# Patient Record
Sex: MALE | Race: WHITE | NOT HISPANIC OR LATINO | Employment: FULL TIME | URBAN - METROPOLITAN AREA
[De-identification: names, ages, dates, MRNs, and addresses within clinical notes are randomized per-mention and may not be internally consistent; named-entity substitution may affect disease eponyms.]

---

## 2017-06-30 ENCOUNTER — OFFICE VISIT (OUTPATIENT)
Dept: FAMILY MEDICINE CLINIC | Facility: CLINIC | Age: 57
End: 2017-06-30

## 2017-06-30 VITALS
SYSTOLIC BLOOD PRESSURE: 142 MMHG | DIASTOLIC BLOOD PRESSURE: 80 MMHG | TEMPERATURE: 98.3 F | HEIGHT: 68 IN | RESPIRATION RATE: 16 BRPM | BODY MASS INDEX: 20.52 KG/M2 | HEART RATE: 88 BPM | WEIGHT: 135.4 LBS

## 2017-06-30 DIAGNOSIS — Z76.89 ENCOUNTER TO ESTABLISH CARE WITH NEW DOCTOR: ICD-10-CM

## 2017-06-30 DIAGNOSIS — R03.0 ELEVATED BLOOD PRESSURE (NOT HYPERTENSION): ICD-10-CM

## 2017-06-30 DIAGNOSIS — K21.00 GERD WITH ESOPHAGITIS: Primary | ICD-10-CM

## 2017-06-30 DIAGNOSIS — Z23 NEED FOR STREPTOCOCCUS PNEUMONIAE VACCINATION: ICD-10-CM

## 2017-06-30 DIAGNOSIS — Z72.0 TOBACCO ABUSE: ICD-10-CM

## 2017-06-30 DIAGNOSIS — Z12.5 SCREENING PSA (PROSTATE SPECIFIC ANTIGEN): ICD-10-CM

## 2017-06-30 DIAGNOSIS — Z23 NEED FOR TDAP VACCINATION: ICD-10-CM

## 2017-06-30 DIAGNOSIS — Z72.0 TOBACCO USE: ICD-10-CM

## 2017-06-30 DIAGNOSIS — Z11.59 NEED FOR HEPATITIS C SCREENING TEST: ICD-10-CM

## 2017-06-30 DIAGNOSIS — Z13.220 SCREENING FOR HYPERLIPIDEMIA: ICD-10-CM

## 2017-06-30 PROCEDURE — 90472 IMMUNIZATION ADMIN EACH ADD: CPT | Performed by: FAMILY MEDICINE

## 2017-06-30 PROCEDURE — 90715 TDAP VACCINE 7 YRS/> IM: CPT | Performed by: FAMILY MEDICINE

## 2017-06-30 PROCEDURE — 99406 BEHAV CHNG SMOKING 3-10 MIN: CPT | Performed by: FAMILY MEDICINE

## 2017-06-30 PROCEDURE — 90471 IMMUNIZATION ADMIN: CPT | Performed by: FAMILY MEDICINE

## 2017-06-30 PROCEDURE — 99203 OFFICE O/P NEW LOW 30 MIN: CPT | Performed by: FAMILY MEDICINE

## 2017-06-30 PROCEDURE — 90670 PCV13 VACCINE IM: CPT | Performed by: FAMILY MEDICINE

## 2017-06-30 RX ORDER — OMEPRAZOLE 40 MG/1
40 CAPSULE, DELAYED RELEASE ORAL DAILY
COMMUNITY
End: 2018-11-29 | Stop reason: SDUPTHER

## 2017-06-30 NOTE — PROGRESS NOTES
Subjective   Roger Bhat is a 57 y.o. male.     History of Present Illness   Here to establish care  No previous PCP   Was seen at  last year for GI bleed, GERD and ulcers. He has had repeat scopes since then, with biopsies and normal results.   He only takes Omeprazole for treatment.     Recently treated for ring worm from [facility]. Took oral medication. Condition is improving.   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     Tobacco abuse: currently smoking 1.5 ppd. States that he previously quit smoking with [employer], but resumed smoking in Dec 2016.     The following portions of the patient's history were reviewed and updated as appropriate: allergies, current medications, past family history, past medical history, past social history, past surgical history and problem list.    Review of Systems   Constitutional: Positive for unexpected weight change (weight loss). Negative for chills, fatigue and fever.   HENT: Negative for congestion, ear pain and hearing loss.    Eyes: Negative for pain and visual disturbance.   Respiratory: Negative for cough, chest tightness and shortness of breath.    Cardiovascular: Negative for chest pain, palpitations and leg swelling.   Gastrointestinal: Negative for abdominal pain, blood in stool, constipation, diarrhea and vomiting.   Endocrine: Negative for cold intolerance, heat intolerance, polydipsia, polyphagia and polyuria.   Genitourinary: Negative for difficulty urinating, dysuria and frequency.   Musculoskeletal: Negative for arthralgias, back pain and gait problem.   Skin: Negative for color change, rash and wound.   Allergic/Immunologic: Negative for environmental allergies, food allergies and immunocompromised state.   Neurological: Negative for dizziness, weakness, numbness and headaches.   Hematological: Negative for adenopathy. Does not bruise/bleed easily.   Psychiatric/Behavioral: Negative for agitation, confusion and sleep disturbance.       Objective    Physical Exam   Constitutional: He is oriented to person, place, and time. He appears well-developed and well-nourished.   HENT:   Head: Normocephalic and atraumatic.   Right Ear: Hearing, tympanic membrane, external ear and ear canal normal.   Left Ear: Hearing, tympanic membrane, external ear and ear canal normal.   Nose: Nose normal.   Mouth/Throat: Uvula is midline, oropharynx is clear and moist and mucous membranes are normal.   Eyes: Conjunctivae and EOM are normal. Pupils are equal, round, and reactive to light.   Neck: Normal range of motion. Neck supple. No tracheal deviation present. No thyromegaly present.   Cardiovascular: Normal rate, regular rhythm and normal heart sounds.    No murmur heard.  Pulmonary/Chest: Effort normal. No respiratory distress. He has decreased breath sounds in the right upper field and the left upper field. He has no wheezes.   Abdominal: Soft. Bowel sounds are normal. He exhibits no distension. There is no tenderness.   Musculoskeletal: He exhibits no edema or deformity.   Lymphadenopathy:     He has no cervical adenopathy.   Neurological: He is alert and oriented to person, place, and time. No cranial nerve deficit.   Skin: Skin is warm and dry. No rash noted.   Psychiatric: He has a normal mood and affect. His behavior is normal. Judgment and thought content normal.   Nursing note and vitals reviewed.      Assessment/Plan   Roger was seen today for establish care.    Diagnoses and all orders for this visit:    GERD with esophagitis  -     Comprehensive Metabolic Panel  -     CBC & Differential    Elevated blood pressure (not hypertension)    Tobacco abuse  -     XR Chest PA & Lateral    Encounter to establish care with new doctor  -     Comprehensive Metabolic Panel  -     CBC & Differential    Screening for hyperlipidemia  -     Lipid Panel  -     Comprehensive Metabolic Panel  -     CBC & Differential    Need for hepatitis C screening test  -     Hepatitis C  Antibody    Need for Streptococcus pneumoniae vaccination  -     pneumococcal conj. 13-valent (PREVNAR-13) vaccine 0.5 mL; Inject 0.5 mL into the shoulder, thigh, or buttocks 1 (One) Time.    Need for Tdap vaccination  -     Tdap Vaccine Greater Than or Equal To 6yo IM    Screening PSA (prostate specific antigen)  -     PSA    Tobacco use    Monitor blood pressure, return if >140/90.   Labs ordered  He will notify me when he wants to complete screening colonoscopy, did recommend that he do this soon.  I advised the patient of the risks in continuing to use tobacco. I also discussed how to quit smoking and the patient has expressed the willingness to quit.    During this visit, I spent 3-10 mintues counseling the patient regarding smoking cessation.   Vaccinations updated  Screening CXR ordered  Avoid caffeine, alcohol, tobacco, and spicy/greasy foods.  Sleep with the head of the bed slightly elevated to decrease reflux symptoms at night.  Avoid eating 3-4 hours prior to bedtime.

## 2017-07-04 DIAGNOSIS — R71.8 ELEVATED MCV: ICD-10-CM

## 2017-07-04 DIAGNOSIS — R73.9 HYPERGLYCEMIA: Primary | ICD-10-CM

## 2017-07-04 LAB
ALBUMIN SERPL-MCNC: 4.2 G/DL (ref 3.2–4.8)
ALBUMIN/GLOB SERPL: 1.7 G/DL (ref 1.5–2.5)
ALP SERPL-CCNC: 83 U/L (ref 25–100)
ALT SERPL-CCNC: 22 U/L (ref 7–40)
AST SERPL-CCNC: 31 U/L (ref 0–33)
BASOPHILS # BLD AUTO: 0.04 10*3/MM3 (ref 0–0.2)
BASOPHILS NFR BLD AUTO: 0.5 % (ref 0–1)
BILIRUB SERPL-MCNC: 0.4 MG/DL (ref 0.3–1.2)
BUN SERPL-MCNC: 5 MG/DL (ref 9–23)
BUN/CREAT SERPL: 7.1 (ref 7–25)
CALCIUM SERPL-MCNC: 9.6 MG/DL (ref 8.7–10.4)
CHLORIDE SERPL-SCNC: 95 MMOL/L (ref 99–109)
CHOLEST SERPL-MCNC: 170 MG/DL (ref 0–200)
CO2 SERPL-SCNC: 27 MMOL/L (ref 20–31)
CREAT SERPL-MCNC: 0.7 MG/DL (ref 0.6–1.3)
EOSINOPHIL # BLD AUTO: 0.05 10*3/MM3 (ref 0–0.3)
EOSINOPHIL NFR BLD AUTO: 0.6 % (ref 0–3)
ERYTHROCYTE [DISTWIDTH] IN BLOOD BY AUTOMATED COUNT: 14.4 % (ref 11.3–14.5)
GLOBULIN SER CALC-MCNC: 2.5 GM/DL
GLUCOSE SERPL-MCNC: 104 MG/DL (ref 70–100)
HCT VFR BLD AUTO: 42.4 % (ref 38.9–50.9)
HCV AB S/CO SERPL IA: <0.1 S/CO RATIO (ref 0–0.9)
HDLC SERPL-MCNC: 70 MG/DL (ref 40–60)
HGB BLD-MCNC: 14.7 G/DL (ref 13.1–17.5)
IMM GRANULOCYTES # BLD: 0.02 10*3/MM3 (ref 0–0.03)
IMM GRANULOCYTES NFR BLD: 0.2 % (ref 0–0.6)
LDLC SERPL CALC-MCNC: 80 MG/DL (ref 0–100)
LYMPHOCYTES # BLD AUTO: 1.4 10*3/MM3 (ref 0.6–4.8)
LYMPHOCYTES NFR BLD AUTO: 16.1 % (ref 24–44)
MCH RBC QN AUTO: 35.8 PG (ref 27–31)
MCHC RBC AUTO-ENTMCNC: 34.7 G/DL (ref 32–36)
MCV RBC AUTO: 103.2 FL (ref 80–99)
MONOCYTES # BLD AUTO: 0.68 10*3/MM3 (ref 0–1)
MONOCYTES NFR BLD AUTO: 7.8 % (ref 0–12)
NEUTROPHILS # BLD AUTO: 6.48 10*3/MM3 (ref 1.5–8.3)
NEUTROPHILS NFR BLD AUTO: 74.8 % (ref 41–71)
PLATELET # BLD AUTO: 253 10*3/MM3 (ref 150–450)
POTASSIUM SERPL-SCNC: 4.8 MMOL/L (ref 3.5–5.5)
PROT SERPL-MCNC: 6.7 G/DL (ref 5.7–8.2)
PSA SERPL-MCNC: 0.73 NG/ML (ref 0–4)
RBC # BLD AUTO: 4.11 10*6/MM3 (ref 4.2–5.76)
SODIUM SERPL-SCNC: 130 MMOL/L (ref 132–146)
TRIGL SERPL-MCNC: 102 MG/DL (ref 0–150)
VLDLC SERPL CALC-MCNC: 20.4 MG/DL
WBC # BLD AUTO: 8.67 10*3/MM3 (ref 3.5–10.8)

## 2017-09-09 ENCOUNTER — RESULTS ENCOUNTER (OUTPATIENT)
Dept: FAMILY MEDICINE CLINIC | Facility: CLINIC | Age: 57
End: 2017-09-09

## 2017-09-09 DIAGNOSIS — R73.9 HYPERGLYCEMIA: ICD-10-CM

## 2017-09-09 DIAGNOSIS — R71.8 ELEVATED MCV: ICD-10-CM

## 2017-09-20 ENCOUNTER — OFFICE VISIT (OUTPATIENT)
Dept: FAMILY MEDICINE CLINIC | Facility: CLINIC | Age: 57
End: 2017-09-20

## 2017-09-20 ENCOUNTER — HOSPITAL ENCOUNTER (OUTPATIENT)
Dept: GENERAL RADIOLOGY | Facility: HOSPITAL | Age: 57
Discharge: HOME | End: 2017-09-20
Attending: FAMILY MEDICINE | Admitting: FAMILY MEDICINE

## 2017-09-20 VITALS
WEIGHT: 139.8 LBS | HEIGHT: 68 IN | SYSTOLIC BLOOD PRESSURE: 130 MMHG | DIASTOLIC BLOOD PRESSURE: 80 MMHG | HEART RATE: 88 BPM | RESPIRATION RATE: 20 BRPM | TEMPERATURE: 97.9 F | BODY MASS INDEX: 21.19 KG/M2

## 2017-09-20 DIAGNOSIS — S99.911A RIGHT ANKLE INJURY, INITIAL ENCOUNTER: Primary | ICD-10-CM

## 2017-09-20 DIAGNOSIS — Z72.0 TOBACCO ABUSE: ICD-10-CM

## 2017-09-20 PROCEDURE — 71020 HC CHEST PA AND LATERAL: CPT

## 2017-09-20 PROCEDURE — 99213 OFFICE O/P EST LOW 20 MIN: CPT | Performed by: FAMILY MEDICINE

## 2017-09-20 NOTE — PATIENT INSTRUCTIONS
"Go to the nearest ER or return to clinic if symptoms worsen, fever/chill develop      Generic Ankle Exercises  EXERCISES  RANGE OF MOTION (ROM) AND STRETCHING EXERCISES  These exercises may help you when beginning to rehabilitate your injury. Your symptoms may resolve with or without further involvement from your physician, physical therapist or . While completing these exercises, remember:   · Restoring tissue flexibility helps normal motion to return to the joints. This allows healthier, less painful movement and activity.  · An effective stretch should be held for at least 30 seconds.  · A stretch should never be painful. You should only feel a gentle lengthening or release in the stretched tissue.  RANGE OF MOTION - Dorsi/Plantar Flexion  · While sitting with your right / left knee straight, draw the top of your foot upwards by flexing your ankle. Then reverse the motion, pointing your toes downward.  · Hold each position for __________ seconds.  · After completing your first set of exercises, repeat this exercise with your knee bent.  Repeat __________ times. Complete this exercise __________ times per day.   RANGE OF MOTION - Ankle Alphabet  · Imagine your right / left big toe is a pen.  · Keeping your hip and knee still, write out the entire alphabet with your \"pen.\" Make the letters as large as you can without increasing any discomfort.  Repeat __________ times. Complete this exercise __________ times per day.   RANGE OF MOTION - Ankle Dorsiflexion, Active Assisted   · Remove shoes and sit on a chair that is preferably not on a carpeted surface.  · Place right / left foot under knee. Extend your opposite leg for support.  · Keeping your heel down, slide your right / left foot back toward the chair until you feel a stretch at your ankle or calf. If you do not feel a stretch, slide your bottom forward to the edge of the chair while still keeping your heel down.  · Hold this stretch for " __________ seconds.  Repeat __________ times. Complete this stretch __________ times per day.   STRENGTHENING EXERCISES   These exercises may help you when beginning to rehabilitate your injury. They may resolve your symptoms with or without further involvement from your physician, physical therapist or . While completing these exercises, remember:   · Muscles can gain both the endurance and the strength needed for everyday activities through controlled exercises.  · Complete these exercises as instructed by your physician, physical therapist or . Progress the resistance and repetitions only as guided.  · You may experience muscle soreness or fatigue, but the pain or discomfort you are trying to eliminate should never worsen during these exercises. If this pain does worsen, stop and make certain you are following the directions exactly. If the pain is still present after adjustments, discontinue the exercise until you can discuss the trouble with your clinician.  STRENGTH - Dorsiflexors  · Secure a rubber exercise band/tubing to a fixed object (table, pole) and loop the other end around your right / left foot.  · Sit on the floor facing the fixed object. The band/tubing should be slightly tense when your foot is relaxed.  · Slowly draw your foot back toward you using your ankle and toes.  · Hold this position for __________ seconds. Slowly release the tension in the band and return your foot to the starting position.  Repeat __________ times. Complete this exercise __________ times per day.   STRENGTH - Plantar-flexors  · Sit with your right / left leg extended. Holding onto both ends of a rubber exercise band/tubing, loop it around the ball of your foot. Keep a slight tension in the band.  · Slowly push your toes away from you, pointing them downward.  · Hold this position for __________ seconds. Return slowly, controlling the tension in the band/tubing.  Repeat __________ times.  Complete this exercise __________ times per day.   STRENGTH - Ankle Eversion  · Secure one end of a rubber exercise band/tubing to a fixed object (table, pole). Loop the other end around your foot just before your toes.  · Place your fists between your knees. This will focus your strengthening at your ankle.  · Drawing the band/tubing across your opposite foot, slowly, pull your little toe out and up. Make sure the band/tubing is positioned to resist the entire motion.  · Hold this position for __________ seconds.  · Have your muscles resist the band/tubing as it slowly pulls your foot back to the starting position.  Repeat __________ times. Complete this exercise __________ times per day.   STRENGTH - Ankle Inversion  · Secure one end of a rubber exercise band/tubing to a fixed object (table, pole). Loop the other end around your foot just before your toes.  · Place your fists between your knees. This will focus your strengthening at your ankle.  · Slowly, pull your big toe up and in, making sure the band/tubing is positioned to resist the entire motion.  · Hold this position for __________ seconds.  · Have your muscles resist the band/tubing as it slowly pulls your foot back to the starting position.  Repeat __________ times. Complete this exercises __________ times per day.   STRENGTH - Towel Curls  · Sit in a chair positioned on a non-carpeted surface.  · Place your foot on a towel, keeping your heel on the floor.  · Pull the towel toward your heel by only curling your toes. Keep your heel on the floor.  If instructed by your physician, physical therapist or , add weight to the end of the towel.  Repeat __________ times. Complete this exercise __________ times per day.  STRENGTH - Plantar-flexors, Standing  · Stand with your feet shoulder width apart. Steady yourself with a wall or table using as little support as needed.  · Keeping your weight evenly spread over the width of your feet, rise up  on your toes.*  · Hold this position for __________ seconds.  Repeat __________ times. Complete this exercise __________ times per day.   *If this is too easy, shift your weight toward your right / left leg until you feel challenged. Ultimately, you may be asked to do this exercise with your right / left foot only.  BALANCE - Tandem Walking  · Place your uninjured foot on a line 2-4 inches wide and at least 10 feet long.  · Keeping your balance without using anything for extra support, place your right / left heel directly in front of your other foot.  · Slowly raise your back foot up, lifting from the heel to the toes, and place it directly in front of the right / left foot.  · Continue to walk along the line slowly. Walk for ____________________ feet.  Repeat ____________________ times. Complete ____________________ times per day.     This information is not intended to replace advice given to you by your health care provider. Make sure you discuss any questions you have with your health care provider.     Document Released: 11/01/2006 Document Revised: 01/08/2016 Document Reviewed: 09/04/2016  [employer] Interactive Patient Education ©2017 [employer]

## 2017-09-20 NOTE — PROGRESS NOTES
Subjective   Roger Bhat is a 57 y.o. male.     History of Present Illness   He was kicked by a horse 1.5 weeks ago on his right ankle. He has been able to bear weight and walk without difficulty, treating with Aleve. Tolerating pain well, but edema is still present on the lateral malleolus. ROM is intact.  The first 2 days, he elevated the RLE and kept ice on the joint.     He continues to smoke tobacco. Order for chest xray was placed on last visit, however he didn't complete. Requesting to complete today.     The following portions of the patient's history were reviewed and updated as appropriate: allergies, current medications, past family history, past medical history, past social history, past surgical history and problem list.    Review of Systems   Constitutional: Negative.    Respiratory: Negative for cough, chest tightness and shortness of breath.    Cardiovascular: Negative.    Musculoskeletal: Positive for arthralgias and joint swelling. Negative for gait problem and myalgias.   Neurological: Negative.    Hematological: Negative.        Objective   Physical Exam   Constitutional: He is oriented to person, place, and time. He appears well-developed and well-nourished.   HENT:   Head: Normocephalic and atraumatic.   Nose: Nose normal.   Eyes: Conjunctivae are normal.   Cardiovascular: Normal rate, regular rhythm and normal heart sounds.    Pulmonary/Chest: Effort normal and breath sounds normal.   Musculoskeletal:        Right ankle: He exhibits swelling (lateral malleolus). He exhibits normal range of motion, no ecchymosis and no deformity. Tenderness. Lateral malleolus tenderness found.   Neurological: He is alert and oriented to person, place, and time. No cranial nerve deficit.   Skin: Skin is warm and dry.   Psychiatric: He has a normal mood and affect. His behavior is normal.   Nursing note and vitals reviewed.      Assessment/Plan   Roger was seen today for ankle pain.    Diagnoses and all orders  for this visit:    Right ankle injury, initial encounter  -     XR Ankle 3+ View Right    Tobacco abuse  -     XR Chest PA & Lateral      Evaluate ankle injury with xray. Advised him to continue aleve prn for pain relief, elevate the affected joint, compress with ace bandage, and apply ice as tolerated.   Encouraged him to stop tobacco abuse

## 2018-11-29 ENCOUNTER — OFFICE VISIT (OUTPATIENT)
Dept: FAMILY MEDICINE CLINIC | Facility: CLINIC | Age: 58
End: 2018-11-29

## 2018-11-29 ENCOUNTER — LAB (OUTPATIENT)
Dept: LAB | Facility: HOSPITAL | Age: 58
End: 2018-11-29

## 2018-11-29 VITALS
TEMPERATURE: 98 F | RESPIRATION RATE: 16 BRPM | HEART RATE: 80 BPM | SYSTOLIC BLOOD PRESSURE: 130 MMHG | HEIGHT: 69 IN | DIASTOLIC BLOOD PRESSURE: 80 MMHG | WEIGHT: 137.5 LBS | BODY MASS INDEX: 20.37 KG/M2

## 2018-11-29 DIAGNOSIS — Z13.220 SCREENING, LIPID: ICD-10-CM

## 2018-11-29 DIAGNOSIS — Z12.5 SCREENING FOR PROSTATE CANCER: ICD-10-CM

## 2018-11-29 DIAGNOSIS — L03.113 CELLULITIS OF RIGHT HAND: ICD-10-CM

## 2018-11-29 DIAGNOSIS — K21.9 GASTROESOPHAGEAL REFLUX DISEASE WITHOUT ESOPHAGITIS: ICD-10-CM

## 2018-11-29 DIAGNOSIS — L03.113 CELLULITIS OF RIGHT HAND: Primary | ICD-10-CM

## 2018-11-29 LAB
ALBUMIN SERPL-MCNC: 4.54 G/DL (ref 3.2–4.8)
ALBUMIN/GLOB SERPL: 2 G/DL (ref 1.5–2.5)
ALP SERPL-CCNC: 91 U/L (ref 25–100)
ALT SERPL W P-5'-P-CCNC: 22 U/L (ref 7–40)
ANION GAP SERPL CALCULATED.3IONS-SCNC: 5 MMOL/L (ref 3–11)
ARTICHOKE IGE QN: 107 MG/DL (ref 0–130)
AST SERPL-CCNC: 28 U/L (ref 0–33)
BASOPHILS # BLD AUTO: 0.07 10*3/MM3 (ref 0–0.2)
BASOPHILS NFR BLD AUTO: 1 % (ref 0–1)
BILIRUB SERPL-MCNC: 0.4 MG/DL (ref 0.3–1.2)
BUN BLD-MCNC: 7 MG/DL (ref 9–23)
BUN/CREAT SERPL: 10.1 (ref 7–25)
CALCIUM SPEC-SCNC: 9.6 MG/DL (ref 8.7–10.4)
CHLORIDE SERPL-SCNC: 101 MMOL/L (ref 99–109)
CHOLEST SERPL-MCNC: 182 MG/DL (ref 0–200)
CO2 SERPL-SCNC: 29 MMOL/L (ref 20–31)
CREAT BLD-MCNC: 0.69 MG/DL (ref 0.6–1.3)
DEPRECATED RDW RBC AUTO: 55 FL (ref 37–54)
EOSINOPHIL # BLD AUTO: 0.14 10*3/MM3 (ref 0–0.3)
EOSINOPHIL NFR BLD AUTO: 2.1 % (ref 0–3)
ERYTHROCYTE [DISTWIDTH] IN BLOOD BY AUTOMATED COUNT: 14.3 % (ref 11.3–14.5)
GFR SERPL CREATININE-BSD FRML MDRD: 119 ML/MIN/1.73
GLOBULIN UR ELPH-MCNC: 2.3 GM/DL
GLUCOSE BLD-MCNC: 93 MG/DL (ref 70–100)
HCT VFR BLD AUTO: 47.7 % (ref 38.9–50.9)
HDLC SERPL-MCNC: 66 MG/DL (ref 40–60)
HGB BLD-MCNC: 16.4 G/DL (ref 13.1–17.5)
IMM GRANULOCYTES # BLD: 0.01 10*3/MM3 (ref 0–0.03)
IMM GRANULOCYTES NFR BLD: 0.1 % (ref 0–0.6)
LYMPHOCYTES # BLD AUTO: 1.57 10*3/MM3 (ref 0.6–4.8)
LYMPHOCYTES NFR BLD AUTO: 23.4 % (ref 24–44)
MCH RBC QN AUTO: 36.2 PG (ref 27–31)
MCHC RBC AUTO-ENTMCNC: 34.4 G/DL (ref 32–36)
MCV RBC AUTO: 105.3 FL (ref 80–99)
MONOCYTES # BLD AUTO: 0.61 10*3/MM3 (ref 0–1)
MONOCYTES NFR BLD AUTO: 9.1 % (ref 0–12)
NEUTROPHILS # BLD AUTO: 4.31 10*3/MM3 (ref 1.5–8.3)
NEUTROPHILS NFR BLD AUTO: 64.4 % (ref 41–71)
PLATELET # BLD AUTO: 336 10*3/MM3 (ref 150–450)
PMV BLD AUTO: 10.1 FL (ref 6–12)
POTASSIUM BLD-SCNC: 5 MMOL/L (ref 3.5–5.5)
PROT SERPL-MCNC: 6.8 G/DL (ref 5.7–8.2)
PSA SERPL-MCNC: 0.78 NG/ML (ref 0–4)
RBC # BLD AUTO: 4.53 10*6/MM3 (ref 4.2–5.76)
SODIUM BLD-SCNC: 135 MMOL/L (ref 132–146)
TRIGL SERPL-MCNC: 118 MG/DL (ref 0–150)
WBC NRBC COR # BLD: 6.7 10*3/MM3 (ref 3.5–10.8)

## 2018-11-29 PROCEDURE — 80053 COMPREHEN METABOLIC PANEL: CPT

## 2018-11-29 PROCEDURE — G0103 PSA SCREENING: HCPCS

## 2018-11-29 PROCEDURE — 99214 OFFICE O/P EST MOD 30 MIN: CPT | Performed by: NURSE PRACTITIONER

## 2018-11-29 PROCEDURE — 85025 COMPLETE CBC W/AUTO DIFF WBC: CPT

## 2018-11-29 PROCEDURE — 80061 LIPID PANEL: CPT

## 2018-11-29 RX ORDER — OMEPRAZOLE 40 MG/1
40 CAPSULE, DELAYED RELEASE ORAL DAILY
Qty: 90 CAPSULE | Refills: 1 | Status: SHIPPED | OUTPATIENT
Start: 2018-11-29 | End: 2020-01-31 | Stop reason: SDUPTHER

## 2018-11-29 RX ORDER — CEPHALEXIN 500 MG/1
CAPSULE ORAL
COMMUNITY
Start: 2018-11-28 | End: 2020-01-31

## 2018-11-29 NOTE — PROGRESS NOTES
Subjective   Roger Bhat is a 58 y.o. male.     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 started on Keflex by [facility] is keeping skin clean and dry and monitoring for worsening pain or swelling or redness  Is supposed to follow up with [facility] tomorrow for recheck, up to date on Tetanus shot    GERD   Needs refill on Omeprazole   No belching or burping or abdominal pains    Would like to have labs he is off work today and is fasting  He usually only see PCP once a year    The following portions of the patient's history were reviewed and updated as appropriate: allergies, current medications, past family history, past medical history, past social history, past surgical history and problem list.    Review of Systems   Constitutional: Negative.  Negative for activity change, chills and fever.   HENT: Negative.    Respiratory: Positive for cough. Negative for chest tightness, shortness of breath and wheezing.    Cardiovascular: Negative.    Gastrointestinal: Negative.  Positive for GERD.   Musculoskeletal: Positive for arthralgias.   Skin: Positive for color change.   Neurological: Negative.  Negative for weakness and numbness.   Hematological: Negative.    Psychiatric/Behavioral: Negative for sleep disturbance.       Objective   Physical Exam   Constitutional: He is oriented to person, place, and time. He appears well-developed and well-nourished. No distress.   HENT:   Head: Normocephalic.   Right Ear: External ear normal.   Left Ear: External ear normal.   Nose: Nose normal.   Neck: Neck supple. No JVD present. No thyromegaly present.   Cardiovascular: Normal rate, regular rhythm, normal heart sounds and intact distal pulses. Exam reveals no gallop and no friction rub.   No murmur heard.  Pulmonary/Chest: Effort normal and breath sounds normal. No stridor. No respiratory distress. He has no wheezes. He has no rales.   Musculoskeletal: Normal range of  motion. He exhibits edema (right hand swelling and tenderness) and tenderness.   Neurological: He is alert and oriented to person, place, and time. He has normal reflexes.   Skin: Skin is warm and dry. Capillary refill takes less than 2 seconds. There is erythema.   Psychiatric: He has a normal mood and affect. His behavior is normal. Judgment and thought content normal.   Nursing note and vitals reviewed.        Assessment/Plan   Roger was seen today for right hand edema & pain, order for any necessary labs and heartburn.    Diagnoses and all orders for this visit:    Cellulitis of right hand  -     CBC & Differential; Future  -     Comprehensive Metabolic Panel; Future    Screening, lipid  -     Lipid Panel; Future    Screening for prostate cancer  -     PSA SCREENING; Future    Gastroesophageal reflux disease without esophagitis    Other orders  -     omeprazole (priLOSEC) 40 MG capsule; Take 1 capsule by mouth Daily.      Will refill Omeprazole  Will check labs, [facility]  Will notify pt of results  Keep follow up regarding cellulitis and continue Keflex twice day until complete, if worsening pain, redness or swelling pt advised to follow up

## 2018-11-29 NOTE — PATIENT INSTRUCTIONS
Cellulitis, Adult  Cellulitis is a skin infection. The infected area is usually red and tender. This condition occurs most often in the arms and lower legs. The infection can travel to the muscles, blood, and underlying tissue and become serious. It is very important to get treated for this condition.  What are the causes?  Cellulitis is caused by bacteria. The bacteria enter through a break in the skin, such as a cut, burn, insect bite, open sore, or crack.  What increases the risk?  This condition is more likely to occur in people who:  · Have a weak defense system (immune system).  · Have open wounds on the skin such as cuts, burns, bites, and scrapes. Bacteria can enter the body through these open wounds.  · Are older.  · Have diabetes.  · Have a type of long-lasting (chronic) liver disease (cirrhosis) or kidney disease.  · Use IV drugs.    What are the signs or symptoms?  Symptoms of this condition include:  · Redness, streaking, or spotting on the skin.  · Swollen area of the skin.  · Tenderness or pain when an area of the skin is touched.  · Warm skin.  · Fever.  · Chills.  · Blisters.    How is this diagnosed?  This condition is diagnosed based on a medical history and physical exam. You may also have tests, including:  · Blood tests.  · Lab tests.  · Imaging tests.    How is this treated?  Treatment for this condition may include:  · Medicines, such as antibiotic medicines or antihistamines.  · Supportive care, such as rest and application of cold or warm cloths (cold or warm compresses) to the skin.  · Hospital care, if the condition is severe.    The infection usually gets better within 1-2 days of treatment.  Follow these instructions at home:  · Take over-the-counter and prescription medicines only as told by your health care provider.  · If you were prescribed an antibiotic medicine, take it as told by your health care provider. Do not stop taking the antibiotic even if you start to feel  better.  · Drink enough fluid to keep your urine clear or pale yellow.  · Do not touch or rub the infected area.  · Raise (elevate) the infected area above the level of your heart while you are sitting or lying down.  · Apply warm or cold compresses to the affected area as told by your health care provider.  · Keep all follow-up visits as told by your health care provider. This is important. These visits let your health care provider make sure a more serious infection is not developing.  Contact a health care provider if:  · You have a fever.  · Your symptoms do not improve within 1-2 days of starting treatment.  · Your bone or joint underneath the infected area becomes painful after the skin has healed.  · Your infection returns in the same area or another area.  · You notice a swollen bump in the infected area.  · You develop new symptoms.  · You have a general ill feeling (malaise) with muscle aches and pains.  Get help right away if:  · Your symptoms get worse.  · You feel very sleepy.  · You develop vomiting or diarrhea that persists.  · You notice red streaks coming from the infected area.  · Your red area gets larger or turns dark in color.  This information is not intended to replace advice given to you by your health care provider. Make sure you discuss any questions you have with your health care provider.  Document Released: 09/27/2006 Document Revised: 04/27/2017 Document Reviewed: 10/26/2016  [employer] Interactive Patient Education © 2018 [employer]    Food Choices for Gastroesophageal Reflux Disease, Adult  When you have gastroesophageal reflux disease (GERD), the foods you eat and your eating habits are very important. Choosing the right foods can help ease the discomfort of GERD. Consider working with a diet and nutrition specialist (dietitian) to help you make healthy food choices.  What general guidelines should I follow?  Eating plan  · Choose healthy foods low in fat, such as fruits,  vegetables, whole grains, low-fat dairy products, and lean meat, fish, and poultry.  · Eat frequent, small meals instead of three large meals each day. Eat your meals slowly, in a relaxed setting. Avoid bending over or lying down until 2-3 hours after eating.  · Limit high-fat foods such as fatty meats or fried foods.  · Limit your intake of oils, butter, and shortening to less than 8 teaspoons each day.  · Avoid the following:  ? Foods that cause symptoms. These may be different for different people. Keep a food diary to keep track of foods that cause symptoms.  ? Alcohol.  ? Drinking large amounts of liquid with meals.  ? Eating meals during the 2-3 hours before bed.  · Cook foods using methods other than frying. This may include baking, grilling, or broiling.  Lifestyle    · Maintain a healthy weight. Ask your health care provider what weight is healthy for you. If you need to lose weight, work with your health care provider to do so safely.  · Exercise for at least 30 minutes on 5 or more days each week, or as told by your health care provider.  · Avoid wearing clothes that fit tightly around your waist and chest.  · Do not use any products that contain nicotine or tobacco, such as cigarettes and e-cigarettes. If you need help quitting, ask your health care provider.  · Sleep with the head of your bed raised. Use a wedge under the mattress or blocks under the bed frame to raise the head of the bed.  What foods are not recommended?  The items listed may not be a complete list. Talk with your dietitian about what dietary choices are best for you.  Grains  Pastries or quick breads with added fat. French toast.  Vegetables  Deep fried vegetables. French fries. Any vegetables prepared with added fat. Any vegetables that cause symptoms. For some people this may include tomatoes and tomato products, chili peppers, onions and garlic, and horseradish.  Fruits  Any fruits prepared with added fat. Any fruits that cause  symptoms. For some people this may include citrus fruits, such as oranges, grapefruit, pineapple, and alex.  Meats and other protein foods  High-fat meats, such as fatty beef or pork, hot dogs, ribs, ham, sausage, salami and durán. Fried meat or protein, including fried fish and fried chicken. Nuts and nut butters.  Dairy  Whole milk and chocolate milk. Sour cream. Cream. Ice cream. Cream cheese. Milk shakes.  Beverages  Coffee and tea, with or without caffeine. Carbonated beverages. Sodas. Energy drinks. Fruit juice made with acidic fruits (such as orange or grapefruit). Tomato juice. Alcoholic drinks.  Fats and oils  Butter. Margarine. Shortening. Ghee.  Sweets and desserts  Chocolate and cocoa. Donuts.  Seasoning and other foods  Pepper. Peppermint and spearmint. Any condiments, herbs, or seasonings that cause symptoms. For some people, this may include rosales, hot sauce, or vinegar-based salad dressings.  Summary  · When you have gastroesophageal reflux disease (GERD), food and lifestyle choices are very important to help ease the discomfort of GERD.  · Eat frequent, small meals instead of three large meals each day. Eat your meals slowly, in a relaxed setting. Avoid bending over or lying down until 2-3 hours after eating.  · Limit high-fat foods such as fatty meat or fried foods.  This information is not intended to replace advice given to you by your health care provider. Make sure you discuss any questions you have with your health care provider.  Document Released: 12/18/2006 Document Revised: 12/19/2017 Document Reviewed: 12/19/2017  [employer] Interactive Patient Education © 2018 [employer]    Preventive Care 40-64 Years, Male  Preventive care refers to lifestyle choices and visits with your health care provider that can promote health and wellness.  What does preventive care include?  · A yearly physical exam. This is also called an annual well check.  · Dental exams once or twice a year.  · Routine eye  exams. Ask your health care provider how often you should have your eyes checked.  · Personal lifestyle choices, including:  ? Daily care of your teeth and gums.  ? Regular physical activity.  ? Eating a healthy diet.  ? Avoiding tobacco and drug use.  ? Limiting alcohol use.  ? Practicing safe sex.  ? Taking low-dose aspirin every day starting at age 50.  What happens during an annual well check?  The services and screenings done by your health care provider during your annual well check will depend on your age, overall health, lifestyle risk factors, and family history of disease.  Counseling  Your health care provider may ask you questions about your:  · Alcohol use.  · Tobacco use.  · Drug use.  · Emotional well-being.  · Home and relationship well-being.  · Sexual activity.  · Eating habits.  · Work and work environment.    Screening  You may have the following tests or measurements:  · Height, weight, and BMI.  · Blood pressure.  · Lipid and cholesterol levels. These may be checked every 5 years, or more frequently if you are over 50 years old.  · Skin check.  · Lung cancer screening. You may have this screening every year starting at age 55 if you have a 30-pack-year history of smoking and currently smoke or have quit within the past 15 years.  · Fecal occult blood test (FOBT) of the stool. You may have this test every year starting at age 50.  · Flexible sigmoidoscopy or colonoscopy. You may have a sigmoidoscopy every 5 years or a colonoscopy every 10 years starting at age 50.  · Prostate cancer screening. Recommendations will vary depending on your family history and other risks.  · Hepatitis C blood test.  · Hepatitis B blood test.  · Sexually transmitted disease (STD) testing.  · Diabetes screening. This is done by checking your blood sugar (glucose) after you have not eaten for a while (fasting). You may have this done every 1-3 years.    Discuss your test results, treatment options, and if necessary,  the need for more tests with your health care provider.  Vaccines  Your health care provider may recommend certain vaccines, such as:  · Influenza vaccine. This is recommended every year.  · Tetanus, diphtheria, and acellular pertussis (Tdap, Td) vaccine. You may need a Td booster every 10 years.  · Varicella vaccine. You may need this if you have not been vaccinated.  · Zoster vaccine. You may need this after age 60.  · Measles, mumps, and rubella (MMR) vaccine. You may need at least one dose of MMR if you were born in 1957 or later. You may also need a second dose.  · Pneumococcal 13-valent conjugate (PCV13) vaccine. You may need this if you have certain conditions and have not been vaccinated.  · Pneumococcal polysaccharide (PPSV23) vaccine. You may need one or two doses if you smoke cigarettes or if you have certain conditions.  · Meningococcal vaccine. You may need this if you have certain conditions.  · Hepatitis A vaccine. You may need this if you have certain conditions or if you travel or work in places where you may be exposed to hepatitis A.  · Hepatitis B vaccine. You may need this if you have certain conditions or if you travel or work in places where you may be exposed to hepatitis B.  · Haemophilus influenzae type b (Hib) vaccine. You may need this if you have certain risk factors.    Talk to your health care provider about which screenings and vaccines you need and how often you need them.  This information is not intended to replace advice given to you by your health care provider. Make sure you discuss any questions you have with your health care provider.  Document Released: 01/13/2017 Document Revised: 09/06/2017 Document Reviewed: 10/18/2016  [employer] Interactive Patient Education © 2018 [employer]  Prostate-Specific Antigen Test  Why am I having this test?  The prostate-specific antigen (PSA) test is performed to determine how much PSA you have in your blood. PSA is a type of protein that is  normally present in the prostate gland. Certain conditions can cause PSA blood levels to increase, such as:  · Infection in the prostate (prostatitis).  · Enlargement of the prostate (hypertrophy).  · Prostate cancer.    Because PSA levels increase greatly from prostate cancer, this test can be used to confirm a diagnosis of prostate cancer. It may also be used to monitor treatment for prostate cancer and to watch for a return of prostate cancer after treatment has finished.  This test has a very high false-positive rate. Therefore, routine PSA screening for all men is no longer recommended. A false-positive result is incorrect because it indicates a condition or finding is present when it is not.  What kind of sample is taken?  A blood sample is required for this test. It is usually collected by inserting a needle into a vein or by sticking a finger with a small needle.  How do I prepare for this test?  There is no preparation required for this test. However, there are factors that can affect the results of a PSA test. To get the most accurate results:  · Avoid having a rectal exam within several hours before having your blood drawn for this test.  · Avoid having any procedures performed on the prostate gland within 6 weeks of having this test.  · Avoid ejaculating within 24 hours of having this test.  · Tell your health care provider if you had a recent urinary tract infection (UTI).  · Tell your health care provider if you are taking medicines to assist with hair growth, such as finasteride.  · Tell your health care provider if you have been exposed to a medicine called diethylstilbestrol.    Let your health care provider know if any of these factors apply to you. You may be asked to reschedule the test.  What are the reference ranges?  Reference ranges are established after testing a large group of people. Reference ranges may vary among different people, labs, and hospitals. It is your responsibility to obtain  your test results. Ask the lab or department performing the test when and how you will get your results.  · Low: 0-2.5 ng/mL.  · Slightly to moderately elevated: 2.6-10.0 ng/mL.  · Moderately elevated: 10.0-19.9 ng/mL.  · Significantly elevated: 20 ng/mL or greater.    What do the results mean?  PSA test results greater than 4 ng/mL are found in the majority of men with prostate cancer. If your test result is above this level, this can indicate an increased risk for prostate cancer. Increased PSA levels can also indicate other health conditions.  Talk with your health care provider to discuss your results, treatment options, and if necessary, the need for more tests. Talk with your health care provider if you have any questions about your results.  Talk with your health care provider to discuss your results, treatment options, and if necessary, the need for more tests. Talk with your health care provider if you have any questions about your results.  This information is not intended to replace advice given to you by your health care provider. Make sure you discuss any questions you have with your health care provider.  Document Released: 01/20/2006 Document Revised: 08/23/2017 Document Reviewed: 05/13/2015  [employer] Interactive Patient Education © 2018 [employer]

## 2020-01-31 ENCOUNTER — OFFICE VISIT (OUTPATIENT)
Dept: FAMILY MEDICINE CLINIC | Facility: CLINIC | Age: 60
End: 2020-01-31

## 2020-01-31 VITALS
RESPIRATION RATE: 20 BRPM | HEIGHT: 69 IN | SYSTOLIC BLOOD PRESSURE: 138 MMHG | BODY MASS INDEX: 20.26 KG/M2 | OXYGEN SATURATION: 98 % | WEIGHT: 136.8 LBS | DIASTOLIC BLOOD PRESSURE: 76 MMHG | HEART RATE: 73 BPM | TEMPERATURE: 97.5 F

## 2020-01-31 DIAGNOSIS — Z00.00 ANNUAL PHYSICAL EXAM: ICD-10-CM

## 2020-01-31 DIAGNOSIS — Z12.11 SCREENING FOR COLON CANCER: ICD-10-CM

## 2020-01-31 DIAGNOSIS — Z23 NEED FOR SHINGLES VACCINE: ICD-10-CM

## 2020-01-31 DIAGNOSIS — F17.200 CURRENT SMOKER: Primary | ICD-10-CM

## 2020-01-31 DIAGNOSIS — Z12.5 PROSTATE CANCER SCREENING: ICD-10-CM

## 2020-01-31 DIAGNOSIS — E78.89 ELEVATED HDL: ICD-10-CM

## 2020-01-31 DIAGNOSIS — R06.02 SHORTNESS OF BREATH: ICD-10-CM

## 2020-01-31 DIAGNOSIS — K21.9 GASTROESOPHAGEAL REFLUX DISEASE WITHOUT ESOPHAGITIS: ICD-10-CM

## 2020-01-31 DIAGNOSIS — Z23 NEED FOR 23-POLYVALENT PNEUMOCOCCAL POLYSACCHARIDE VACCINE: ICD-10-CM

## 2020-01-31 PROCEDURE — 99396 PREV VISIT EST AGE 40-64: CPT | Performed by: FAMILY MEDICINE

## 2020-01-31 RX ORDER — OMEPRAZOLE 40 MG/1
40 CAPSULE, DELAYED RELEASE ORAL DAILY
Qty: 90 CAPSULE | Refills: 3 | Status: SHIPPED | OUTPATIENT
Start: 2020-01-31 | End: 2021-08-23 | Stop reason: SDUPTHER

## 2020-01-31 RX ORDER — ALBUTEROL SULFATE 90 UG/1
2 AEROSOL, METERED RESPIRATORY (INHALATION) EVERY 4 HOURS PRN
Qty: 8 G | Refills: 5 | Status: SHIPPED | OUTPATIENT
Start: 2020-01-31 | End: 2021-08-12 | Stop reason: SDUPTHER

## 2020-01-31 RX ORDER — NICOTINE 21 MG/24HR
1 PATCH, TRANSDERMAL 24 HOURS TRANSDERMAL EVERY 24 HOURS
Qty: 28 EACH | Refills: 1 | Status: SHIPPED | OUTPATIENT
Start: 2020-01-31 | End: 2021-08-12

## 2020-02-03 PROCEDURE — 90471 IMMUNIZATION ADMIN: CPT | Performed by: FAMILY MEDICINE

## 2020-02-03 PROCEDURE — 90732 PPSV23 VACC 2 YRS+ SUBQ/IM: CPT | Performed by: FAMILY MEDICINE

## 2020-02-05 ENCOUNTER — HOSPITAL ENCOUNTER (OUTPATIENT)
Dept: GENERAL RADIOLOGY | Facility: HOSPITAL | Age: 60
Discharge: HOME | End: 2020-02-05
Admitting: FAMILY MEDICINE

## 2020-02-05 ENCOUNTER — RESULTS ENCOUNTER (OUTPATIENT)
Dept: FAMILY MEDICINE CLINIC | Facility: CLINIC | Age: 60
End: 2020-02-05

## 2020-02-05 DIAGNOSIS — Z12.5 PROSTATE CANCER SCREENING: ICD-10-CM

## 2020-02-05 DIAGNOSIS — K21.9 GASTROESOPHAGEAL REFLUX DISEASE WITHOUT ESOPHAGITIS: ICD-10-CM

## 2020-02-05 DIAGNOSIS — F17.200 CURRENT SMOKER: ICD-10-CM

## 2020-02-05 DIAGNOSIS — E78.89 ELEVATED HDL: ICD-10-CM

## 2020-02-05 PROCEDURE — 71046 X-RAY EXAM CHEST 2 VIEWS: CPT

## 2020-02-06 DIAGNOSIS — E87.5 HYPERKALEMIA: Primary | ICD-10-CM

## 2020-02-06 DIAGNOSIS — R71.8 ELEVATED MCV: ICD-10-CM

## 2020-02-06 LAB
ALBUMIN SERPL-MCNC: 4.1 G/DL (ref 3.5–5.2)
ALBUMIN/GLOB SERPL: 1.6 G/DL
ALP SERPL-CCNC: 80 U/L (ref 39–117)
ALT SERPL-CCNC: 19 U/L (ref 1–41)
AST SERPL-CCNC: 25 U/L (ref 1–40)
BASOPHILS # BLD AUTO: 0.07 10*3/MM3 (ref 0–0.2)
BASOPHILS NFR BLD AUTO: 1.1 % (ref 0–1.5)
BILIRUB SERPL-MCNC: 0.3 MG/DL (ref 0.2–1.2)
BUN SERPL-MCNC: 6 MG/DL (ref 6–20)
BUN/CREAT SERPL: 8.2 (ref 7–25)
CALCIUM SERPL-MCNC: 9.3 MG/DL (ref 8.6–10.5)
CHLORIDE SERPL-SCNC: 96 MMOL/L (ref 98–107)
CHOLEST SERPL-MCNC: 166 MG/DL (ref 0–200)
CO2 SERPL-SCNC: 29.4 MMOL/L (ref 22–29)
CREAT SERPL-MCNC: 0.73 MG/DL (ref 0.76–1.27)
EOSINOPHIL # BLD AUTO: 0.15 10*3/MM3 (ref 0–0.4)
EOSINOPHIL NFR BLD AUTO: 2.4 % (ref 0.3–6.2)
ERYTHROCYTE [DISTWIDTH] IN BLOOD BY AUTOMATED COUNT: 13.2 % (ref 12.3–15.4)
GLOBULIN SER CALC-MCNC: 2.5 GM/DL
GLUCOSE SERPL-MCNC: 127 MG/DL (ref 65–99)
HCT VFR BLD AUTO: 43.7 % (ref 37.5–51)
HDLC SERPL-MCNC: 67 MG/DL (ref 40–60)
HGB BLD-MCNC: 15.1 G/DL (ref 13–17.7)
IMM GRANULOCYTES # BLD AUTO: 0.04 10*3/MM3 (ref 0–0.05)
IMM GRANULOCYTES NFR BLD AUTO: 0.6 % (ref 0–0.5)
LDLC SERPL CALC-MCNC: 73 MG/DL (ref 0–100)
LYMPHOCYTES # BLD AUTO: 1.57 10*3/MM3 (ref 0.7–3.1)
LYMPHOCYTES NFR BLD AUTO: 25 % (ref 19.6–45.3)
Lab: NORMAL
Lab: NORMAL
MCH RBC QN AUTO: 35.9 PG (ref 26.6–33)
MCHC RBC AUTO-ENTMCNC: 34.6 G/DL (ref 31.5–35.7)
MCV RBC AUTO: 103.8 FL (ref 79–97)
MONOCYTES # BLD AUTO: 0.59 10*3/MM3 (ref 0.1–0.9)
MONOCYTES NFR BLD AUTO: 9.4 % (ref 5–12)
NEUTROPHILS # BLD AUTO: 3.85 10*3/MM3 (ref 1.7–7)
NEUTROPHILS NFR BLD AUTO: 61.5 % (ref 42.7–76)
NRBC BLD AUTO-RTO: 0 /100 WBC (ref 0–0.2)
PLATELET # BLD AUTO: 365 10*3/MM3 (ref 140–450)
POTASSIUM SERPL-SCNC: 5.6 MMOL/L (ref 3.5–5.2)
PROT SERPL-MCNC: 6.6 G/DL (ref 6–8.5)
PSA SERPL-MCNC: 1.15 NG/ML (ref 0–4)
RBC # BLD AUTO: 4.21 10*6/MM3 (ref 4.14–5.8)
SODIUM SERPL-SCNC: 135 MMOL/L (ref 136–145)
TRIGL SERPL-MCNC: 132 MG/DL (ref 0–150)
VLDLC SERPL CALC-MCNC: 26.4 MG/DL
WBC # BLD AUTO: 6.27 10*3/MM3 (ref 3.4–10.8)

## 2020-02-07 LAB
HBA1C MFR BLD: 5.8 % (ref 4.8–5.6)
WRITTEN AUTHORIZATION: NORMAL

## 2020-02-11 ENCOUNTER — RESULTS ENCOUNTER (OUTPATIENT)
Dept: FAMILY MEDICINE CLINIC | Facility: CLINIC | Age: 60
End: 2020-02-11

## 2020-02-11 DIAGNOSIS — R71.8 ELEVATED MCV: ICD-10-CM

## 2020-02-11 DIAGNOSIS — E87.5 HYPERKALEMIA: ICD-10-CM

## 2020-02-15 DIAGNOSIS — E87.1 HYPONATREMIA: ICD-10-CM

## 2020-02-15 DIAGNOSIS — E87.5 HYPERKALEMIA: Primary | ICD-10-CM

## 2020-02-15 LAB
BUN SERPL-MCNC: 7 MG/DL (ref 6–20)
BUN/CREAT SERPL: 9.9 (ref 7–25)
CALCIUM SERPL-MCNC: 9.4 MG/DL (ref 8.6–10.5)
CHLORIDE SERPL-SCNC: 94 MMOL/L (ref 98–107)
CO2 SERPL-SCNC: 27.1 MMOL/L (ref 22–29)
CREAT SERPL-MCNC: 0.71 MG/DL (ref 0.76–1.27)
FOLATE SERPL-MCNC: 10.3 NG/ML (ref 4.78–24.2)
GLUCOSE SERPL-MCNC: 121 MG/DL (ref 65–99)
POTASSIUM SERPL-SCNC: 5.7 MMOL/L (ref 3.5–5.2)
SODIUM SERPL-SCNC: 133 MMOL/L (ref 136–145)
VIT B12 SERPL-MCNC: 692 PG/ML (ref 211–946)

## 2020-02-19 DIAGNOSIS — Z12.11 SCREENING FOR COLON CANCER: Primary | ICD-10-CM

## 2020-02-20 ENCOUNTER — RESULTS ENCOUNTER (OUTPATIENT)
Dept: FAMILY MEDICINE CLINIC | Facility: CLINIC | Age: 60
End: 2020-02-20

## 2020-02-20 DIAGNOSIS — E87.5 HYPERKALEMIA: ICD-10-CM

## 2020-02-20 DIAGNOSIS — E87.1 HYPONATREMIA: ICD-10-CM

## 2020-02-26 ENCOUNTER — OUTSIDE FACILITY SERVICE (OUTPATIENT)
Dept: GASTROENTEROLOGY | Facility: CLINIC | Age: 60
End: 2020-02-26

## 2020-02-26 ENCOUNTER — LAB REQUISITION (OUTPATIENT)
Dept: LAB | Facility: HOSPITAL | Age: 60
End: 2020-02-26

## 2020-02-26 DIAGNOSIS — Z12.11 ENCOUNTER FOR SCREENING FOR MALIGNANT NEOPLASM OF COLON: ICD-10-CM

## 2020-02-26 PROCEDURE — 88305 TISSUE EXAM BY PATHOLOGIST: CPT | Performed by: INTERNAL MEDICINE

## 2020-02-26 PROCEDURE — 45385 COLONOSCOPY W/LESION REMOVAL: CPT | Performed by: INTERNAL MEDICINE

## 2020-02-26 PROCEDURE — 45381 COLONOSCOPY SUBMUCOUS NJX: CPT | Performed by: INTERNAL MEDICINE

## 2020-02-27 LAB
CYTO UR: NORMAL
LAB AP CASE REPORT: NORMAL
LAB AP CLINICAL INFORMATION: NORMAL
PATH REPORT.FINAL DX SPEC: NORMAL
PATH REPORT.GROSS SPEC: NORMAL

## 2020-02-28 LAB
ACTH PLAS-MCNC: 13.8 PG/ML (ref 7.2–63.3)
ALDOST SERPL-MCNC: 1.2 NG/DL (ref 0–30)
BUN SERPL-MCNC: 6 MG/DL (ref 6–20)
BUN/CREAT SERPL: 11.3 (ref 7–25)
CALCIUM SERPL-MCNC: 9.2 MG/DL (ref 8.6–10.5)
CHLORIDE SERPL-SCNC: 90 MMOL/L (ref 98–107)
CO2 SERPL-SCNC: 27.5 MMOL/L (ref 22–29)
CORTIS SERPL-MCNC: 7.1 UG/DL
CREAT SERPL-MCNC: 0.53 MG/DL (ref 0.76–1.27)
GLUCOSE SERPL-MCNC: 70 MG/DL (ref 65–99)
POTASSIUM SERPL-SCNC: 4.9 MMOL/L (ref 3.5–5.2)
SODIUM SERPL-SCNC: 129 MMOL/L (ref 136–145)

## 2021-07-19 ENCOUNTER — OFFICE VISIT (OUTPATIENT)
Dept: FAMILY MEDICINE CLINIC | Facility: CLINIC | Age: 61
End: 2021-07-19

## 2021-07-19 VITALS
OXYGEN SATURATION: 99 % | HEART RATE: 74 BPM | SYSTOLIC BLOOD PRESSURE: 152 MMHG | HEIGHT: 69 IN | BODY MASS INDEX: 19.99 KG/M2 | RESPIRATION RATE: 20 BRPM | WEIGHT: 135 LBS | DIASTOLIC BLOOD PRESSURE: 90 MMHG | TEMPERATURE: 98 F

## 2021-07-19 DIAGNOSIS — S69.91XD INJURY OF RIGHT HAND, SUBSEQUENT ENCOUNTER: Primary | ICD-10-CM

## 2021-07-19 DIAGNOSIS — R60.0 HAND EDEMA: ICD-10-CM

## 2021-07-19 DIAGNOSIS — R23.3 EASY BRUISABILITY: ICD-10-CM

## 2021-07-19 DIAGNOSIS — R13.10 DYSPHAGIA, UNSPECIFIED TYPE: ICD-10-CM

## 2021-07-19 PROCEDURE — 99214 OFFICE O/P EST MOD 30 MIN: CPT | Performed by: FAMILY MEDICINE

## 2021-07-19 RX ORDER — METHYLPREDNISOLONE 4 MG/1
TABLET ORAL
Qty: 21 EACH | Refills: 0 | Status: SHIPPED | OUTPATIENT
Start: 2021-07-19 | End: 2021-08-12

## 2021-07-19 NOTE — PROGRESS NOTES
"Chief Complaint  [facility] ER f/u-R hand injury/swollen (horse knocked him down 6/1, no breaks per pt on xray )    Subjective     {Problem List  Visit Diagnosis   Encounters  Notes  Medications  Labs  Result Review Imaging  Media :23}     Roger Bhat presents to [facility]  History of Present Illness  He suffered an injury to his right hand on 6/1/2021 in a horse knocked him down and he landed on the dorsal surface of his right hand.  He was initially evaluated at [facility], x-ray completed and no fracture or dislocation seen.  He was advised to follow-up with myself within the week, which he did not.  He returned to [facility] ER on 7/13/2021 for another recheck on right hand due to pain and edema still being present.  X-ray was repeated and again no fracture or dislocation seen.  Per x-ray read \"persistent, but improved dorsal hand soft tissue swelling\".    {Common H&P Review Areas:15715}    Objective   Vital Signs:   /90   Pulse 74   Temp 98 °F (36.7 °C)   Resp 20   Ht 175.3 cm (69\")   Wt 61.2 kg (135 lb)   SpO2 99%   BMI 19.94 kg/m²     Physical Exam   Result Review :{Labs  Result Review  Imaging  Med Tab  Media :23}   {The following data was reviewed by (Optional):92215}  {Ambulatory Labs (Optional):70873}  {Data reviewed (Optional):88121:::1}          Assessment and Plan {CC Problem List  Visit Diagnosis  ROS  Review (Popup)  Health Maintenance  Quality  BestPractice  Medications  SmartSets  SnapShot Encounters  Media :23}   There are no diagnoses linked to this encounter.  {Time Spent (Optional):30805}  Follow Up {Instructions Charge Capture  Follow-up Communications :23}  No follow-ups on file.  Patient was given instructions and counseling regarding his condition or for health maintenance advice. Please see specific information pulled into the AVS if appropriate.   "

## 2021-07-19 NOTE — PROGRESS NOTES
"Chief Complaint  [facility] f/u-R hand injury/swollen (horse knocked him down 6/1, no breaks per pt on xray )    Subjective          Roger Bhat presents to [facility]  History of Present Illness  He suffered an injury to his right hand on 6/1/2021 in a horse knocked him down and he landed on the dorsal surface of his right hand.  He was initially evaluated at [facility], x-ray completed and no fracture or dislocation seen.  He was advised to follow-up with myself within the week, which he did not.  He returned to [facility] ER on 7/13/2021 for another recheck on right hand due to pain and edema still being present.  X-ray was repeated and again no fracture or dislocation seen.  Per x-ray read \"persistent, but improved dorsal hand soft tissue swelling\".  Edema remains of dorsal right hand, improving gradually with time and compression. He is also taking tylenol to treat.   ROM of right hand and fingers are intact  He is able to still use his right hand, but painful at times.   There is no skin color change or wounds present.     He recently went to [place] ER due to choking.  They considered sending him to [facility] for treatment, but ended up responding to treatment provided in ER.  He reports that he is unable to eat certain foods, including steak as it easily chokes him.  He does not experience dysphagia with liquids.    His significant other is also concerned because he bruises easily and if he nicks himself, bleeds for an extended period of time.  He denies taking aspirin and blood thinners.    The following portions of the patient's history were reviewed and updated as appropriate: allergies, current medications, past family history, past medical history, past social history, past surgical history and problem list.    Objective   Vital Signs:   /90   Pulse 74   Temp 98 °F (36.7 °C)   Resp 20   Ht 175.3 cm (69\")   Wt 61.2 " kg (135 lb)   SpO2 99%   BMI 19.94 kg/m²     Physical Exam  Vitals and nursing note reviewed.   Constitutional:       Appearance: Normal appearance. He is well-developed.   HENT:      Head: Normocephalic and atraumatic.      Nose: Nose normal.   Eyes:      Conjunctiva/sclera: Conjunctivae normal.   Cardiovascular:      Rate and Rhythm: Normal rate and regular rhythm.      Heart sounds: Normal heart sounds.   Pulmonary:      Effort: Pulmonary effort is normal.      Breath sounds: Normal breath sounds. No wheezing.   Musculoskeletal:         General: No deformity.      Right hand: Swelling present. No deformity or bony tenderness. Normal range of motion.      Comments: No erythema or wound of right hand/wrist   Skin:     General: Skin is warm and dry.   Neurological:      General: No focal deficit present.      Mental Status: He is alert and oriented to person, place, and time.   Psychiatric:         Behavior: Behavior normal.        Result Review :                 Assessment and Plan    Diagnoses and all orders for this visit:    1. Injury of right hand, subsequent encounter (Primary)    2. Hand edema  -     Ambulatory Referral to Hand Surgery  -     methylPREDNISolone (MEDROL) 4 MG dose pack; Take as directed on package instructions.  Dispense: 21 each; Refill: 0    3. Dysphagia, unspecified type  -     Ambulatory Referral to Gastroenterology    4. Easy bruisability  -     CBC & Differential  -     Comprehensive Metabolic Panel  -     Protime-INR    Will refer to hand surgeon for persistent symptoms in right hand after injury.  2 x-rays have been completed and no fracture seen on either.  Recommended to start cold compresses, elevate affected extremity to improve edema.  Steroid taper provided.  Labs completed today to evaluate easy bruisability.  Refer to GI for further evaluation of dysphagia.      Follow Up   Return if symptoms worsen or fail to improve.  Patient was given instructions and counseling regarding  his condition or for health maintenance advice. Please see specific information pulled into the AVS if appropriate.

## 2021-07-19 NOTE — PATIENT INSTRUCTIONS
Go to the nearest ER or return to clinic if symptoms worsen, fever/chill develop    Dysphagia    Dysphagia is trouble swallowing. This condition occurs when solids and liquids stick in a person's throat on the way down to the stomach, or when food takes longer to get to the stomach than usual.  You may have problems swallowing food, liquids, or both. You may also have pain while trying to swallow. It may take you more time and effort to swallow something.  What are the causes?  This condition may be caused by:  · Muscle problems. They may make it difficult for you to move food and liquids through the esophagus, which is the tube that connects your mouth to your stomach.  · Blockages. You may have ulcers, scar tissue, or inflammation that blocks the normal passage of food and liquids. Causes of these problems include:  ? Acid reflux from your stomach into your esophagus (gastroesophageal reflux).  ? Infections.  ? Radiation treatment for cancer.  ? Medicines taken without enough fluids to wash them down into your stomach.  · Stroke. This can affect the nerves and make it difficult to swallow.  · Nerve problems. These prevent signals from being sent to the muscles of your esophagus to squeeze (contract) and move what you swallow down to your stomach.  · Globus pharyngeus. This is a common problem that involves a feeling like something is stuck in your throat or a sense of trouble with swallowing, even though nothing is wrong with the swallowing passages.  · Certain conditions, such as cerebral palsy or Parkinson's disease.  What are the signs or symptoms?  Common symptoms of this condition include:  · A feeling that solids or liquids are stuck in your throat on the way down to the stomach.  · Pain while swallowing.  · Coughing or gagging while trying to swallow.  Other symptoms include:  · Food moving back from your stomach to your mouth (regurgitation).  · Noises coming from your throat.  · Chest discomfort with  swallowing.  · A feeling of fullness when swallowing.  · Drooling, especially when the throat is blocked.  · Heartburn.  How is this diagnosed?  This condition may be diagnosed by:  · Barium X-ray. In this test, you will swallow a white liquid that sticks to the inside of your esophagus. X-ray images are then taken.  · Endoscopy. In this test, a flexible telescope is inserted down your throat to look at your esophagus and your stomach.  · CT scans and an MRI.  How is this treated?  Treatment for dysphagia depends on the cause of this condition, such as:  · If the dysphagia is caused by acid reflux or infection, medicines may be used. They may include antibiotics and heartburn medicines.  · If the dysphagia is caused by problems with the muscles, swallowing therapy may be used to help you strengthen your swallowing muscles. You may have to do specific exercises to strengthen the muscles or stretch them.  · If the dysphagia is caused by a blockage or mass, procedures to remove the blockage may be done. You may need surgery and a feeding tube.  You may need to make diet changes. Ask your health care provider for specific instructions.  Follow these instructions at home:  Medicines  · Take over-the-counter and prescription medicines only as told by your health care provider.  · If you were prescribed an antibiotic medicine, take it as told by your health care provider. Do not stop taking the antibiotic even if you start to feel better.  Eating and drinking    · Follow any diet changes as told by your health care provider.  · Work with a diet and nutrition specialist (dietitian) to create an eating plan that will help you get the nutrients you need in order to stay healthy.  · Eat soft foods that are easier to swallow.  · Cut your food into small pieces and eat slowly. Take small bites.  · Eat and drink only when you are sitting upright.  · Do not drink alcohol or caffeine. If you need help quitting, ask your health care  provider.  General instructions  · Check your weight every day to make sure you are not losing weight.  · Do not use any products that contain nicotine or tobacco, such as cigarettes, e-cigarettes, and chewing tobacco. If you need help quitting, ask your health care provider.  · Keep all follow-up visits as told by your health care provider. This is important.  Contact a health care provider if you:  · Lose weight because you cannot swallow.  · Cough when you drink liquids.  · Cough up partially digested food.  Get help right away if you:  · Cannot swallow your saliva.  · Have shortness of breath, a fever, or both.  · Have a hoarse voice and also have trouble swallowing.  Summary  · Dysphagia is trouble swallowing. This condition occurs when solids and liquids stick in a person's throat on the way down to the stomach. You may cough or gag while trying to swallow.  · Dysphagia has many possible causes.  · Treatment for dysphagia depends on the cause of the condition.  · Keep all follow-up visits as told by your health care provider. This is important.  This information is not intended to replace advice given to you by your health care provider. Make sure you discuss any questions you have with your health care provider.  Document Revised: 05/13/2020 Document Reviewed: 05/13/2020  [employer] Patient Education © 2021 [employer]

## 2021-07-20 LAB
ALBUMIN SERPL-MCNC: 4.5 G/DL (ref 3.8–4.9)
ALBUMIN/GLOB SERPL: 2 {RATIO} (ref 1.2–2.2)
ALP SERPL-CCNC: 79 IU/L (ref 48–121)
ALT SERPL-CCNC: 15 IU/L (ref 0–44)
AST SERPL-CCNC: 23 IU/L (ref 0–40)
BASOPHILS # BLD AUTO: 0.1 X10E3/UL (ref 0–0.2)
BASOPHILS NFR BLD AUTO: 1 %
BILIRUB SERPL-MCNC: 0.4 MG/DL (ref 0–1.2)
BUN SERPL-MCNC: 6 MG/DL (ref 6–24)
BUN/CREAT SERPL: 11 (ref 9–20)
CALCIUM SERPL-MCNC: 9.3 MG/DL (ref 8.7–10.2)
CHLORIDE SERPL-SCNC: 92 MMOL/L (ref 96–106)
CO2 SERPL-SCNC: 25 MMOL/L (ref 20–29)
CREAT SERPL-MCNC: 0.54 MG/DL (ref 0.76–1.27)
EOSINOPHIL # BLD AUTO: 0.1 X10E3/UL (ref 0–0.4)
EOSINOPHIL NFR BLD AUTO: 1 %
ERYTHROCYTE [DISTWIDTH] IN BLOOD BY AUTOMATED COUNT: 13.8 % (ref 11.6–15.4)
GLOBULIN SER CALC-MCNC: 2.2 G/DL (ref 1.5–4.5)
GLUCOSE SERPL-MCNC: 79 MG/DL (ref 65–99)
HCT VFR BLD AUTO: 42.5 % (ref 37.5–51)
HGB BLD-MCNC: 14.9 G/DL (ref 13–17.7)
IMM GRANULOCYTES # BLD AUTO: 0 X10E3/UL (ref 0–0.1)
IMM GRANULOCYTES NFR BLD AUTO: 0 %
INR PPP: 1 (ref 0.9–1.2)
LYMPHOCYTES # BLD AUTO: 1.7 X10E3/UL (ref 0.7–3.1)
LYMPHOCYTES NFR BLD AUTO: 19 %
MCH RBC QN AUTO: 35.9 PG (ref 26.6–33)
MCHC RBC AUTO-ENTMCNC: 35.1 G/DL (ref 31.5–35.7)
MCV RBC AUTO: 102 FL (ref 79–97)
MONOCYTES # BLD AUTO: 0.9 X10E3/UL (ref 0.1–0.9)
MONOCYTES NFR BLD AUTO: 9 %
NEUTROPHILS # BLD AUTO: 6.4 X10E3/UL (ref 1.4–7)
NEUTROPHILS NFR BLD AUTO: 70 %
PLATELET # BLD AUTO: 290 X10E3/UL (ref 150–450)
POTASSIUM SERPL-SCNC: 4.7 MMOL/L (ref 3.5–5.2)
PROT SERPL-MCNC: 6.7 G/DL (ref 6–8.5)
PROTHROMBIN TIME: 10.3 SEC (ref 9.1–12)
RBC # BLD AUTO: 4.15 X10E6/UL (ref 4.14–5.8)
SODIUM SERPL-SCNC: 130 MMOL/L (ref 134–144)
WBC # BLD AUTO: 9.2 X10E3/UL (ref 3.4–10.8)

## 2021-08-12 ENCOUNTER — OFFICE VISIT (OUTPATIENT)
Dept: FAMILY MEDICINE CLINIC | Facility: CLINIC | Age: 61
End: 2021-08-12

## 2021-08-12 VITALS
WEIGHT: 132 LBS | HEART RATE: 88 BPM | TEMPERATURE: 98.7 F | DIASTOLIC BLOOD PRESSURE: 68 MMHG | RESPIRATION RATE: 22 BRPM | BODY MASS INDEX: 19.55 KG/M2 | OXYGEN SATURATION: 98 % | SYSTOLIC BLOOD PRESSURE: 122 MMHG | HEIGHT: 69 IN

## 2021-08-12 DIAGNOSIS — R73.03 PREDIABETES: ICD-10-CM

## 2021-08-12 DIAGNOSIS — Z00.00 ANNUAL PHYSICAL EXAM: ICD-10-CM

## 2021-08-12 DIAGNOSIS — F17.200 CURRENT SMOKER: ICD-10-CM

## 2021-08-12 DIAGNOSIS — Z12.5 PROSTATE CANCER SCREENING: ICD-10-CM

## 2021-08-12 DIAGNOSIS — R05.3 PERSISTENT COUGH FOR 3 WEEKS OR LONGER: Primary | ICD-10-CM

## 2021-08-12 DIAGNOSIS — Z13.220 SCREENING FOR HYPERLIPIDEMIA: ICD-10-CM

## 2021-08-12 DIAGNOSIS — N52.9 ERECTILE DYSFUNCTION, UNSPECIFIED ERECTILE DYSFUNCTION TYPE: ICD-10-CM

## 2021-08-12 PROCEDURE — 99396 PREV VISIT EST AGE 40-64: CPT | Performed by: FAMILY MEDICINE

## 2021-08-12 RX ORDER — SILDENAFIL 100 MG/1
50-100 TABLET, FILM COATED ORAL DAILY PRN
Qty: 20 TABLET | Refills: 2 | Status: SHIPPED | OUTPATIENT
Start: 2021-08-12 | End: 2021-11-09

## 2021-08-12 RX ORDER — ALBUTEROL SULFATE 90 UG/1
2 AEROSOL, METERED RESPIRATORY (INHALATION) EVERY 4 HOURS PRN
Qty: 8 G | Refills: 5 | Status: SHIPPED | OUTPATIENT
Start: 2021-08-12 | End: 2022-11-28 | Stop reason: SDUPTHER

## 2021-08-12 NOTE — PROGRESS NOTES
"Chief Complaint  Annual Exam (pt is not fasting ), R hand still swollen (He was referred to hand specialist July 2021), and Dry cough all the time    Subjective          Roger Bhat presents to [facility]  History of Present Illness  Here for annual exam  Vision exam is overdue   Dental exam is overdue  Denies urinary frequency, dribbling, weak stream. +nocturia     Right hand is still swollen after injury in   ROM intact, but he is unable to lift heavy items with right hand  Referred to hand specialist, but states that he was never made aware of appointment.     Patient complains of erectile dysfunction. Onset of dysfunction was several months ago and was gradual in onset.  Patient states the nature of difficulty is maintaining erection.  Partial erections occur with intercourse.  Previous treatment of ED includes none.    He has a persistent dry cough.  Feels like something is in his throat.  He does take omeprazole for treatment of GERD, acid reflux symptoms are controlled with this.  He is scheduled to follow-up with gastroenterology in the near future.  He does smoke tobacco, 1-1/2 to 2 packs/day.  He has smoked for over 30 years.    The following portions of the patient's history were reviewed and updated as appropriate: allergies, current medications, past family history, past medical history, past social history, past surgical history and problem list.    Objective   Vital Signs:   /68   Pulse 88   Temp 98.7 °F (37.1 °C)   Resp 22   Ht 175.3 cm (69\")   Wt 59.9 kg (132 lb)   SpO2 98%   BMI 19.49 kg/m²     Physical Exam  Vitals and nursing note reviewed.   Constitutional:       Appearance: Normal appearance. He is well-developed.   HENT:      Head: Normocephalic and atraumatic.      Right Ear: External ear normal.      Left Ear: External ear normal.      Nose: Nose normal.   Eyes:      Conjunctiva/sclera: Conjunctivae normal.   Cardiovascular:      Rate and " Rhythm: Normal rate and regular rhythm.      Heart sounds: Normal heart sounds. No murmur heard.     Pulmonary:      Effort: Pulmonary effort is normal. No respiratory distress.      Breath sounds: Normal breath sounds. No wheezing or rhonchi.   Musculoskeletal:         General: No deformity.      Right hand: Swelling present.      Cervical back: Normal range of motion and neck supple.   Skin:     General: Skin is warm and dry.   Neurological:      General: No focal deficit present.      Mental Status: He is alert and oriented to person, place, and time.   Psychiatric:         Mood and Affect: Mood normal.         Behavior: Behavior normal.        Result Review :                 Assessment and Plan    Diagnoses and all orders for this visit:    1. Persistent cough for 3 weeks or longer (Primary)  -     CBC & Differential  -     Comprehensive Metabolic Panel  -     TSH Rfx On Abnormal To Free T4  -     CT Chest Without Contrast    2. Current smoker  -     Lipid Panel With / Chol / HDL Ratio  -     TSH Rfx On Abnormal To Free T4  -     albuterol sulfate HFA (Ventolin HFA) 108 (90 Base) MCG/ACT inhaler; Inhale 2 puffs Every 4 (Four) Hours As Needed for Wheezing or Shortness of Air.  Dispense: 8 g; Refill: 5  -     CT Chest Without Contrast    3. Erectile dysfunction, unspecified erectile dysfunction type  -     sildenafil (VIAGRA) 100 MG tablet; Take 0.5-1 tablets by mouth Daily As Needed for Erectile Dysfunction.  Dispense: 20 tablet; Refill: 2    4. Annual physical exam  -     CBC & Differential  -     Comprehensive Metabolic Panel  -     Lipid Panel With / Chol / HDL Ratio  -     TSH Rfx On Abnormal To Free T4    5. Prediabetes  -     Hemoglobin A1c    6. Prostate cancer screening  -     PSA Screen    7. Screening for hyperlipidemia  -     Lipid Panel With / Chol / HDL Ratio  -     TSH Rfx On Abnormal To Free T4    Health advice: healthy food choices with fresh fruits and vegetables, maintain sleep pattern at least  8 hours, avoid texting and distracted driving practices; wear safety belt, engage in regular exercise, maintain healthy weight, use safe sex practices, avoid alcohol and illicit drugs. Maintain immunizations that are up to date. Maintain health maintenance:  PSA, etc.  Follow up with PCP if struggling with depression or anxiety. Keep regular dental and eye exams. Brush and floss teeth daily.   F/U annually and prn.         Follow Up   Return in about 1 year (around 8/12/2022) for Annual.  Patient was given instructions and counseling regarding his condition or for health maintenance advice. Please see specific information pulled into the AVS if appropriate.

## 2021-08-17 ENCOUNTER — LAB (OUTPATIENT)
Dept: FAMILY MEDICINE CLINIC | Facility: CLINIC | Age: 61
End: 2021-08-17

## 2021-08-18 LAB
ALBUMIN SERPL-MCNC: 4.3 G/DL (ref 3.5–5.2)
ALBUMIN/GLOB SERPL: 1.7 G/DL
ALP SERPL-CCNC: 80 U/L (ref 39–117)
ALT SERPL-CCNC: 19 U/L (ref 1–41)
AST SERPL-CCNC: 29 U/L (ref 1–40)
BASOPHILS # BLD AUTO: 0.07 10*3/MM3 (ref 0–0.2)
BASOPHILS NFR BLD AUTO: 0.8 % (ref 0–1.5)
BILIRUB SERPL-MCNC: 0.5 MG/DL (ref 0–1.2)
BUN SERPL-MCNC: 7 MG/DL (ref 6–20)
BUN/CREAT SERPL: 10 (ref 7–25)
CALCIUM SERPL-MCNC: 9.6 MG/DL (ref 8.6–10.5)
CHLORIDE SERPL-SCNC: 91 MMOL/L (ref 98–107)
CHOLEST SERPL-MCNC: 167 MG/DL (ref 0–200)
CHOLEST/HDLC SERPL: 2.57 {RATIO}
CO2 SERPL-SCNC: 27.4 MMOL/L (ref 22–29)
CREAT SERPL-MCNC: 0.7 MG/DL (ref 0.76–1.27)
EOSINOPHIL # BLD AUTO: 0.09 10*3/MM3 (ref 0–0.4)
EOSINOPHIL NFR BLD AUTO: 1 % (ref 0.3–6.2)
ERYTHROCYTE [DISTWIDTH] IN BLOOD BY AUTOMATED COUNT: 13.8 % (ref 12.3–15.4)
GLOBULIN SER CALC-MCNC: 2.6 GM/DL
GLUCOSE SERPL-MCNC: 87 MG/DL (ref 65–99)
HBA1C MFR BLD: 5.2 % (ref 4.8–5.6)
HCT VFR BLD AUTO: 43 % (ref 37.5–51)
HDLC SERPL-MCNC: 65 MG/DL (ref 40–60)
HGB BLD-MCNC: 15.3 G/DL (ref 13–17.7)
IMM GRANULOCYTES # BLD AUTO: 0.02 10*3/MM3 (ref 0–0.05)
IMM GRANULOCYTES NFR BLD AUTO: 0.2 % (ref 0–0.5)
LDLC SERPL CALC-MCNC: 86 MG/DL (ref 0–100)
LYMPHOCYTES # BLD AUTO: 1.8 10*3/MM3 (ref 0.7–3.1)
LYMPHOCYTES NFR BLD AUTO: 20.2 % (ref 19.6–45.3)
MCH RBC QN AUTO: 36.8 PG (ref 26.6–33)
MCHC RBC AUTO-ENTMCNC: 35.6 G/DL (ref 31.5–35.7)
MCV RBC AUTO: 103.4 FL (ref 79–97)
MONOCYTES # BLD AUTO: 0.88 10*3/MM3 (ref 0.1–0.9)
MONOCYTES NFR BLD AUTO: 9.9 % (ref 5–12)
NEUTROPHILS # BLD AUTO: 6.06 10*3/MM3 (ref 1.7–7)
NEUTROPHILS NFR BLD AUTO: 67.9 % (ref 42.7–76)
NRBC BLD AUTO-RTO: 0 /100 WBC (ref 0–0.2)
PLATELET # BLD AUTO: 299 10*3/MM3 (ref 140–450)
POTASSIUM SERPL-SCNC: 5.2 MMOL/L (ref 3.5–5.2)
PROT SERPL-MCNC: 6.9 G/DL (ref 6–8.5)
PSA SERPL-MCNC: 0.75 NG/ML (ref 0–4)
RBC # BLD AUTO: 4.16 10*6/MM3 (ref 4.14–5.8)
SODIUM SERPL-SCNC: 130 MMOL/L (ref 136–145)
TRIGL SERPL-MCNC: 85 MG/DL (ref 0–150)
TSH SERPL DL<=0.005 MIU/L-ACNC: 0.93 UIU/ML (ref 0.27–4.2)
VLDLC SERPL CALC-MCNC: 16 MG/DL (ref 5–40)
WBC # BLD AUTO: 8.92 10*3/MM3 (ref 3.4–10.8)

## 2021-08-23 ENCOUNTER — TELEPHONE (OUTPATIENT)
Dept: FAMILY MEDICINE CLINIC | Facility: CLINIC | Age: 61
End: 2021-08-23

## 2021-08-23 DIAGNOSIS — K21.9 GASTROESOPHAGEAL REFLUX DISEASE WITHOUT ESOPHAGITIS: ICD-10-CM

## 2021-08-23 RX ORDER — OMEPRAZOLE 40 MG/1
40 CAPSULE, DELAYED RELEASE ORAL DAILY
Qty: 90 CAPSULE | Refills: 3 | Status: SHIPPED | OUTPATIENT
Start: 2021-08-23 | End: 2022-06-07 | Stop reason: SDUPTHER

## 2021-08-23 NOTE — TELEPHONE ENCOUNTER
----- Message from Richa Mary DO sent at 8/22/2021  6:26 PM EDT -----  Screening PSA is normal. Cholesterol panel looks great. Normal thyroid function, HgA1c.  Sodium is low. Alcohol consumption and how much? Excessive water intake?   Size of RBC are large, can we add vitamin b12 and folate?

## 2021-08-23 NOTE — TELEPHONE ENCOUNTER
Called and spoke with patient, reviewed results with him. He does consume alcohol stating that he drinks 3-4 beers daily. He doesn't drink water but does drink 2-3 Gatorades daily.     Added on labs as requested.

## 2021-08-24 ENCOUNTER — OFFICE VISIT (OUTPATIENT)
Dept: GASTROENTEROLOGY | Facility: CLINIC | Age: 61
End: 2021-08-24

## 2021-08-24 VITALS
TEMPERATURE: 97.8 F | HEIGHT: 69 IN | DIASTOLIC BLOOD PRESSURE: 72 MMHG | BODY MASS INDEX: 19.61 KG/M2 | HEART RATE: 82 BPM | SYSTOLIC BLOOD PRESSURE: 142 MMHG | OXYGEN SATURATION: 99 % | WEIGHT: 132.4 LBS

## 2021-08-24 DIAGNOSIS — D12.6 ADENOMATOUS POLYP OF COLON, UNSPECIFIED PART OF COLON: ICD-10-CM

## 2021-08-24 DIAGNOSIS — K21.00 GASTROESOPHAGEAL REFLUX DISEASE WITH ESOPHAGITIS WITHOUT HEMORRHAGE: ICD-10-CM

## 2021-08-24 DIAGNOSIS — K22.2 ESOPHAGEAL STENOSIS: Primary | ICD-10-CM

## 2021-08-24 DIAGNOSIS — R13.19 ESOPHAGEAL DYSPHAGIA: ICD-10-CM

## 2021-08-24 LAB
FOLATE SERPL-MCNC: 8.76 NG/ML (ref 4.78–24.2)
Lab: NORMAL
VIT B12 SERPL-MCNC: 554 PG/ML (ref 211–946)
WRITTEN AUTHORIZATION: NORMAL

## 2021-08-24 PROCEDURE — 99214 OFFICE O/P EST MOD 30 MIN: CPT | Performed by: NURSE PRACTITIONER

## 2021-08-24 NOTE — PROGRESS NOTES
New Patient Consultation     Patient Name: Roger Bhat  : 1960   MRN: [id number]     Chief Complaint:    Chief Complaint   Patient presents with   • Difficulty Swallowing       History of Present Illness: Roger Bhat is a 61 y.o. male who is here today for a Gastroenterology Consultation for dysphagia.  Roger is here today with dysphagia.  Dysphagia occurs only with solids.  Has had food bolus impaction twice in the past- one time requiring an EGD with extraction.  This was last performed in 2020 by Dr. Aidan Cook.  A repeat EGD was recommended in 3 weeks with dilation however he was lost to follow-up.  Roger believes he has been dilated twice in the past.  He currently takes omeprazole once daily and no longer has GERD symptoms.  He also reports about 3 to 4 years ago he had an upper GI bleed.  During that time he underwent a surgical procedure and was evaluated inpatient for several days.  He is unsure exactly what procedure he had.  There is no unintentional weight loss or change in appetite.  His bowel habits are normal.  He denies any family history of colon cancer.    His colonoscopy was last performed in 2020.  Findings were significant for multiple tubular adenomas and a repeat colonoscopy was recommended in 3 to 4 months.  Roger does smoke about 2 packs/day.  He does drink a few beers each night.  There is no history of liver disease..    Subjective      Review of Systems:   Review of Systems   Constitutional: Negative for activity change, appetite change, chills, diaphoresis, fever, unexpected weight gain and unexpected weight loss.   HENT: Positive for trouble swallowing.    Respiratory: Positive for cough. Negative for shortness of breath.    Gastrointestinal: Negative for abdominal distention, abdominal pain, anal bleeding, blood in stool, constipation, diarrhea, nausea, rectal pain, vomiting, GERD and indigestion.   Musculoskeletal: Positive for arthralgias.        Past Medical History:   Past Medical History:   Diagnosis Date   • GERD (gastroesophageal reflux disease)        Past Surgical History:   Past Surgical History:   Procedure Laterality Date   • ENDOSCOPY  05/2016    pt say's, he was placed under general anesthesia for this       Family History:   Family History   Problem Relation Age of Onset   • No Known Problems Mother    • No Known Problems Father    • Colon cancer Neg Hx    • Colon polyps Neg Hx    • Esophageal cancer Neg Hx        Social History:   Social History     Socioeconomic History   • Marital status:      Spouse name: Not on file   • Number of children: Not on file   • Years of education: Not on file   • Highest education level: Not on file   Tobacco Use   • Smoking status: Current Every Day Smoker     Packs/day: 2.00     Years: 40.00     Pack years: 80.00     Types: Cigarettes   • Smokeless tobacco: Never Used   Vaping Use   • Vaping Use: Never used   Substance and Sexual Activity   • Alcohol use: Yes     Alcohol/week: 2.0 standard drinks     Types: 2 Cans of beer per week   • Drug use: Never   • Sexual activity: Defer       Alcohol/Tobacco History:   Social History     Substance and Sexual Activity   Alcohol Use Yes   • Alcohol/week: 2.0 standard drinks   • Types: 2 Cans of beer per week     Social History     Tobacco Use   Smoking Status Current Every Day Smoker   • Packs/day: 2.00   • Years: 40.00   • Pack years: 80.00   • Types: Cigarettes   Smokeless Tobacco Never Used       Medications:     Current Outpatient Medications:   •  albuterol sulfate HFA (Ventolin HFA) 108 (90 Base) MCG/ACT inhaler, Inhale 2 puffs Every 4 (Four) Hours As Needed for Wheezing or Shortness of Air., Disp: 8 g, Rfl: 5  •  omeprazole (priLOSEC) 40 MG capsule, Take 1 capsule by mouth Daily., Disp: 90 capsule, Rfl: 3  •  sildenafil (VIAGRA) 100 MG tablet, Take 0.5-1 tablets by mouth Daily As Needed for Erectile Dysfunction., Disp: 20 tablet, Rfl: 2    Allergies:  "  No Known Allergies    Objective     Physical Exam:  Vital Signs:   Vitals:    08/24/21 1432   BP: 142/72   BP Location: Left arm   Patient Position: Sitting   Cuff Size: Adult   Pulse: 82   Temp: 97.8 °F (36.6 °C)   TempSrc: Temporal   SpO2: 99%   Weight: 60.1 kg (132 lb 6.4 oz)   Height: 175.3 cm (69.02\")     Body mass index is 19.54 kg/m².     Physical Exam  Vitals and nursing note reviewed.   Constitutional:       General: He is not in acute distress.     Appearance: He is well-developed.   Eyes:      General: No scleral icterus.  Pulmonary:      Effort: Pulmonary effort is normal. No accessory muscle usage or respiratory distress.      Breath sounds: Rhonchi present. No decreased breath sounds or rales.   Abdominal:      General: There is no distension.      Palpations: Abdomen is soft. There is no hepatomegaly or splenomegaly.      Tenderness: There is no abdominal tenderness. There is no guarding.   Musculoskeletal:      Right lower leg: No edema.      Left lower leg: No edema.   Skin:     Coloration: Skin is not pale.      Findings: No erythema.      Comments: Rubén   Neurological:      Mental Status: He is alert and oriented to person, place, and time.   Psychiatric:         Speech: Speech normal.         Behavior: Behavior normal.         Thought Content: Thought content normal.         Judgment: Judgment normal.         Assessment / Plan      Assessment/Plan:   Diagnoses and all orders for this visit:    1. Esophageal stenosis (Primary)  -     Ambulatory referral for Screening EGD  Continue omeprazole 30 minutes before meal.  Counseled on tobacco cessation.  Will attempt to get records from  regarding previous GI surgery.  2. Gastroesophageal reflux disease with esophagitis without hemorrhage  -     Ambulatory referral for Screening EGD    3. Esophageal dysphagia  -     Ambulatory referral for Screening EGD    4. Adenomatous polyp of colon, unspecified part of colon  -     Ambulatory Referral For " Screening Colonoscopy           Follow Up:   Return in about 6 weeks (around 10/5/2021).    Plan of care reviewed with the patient at the conclusion of today's visit.  Education was provided regarding diagnosis, management, and any prescribed or recommended OTC medications.  Patient verbalized understanding of and agreement with management plan.         NESHA Cornelius  [facility] [place] [facility]

## 2021-08-27 ENCOUNTER — TELEPHONE (OUTPATIENT)
Dept: FAMILY MEDICINE CLINIC | Facility: CLINIC | Age: 61
End: 2021-08-27

## 2021-08-27 NOTE — TELEPHONE ENCOUNTER
OANH HAS CALLED REQUESTING IF SUPREP BILE PREP CAN BE CALLED INTO PHARMACY ASAP FOR PATIENT UPCOMING COLONOSCOPY.    PATIENT USES [email or web address] IN [address]    CALL BACK NUMBER [phone]

## 2021-08-27 NOTE — TELEPHONE ENCOUNTER
MISTAKE    CALLER GAVE INCORRECT INFORMATION AT FIRST FOR MESSAGE     CALLER NEEDS TO SPEAK TO SPECIALIST REGARDING REFERRAL FROM DR BENAVIDEZ

## 2021-08-30 ENCOUNTER — APPOINTMENT (OUTPATIENT)
Dept: CT IMAGING | Facility: HOSPITAL | Age: 61
End: 2021-08-30

## 2021-08-31 ENCOUNTER — HOSPITAL ENCOUNTER (OUTPATIENT)
Dept: CT IMAGING | Facility: HOSPITAL | Age: 61
Discharge: HOME | End: 2021-08-31
Admitting: FAMILY MEDICINE

## 2021-08-31 PROCEDURE — 71250 CT THORAX DX C-: CPT

## 2021-09-02 DIAGNOSIS — Z12.11 ENCOUNTER FOR SCREENING COLONOSCOPY: Primary | ICD-10-CM

## 2021-09-08 DIAGNOSIS — J43.9 PULMONARY EMPHYSEMA, UNSPECIFIED EMPHYSEMA TYPE (HCC): Primary | ICD-10-CM

## 2021-09-13 ENCOUNTER — APPOINTMENT (OUTPATIENT)
Dept: PREADMISSION TESTING | Facility: HOSPITAL | Age: 61
End: 2021-09-13

## 2021-09-13 PROCEDURE — U0004 COV-19 TEST NON-CDC HGH THRU: HCPCS

## 2021-09-13 PROCEDURE — C9803 HOPD COVID-19 SPEC COLLECT: HCPCS

## 2021-09-14 LAB — SARS-COV-2 RNA PNL SPEC NAA+PROBE: NOT DETECTED

## 2021-09-16 ENCOUNTER — OUTSIDE FACILITY SERVICE (OUTPATIENT)
Dept: GASTROENTEROLOGY | Facility: CLINIC | Age: 61
End: 2021-09-16

## 2021-09-16 PROCEDURE — 43202 ESOPHAGOSCOPY FLEX BIOPSY: CPT | Performed by: INTERNAL MEDICINE

## 2021-09-16 PROCEDURE — 88312 SPECIAL STAINS GROUP 1: CPT | Performed by: INTERNAL MEDICINE

## 2021-09-16 PROCEDURE — 45380 COLONOSCOPY AND BIOPSY: CPT | Performed by: INTERNAL MEDICINE

## 2021-09-16 PROCEDURE — 88305 TISSUE EXAM BY PATHOLOGIST: CPT | Performed by: INTERNAL MEDICINE

## 2021-09-16 PROCEDURE — 45381 COLONOSCOPY SUBMUCOUS NJX: CPT | Performed by: INTERNAL MEDICINE

## 2021-09-16 PROCEDURE — 45385 COLONOSCOPY W/LESION REMOVAL: CPT | Performed by: INTERNAL MEDICINE

## 2021-09-16 RX ORDER — TIOTROPIUM BROMIDE INHALATION SPRAY 3.12 UG/1
2 SPRAY, METERED RESPIRATORY (INHALATION)
Qty: 12 G | Refills: 3 | Status: SHIPPED | OUTPATIENT
Start: 2021-09-16 | End: 2022-10-15 | Stop reason: SDUPTHER

## 2021-09-17 ENCOUNTER — LAB REQUISITION (OUTPATIENT)
Dept: LAB | Facility: HOSPITAL | Age: 61
End: 2021-09-17

## 2021-09-17 DIAGNOSIS — R13.10 DYSPHAGIA, UNSPECIFIED: ICD-10-CM

## 2021-09-17 DIAGNOSIS — K63.89 OTHER SPECIFIED DISEASES OF INTESTINE: ICD-10-CM

## 2021-09-17 DIAGNOSIS — K21.9 GASTRO-ESOPHAGEAL REFLUX DISEASE WITHOUT ESOPHAGITIS: ICD-10-CM

## 2021-09-17 DIAGNOSIS — Z86.010 PERSONAL HISTORY OF COLONIC POLYPS: ICD-10-CM

## 2021-09-17 DIAGNOSIS — K22.2 ESOPHAGEAL OBSTRUCTION: ICD-10-CM

## 2021-09-17 DIAGNOSIS — K63.5 POLYP OF COLON: ICD-10-CM

## 2021-09-21 LAB
CYTO UR: NORMAL
LAB AP CASE REPORT: NORMAL
LAB AP CLINICAL INFORMATION: NORMAL
LAB AP DIAGNOSIS COMMENT: NORMAL
PATH REPORT.FINAL DX SPEC: NORMAL
PATH REPORT.GROSS SPEC: NORMAL

## 2021-09-22 ENCOUNTER — PREP FOR SURGERY (OUTPATIENT)
Dept: OTHER | Facility: HOSPITAL | Age: 61
End: 2021-09-22

## 2021-09-22 DIAGNOSIS — K21.9 GASTROESOPHAGEAL REFLUX DISEASE WITHOUT ESOPHAGITIS: Primary | ICD-10-CM

## 2021-10-14 ENCOUNTER — PRE-ADMISSION TESTING (OUTPATIENT)
Dept: PREADMISSION TESTING | Facility: HOSPITAL | Age: 61
End: 2021-10-14

## 2021-10-14 VITALS — BODY MASS INDEX: 19.99 KG/M2 | WEIGHT: 135 LBS | HEIGHT: 69 IN

## 2021-10-14 LAB
DEPRECATED RDW RBC AUTO: 49.5 FL (ref 37–54)
ERYTHROCYTE [DISTWIDTH] IN BLOOD BY AUTOMATED COUNT: 13.2 % (ref 12.3–15.4)
HCT VFR BLD AUTO: 41.6 % (ref 37.5–51)
HGB BLD-MCNC: 14.7 G/DL (ref 13–17.7)
MCH RBC QN AUTO: 35.8 PG (ref 26.6–33)
MCHC RBC AUTO-ENTMCNC: 35.3 G/DL (ref 31.5–35.7)
MCV RBC AUTO: 101.2 FL (ref 79–97)
PLATELET # BLD AUTO: 277 10*3/MM3 (ref 140–450)
PMV BLD AUTO: 9.4 FL (ref 6–12)
RBC # BLD AUTO: 4.11 10*6/MM3 (ref 4.14–5.8)
WBC # BLD AUTO: 9.38 10*3/MM3 (ref 3.4–10.8)

## 2021-10-14 PROCEDURE — 36415 COLL VENOUS BLD VENIPUNCTURE: CPT

## 2021-10-14 PROCEDURE — 85027 COMPLETE CBC AUTOMATED: CPT

## 2021-10-14 NOTE — DISCHARGE INSTRUCTIONS
The following information and instructions were given:    Do not eat, drink, smoke or chew gum after midnight the night before surgery. This includes no mints.  Take all routine, prescribed medications including heart and blood pressure medicines with a sip of water unless otherwise instructed by your physician.   Do NOT take diabetic medication unless instructed by your physician.    DO NOT shave for two days before your procedure.  Do not wear makeup.      DO NOT wear fingernail polish (gel/regular) and/or acrylic/artificial nails on the day of surgery.   If you had a recent manicure and would rather not remove polish or artificial nails, the minimum requirement is that the polish/artificial nails must be removed from the middle finger on each hand.      If you are having surgery/procedure on an upper extremity, fingernail polish/artificial fingernails must be removed for surgery.  NO EXCEPTIONS.      If you are having surgery/procedure on a lower extremity, toenail polish on both extremities must be removed for surgery.  NO EXCEPTIONS.    Remove all jewelry (advise to go to jeweler if unable to remove).  Jewelry, especially rings, can no longer be taped for surgery.    Leave anything you consider valuable at home.    Leave your suitcase in the car until after your surgery.    Bring the following with you the day of your procedure (when applicable):       -Picture ID and insurance cards       -Co-pay/deductible required by insurance       -Medications in the original bottles (not a list) including all over-the-counter medications if not brought to PAT       -Copy of advance directive, living will or power of  documents if not brought to PAT       -PAT Pass    Education booklet, brochure, handout or binder related to procedure given to patient.  Education booklet also includes general information related to their recovery that mentions signs and symptoms of infection and when to call the doctor.    Pain  Control After Surgery handout given to patient.    Respirex use (handout given to patient) and pneumonia prevention education provided.    Signs and Symptoms of infection discussed with patient in Pre Admission Testing.  Patient instructed to call their doctor if any of the following symptoms are noted during recovery:  Fever of 100.4 F or higher, incision that is warm or has increasing bleeding, redness or drainage.

## 2021-10-15 DIAGNOSIS — Z01.812 PRE-PROCEDURE LAB EXAM: Primary | ICD-10-CM

## 2021-10-17 ENCOUNTER — APPOINTMENT (OUTPATIENT)
Dept: PREADMISSION TESTING | Facility: HOSPITAL | Age: 61
End: 2021-10-17

## 2021-10-17 LAB — SARS-COV-2 RNA PNL SPEC NAA+PROBE: NOT DETECTED

## 2021-10-17 PROCEDURE — C9803 HOPD COVID-19 SPEC COLLECT: HCPCS

## 2021-10-17 PROCEDURE — U0004 COV-19 TEST NON-CDC HGH THRU: HCPCS

## 2021-10-19 ENCOUNTER — ANESTHESIA EVENT (OUTPATIENT)
Dept: GASTROENTEROLOGY | Facility: HOSPITAL | Age: 61
End: 2021-10-19

## 2021-10-19 ENCOUNTER — ANESTHESIA (OUTPATIENT)
Dept: GASTROENTEROLOGY | Facility: HOSPITAL | Age: 61
End: 2021-10-19

## 2021-10-19 ENCOUNTER — HOSPITAL ENCOUNTER (OUTPATIENT)
Facility: HOSPITAL | Age: 61
Setting detail: HOSPITAL OUTPATIENT SURGERY
Discharge: HOME | End: 2021-10-19
Attending: INTERNAL MEDICINE | Admitting: INTERNAL MEDICINE

## 2021-10-19 VITALS
HEART RATE: 56 BPM | RESPIRATION RATE: 18 BRPM | OXYGEN SATURATION: 98 % | SYSTOLIC BLOOD PRESSURE: 147 MMHG | DIASTOLIC BLOOD PRESSURE: 92 MMHG | TEMPERATURE: 97.7 F

## 2021-10-19 DIAGNOSIS — K21.9 GASTROESOPHAGEAL REFLUX DISEASE WITHOUT ESOPHAGITIS: ICD-10-CM

## 2021-10-19 PROCEDURE — 88305 TISSUE EXAM BY PATHOLOGIST: CPT | Performed by: INTERNAL MEDICINE

## 2021-10-19 PROCEDURE — S0260 H&P FOR SURGERY: HCPCS | Performed by: INTERNAL MEDICINE

## 2021-10-19 PROCEDURE — 43239 EGD BIOPSY SINGLE/MULTIPLE: CPT | Performed by: INTERNAL MEDICINE

## 2021-10-19 PROCEDURE — 43248 EGD GUIDE WIRE INSERTION: CPT | Performed by: INTERNAL MEDICINE

## 2021-10-19 PROCEDURE — 25010000002 PROPOFOL 10 MG/ML EMULSION: Performed by: NURSE ANESTHETIST, CERTIFIED REGISTERED

## 2021-10-19 PROCEDURE — C1769 GUIDE WIRE: HCPCS | Performed by: INTERNAL MEDICINE

## 2021-10-19 RX ORDER — FAMOTIDINE 10 MG/ML
20 INJECTION, SOLUTION INTRAVENOUS ONCE
Status: DISCONTINUED | OUTPATIENT
Start: 2021-10-19 | End: 2021-10-19

## 2021-10-19 RX ORDER — LIDOCAINE HYDROCHLORIDE 10 MG/ML
0.5 INJECTION, SOLUTION EPIDURAL; INFILTRATION; INTRACAUDAL; PERINEURAL ONCE AS NEEDED
Status: CANCELLED | OUTPATIENT
Start: 2021-10-19

## 2021-10-19 RX ORDER — SODIUM CHLORIDE, SODIUM LACTATE, POTASSIUM CHLORIDE, CALCIUM CHLORIDE 600; 310; 30; 20 MG/100ML; MG/100ML; MG/100ML; MG/100ML
9 INJECTION, SOLUTION INTRAVENOUS CONTINUOUS
Status: CANCELLED | OUTPATIENT
Start: 2021-10-19

## 2021-10-19 RX ORDER — ONDANSETRON 2 MG/ML
4 INJECTION INTRAMUSCULAR; INTRAVENOUS ONCE AS NEEDED
Status: DISCONTINUED | OUTPATIENT
Start: 2021-10-19 | End: 2021-10-19 | Stop reason: HOSPADM

## 2021-10-19 RX ORDER — LIDOCAINE HYDROCHLORIDE 10 MG/ML
INJECTION, SOLUTION EPIDURAL; INFILTRATION; INTRACAUDAL; PERINEURAL AS NEEDED
Status: DISCONTINUED | OUTPATIENT
Start: 2021-10-19 | End: 2021-10-19 | Stop reason: SURG

## 2021-10-19 RX ORDER — FAMOTIDINE 20 MG/1
20 TABLET, FILM COATED ORAL ONCE
Status: CANCELLED | OUTPATIENT
Start: 2021-10-19 | End: 2021-10-19

## 2021-10-19 RX ORDER — SODIUM CHLORIDE 9 MG/ML
9 INJECTION, SOLUTION INTRAVENOUS CONTINUOUS
Status: DISCONTINUED | OUTPATIENT
Start: 2021-10-19 | End: 2021-10-19 | Stop reason: HOSPADM

## 2021-10-19 RX ORDER — MIDAZOLAM HYDROCHLORIDE 1 MG/ML
1 INJECTION INTRAMUSCULAR; INTRAVENOUS
Status: DISCONTINUED | OUTPATIENT
Start: 2021-10-19 | End: 2021-10-19 | Stop reason: HOSPADM

## 2021-10-19 RX ORDER — PROPOFOL 10 MG/ML
VIAL (ML) INTRAVENOUS AS NEEDED
Status: DISCONTINUED | OUTPATIENT
Start: 2021-10-19 | End: 2021-10-19 | Stop reason: SURG

## 2021-10-19 RX ORDER — SODIUM CHLORIDE 0.9 % (FLUSH) 0.9 %
10 SYRINGE (ML) INJECTION AS NEEDED
Status: DISCONTINUED | OUTPATIENT
Start: 2021-10-19 | End: 2021-10-19 | Stop reason: HOSPADM

## 2021-10-19 RX ORDER — SODIUM CHLORIDE 0.9 % (FLUSH) 0.9 %
10 SYRINGE (ML) INJECTION EVERY 12 HOURS SCHEDULED
Status: CANCELLED | OUTPATIENT
Start: 2021-10-19

## 2021-10-19 RX ADMIN — SODIUM CHLORIDE, PRESERVATIVE FREE 10 ML: 5 INJECTION INTRAVENOUS at 10:43

## 2021-10-19 RX ADMIN — SODIUM CHLORIDE 9 ML/HR: 9 INJECTION, SOLUTION INTRAVENOUS at 10:43

## 2021-10-19 RX ADMIN — PROPOFOL 50 MG: 10 INJECTION, EMULSION INTRAVENOUS at 12:52

## 2021-10-19 RX ADMIN — PROPOFOL 100 MG: 10 INJECTION, EMULSION INTRAVENOUS at 12:41

## 2021-10-19 RX ADMIN — LIDOCAINE HYDROCHLORIDE 50 MG: 10 INJECTION, SOLUTION EPIDURAL; INFILTRATION; INTRACAUDAL; PERINEURAL at 12:41

## 2021-10-19 RX ADMIN — PROPOFOL 100 MG: 10 INJECTION, EMULSION INTRAVENOUS at 12:46

## 2021-10-19 NOTE — ANESTHESIA PREPROCEDURE EVALUATION
Anesthesia Evaluation     Patient summary reviewed and Nursing notes reviewed   NPO Solid Status: > 8 hours  NPO Liquid Status: > 8 hours           Airway   Mallampati: II  TM distance: >3 FB  Neck ROM: full  No difficulty expected  Dental    (+) partials        Pulmonary    (+) a smoker Current, COPD (MDI ) moderate,   (-) shortness of breath, recent URI, sleep apnea  Cardiovascular     (-) hypertension, past MI, dysrhythmias, angina      Neuro/Psych  (-) seizures, CVA  GI/Hepatic/Renal/Endo    (+)  GERD,    (-) no renal disease, diabetes, no thyroid disorder    Musculoskeletal     Abdominal    Substance History      OB/GYN          Other            Phys Exam Other: Upper plate               Anesthesia Plan    ASA 2     general and MAC   (PFL )  intravenous induction     Anesthetic plan, all risks, benefits, and alternatives have been provided, discussed and informed consent has been obtained with: patient.    Plan discussed with CRNA.

## 2021-10-19 NOTE — DISCHARGE INSTRUCTIONS
Monitored Anesthesia Care, Care After  This sheet gives you information about how to care for yourself after your procedure. Your health care provider may also give you more specific instructions. If you have problems or questions, contact your health care provider.  What can I expect after the procedure?  After the procedure, it is common to have:  · Tiredness.  · Forgetfulness about what happened after the procedure.  · Impaired judgment for important decisions.  · Nausea or vomiting.  · Some difficulty with balance.  Follow these instructions at home:  For the time period you were told by your health care provider:         · Rest as needed.  · Do not participate in activities where you could fall or become injured.  · Do not drive or use machinery.  · Do not drink alcohol.  · Do not take sleeping pills or medicines that cause drowsiness.  · Do not make important decisions or sign legal documents.  · Do not take care of children on your own.  Eating and drinking  · Follow the diet that is recommended by your health care provider.  · Drink enough fluid to keep your urine pale yellow.  · If you vomit:  ? Drink water, juice, or soup when you can drink without vomiting.  ? Make sure you have little or no nausea before eating solid foods.  General instructions  · Have a responsible adult stay with you for the time you are told. It is important to have someone help care for you until you are awake and alert.  · Take over-the-counter and prescription medicines only as told by your health care provider.  · If you have sleep apnea, surgery and certain medicines can increase your risk for breathing problems. Follow instructions from your health care provider about wearing your sleep device:  ? Anytime you are sleeping, including during daytime naps.  ? While taking prescription pain medicines, sleeping medicines, or medicines that make you drowsy.  · Avoid smoking.  · Keep all follow-up visits as told by your health care  "provider. This is important.  Contact a health care provider if:  · You keep feeling nauseous or you keep vomiting.  · You feel light-headed.  · You are still sleepy or having trouble with balance after 24 hours.  · You develop a rash.  · You have a fever.  · You have redness or swelling around the IV site.  Get help right away if:  · You have trouble breathing.  · You have new-onset confusion at home.  Summary  · For several hours after your procedure, you may feel tired. You may also be forgetful and have poor judgment.  · Have a responsible adult stay with you for the time you are told. It is important to have someone help care for you until you are awake and alert.  · Rest as told. Do not drive or operate machinery. Do not drink alcohol or take sleeping pills.  · Get help right away if you have trouble breathing, or if you suddenly become confused.  This information is not intended to replace advice given to you by your health care provider. Make sure you discuss any questions you have with your health care provider.  Document Revised: 04/23/2021 Document Reviewed: 11/19/2020  [employer] Patient Education © 2021 [employer].  [email or web address]">   Esophageal Dilatation  Esophageal dilatation, also called esophageal dilation, is a procedure to widen or open a blocked or narrowed part of the esophagus. The esophagus is the part of the body that moves food and liquid from the mouth to the stomach. You may need this procedure if:  · You have a buildup of scar tissue in your esophagus that makes it difficult, painful, or impossible to swallow. This can be caused by gastroesophageal reflux disease (GERD).  · You have cancer of the esophagus.  · There is a problem with how food moves through your esophagus.  In some cases, you may need this procedure repeated at a later time to dilate the esophagus gradually.  Tell a health care provider " about:  · Any allergies you have.  · All medicines you are taking, including vitamins, herbs, eye drops, creams, and over-the-counter medicines.  · Any problems you or family members have had with anesthetic medicines.  · Any blood disorders you have.  · Any surgeries you have had.  · Any medical conditions you have.  · Any antibiotic medicines you are required to take before dental procedures.  · Whether you are pregnant or may be pregnant.  What are the risks?  Generally, this is a safe procedure. However, problems may occur, including:  · Bleeding due to a tear in the lining of the esophagus.  · A hole, or perforation, in the esophagus.  What happens before the procedure?  · Ask your health care provider about:  ? Changing or stopping your regular medicines. This is especially important if you are taking diabetes medicines or blood thinners.  ? Taking medicines such as aspirin and ibuprofen. These medicines can thin your blood. Do not take these medicines unless your health care provider tells you to take them.  ? Taking over-the-counter medicines, vitamins, herbs, and supplements.  · Follow instructions from your health care provider about eating or drinking restrictions.  · Plan to have a responsible adult take you home from the hospital or clinic.  · Plan to have a responsible adult care for you for the time you are told after you leave the hospital or clinic. This is important.  What happens during the procedure?  · You may be given a medicine to help you relax (sedative).  · A numbing medicine may be sprayed into the back of your throat, or you may gargle the medicine.  · Your health care provider may perform the dilatation using various surgical instruments, such as:  ? Simple dilators. This instrument is carefully placed in the esophagus to stretch it.  ? Guided wire bougies. This involves using an endoscope to insert a wire into the esophagus. A dilator is passed over this wire to enlarge the esophagus.  Then the wire is removed.  ? Balloon dilators. An endoscope with a small balloon is inserted into the esophagus. The balloon is inflated to stretch the esophagus and open it up.  The procedure may vary among health care providers and hospitals.  What can I expect after the procedure?  · Your blood pressure, heart rate, breathing rate, and blood oxygen level will be monitored until you leave the hospital or clinic.  · Your throat may feel slightly sore and numb. This will get better over time.  · You will not be allowed to eat or drink until your throat is no longer numb.  · When you are able to drink, urinate, and sit on the edge of the bed without nausea or dizziness, you may be able to return home.  Follow these instructions at home:  · Take over-the-counter and prescription medicines only as told by your health care provider.  · If you were given a sedative during the procedure, it can affect you for several hours. Do not drive or operate machinery until your health care provider says that it is safe.  · Plan to have a responsible adult care for you for the time you are told. This is important.  · Follow instructions from your health care provider about any eating or drinking restrictions.  · Do not use any products that contain nicotine or tobacco, such as cigarettes, e-cigarettes, and chewing tobacco. If you need help quitting, ask your health care provider.  · Keep all follow-up visits. This is important.  Contact a health care provider if:  · You have a fever.  · You have pain that is not relieved by medicine.  Get help right away if:  · You have chest pain.  · You have trouble breathing.  · You have trouble swallowing.  · You vomit blood.  · You have black, tarry, or bloody stools.  These symptoms may represent a serious problem that is an emergency. Do not wait to see if the symptoms will go away. Get medical help right away. Call your local emergency services (911 in the [place] Do not drive yourself to the  hospital.  Summary  · Esophageal dilatation, also called esophageal dilation, is a procedure to widen or open a blocked or narrowed part of the esophagus.  · Plan to have a responsible adult take you home from the hospital or clinic.  · For this procedure, a numbing medicine may be sprayed into the back of your throat, or you may gargle the medicine.  · Do not drive or operate machinery until your health care provider says that it is safe.  This information is not intended to replace advice given to you by your health care provider. Make sure you discuss any questions you have with your health care provider.  Document Revised: 05/05/2021 Document Reviewed: 05/05/2021  [employer] Patient Education © 2021 [employer]  Upper Endoscopy, Adult, Care After  This sheet gives you information about how to care for yourself after your procedure. Your health care provider may also give you more specific instructions. If you have problems or questions, contact your health care provider.  What can I expect after the procedure?  After the procedure, it is common to have:  · A sore throat.  · Mild stomach pain or discomfort.  · Bloating.  · Nausea.  Follow these instructions at home:    · Follow instructions from your health care provider about what to eat or drink after your procedure.  · Return to your normal activities as told by your health care provider. Ask your health care provider what activities are safe for you.  · Take over-the-counter and prescription medicines only as told by your health care provider.  · If you were given a sedative during the procedure, it can affect you for several hours. Do not drive or operate machinery until your health care provider says that it is safe.  · Keep all follow-up visits as told by your health care provider. This is important.  Contact a health care provider if you have:  · A sore throat that lasts longer than one day.  · Trouble swallowing.  Get help right away if:  · You vomit  blood or your vomit looks like coffee grounds.  · You have:  ? A fever.  ? Bloody, black, or tarry stools.  ? A severe sore throat or you cannot swallow.  ? Difficulty breathing.  ? Severe pain in your chest or abdomen.  Summary  · After the procedure, it is common to have a sore throat, mild stomach discomfort, bloating, and nausea.  · If you were given a sedative during the procedure, it can affect you for several hours. Do not drive or operate machinery until your health care provider says that it is safe.  · Follow instructions from your health care provider about what to eat or drink after your procedure.  · Return to your normal activities as told by your health care provider.  This information is not intended to replace advice given to you by your health care provider. Make sure you discuss any questions you have with your health care provider.  Document Revised: 12/15/2020 Document Reviewed: 05/20/2019  [employer] Patient Education © 2021 [employer]

## 2021-10-19 NOTE — ANESTHESIA POSTPROCEDURE EVALUATION
Patient: Roger Bhat    Procedure Summary     Date: 10/19/21 Room / Location:  [facility]    Anesthesia Start: 1237 Anesthesia Stop:     Procedure: ESOPHAGOGASTRODUODENOSCOPY (N/A ) Diagnosis:       Gastroesophageal reflux disease without esophagitis      (Gastroesophageal reflux disease without esophagitis [K21.9])    Surgeons: Osmel Kessler MD Provider: Izaiah De La Cruz MD    Anesthesia Type: general, MAC ASA Status: 2          Anesthesia Type: general, MAC    Vitals  Vitals Value Taken Time   BP 89/54 10/19/21 1306   Temp 97.7 °F (36.5 °C) 10/19/21 1306   Pulse 72 10/19/21 1308   Resp 16 10/19/21 1306   SpO2 99 % 10/19/21 1308   Vitals shown include unvalidated device data.        Post Anesthesia Care and Evaluation    Patient location during evaluation: PACU  Patient participation: complete - patient participated  Level of consciousness: awake and responsive to verbal stimuli  Pain score: 2  Pain management: adequate  Airway patency: patent  Anesthetic complications: No anesthetic complications    Cardiovascular status: acceptable  Respiratory status: acceptable  Hydration status: acceptable    Comments: Pt awake and responsive. SV. VSS. Report to RN. Patient Vitals in the past 24 hrs:  10/19/21 1306, BP:(!) 89/54, Temp:97.7 °F (36.5 °C), Temp src:Temporal, Pulse:74, Resp:16, SpO2:99 %  10/19/21 1020, BP:147/88, Temp:97.5 °F (36.4 °C), Temp src:Tympanic, Pulse:57, Resp:20, SpO2:100 %  133/78. p 72. r 16. t 98.1

## 2021-10-19 NOTE — H&P
[facility]    Referring Provider: Osmel Kessler MD    Reason for Consultation: high grade esophageal proximal stricture    Chief complaint difficulty swallowing    History of present illness:  Roger Bhat is a 61 y.o. male who presents to inpatient endoscopy for egd. H/o high grade proximal esophageal stricture. Inability to pass diagnostic gastroscope beyond proximal esophagus. Also h/o sigmoid ulcerated masses biopsied to show benign inflammation. He is here for esophageal dilatation. Would like to repeat colonoscopy upon return from [place].      Allergies:  Patient has no known allergies.    Scheduled Meds:        Infusions:  sodium chloride, 9 mL/hr, Last Rate: 9 mL/hr (10/19/21 1043)        PRN Meds:  •  midazolam  •  sodium chloride    Home Meds:  Medications Prior to Admission   Medication Sig Dispense Refill Last Dose   • albuterol sulfate HFA (Ventolin HFA) 108 (90 Base) MCG/ACT inhaler Inhale 2 puffs Every 4 (Four) Hours As Needed for Wheezing or Shortness of Air. 8 g 5 10/19/2021 at Unknown time   • omeprazole (priLOSEC) 40 MG capsule Take 1 capsule by mouth Daily. 90 capsule 3 10/19/2021 at 0730   • tiotropium bromide monohydrate (Spiriva Respimat) 2.5 MCG/ACT aerosol solution inhaler Inhale 2 puffs Daily. 12 g 3 10/18/2021 at Unknown time   • sildenafil (VIAGRA) 100 MG tablet Take 0.5-1 tablets by mouth Daily As Needed for Erectile Dysfunction. 20 tablet 2 Unknown at Unknown time       ROS: Review of Systems  All other systems reviewed and are negative.    PAST MED HX: Pt  has a past medical history of Cough, GERD (gastroesophageal reflux disease), and Wears dentures.  PAST SURG HX: Pt  has a past surgical history that includes Esophagogastroduodenoscopy (05/2016) and Colonoscopy (2021).  FAM HX: family history includes No Known Problems in his father and mother.  SOC HX: Pt  reports that he has been smoking cigarettes. He has a 10.00 pack-year smoking history. He has never  used smokeless tobacco. He reports current alcohol use of about 14.0 standard drinks of alcohol per week. He reports that he does not use drugs.    /88 (BP Location: Left arm, Patient Position: Lying)   Pulse 57   Temp 97.5 °F (36.4 °C) (Tympanic)   Resp 20   SpO2 100%     Physical Exam  Wt Readings from Last 3 Encounters:   10/14/21 61.2 kg (135 lb)   08/24/21 60.1 kg (132 lb 6.4 oz)   08/12/21 59.9 kg (132 lb)   ,body mass index is unknown because there is no height or weight on file.    General Appearance:  Vitals as above. no acute distress  Older than stated age  Head/face:  Normocephalic, atraumatic  Eyes:   EOMI, no conjunctivitis or icterus   Nose/Sinuses:  Nares patent bilaterally without discharge or lesions  Mouth/Throat:  Normal oral movements without dyskinesia  Neck:  trachea is midline, no thyromegaly  Lungs:  Normal work of breathing effort, no overt rales  Heart:  Regular rate, no overt palpable thrill or grade VI M  Abdomen:  Nondistended, no guarding or rebound tenderness  Neurologic:  Alert; no focal deficits; age appropriate behavior and speech  Psychiatric: mood and affect are congruent  Vascular: extremities without edema  Skin: no rash or cyanosis.          Results Review:   I reviewed the patient's new clinical results.    Lab Results   Component Value Date    WBC 9.38 10/14/2021    HGB 14.7 10/14/2021    HCT 41.6 10/14/2021    .2 (H) 10/14/2021     10/14/2021       Lab Results   Component Value Date    GLUCOSE 93 11/29/2018    BUN 7 08/17/2021    CREATININE 0.70 (L) 08/17/2021    EGFRIFNONA 115 08/17/2021    EGFRIFAFRI 140 08/17/2021    BCR 10.0 08/17/2021    CO2 27.4 08/17/2021    CALCIUM 9.6 08/17/2021    PROTENTOTREF 6.9 08/17/2021    ALBUMIN 4.30 08/17/2021    LABIL2 1.7 08/17/2021    AST 29 08/17/2021    ALT 19 08/17/2021       ASSESSMENTS/PLANS  1.) High grade proximal esophageal stricture   to egd with xp scope, biopsy and dilatation with savary. He will need  repeat dilatations to reach goal diameter. Complicated by future vacation and difficulty work schedule.    2.) h/o tobacco dependence  3.) Sigmoid lesions, biopsied with benign inflammation. ? Ischemia   he has quit smoking. Will repeat colonoscopy to reassess lesions upon return from [place]    The risk of endoscopy was discussed including the risk of anesthesia, bowel perforation, bleeding, missed lesion, infection, and death should a severe complication occur.  The patient determined that the diagnostic benefit outweighed the risk.             I discussed the patients findings and my recommendations with the patient    Osmel Kessler MD  10/19/21  11:55 EDT

## 2021-10-27 ENCOUNTER — PREP FOR SURGERY (OUTPATIENT)
Dept: OTHER | Facility: HOSPITAL | Age: 61
End: 2021-10-27

## 2021-10-27 DIAGNOSIS — Z01.818 PRE-OP TESTING: ICD-10-CM

## 2021-10-27 DIAGNOSIS — K21.9 GASTROESOPHAGEAL REFLUX DISEASE WITHOUT ESOPHAGITIS: Primary | ICD-10-CM

## 2021-10-27 DIAGNOSIS — R13.10 DYSPHAGIA, UNSPECIFIED TYPE: ICD-10-CM

## 2021-11-09 ENCOUNTER — TELEPHONE (OUTPATIENT)
Dept: FAMILY MEDICINE CLINIC | Facility: CLINIC | Age: 61
End: 2021-11-09

## 2021-11-09 DIAGNOSIS — F17.200 CURRENT SMOKER: Primary | ICD-10-CM

## 2021-11-09 RX ORDER — NICOTINE 21 MG/24HR
1 PATCH, TRANSDERMAL 24 HOURS TRANSDERMAL EVERY 24 HOURS
Qty: 28 EACH | Refills: 0 | Status: SHIPPED | OUTPATIENT
Start: 2021-11-09 | End: 2021-11-09 | Stop reason: DRUGHIGH

## 2021-11-09 NOTE — TELEPHONE ENCOUNTER
Caller: Jeremiah Ambar    Relationship: Emergency Contact    Best call back number: [phone]    What medication are you requesting: NICOTINE PATCH    What are your current symptoms: HAVING HIS ESOPHAGUS STRETCHED, HE IS A HEAVY SMOKER AND DOES NEED HELP WITH NICOTINE WITHDRAWAL PRIOR TO PROCEDURE.    How long have you been experiencing symptoms: LONG TIME    Have you had these symptoms before:    [x] Yes  [] No    Have you been treated for these symptoms before:   [x] Yes  [] No    If a prescription is needed, what is your preferred [facility] and [facility] [address] [place] [address] [facility] [phone] [facility] [phone]      Additional notes: PLEASE LET ME KNOW IF THIS IS CALLED IN.

## 2021-11-11 ENCOUNTER — TELEPHONE (OUTPATIENT)
Dept: FAMILY MEDICINE CLINIC | Facility: CLINIC | Age: 61
End: 2021-11-11

## 2021-11-11 NOTE — TELEPHONE ENCOUNTER
Caller: Jeremiah Ambar    Relationship: Emergency Contact    Best call back number: [phone]    What medications are you currently taking:   Current Outpatient Medications on File Prior to Visit   Medication Sig Dispense Refill   • albuterol sulfate HFA (Ventolin HFA) 108 (90 Base) MCG/ACT inhaler Inhale 2 puffs Every 4 (Four) Hours As Needed for Wheezing or Shortness of Air. 8 g 5   • nicotine (Nicotine Step 3) 7 MG/24HR patch Place 1 patch on the skin as directed by provider Daily. 28 each 0   • omeprazole (priLOSEC) 40 MG capsule Take 1 capsule by mouth Daily. 90 capsule 3   • tiotropium bromide monohydrate (Spiriva Respimat) 2.5 MCG/ACT aerosol solution inhaler Inhale 2 puffs Daily. 12 g 3     No current facility-administered medications on file prior to visit.      Which medication are you concerned about: INHALERS     Who prescribed you this medication: PRAMOD     What are your concerns: PATIENT'S SPOUSE HAS QUESTIONS ABOUT THE PATIENT'S INHALERS.

## 2021-11-12 ENCOUNTER — PRE-ADMISSION TESTING (OUTPATIENT)
Dept: PREADMISSION TESTING | Facility: HOSPITAL | Age: 61
End: 2021-11-12

## 2021-11-12 VITALS — BODY MASS INDEX: 20.7 KG/M2 | HEIGHT: 69 IN | WEIGHT: 139.77 LBS

## 2021-11-12 LAB
DEPRECATED RDW RBC AUTO: 51.8 FL (ref 37–54)
ERYTHROCYTE [DISTWIDTH] IN BLOOD BY AUTOMATED COUNT: 13.4 % (ref 12.3–15.4)
HCT VFR BLD AUTO: 41.6 % (ref 37.5–51)
HGB BLD-MCNC: 14.7 G/DL (ref 13–17.7)
MCH RBC QN AUTO: 36.4 PG (ref 26.6–33)
MCHC RBC AUTO-ENTMCNC: 35.3 G/DL (ref 31.5–35.7)
MCV RBC AUTO: 103 FL (ref 79–97)
PLATELET # BLD AUTO: 282 10*3/MM3 (ref 140–450)
PMV BLD AUTO: 9.1 FL (ref 6–12)
QT INTERVAL: 374 MS
QTC INTERVAL: 423 MS
RBC # BLD AUTO: 4.04 10*6/MM3 (ref 4.14–5.8)
WBC # BLD AUTO: 8.85 10*3/MM3 (ref 3.4–10.8)

## 2021-11-12 PROCEDURE — 93005 ELECTROCARDIOGRAM TRACING: CPT

## 2021-11-12 PROCEDURE — 36415 COLL VENOUS BLD VENIPUNCTURE: CPT

## 2021-11-12 PROCEDURE — 85027 COMPLETE CBC AUTOMATED: CPT

## 2021-11-12 PROCEDURE — 93010 ELECTROCARDIOGRAM REPORT: CPT | Performed by: INTERNAL MEDICINE

## 2021-11-12 NOTE — TELEPHONE ENCOUNTER
Has The Growth Been Previously Biopsied?: has been previously biopsied tor was sent in 8/21 but never picked up. She wasn't sure he was to be on both. Informed yes

## 2021-11-14 ENCOUNTER — APPOINTMENT (OUTPATIENT)
Dept: PREADMISSION TESTING | Facility: HOSPITAL | Age: 61
End: 2021-11-14

## 2021-11-14 DIAGNOSIS — Z01.818 PRE-OP TESTING: ICD-10-CM

## 2021-11-14 LAB — SARS-COV-2 RNA PNL SPEC NAA+PROBE: NOT DETECTED

## 2021-11-14 PROCEDURE — U0004 COV-19 TEST NON-CDC HGH THRU: HCPCS

## 2021-11-14 PROCEDURE — C9803 HOPD COVID-19 SPEC COLLECT: HCPCS

## 2021-11-15 ENCOUNTER — ANESTHESIA EVENT (OUTPATIENT)
Dept: GASTROENTEROLOGY | Facility: HOSPITAL | Age: 61
End: 2021-11-15

## 2021-11-15 ENCOUNTER — TELEPHONE (OUTPATIENT)
Dept: GASTROENTEROLOGY | Facility: CLINIC | Age: 61
End: 2021-11-15

## 2021-11-15 NOTE — TELEPHONE ENCOUNTER
I SPOKE WITH OANH. GAVE HER EGD APPOINTMENT ARRIVAL TIME AT 10AM. ALSO DON'T PUT NICOTINE PATCH THE DAY OF PROCEDURE. MAY APPLY NEW PATCH AFTER THE PROCEDURE.

## 2021-11-16 ENCOUNTER — HOSPITAL ENCOUNTER (OUTPATIENT)
Facility: HOSPITAL | Age: 61
Setting detail: HOSPITAL OUTPATIENT SURGERY
Discharge: HOME | End: 2021-11-16
Attending: INTERNAL MEDICINE | Admitting: INTERNAL MEDICINE

## 2021-11-16 ENCOUNTER — ANESTHESIA (OUTPATIENT)
Dept: GASTROENTEROLOGY | Facility: HOSPITAL | Age: 61
End: 2021-11-16

## 2021-11-16 VITALS
SYSTOLIC BLOOD PRESSURE: 140 MMHG | HEART RATE: 67 BPM | OXYGEN SATURATION: 99 % | TEMPERATURE: 97.6 F | HEIGHT: 69 IN | BODY MASS INDEX: 20.7 KG/M2 | DIASTOLIC BLOOD PRESSURE: 86 MMHG | RESPIRATION RATE: 18 BRPM | WEIGHT: 139.77 LBS

## 2021-11-16 DIAGNOSIS — K21.9 GASTROESOPHAGEAL REFLUX DISEASE WITHOUT ESOPHAGITIS: ICD-10-CM

## 2021-11-16 DIAGNOSIS — R13.10 DYSPHAGIA, UNSPECIFIED TYPE: ICD-10-CM

## 2021-11-16 PROCEDURE — 25010000002 PROPOFOL 10 MG/ML EMULSION: Performed by: NURSE ANESTHETIST, CERTIFIED REGISTERED

## 2021-11-16 PROCEDURE — 88305 TISSUE EXAM BY PATHOLOGIST: CPT | Performed by: INTERNAL MEDICINE

## 2021-11-16 PROCEDURE — C1769 GUIDE WIRE: HCPCS | Performed by: INTERNAL MEDICINE

## 2021-11-16 PROCEDURE — 43248 EGD GUIDE WIRE INSERTION: CPT | Performed by: INTERNAL MEDICINE

## 2021-11-16 PROCEDURE — 43239 EGD BIOPSY SINGLE/MULTIPLE: CPT | Performed by: INTERNAL MEDICINE

## 2021-11-16 RX ORDER — SODIUM CHLORIDE, SODIUM LACTATE, POTASSIUM CHLORIDE, CALCIUM CHLORIDE 600; 310; 30; 20 MG/100ML; MG/100ML; MG/100ML; MG/100ML
9 INJECTION, SOLUTION INTRAVENOUS CONTINUOUS
Status: DISCONTINUED | OUTPATIENT
Start: 2021-11-16 | End: 2021-11-16 | Stop reason: HOSPADM

## 2021-11-16 RX ORDER — SODIUM CHLORIDE 0.9 % (FLUSH) 0.9 %
10 SYRINGE (ML) INJECTION AS NEEDED
Status: DISCONTINUED | OUTPATIENT
Start: 2021-11-16 | End: 2021-11-16 | Stop reason: HOSPADM

## 2021-11-16 RX ORDER — IPRATROPIUM BROMIDE AND ALBUTEROL SULFATE 2.5; .5 MG/3ML; MG/3ML
3 SOLUTION RESPIRATORY (INHALATION) ONCE AS NEEDED
Status: DISCONTINUED | OUTPATIENT
Start: 2021-11-16 | End: 2021-11-16 | Stop reason: HOSPADM

## 2021-11-16 RX ORDER — FAMOTIDINE 20 MG/1
20 TABLET, FILM COATED ORAL ONCE
Status: DISCONTINUED | OUTPATIENT
Start: 2021-11-16 | End: 2021-11-16 | Stop reason: HOSPADM

## 2021-11-16 RX ORDER — ONDANSETRON 2 MG/ML
4 INJECTION INTRAMUSCULAR; INTRAVENOUS ONCE AS NEEDED
Status: DISCONTINUED | OUTPATIENT
Start: 2021-11-16 | End: 2021-11-16 | Stop reason: HOSPADM

## 2021-11-16 RX ORDER — MIDAZOLAM HYDROCHLORIDE 1 MG/ML
1 INJECTION INTRAMUSCULAR; INTRAVENOUS
Status: DISCONTINUED | OUTPATIENT
Start: 2021-11-16 | End: 2021-11-16 | Stop reason: HOSPADM

## 2021-11-16 RX ORDER — LIDOCAINE HYDROCHLORIDE 10 MG/ML
INJECTION, SOLUTION EPIDURAL; INFILTRATION; INTRACAUDAL; PERINEURAL AS NEEDED
Status: DISCONTINUED | OUTPATIENT
Start: 2021-11-16 | End: 2021-11-17 | Stop reason: SURG

## 2021-11-16 RX ORDER — SODIUM CHLORIDE 0.9 % (FLUSH) 0.9 %
10 SYRINGE (ML) INJECTION EVERY 12 HOURS SCHEDULED
Status: DISCONTINUED | OUTPATIENT
Start: 2021-11-16 | End: 2021-11-16 | Stop reason: HOSPADM

## 2021-11-16 RX ORDER — PROPOFOL 10 MG/ML
VIAL (ML) INTRAVENOUS AS NEEDED
Status: DISCONTINUED | OUTPATIENT
Start: 2021-11-16 | End: 2021-11-17 | Stop reason: SURG

## 2021-11-16 RX ORDER — FAMOTIDINE 10 MG/ML
20 INJECTION, SOLUTION INTRAVENOUS ONCE
Status: COMPLETED | OUTPATIENT
Start: 2021-11-16 | End: 2021-11-16

## 2021-11-16 RX ORDER — LIDOCAINE HYDROCHLORIDE 10 MG/ML
0.5 INJECTION, SOLUTION EPIDURAL; INFILTRATION; INTRACAUDAL; PERINEURAL ONCE AS NEEDED
Status: DISCONTINUED | OUTPATIENT
Start: 2021-11-16 | End: 2021-11-16 | Stop reason: HOSPADM

## 2021-11-16 RX ADMIN — PROPOFOL 100 MG: 10 INJECTION, EMULSION INTRAVENOUS at 11:38

## 2021-11-16 RX ADMIN — PROPOFOL 20 MG: 10 INJECTION, EMULSION INTRAVENOUS at 11:42

## 2021-11-16 RX ADMIN — LIDOCAINE HYDROCHLORIDE 100 MG: 10 INJECTION, SOLUTION EPIDURAL; INFILTRATION; INTRACAUDAL; PERINEURAL at 11:38

## 2021-11-16 RX ADMIN — SODIUM CHLORIDE, PRESERVATIVE FREE 10 ML: 5 INJECTION INTRAVENOUS at 09:51

## 2021-11-16 RX ADMIN — FAMOTIDINE 20 MG: 10 INJECTION INTRAVENOUS at 09:50

## 2021-11-16 RX ADMIN — SODIUM CHLORIDE, POTASSIUM CHLORIDE, SODIUM LACTATE AND CALCIUM CHLORIDE 9 ML/HR: 600; 310; 30; 20 INJECTION, SOLUTION INTRAVENOUS at 09:50

## 2021-11-16 RX ADMIN — PROPOFOL 50 MG: 10 INJECTION, EMULSION INTRAVENOUS at 11:40

## 2021-11-17 NOTE — ANESTHESIA POSTPROCEDURE EVALUATION
Patient: Roger Bhat    Procedure Summary     Date: 11/16/21 Room / Location:  [facility]    Anesthesia Start: 1118 Anesthesia Stop:     Procedure: ESOPHAGOGASTRODUODENOSCOPY WITH DILATATION (N/A ) Diagnosis:       Gastroesophageal reflux disease without esophagitis      Dysphagia, unspecified type      (Gastroesophageal reflux disease without esophagitis [K21.9])      (Dysphagia, unspecified type [R13.10])    Surgeons: Osmel Kessler MD Provider: Keyur Gilbert MD    Anesthesia Type: general ASA Status: 3          Anesthesia Type: No value filed.    Vitals  Vitals Value Taken Time   /86 11/16/21 1215   Temp 97.6 °F (36.4 °C) 11/16/21 1152   Pulse 76 11/16/21 1218   Resp 18 11/16/21 1215   SpO2 98 % 11/16/21 1218   Vitals shown include unvalidated device data.        Post Anesthesia Care and Evaluation    Patient location during evaluation: PACU  Patient participation: complete - patient participated  Level of consciousness: awake and alert  Pain management: adequate  Airway patency: patent  Anesthetic complications: No anesthetic complications  PONV Status: none  Cardiovascular status: hemodynamically stable and acceptable  Respiratory status: nonlabored ventilation, acceptable and nasal cannula  Hydration status: acceptable

## 2021-11-17 NOTE — ANESTHESIA PREPROCEDURE EVALUATION
Anesthesia Evaluation     Patient summary reviewed and Nursing notes reviewed                Airway   Mallampati: II  Dental      Pulmonary    (+) COPD,   Cardiovascular - negative cardio ROS        Neuro/Psych- negative ROS  GI/Hepatic/Renal/Endo - negative ROS     Musculoskeletal (-) negative ROS    Abdominal    Substance History - negative use     OB/GYN negative ob/gyn ROS         Other                        Anesthesia Plan    ASA 3     general     intravenous induction     Anesthetic plan, all risks, benefits, and alternatives have been provided, discussed and informed consent has been obtained with: patient.

## 2021-12-01 ENCOUNTER — TELEPHONE (OUTPATIENT)
Dept: FAMILY MEDICINE CLINIC | Facility: CLINIC | Age: 61
End: 2021-12-01

## 2021-12-01 RX ORDER — NICOTINE 21 MG/24HR
1 PATCH, TRANSDERMAL 24 HOURS TRANSDERMAL EVERY 24 HOURS
Qty: 28 EACH | Refills: 1 | Status: SHIPPED | OUTPATIENT
Start: 2021-12-01 | End: 2022-09-14

## 2021-12-01 NOTE — TELEPHONE ENCOUNTER
Caller: Ambar Daniels    Relationship: Emergency Contact    Best call back number: [phone]    What medication are you requesting: NICOTINE PATCHES- RUGBY     Have you had these symptoms before:    [x] Yes  [] No    Have you been treated for these symptoms before:   [x] Yes  [] No    If a prescription is needed, what is your preferred pharmacy and phone number: [facility] [address] [place], [place] [address] [facility] [phone] [facility] [phone] FX     Additional notes: PATIENT WAS GIVEN 7 MG AND HE IS STRUGGLING.  PATIENT HAS A PRESCRIPTIN BUT IS GOING TO BE RUNNING OUT SOON

## 2022-06-07 DIAGNOSIS — K21.9 GASTROESOPHAGEAL REFLUX DISEASE WITHOUT ESOPHAGITIS: ICD-10-CM

## 2022-06-07 RX ORDER — OMEPRAZOLE 40 MG/1
40 CAPSULE, DELAYED RELEASE ORAL DAILY
Qty: 90 CAPSULE | Refills: 3 | Status: SHIPPED | OUTPATIENT
Start: 2022-06-07 | End: 2022-11-28 | Stop reason: SDUPTHER

## 2022-09-08 ENCOUNTER — TELEPHONE (OUTPATIENT)
Dept: FAMILY MEDICINE CLINIC | Facility: CLINIC | Age: 62
End: 2022-09-08

## 2022-09-08 DIAGNOSIS — S99.922D INJURY OF LEFT FOOT, SUBSEQUENT ENCOUNTER: ICD-10-CM

## 2022-09-08 DIAGNOSIS — M79.672 LEFT FOOT PAIN: Primary | ICD-10-CM

## 2022-09-08 NOTE — TELEPHONE ENCOUNTER
Please request records.     Which foot did he fracture? (need diagnosis to associate with referral)

## 2022-09-08 NOTE — TELEPHONE ENCOUNTER
Bottom of L ft is broken. Kennedi sent him home w/ Ibuprofen then called the next day and said it's broken. Currently he's in a boot. [facility] location which ever can get him in soon. He can barley walk.     Records requested.

## 2022-09-08 NOTE — TELEPHONE ENCOUNTER
Pt wife stated [place] and they wanted to know if you could place a referral to an ortho they already had xrays done at [facility] in [place], and they referred him to [facility] but they want to stay with [facility] if possible

## 2022-09-14 ENCOUNTER — OFFICE VISIT (OUTPATIENT)
Dept: ORTHOPEDIC SURGERY | Facility: CLINIC | Age: 62
End: 2022-09-14

## 2022-09-14 VITALS
SYSTOLIC BLOOD PRESSURE: 122 MMHG | HEIGHT: 69 IN | WEIGHT: 145 LBS | DIASTOLIC BLOOD PRESSURE: 81 MMHG | BODY MASS INDEX: 21.48 KG/M2

## 2022-09-14 DIAGNOSIS — S92.015A CLOSED NONDISPLACED FRACTURE OF BODY OF LEFT CALCANEUS, INITIAL ENCOUNTER: Primary | ICD-10-CM

## 2022-09-14 DIAGNOSIS — F17.200 SMOKING: ICD-10-CM

## 2022-09-14 DIAGNOSIS — I87.8 VENOUS STASIS: ICD-10-CM

## 2022-09-14 PROCEDURE — 99204 OFFICE O/P NEW MOD 45 MIN: CPT | Performed by: ORTHOPAEDIC SURGERY

## 2022-09-14 RX ORDER — IBUPROFEN 600 MG/1
TABLET ORAL
COMMUNITY
Start: 2022-09-08 | End: 2022-11-28

## 2022-09-14 NOTE — PROGRESS NOTES
NEW PATIENT    Patient: Roger Bhat  : 1960    Primary Care Provider: Richa Mary DO    Requesting Provider: As above    Pain and Initial Evaluation of the Left Ankle (Stepped into muddy hole 22)      History    Chief Complaint: Left heel fracture    History of Present Illness: This is a very pleasant 62-year-old gentleman here today with his wife.  On 2022 he was working on a horse [place] and jumped off a piece of equipment into a hole and sustained a left calcaneus fracture.  He was seen at [facility] in [place], I have the outside x-ray disc to review.  He has been using a walker and a short fracture boot, but has been putting weight on it.  He reports significant pain with weightbearing.  He has been using ibuprofen.  He reports the pain is 4-6 out of 10.  He is a heavy smoker, at least a pack a day.  I strongly recommended that he quit smoking.I strongly recommend quitting smoking.  I explained the risks of smoking, including hardening of the arteries, gangrene, amputation.  I explained nicotine poisons bone cells and increases the risk of nonunion of fractures and fusions.  I explained that studies show that smokers have FOUR times the risk of the general population when they have foot or ankle surgery.  (5min)        The patient does not have a personal or family history of DVT, PE, hypercoagulable state or risk factors for clotting.        Current Outpatient Medications on File Prior to Visit   Medication Sig Dispense Refill   • albuterol sulfate HFA (Ventolin HFA) 108 (90 Base) MCG/ACT inhaler Inhale 2 puffs Every 4 (Four) Hours As Needed for Wheezing or Shortness of Air. 8 g 5   • ibuprofen (ADVIL,MOTRIN) 600 MG tablet      • omeprazole (priLOSEC) 40 MG capsule Take 1 capsule by mouth Daily. 90 capsule 3   • tiotropium bromide monohydrate (Spiriva Respimat) 2.5 MCG/ACT aerosol solution inhaler Inhale 2 puffs Daily. 12 g 3     No current  facility-administered medications on file prior to visit.      No Known Allergies   Past Medical History:   Diagnosis Date   • COPD (chronic obstructive pulmonary disease) ([facility])    • Cough    • GERD (gastroesophageal reflux disease)    • Wears dentures     partial upper plate      Past Surgical History:   Procedure Laterality Date   • COLONOSCOPY  2021   • ENDOSCOPY  05/2016    pt dennise, he was placed under general anesthesia for this   • ENDOSCOPY N/A 10/19/2021    Procedure: ESOPHAGOGASTRODUODENOSCOPY;  Surgeon: Osmel Kessler MD;  Location:  [facility];  Service: Gastroenterology;  Laterality: N/A;  24 fr savory dilator used at 1251, 27 fr sdavory used at 1253, 33 Upper sorbian savoruy used at 1257, 42 fr savory used at 1259     • ENDOSCOPY N/A 11/16/2021    Procedure: ESOPHAGOGASTRODUODENOSCOPY WITH DILATATION;  Surgeon: Osmel Kessler MD;  Location:  [facility];  Service: Gastroenterology;  Laterality: N/A;  Dilation with 48fr savery     Family History   Problem Relation Age of Onset   • No Known Problems Mother    • No Known Problems Father    • Colon cancer Neg Hx    • Colon polyps Neg Hx    • Esophageal cancer Neg Hx       Social History     Socioeconomic History   • Marital status: Significant Other   Tobacco Use   • Smoking status: Current Every Day Smoker     Packs/day: 1.00     Years: 40.00     Pack years: 40.00     Types: Cigarettes   • Smokeless tobacco: Never Used   • Tobacco comment: max 2ppd  now 1/4 ppd    Vaping Use   • Vaping Use: Every day   • Substances: Nicotine   • Devices: Disposable   Substance and Sexual Activity   • Alcohol use: Yes     Alcohol/week: 14.0 standard drinks     Types: 14 Cans of beer per week   • Drug use: Never   • Sexual activity: Defer        Review of Systems   Constitutional: Negative.    HENT: Negative.    Eyes: Negative.    Respiratory: Negative.    Cardiovascular: Negative.    Gastrointestinal: Negative.    Endocrine: Negative.    Genitourinary:  "Negative.    Musculoskeletal: Positive for arthralgias.        Foot swelling     Skin: Negative.    Allergic/Immunologic: Negative.    Neurological: Negative.    Hematological: Negative.    Psychiatric/Behavioral: Negative.        The following portions of the patient's history were reviewed and updated as appropriate: allergies, current medications, past family history, past medical history, past social history, past surgical history and problem list.    Physical Exam:   /81   Ht 175.3 cm (69.02\")   Wt 65.8 kg (145 lb)   BMI 21.40 kg/m²   GENERAL: Body habitus: normal weight for height    Lower extremity edema: Right: 2+ pitting; Left: 2+ pitting    Varicose veins:  Right: moderate; Left: moderate    Gait: using walker     Mental Status:  awake and alert; oriented to person, place, and time    Voice:  clear  SKIN:  Lower extremity: venous stasis pigment    Hair Growth(lower extremity):  Right:diminished; Left:  diminished  NAILS: Toenails: thick  HEENT: Head: Normocephalic, atraumatic,  without obvious abnormality.  eye: normal external eye, no icterus  ears:normal external ears  PULM:  Repiratory effort normal  CV:  Dorsalis Pedis:  Right: 2+; Left:2+    Posterior Tibial: Right:2+; Left:2+    Capillary Refill:  Brisk  MSK:        Tibia:  ; Left:  non tender      Ankle:  ; Left:  non tender      Foot: ; Left:  Tender with swelling and ecchymosis surrounding the calcaneus, decreased inversion eversion, nontender in the midfoot and forefoot      NEURO:      [place] 5.07 monofilament test: normal    Lower extremity sensation: intact         Motor Function: All motors fire bilateral lower extremity         Medical Decision Making    Data Review:   ordered and reviewed x-rays today, reviewed radiology images and reviewed radiology results    Assessment and Plan/ Diagnosis/Treatment options:   1. Closed nondisplaced fracture of body of left calcaneus, initial encounter  X-rays today show that the injury " is probably more comminuted than was noticeable on the first set of x-rays.  We need to do a CT scan to evaluate the fracture.  That we will direct treatment.  At present I suspect we will be able to treat this nonoperatively.  However I explained to the patient and his wife that calcaneus fractures always result in some permanent stiffness and some arthritis in the joint.  He must be absolutely nonweightbearing.  We put him in a tall boot.  I encouraged him to get a knee scooter.  I will see him back following the CT scan  - XR Calcaneus 2+ View Left  - CT Lower Extremity Left Without Contrast; Future    2. Smoking  As above I strongly recommend he quit smoking    3. Venous stasis  Yet does have significant venous stasis, I explained that in relation to horses that have leg swelling.  He was able to understand this.  I explained edema and venous stasis to the patient, and the often hereditary nature of the problem, and the contribution of weight, trauma, age, etc. I explained how these factors cause edema (due to gravity, etc) I explained how the edema can lead to ulceration.  I explained how the edema can cause pain, stiffness, aching, cramping, etc. I explained that it is a very very common problem, so common that even [employer] markets compression stockings.  I explained that diuretics cannot correct the problem. I recommend wearing support stockings (compression stockings) daily, we discussed what type and where to find them                  Part of this encounter note is an electronic transcription/translation of spoken language to printed text. The electronic translation of spoken language may permit erroneous, or at times, nonsensical words or phrases to be inadvertently transcribed; Although I have reviewed the note for such errors, some may still exist.          Albania Boykin MD

## 2022-09-15 ENCOUNTER — TELEPHONE (OUTPATIENT)
Dept: ORTHOPEDIC SURGERY | Facility: CLINIC | Age: 62
End: 2022-09-15

## 2022-09-15 NOTE — TELEPHONE ENCOUNTER
Caller: OANH     Relationship: SO     Best call back number: [phone]     What is the best time to reach you: ASAP TODAY     Who are you requesting to speak with (clinical staff, provider,  specific staff member): CLINICAL     Do you know the name of the person who called: NO    What was the call regarding: UNSURE    Do you require a callback: YES , NO MESSAGE IN CHART WHERE PT WAS GIVEN A CALL . UNABLE TO WT

## 2022-09-16 NOTE — TELEPHONE ENCOUNTER
I called and spoke with Ambar and she called and scheduled his CT already.  She saw on mychart that they had tried to call.  Marilin

## 2022-09-19 ENCOUNTER — HOSPITAL ENCOUNTER (OUTPATIENT)
Dept: CT IMAGING | Facility: HOSPITAL | Age: 62
Discharge: HOME | End: 2022-09-19
Admitting: ORTHOPAEDIC SURGERY

## 2022-09-19 DIAGNOSIS — S92.015A CLOSED NONDISPLACED FRACTURE OF BODY OF LEFT CALCANEUS, INITIAL ENCOUNTER: ICD-10-CM

## 2022-09-19 PROCEDURE — 73700 CT LOWER EXTREMITY W/O DYE: CPT

## 2022-09-21 ENCOUNTER — OFFICE VISIT (OUTPATIENT)
Dept: ORTHOPEDIC SURGERY | Facility: CLINIC | Age: 62
End: 2022-09-21

## 2022-09-21 VITALS
HEIGHT: 69 IN | SYSTOLIC BLOOD PRESSURE: 132 MMHG | DIASTOLIC BLOOD PRESSURE: 80 MMHG | BODY MASS INDEX: 21.48 KG/M2 | WEIGHT: 145 LBS

## 2022-09-21 DIAGNOSIS — M25.572 LEFT ANKLE PAIN, UNSPECIFIED CHRONICITY: Primary | ICD-10-CM

## 2022-09-21 DIAGNOSIS — S92.015A CLOSED NONDISPLACED FRACTURE OF BODY OF LEFT CALCANEUS, INITIAL ENCOUNTER: ICD-10-CM

## 2022-09-21 DIAGNOSIS — I87.8 VENOUS STASIS: ICD-10-CM

## 2022-09-21 DIAGNOSIS — F17.200 SMOKING: ICD-10-CM

## 2022-09-21 PROCEDURE — 99213 OFFICE O/P EST LOW 20 MIN: CPT | Performed by: ORTHOPAEDIC SURGERY

## 2022-09-21 NOTE — PROGRESS NOTES
ESTABLISHED PATIENT    Patient: Roger Bhat  : 1960    Primary Care Provider: Richa Mary DO    Requesting Provider: As above    Follow-up (1 week CT f/u-- Closed nondisplaced fracture of body of left calcaneus)      History    Chief Complaint: Left calcaneus fracture    History of Present Illness: He returns for follow-up of the CT scan of the left calcaneus.  I reviewed the scan and the report, and discussed it with the radiologist.  It was done 2022.  The calcaneus fracture is extra-articular and nondisplaced, so we can treat it nonoperatively.  He does have some evidence of an old anterior process fracture with a little bit of arthritis in that area.  Interestingly on the anterior distal tibia there is essentially a hole in the cortex, with a track, that is well-corticated, that looks like an old penetrating injury.  It does not look like a tumor.  It does not look like a fracture.  He does not remember any specific injury, but there are 2 scars in that area and we got an x-ray today with a marker on the scars.    Current Outpatient Medications on File Prior to Visit   Medication Sig Dispense Refill   • albuterol sulfate HFA (Ventolin HFA) 108 (90 Base) MCG/ACT inhaler Inhale 2 puffs Every 4 (Four) Hours As Needed for Wheezing or Shortness of Air. 8 g 5   • ibuprofen (ADVIL,MOTRIN) 600 MG tablet      • omeprazole (priLOSEC) 40 MG capsule Take 1 capsule by mouth Daily. 90 capsule 3   • tiotropium bromide monohydrate (Spiriva Respimat) 2.5 MCG/ACT aerosol solution inhaler Inhale 2 puffs Daily. 12 g 3     No current facility-administered medications on file prior to visit.      No Known Allergies   Past Medical History:   Diagnosis Date   • COPD (chronic obstructive pulmonary disease) ([facility])    • Cough    • GERD (gastroesophageal reflux disease)    • Wears dentures     partial upper plate      Past Surgical History:   Procedure Laterality Date   • COLONOSCOPY     • ENDOSCOPY  2016     pt say's, he was placed under general anesthesia for this   • ENDOSCOPY N/A 10/19/2021    Procedure: ESOPHAGOGASTRODUODENOSCOPY;  Surgeon: Osmel Kessler MD;  Location:  [facility];  Service: Gastroenterology;  Laterality: N/A;  24 fr savory dilator used at 1251, 27 fr sdavory used at 1253, 33 Togolese savoruy used at 1257, 42 fr savory used at 1259     • ENDOSCOPY N/A 11/16/2021    Procedure: ESOPHAGOGASTRODUODENOSCOPY WITH DILATATION;  Surgeon: Osmel Kessler MD;  Location:  [facility];  Service: Gastroenterology;  Laterality: N/A;  Dilation with 48fr savery     Family History   Problem Relation Age of Onset   • No Known Problems Mother    • No Known Problems Father    • Colon cancer Neg Hx    • Colon polyps Neg Hx    • Esophageal cancer Neg Hx       Social History     Socioeconomic History   • Marital status: Significant Other   Tobacco Use   • Smoking status: Current Every Day Smoker     Packs/day: 1.00     Years: 40.00     Pack years: 40.00     Types: Cigarettes   • Smokeless tobacco: Never Used   • Tobacco comment: max 2ppd  now 1/4 ppd    Vaping Use   • Vaping Use: Every day   • Substances: Nicotine   • Devices: Disposable   Substance and Sexual Activity   • Alcohol use: Yes     Alcohol/week: 14.0 standard drinks     Types: 14 Cans of beer per week   • Drug use: Never   • Sexual activity: Defer        Review of Systems   Constitutional: Negative.    HENT: Negative.    Eyes: Negative.    Respiratory: Negative.    Cardiovascular: Negative.    Gastrointestinal: Negative.    Endocrine: Negative.    Genitourinary: Negative.    Musculoskeletal: Positive for arthralgias.   Skin: Negative.    Allergic/Immunologic: Negative.    Neurological: Negative.    Hematological: Negative.    Psychiatric/Behavioral: Negative.        The following portions of the patient's history were reviewed and updated as appropriate: allergies, current medications, past family history, past medical history, past social  "history, past surgical history and problem list.    Physical Exam:   /80   Ht 175.3 cm (69.02\")   Wt 65.8 kg (145 lb)   BMI 21.40 kg/m²   GENERAL: Gait: using crutches       MSK:  Less swelling in left foot and ankle,  over the calcaneus as expected, 2 old scars on the anterior distal tibia, I put markers on these    Medical Decision Making    Data Review:   ordered and reviewed x-rays today, reviewed radiology images and reviewed radiology results    Assessment/Plan/Diagnosis/Treatment Options:   1. Left ankle pain, unspecified chronicity  In looking at the ankle x-ray with a marker one of the scars on his leg does coincide with that divot in the tibia.  It must of been some type of old penetrating injury.  He does remember that he has had injuries from pitchfork's before.  It could have been that or a nail or fence wire etc.  He does not have the appearance at all of a tumor.  It is very well-corticated, and is clearly old.  I do not think we need to do any more work-up, I showed him the pictures and discussed it extensively.  - XR Ankle 3+ View Left    2. Closed nondisplaced fracture of body of left calcaneus, initial encounter  We can treat this nonoperatively, no weightbearing in the boot.  I will see him again in 6 to 8 weeks for nonweightbearing x-ray of the calcaneus    3. Venous stasis  He did get compression socks he should wear these daily    4. Smoking  Again I recommend he quit smoking              Part of this encounter note is an electronic transcription/translation of spoken language to printed text. The electronic translation of spoken language may permit erroneous, or at times, nonsensical words or phrases to be inadvertently transcribed; Although I have reviewed the note for such errors, some may still exist.              "

## 2022-10-13 DIAGNOSIS — J43.9 PULMONARY EMPHYSEMA, UNSPECIFIED EMPHYSEMA TYPE: ICD-10-CM

## 2022-10-13 RX ORDER — TIOTROPIUM BROMIDE INHALATION SPRAY 3.12 UG/1
2 SPRAY, METERED RESPIRATORY (INHALATION)
Qty: 12 G | Refills: 3 | OUTPATIENT
Start: 2022-10-13

## 2022-10-13 NOTE — TELEPHONE ENCOUNTER
Caller: JeremiahAmbar    Relationship: Emergency Contact    Best call back number: [phone] TO LEAVE DETAILED MESSAGE IF NO ANSWER 10/13/22 HUB      Requested Prescriptions:   Requested Prescriptions     Pending Prescriptions Disp Refills   • tiotropium bromide monohydrate (Spiriva Respimat) 2.5 MCG/ACT aerosol solution inhaler 12 g 3     Sig: Inhale 2 puffs Daily.        Pharmacy where request should be sent: [facility] [id number] [address] [phone] [facility] [phone]      Additional details provided by patient: PLEASE REFILL- OUT OF MEDICATION  Does the patient have less than a 3 day supply:  [x] Yes  [] No    Ronda Wilkerson Rep   10/13/22 10:14 EDT

## 2022-10-15 ENCOUNTER — NURSE TRIAGE (OUTPATIENT)
Dept: CALL CENTER | Facility: HOSPITAL | Age: 62
End: 2022-10-15

## 2022-10-15 DIAGNOSIS — J43.9 PULMONARY EMPHYSEMA, UNSPECIFIED EMPHYSEMA TYPE: ICD-10-CM

## 2022-10-15 RX ORDER — TIOTROPIUM BROMIDE INHALATION SPRAY 3.12 UG/1
2 SPRAY, METERED RESPIRATORY (INHALATION)
Qty: 12 G | Refills: 1 | Status: SHIPPED | OUTPATIENT
Start: 2022-10-15 | End: 2022-11-28 | Stop reason: SDUPTHER

## 2022-10-15 NOTE — TELEPHONE ENCOUNTER
Patient called while on call. Med not sent in yet. I sent in Spiriva to attached pharmacy. He has an appt in Nov    Castro Velasquez MD

## 2022-10-15 NOTE — TELEPHONE ENCOUNTER
"Request for spiriva refill on 10/13, still pending.  Contacted Dr. Velasquez provider on call for refill.  Dr. Velasquez approves and sent in, patient notified.    Reason for Disposition  • [1] Prescription refill request for ESSENTIAL medicine (i.e., likelihood of harm to patient if not taken) AND [2] triager unable to refill per department policy    Additional Information  • Negative: New-onset or worsening symptoms, see that guideline  (e.g., diarrhea, runny nose, sore throat)  • Negative: Medicine question not related to refill or renewal  • Negative: Caller requesting information unrelated to medicine  • Negative: [1] Prescription not at pharmacy AND [2] was prescribed by PCP recently (Exception: triager has access to EMR and prescription is recorded there. Go to Home Care and confirm for pharmacy.)  • Negative: [1] Pharmacy calling with prescription questions AND [2] triager unable to answer question  • Negative: Prescription request for new medicine (not a refill)  • Negative: Caller requesting a CONTROLLED substance prescription refill (e.g., narcotics, ADHD medicines)  • Negative: [1] Prescription refill request for NON-ESSENTIAL medicine (i.e., no harm to patient if med not taken) AND [2] triager unable to refill per department policy  • Negative: [1] Caller has NON-URGENT medicine question about med that PCP prescribed AND [2] triager unable to answer question  • Negative: [1] Prescription prescribed recently is not at pharmacy AND [2] triager has access to patient's EMR AND [3] prescription is recorded in the EMR  • Negative: [1] Caller requesting a prescription renewal (no refills left), no triage required, AND [2] triager able to renew prescription per department policy  • Negative: Patient has refills remaining on their prescription    Answer Assessment - Initial Assessment Questions  1. DRUG NAME: \"What medicine do you need to have refilled?\"      Spiriva  2. REFILLS REMAINING: \"How many refills are " "remaining?\" (Note: The label on the medicine or pill bottle will show how many refills are remaining. If there are no refills remaining, then a renewal may be needed.)      0  3. EXPIRATION DATE: \"What is the expiration date?\" (Note: The label states when the prescription will , and thus can no longer be refilled.)      na  4. PRESCRIBING HCP: \"Who prescribed it?\" Reason: If prescribed by specialist, call should be referred to that group.      Dr. Mary  5. SYMPTOMS: \"Do you have any symptoms?\"      yes  6. PREGNANCY: \"Is there any chance that you are pregnant?\" \"When was your last menstrual period?\"      na    Protocols used: MEDICATION REFILL AND RENEWAL CALL-ADULT-      "

## 2022-10-17 RX ORDER — TIOTROPIUM BROMIDE INHALATION SPRAY 3.12 UG/1
SPRAY, METERED RESPIRATORY (INHALATION)
Qty: 4 G | Refills: 0 | OUTPATIENT
Start: 2022-10-17

## 2022-11-02 ENCOUNTER — OFFICE VISIT (OUTPATIENT)
Dept: ORTHOPEDIC SURGERY | Facility: CLINIC | Age: 62
End: 2022-11-02

## 2022-11-02 VITALS
BODY MASS INDEX: 21.48 KG/M2 | WEIGHT: 145 LBS | DIASTOLIC BLOOD PRESSURE: 80 MMHG | HEIGHT: 69 IN | SYSTOLIC BLOOD PRESSURE: 124 MMHG

## 2022-11-02 DIAGNOSIS — Z09 FRACTURE FOLLOW-UP: Primary | ICD-10-CM

## 2022-11-02 PROCEDURE — 99213 OFFICE O/P EST LOW 20 MIN: CPT | Performed by: ORTHOPAEDIC SURGERY

## 2022-11-02 NOTE — PROGRESS NOTES
ESTABLISHED PATIENT    Patient: Roger Bhat  : 1960    Primary Care Provider: Richa Mary DO    Requesting Provider: As above    Follow-up (6 week f/u; Closed nondisplaced fracture of body of left calcaneus))      History    Chief Complaint: Follow-up left calcaneus fracture, 2022    History of Present Illness: He returns for follow-up of his extra-articular nondisplaced calcaneus fracture on the left.  He reports he has cut back significantly on smoking.  His pain is much less.  He has been putting some weight on the foot and advised him to stop doing that.    Current Outpatient Medications on File Prior to Visit   Medication Sig Dispense Refill   • albuterol sulfate HFA (Ventolin HFA) 108 (90 Base) MCG/ACT inhaler Inhale 2 puffs Every 4 (Four) Hours As Needed for Wheezing or Shortness of Air. 8 g 5   • ibuprofen (ADVIL,MOTRIN) 600 MG tablet      • omeprazole (priLOSEC) 40 MG capsule Take 1 capsule by mouth Daily. 90 capsule 3   • tiotropium bromide monohydrate (Spiriva Respimat) 2.5 MCG/ACT aerosol solution inhaler Inhale 2 puffs Daily. 12 g 1     No current facility-administered medications on file prior to visit.      No Known Allergies   Past Medical History:   Diagnosis Date   • COPD (chronic obstructive pulmonary disease) ([facility])    • Cough    • GERD (gastroesophageal reflux disease)    • Wears dentures     partial upper plate      Past Surgical History:   Procedure Laterality Date   • COLONOSCOPY     • ENDOSCOPY  2016    pt say's, he was placed under general anesthesia for this   • ENDOSCOPY N/A 10/19/2021    Procedure: ESOPHAGOGASTRODUODENOSCOPY;  Surgeon: Osmel Kessler MD;  [facility];  Service: Gastroenterology;  Laterality: N/A;  24 fr savory dilator used at 1251, 27 fr sdavory used at 1253, 33 french savoruy used at 1257, 42 fr savory used at 1259     • ENDOSCOPY N/A 2021    Procedure: ESOPHAGOGASTRODUODENOSCOPY WITH DILATATION;  Surgeon:  "Osmel Kessler MD;  Location: [facility] END[facility]COPY;  Service: Gastroenterology;  Laterality: N/A;  Dilation with 48fr savery     Family History   Problem Relation Age of Onset   • No Known Problems Mother    • No Known Problems Father    • Colon cancer Neg Hx    • Colon polyps Neg Hx    • Esophageal cancer Neg Hx       Social History     Socioeconomic History   • Marital status: Significant Other   Tobacco Use   • Smoking status: Every Day     Packs/day: 1.00     Years: 40.00     Pack years: 40.00     Types: Cigarettes   • Smokeless tobacco: Never   • Tobacco comments:     max 2ppd  now 1/4 ppd    Vaping Use   • Vaping Use: Every day   • Substances: Nicotine   • Devices: Disposable   Substance and Sexual Activity   • Alcohol use: Yes     Alcohol/week: 14.0 standard drinks     Types: 14 Cans of beer per week   • Drug use: Never   • Sexual activity: Defer        Review of Systems   Constitutional: Negative.    HENT: Negative.    Eyes: Negative.    Respiratory: Negative.    Cardiovascular: Negative.    Gastrointestinal: Negative.    Endocrine: Negative.    Genitourinary: Negative.    Musculoskeletal: Positive for arthralgias.   Skin: Negative.    Allergic/Immunologic: Negative.    Neurological: Negative.    Hematological: Negative.    Psychiatric/Behavioral: Negative.        The following portions of the patient's history were reviewed and updated as appropriate: allergies, current medications, past family history, past medical history, past social history, past surgical history and problem list.    Physical Exam:   /80   Ht 175.3 cm (69.02\")   Wt 65.8 kg (145 lb)   BMI 21.40 kg/m²   Much less swelling around the left hindfoot, mildly tender over the calcaneus    Medical Decision Making    Data Review:   ordered and reviewed x-rays today    Assessment/Plan/Diagnosis/Treatment Options:   1. Fracture follow-up  I strongly recommend he maintain nonweightbearing.  I explained that if he walks on this the " fracture can collapse.  I recommend he quit smoking altogether.  I will see him in 4 weeks for nonweightbearing x-ray of the calcaneus to possibly start physical therapy and weightbearing.  - XR Calcaneus 2+ View Left        Albania Boykin MD

## 2022-11-08 ENCOUNTER — TELEPHONE (OUTPATIENT)
Dept: ORTHOPEDIC SURGERY | Facility: CLINIC | Age: 62
End: 2022-11-08

## 2022-11-08 NOTE — TELEPHONE ENCOUNTER
Caller: LATOYA    Relationship: CAREGIVER    Best call back number: [phone]    What form or medical record are you requesting: WORK STATUS    Who is requesting this form or medical record from you: LUIS    How would you like to receive the form or medical records (pick-up, mail, fax): FAX  If fax, what is the fax number: [phone]: ATTN: MARIANA    Timeframe paperwork needed: ASAP

## 2022-11-28 ENCOUNTER — OFFICE VISIT (OUTPATIENT)
Dept: FAMILY MEDICINE CLINIC | Facility: CLINIC | Age: 62
End: 2022-11-28

## 2022-11-28 VITALS
DIASTOLIC BLOOD PRESSURE: 76 MMHG | BODY MASS INDEX: 22.22 KG/M2 | RESPIRATION RATE: 20 BRPM | HEIGHT: 69 IN | WEIGHT: 150 LBS | SYSTOLIC BLOOD PRESSURE: 122 MMHG | OXYGEN SATURATION: 99 % | TEMPERATURE: 97.1 F | HEART RATE: 80 BPM

## 2022-11-28 DIAGNOSIS — R53.83 OTHER FATIGUE: ICD-10-CM

## 2022-11-28 DIAGNOSIS — Z12.5 SCREENING FOR PROSTATE CANCER: ICD-10-CM

## 2022-11-28 DIAGNOSIS — N52.9 ERECTILE DYSFUNCTION, UNSPECIFIED ERECTILE DYSFUNCTION TYPE: ICD-10-CM

## 2022-11-28 DIAGNOSIS — J43.9 PULMONARY EMPHYSEMA, UNSPECIFIED EMPHYSEMA TYPE: ICD-10-CM

## 2022-11-28 DIAGNOSIS — F17.200 CURRENT SMOKER: ICD-10-CM

## 2022-11-28 DIAGNOSIS — Z00.00 ANNUAL PHYSICAL EXAM: Primary | ICD-10-CM

## 2022-11-28 DIAGNOSIS — Z23 NEED FOR COVID-19 VACCINE: ICD-10-CM

## 2022-11-28 DIAGNOSIS — D12.6 ADENOMATOUS POLYP OF COLON, UNSPECIFIED PART OF COLON: ICD-10-CM

## 2022-11-28 DIAGNOSIS — Z83.49 FAMILY HISTORY OF HEMOCHROMATOSIS: ICD-10-CM

## 2022-11-28 DIAGNOSIS — R73.03 PREDIABETES: ICD-10-CM

## 2022-11-28 DIAGNOSIS — K21.9 GASTROESOPHAGEAL REFLUX DISEASE WITHOUT ESOPHAGITIS: ICD-10-CM

## 2022-11-28 LAB
BILIRUB BLD-MCNC: NEGATIVE MG/DL
CLARITY, POC: CLEAR
COLOR UR: YELLOW
EXPIRATION DATE: NORMAL
GLUCOSE UR STRIP-MCNC: NEGATIVE MG/DL
KETONES UR QL: NEGATIVE
LEUKOCYTE EST, POC: NEGATIVE
Lab: NORMAL
NITRITE UR-MCNC: NEGATIVE MG/ML
PH UR: 6 [PH] (ref 5–8)
PROT UR STRIP-MCNC: NEGATIVE MG/DL
RBC # UR STRIP: NEGATIVE /UL
SP GR UR: 1.01 (ref 1–1.03)
UROBILINOGEN UR QL: NORMAL

## 2022-11-28 PROCEDURE — 81003 URINALYSIS AUTO W/O SCOPE: CPT | Performed by: FAMILY MEDICINE

## 2022-11-28 PROCEDURE — 99214 OFFICE O/P EST MOD 30 MIN: CPT | Performed by: FAMILY MEDICINE

## 2022-11-28 PROCEDURE — 0124A PR ADM SARSCOV2 30MCG/0.3ML BST: CPT | Performed by: FAMILY MEDICINE

## 2022-11-28 PROCEDURE — 99396 PREV VISIT EST AGE 40-64: CPT | Performed by: FAMILY MEDICINE

## 2022-11-28 PROCEDURE — 91312 COVID-19 (PFIZER) BIVALENT BOOSTER 12+YRS: CPT | Performed by: FAMILY MEDICINE

## 2022-11-28 RX ORDER — OMEPRAZOLE 40 MG/1
40 CAPSULE, DELAYED RELEASE ORAL DAILY
Qty: 90 CAPSULE | Refills: 3 | Status: SHIPPED | OUTPATIENT
Start: 2022-11-28

## 2022-11-28 RX ORDER — BUPROPION HYDROCHLORIDE 150 MG/1
TABLET, EXTENDED RELEASE ORAL
Qty: 183 TABLET | Refills: 0 | Status: SHIPPED | OUTPATIENT
Start: 2022-11-28 | End: 2023-01-16

## 2022-11-28 RX ORDER — BUDESONIDE AND FORMOTEROL FUMARATE DIHYDRATE 160; 4.5 UG/1; UG/1
2 AEROSOL RESPIRATORY (INHALATION)
Qty: 6 G | Refills: 12 | Status: SHIPPED | OUTPATIENT
Start: 2022-11-28

## 2022-11-28 RX ORDER — ALBUTEROL SULFATE 90 UG/1
2 AEROSOL, METERED RESPIRATORY (INHALATION) EVERY 4 HOURS PRN
Qty: 8 G | Refills: 11 | Status: SHIPPED | OUTPATIENT
Start: 2022-11-28

## 2022-11-28 RX ORDER — SILDENAFIL 100 MG/1
50-100 TABLET, FILM COATED ORAL DAILY PRN
Qty: 20 TABLET | Refills: 5 | Status: SHIPPED | OUTPATIENT
Start: 2022-11-28

## 2022-11-28 RX ORDER — TIOTROPIUM BROMIDE INHALATION SPRAY 3.12 UG/1
2 SPRAY, METERED RESPIRATORY (INHALATION)
Qty: 12 G | Refills: 3 | Status: SHIPPED | OUTPATIENT
Start: 2022-11-28

## 2022-11-28 NOTE — PROGRESS NOTES
The ABCs of the Annual Wellness Visit  Subsequent Medicare Wellness Visit    Chief Complaint   Patient presents with   • Medicare Wellness-subsequent      Subjective    History of Present Illness:  Roger Bhat is a 62 y.o. male who presents for a Subsequent Medicare Wellness Visit.    The following portions of the patient's history were reviewed and   updated as appropriate: allergies, current medications, past family history, past medical history, past social history, past surgical history and problem list.    Compared to one year ago, the patient feels his physical   health is the same.    Compared to one year ago, the patient feels his mental   health is the same.    Recent Hospitalizations:  He was not admitted to the hospital during the last year.       Current Medical Providers:  Patient Care Team:  Richa Mary DO as PCP - General (Family Medicine)  Cecelia Ace APRN as Nurse Practitioner (Nurse Practitioner)  Osmel Kessler MD as Consulting Physician (Gastroenterology)    Outpatient Medications Prior to Visit   Medication Sig Dispense Refill   • albuterol sulfate HFA (Ventolin HFA) 108 (90 Base) MCG/ACT inhaler Inhale 2 puffs Every 4 (Four) Hours As Needed for Wheezing or Shortness of Air. 8 g 5   • omeprazole (priLOSEC) 40 MG capsule Take 1 capsule by mouth Daily. 90 capsule 3   • tiotropium bromide monohydrate (Spiriva Respimat) 2.5 MCG/ACT aerosol solution inhaler Inhale 2 puffs Daily. 12 g 1   • ibuprofen (ADVIL,MOTRIN) 600 MG tablet        No facility-administered medications prior to visit.       No opioid medication identified on active medication list. I have reviewed chart for other potential  high risk medication/s and harmful drug interactions in the elderly.          Aspirin is not on active medication list.  Aspirin use is not indicated based on review of current medical condition/s. Risk of harm outweighs potential benefits.  .    Patient Active Problem List  "  Diagnosis   • Current smoker   • Cellulitis of right hand   • Gastroesophageal reflux disease without esophagitis   • Screening, lipid   • Dysphagia     Advance Care Planning  Advance Directive is not on file.  {ACP Discussion, Advance Directive not in EMR:53816}          Objective    Vitals:    11/28/22 1005   BP: 122/76   Pulse: 80   Resp: 20   Temp: 97.1 °F (36.2 °C)   SpO2: 99%   Weight: 68 kg (150 lb)  Comment: EST PER PT-WEARING A BOOT   Height: 175.3 cm (69.02\")   PainSc: 0-No pain     Estimated body mass index is 22.14 kg/m² as calculated from the following:    Height as of this encounter: 175.3 cm (69.02\").    Weight as of this encounter: 68 kg (150 lb).    BMI is within normal parameters. No other follow-up for BMI required.      Does the patient have evidence of cognitive impairment? No    Physical Exam            HEALTH RISK ASSESSMENT    Smoking Status:  Social History     Tobacco Use   Smoking Status Every Day   • Packs/day: 1.00   • Years: 40.00   • Pack years: 40.00   • Types: Cigarettes   Smokeless Tobacco Never   Tobacco Comments    max 2ppd  now 1/4 ppd      Alcohol Consumption:  Social History     Substance and Sexual Activity   Alcohol Use Yes   • Alcohol/week: 14.0 standard drinks   • Types: 14 Cans of beer per week     Fall Risk Screen:    JANETTE Fall Risk Assessment was completed, and patient is at HIGH risk for falls. Assessment completed on:11/28/2022    Depression Screening:  PHQ-2/PHQ-9 Depression Screening 11/28/2022   Retired Total Score -   Little Interest or Pleasure in Doing Things 0-->not at all   Feeling Down, Depressed or Hopeless 0-->not at all   PHQ-9: Brief Depression Severity Measure Score 0       Health Habits and Functional and Cognitive Screening:  Functional & Cognitive Status 11/28/2022   Do you have difficulty preparing food and eating? No   Do you have difficulty bathing yourself, getting dressed or grooming yourself? No   Do you have difficulty using the toilet? No "   Do you have difficulty moving around from place to place? No   Do you have trouble with steps or getting out of a bed or a chair? No   Current Diet Well Balanced Diet   Dental Exam Up to date   Eye Exam Not up to date   Exercise (times per week) 2 times per week   Current Exercises Include Walking   Do you need help using the phone?  No   Are you deaf or do you have serious difficulty hearing?  No   Do you need help with transportation? No   Do you need help shopping? No   Do you need help preparing meals?  No   Do you need help with housework?  No   Do you need help with laundry? No   Do you need help taking your medications? No   Do you need help managing money? No   Do you ever drive or ride in a car without wearing a seat belt? No   Have you felt unusual stress, anger or loneliness in the last month? No   Who do you live with? Other   If you need help, do you have trouble finding someone available to you? No   Have you been bothered in the last four weeks by sexual problems? No   Do you have difficulty concentrating, remembering or making decisions? No       Age-appropriate Screening Schedule:  Refer to the list below for future screening recommendations based on patient's age, sex and/or medical conditions. Orders for these recommended tests are listed in the plan section. The patient has been provided with a written plan.    Health Maintenance   Topic Date Due   • ZOSTER VACCINE (2 of 2) 04/03/2020   • INFLUENZA VACCINE  08/01/2022   • TDAP/TD VACCINES (2 - Td or Tdap) 06/30/2027              Assessment & Plan   CMS Preventative Services Quick Reference  Risk Factors Identified During Encounter  {Medicare Wellness Risk Factors:24108}  The above risks/problems have been discussed with the patient.  Follow up actions/plans if indicated are seen below in the Assessment/Plan Section.  Pertinent information has been shared with the patient in the After Visit Summary.    Diagnoses and all orders for this  visit:    1. Medicare annual wellness visit, subsequent (Primary)        Follow Up:   No follow-ups on file.     An After Visit Summary and PPPS were made available to the patient.    {Optional Chart Navigation Links Wrapup  Review (Popup)  Advance Care Planning  Labs  CC  Problem List  Visit Diagnosis  Medications  Result Review  Imaging  Health Maintenance  Quality  BestPractice  SmartSets  SnapShot  Encounters  Notes  Media  Procedures :23}      {Time Spent-Use for E/M Coding (Optional):56653}

## 2022-11-28 NOTE — PROGRESS NOTES
"Chief Complaint  Medicare Wellness-subsequent    Subjective        Roger Logan Bhat presents to [facility]  History of Present Illness  Dental exam is up to date, planning to have teeth extracted in December.  Vision exam is overdue, he will schedule  He is also planning to have hearing checked.     Experiencing increased fatigue.   Little routine exercise.     Colonoscopy: Sept 2021, was supposed to repeat in 3 months, but hasn't completed. Would like to schedule.     Has a cough, mainly in the morning when he wakes up.  Eases off during the day, but returns at night   Typically, nonproductive   Cough on going for over a year  CT chest screening completed August 2021, moderate to severe emphysematous changes. Continues to smoke about 0.5-1 ppd, would like to quit.   Unable to tolerate nicotine patches, irritates his skin. Would like to try medication to help quit.     His sister has hemochromatosis, he is requesting to have lab to evaluate     Objective   Vital Signs:  /76   Pulse 80   Temp 97.1 °F (36.2 °C)   Resp 20   Ht 175.3 cm (69.02\")   Wt 68 kg (150 lb) Comment: EST PER PT-WEARING A BOOT  SpO2 99%   BMI 22.14 kg/m²   Estimated body mass index is 22.14 kg/m² as calculated from the following:    Height as of this encounter: 175.3 cm (69.02\").    Weight as of this encounter: 68 kg (150 lb).    BMI is within normal parameters. No other follow-up for BMI required.      Physical Exam  Vitals reviewed.   Constitutional:       Appearance: He is well-developed.   HENT:      Head: Normocephalic and atraumatic.      Right Ear: Tympanic membrane, ear canal and external ear normal.      Left Ear: Tympanic membrane, ear canal and external ear normal.   Cardiovascular:      Rate and Rhythm: Normal rate and regular rhythm.      Heart sounds: Normal heart sounds. No murmur heard.  Pulmonary:      Effort: Pulmonary effort is normal.      Breath sounds: Decreased breath sounds " (diffuse) present.   Musculoskeletal:         General: No swelling.      Cervical back: Neck supple.      Comments: Left foot in boot due to injury    Neurological:      Mental Status: He is alert and oriented to person, place, and time.   Psychiatric:         Behavior: Behavior normal.        Result Review :                Assessment and Plan   Diagnoses and all orders for this visit:    1. Annual physical exam (Primary)  -     Hemochromatosis Mutation  -     CBC & Differential  -     Comprehensive Metabolic Panel  -     Hemoglobin A1c  -     Lipid Panel With / Chol / HDL Ratio  -     Iron  -     POCT urinalysis dipstick, automated  -     TSH  -     Vitamin D,25-Hydroxy  -     Vitamin B12    2. Current smoker  -     CT Chest Low Dose Wo; Future  -     Hemochromatosis Mutation  -     CBC & Differential  -     Comprehensive Metabolic Panel  -     Hemoglobin A1c  -     Lipid Panel With / Chol / HDL Ratio  -     Iron  -     albuterol sulfate HFA (Ventolin HFA) 108 (90 Base) MCG/ACT inhaler; Inhale 2 puffs Every 4 (Four) Hours As Needed for Wheezing or Shortness of Air.  Dispense: 8 g; Refill: 11  -     buPROPion SR (Wellbutrin SR) 150 MG 12 hr tablet; Take 1 tablet by mouth Daily for 3 days, THEN 1 tablet 2 (Two) Times a Day for 90 days.  Dispense: 183 tablet; Refill: 0  -     POCT urinalysis dipstick, automated  -     TSH  -     Vitamin D,25-Hydroxy  -     Vitamin B12    3. Prediabetes  -     CBC & Differential  -     Comprehensive Metabolic Panel  -     Hemoglobin A1c  -     POCT urinalysis dipstick, automated  -     TSH  -     Vitamin D,25-Hydroxy  -     Vitamin B12    4. Pulmonary emphysema, unspecified emphysema type (HCC)  -     tiotropium bromide monohydrate (Spiriva Respimat) 2.5 MCG/ACT aerosol solution inhaler; Inhale 2 puffs Daily.  Dispense: 12 g; Refill: 3  -     POCT urinalysis dipstick, automated  -     budesonide-formoterol (Symbicort) 160-4.5 MCG/ACT inhaler; Inhale 2 puffs 2 (Two) Times a Day.   Dispense: 6 g; Refill: 12  -     TSH  -     Vitamin D,25-Hydroxy  -     Vitamin B12    5. Gastroesophageal reflux disease without esophagitis  -     omeprazole (priLOSEC) 40 MG capsule; Take 1 capsule by mouth Daily.  Dispense: 90 capsule; Refill: 3  -     POCT urinalysis dipstick, automated  -     TSH  -     Vitamin D,25-Hydroxy  -     Vitamin B12    6. Other fatigue  -     TSH  -     Vitamin D,25-Hydroxy  -     Vitamin B12    7. Erectile dysfunction, unspecified erectile dysfunction type  -     sildenafil (Viagra) 100 MG tablet; Take 0.5-1 tablets by mouth Daily As Needed for Erectile Dysfunction.  Dispense: 20 tablet; Refill: 5  -     POCT urinalysis dipstick, automated  -     TSH  -     Vitamin D,25-Hydroxy  -     Vitamin B12    8. Adenomatous polyp of colon, unspecified part of colon  -     Ambulatory Referral For Screening Colonoscopy    9. Need for COVID-19 vaccine  -     COVID-19 Bivalent Booster (Pfizer) 12+yrs  -     Hemochromatosis Mutation  -     CBC & Differential  -     Comprehensive Metabolic Panel  -     Hemoglobin A1c  -     Lipid Panel With / Chol / HDL Ratio  -     Iron  -     POCT urinalysis dipstick, automated    10. Family history of hemochromatosis  -     Hemochromatosis Mutation  -     CBC & Differential  -     Comprehensive Metabolic Panel  -     Hemoglobin A1c  -     Lipid Panel With / Chol / HDL Ratio  -     Iron  -     POCT urinalysis dipstick, automated    11. Screening for prostate cancer  -     Hemochromatosis Mutation  -     CBC & Differential  -     Comprehensive Metabolic Panel  -     Hemoglobin A1c  -     Lipid Panel With / Chol / HDL Ratio  -     Iron  -     PSA Screen  -     POCT urinalysis dipstick, automated    12. BMI 22.0-22.9, adult  -     TSH  -     Vitamin D,25-Hydroxy  -     Vitamin B12      Add ICS to treatment to improve cough.  Consider pulmonology consult if symptoms persist.  Encourage tobacco cessation. Roger Bhat  reports that he has been smoking  cigarettes. He has a 40.00 pack-year smoking history. He has never used smokeless tobacco.. I have educated him on the risk of diseases from using tobacco products such as COPD.   I advised him to quit and he is willing to quit. We have discussed the following method/s for tobacco cessation:  Prescription Medicaiton.   I spent 5 minutes counseling the patient.    Health advice: healthy food choices with fresh fruits and vegetables, maintain sleep pattern at least 8 hours, avoid texting and distracted driving practices; wear safety belt, engage in regular exercise, maintain healthy weight, use safe sex practices, avoid alcohol and illicit drugs. Maintain immunizations that are up to date. Maintain health maintenance: PSA, etc.  Follow up with PCP if struggling with depression or anxiety. Keep regular dental and eye exams. Brush and floss teeth daily.   F/U annually and prn.                   Follow Up   No follow-ups on file.  Patient was given instructions and counseling regarding his condition or for health maintenance advice. Please see specific information pulled into the AVS if appropriate.

## 2022-11-30 ENCOUNTER — OFFICE VISIT (OUTPATIENT)
Dept: ORTHOPEDIC SURGERY | Facility: CLINIC | Age: 62
End: 2022-11-30

## 2022-11-30 VITALS — BODY MASS INDEX: 22.2 KG/M2 | WEIGHT: 149.91 LBS | HEIGHT: 69 IN

## 2022-11-30 DIAGNOSIS — Z09 FRACTURE FOLLOW-UP: Primary | ICD-10-CM

## 2022-11-30 PROCEDURE — 99213 OFFICE O/P EST LOW 20 MIN: CPT | Performed by: ORTHOPAEDIC SURGERY

## 2022-11-30 NOTE — PROGRESS NOTES
ESTABLISHED PATIENT    Patient: Roger Bhat  : 1960    Primary Care Provider: Richa Mary DO    Requesting Provider: As above    Follow-up (4 week follow up; Closed nondisplaced fracture of body of left calcaneus (DOI: 22))      History    Chief Complaint: Left calcaneus fracture    History of Present Illness: He returns for follow-up of his left calcaneus fracture.  He is now 3 months out, original injury 2022 his wife is with him.  He has been nonweightbearing in a boot.  He reports the pain is much less.  He would like to try to go back to work light duty.  He reports he has cut down on smoking to a pack a day from 2-1/2 packs a day.  I strongly recommend that he completely quit.    Current Outpatient Medications on File Prior to Visit   Medication Sig Dispense Refill   • albuterol sulfate HFA (Ventolin HFA) 108 (90 Base) MCG/ACT inhaler Inhale 2 puffs Every 4 (Four) Hours As Needed for Wheezing or Shortness of Air. 8 g 11   • budesonide-formoterol (Symbicort) 160-4.5 MCG/ACT inhaler Inhale 2 puffs 2 (Two) Times a Day. 6 g 12   • buPROPion SR (Wellbutrin SR) 150 MG 12 hr tablet Take 1 tablet by mouth Daily for 3 days, THEN 1 tablet 2 (Two) Times a Day for 90 days. 183 tablet 0   • omeprazole (priLOSEC) 40 MG capsule Take 1 capsule by mouth Daily. 90 capsule 3   • sildenafil (Viagra) 100 MG tablet Take 0.5-1 tablets by mouth Daily As Needed for Erectile Dysfunction. 20 tablet 5   • tiotropium bromide monohydrate (Spiriva Respimat) 2.5 MCG/ACT aerosol solution inhaler Inhale 2 puffs Daily. 12 g 3     No current facility-administered medications on file prior to visit.      No Known Allergies   Past Medical History:   Diagnosis Date   • COPD (chronic obstructive pulmonary disease) (HCC)    • Cough    • GERD (gastroesophageal reflux disease)    • Wears dentures     partial upper plate      Past Surgical History:   Procedure Laterality Date   • COLONOSCOPY     • ENDOSCOPY  2016     pt say's, he was placed under general anesthesia for this   • ENDOSCOPY N/A 10/19/2021    Procedure: ESOPHAGOGASTRODUODENOSCOPY;  Surgeon: Osmel Kessler MD;  Location:  [facility];  Service: Gastroenterology;  Laterality: N/A;  24 fr savory dilator used at 1251, 27 fr sdavory used at 1253, 33 Azerbaijani savoruy used at 1257, 42 fr savory used at 1259     • ENDOSCOPY N/A 11/16/2021    Procedure: ESOPHAGOGASTRODUODENOSCOPY WITH DILATATION;  Surgeon: Osmel Kessler MD;  Location:  [facility];  Service: Gastroenterology;  Laterality: N/A;  Dilation with 48fr savery     Family History   Problem Relation Age of Onset   • No Known Problems Mother    • No Known Problems Father    • Colon cancer Neg Hx    • Colon polyps Neg Hx    • Esophageal cancer Neg Hx       Social History     Socioeconomic History   • Marital status: Significant Other   Tobacco Use   • Smoking status: Every Day     Packs/day: 1.00     Years: 40.00     Pack years: 40.00     Types: Cigarettes   • Smokeless tobacco: Never   • Tobacco comments:     max 2ppd  now 1/4 ppd    Vaping Use   • Vaping Use: Never used   Substance and Sexual Activity   • Alcohol use: Yes     Alcohol/week: 14.0 standard drinks     Types: 14 Cans of beer per week   • Drug use: Never   • Sexual activity: Defer        Review of Systems   Constitutional: Negative.    HENT: Negative.    Eyes: Negative.    Respiratory: Negative.    Cardiovascular: Negative.    Gastrointestinal: Negative.    Endocrine: Negative.    Genitourinary: Negative.    Musculoskeletal: Positive for arthralgias.   Skin: Negative.    Allergic/Immunologic: Negative.    Neurological: Negative.    Hematological: Negative.    Psychiatric/Behavioral: Negative.        The following portions of the patient's history were reviewed and updated as appropriate: allergies, current medications, past family history, past medical history, past social history, past surgical history and problem list.    Physical  "Exam:   Ht 175.3 cm (69.02\")   Wt 68 kg (149 lb 14.6 oz)   BMI 22.13 kg/m²   Only mild thickening around the left calcaneus, nontender, ankle dorsiflexion 10 plantarflexion 30, few degrees inversion eversion  Medical Decision Making    Data Review:   ordered and reviewed x-rays today    Assessment/Plan/Diagnosis/Treatment Options:   1. Fracture follow-up  He is healing well.  He may now begin weightbearing in the boot and start PT.  He may slowly wean out of the boot.  I think it is okay to do light duty work.  I will see him in 8 to 10 weeks with an x-ray of the calcaneus weightbearing  - XR Calcaneus 2+ View Left  - Ambulatory Referral to Physical Therapy Evaluate and treat, Ortho        Albania Boykin MD                      "

## 2022-12-06 ENCOUNTER — TELEPHONE (OUTPATIENT)
Dept: ORTHOPEDIC SURGERY | Facility: CLINIC | Age: 62
End: 2022-12-06

## 2022-12-06 NOTE — TELEPHONE ENCOUNTER
Caller:MARIANA     Relationship:LUIS RAMEY    Best call back number:     What form or medical record are you requesting: MOST RECENT WORK STATUS    Who is requesting this form or medical record from you: LUIS BLAKE/BRANDEN    How would you like to receive the form or medical records (pick-up, mail, fax):FAX  If fax, what is the fax number: [phone]: ATTN: MARIANA- CLAIM# [id number]    Timeframe paperwork needed: ASAP

## 2022-12-07 LAB
25(OH)D3+25(OH)D2 SERPL-MCNC: 29.5 NG/ML (ref 30–100)
ALBUMIN SERPL-MCNC: 4.3 G/DL (ref 3.8–4.9)
ALBUMIN/GLOB SERPL: 1.6 {RATIO} (ref 1.2–2.2)
ALP SERPL-CCNC: 97 IU/L (ref 44–121)
ALT SERPL-CCNC: 20 IU/L (ref 0–44)
AST SERPL-CCNC: 22 IU/L (ref 0–40)
BASOPHILS # BLD AUTO: 0.1 X10E3/UL (ref 0–0.2)
BASOPHILS NFR BLD AUTO: 1 %
BILIRUB SERPL-MCNC: 0.4 MG/DL (ref 0–1.2)
BUN SERPL-MCNC: 6 MG/DL (ref 8–27)
BUN/CREAT SERPL: 8 (ref 10–24)
CALCIUM SERPL-MCNC: 9.3 MG/DL (ref 8.6–10.2)
CHLORIDE SERPL-SCNC: 94 MMOL/L (ref 96–106)
CHOLEST SERPL-MCNC: 182 MG/DL (ref 100–199)
CHOLEST/HDLC SERPL: 3.6 RATIO (ref 0–5)
CO2 SERPL-SCNC: 24 MMOL/L (ref 20–29)
CREAT SERPL-MCNC: 0.75 MG/DL (ref 0.76–1.27)
EGFRCR SERPLBLD CKD-EPI 2021: 103 ML/MIN/1.73
EOSINOPHIL # BLD AUTO: 0.1 X10E3/UL (ref 0–0.4)
EOSINOPHIL NFR BLD AUTO: 1 %
ERYTHROCYTE [DISTWIDTH] IN BLOOD BY AUTOMATED COUNT: 13.2 % (ref 11.6–15.4)
GLOBULIN SER CALC-MCNC: 2.7 G/DL (ref 1.5–4.5)
GLUCOSE SERPL-MCNC: 98 MG/DL (ref 70–99)
HBA1C MFR BLD: 5.3 % (ref 4.8–5.6)
HCT VFR BLD AUTO: 45.9 % (ref 37.5–51)
HDLC SERPL-MCNC: 50 MG/DL
HFE GENE MUT ANL BLD/T: NORMAL
HGB BLD-MCNC: 15.7 G/DL (ref 13–17.7)
IMM GRANULOCYTES # BLD AUTO: 0 X10E3/UL (ref 0–0.1)
IMM GRANULOCYTES NFR BLD AUTO: 0 %
IRON SERPL-MCNC: 139 UG/DL (ref 38–169)
LDLC SERPL CALC-MCNC: 109 MG/DL (ref 0–99)
LYMPHOCYTES # BLD AUTO: 1.7 X10E3/UL (ref 0.7–3.1)
LYMPHOCYTES NFR BLD AUTO: 21 %
MCH RBC QN AUTO: 34.7 PG (ref 26.6–33)
MCHC RBC AUTO-ENTMCNC: 34.2 G/DL (ref 31.5–35.7)
MCV RBC AUTO: 102 FL (ref 79–97)
MONOCYTES # BLD AUTO: 0.7 X10E3/UL (ref 0.1–0.9)
MONOCYTES NFR BLD AUTO: 8 %
NEUTROPHILS # BLD AUTO: 5.6 X10E3/UL (ref 1.4–7)
NEUTROPHILS NFR BLD AUTO: 69 %
PLATELET # BLD AUTO: 324 X10E3/UL (ref 150–450)
POTASSIUM SERPL-SCNC: 4.8 MMOL/L (ref 3.5–5.2)
PROT SERPL-MCNC: 7 G/DL (ref 6–8.5)
PSA SERPL-MCNC: 0.7 NG/ML (ref 0–4)
RBC # BLD AUTO: 4.52 X10E6/UL (ref 4.14–5.8)
SODIUM SERPL-SCNC: 132 MMOL/L (ref 134–144)
TRIGL SERPL-MCNC: 128 MG/DL (ref 0–149)
TSH SERPL DL<=0.005 MIU/L-ACNC: 1.28 UIU/ML (ref 0.45–4.5)
VIT B12 SERPL-MCNC: 684 PG/ML (ref 232–1245)
VLDLC SERPL CALC-MCNC: 23 MG/DL (ref 5–40)
WBC # BLD AUTO: 8.1 X10E3/UL (ref 3.4–10.8)

## 2022-12-08 DIAGNOSIS — Z83.49 FAMILY HISTORY OF HEMOCHROMATOSIS: Primary | ICD-10-CM

## 2022-12-09 RX ORDER — ROSUVASTATIN CALCIUM 10 MG/1
10 TABLET, COATED ORAL NIGHTLY
Qty: 90 TABLET | Refills: 3 | Status: SHIPPED | OUTPATIENT
Start: 2022-12-09

## 2022-12-14 ENCOUNTER — HOSPITAL ENCOUNTER (OUTPATIENT)
Dept: CT IMAGING | Facility: HOSPITAL | Age: 62
Discharge: HOME | End: 2022-12-14
Admitting: FAMILY MEDICINE

## 2022-12-14 DIAGNOSIS — F17.200 CURRENT SMOKER: ICD-10-CM

## 2022-12-14 PROCEDURE — 71271 CT THORAX LUNG CANCER SCR C-: CPT

## 2023-01-03 ENCOUNTER — TELEPHONE (OUTPATIENT)
Dept: FAMILY MEDICINE CLINIC | Facility: CLINIC | Age: 63
End: 2023-01-03
Payer: COMMERCIAL

## 2023-01-03 NOTE — TELEPHONE ENCOUNTER
Caller: Ambar Daniels    Relationship: Emergency Contact    Best call back number: [phone]    What is the best time to reach you: AS SOON AS POSSIBLE    Who are you requesting to speak with (clinical staff, provider,  specific staff member): CLINICAL    Do you know the name of the person who called: NA    What was the call regarding: MS. DANIELS STATED PATIENT IS HAVING ALL OF HIS TEETH REMOVED 1/4/23 @ 1 O'CLOCK, AND GETTING DENTURES PUT IN SAME DAY.    WOULD LIKE TO KNOW IF PATIENT SHOULD STOP USING THE   budesonide-formoterol (Symbicort) 160-4.5 MCG/ACT inhaler   FOR A PERIOD TIME DUE TO POSSIBLE THRUSH SIDE EFFECTS    Do you require a callback: YES

## 2023-01-03 NOTE — TELEPHONE ENCOUNTER
Caller: Ambar Daniels    Relationship: Emergency Contact    Best call back number: [phone]    What is the best time to reach you: ANYTIME     Who are you requesting to speak with (clinical staff, provider,  specific staff member): CLINICAL STAFF    Do you know the name of the person who called: AMBAR (ON  VERBAL)    What was the call regarding: AMBAR REPORTS THE PATIENT IS SCHEDULED FOR ORAL SURGERY TOMORROW, 01/04/2023, TO HAVE HIS REMAINING TEETH REMOVED AND WILL BE FITTED FOR DENTURES THE SAME DAY. AMBAR   WANTS TO KNOW IF THE PATIENT SHOULD TEMPORARILY STOP USING HIS  SYMBICORT INHALER FOR A FEW DAYS, BECAUSE THE PATIENT GENERALLY HAS TO RINCE HIS MOUTH AFTER USING THE MEDICATION.    Do you require a callback: YES, PLEASE CALL AND ADVISE

## 2023-01-04 NOTE — TELEPHONE ENCOUNTER
If he is unable to rinse his mouth after dental procedure, ok to hold Symbicort for a few days.

## 2023-01-13 DIAGNOSIS — F17.200 CURRENT SMOKER: ICD-10-CM

## 2023-01-13 RX ORDER — BUPROPION HYDROCHLORIDE 150 MG/1
TABLET, EXTENDED RELEASE ORAL
Qty: 183 TABLET | Refills: 0 | Status: CANCELLED | OUTPATIENT
Start: 2023-01-13 | End: 2023-04-16

## 2023-01-16 RX ORDER — BUPROPION HYDROCHLORIDE 150 MG/1
150 TABLET ORAL EVERY MORNING
Qty: 90 TABLET | Refills: 1 | Status: SHIPPED | OUTPATIENT
Start: 2023-01-16

## 2023-01-16 NOTE — TELEPHONE ENCOUNTER
Caller: Ambar Daniels    Relationship: Emergency Contact    Best call back number: [phone]     Requested Prescriptions:   Requested Prescriptions     Pending Prescriptions Disp Refills   • buPROPion SR (Wellbutrin SR) 150 MG 12 hr tablet 183 tablet 0     Sig: Take 1 tablet by mouth Daily for 3 days, THEN 1 tablet 2 (Two) Times a Day for 90 days.        Pharmacy where request should be sent: [facility] [address] [phone] [facility] [phone] [facility]     Additional details provided by patient: PATIENT WANT PATIENT WANTS TO TAKE ONE DAILY IN A TIMED RELEASE IF AVAILABLE, AND WOULD ALSO LIKE TO TAKE FOR FOR BEHAVORIAL HEALTH  REASON,  FOR HIS ANXIETY, IT HELPS HIM TO FOCUS AND NOT EDGY    Does the patient have less than a 3 day supply:  [] Yes  [x] No    Would you like a call back once the refill request has been completed: [x] Yes [] No    If the office needs to give you a call back, can they leave a voicemail: [x] Yes [] No    Ronda Chavarria Rep   01/13/23 12:42 EST     
Changed to once daily dose.   
Two to three times per week

## 2023-01-25 ENCOUNTER — OFFICE VISIT (OUTPATIENT)
Dept: ORTHOPEDIC SURGERY | Facility: CLINIC | Age: 63
End: 2023-01-25
Payer: OTHER MISCELLANEOUS

## 2023-01-25 VITALS
HEIGHT: 69 IN | WEIGHT: 149.91 LBS | DIASTOLIC BLOOD PRESSURE: 76 MMHG | SYSTOLIC BLOOD PRESSURE: 118 MMHG | BODY MASS INDEX: 22.2 KG/M2

## 2023-01-25 DIAGNOSIS — Z09 FRACTURE FOLLOW-UP: Primary | ICD-10-CM

## 2023-01-25 PROCEDURE — 99213 OFFICE O/P EST LOW 20 MIN: CPT | Performed by: ORTHOPAEDIC SURGERY

## 2023-01-25 RX ORDER — CHLORHEXIDINE GLUCONATE 0.12 MG/ML
RINSE ORAL
COMMUNITY
Start: 2023-01-14

## 2023-01-25 NOTE — PROGRESS NOTES
ESTABLISHED PATIENT    Patient: Roger Bhat  : 1960    Primary Care Provider: Richa Mary DO    Requesting Provider: As above    Follow-up (8 week f/u Closed nondisplaced fracture of body of left calcaneus DOI: 22)      History    Chief Complaint: Follow-up left calcaneus fracture    History of Present Illness: He returns for follow-up of his nondisplaced left calcaneus fracture.  He is feeling much better.  He is walking in the boot.  He has participated with physical therapy and has improved range of motion.    Current Outpatient Medications on File Prior to Visit   Medication Sig Dispense Refill   • albuterol sulfate HFA (Ventolin HFA) 108 (90 Base) MCG/ACT inhaler Inhale 2 puffs Every 4 (Four) Hours As Needed for Wheezing or Shortness of Air. 8 g 11   • budesonide-formoterol (Symbicort) 160-4.5 MCG/ACT inhaler Inhale 2 puffs 2 (Two) Times a Day. 6 g 12   • buPROPion XL (Wellbutrin XL) 150 MG 24 hr tablet Take 1 tablet by mouth Every Morning. 90 tablet 1   • chlorhexidine (PERIDEX) 0.12 % solution RINSE 1/2 OUNCE (15 ML) ORALLY FOR 30 SECONDS TWO TIMES A DAY (MORNING AND EVENING) AFTER BRUSHING (DO NOT SWALLOW)     • omeprazole (priLOSEC) 40 MG capsule Take 1 capsule by mouth Daily. 90 capsule 3   • rosuvastatin (Crestor) 10 MG tablet Take 1 tablet by mouth Every Night. 90 tablet 3   • sildenafil (Viagra) 100 MG tablet Take 0.5-1 tablets by mouth Daily As Needed for Erectile Dysfunction. 20 tablet 5   • tiotropium bromide monohydrate (Spiriva Respimat) 2.5 MCG/ACT aerosol solution inhaler Inhale 2 puffs Daily. 12 g 3     No current facility-administered medications on file prior to visit.      No Known Allergies   Past Medical History:   Diagnosis Date   • COPD (chronic obstructive pulmonary disease) (HCC)    • Cough    • GERD (gastroesophageal reflux disease)    • Wears dentures     partial upper plate      Past Surgical History:   Procedure Laterality Date   • COLONOSCOPY     •  ENDOSCOPY  05/2016    pt say's, he was placed under general anesthesia for this   • ENDOSCOPY N/A 10/19/2021    Procedure: ESOPHAGOGASTRODUODENOSCOPY;  Surgeon: Osmel Kessler MD;  Location:  [facility];  Service: Gastroenterology;  Laterality: N/A;  24 fr savory dilator used at 1251, 27 fr sdavory used at 1253, 33 German savoruy used at 1257, 42 fr savory used at 1259     • ENDOSCOPY N/A 11/16/2021    Procedure: ESOPHAGOGASTRODUODENOSCOPY WITH DILATATION;  Surgeon: Osmel Kessler MD;  Location:  [facility];  Service: Gastroenterology;  Laterality: N/A;  Dilation with 48fr savery     Family History   Problem Relation Age of Onset   • No Known Problems Mother    • No Known Problems Father    • Colon cancer Neg Hx    • Colon polyps Neg Hx    • Esophageal cancer Neg Hx       Social History     Socioeconomic History   • Marital status: Significant Other   Tobacco Use   • Smoking status: Every Day     Packs/day: 1.00     Years: 40.00     Pack years: 40.00     Types: Cigarettes   • Smokeless tobacco: Never   • Tobacco comments:     max 2ppd  now 1/4 ppd    Vaping Use   • Vaping Use: Never used   Substance and Sexual Activity   • Alcohol use: Yes     Alcohol/week: 14.0 standard drinks     Types: 14 Cans of beer per week   • Drug use: Never   • Sexual activity: Defer        Review of Systems   Constitutional: Negative.    HENT: Negative.    Eyes: Negative.    Respiratory: Negative.    Cardiovascular: Negative.    Gastrointestinal: Negative.    Endocrine: Negative.    Genitourinary: Negative.    Musculoskeletal: Positive for arthralgias.   Skin: Negative.    Allergic/Immunologic: Negative.    Neurological: Negative.    Hematological: Negative.    Psychiatric/Behavioral: Negative.        The following portions of the patient's history were reviewed and updated as appropriate: allergies, current medications, past family history, past medical history, past social history, past surgical history and problem  "list.    Physical Exam:   /76   Ht 175.3 cm (69.02\")   Wt 68 kg (149 lb 14.6 oz)   BMI 22.13 kg/m²   No swelling in the left hindfoot, no tenderness, ankle dorsiflexion 10 plantarflexion 30 about 5 degrees inversion eversion    Medical Decision Making    Data Review:   ordered and reviewed x-rays today    Assessment/Plan/Diagnosis/Treatment Options:   1. Fracture follow-up  He is doing well.  He has very reasonable range of motion for this injury.  He may wean out of the boot over the next 6 weeks.  We talked about how to do that.  He may slowly increase his activity.  I will see him again in 3 months for final x-ray  - XR Calcaneus 2+ View Left        Albania Boykin MD                      "

## 2023-02-22 DIAGNOSIS — Z12.11 SCREENING FOR COLORECTAL CANCER: Primary | ICD-10-CM

## 2023-02-22 DIAGNOSIS — Z12.12 SCREENING FOR COLORECTAL CANCER: Primary | ICD-10-CM

## 2023-03-27 ENCOUNTER — TELEPHONE (OUTPATIENT)
Dept: FAMILY MEDICINE CLINIC | Facility: CLINIC | Age: 63
End: 2023-03-27

## 2023-03-27 NOTE — TELEPHONE ENCOUNTER
Caller: Ambar Daniels    Relationship: Emergency Contact    Best call back number: [phone]    What orders are you requesting (i.e. lab or imaging): COLOGUARD REQUEST    In what timeframe would the patient need to come in: NA    Where will you receive your lab/imaging services: NA    Additional notes: STATED PATIENT WOULD LIKE TO KNOW IF HE CAN DO COLOGUARD.  STATED HE IS UNABLE TO KEEP COLONOSCOPY APPOINTMENT DUE TO WORK SCHEDULE

## 2023-03-27 NOTE — TELEPHONE ENCOUNTER
Spoke with JUSTO MARES he has not had a follow up colonoscopy. I spoke with Ambar he does not want to do one until around august due to work issues.

## 2023-04-26 ENCOUNTER — OFFICE VISIT (OUTPATIENT)
Dept: ORTHOPEDIC SURGERY | Facility: CLINIC | Age: 63
End: 2023-04-26
Payer: OTHER MISCELLANEOUS

## 2023-04-26 VITALS
BODY MASS INDEX: 21.12 KG/M2 | DIASTOLIC BLOOD PRESSURE: 84 MMHG | WEIGHT: 142.6 LBS | HEIGHT: 69 IN | SYSTOLIC BLOOD PRESSURE: 130 MMHG

## 2023-04-26 DIAGNOSIS — Z09 FRACTURE FOLLOW-UP: Primary | ICD-10-CM

## 2023-04-26 DIAGNOSIS — I87.8 VENOUS STASIS: ICD-10-CM

## 2023-04-26 RX ORDER — SODIUM, POTASSIUM,MAG SULFATES 17.5-3.13G
SOLUTION, RECONSTITUTED, ORAL ORAL
COMMUNITY
Start: 2023-02-22

## 2023-04-26 NOTE — PROGRESS NOTES
ESTABLISHED PATIENT    Patient: Roger Bhat  : 1960    Primary Care Provider: Richa Mary DO    Requesting Provider: As above    Follow-up (3 month follow up - Closed nondisplaced fracture of body of left calcaneus DOI: 22))      History    Chief Complaint: Follow-up left calcaneal fracture    History of Present Illness: He returns for follow-up of his left calcaneus fracture.  He also has underlying venous stasis.  He reports he is doing well.  No further pain.  He is very careful on uneven ground.  His wife asked me why he needs to wear compression socks and I went over this again with her.    Current Outpatient Medications on File Prior to Visit   Medication Sig Dispense Refill   • albuterol sulfate HFA (Ventolin HFA) 108 (90 Base) MCG/ACT inhaler Inhale 2 puffs Every 4 (Four) Hours As Needed for Wheezing or Shortness of Air. 8 g 11   • budesonide-formoterol (Symbicort) 160-4.5 MCG/ACT inhaler Inhale 2 puffs 2 (Two) Times a Day. 6 g 12   • buPROPion XL (Wellbutrin XL) 150 MG 24 hr tablet Take 1 tablet by mouth Every Morning. 90 tablet 1   • chlorhexidine (PERIDEX) 0.12 % solution RINSE 1/2 OUNCE (15 ML) ORALLY FOR 30 SECONDS TWO TIMES A DAY (MORNING AND EVENING) AFTER BRUSHING (DO NOT SWALLOW)     • omeprazole (priLOSEC) 40 MG capsule Take 1 capsule by mouth Daily. 90 capsule 3   • rosuvastatin (Crestor) 10 MG tablet Take 1 tablet by mouth Every Night. 90 tablet 3   • sildenafil (Viagra) 100 MG tablet Take 0.5-1 tablets by mouth Daily As Needed for Erectile Dysfunction. 20 tablet 5   • Sod Picosulfate-Mag Ox-Cit Acd 10-3.5-12 MG-GM -GM/160ML solution Take 160 mL by mouth Take As Directed for 2 doses. 320 mL 0   • sodium-potassium-magnesium sulfates (SUPREP) 17.5-3.13-1.6 GM/177ML solution oral solution DRINK AS DIRECTED PRIOR TO COLONOSCOPY     • tiotropium bromide monohydrate (Spiriva Respimat) 2.5 MCG/ACT aerosol solution inhaler Inhale 2 puffs Daily. 12 g 3     No current  facility-administered medications on file prior to visit.      No Known Allergies   Past Medical History:   Diagnosis Date   • COPD (chronic obstructive pulmonary disease)    • Cough    • Fracture, foot Sept 7 2022   • GERD (gastroesophageal reflux disease)    • Wears dentures     partial upper plate      Past Surgical History:   Procedure Laterality Date   • COLONOSCOPY  2021   • ENDOSCOPY  05/2016    pt dennise, he was placed under general anesthesia for this   • ENDOSCOPY N/A 10/19/2021    Procedure: ESOPHAGOGASTRODUODENOSCOPY;  Surgeon: Osmel Kessler MD;  Location:  [facility];  Service: Gastroenterology;  Laterality: N/A;  24 fr savory dilator used at 1251, 27 fr sdavory used at 1253, 33 Hungarian savoruy used at 1257, 42 fr savory used at 1259     • ENDOSCOPY N/A 11/16/2021    Procedure: ESOPHAGOGASTRODUODENOSCOPY WITH DILATATION;  Surgeon: Osmel Kessler MD;  Location:  [facility];  Service: Gastroenterology;  Laterality: N/A;  Dilation with 48fr savery   • HAND SURGERY  2022     Family History   Problem Relation Age of Onset   • No Known Problems Mother    • No Known Problems Father    • Colon cancer Neg Hx    • Colon polyps Neg Hx    • Esophageal cancer Neg Hx       Social History     Socioeconomic History   • Marital status: Significant Other   Tobacco Use   • Smoking status: Every Day     Packs/day: 1.00     Years: 15.00     Pack years: 15.00     Types: Cigarettes   • Smokeless tobacco: Never   • Tobacco comments:     max 2ppd  now 1/4 ppd    Vaping Use   • Vaping Use: Never used   Substance and Sexual Activity   • Alcohol use: Yes     Alcohol/week: 23.0 standard drinks     Types: 2 Glasses of wine, 21 Cans of beer per week   • Drug use: Never   • Sexual activity: Yes     Partners: Female     Birth control/protection: None        Review of Systems   Constitutional: Negative for activity change, appetite change, chills, diaphoresis, fatigue, fever and unexpected weight change.   HENT:  Negative for congestion, dental problem, drooling, ear discharge, ear pain, facial swelling, hearing loss, mouth sores, nosebleeds, postnasal drip, rhinorrhea, sinus pressure, sneezing, sore throat, tinnitus, trouble swallowing and voice change.    Eyes: Negative for photophobia, pain, discharge, redness, itching and visual disturbance.   Respiratory: Negative for apnea, cough, choking, chest tightness, shortness of breath, wheezing and stridor.    Cardiovascular: Negative for chest pain, palpitations and leg swelling.   Gastrointestinal: Negative for abdominal distention, abdominal pain, anal bleeding, blood in stool, constipation, diarrhea, nausea, rectal pain and vomiting.   Endocrine: Negative for cold intolerance, heat intolerance, polydipsia, polyphagia and polyuria.   Genitourinary: Negative for decreased urine volume, difficulty urinating, dysuria, enuresis, flank pain, frequency, genital sores, hematuria and urgency.   Musculoskeletal: Positive for arthralgias. Negative for back pain, gait problem, joint swelling, myalgias, neck pain and neck stiffness.   Skin: Negative for color change, pallor, rash and wound.   Allergic/Immunologic: Negative for environmental allergies, food allergies and immunocompromised state.   Neurological: Negative for dizziness, tremors, seizures, syncope, facial asymmetry, speech difficulty, weakness, light-headedness, numbness and headaches.   Hematological: Negative for adenopathy. Does not bruise/bleed easily.   Psychiatric/Behavioral: Negative for agitation, behavioral problems, confusion, decreased concentration, dysphoric mood, hallucinations, self-injury, sleep disturbance and suicidal ideas. The patient is not nervous/anxious and is not hyperactive.        The following portions of the patient's history were reviewed and updated as appropriate: allergies, current medications, past family history, past medical history, past social history, past surgical history and problem  "list.    Physical Exam:   /84   Ht 175.3 cm (69.02\")   Wt 64.7 kg (142 lb 9.6 oz)   BMI 21.05 kg/m²   No tenderness in the left ankle and hindfoot, he has 5 degrees inversion eversion which is very good considering the injury, dorsiflexion plantarflexion is symmetric with the right    Medical Decision Making    Data Review:   ordered and reviewed x-rays today    Assessment/Plan/Diagnosis/Treatment Options:   1. Fracture follow-up  The fracture is healed he has quite good range of motion for the injury.  I will be happy to see him anytime  - XR Calcaneus 2+ View Left    2. 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  The key to controlling swelling is to compress the tissue to help prevent        Albania Boykin MD                      "

## 2023-05-15 RX ORDER — BUPROPION HYDROCHLORIDE 150 MG/1
TABLET ORAL
Qty: 90 TABLET | Refills: 1 | Status: SHIPPED | OUTPATIENT
Start: 2023-05-15

## 2023-10-26 ENCOUNTER — OFFICE VISIT (OUTPATIENT)
Dept: FAMILY MEDICINE CLINIC | Facility: CLINIC | Age: 63
End: 2023-10-26
Payer: COMMERCIAL

## 2023-10-26 VITALS
HEIGHT: 69 IN | DIASTOLIC BLOOD PRESSURE: 74 MMHG | WEIGHT: 143.6 LBS | RESPIRATION RATE: 18 BRPM | SYSTOLIC BLOOD PRESSURE: 142 MMHG | HEART RATE: 76 BPM | TEMPERATURE: 97.7 F | OXYGEN SATURATION: 95 % | BODY MASS INDEX: 21.27 KG/M2

## 2023-10-26 DIAGNOSIS — K62.5 BRIGHT RED BLOOD PER RECTUM: Primary | ICD-10-CM

## 2023-10-26 DIAGNOSIS — F17.200 CURRENT SMOKER: ICD-10-CM

## 2023-10-26 DIAGNOSIS — J43.9 PULMONARY EMPHYSEMA, UNSPECIFIED EMPHYSEMA TYPE: ICD-10-CM

## 2023-10-26 DIAGNOSIS — Z86.010 HISTORY OF COLON POLYPS: ICD-10-CM

## 2023-10-26 DIAGNOSIS — Z12.11 SCREENING FOR MALIGNANT NEOPLASM OF COLON: ICD-10-CM

## 2023-10-26 RX ORDER — HYDROCORTISONE 25 MG/G
CREAM TOPICAL 2 TIMES DAILY
Qty: 28 G | Refills: 0 | Status: SHIPPED | OUTPATIENT
Start: 2023-10-26 | End: 2023-10-31

## 2023-10-26 RX ORDER — ALBUTEROL SULFATE 90 UG/1
2 AEROSOL, METERED RESPIRATORY (INHALATION) EVERY 4 HOURS PRN
Qty: 8 G | Refills: 11 | Status: SHIPPED | OUTPATIENT
Start: 2023-10-26

## 2023-10-26 RX ORDER — MAGNESIUM OXIDE 400 MG/1
400 TABLET ORAL DAILY
COMMUNITY

## 2023-10-26 RX ORDER — BUDESONIDE AND FORMOTEROL FUMARATE DIHYDRATE 160; 4.5 UG/1; UG/1
2 AEROSOL RESPIRATORY (INHALATION)
Qty: 6 G | Refills: 12 | Status: SHIPPED | OUTPATIENT
Start: 2023-10-26

## 2023-10-26 RX ORDER — TIOTROPIUM BROMIDE INHALATION SPRAY 3.12 UG/1
2 SPRAY, METERED RESPIRATORY (INHALATION)
Qty: 12 G | Refills: 3 | Status: SHIPPED | OUTPATIENT
Start: 2023-10-26

## 2023-10-26 NOTE — PROGRESS NOTES
"Subjective   Roger Logan Bhat is a 63 y.o. male.     Rectal Bleeding       Incident day before yesterday when wife noted her  stood up and and had a lot of bright red blood on back of pants that soaked through underwear, pants, as well as couch cushion. Pt had passed gas leading up to noticing the blood and there was a very small amount of stool in underwear  Had normal BM today   Denies rectal pain   No recent diarrheal illness  Has not felt a bulge  No recent change in diet  No abdominal pain or urinary symptoms   Does not sit on commode for prolonged period of time   Hx of colon polyps and inflammation on last colonoscopy in 2021. Was due to repeat in 6 months due to amount of inflammation/erythematous mucosa. Pt has not returned for repeat scope due to work demands  Multiple cancelled appointments with gastroenterology earlier this year. Pt states he will prioritize this and has the time in December to repeat scope   Needs refills on his inhalers     The following portions of the patient's history were reviewed and updated as appropriate: allergies, current medications, past family history, past medical history, past social history, past surgical history, and problem list.    Review of Systems   Gastrointestinal:  Positive for hematochezia.     As noted per HPI     Objective   Blood pressure 142/74, pulse 76, temperature 97.7 °F (36.5 °C), resp. rate 18, height 175.3 cm (69\"), weight 65.1 kg (143 lb 9.6 oz), SpO2 95%.     Physical Exam  Vitals and nursing note reviewed.   Constitutional:       Appearance: Normal appearance.   HENT:      Head: Normocephalic.      Right Ear: External ear normal.      Left Ear: External ear normal.   Cardiovascular:      Rate and Rhythm: Normal rate and regular rhythm.   Pulmonary:      Effort: Pulmonary effort is normal.      Breath sounds: Wheezing present.   Abdominal:      Tenderness: There is no abdominal tenderness. There is no guarding.   Skin:     General: Skin " is warm and dry.   Neurological:      Mental Status: He is alert and oriented to person, place, and time.   Psychiatric:         Mood and Affect: Mood normal.         Behavior: Behavior normal.         Assessment & Plan   Diagnoses and all orders for this visit:    1. Bright red blood per rectum (Primary)  -     Ambulatory Referral For Screening Colonoscopy  -     Ambulatory Referral to Gastroenterology  -     Hydrocortisone, Perianal, (ANUSOL-HC) 2.5 % rectal cream; Insert  into the rectum 2 (Two) Times a Day for 5 days.  Dispense: 28 g; Refill: 0    2. Current smoker  -     albuterol sulfate HFA (Ventolin HFA) 108 (90 Base) MCG/ACT inhaler; Inhale 2 puffs Every 4 (Four) Hours As Needed for Wheezing or Shortness of Air.  Dispense: 8 g; Refill: 11    3. Pulmonary emphysema, unspecified emphysema type  -     albuterol sulfate HFA (Ventolin HFA) 108 (90 Base) MCG/ACT inhaler; Inhale 2 puffs Every 4 (Four) Hours As Needed for Wheezing or Shortness of Air.  Dispense: 8 g; Refill: 11  -     tiotropium bromide monohydrate (Spiriva Respimat) 2.5 MCG/ACT aerosol solution inhaler; Inhale 2 puffs Daily.  Dispense: 12 g; Refill: 3  -     budesonide-formoterol (Symbicort) 160-4.5 MCG/ACT inhaler; Inhale 2 puffs 2 (Two) Times a Day.  Dispense: 6 g; Refill: 12    4. Screening for malignant neoplasm of colon  -     Ambulatory Referral For Screening Colonoscopy  -     Ambulatory Referral to Gastroenterology    5. History of colon polyps  -     Ambulatory Referral For Screening Colonoscopy  -     Ambulatory Referral to Gastroenterology    DDX includes hemorrhoid, bleeding polyp, fissure, rectal ulcer and colorectal cancer   Start metamucil fiber supplement and will do brief course of rectal steroid to help with inflammation   Stressed the importance of repeat colonoscopy and referral has been placed back to GI. Pt and wife voiced understanding   Refills on inhalers as noted.

## 2023-12-12 RX ORDER — ROSUVASTATIN CALCIUM 10 MG/1
10 TABLET, COATED ORAL
Qty: 30 TABLET | Refills: 1 | Status: SHIPPED | OUTPATIENT
Start: 2023-12-12

## 2023-12-15 ENCOUNTER — OFFICE VISIT (OUTPATIENT)
Dept: FAMILY MEDICINE CLINIC | Facility: CLINIC | Age: 63
End: 2023-12-15
Payer: COMMERCIAL

## 2023-12-15 VITALS
HEART RATE: 82 BPM | BODY MASS INDEX: 21.12 KG/M2 | OXYGEN SATURATION: 98 % | DIASTOLIC BLOOD PRESSURE: 80 MMHG | SYSTOLIC BLOOD PRESSURE: 130 MMHG | HEIGHT: 69 IN | TEMPERATURE: 97.3 F | WEIGHT: 142.6 LBS | RESPIRATION RATE: 18 BRPM

## 2023-12-15 DIAGNOSIS — F17.200 CURRENT SMOKER: ICD-10-CM

## 2023-12-15 DIAGNOSIS — Z00.00 ANNUAL PHYSICAL EXAM: Primary | ICD-10-CM

## 2023-12-15 DIAGNOSIS — J30.89 NON-SEASONAL ALLERGIC RHINITIS, UNSPECIFIED TRIGGER: ICD-10-CM

## 2023-12-15 DIAGNOSIS — Z12.5 PROSTATE CANCER SCREENING: ICD-10-CM

## 2023-12-15 DIAGNOSIS — N52.9 ERECTILE DYSFUNCTION, UNSPECIFIED ERECTILE DYSFUNCTION TYPE: ICD-10-CM

## 2023-12-15 DIAGNOSIS — J44.9 CHRONIC OBSTRUCTIVE PULMONARY DISEASE, UNSPECIFIED COPD TYPE: ICD-10-CM

## 2023-12-15 DIAGNOSIS — R53.83 OTHER FATIGUE: ICD-10-CM

## 2023-12-15 PROCEDURE — 99396 PREV VISIT EST AGE 40-64: CPT | Performed by: FAMILY MEDICINE

## 2023-12-15 RX ORDER — SILDENAFIL 100 MG/1
50-100 TABLET, FILM COATED ORAL DAILY PRN
Qty: 20 TABLET | Refills: 5 | Status: SHIPPED | OUTPATIENT
Start: 2023-12-15

## 2023-12-15 RX ORDER — SODIUM PICOSULFATE, MAGNESIUM OXIDE, AND ANHYDROUS CITRIC ACID 10; 3.5; 12 MG/160ML; G/160ML; G/160ML
350 LIQUID ORAL TAKE AS DIRECTED
Qty: 350 ML | Refills: 0 | Status: SHIPPED | OUTPATIENT
Start: 2023-12-15

## 2023-12-15 RX ORDER — BUDESONIDE, GLYCOPYRROLATE, AND FORMOTEROL FUMARATE 160; 9; 4.8 UG/1; UG/1; UG/1
2 AEROSOL, METERED RESPIRATORY (INHALATION) 2 TIMES DAILY
Qty: 10.7 G | Refills: 5 | Status: SHIPPED | OUTPATIENT
Start: 2023-12-15

## 2023-12-15 RX ORDER — LEVOCETIRIZINE DIHYDROCHLORIDE 5 MG/1
5 TABLET, FILM COATED ORAL EVERY EVENING
Qty: 90 TABLET | Refills: 1 | Status: SHIPPED | OUTPATIENT
Start: 2023-12-15

## 2023-12-15 NOTE — PROGRESS NOTES
Chief Complaint  Med Refill    Subjective          Roger Logan Bhat presents to [facility]  History of Present Illness  The patient is a 63-year-old male who presents for an annual wellness visit.     He is scheduled to see Dr. Kessler on 12/22/2023 for a colonoscopy. He had rectal bleeding 2 to 3 weeks ago, bright red blood. He denies any pain associated with the bleeding, but he had itching. His bowel movements and urination are normal. He has not had any other episodes of bleeding.     The adult female is concerned about the patient's fatigue. He sleeps from  evening time until he goes to work the next morning. He wakes up around 9:30 AM to eat. She knows he is tired from working, stating that he works hard. He sleeps well at night.     He takes omeprazole 40 mg, which controls GERD symptoms.     He has a cough in the morning. It takes him a good while before he stops coughing. He has 3 different inhalers. He normally rinses his mouth after using the inhalers. He wakes up a couple of times during the night coughing. He is on Symbicort and Spiriva, along with albuterol.    He has cut back on his smoking.  He needs a refill on Viagra.    He is up-to-date on his dental and eye exams.    The following portions of the patient's history were reviewed and updated as appropriate: allergies, current medications, past family history, past medical history, past social history, past surgical history, and problem list.    Objective      Physical Exam  Vitals reviewed.   HENT:      Right Ear: Tympanic membrane, ear canal and external ear normal.      Left Ear: Tympanic membrane, ear canal and external ear normal.   Cardiovascular:      Rate and Rhythm: Normal rate.      Heart sounds: Normal heart sounds.   Pulmonary:      Effort: Pulmonary effort is normal.      Breath sounds: Normal breath sounds.   Musculoskeletal:         General: No swelling.   Neurological:      Mental Status: He is  alert.   Psychiatric:         Mood and Affect: Mood normal.         Behavior: Behavior normal.        Result Review :                Assessment and Plan    Diagnoses and all orders for this visit:    1. Annual physical exam (Primary)  -     CBC & Differential  -     Vitamin D,25-Hydroxy  -     Vitamin B12  -     TSH+Free T4  -     Lipid Panel With / Chol / HDL Ratio  -     Comprehensive Metabolic Panel  -     Iron    2. Other fatigue  -     CBC & Differential  -     Vitamin D,25-Hydroxy  -     Vitamin B12  -     TSH+Free T4  -     Lipid Panel With / Chol / HDL Ratio  -     Comprehensive Metabolic Panel  -     Iron    3. Erectile dysfunction, unspecified erectile dysfunction type  -     CBC & Differential  -     Vitamin D,25-Hydroxy  -     Vitamin B12  -     TSH+Free T4  -     Lipid Panel With / Chol / HDL Ratio  -     Comprehensive Metabolic Panel  -     Iron  -     sildenafil (Viagra) 100 MG tablet; Take 0.5-1 tablets by mouth Daily As Needed for Erectile Dysfunction.  Dispense: 20 tablet; Refill: 5    4. Non-seasonal allergic rhinitis, unspecified trigger  -     CBC & Differential  -     Vitamin D,25-Hydroxy  -     Vitamin B12  -     TSH+Free T4  -     Lipid Panel With / Chol / HDL Ratio  -     Comprehensive Metabolic Panel  -     Iron  -     levocetirizine (XYZAL) 5 MG tablet; Take 1 tablet by mouth Every Evening.  Dispense: 90 tablet; Refill: 1    5. Current smoker  -     CT Chest Low Dose Wo; Future    6. Chronic obstructive pulmonary disease, unspecified COPD type  -     CBC & Differential  -     Vitamin D,25-Hydroxy  -     Vitamin B12  -     TSH+Free T4  -     Lipid Panel With / Chol / HDL Ratio  -     Comprehensive Metabolic Panel  -     Iron  -     Budeson-Glycopyrrol-Formoterol (Breztri Aerosphere) 160-9-4.8 MCG/ACT aerosol inhaler; Inhale 2 puffs 2 (Two) Times a Day.  Dispense: 10.7 g; Refill: 5    7. Prostate cancer screening  -     CBC & Differential  -     Vitamin D,25-Hydroxy  -     Vitamin B12  -      TSH+Free T4  -     Lipid Panel With / Chol / HDL Ratio  -     Comprehensive Metabolic Panel  -     Iron  -     PSA Screen      An order has been placed for fasting blood work.  His cough could be due to COPD. I will change his inhaler to Breztri. He will continue to use his albuterol inhaler. Screening CT scan of the chest.    Health advice: healthy food choices with fresh fruits and vegetables, maintain sleep pattern at least 8 hours, avoid texting and distracted driving practices; wear safety belt, engage in regular exercise, maintain healthy weight, use safe sex practices, avoid alcohol and illicit drugs. Maintain immunizations that are up to date. Maintain health maintenance: PSA, etc.  Follow up with PCP if struggling with depression or anxiety. Keep regular dental and eye exams. Brush and floss teeth daily.   F/U annually and prn.         Follow Up   Return in about 1 year (around 12/15/2024) for Annual.  Patient was given instructions and counseling regarding his condition or for health maintenance advice. Please see specific information pulled into the AVS if appropriate.    Transcribed from ambient dictation for Richa Mary DO by Black Bradley.  12/15/23   13:44 EST    Patient or patient representative verbalized consent to the visit recording.  I have personally performed the services described in this document as transcribed by the above individual, and it is both accurate and complete.

## 2023-12-19 ENCOUNTER — LAB (OUTPATIENT)
Dept: FAMILY MEDICINE CLINIC | Facility: CLINIC | Age: 63
End: 2023-12-19
Payer: COMMERCIAL

## 2023-12-20 LAB
25(OH)D3+25(OH)D2 SERPL-MCNC: 21.7 NG/ML (ref 30–100)
ALBUMIN SERPL-MCNC: 4.4 G/DL (ref 3.5–5.2)
ALBUMIN/GLOB SERPL: 1.8 G/DL
ALP SERPL-CCNC: 105 U/L (ref 39–117)
ALT SERPL-CCNC: 22 U/L (ref 1–41)
AST SERPL-CCNC: 23 U/L (ref 1–40)
BASOPHILS # BLD AUTO: 0.07 10*3/MM3 (ref 0–0.2)
BASOPHILS NFR BLD AUTO: 0.9 % (ref 0–1.5)
BILIRUB SERPL-MCNC: 0.5 MG/DL (ref 0–1.2)
BUN SERPL-MCNC: 6 MG/DL (ref 8–23)
BUN/CREAT SERPL: 6.5 (ref 7–25)
CALCIUM SERPL-MCNC: 9.5 MG/DL (ref 8.6–10.5)
CHLORIDE SERPL-SCNC: 94 MMOL/L (ref 98–107)
CHOLEST SERPL-MCNC: 168 MG/DL (ref 0–200)
CHOLEST/HDLC SERPL: 2.24 {RATIO}
CO2 SERPL-SCNC: 26.9 MMOL/L (ref 22–29)
CREAT SERPL-MCNC: 0.92 MG/DL (ref 0.76–1.27)
EGFRCR SERPLBLD CKD-EPI 2021: 94.6 ML/MIN/1.73
EOSINOPHIL # BLD AUTO: 0.15 10*3/MM3 (ref 0–0.4)
EOSINOPHIL NFR BLD AUTO: 1.9 % (ref 0.3–6.2)
ERYTHROCYTE [DISTWIDTH] IN BLOOD BY AUTOMATED COUNT: 13 % (ref 12.3–15.4)
GLOBULIN SER CALC-MCNC: 2.5 GM/DL
GLUCOSE SERPL-MCNC: 127 MG/DL (ref 65–99)
HCT VFR BLD AUTO: 43.6 % (ref 37.5–51)
HDLC SERPL-MCNC: 75 MG/DL (ref 40–60)
HGB BLD-MCNC: 14.9 G/DL (ref 13–17.7)
IMM GRANULOCYTES # BLD AUTO: 0.03 10*3/MM3 (ref 0–0.05)
IMM GRANULOCYTES NFR BLD AUTO: 0.4 % (ref 0–0.5)
IRON SERPL-MCNC: 162 MCG/DL (ref 59–158)
LDLC SERPL CALC-MCNC: 83 MG/DL (ref 0–100)
LYMPHOCYTES # BLD AUTO: 1.63 10*3/MM3 (ref 0.7–3.1)
LYMPHOCYTES NFR BLD AUTO: 20.3 % (ref 19.6–45.3)
MCH RBC QN AUTO: 33.5 PG (ref 26.6–33)
MCHC RBC AUTO-ENTMCNC: 34.2 G/DL (ref 31.5–35.7)
MCV RBC AUTO: 98 FL (ref 79–97)
MONOCYTES # BLD AUTO: 0.88 10*3/MM3 (ref 0.1–0.9)
MONOCYTES NFR BLD AUTO: 11 % (ref 5–12)
NEUTROPHILS # BLD AUTO: 5.25 10*3/MM3 (ref 1.7–7)
NEUTROPHILS NFR BLD AUTO: 65.5 % (ref 42.7–76)
NRBC BLD AUTO-RTO: 0 /100 WBC (ref 0–0.2)
PLATELET # BLD AUTO: 409 10*3/MM3 (ref 140–450)
POTASSIUM SERPL-SCNC: 5.2 MMOL/L (ref 3.5–5.2)
PROT SERPL-MCNC: 6.9 G/DL (ref 6–8.5)
PSA SERPL-MCNC: 0.86 NG/ML (ref 0–4)
RBC # BLD AUTO: 4.45 10*6/MM3 (ref 4.14–5.8)
SODIUM SERPL-SCNC: 132 MMOL/L (ref 136–145)
T4 FREE SERPL-MCNC: 1.27 NG/DL (ref 0.93–1.7)
TRIGL SERPL-MCNC: 48 MG/DL (ref 0–150)
TSH SERPL DL<=0.005 MIU/L-ACNC: 2.38 UIU/ML (ref 0.27–4.2)
VIT B12 SERPL-MCNC: 522 PG/ML (ref 211–946)
VLDLC SERPL CALC-MCNC: 10 MG/DL (ref 5–40)
WBC # BLD AUTO: 8.01 10*3/MM3 (ref 3.4–10.8)

## 2023-12-22 ENCOUNTER — OUTSIDE FACILITY SERVICE (OUTPATIENT)
Dept: GASTROENTEROLOGY | Facility: CLINIC | Age: 63
End: 2023-12-22
Payer: COMMERCIAL

## 2023-12-22 DIAGNOSIS — K63.89 COLONIC MASS: Primary | ICD-10-CM

## 2023-12-22 PROCEDURE — 88305 TISSUE EXAM BY PATHOLOGIST: CPT

## 2023-12-23 ENCOUNTER — LAB REQUISITION (OUTPATIENT)
Dept: LAB | Facility: HOSPITAL | Age: 63
End: 2023-12-23
Payer: COMMERCIAL

## 2023-12-23 DIAGNOSIS — Z86.010 PERSONAL HISTORY OF COLONIC POLYPS: ICD-10-CM

## 2023-12-23 DIAGNOSIS — K64.8 OTHER HEMORRHOIDS: ICD-10-CM

## 2023-12-23 DIAGNOSIS — Z12.11 ENCOUNTER FOR SCREENING FOR MALIGNANT NEOPLASM OF COLON: ICD-10-CM

## 2023-12-23 DIAGNOSIS — D12.4 BENIGN NEOPLASM OF DESCENDING COLON: ICD-10-CM

## 2023-12-23 DIAGNOSIS — K56.690 OTHER PARTIAL INTESTINAL OBSTRUCTION: ICD-10-CM

## 2023-12-23 DIAGNOSIS — D49.0 NEOPLASM OF UNSPECIFIED BEHAVIOR OF DIGESTIVE SYSTEM: ICD-10-CM

## 2023-12-23 DIAGNOSIS — D12.2 BENIGN NEOPLASM OF ASCENDING COLON: ICD-10-CM

## 2023-12-26 ENCOUNTER — PATIENT OUTREACH (OUTPATIENT)
Dept: ONCOLOGY | Facility: CLINIC | Age: 63
End: 2023-12-26
Payer: COMMERCIAL

## 2023-12-26 ENCOUNTER — TELEPHONE (OUTPATIENT)
Dept: FAMILY MEDICINE CLINIC | Facility: CLINIC | Age: 63
End: 2023-12-26
Payer: COMMERCIAL

## 2023-12-26 DIAGNOSIS — E87.1 HYPONATREMIA: ICD-10-CM

## 2023-12-26 DIAGNOSIS — E55.9 VITAMIN D DEFICIENCY: Primary | ICD-10-CM

## 2023-12-26 DIAGNOSIS — R73.9 HYPERGLYCEMIA: ICD-10-CM

## 2023-12-26 DIAGNOSIS — K63.89 MASS OF COLON: Primary | ICD-10-CM

## 2023-12-26 RX ORDER — ERGOCALCIFEROL 1.25 MG/1
50000 CAPSULE ORAL WEEKLY
Qty: 4 CAPSULE | Refills: 3 | Status: SHIPPED | OUTPATIENT
Start: 2023-12-26

## 2023-12-26 NOTE — TELEPHONE ENCOUNTER
Caller: Roger Bhat    Relationship: Self    Best call back number: [phone]     What is the best time to reach you: ANY    Who are you requesting to speak with (clinical staff, provider,  specific staff member): CLINICAL    Do you know the name of the person who called: SELF    What was the call regarding: PATIENT WANTS RESULTS OF COLONOSCOPY    Is it okay if the provider responds through MyChart: NO

## 2023-12-27 ENCOUNTER — HOSPITAL ENCOUNTER (OUTPATIENT)
Dept: CT IMAGING | Facility: HOSPITAL | Age: 63
Discharge: HOME | End: 2023-12-27
Admitting: INTERNAL MEDICINE
Payer: COMMERCIAL

## 2023-12-27 ENCOUNTER — PATIENT OUTREACH (OUTPATIENT)
Dept: ONCOLOGY | Facility: CLINIC | Age: 63
End: 2023-12-27
Payer: COMMERCIAL

## 2023-12-27 DIAGNOSIS — K63.89 COLONIC MASS: ICD-10-CM

## 2023-12-27 PROCEDURE — 74177 CT ABD & PELVIS W/CONTRAST: CPT

## 2023-12-27 PROCEDURE — 25510000001 IOPAMIDOL 61 % SOLUTION: Performed by: INTERNAL MEDICINE

## 2023-12-27 PROCEDURE — 71260 CT THORAX DX C+: CPT

## 2023-12-27 RX ADMIN — IOPAMIDOL 85 ML: 612 INJECTION, SOLUTION INTRAVENOUS at 14:35

## 2023-12-27 NOTE — TELEPHONE ENCOUNTER
Pt already knew his results and next steps, he was just checking to see if we had the biopsy report yet. I informed him that would have to come from Dr. Kessler's office. He voiced understanding.

## 2023-12-28 LAB — REF LAB TEST METHOD: NORMAL

## 2023-12-29 ENCOUNTER — TELEPHONE (OUTPATIENT)
Dept: FAMILY MEDICINE CLINIC | Facility: CLINIC | Age: 63
End: 2023-12-29
Payer: COMMERCIAL

## 2023-12-29 ENCOUNTER — PATIENT OUTREACH (OUTPATIENT)
Dept: ONCOLOGY | Facility: CLINIC | Age: 63
End: 2023-12-29
Payer: COMMERCIAL

## 2023-12-29 NOTE — TELEPHONE ENCOUNTER
Caller: Ambar Daniels    Relationship: Emergency Contact    Best call back number: [phone]     What was the call regarding: PATIENT NEEDS TO DISCUSS REFERRALS ABOUT CT'S AND WHAT INSURANCE WILL COVER. INSURANCE IS STATING THEY WILL NOT COVER CERTAIN PROCEDURES. PATIENT ALSO IS BEING REQUESTED TO HAVE BLOOD WORK DONE AT MULTIPLE PLACES.     Is it okay if the provider responds through MyChart: NO

## 2024-01-02 DIAGNOSIS — K21.9 GASTROESOPHAGEAL REFLUX DISEASE WITHOUT ESOPHAGITIS: ICD-10-CM

## 2024-01-02 NOTE — TELEPHONE ENCOUNTER
LVM FOR PATIENT FOR THEM TO CALL SCHEDULING TO GET INFORMATION ABOUT INSURANCE COVERING THE [phone]

## 2024-01-03 RX ORDER — OMEPRAZOLE 40 MG/1
40 CAPSULE, DELAYED RELEASE ORAL DAILY
Qty: 90 CAPSULE | Refills: 3 | Status: SHIPPED | OUTPATIENT
Start: 2024-01-03

## 2024-01-05 ENCOUNTER — TRANSCRIBE ORDERS (OUTPATIENT)
Dept: ADMINISTRATIVE | Facility: HOSPITAL | Age: 64
End: 2024-01-05
Payer: COMMERCIAL

## 2024-01-05 DIAGNOSIS — D49.0 NEOPLASM OF DIGESTIVE SYSTEM: Primary | ICD-10-CM

## 2024-01-08 ENCOUNTER — LAB (OUTPATIENT)
Dept: LAB | Facility: HOSPITAL | Age: 64
End: 2024-01-08
Payer: COMMERCIAL

## 2024-01-08 ENCOUNTER — CONSULT (OUTPATIENT)
Dept: ONCOLOGY | Facility: CLINIC | Age: 64
End: 2024-01-08
Payer: COMMERCIAL

## 2024-01-08 ENCOUNTER — PATIENT OUTREACH (OUTPATIENT)
Dept: ONCOLOGY | Facility: CLINIC | Age: 64
End: 2024-01-08
Payer: COMMERCIAL

## 2024-01-08 VITALS
SYSTOLIC BLOOD PRESSURE: 127 MMHG | OXYGEN SATURATION: 97 % | RESPIRATION RATE: 18 BRPM | HEART RATE: 90 BPM | BODY MASS INDEX: 20.73 KG/M2 | HEIGHT: 69 IN | WEIGHT: 140 LBS | TEMPERATURE: 97.5 F | DIASTOLIC BLOOD PRESSURE: 88 MMHG

## 2024-01-08 DIAGNOSIS — K63.89 COLONIC MASS: ICD-10-CM

## 2024-01-08 DIAGNOSIS — K82.8 GALLBLADDER MASS: ICD-10-CM

## 2024-01-08 DIAGNOSIS — E83.110 HEREDITARY HEMOCHROMATOSIS: Primary | ICD-10-CM

## 2024-01-08 DIAGNOSIS — E83.110 HEREDITARY HEMOCHROMATOSIS: ICD-10-CM

## 2024-01-08 LAB
ALBUMIN SERPL-MCNC: 4.4 G/DL (ref 3.5–5.2)
ALBUMIN/GLOB SERPL: 1.3 G/DL
ALP SERPL-CCNC: 87 U/L (ref 39–117)
ALT SERPL W P-5'-P-CCNC: 16 U/L (ref 1–41)
ANION GAP SERPL CALCULATED.3IONS-SCNC: 10 MMOL/L (ref 5–15)
AST SERPL-CCNC: 23 U/L (ref 1–40)
BASOPHILS # BLD AUTO: 0.05 10*3/MM3 (ref 0–0.2)
BASOPHILS NFR BLD AUTO: 0.7 % (ref 0–1.5)
BILIRUB SERPL-MCNC: 0.5 MG/DL (ref 0–1.2)
BUN SERPL-MCNC: 8 MG/DL (ref 8–23)
BUN/CREAT SERPL: 10.4 (ref 7–25)
CALCIUM SPEC-SCNC: 9.5 MG/DL (ref 8.6–10.5)
CANCER AG19-9 SERPL-ACNC: 18.5 U/ML
CEA SERPL-MCNC: 12.4 NG/ML
CHLORIDE SERPL-SCNC: 98 MMOL/L (ref 98–107)
CO2 SERPL-SCNC: 27 MMOL/L (ref 22–29)
CREAT SERPL-MCNC: 0.77 MG/DL (ref 0.76–1.27)
DEPRECATED RDW RBC AUTO: 53.6 FL (ref 37–54)
EGFRCR SERPLBLD CKD-EPI 2021: 101.9 ML/MIN/1.73
EOSINOPHIL # BLD AUTO: 0.17 10*3/MM3 (ref 0–0.4)
EOSINOPHIL NFR BLD AUTO: 2.5 % (ref 0.3–6.2)
ERYTHROCYTE [DISTWIDTH] IN BLOOD BY AUTOMATED COUNT: 14.2 % (ref 12.3–15.4)
FERRITIN SERPL-MCNC: 1141 NG/ML (ref 30–400)
GLOBULIN UR ELPH-MCNC: 3.3 GM/DL
GLUCOSE SERPL-MCNC: 100 MG/DL (ref 65–99)
HCT VFR BLD AUTO: 43.6 % (ref 37.5–51)
HGB BLD-MCNC: 15 G/DL (ref 13–17.7)
IMM GRANULOCYTES # BLD AUTO: 0.01 10*3/MM3 (ref 0–0.05)
IMM GRANULOCYTES NFR BLD AUTO: 0.1 % (ref 0–0.5)
IRON 24H UR-MRATE: 215 MCG/DL (ref 59–158)
IRON SATN MFR SERPL: 79 % (ref 20–50)
LYMPHOCYTES # BLD AUTO: 1.59 10*3/MM3 (ref 0.7–3.1)
LYMPHOCYTES NFR BLD AUTO: 23 % (ref 19.6–45.3)
MCH RBC QN AUTO: 34.9 PG (ref 26.6–33)
MCHC RBC AUTO-ENTMCNC: 34.4 G/DL (ref 31.5–35.7)
MCV RBC AUTO: 101.4 FL (ref 79–97)
MONOCYTES # BLD AUTO: 0.71 10*3/MM3 (ref 0.1–0.9)
MONOCYTES NFR BLD AUTO: 10.3 % (ref 5–12)
NEUTROPHILS NFR BLD AUTO: 4.39 10*3/MM3 (ref 1.7–7)
NEUTROPHILS NFR BLD AUTO: 63.4 % (ref 42.7–76)
PLATELET # BLD AUTO: 327 10*3/MM3 (ref 140–450)
PMV BLD AUTO: 8.6 FL (ref 6–12)
POTASSIUM SERPL-SCNC: 5.2 MMOL/L (ref 3.5–5.2)
PROT SERPL-MCNC: 7.7 G/DL (ref 6–8.5)
RBC # BLD AUTO: 4.3 10*6/MM3 (ref 4.14–5.8)
SODIUM SERPL-SCNC: 135 MMOL/L (ref 136–145)
TIBC SERPL-MCNC: 273 MCG/DL (ref 298–536)
TRANSFERRIN SERPL-MCNC: 183 MG/DL (ref 200–360)
WBC NRBC COR # BLD AUTO: 6.92 10*3/MM3 (ref 3.4–10.8)

## 2024-01-08 PROCEDURE — 36415 COLL VENOUS BLD VENIPUNCTURE: CPT

## 2024-01-08 PROCEDURE — 84466 ASSAY OF TRANSFERRIN: CPT

## 2024-01-08 PROCEDURE — 82728 ASSAY OF FERRITIN: CPT

## 2024-01-08 PROCEDURE — 80053 COMPREHEN METABOLIC PANEL: CPT

## 2024-01-08 PROCEDURE — 86301 IMMUNOASSAY TUMOR CA 19-9: CPT

## 2024-01-08 PROCEDURE — 82378 CARCINOEMBRYONIC ANTIGEN: CPT

## 2024-01-08 PROCEDURE — 83540 ASSAY OF IRON: CPT

## 2024-01-08 PROCEDURE — 85025 COMPLETE CBC W/AUTO DIFF WBC: CPT

## 2024-01-08 PROCEDURE — 99204 OFFICE O/P NEW MOD 45 MIN: CPT | Performed by: INTERNAL MEDICINE

## 2024-01-08 NOTE — PROGRESS NOTES
New Patient Office Visit      Date: 2024     Patient Name: Roger Bhat  MRN: [id number]  : 1960  Referring Physician: Bassam Kessler    Chief Complaint: Establish care for colon and gallbladder mass as well as hereditary hemochromatosis    History of Present Illness: Roger Bhat is a pleasant 63 y.o. male past medical history of hereditary hemochromatosis, anxiety, GERD, hyperlipidemia, COPD who presents today for evaluation of: Gallbladder mass and hemochromatosis. The patient is accompanied by their friend who contributes to the history of their care.  He initially presented in 2023 with bright red blood per rectum.  Underwent a colonoscopy Dr. Kessler which revealed a large sigmoid mass.  Biopsies were taken however they were not positive for malignancy.  CT C/A/P notable for lymphadenopathy near the colon as well as a possible gallbladder mass.  Nonspecific pulmonary nodules noted as well.  He feels well today.  Denies any abdominal pain or discomfort.  Denies any further rectal bleeding or discomfort.  Of note, he was diagnosed with hemochromatosis in 2022 with a homozygous C282Y mutation.  States that he has not been told about the diagnosis nor has he ever had therapeutic phlebotomy previously    Oncology History:    Oncology/Hematology History    No history exists.       Subjective      Review of Systems:     Constitutional: Negative for fevers, chills, or weight loss  Eyes: Negative for blurred vision or discharge         Ear/Nose/Throat: Negative for difficulty swallowing, sore throat, LAD                                                       Respiratory: Negative for cough, SOA, wheezing                                                                                        Cardiovascular: Negative for chest pain or palpitations                                                                  Gastrointestinal: Negative for nausea, vomiting or diarrhea                                                                      Genitourinary: Negative for dysuria or hematuria                                                                                           Musculoskeletal: Negative for any joint pains or muscle aches                                                                        Neurologic: Negative for any weakness, headaches, dizziness                                                                         Hematologic: Negative for any easy bleeding or bruising                                                                                   Psychiatric: Negative for anxiety or depression                             Past Medical History:   Past Medical History:   Diagnosis Date    Clotting disorder     COPD (chronic obstructive pulmonary disease)     Cough     Fracture, foot Sept 7 2022    GERD (gastroesophageal reflux disease)     Wears dentures     partial upper plate        Past Surgical History:   Past Surgical History:   Procedure Laterality Date    COLONOSCOPY  2021    ENDOSCOPY  05/2016    pt dennise, he was placed under general anesthesia for this    ENDOSCOPY N/A 10/19/2021    Procedure: ESOPHAGOGASTRODUODENOSCOPY;  Surgeon: Osmel Kessler MD;  Location:  [facility];  Service: Gastroenterology;  Laterality: N/A;  24 fr savory dilator used at 1251, 27 fr sdavory used at 1253, 33 English savoruy used at 1257, 42 fr savory used at 1259      ENDOSCOPY N/A 11/16/2021    Procedure: ESOPHAGOGASTRODUODENOSCOPY WITH DILATATION;  Surgeon: Osmel Kessler MD;  Location:  [facility];  Service: Gastroenterology;  Laterality: N/A;  Dilation with 48fr savery    HAND SURGERY  2022       Family History:   Family History   Problem Relation Age of Onset    No Known Problems Mother     No Known Problems Father     Colon cancer Neg Hx     Colon polyps Neg Hx     Esophageal cancer Neg Hx        Social History:   Social History     Socioeconomic History     Marital status: Significant Other   Tobacco Use    Smoking status: Every Day     Packs/day: 2.00     Years: 15.00     Additional pack years: 0.00     Total pack years: 30.00     Types: Cigarettes    Smokeless tobacco: Never    Tobacco comments:     max 2ppd  now 1/4 ppd    Vaping Use    Vaping Use: Never used   Substance and Sexual Activity    Alcohol use: Yes     Alcohol/week: 23.0 standard drinks of alcohol     Types: 2 Glasses of wine, 21 Cans of beer per week    Drug use: Never    Sexual activity: Yes     Partners: Female     Birth control/protection: None       Medications:     Current Outpatient Medications:     albuterol sulfate HFA (Ventolin HFA) 108 (90 Base) MCG/ACT inhaler, Inhale 2 puffs Every 4 (Four) Hours As Needed for Wheezing or Shortness of Air., Disp: 8 g, Rfl: 11    Budeson-Glycopyrrol-Formoterol (Breztri Aerosphere) 160-9-4.8 MCG/ACT aerosol inhaler, Inhale 2 puffs 2 (Two) Times a Day., Disp: 10.7 g, Rfl: 5    buPROPion XL (WELLBUTRIN XL) 150 MG 24 hr tablet, TAKE ONE TABLET BY MOUTH EVERY MORNING, Disp: 90 tablet, Rfl: 1    levocetirizine (XYZAL) 5 MG tablet, Take 1 tablet by mouth Every Evening., Disp: 90 tablet, Rfl: 1    magnesium oxide (MAG-OX) 400 MG tablet, Take 1 tablet by mouth Daily. Strength 200mg?, Disp: , Rfl:     omeprazole (priLOSEC) 40 MG capsule, TAKE ONE CAPSULE BY MOUTH EVERY DAY, Disp: 90 capsule, Rfl: 3    rosuvastatin (CRESTOR) 10 MG tablet, TAKE ONE TABLET BY MOUTH NIGHTLY AT BEDTIME, Disp: 30 tablet, Rfl: 1    sildenafil (Viagra) 100 MG tablet, Take 0.5-1 tablets by mouth Daily As Needed for Erectile Dysfunction., Disp: 20 tablet, Rfl: 5    Sod Picosulfate-Mag Ox-Cit Acd (Clenpiq) 10-3.5-12 MG-GM -GM/160ML solution, Take 350 mL by mouth Take As Directed., Disp: 350 mL, Rfl: 0    vitamin D (ERGOCALCIFEROL) 1.25 MG (90439 UT) capsule capsule, Take 1 capsule by mouth 1 (One) Time Per Week., Disp: 4 capsule, Rfl: 3    Allergies:   No Known Allergies    Objective     Physical  "Exam:  Vital Signs:   Vitals:    24 0845   BP: 127/88   Pulse: 90   Resp: 18   Temp: 97.5 °F (36.4 °C)   TempSrc: Temporal   SpO2: 97%   Weight: 63.5 kg (140 lb)   Height: 175.3 cm (69.02\")   PainSc: 0-No pain     Pain Score    2445   PainSc: 0-No pain     ECOG Performance Status: 0 - Asymptomatic    Constitutional: NAD, ECOG 0  Eyes: PERRLA, scleral anicteric  ENT: No LAD, no thyromegaly  Respiratory: CTAB, no wheezing, rales, rhonchi  Cardiovascular: RRR, no murmurs, pulses 2+ bilaterally  Abdomen: soft, NT/ND, no HSM  Musculoskeletal: strength 5/5 bilaterally, no c/c/e  Neurologic: A&O x 3, CN II-XII intact grossly  Psych: mood and affect congruent, no SI or HI    Results Review:   No visits with results within 2 Week(s) from this visit.   Latest known visit with results is:   Lab Requisition on 2023   Component Date Value Ref Range Status    Reference Lab Report 2023    Final                    Value:[facility]  [address] [place] [address]  Phone: [phone] or [phone]  Fax: [phone]  Jarvis Padilla M.D., Medical Director    PATIENT NAME                           LABORATORY NO.  ALICE CULVER.                 [id number]  [id number]                         AGE              SEX  SSN           CLIENT REF #  [facility]           63      1960      [id number]   [id number]    [address] [facility]              REQUESTING M.D.     ATTENDING [place] [address]                CHELA CAUSEY MAKAYLA  DATE COLLECTED      DATE RECEIVED      DATE REPORTED  2023    DIAGNOSIS:  A.   ASCENDING COLON POLYP:  Tubular adenoma  Negative for high-grade dysplasia or malignancy  B.   DESCENDING COLON POLYP:  Tubular adenoma  Negative for high-grade dysplasia or malignancy  C.   SIGMOID COLON BIOPSY, MASS:  Reactive colonic mucosa with            " "               activity  Negative for dysplasia or malignancy  See comment    COMMENT:  C.  Cannot rule out an underlying malignancy.  Clinical  correlation suggested.  This case was discussed with Dr. Kessler on December 28, 2023.    CLINICAL HISTORY:  Other hemorrhoids, benign neoplasm of ascending colon, benign neoplasm of  descending colon, other partial intestinal obstruction, neoplasm of unspecified  behavior of digestive system, encounter for screening for malignant neoplasm of  colon    SPECIMENS RECEIVED:  A.  ASCENDING COLON POLYP  B.  DESCENDING COLON POLYP  C.  SIGMOID COLON BIOPSY, MASS    MICROSCOPIC DESCRIPTION:  Tissue blocks are prepared and slides are examined microscopically on all  specimens. See diagnosis for details.    Professional interpretation rendered by Manfred Dillard M.D., TALIA at [facility], [address] [place] [address].    GROSS DESCRIPTION:  A.  Labeled \"ascending colon polyp\".  Consists of 2 pieces of tan soft tissue  measuring 0.6 x 0.3 x 0.2 cm in                           aggregate and submitted entirely in 1  block.  DELROY  B.  Labeled \"descending colon polyp\".  Consists of 1 piece of tan soft tissue  measuring 1.1 x 1.0 x 0.7 cm and is serially sectioned and submitted  sequentially entirely in 2 blocks.  C.  Labeled \"sigmoid colon mass\".  Consists of multiple pieces of tan soft  tissue measuring 0.7 x 0.5 x 0.2 cm in aggregate and is submitted entirely  in 1 block.    REVIEWED, DIAGNOSED AND ELECTRONICALLY  SIGNED BY:    Manfred Dillard M.D., CLARECFroilanA.JUSTINO.  CPT CODES:  88305x3         CT Abdomen Pelvis With Contrast    Result Date: 12/27/2023  Narrative: CT CHEST W CONTRAST DIAGNOSTIC, CT ABDOMEN PELVIS W CONTRAST Date of Exam: 12/27/2023 1:19 PM CST Indication: Colon cancer, metastatic, staging. Comparison: Chest CT 12/14/2020 Technique: Axial CT images were obtained of the chest abdomen pelvis after the uneventful intravenous administration of 85 cc Isovue-300.  " Reconstructed coronal and sagittal images were also obtained. Automated exposure control and iterative construction  methods were used. Findings: CT CHEST: MEDIASTINUM: Unremarkable. Aortic and heart size are normal. No mass nor pericardial effusion. CORONARY ARTERIES: Moderate calcified atherosclerotic disease. LUNGS: There is several new irregular nodular densities within the right lower lobe. While these may be infectious/inflammatory, metastatic lesions not excluded. These include: *5 mm nodule (image 53, series 3) *9 mm nodule (image 59, series 3) *7 mm nodule (image 64, series 3). No consolidation. There is moderate upper lung emphysema. PLEURAL SPACE: No effusion, mass, nor pneumothorax. CT ABDOMEN AND PELVIS:  LIVER:  Unremarkable parenchyma without focal lesion. BILIARY/GALLBLADDER: There is a 1.7 cm nodular area within the gallbladder fundus adjacent to a calcified stone. Imaging features are concerning for gallbladder carcinoma. Ultrasound abdomen might be useful to further assess. SPLEEN:  Unremarkable PANCREAS:  Unremarkable ADRENAL:  Unremarkable KIDNEYS:  Unremarkable parenchyma with no solid mass identified. No obstruction.  No calculus identified. GASTROINTESTINAL/MESENTERY: There is irregular mural thickening involving the mid to distal sigmoid colon presumably representing patient's primary neoplasm. No evidence of obstruction. No other areas of intestinal mural thickening. The appendix is prominent located along the right lateral margin of the rectosigmoid junction potentially related to adjacent neoplastic inflammation. AORTA/IVC:  Normal caliber. RETROPERITONEUM/LYMPH NODES: There are several prominent borderline enlarged lymph nodes within the pelvis concerning for metastatic involvement. These include: *0.9 x 0.8 cm left common iliac node (image 83, series 2) *0.9 x 0.8 cm distal perisigmoidal node (image 91, series 2) within the central upper deep pelvis REPRODUCTIVE:  Unremarkable BLADDER:   Unremarkable OSSEUS STRUCTURES:  Typical for age with no acute process identified.     Impression: Impression: 1.Mural thickening involving the distal sigmoid colon extending to the rectosigmoid junction presumably representing patient's primary neoplasm. There are several surrounding prominent lymph nodes. 2.Irregular nodular area within the gallbladder fundus concerning for neoplasm. Ultrasound might be most useful initial study to further assess. 3.Several nonspecific irregular pulmonary nodular foci in the right lower lobe. While these may be infectious/inflammatory, metastatic lesions not excluded. Electronically Signed: Salomón Ortiz MD  12/27/2023 2:03 PM CST  Workstation ID: [id number]    CT Chest With Contrast Diagnostic    Result Date: 12/27/2023  Narrative: CT CHEST W CONTRAST DIAGNOSTIC, CT ABDOMEN PELVIS W CONTRAST Date of Exam: 12/27/2023 1:19 PM CST Indication: Colon cancer, metastatic, staging. Comparison: Chest CT 12/14/2020 Technique: Axial CT images were obtained of the chest abdomen pelvis after the uneventful intravenous administration of 85 cc Isovue-300.  Reconstructed coronal and sagittal images were also obtained. Automated exposure control and iterative construction  methods were used. Findings: CT CHEST: MEDIASTINUM: Unremarkable. Aortic and heart size are normal. No mass nor pericardial effusion. CORONARY ARTERIES: Moderate calcified atherosclerotic disease. LUNGS: There is several new irregular nodular densities within the right lower lobe. While these may be infectious/inflammatory, metastatic lesions not excluded. These include: *5 mm nodule (image 53, series 3) *9 mm nodule (image 59, series 3) *7 mm nodule (image 64, series 3). No consolidation. There is moderate upper lung emphysema. PLEURAL SPACE: No effusion, mass, nor pneumothorax. CT ABDOMEN AND PELVIS:  LIVER:  Unremarkable parenchyma without focal lesion. BILIARY/GALLBLADDER: There is a 1.7 cm nodular area within the gallbladder  fundus adjacent to a calcified stone. Imaging features are concerning for gallbladder carcinoma. Ultrasound abdomen might be useful to further assess. SPLEEN:  Unremarkable PANCREAS:  Unremarkable ADRENAL:  Unremarkable KIDNEYS:  Unremarkable parenchyma with no solid mass identified. No obstruction.  No calculus identified. GASTROINTESTINAL/MESENTERY: There is irregular mural thickening involving the mid to distal sigmoid colon presumably representing patient's primary neoplasm. No evidence of obstruction. No other areas of intestinal mural thickening. The appendix is prominent located along the right lateral margin of the rectosigmoid junction potentially related to adjacent neoplastic inflammation. AORTA/IVC:  Normal caliber. RETROPERITONEUM/LYMPH NODES: There are several prominent borderline enlarged lymph nodes within the pelvis concerning for metastatic involvement. These include: *0.9 x 0.8 cm left common iliac node (image 83, series 2) *0.9 x 0.8 cm distal perisigmoidal node (image 91, series 2) within the central upper deep pelvis REPRODUCTIVE:  Unremarkable BLADDER:  Unremarkable OSSEUS STRUCTURES:  Typical for age with no acute process identified.     Impression: Impression: 1.Mural thickening involving the distal sigmoid colon extending to the rectosigmoid junction presumably representing patient's primary neoplasm. There are several surrounding prominent lymph nodes. 2.Irregular nodular area within the gallbladder fundus concerning for neoplasm. Ultrasound might be most useful initial study to further assess. 3.Several nonspecific irregular pulmonary nodular foci in the right lower lobe. While these may be infectious/inflammatory, metastatic lesions not excluded. Electronically Signed: Salomón Ortiz MD  12/27/2023 2:03 PM CST  Workstation ID: [id number]     Assessment / Plan      Assessment/Plan:   1. Hereditary hemochromatosis (Primary)  -Hemochromatosis mutational panel in November 2022 positive for a  homozygous C282Y mutation  -Will check iron studies today  -May consider therapeutic phlebotomy to keep ferritin goal of   -     CBC & Differential; Future  -     Comprehensive Metabolic Panel; Future  -     Ferritin; Future  -     Iron Profile; Future    2. Gallbladder mass  -Incidentally noted on CT scan  -PET/CT pending for Wednesday  -Will likely need cholecystectomy  -We will check CA 19-9 today  -     Cancer Antigen 19-9; Future    3. Colonic mass  -Noted on recent colonoscopy  -Biopsies not positive for malignancy although there is still a very high concern  -PET/CT pending for Wednesday  -Will check CEA today  -Follow with Dr. Craig with general surgery  -Will plan to discuss his case at tumor board on Friday  -     CEA; Future           Follow Up:   Follow-up pending tumor board discussion     Miguel Ángel Morris MD  Hematology and Oncology     Please note that portions of this note may have been completed with a voice recognition program. Efforts were made to edit the dictations, but occasionally words are mistranscribed.

## 2024-01-10 ENCOUNTER — HOSPITAL ENCOUNTER (OUTPATIENT)
Dept: PET IMAGING | Facility: HOSPITAL | Age: 64
Discharge: HOME | End: 2024-01-10
Payer: COMMERCIAL

## 2024-01-10 ENCOUNTER — PRE-ADMISSION TESTING (OUTPATIENT)
Dept: PREADMISSION TESTING | Facility: HOSPITAL | Age: 64
End: 2024-01-10
Payer: COMMERCIAL

## 2024-01-10 VITALS — HEIGHT: 69 IN | WEIGHT: 146.06 LBS | BODY MASS INDEX: 21.63 KG/M2

## 2024-01-10 DIAGNOSIS — D49.0 NEOPLASM OF DIGESTIVE SYSTEM: ICD-10-CM

## 2024-01-10 LAB
GLUCOSE BLDC GLUCOMTR-MCNC: 94 MG/DL (ref 70–130)
HBA1C MFR BLD: 5 % (ref 4.8–5.6)

## 2024-01-10 PROCEDURE — 82948 REAGENT STRIP/BLOOD GLUCOSE: CPT

## 2024-01-10 PROCEDURE — A9552 F18 FDG: HCPCS | Performed by: SURGERY

## 2024-01-10 PROCEDURE — 78815 PET IMAGE W/CT SKULL-THIGH: CPT

## 2024-01-10 PROCEDURE — 93010 ELECTROCARDIOGRAM REPORT: CPT | Performed by: INTERNAL MEDICINE

## 2024-01-10 PROCEDURE — 93005 ELECTROCARDIOGRAM TRACING: CPT

## 2024-01-10 PROCEDURE — 0 FLUDEOXYGLUCOSE F18 SOLUTION: Performed by: SURGERY

## 2024-01-10 PROCEDURE — 83036 HEMOGLOBIN GLYCOSYLATED A1C: CPT

## 2024-01-10 PROCEDURE — 36415 COLL VENOUS BLD VENIPUNCTURE: CPT

## 2024-01-10 RX ORDER — BUDESONIDE AND FORMOTEROL FUMARATE 160; 4.5 UG/1; UG/1
AEROSOL, METERED RESPIRATORY (INHALATION)
Status: ON HOLD | COMMUNITY
Start: 2023-12-21

## 2024-01-10 RX ORDER — SODIUM, POTASSIUM,MAG SULFATES 17.5-3.13G
SOLUTION, RECONSTITUTED, ORAL ORAL
Status: ON HOLD | COMMUNITY
Start: 2023-12-15 | End: 2024-01-17

## 2024-01-10 RX ORDER — CELECOXIB 100 MG/1
CAPSULE ORAL
Status: ON HOLD | COMMUNITY

## 2024-01-10 RX ADMIN — FLUDEOXYGLUCOSE F 18 1 DOSE: 200 INJECTION, SOLUTION INTRAVENOUS at 09:38

## 2024-01-10 NOTE — PAT
An arrival time for procedure was not provided during PAT visit. If patient had any questions or concerns about their arrival time, they were instructed to contact their surgeon/physician.  Additionally, if the patient referred to an arrival time that was acquired from their my chart account, patient was encouraged to verify that time with their surgeon/physician. Arrival times are NOT provided in Pre Admission Testing Department.    Patient viewed general PAT education video as instructed in their preoperative information received from their surgeon.  Patient stated the general PAT education video was viewed in its entirety and survey completed.  Copies of PAT general education handouts (Incentive Spirometry, Meds to Beds Program, Patient Belongings, Pre-op skin preparation instructions, Blood Glucose testing, Visitor policy, Surgery FAQ, Code H) distributed to patient if not printed. Education related to the PAT pass and skin preparation for surgery (if applicable) completed in PAT as a reinforcement to PAT education video. Patient instructed to return PAT pass provided today as well as completed skin preparation sheet (if applicable) on the day of procedure.     Additionally if patient had not viewed video yet but intended to view it at home or in our waiting area, then referred them to the handout with QR code/link provided during PAT visit.  Instructed patient to complete survey after viewing the video in its entirety.  Encouraged patient/family to read PAT general education handouts thoroughly and notify PAT staff with any questions or concerns. Patient verbalized understanding of all information and priority content.    Patient instructed to drink 20 ounces of Gatorade or Gatorlyte (if diabetic) and it needs to be completed 1 hour (for Main OR patients) or 2 hours (scheduled  section & [facility] patients) before given arrival time for procedure (NO RED Gatorade and NO Gatorade Zero).    Patient  "verbalized understanding.    Ten (8 ounce) Impact Advanced Recovery Nutritional Drinks distributed to patient from Pre Admission Testing Department.  Verbal and written instructions given that patient must consume two (8 ounce) Impact drinks a day for five days before surgery.  Flavoring tip sheet provided as well the brochure \"Go in Stronger. Get Home Sooner\" that explains benefits of nutritional drinks to enhance recovery. Patient/family verbalized understanding.     Patient denies any current skin issues.     Patient to apply Chlorhexadine wipes  to surgical area (as instructed) the night before procedure and the AM of procedure. Wipes provided.    EKG from [facility] today faxed to anesthesiology department for review and cardiac clearance. RN spoke with  DR. ASHER  and reviewed pertinent medical history and EKG results.  Per DR. ASHER, patient is cleared to proceed with procedure as planned without additional cardiac testing. Patient denies chest pain or increased shortness of breath.         "

## 2024-01-12 ENCOUNTER — PATIENT ROUNDING (BHMG ONLY) (OUTPATIENT)
Dept: ONCOLOGY | Facility: CLINIC | Age: 64
End: 2024-01-12
Payer: COMMERCIAL

## 2024-01-12 LAB
QT INTERVAL: 390 MS
QT INTERVAL: 394 MS
QTC INTERVAL: 422 MS
QTC INTERVAL: 441 MS

## 2024-01-16 ENCOUNTER — ANESTHESIA EVENT (OUTPATIENT)
Dept: PERIOP | Facility: HOSPITAL | Age: 64
End: 2024-01-16
Payer: COMMERCIAL

## 2024-01-16 RX ORDER — FAMOTIDINE 10 MG/ML
20 INJECTION, SOLUTION INTRAVENOUS ONCE
Status: CANCELLED | OUTPATIENT
Start: 2024-01-16 | End: 2024-01-16

## 2024-01-17 ENCOUNTER — ANESTHESIA (OUTPATIENT)
Dept: PERIOP | Facility: HOSPITAL | Age: 64
End: 2024-01-17
Payer: COMMERCIAL

## 2024-01-17 ENCOUNTER — ANESTHESIA EVENT CONVERTED (OUTPATIENT)
Dept: ANESTHESIOLOGY | Facility: HOSPITAL | Age: 64
End: 2024-01-17
Payer: COMMERCIAL

## 2024-01-17 ENCOUNTER — HOSPITAL ENCOUNTER (INPATIENT)
Facility: HOSPITAL | Age: 64
LOS: 29 days | Discharge: HOME | End: 2024-02-15
Attending: SURGERY | Admitting: SURGERY
Payer: COMMERCIAL

## 2024-01-17 DIAGNOSIS — E43 SEVERE MALNUTRITION: ICD-10-CM

## 2024-01-17 DIAGNOSIS — L03.113 CELLULITIS OF RIGHT HAND: ICD-10-CM

## 2024-01-17 DIAGNOSIS — K21.9 GASTROESOPHAGEAL REFLUX DISEASE WITHOUT ESOPHAGITIS: ICD-10-CM

## 2024-01-17 DIAGNOSIS — K63.89 COLONIC MASS: ICD-10-CM

## 2024-01-17 DIAGNOSIS — K56.609 BOWEL OBSTRUCTION: ICD-10-CM

## 2024-01-17 DIAGNOSIS — K82.8 GALLBLADDER MASS: ICD-10-CM

## 2024-01-17 DIAGNOSIS — Z13.220 SCREENING, LIPID: ICD-10-CM

## 2024-01-17 DIAGNOSIS — F17.200 CURRENT SMOKER: Primary | ICD-10-CM

## 2024-01-17 DIAGNOSIS — R06.03 RESPIRATORY DISTRESS: ICD-10-CM

## 2024-01-17 DIAGNOSIS — J43.2 CENTRILOBULAR EMPHYSEMA: ICD-10-CM

## 2024-01-17 DIAGNOSIS — E83.110 HEREDITARY HEMOCHROMATOSIS: ICD-10-CM

## 2024-01-17 PROCEDURE — 25010000002 ONDANSETRON PER 1 MG: Performed by: NURSE ANESTHETIST, CERTIFIED REGISTERED

## 2024-01-17 PROCEDURE — 88305 TISSUE EXAM BY PATHOLOGIST: CPT | Performed by: SURGERY

## 2024-01-17 PROCEDURE — 0DTN0ZZ RESECTION OF SIGMOID COLON, OPEN APPROACH: ICD-10-PCS | Performed by: SURGERY

## 2024-01-17 PROCEDURE — 25010000002 DEXAMETHASONE SODIUM PHOSPHATE 10 MG/ML SOLUTION: Performed by: NURSE ANESTHETIST, CERTIFIED REGISTERED

## 2024-01-17 PROCEDURE — 0FB14ZX EXCISION OF RIGHT LOBE LIVER, PERCUTANEOUS ENDOSCOPIC APPROACH, DIAGNOSTIC: ICD-10-PCS | Performed by: SURGERY

## 2024-01-17 PROCEDURE — 25010000002 PHENYLEPHRINE 10 MG/ML SOLUTION: Performed by: NURSE ANESTHETIST, CERTIFIED REGISTERED

## 2024-01-17 PROCEDURE — 25010000002 METRONIDAZOLE 500 MG/100ML SOLUTION: Performed by: SURGERY

## 2024-01-17 PROCEDURE — 25010000002 PHENYLEPHRINE 10 MG/ML SOLUTION 1 ML VIAL: Performed by: NURSE ANESTHETIST, CERTIFIED REGISTERED

## 2024-01-17 PROCEDURE — 0DTJ0ZZ RESECTION OF APPENDIX, OPEN APPROACH: ICD-10-PCS | Performed by: SURGERY

## 2024-01-17 PROCEDURE — 0D1B0Z4 BYPASS ILEUM TO CUTANEOUS, OPEN APPROACH: ICD-10-PCS | Performed by: SURGERY

## 2024-01-17 PROCEDURE — 88307 TISSUE EXAM BY PATHOLOGIST: CPT | Performed by: SURGERY

## 2024-01-17 PROCEDURE — 25010000002 FENTANYL CITRATE (PF) 50 MCG/ML SOLUTION

## 2024-01-17 PROCEDURE — 25010000002 HEPARIN (PORCINE) PER 1000 UNITS: Performed by: SURGERY

## 2024-01-17 PROCEDURE — 94640 AIRWAY INHALATION TREATMENT: CPT

## 2024-01-17 PROCEDURE — 0FT44ZZ RESECTION OF GALLBLADDER, PERCUTANEOUS ENDOSCOPIC APPROACH: ICD-10-PCS | Performed by: SURGERY

## 2024-01-17 PROCEDURE — 88304 TISSUE EXAM BY PATHOLOGIST: CPT | Performed by: SURGERY

## 2024-01-17 PROCEDURE — 25010000002 BUPIVACAINE (PF) 0.25 % SOLUTION: Performed by: NURSE ANESTHETIST, CERTIFIED REGISTERED

## 2024-01-17 PROCEDURE — 88313 SPECIAL STAINS GROUP 2: CPT | Performed by: SURGERY

## 2024-01-17 PROCEDURE — 25010000002 SUGAMMADEX 200 MG/2ML SOLUTION: Performed by: NURSE ANESTHETIST, CERTIFIED REGISTERED

## 2024-01-17 PROCEDURE — 0DJD8ZZ INSPECTION OF LOWER INTESTINAL TRACT, VIA NATURAL OR ARTIFICIAL OPENING ENDOSCOPIC: ICD-10-PCS | Performed by: SURGERY

## 2024-01-17 PROCEDURE — 25810000003 LACTATED RINGERS PER 1000 ML: Performed by: ANESTHESIOLOGY

## 2024-01-17 PROCEDURE — 25010000002 PROPOFOL 10 MG/ML EMULSION: Performed by: NURSE ANESTHETIST, CERTIFIED REGISTERED

## 2024-01-17 PROCEDURE — 25010000002 DEXAMETHASONE PER 1 MG: Performed by: NURSE ANESTHETIST, CERTIFIED REGISTERED

## 2024-01-17 PROCEDURE — 25010000002 HYDROMORPHONE HCL PF 50 MG/5ML SOLUTION

## 2024-01-17 PROCEDURE — 25810000003 SODIUM CHLORIDE 0.9 % SOLUTION 250 ML FLEX CONT: Performed by: NURSE ANESTHETIST, CERTIFIED REGISTERED

## 2024-01-17 PROCEDURE — 25010000002 CEFAZOLIN PER 500 MG: Performed by: SURGERY

## 2024-01-17 PROCEDURE — 25810000003 SODIUM CHLORIDE 0.9 % SOLUTION

## 2024-01-17 PROCEDURE — 25810000003 LACTATED RINGERS PER 1000 ML: Performed by: SURGERY

## 2024-01-17 DEVICE — IMPLANTABLE DEVICE: Type: IMPLANTABLE DEVICE | Site: ABDOMEN | Status: FUNCTIONAL

## 2024-01-17 DEVICE — STPLR LNR CUT PROX 75MM BLU TLC75: Type: IMPLANTABLE DEVICE | Site: ABDOMEN | Status: FUNCTIONAL

## 2024-01-17 DEVICE — CLIPAPPLR M/ ENDO LIGAMAX5 5MM 33CM MD/LG: Type: IMPLANTABLE DEVICE | Site: ABDOMEN | Status: FUNCTIONAL

## 2024-01-17 RX ORDER — MELOXICAM 15 MG/1
15 TABLET ORAL ONCE
Status: COMPLETED | OUTPATIENT
Start: 2024-01-17 | End: 2024-01-17

## 2024-01-17 RX ORDER — FENTANYL CITRATE 50 UG/ML
INJECTION, SOLUTION INTRAMUSCULAR; INTRAVENOUS
Status: DISCONTINUED
Start: 2024-01-17 | End: 2024-01-17 | Stop reason: WASHOUT

## 2024-01-17 RX ORDER — MAGNESIUM HYDROXIDE 1200 MG/15ML
LIQUID ORAL AS NEEDED
Status: DISCONTINUED | OUTPATIENT
Start: 2024-01-17 | End: 2024-01-17 | Stop reason: HOSPADM

## 2024-01-17 RX ORDER — ACETAMINOPHEN 325 MG/1
650 TABLET ORAL EVERY 6 HOURS
Status: DISCONTINUED | OUTPATIENT
Start: 2024-01-17 | End: 2024-01-20

## 2024-01-17 RX ORDER — HYDROMORPHONE HCL/0.9% NACL/PF 10 MG/50ML
PATIENT CONTROLLED ANALGESIA SYRINGE INTRAVENOUS CONTINUOUS
Status: DISCONTINUED | OUTPATIENT
Start: 2024-01-17 | End: 2024-01-19 | Stop reason: SDUPTHER

## 2024-01-17 RX ORDER — LIDOCAINE HYDROCHLORIDE 10 MG/ML
INJECTION, SOLUTION EPIDURAL; INFILTRATION; INTRACAUDAL; PERINEURAL AS NEEDED
Status: DISCONTINUED | OUTPATIENT
Start: 2024-01-17 | End: 2024-01-17 | Stop reason: SURG

## 2024-01-17 RX ORDER — DIPHENHYDRAMINE HYDROCHLORIDE 50 MG/ML
25 INJECTION INTRAMUSCULAR; INTRAVENOUS EVERY 6 HOURS PRN
Status: DISCONTINUED | OUTPATIENT
Start: 2024-01-17 | End: 2024-01-30 | Stop reason: SDUPTHER

## 2024-01-17 RX ORDER — SODIUM CHLORIDE 0.9 % (FLUSH) 0.9 %
10 SYRINGE (ML) INJECTION AS NEEDED
Status: DISCONTINUED | OUTPATIENT
Start: 2024-01-17 | End: 2024-01-17 | Stop reason: HOSPADM

## 2024-01-17 RX ORDER — ROSUVASTATIN CALCIUM 10 MG/1
10 TABLET, COATED ORAL NIGHTLY
Status: DISCONTINUED | OUTPATIENT
Start: 2024-01-17 | End: 2024-02-05

## 2024-01-17 RX ORDER — NALOXONE HCL 0.4 MG/ML
0.1 VIAL (ML) INJECTION
Status: DISCONTINUED | OUTPATIENT
Start: 2024-01-17 | End: 2024-01-30

## 2024-01-17 RX ORDER — DROPERIDOL 2.5 MG/ML
0.62 INJECTION, SOLUTION INTRAMUSCULAR; INTRAVENOUS ONCE AS NEEDED
Status: DISCONTINUED | OUTPATIENT
Start: 2024-01-17 | End: 2024-01-17 | Stop reason: SDUPTHER

## 2024-01-17 RX ORDER — METRONIDAZOLE 500 MG/100ML
500 INJECTION, SOLUTION INTRAVENOUS ONCE
Status: COMPLETED | OUTPATIENT
Start: 2024-01-17 | End: 2024-01-17

## 2024-01-17 RX ORDER — ALBUTEROL SULFATE 2.5 MG/3ML
2.5 SOLUTION RESPIRATORY (INHALATION) ONCE AS NEEDED
Status: DISCONTINUED | OUTPATIENT
Start: 2024-01-17 | End: 2024-01-17 | Stop reason: SDUPTHER

## 2024-01-17 RX ORDER — HEPARIN SODIUM 5000 [USP'U]/ML
5000 INJECTION, SOLUTION INTRAVENOUS; SUBCUTANEOUS ONCE
Status: COMPLETED | OUTPATIENT
Start: 2024-01-17 | End: 2024-01-17

## 2024-01-17 RX ORDER — PHENYLEPHRINE HYDROCHLORIDE 10 MG/ML
INJECTION INTRAVENOUS AS NEEDED
Status: DISCONTINUED | OUTPATIENT
Start: 2024-01-17 | End: 2024-01-17 | Stop reason: SURG

## 2024-01-17 RX ORDER — BUPIVACAINE HYDROCHLORIDE 2.5 MG/ML
INJECTION, SOLUTION EPIDURAL; INFILTRATION; INTRACAUDAL
Status: COMPLETED | OUTPATIENT
Start: 2024-01-17 | End: 2024-01-17

## 2024-01-17 RX ORDER — SODIUM CHLORIDE, SODIUM LACTATE, POTASSIUM CHLORIDE, CALCIUM CHLORIDE 600; 310; 30; 20 MG/100ML; MG/100ML; MG/100ML; MG/100ML
9 INJECTION, SOLUTION INTRAVENOUS CONTINUOUS
Status: DISCONTINUED | OUTPATIENT
Start: 2024-01-17 | End: 2024-01-19

## 2024-01-17 RX ORDER — DEXAMETHASONE SODIUM PHOSPHATE 10 MG/ML
INJECTION, SOLUTION INTRAMUSCULAR; INTRAVENOUS
Status: COMPLETED | OUTPATIENT
Start: 2024-01-17 | End: 2024-01-17

## 2024-01-17 RX ORDER — SODIUM CHLORIDE 0.9 % (FLUSH) 0.9 %
10 SYRINGE (ML) INJECTION EVERY 12 HOURS SCHEDULED
Status: DISCONTINUED | OUTPATIENT
Start: 2024-01-17 | End: 2024-01-17 | Stop reason: HOSPADM

## 2024-01-17 RX ORDER — ULTRASOUND COUPLING MEDIUM
GEL (GRAM) TOPICAL AS NEEDED
Status: DISCONTINUED | OUTPATIENT
Start: 2024-01-17 | End: 2024-01-17 | Stop reason: HOSPADM

## 2024-01-17 RX ORDER — BUPROPION HYDROCHLORIDE 150 MG/1
150 TABLET ORAL EVERY MORNING
Status: DISCONTINUED | OUTPATIENT
Start: 2024-01-18 | End: 2024-02-05

## 2024-01-17 RX ORDER — ALBUTEROL SULFATE 2.5 MG/3ML
2.5 SOLUTION RESPIRATORY (INHALATION) ONCE AS NEEDED
Status: DISCONTINUED | OUTPATIENT
Start: 2024-01-17 | End: 2024-01-17 | Stop reason: HOSPADM

## 2024-01-17 RX ORDER — MIDAZOLAM HYDROCHLORIDE 1 MG/ML
1 INJECTION INTRAMUSCULAR; INTRAVENOUS
Status: DISCONTINUED | OUTPATIENT
Start: 2024-01-17 | End: 2024-01-17 | Stop reason: HOSPADM

## 2024-01-17 RX ORDER — SODIUM CHLORIDE, SODIUM LACTATE, POTASSIUM CHLORIDE, CALCIUM CHLORIDE 600; 310; 30; 20 MG/100ML; MG/100ML; MG/100ML; MG/100ML
75 INJECTION, SOLUTION INTRAVENOUS CONTINUOUS
Status: DISCONTINUED | OUTPATIENT
Start: 2024-01-17 | End: 2024-01-21

## 2024-01-17 RX ORDER — LABETALOL HYDROCHLORIDE 5 MG/ML
5 INJECTION, SOLUTION INTRAVENOUS
Status: DISCONTINUED | OUTPATIENT
Start: 2024-01-17 | End: 2024-01-17 | Stop reason: HOSPADM

## 2024-01-17 RX ORDER — AMOXICILLIN 250 MG
2 CAPSULE ORAL NIGHTLY
Status: DISCONTINUED | OUTPATIENT
Start: 2024-01-17 | End: 2024-01-19

## 2024-01-17 RX ORDER — ALVIMOPAN 12 MG/1
12 CAPSULE ORAL 2 TIMES DAILY
Status: DISCONTINUED | OUTPATIENT
Start: 2024-01-18 | End: 2024-01-22

## 2024-01-17 RX ORDER — HYDRALAZINE HYDROCHLORIDE 20 MG/ML
5 INJECTION INTRAMUSCULAR; INTRAVENOUS
Status: DISCONTINUED | OUTPATIENT
Start: 2024-01-17 | End: 2024-01-17 | Stop reason: HOSPADM

## 2024-01-17 RX ORDER — HYDROMORPHONE HYDROCHLORIDE 1 MG/ML
0.5 INJECTION, SOLUTION INTRAMUSCULAR; INTRAVENOUS; SUBCUTANEOUS
Status: DISCONTINUED | OUTPATIENT
Start: 2024-01-17 | End: 2024-01-17 | Stop reason: SDUPTHER

## 2024-01-17 RX ORDER — ACETAMINOPHEN 500 MG
1000 TABLET ORAL ONCE
Status: COMPLETED | OUTPATIENT
Start: 2024-01-17 | End: 2024-01-17

## 2024-01-17 RX ORDER — SCOLOPAMINE TRANSDERMAL SYSTEM 1 MG/1
1 PATCH, EXTENDED RELEASE TRANSDERMAL CONTINUOUS
Status: DISPENSED | OUTPATIENT
Start: 2024-01-17 | End: 2024-01-20

## 2024-01-17 RX ORDER — FENTANYL CITRATE 50 UG/ML
INJECTION, SOLUTION INTRAMUSCULAR; INTRAVENOUS
Status: COMPLETED
Start: 2024-01-17 | End: 2024-01-17

## 2024-01-17 RX ORDER — PREGABALIN 75 MG/1
75 CAPSULE ORAL ONCE
Status: COMPLETED | OUTPATIENT
Start: 2024-01-17 | End: 2024-01-17

## 2024-01-17 RX ORDER — FAMOTIDINE 20 MG/1
20 TABLET, FILM COATED ORAL ONCE
Status: COMPLETED | OUTPATIENT
Start: 2024-01-17 | End: 2024-01-17

## 2024-01-17 RX ORDER — HYDROMORPHONE HCL/0.9% NACL/PF 10 MG/50ML
PATIENT CONTROLLED ANALGESIA SYRINGE INTRAVENOUS
Status: COMPLETED
Start: 2024-01-17 | End: 2024-01-17

## 2024-01-17 RX ORDER — ALVIMOPAN 12 MG/1
12 CAPSULE ORAL ONCE
Status: COMPLETED | OUTPATIENT
Start: 2024-01-17 | End: 2024-01-17

## 2024-01-17 RX ORDER — HYDROMORPHONE HYDROCHLORIDE 1 MG/ML
0.5 INJECTION, SOLUTION INTRAMUSCULAR; INTRAVENOUS; SUBCUTANEOUS
Status: DISCONTINUED | OUTPATIENT
Start: 2024-01-17 | End: 2024-01-17 | Stop reason: HOSPADM

## 2024-01-17 RX ORDER — SODIUM CHLORIDE 9 MG/ML
40 INJECTION, SOLUTION INTRAVENOUS AS NEEDED
Status: DISCONTINUED | OUTPATIENT
Start: 2024-01-17 | End: 2024-01-17 | Stop reason: HOSPADM

## 2024-01-17 RX ORDER — ONDANSETRON 2 MG/ML
4 INJECTION INTRAMUSCULAR; INTRAVENOUS ONCE AS NEEDED
Status: DISCONTINUED | OUTPATIENT
Start: 2024-01-17 | End: 2024-01-17 | Stop reason: HOSPADM

## 2024-01-17 RX ORDER — ONDANSETRON 2 MG/ML
4 INJECTION INTRAMUSCULAR; INTRAVENOUS ONCE AS NEEDED
Status: DISCONTINUED | OUTPATIENT
Start: 2024-01-17 | End: 2024-01-17 | Stop reason: SDUPTHER

## 2024-01-17 RX ORDER — ASPIRIN 325 MG
325 TABLET ORAL DAILY PRN
COMMUNITY

## 2024-01-17 RX ORDER — ASPIRIN 325 MG
325 TABLET ORAL DAILY PRN
Status: DISCONTINUED | OUTPATIENT
Start: 2024-01-17 | End: 2024-01-20

## 2024-01-17 RX ORDER — FENTANYL CITRATE 50 UG/ML
50 INJECTION, SOLUTION INTRAMUSCULAR; INTRAVENOUS
Status: DISCONTINUED | OUTPATIENT
Start: 2024-01-17 | End: 2024-01-17

## 2024-01-17 RX ORDER — PROPOFOL 10 MG/ML
VIAL (ML) INTRAVENOUS AS NEEDED
Status: DISCONTINUED | OUTPATIENT
Start: 2024-01-17 | End: 2024-01-17 | Stop reason: SURG

## 2024-01-17 RX ORDER — FENTANYL CITRATE 50 UG/ML
50 INJECTION, SOLUTION INTRAMUSCULAR; INTRAVENOUS
Status: DISCONTINUED | OUTPATIENT
Start: 2024-01-17 | End: 2024-01-17 | Stop reason: HOSPADM

## 2024-01-17 RX ORDER — DROPERIDOL 2.5 MG/ML
0.62 INJECTION, SOLUTION INTRAMUSCULAR; INTRAVENOUS ONCE AS NEEDED
Status: DISCONTINUED | OUTPATIENT
Start: 2024-01-17 | End: 2024-01-17 | Stop reason: HOSPADM

## 2024-01-17 RX ORDER — ROCURONIUM BROMIDE 10 MG/ML
INJECTION, SOLUTION INTRAVENOUS AS NEEDED
Status: DISCONTINUED | OUTPATIENT
Start: 2024-01-17 | End: 2024-01-17 | Stop reason: SURG

## 2024-01-17 RX ORDER — HEPARIN SODIUM 5000 [USP'U]/ML
5000 INJECTION, SOLUTION INTRAVENOUS; SUBCUTANEOUS EVERY 8 HOURS SCHEDULED
Status: DISCONTINUED | OUTPATIENT
Start: 2024-01-18 | End: 2024-01-19

## 2024-01-17 RX ORDER — ONDANSETRON 2 MG/ML
4 INJECTION INTRAMUSCULAR; INTRAVENOUS EVERY 6 HOURS PRN
Status: DISCONTINUED | OUTPATIENT
Start: 2024-01-17 | End: 2024-01-30

## 2024-01-17 RX ORDER — LIDOCAINE HYDROCHLORIDE 10 MG/ML
0.5 INJECTION, SOLUTION EPIDURAL; INFILTRATION; INTRACAUDAL; PERINEURAL ONCE AS NEEDED
Status: COMPLETED | OUTPATIENT
Start: 2024-01-17 | End: 2024-01-17

## 2024-01-17 RX ORDER — ALBUTEROL SULFATE 90 UG/1
2 AEROSOL, METERED RESPIRATORY (INHALATION) EVERY 4 HOURS PRN
Status: DISCONTINUED | OUTPATIENT
Start: 2024-01-17 | End: 2024-02-15 | Stop reason: HOSPADM

## 2024-01-17 RX ORDER — DEXAMETHASONE SODIUM PHOSPHATE 4 MG/ML
INJECTION, SOLUTION INTRA-ARTICULAR; INTRALESIONAL; INTRAMUSCULAR; INTRAVENOUS; SOFT TISSUE AS NEEDED
Status: DISCONTINUED | OUTPATIENT
Start: 2024-01-17 | End: 2024-01-17 | Stop reason: SURG

## 2024-01-17 RX ORDER — IPRATROPIUM BROMIDE AND ALBUTEROL SULFATE 2.5; .5 MG/3ML; MG/3ML
3 SOLUTION RESPIRATORY (INHALATION) ONCE
Status: COMPLETED | OUTPATIENT
Start: 2024-01-17 | End: 2024-01-17

## 2024-01-17 RX ORDER — ONDANSETRON 2 MG/ML
INJECTION INTRAMUSCULAR; INTRAVENOUS AS NEEDED
Status: DISCONTINUED | OUTPATIENT
Start: 2024-01-17 | End: 2024-01-17 | Stop reason: SURG

## 2024-01-17 RX ADMIN — SODIUM CHLORIDE, POTASSIUM CHLORIDE, SODIUM LACTATE AND CALCIUM CHLORIDE 100 ML/HR: 600; 310; 30; 20 INJECTION, SOLUTION INTRAVENOUS at 15:14

## 2024-01-17 RX ADMIN — METRONIDAZOLE 500 MG: 500 INJECTION, SOLUTION INTRAVENOUS at 11:05

## 2024-01-17 RX ADMIN — PROPOFOL 150 MG: 10 INJECTION, EMULSION INTRAVENOUS at 10:54

## 2024-01-17 RX ADMIN — SODIUM CHLORIDE, POTASSIUM CHLORIDE, SODIUM LACTATE AND CALCIUM CHLORIDE: 600; 310; 30; 20 INJECTION, SOLUTION INTRAVENOUS at 12:46

## 2024-01-17 RX ADMIN — ONDANSETRON 4 MG: 2 INJECTION INTRAMUSCULAR; INTRAVENOUS at 14:34

## 2024-01-17 RX ADMIN — ROSUVASTATIN 10 MG: 10 TABLET, FILM COATED ORAL at 20:12

## 2024-01-17 RX ADMIN — MELOXICAM 15 MG: 15 TABLET ORAL at 09:30

## 2024-01-17 RX ADMIN — HEPARIN SODIUM 5000 UNITS: 5000 INJECTION, SOLUTION INTRAVENOUS; SUBCUTANEOUS at 09:30

## 2024-01-17 RX ADMIN — DEXAMETHASONE SODIUM PHOSPHATE 4 MG: 10 INJECTION, SOLUTION INTRAMUSCULAR; INTRAVENOUS at 10:58

## 2024-01-17 RX ADMIN — SODIUM CHLORIDE, POTASSIUM CHLORIDE, SODIUM LACTATE AND CALCIUM CHLORIDE: 600; 310; 30; 20 INJECTION, SOLUTION INTRAVENOUS at 14:46

## 2024-01-17 RX ADMIN — LIDOCAINE HYDROCHLORIDE 0.5 ML: 10 INJECTION, SOLUTION EPIDURAL; INFILTRATION; INTRACAUDAL; PERINEURAL at 09:31

## 2024-01-17 RX ADMIN — ROCURONIUM BROMIDE 50 MG: 10 SOLUTION INTRAVENOUS at 12:06

## 2024-01-17 RX ADMIN — FAMOTIDINE 20 MG: 20 TABLET ORAL at 09:30

## 2024-01-17 RX ADMIN — PREGABALIN 75 MG: 75 CAPSULE ORAL at 09:30

## 2024-01-17 RX ADMIN — ACETAMINOPHEN 650 MG: 325 TABLET ORAL at 23:57

## 2024-01-17 RX ADMIN — PHENYLEPHRINE HYDROCHLORIDE 100 MCG: 10 INJECTION INTRAVENOUS at 11:03

## 2024-01-17 RX ADMIN — FENTANYL CITRATE 50 MCG: 50 INJECTION, SOLUTION INTRAMUSCULAR; INTRAVENOUS at 15:02

## 2024-01-17 RX ADMIN — PHENYLEPHRINE HYDROCHLORIDE 200 MCG: 10 INJECTION INTRAVENOUS at 11:05

## 2024-01-17 RX ADMIN — IPRATROPIUM BROMIDE AND ALBUTEROL SULFATE 3 ML: .5; 2.5 SOLUTION RESPIRATORY (INHALATION) at 10:06

## 2024-01-17 RX ADMIN — ALVIMOPAN 12 MG: 12 CAPSULE ORAL at 09:30

## 2024-01-17 RX ADMIN — LIDOCAINE HYDROCHLORIDE 50 MG: 10 INJECTION, SOLUTION EPIDURAL; INFILTRATION; INTRACAUDAL; PERINEURAL at 10:54

## 2024-01-17 RX ADMIN — ACETAMINOPHEN 1000 MG: 500 TABLET ORAL at 09:30

## 2024-01-17 RX ADMIN — SENNOSIDES AND DOCUSATE SODIUM 2 TABLET: 8.6; 5 TABLET ORAL at 20:12

## 2024-01-17 RX ADMIN — SODIUM CHLORIDE, POTASSIUM CHLORIDE, SODIUM LACTATE AND CALCIUM CHLORIDE 9 ML/HR: 600; 310; 30; 20 INJECTION, SOLUTION INTRAVENOUS at 09:31

## 2024-01-17 RX ADMIN — HYDROMORPHONE HYDROCHLORIDE: 10 INJECTION, SOLUTION INTRAMUSCULAR; INTRAVENOUS; SUBCUTANEOUS at 15:14

## 2024-01-17 RX ADMIN — Medication: at 15:14

## 2024-01-17 RX ADMIN — SCOPOLAMINE 1 PATCH: 1.5 PATCH, EXTENDED RELEASE TRANSDERMAL at 09:31

## 2024-01-17 RX ADMIN — PHENYLEPHRINE HYDROCHLORIDE 200 MCG: 10 INJECTION INTRAVENOUS at 11:43

## 2024-01-17 RX ADMIN — ACETAMINOPHEN 650 MG: 325 TABLET ORAL at 18:33

## 2024-01-17 RX ADMIN — BUPIVACAINE HYDROCHLORIDE 60 ML: 2.5 INJECTION, SOLUTION EPIDURAL; INFILTRATION; INTRACAUDAL; PERINEURAL at 10:58

## 2024-01-17 RX ADMIN — ROCURONIUM BROMIDE 50 MG: 10 SOLUTION INTRAVENOUS at 10:54

## 2024-01-17 RX ADMIN — DEXAMETHASONE SODIUM PHOSPHATE 4 MG: 4 INJECTION, SOLUTION INTRAMUSCULAR; INTRAVENOUS at 11:00

## 2024-01-17 RX ADMIN — SODIUM CHLORIDE 2000 MG: 900 INJECTION INTRAVENOUS at 11:00

## 2024-01-17 RX ADMIN — SUGAMMADEX 200 MG: 100 INJECTION, SOLUTION INTRAVENOUS at 14:34

## 2024-01-17 RX ADMIN — PHENYLEPHRINE HYDROCHLORIDE 0.2 MCG/KG/MIN: 10 INJECTION INTRAVENOUS at 11:07

## 2024-01-17 NOTE — INTERVAL H&P NOTE
"[facility] Pre-op    Full history and physical note from office is attached.    /92 (BP Location: Right arm, Patient Position: Sitting)   Pulse 66   Temp 97.4 °F (36.3 °C) (Temporal)   Resp 18   Ht 175.3 cm (69\")   Wt 66.3 kg (146 lb 0.9 oz)   SpO2 98%   BMI 21.57 kg/m²     Immunizations:  Influenza:  No  Pneumococcal:  No  Tetanus:  Yes  Covid : x3    Review of Systems:  Constitutional-- No fever, chills or sweats. No fatigue.  CV-- No chest pain, palpitation or syncope  Resp-- No SOB, cough, hemoptysis  Skin--No rashes or lesions    LAB Results:  Lab Results   Component Value Date    WBC 6.92 01/08/2024    HGB 15.0 01/08/2024    HCT 43.6 01/08/2024    .4 (H) 01/08/2024     01/08/2024    NEUTROABS 4.39 01/08/2024    GLUCOSE 100 (H) 01/08/2024    BUN 8 01/08/2024    CREATININE 0.77 01/08/2024    EGFRIFNONA 115 08/17/2021    EGFRIFAFRI 140 08/17/2021     (L) 01/08/2024    K 5.2 01/08/2024    CL 98 01/08/2024    CO2 27.0 01/08/2024    CALCIUM 9.5 01/08/2024    ALBUMIN 4.4 01/08/2024    AST 23 01/08/2024    ALT 16 01/08/2024    BILITOT 0.5 01/08/2024    INR 1.0 07/19/2021       Cancer Staging (if applicable)  Cancer Patient: _x_ yes __no __unknown__N/A; If yes, clinical stage T:__ N:__M:__, stage group or __N/A      Impression: Colonic Mass      Plan: CHOLECYSTECTOMY LAPAROSCOPIC       Jake Garcia, APRN   1/17/2024   09:17 EST  "

## 2024-01-17 NOTE — ANESTHESIA PROCEDURE NOTES
Airway  Urgency: elective    Date/Time: 1/17/2024 10:57 AM  Airway not difficult    General Information and Staff    Patient location during procedure: OR  CRNA/CAA: Donavon Dumont CRNA    Indications and Patient Condition  Indications for airway management: airway protection    Preoxygenated: yes  MILS not maintained throughout  Mask difficulty assessment: 2 - vent by mask + OA or adjuvant +/- NMBA    Final Airway Details  Final airway type: endotracheal airway      Successful airway: ETT  Cuffed: yes   Successful intubation technique: direct laryngoscopy  Endotracheal tube insertion site: oral  Blade: [place]  Blade size: 2  ETT size (mm): 7.5  Cormack-Lehane Classification: grade I - full view of glottis  Placement verified by: chest auscultation and capnometry   Cuff volume (mL): 10  Measured from: lips  ETT/EBT  to lips (cm): 23  Number of attempts at approach: 1  Assessment: lips, teeth, and gum same as pre-op and atraumatic intubation    Additional Comments  Negative epigastric sounds, Breath sound equal bilaterally with symmetric chest rise and fall

## 2024-01-17 NOTE — PROGRESS NOTES
"Patient Name:  Roger Bhat  YOB: 1960  [id number]    Surgery Post - Operative Note    Date of visit: 1/17/2024      Subjective: Seen in PACU.  Pain controlled.  Heart rate in the 60s, systolics in the 130s        Objective:    /82   Pulse 71   Temp 97 °F (36.1 °C) (Temporal)   Resp 16   Ht 175.3 cm (69\")   Wt 66.3 kg (146 lb 0.9 oz)   SpO2 97%   BMI 21.57 kg/m²     CV:  Regular rate and rhythm   L:  Clear to auscultation bilaterally. Not labored on O2 by NC   ABD:  Soft, appropriately tender. Dressings clean, dry and intact.  Ileostomy is pink and patent on the right side.  Drain with small amount of sanguinous output  EXT:  No cyanosis, clubbing or edema        Assessment/ Plan:     Recovering well after open sigmoid colectomy and cholecystectomy. Continue Pulmonary toilet        Jimmy Craig MD  1/17/2024  15:46 EST    "

## 2024-01-17 NOTE — BRIEF OP NOTE
CHOLECYSTECTOMY LAPAROSCOPIC, COLON RESECTION LOW ANTERIOR  Progress Note    Roger Bhat  1/17/2024    Pre-op Diagnosis:   Sigmoid colon mass, gallbladder nodule       Post-Op Diagnosis Codes:   same    Procedure/CPT® Codes:        Procedure(s):  Laparoscopic cholecystectomy, laparoscopic liver biopsy, open sigmoid colectomy with primary coloproctostomy, takedown of the splenic flexure, diverting loop ileostomy creation, appendectomy, flexible sigmoidoscopy for a pneumatic leak test            Surgeon(s):  Jimmy Craig MD    Anesthesia: General with Block    Staff:   Circulator: Octavia Weinstein RN; Anabel Horne RN; Willie Sibley, RN; Marcello Pa RN  Physician Assistant: Alba Espinoza PA-C  Scrub Person: Yazan Oliveira; Bonnie Ann  Nursing Assistant: Smita Velasquez PCT  Assistant: Yazan Harding PA-C  Assistant: Yazan Harding PA-C      Estimated Blood Loss: 150 mL    Urine Voided: 250 mL    Specimens:    Gallbladder, liver biopsy, sigmoid colon, appendix, anastomotic donuts                      Drains:   Closed/Suction Drain 1 RLQ Bulb 10 Fr. (Active)       Ileostomy Loop RLQ (Active)       Urethral Catheter Silicone 16 Fr. (Active)       Findings: There is evidence of some scarring and a nodular area at the fundus of the gallbladder.  There was significant thickening of the sigmoid colon with a palpable mass.  A sigmoid colectomy was performed with a Primary coloproctostomy with a 29 EEA stapler.  Leak test was negative.  This required mobilization of the splenic flexure.  Appendectomy was performed        Complications: None immediate    Assistant: Yazan Harding PA-C  was responsible for performing the following activities: Retraction, Suction, Irrigation, Suturing, and Closing and their skilled assistance was necessary for the success of this case.    Jimmy Craig MD     Date: 1/17/2024  Time: 14:36 EST

## 2024-01-17 NOTE — OP NOTE
Operative Report    Patient Name:  Roger Bhat  YOB: 1960  [id number]    1/17/2024      PREOPERATIVE DIAGNOSIS: Sigmoid Colon Mass, Gallbladder Mass      POSTOPERATIVE DIAGNOSIS: Same        PROCEDURE PERFORMED:     Laparoscopic Cholecystectomy  Laparoscopic Liver Biopsy  Open Sigmoid Colectomy with Primary Coloproctostomy   Mobilization of the Splenic Flexure  Flexible Sigmoidoscopy for Pneumatic Leak Test   Diverting Loop Ileostomy Creation   Appendectomy        SURGEON: Jimmy Craig MD      ASSISTANT: Assistant: Yazan Harding PA-C      Assistant responsibilities: They assisted with dissection, retraction, resection of the specimen, holding a positioning of the camera, and closure.  Their assistance was necessary and required for successful completion of the case    Colon Resection  Operation performed with curative intent Yes   Tumor Location (select all that apply) Sigmoid colon   Extent of colon and vascular resection  (select all that apply) Sigmoid resection - inferior mesenteric             SPECIMENS: Gallbladder, Liver Biopsy, Sigmoid colon, Anastomotic Donuts, Appendix       ANESTHESIA: General with TAP block      ESTIMATED BLOOD LOSS: 150 mL       FINDINGS:  1.  There was a nodular area at the dome of the gallbladder with some scarring and adherent omentum.  This did not appear to be grossly invading the liver and was completely excised as part of a cholecystectomy   2.  There was a palpable mass within the sigmoid colon, however there was no obvious local invasion and the sigmoid colon was freely mobile.  This was completely excised as part of a sigmoid colectomy up to the proximal descending colon with a primary coloproctostomy performed with a 29 EEA stapler  3. Flexible sigmoidoscopy following coloproctostomy creation with negative pneumatic air-leak test   4.  A diverting ileostomy was created approximately 30 cm proximal to the ileocecal valve  5.  Appendectomy was  performed to avoid any future diagnostic uncertainty  6. Liver biopsy was performed as requested by Hematology due to concern for hemochromatosis          INDICATIONS:      This patient is a 63 y.o. male with a history of sigmoid colon mass with hematochezia as well as concern for gallbladder mass.  His case was discussed at the multidisciplinary board and a consensus decision was made to proceed forward with primary resection of both sites upfront. The risks, benefits, and alternatives of the procedure were discussed with the patient and their family preoperatively and they agreed to proceed.          DESCRIPTION OF PROCEDURE:      After obtaining informed consent, the patient was taken to the operating room and placed in the modified lithotomy position on the operating room table. General anesthesia was initiated. A Crouch catheter was placed. The abdomen was prepped and draped in the usual sterile fashion. A time-out was properly performed. Antibiotics were given preoperatively. SCDs were properly placed on the patient and turned on.  A nasogastric tube was placed by the anesthesiologist.  A block was performed by the anesthesia service prior to the start of the case.    An infraumbilical skin incision was then created inferior to the umbilicus utilizing an 11 blade scalpel. Blunt dissection was carried down to the level of the anterior abdominal wall fascia and the base of the umbilicus. The base of the umbilicus was then grasped and elevated with a Kocher clamp. A vertical incision was then made in the anterior abdominal wall fascia under direct visualization sharply. Following this, the peritoneal cavity was then entered under direct visualization. Stay sutures of 0 Vicryl were placed around the defect, and a 12 mm Jete trocar was then advanced without difficulty into the abdominal cavity. Pneumoperitoneum was established at 15 mmHg. The abdomen was then surveyed with a 10mm 30 degree laparoscope, with  special attention to the viscera underlying the insertion site which was found to be free of injury.  Next, under direct laparoscopic visualization three additional 5 mm trocars were placed in the right upper quadrant. The patient was then positioned in the head-up position with the table tilted to the left.    Laparoscopic survey of the abdomen demonstrated no obvious evidence of peritoneal disease in all 4 quadrants.  The right and left lobe of the liver were carefully evaluated and there was no evidence of any obvious metastatic lesions.  The gallbladder did appear to have an area of nodularity high up at the dome with some adherent omentum.  We therefore elected to proceed forward with resection.    The fundus of the gallbladder was retracted cephalad while the infundibulum of the gallbladder was retracted laterally.  Some of the omental adhesions to the gallbladder were taken down using blunt dissection as well as the [place] LigaSure device.  Using a combination of hook cautery as well as a [place] dissector, the peritoneum surrounding the cystic pedicle was stripped. Cystic structures were dissected. A critical view of safety was established, meanin and only 2 structures were visualized entering the gallbladder, all fibrous and fatty tissue between the cystic artery and cystic duct were cleared, and the posterior one-third of the gallbladder was removed from the cystic plate. Next 2 clips were placed on the distal cystic duct, 1 clip was placed on the proximal cystic duct, and the duct was transected with laparoscopic scissors.  A single PDS Endoloop was applied to the cystic duct stump below the clips.  Next 2 clips were placed on the proximal cystic artery, 1 clip was placed on the distal cystic artery and this was transected with laparoscopic scissors. Next the gallbladder was removed from the cystic plate using hook electrocautery proceeding superiorly towards the dome.  As we approached the dome of  the gallbladder we noted that there was an area of scarring and nodularity in close proximity to the liver bed at the very top of the fundus of the gallbladder.  This was excised with a small portion of surrounding liver parenchyma immediately adjacent to this area.     A 5 mm 30 degree laparoscope was then inserted through the subxiphoid trocar site to visualize the placement of the gallbladder within an Endo Catch bag and then extraction of the gallbladder through the periumbilical trocar site utilizing the Endo Catch bag. Instruments were returned to their native positions. Hemostasis of the cystic plate was confirmed using electrocautery. The clipped cystic structures were carefully examined and there was no sign of bilious or bloody drainage.      We then proceeded forward with liver biopsy.  Using an 18-gauge core needle biopsy device, 3 separate passes were taken on the right lobe of the liver with an adequate core specimen identified with each pass.  Hemostasis of these biopsy sites was confirmed with electrocautery.  Specimen was then passed off the field as liver biopsy.    The table was then leveled and limited irrigation of the right upper quadrant was performed with normal saline and hemostasis again confirmed. Next, the 5 mm trocars were removed under direct laparoscopic visualization. The 12 mm trocar was then withdrawn and pneumoperitoneum was released.     We then proceeded forward with the open portion of the procedure.  In line with the previous 12 mm trocar, a midline laparotomy was created.  A skin incision was made using electrocautery and dissection was carried down to the level of the anterior abdominal wall fascia.  The anterior abdominal wall fascia was incised, and our laparotomy incision was extended from just above the umbilicus to the level of the pubic symphysis.  The abdomen was then again carefully surveyed.  We noted that there was a palpable mass within the sigmoid colon, however  the sigmoid colon was freely mobile and there was no evidence of any local invasion.  There was evidence of 2 separate tattoo sites, one at the upper rectum just distal to the mass, and 1 within the mid descending colon.  A Bookwalter retractor was placed to aid with visualization.     The small bowel and omentum were retracted superiorly and packed into the upper abdomen. The sigmoid colon was retracted upwards so that the mesentery and vascular pedicle could be visualized. Using electrocautery, we scored along the medial portion of the sigmoid colon mesentery proceeding inferiorly towards the pelvis. The mesentery was swept upwards and the presacral space was developed. The inferior mesenteric artery along with its lymph node bundle were lifted up.  We then similarly took down the lateral attachments of the sigmoid colon. the left ureter was identified and was protected throughout the course of the dissection. We then proceeded with our dissection proximally freeing the sigmoid colon mesentery from the underlying retroperitoneum.  We then turned our attention to the lateral attachments of the descending colon.  These were taken down using electrocautery proceeding superiorly.  Given that the proximal tattoo site was within the mid descending colon, it became clear that we would have to mobilize the splenic flexure in order to resect this area and have adequate reach for anastomosis in the pelvis.    We therefore proceeded forward with mobilization of the splenic flexure.  The greater omentum was reflected superiorly and the mid-transverse colon was elevated. The lesser sac was entered by dividing the gastrocolic ligament close to the colon where the leaflets meet. We then proceeded distally with this dissection such that the splenic flexure was completely mobilized from medial to lateral as well as a lateral to medial approach. Our points of dissection were met with the previously performed mobilization of the  left descending colon and we found that the entire left colon was mobilized adequately.       We again turned our attention to the pelvis.  The peritoneum surrounding the KENDRA was cleared and it was isolated. A high-ligation of the KENDRA was performed using a 0 silk tie.  There was some bleeding from the vascular pedicle following ligation, however this was controlled using a 2-0 silk figure-of-eight suture.. Hemostasis was confirmed.  At this point we confirmed that the left colon was completely mobilized.    With the left colon mobilized, we then turned our attention to the pelvic dissection again.  We again visualized both the right and left ureter and ensured that these were kept safe throughout the dissection.  Using the ligasure device, we proceeded with distal dissection of the sigmoid colon mesentery. This dissection proceeded all the way to the level of the proximal mesorectum and distal to the distal tattoo site. A distal transection site was chosen on the proximal rectum. We ensured that there was adequate circular dissection of the mesorectum at this site and divided the mesentery to the bowel wall using the [place] LigaSure. The bowel was then transected at this site using a blue load contour stapler.  Any remaining portions of the sigmoid colon mesentery were divided using LigaSure.  The proximal transection site was then chosen at the mid descending colon proximal to the tattoo site, and the colon was similarly divided using another firing of the contour stapler.  The specimen was then passed off the field the sigmoid colon.    Serial EEA sizers were then introduced up the patient's anus to the end of the distal rectal staple line.  This accommodated a 29 EEA stapler.  The anvil for the 29 EEA stapler was then secured within the divided end of the descending colon using a pursestring clamp with a 2-0 Prolene suture. Under direct visualization, the 29 EEA stapler was advanced into the anus. It was  advanced to the distal rectal staple line. The spike was deployed just anterior to the mid portion of the staple line. The anvil was advanced onto the spike. The mesentery of the colon was confirmed to lie in proper position without twisting. The EEA stapler was then fired and a stapled end-to-end coloproctostomy was created. The anastomotic donuts were confirmed to be intact. The anastomosis was then submerged in saline irrigation within the pelvis and a pneumatic leak test was performed while the surgeon performed flexible sigmoidoscopy. Flexible sigmoidoscopy demonstrated the anastomosis to be patent and hemostatic endoscopically.  This was encountered at roughly 15 cm from the anus.  The pneumatic air-leak test was noted to be negative for leak on examination. The irrigation was aspirated.  Portions of the anastomosis were oversewn using 3-0 silk suture in a Lembert fashion.  At this time a 10 flat DELROY drain was brought into the abdominal cavity through a right sided incision. The drain was positioned posterior to our anastomosis in the pelvis. The drain was secured to the abdominal wall using a 2-0 Nylon suture. Hemostasis of all operative sites was confirmed.  The surgeons changed gowns and gloves.    At this point we then noted that the appendix was oriented down into the pelvis.  To avoid any future diagnostic uncertainty I felt that it was in the patient's best interest to proceed forward with appendectomy.  A window was created in the mesoappendix adjacent to the cecum.  The appendix was then divided flush with the cecum using a KAPIL 75 blue load stapler.  The appendiceal mesentery was then sequentially divided using the LigaSure device.  The specimen was then passed off the field as appendix.    Hemostasis was confirmed throughout the abdomen.  We traced the small bowel to approximately 30 cm proximal to the ileocecal valve.  At the site was marked the small bowel for our ileostomy site.  A trephine was  created on the right side of the abdominal wall in the mid rectus plane in the standard fashion.  This portion of small bowel was brought out through this site with great care taken to ensure that the loop ileostomy was properly oriented.    We again confirmed hemostasis throughout the abdomen.  The NG tube was palpated within the stomach.  The fascia was then closed using simple interrupted figure-of-eight #1 PDS suture.  The subcutaneous wound was irrigated with antibiotic-containing solution.  The skin was then closed with skin staples.  The loop ileostomy was then matured on the right side of the abdominal wall in the standard fashion over a bridge with 3-0 Vicryl suture.  A stoma appliance was then applied to the ileostomy site and a clean and dry dressing was then applied to the midline.  The laparoscopic incision sites were closed using 4-0 Monocryl in the subcuticular layer and dermal glue was applied overlying this.     After the procedure, the patient was awakened, extubated, and taken to the postoperative anesthesia care unit for recovery. All needle, instrument, and lap counts were correct. I was personally present and performed all portions of the procedure. There were no immediate complications         Jimmy Craig MD  1/17/2024  14:59 EST

## 2024-01-17 NOTE — ANESTHESIA PROCEDURE NOTES
"Peripheral Block      Patient reassessed immediately prior to procedure    Patient location during procedure: OR  Reason for block: at surgeon's request and post-op pain management  Performed by  Anesthesiologist: Leodan Crawford Jr., MD  Preanesthetic Checklist  Completed: patient identified, IV checked, site marked, risks and benefits discussed, surgical consent, monitors and equipment checked, pre-op evaluation and timeout performed  Prep:  Pt Position: supine  Sterile barriers:cap, gloves, mask and washed/disinfected hands  Prep: ChloraPrep  Patient monitoring: blood pressure monitoring, continuous pulse oximetry and EKG  Procedure    Sedation: yes  Performed under: general  Guidance:ultrasound guided  Images:still images obtained, printed/placed on chart    Laterality:Bilateral  Block Type:TAP  Injection Technique:single-shot  Needle Type:short-bevel and echogenic  Needle Gauge:20 G  Resistance on Injection: none    Medications Used: dexamethasone sodium phosphate injection - Injection   4 mg - 1/17/2024 10:58:00 AM  bupivacaine PF (MARCAINE) 0.25 % injection - Injection   60 mL - 1/17/2024 10:58:00 AM      Medications  Comment:Block Injection:  LA dose divided between Right and Left block        Post Assessment  Injection Assessment: negative aspiration for heme, incremental injection and no paresthesia on injection  Patient Tolerance:comfortable throughout block  Complications:no  Additional Notes    Subcostal TAPs    A high-frequency linear transducer, with sterile cover, was placed sub-xiphoid to identify Linea Alba, right and left Rectus Abdominus Muscles (BENJA). The transducer was moved either right or left subcostally to identify the BENJA and the Transverse Abdominus Muscle (MURPHY). The insertion site was prepped in sterile fashion and then localized with 2-5 ml of 1% Lidocaine. Using ultrasound-guidance, a 20-gauge B-[employer]ark 4\" Ultraplex 360 non-stimulating echogenic needle was advanced in plane, from " "medial to lateral, until the tip of the needle was in the fascial plane between the BENJA and MURPHY. 1-3ml of preservative free normal saline was used to hydro-dissect the fascial planes. After the fascial plane was verified, the local anesthetic (LA) was injected. The procedure was repeated on the opposite side for bilateral coverage. Aspiration every 5 ml to prevent intravascular injection. Injection was completed with negative aspiration of blood and negative intravascular injection. Injection pressures were normal with minimal resistance. The subcostal approach to the TAP nerve block ideally anesthetizes the intercostal nerves T6-T9.     Mid-Axillary/Lateral TAPs    A high-frequency linear transducer, with sterile cover, was placed in the midaxillary line between the ASIS and costal margin. The External Oblique Muscle (EOM), Internal Oblique Muscle (IOM), Transverse Abdominus Muscle (MURPHY), and Peritoneum were identified. The insertion site was prepped in sterile fashion and then localized with 2-5 ml of 1% Lidocaine. Using ultrasound-guidance, a 20-gauge B-[employer]ark 4\" Ultraplex 360 non-stimulating echogenic needle was advanced in plane, from medial to lateral, until the tip of the needle was in the fascial plane between the IOM and MURPHY. 1-3ml of preservative free normal saline was used to hydro-dissect the fascial planes. After the fascial plane was verified, the local anesthetic (LA) was injected. The procedure was repeated on the opposite side for bilateral coverage. Aspiration every 5 ml to prevent intravascular injection. Injection was completed with negative aspiration of blood and negative intravascular injection. Injection pressures were normal with minimal resistance. Midaxillary TAPs should reach intercostal nerves T10- T11 and the subcostal nerve T12.              "

## 2024-01-17 NOTE — ANESTHESIA POSTPROCEDURE EVALUATION
Patient: Roger Bhat    Procedure Summary       Date: 01/17/24 Room / Location:  [facility]  [facility]    Anesthesia Start: 1049 Anesthesia Stop: 1453    Procedures:       CHOLECYSTECTOMY LAPAROSCOPIC (Abdomen)      OPEN SIGMOID COLECTOMY, LIVER BIOPSY, DIVERTING LOOP ILEOSTOMY CREATION, TAKE DOWN OF THE SPLENIC FLEXURE, AND APPENDECTOMY (Abdomen) Diagnosis:     Surgeons: Jimmy Craig MD Provider: Hardeep Carey MD    Anesthesia Type: general with block ASA Status: 3            Anesthesia Type: general with block    Vitals  Vitals Value Taken Time   /97 01/17/24 1449   Temp     Pulse 65 01/17/24 1452   Resp     SpO2 100 % 01/17/24 1452   Vitals shown include unfiled device data.        Post Anesthesia Care and Evaluation    Patient location during evaluation: PACU  Patient participation: complete - patient participated  Level of consciousness: awake  Pain score: 0  Pain management: adequate    Airway patency: patent  Anesthetic complications: No anesthetic complications  PONV Status: none  Cardiovascular status: acceptable and stable  Respiratory status: nasal cannula, unassisted, acceptable and spontaneous ventilation  Hydration status: acceptable

## 2024-01-17 NOTE — ANESTHESIA PREPROCEDURE EVALUATION
Anesthesia Evaluation     Patient summary reviewed and Nursing notes reviewed                Airway   Mallampati: II  TM distance: >3 FB  Neck ROM: full  No difficulty expected  Dental - normal exam   (+) upper dentures and lower dentures    Pulmonary    (+) a smoker Current, COPD,decreased breath sounds, wheezes  Cardiovascular - normal exam    (+) hypertension, hyperlipidemia      Neuro/Psych- negative ROS  GI/Hepatic/Renal/Endo    (+) GERD    Musculoskeletal (-) negative ROS    Abdominal  - normal exam    Bowel sounds: normal.   Substance History   (+) alcohol use (23 drinks/week)     OB/GYN negative ob/gyn ROS         Other                      Anesthesia Plan    ASA 3     general with block     intravenous induction     Anesthetic plan, risks, benefits, and alternatives have been provided, discussed and informed consent has been obtained with: patient.    Plan discussed with CRNA.      CODE STATUS:

## 2024-01-18 LAB
ALBUMIN SERPL-MCNC: 3.6 G/DL (ref 3.5–5.2)
ALBUMIN/GLOB SERPL: 1.9 G/DL
ALP SERPL-CCNC: 72 U/L (ref 39–117)
ALT SERPL W P-5'-P-CCNC: 25 U/L (ref 1–41)
ANION GAP SERPL CALCULATED.3IONS-SCNC: 11 MMOL/L (ref 5–15)
AST SERPL-CCNC: 41 U/L (ref 1–40)
BASOPHILS # BLD AUTO: 0.01 10*3/MM3 (ref 0–0.2)
BASOPHILS NFR BLD AUTO: 0.1 % (ref 0–1.5)
BILIRUB SERPL-MCNC: 0.3 MG/DL (ref 0–1.2)
BUN SERPL-MCNC: 12 MG/DL (ref 8–23)
BUN/CREAT SERPL: 21.1 (ref 7–25)
CALCIUM SPEC-SCNC: 8.5 MG/DL (ref 8.6–10.5)
CHLORIDE SERPL-SCNC: 100 MMOL/L (ref 98–107)
CO2 SERPL-SCNC: 24 MMOL/L (ref 22–29)
CREAT SERPL-MCNC: 0.57 MG/DL (ref 0.76–1.27)
DEPRECATED RDW RBC AUTO: 53.2 FL (ref 37–54)
EGFRCR SERPLBLD CKD-EPI 2021: 111.5 ML/MIN/1.73
EOSINOPHIL # BLD AUTO: 0 10*3/MM3 (ref 0–0.4)
EOSINOPHIL NFR BLD AUTO: 0 % (ref 0.3–6.2)
ERYTHROCYTE [DISTWIDTH] IN BLOOD BY AUTOMATED COUNT: 14.6 % (ref 12.3–15.4)
GLOBULIN UR ELPH-MCNC: 1.9 GM/DL
GLUCOSE SERPL-MCNC: 137 MG/DL (ref 65–99)
HCT VFR BLD AUTO: 34.9 % (ref 37.5–51)
HGB BLD-MCNC: 12.3 G/DL (ref 13–17.7)
IMM GRANULOCYTES # BLD AUTO: 0.05 10*3/MM3 (ref 0–0.05)
IMM GRANULOCYTES NFR BLD AUTO: 0.4 % (ref 0–0.5)
LYMPHOCYTES # BLD AUTO: 0.97 10*3/MM3 (ref 0.7–3.1)
LYMPHOCYTES NFR BLD AUTO: 7.8 % (ref 19.6–45.3)
MAGNESIUM SERPL-MCNC: 1.8 MG/DL (ref 1.6–2.4)
MCH RBC QN AUTO: 35 PG (ref 26.6–33)
MCHC RBC AUTO-ENTMCNC: 35.2 G/DL (ref 31.5–35.7)
MCV RBC AUTO: 99.4 FL (ref 79–97)
MONOCYTES # BLD AUTO: 1.05 10*3/MM3 (ref 0.1–0.9)
MONOCYTES NFR BLD AUTO: 8.5 % (ref 5–12)
NEUTROPHILS NFR BLD AUTO: 10.32 10*3/MM3 (ref 1.7–7)
NEUTROPHILS NFR BLD AUTO: 83.2 % (ref 42.7–76)
NRBC BLD AUTO-RTO: 0 /100 WBC (ref 0–0.2)
PHOSPHATE SERPL-MCNC: 4.3 MG/DL (ref 2.5–4.5)
PLATELET # BLD AUTO: 323 10*3/MM3 (ref 140–450)
PMV BLD AUTO: 9.2 FL (ref 6–12)
POTASSIUM SERPL-SCNC: 4.1 MMOL/L (ref 3.5–5.2)
PROT SERPL-MCNC: 5.5 G/DL (ref 6–8.5)
RBC # BLD AUTO: 3.51 10*6/MM3 (ref 4.14–5.8)
SODIUM SERPL-SCNC: 135 MMOL/L (ref 136–145)
WBC NRBC COR # BLD AUTO: 12.4 10*3/MM3 (ref 3.4–10.8)

## 2024-01-18 PROCEDURE — 25010000002 HEPARIN (PORCINE) PER 1000 UNITS: Performed by: SURGERY

## 2024-01-18 PROCEDURE — 84100 ASSAY OF PHOSPHORUS: CPT | Performed by: SURGERY

## 2024-01-18 PROCEDURE — 80053 COMPREHEN METABOLIC PANEL: CPT | Performed by: SURGERY

## 2024-01-18 PROCEDURE — 25810000003 LACTATED RINGERS PER 1000 ML: Performed by: SURGERY

## 2024-01-18 PROCEDURE — 85025 COMPLETE CBC W/AUTO DIFF WBC: CPT | Performed by: SURGERY

## 2024-01-18 PROCEDURE — 83735 ASSAY OF MAGNESIUM: CPT | Performed by: SURGERY

## 2024-01-18 RX ORDER — PANTOPRAZOLE SODIUM 40 MG/1
40 TABLET, DELAYED RELEASE ORAL
Status: DISCONTINUED | OUTPATIENT
Start: 2024-01-19 | End: 2024-01-20

## 2024-01-18 RX ADMIN — ACETAMINOPHEN 650 MG: 325 TABLET ORAL at 17:06

## 2024-01-18 RX ADMIN — HEPARIN SODIUM 5000 UNITS: 5000 INJECTION INTRAVENOUS; SUBCUTANEOUS at 05:56

## 2024-01-18 RX ADMIN — ALVIMOPAN 12 MG: 12 CAPSULE ORAL at 08:20

## 2024-01-18 RX ADMIN — ACETAMINOPHEN 650 MG: 325 TABLET ORAL at 05:56

## 2024-01-18 RX ADMIN — HEPARIN SODIUM 5000 UNITS: 5000 INJECTION INTRAVENOUS; SUBCUTANEOUS at 14:15

## 2024-01-18 RX ADMIN — ROSUVASTATIN 10 MG: 10 TABLET, FILM COATED ORAL at 21:11

## 2024-01-18 RX ADMIN — ACETAMINOPHEN 650 MG: 325 TABLET ORAL at 11:26

## 2024-01-18 RX ADMIN — SODIUM CHLORIDE, POTASSIUM CHLORIDE, SODIUM LACTATE AND CALCIUM CHLORIDE 50 ML/HR: 600; 310; 30; 20 INJECTION, SOLUTION INTRAVENOUS at 10:16

## 2024-01-18 RX ADMIN — ALVIMOPAN 12 MG: 12 CAPSULE ORAL at 21:11

## 2024-01-18 RX ADMIN — BUPROPION HYDROCHLORIDE 150 MG: 150 TABLET, EXTENDED RELEASE ORAL at 06:02

## 2024-01-18 RX ADMIN — HEPARIN SODIUM 5000 UNITS: 5000 INJECTION INTRAVENOUS; SUBCUTANEOUS at 21:11

## 2024-01-18 RX ADMIN — SENNOSIDES AND DOCUSATE SODIUM 2 TABLET: 8.6; 5 TABLET ORAL at 21:11

## 2024-01-18 NOTE — NURSING NOTE
[facility] visit for Stoma care and assessment    Surgery date: 01/17/24  Surgical Procedures Since Admission:  Procedure(s):  CHOLECYSTECTOMY LAPAROSCOPIC  OPEN SIGMOID COLECTOMY, LIVER BIOPSY, DIVERTING LOOP ILEOSTOMY CREATION, TAKE DOWN OF THE SPLENIC FLEXURE, AND APPENDECTOMY  Surgeon:  Jimmy Craig MD  Status:  1 Day Post-Op  -------------------     Patient sitting up in bed. Patient seen Medical/Surgical Floor. patient's spouse at beside.    Stoma Type: Loop Ileostomy  Diameter/shape: batool  Location: RLQ  Protrusion: Budded  Stoma Characteristics: Edematous, Moist, and Pink  Wes: Yes, sutured to peristomal skin  Tension: Moderate amount of tension  Peristomal skin: BATOOL =unable to assess  Character of output: Brown and Liquid, scant  Emptying frequency per day: per nursing flowsheet  Current pouching system: SolvAxis, Open Home Pro31  Accessory products, appliance placed: none   Goal wear time:1-2 days  Last appliance change: 1/17 in or  Image:     Surgical Incision: Midline abdominal incision  Degree of approximation: batool  Approximating Devices: batool  Drainage Characteristics: serosanguineous-stained on  Method of Management: Gauze border dressing    Drain(s) type: DELROY drain  Effluent characteristics: serosanguineous    Education provided:   How to open/close the drainable end of the appliance, How to empty the appliance, Indications for changing the appliance, and Ambulation promoted  Planning for change lesson tomorrow and more detailed education session.  Asked patient and family member to review generic ostomy tip sheet between today and tomorrow.  Will ask dietician to see patient at a later date as patient states he will be in house for 5 to 6 days  Waiting for wes removal early next week before discharge, will continue ostomy education next week as well.    [facility] Nurse will continue to follow for ostomy education. Please contact #[phone] with questions or if ostomy leaks occur.

## 2024-01-18 NOTE — PROGRESS NOTES
"Patient Name:  Roger Bhat  YOB: 1960  [id number]    Surgery Progress Note    Date of visit: 1/18/2024      Subjective: No acute events.  Pain is controlled with PCA.  Ambulated in the hallway 1 time.  Denies nausea or vomiting.  1400 mL of urine output in the Crouch.  DELROY with 155 mL of output.  Has had some bilious output in the ileostomy.          Objective:     /76 (BP Location: Right arm, Patient Position: Lying)   Pulse 109   Temp 99.5 °F (37.5 °C) (Temporal)   Resp 20   Ht 175.3 cm (69\")   Wt 66.3 kg (146 lb 0.9 oz)   SpO2 95%   BMI 21.57 kg/m²     Intake/Output Summary (Last 24 hours) at 1/18/2024 0836  Last data filed at 1/18/2024 0600  Gross per 24 hour   Intake 2149.2 ml   Output 1575 ml   Net 574.2 ml       GEN:   Awake, alert, in no acute distress, resting comfortably in bed   CV:   Regular rate and rhythm  L:  Symmetric expansion, not labored on oxygen by nasal cannula  Abd:  Soft, not obviously distended, appropriately tender palpation along incision, midline incision with clean dressing in place, ileostomy on the right side is pink and patent with a small amount of bilious output in the bag, DELROY drain in the right lower quadrant is sanguinous  Ext:  No cyanosis, clubbing, or edema    Recent labs that are back at this time have been reviewed.           Assessment/ Plan:    Mr. Bhat is a 63-year-old gentleman who is admitted following operative intervention for gallbladder and sigmoid colon mass on January 17    # Sigmoid colon mass, gallbladder mass  -Postop day 1 following lap vladimir, open sigmoid colectomy with diverting loop ileostomy  -Vitals are stable  -Labs are appropriate  -Decrease IV fluid rate to 50 mL/h.  Continue Crouch catheter today and plan to remove tomorrow  -Continue PCA  -Subcu heparin for DVT prophylaxis  -Clamp NG today.  Allow ice chips and sips of clear liquids  -Ambulate.  Mobilize      Jimmy Craig MD  1/18/2024  08:36 EST      "

## 2024-01-18 NOTE — PLAN OF CARE
Goal Outcome Evaluation:                                            VSS on room air. NG to LWS with dark green bile. DELROY x1 with sersosang output. Ileostomy draining brown/green liquid. Sites CDI. PCA in place. NPO.

## 2024-01-19 ENCOUNTER — PATIENT OUTREACH (OUTPATIENT)
Dept: ONCOLOGY | Facility: CLINIC | Age: 64
End: 2024-01-19
Payer: COMMERCIAL

## 2024-01-19 LAB
ANION GAP SERPL CALCULATED.3IONS-SCNC: 10 MMOL/L (ref 5–15)
BUN SERPL-MCNC: 9 MG/DL (ref 8–23)
BUN/CREAT SERPL: 18.8 (ref 7–25)
CALCIUM SPEC-SCNC: 8.3 MG/DL (ref 8.6–10.5)
CHLORIDE SERPL-SCNC: 100 MMOL/L (ref 98–107)
CO2 SERPL-SCNC: 24 MMOL/L (ref 22–29)
CREAT SERPL-MCNC: 0.48 MG/DL (ref 0.76–1.27)
DEPRECATED RDW RBC AUTO: 55.8 FL (ref 37–54)
EGFRCR SERPLBLD CKD-EPI 2021: 117.5 ML/MIN/1.73
ERYTHROCYTE [DISTWIDTH] IN BLOOD BY AUTOMATED COUNT: 14.6 % (ref 12.3–15.4)
GLUCOSE SERPL-MCNC: 80 MG/DL (ref 65–99)
HCT VFR BLD AUTO: 32.4 % (ref 37.5–51)
HGB BLD-MCNC: 11.2 G/DL (ref 13–17.7)
MAGNESIUM SERPL-MCNC: 1.9 MG/DL (ref 1.6–2.4)
MCH RBC QN AUTO: 35.4 PG (ref 26.6–33)
MCHC RBC AUTO-ENTMCNC: 34.6 G/DL (ref 31.5–35.7)
MCV RBC AUTO: 102.5 FL (ref 79–97)
PHOSPHATE SERPL-MCNC: 2.6 MG/DL (ref 2.5–4.5)
PLATELET # BLD AUTO: 274 10*3/MM3 (ref 140–450)
PMV BLD AUTO: 9.5 FL (ref 6–12)
POTASSIUM SERPL-SCNC: 4.2 MMOL/L (ref 3.5–5.2)
RBC # BLD AUTO: 3.16 10*6/MM3 (ref 4.14–5.8)
SODIUM SERPL-SCNC: 134 MMOL/L (ref 136–145)
WBC NRBC COR # BLD AUTO: 9.97 10*3/MM3 (ref 3.4–10.8)

## 2024-01-19 PROCEDURE — 85027 COMPLETE CBC AUTOMATED: CPT | Performed by: SURGERY

## 2024-01-19 PROCEDURE — 0 HYDROMORPHONE HCL PF 50 MG/5ML SOLUTION: Performed by: SURGERY

## 2024-01-19 PROCEDURE — 83735 ASSAY OF MAGNESIUM: CPT | Performed by: SURGERY

## 2024-01-19 PROCEDURE — 25810000003 SODIUM CHLORIDE 0.9 % SOLUTION: Performed by: SURGERY

## 2024-01-19 PROCEDURE — 25010000002 HEPARIN (PORCINE) PER 1000 UNITS: Performed by: SURGERY

## 2024-01-19 PROCEDURE — 84100 ASSAY OF PHOSPHORUS: CPT | Performed by: SURGERY

## 2024-01-19 PROCEDURE — 80048 BASIC METABOLIC PNL TOTAL CA: CPT | Performed by: SURGERY

## 2024-01-19 RX ORDER — HYDROMORPHONE HCL/0.9% NACL/PF 10 MG/50ML
PATIENT CONTROLLED ANALGESIA SYRINGE INTRAVENOUS CONTINUOUS
Status: DISCONTINUED | OUTPATIENT
Start: 2024-01-19 | End: 2024-01-21 | Stop reason: SDUPTHER

## 2024-01-19 RX ORDER — HEPARIN SODIUM 5000 [USP'U]/ML
5000 INJECTION, SOLUTION INTRAVENOUS; SUBCUTANEOUS EVERY 8 HOURS SCHEDULED
Status: DISCONTINUED | OUTPATIENT
Start: 2024-01-19 | End: 2024-02-15 | Stop reason: HOSPADM

## 2024-01-19 RX ADMIN — BUPROPION HYDROCHLORIDE 150 MG: 150 TABLET, EXTENDED RELEASE ORAL at 06:09

## 2024-01-19 RX ADMIN — ACETAMINOPHEN 650 MG: 325 TABLET ORAL at 12:22

## 2024-01-19 RX ADMIN — ALVIMOPAN 12 MG: 12 CAPSULE ORAL at 21:56

## 2024-01-19 RX ADMIN — Medication: at 01:17

## 2024-01-19 RX ADMIN — ROSUVASTATIN 10 MG: 10 TABLET, FILM COATED ORAL at 21:56

## 2024-01-19 RX ADMIN — PANTOPRAZOLE SODIUM 40 MG: 40 TABLET, DELAYED RELEASE ORAL at 06:09

## 2024-01-19 RX ADMIN — ACETAMINOPHEN 650 MG: 325 TABLET ORAL at 17:19

## 2024-01-19 RX ADMIN — HEPARIN SODIUM 5000 UNITS: 5000 INJECTION INTRAVENOUS; SUBCUTANEOUS at 21:55

## 2024-01-19 RX ADMIN — ACETAMINOPHEN 650 MG: 325 TABLET ORAL at 00:01

## 2024-01-19 RX ADMIN — HEPARIN SODIUM 5000 UNITS: 5000 INJECTION INTRAVENOUS; SUBCUTANEOUS at 06:09

## 2024-01-19 RX ADMIN — HEPARIN SODIUM 5000 UNITS: 5000 INJECTION INTRAVENOUS; SUBCUTANEOUS at 13:39

## 2024-01-19 RX ADMIN — ALVIMOPAN 12 MG: 12 CAPSULE ORAL at 10:21

## 2024-01-19 RX ADMIN — ACETAMINOPHEN 650 MG: 325 TABLET ORAL at 06:09

## 2024-01-19 NOTE — PROGRESS NOTES
"Patient Name:  Roger Bhat  YOB: 1960  [id number]    Surgery Progress Note    Date of visit: 1/19/2024      Subjective: No acute events. Pain better controlled but is also utilizing PCA more often. Ambulating well. Denies nausea or vomiting. Tolerated small amounts of clears yesterday without difficulty and NGT was removed in the afternoon. Urine output 1130 mL in the hillman. DELROY 140 mL. 75 mL of output from the ileostomy recorded         Objective:    /87 (BP Location: Right arm, Patient Position: Lying)   Pulse 87   Temp 98.2 °F (36.8 °C) (Temporal)   Resp 17   Ht 175.3 cm (69\")   Wt 66.3 kg (146 lb 0.9 oz)   SpO2 93%   BMI 21.57 kg/m²     CV:  Regular rate and rhythm   L:  Clear to auscultation bilaterally. Not labored on O2 by NC   ABD:  Soft, appropriately tender along midline incision, incision is clean, dry and intact without erythema, ileostomy is pink and viable on the right side with some small bilious drainage in the bag and stoma bridge in place, drain in the RLQ is serosang  EXT:  No cyanosis, clubbing or edema        Assessment/ Plan:     Mr. Bhat is a 63-year-old gentleman who is admitted following operative intervention for gallbladder and sigmoid colon mass on January 17     # Sigmoid colon mass, gallbladder mass  -Postop day 2 following lap vladimir, open sigmoid colectomy with diverting loop ileostomy  -Vitals are stable  -Labs are without acute findings. WBC 9, Hb 11.2, Cr 0.48  -Discontinue hillman today  -Start full liquid diet  -Decrease IV fluid rate  -Continue PCA for now  -Continue stoma bridge until tomorrow postop day 3  -Ambulate.  Mobilize       Jimmy Craig MD  1/19/2024  08:09 EST    "

## 2024-01-19 NOTE — NURSING NOTE
[facility] visit for Stoma care and assessment    Surgery date: 01/17/24  Surgical Procedures Since Admission:  Procedure(s):  CHOLECYSTECTOMY LAPAROSCOPIC  OPEN SIGMOID COLECTOMY, LIVER BIOPSY, DIVERTING LOOP ILEOSTOMY CREATION, TAKE DOWN OF THE SPLENIC FLEXURE, AND APPENDECTOMY  Surgeon:  Jimmy Craig MD  Status:  2 Days Post-Op  -------------------     Patient sitting up in bed. Patient seen Medical/Surgical Floor. patient's spouse at beside.    Stoma Type: Loop Ileostomy  Diameter/shape: batool  Location: RLQ  Protrusion: Budded  Stoma Characteristics: Edematous, Moist, and Pink  Wes: Yes, not sutured to peristomal skin  Tension: None  Peristomal skin: Clean and Intact  Character of output: Brown and Liquid, scant  Emptying frequency per day: per nursing flowsheet  Current pouching system: [employer], ShoorK  Accessory products, appliance placed: Coloplast yellow flex adhesive lock flat --to be able to maneuver around stoma bridge, minimal amounts of paste used where slit was cut in barrier.  Goal wear time:3-4 days  Last appliance change: 1/19  Image:     Surgical Incision: Midline abdominal incision  Degree of approximation: 100  Approximating Devices: staples  Drainage Characteristics:none  Method of Management: open    Drain(s) type: DELROY drain  Effluent characteristics: serosanguineous    Education provided:   Ileostomy diet reviewed, Oral hydration promoted - specifically oral electrolyte replacement (OER), Dietician consulted for additional dietary recommendations, Showering/Bathing, How to open/close the drainable end of the appliance, How to empty the appliance, Indications for changing the appliance, Stool characteristics for the type of stoma created, Exercise, Ambulation promoted, How to measure the stoma and the importance of cutting the appliance to limit amout of peristomal skin exposed. , Lesson #1 - appliance change process completed by [facility] RN., Accessory products reviewed, When to seek medical  attention, Use of Imodium and other non-pharmacological interventions to bulk stool, How to order supplies from ostomy supply company, Ostomy catalou marked with specific products needed for ostomy type, and Discharge supplies provided  Patient is discharged over the weekend, please have Dr. Craig to remove stoma lana which can be done without removing bag.  If patient is discharged over the weekend family requested follow-up visit with outpatient ostomy nurse, scheduled 10 AM Tuesday just in case.  Will still visit patient if he is in house Monday.  Marked catalog for ordering which patient and family have been instructed to do Monday if discharged over the weekend.  Marked 2-1/4 flat 2 piece Durand with [employer] barrier ring.    [facility] Nurse will continue to follow for ostomy education. Please contact #[phone] with questions or if ostomy leaks occur.

## 2024-01-19 NOTE — CASE MANAGEMENT/SOCIAL WORK
Discharge Planning Assessment  [facility]     Patient Name: Roger Bhat  MRN: [id number]  Today's Date: 1/19/2024    Admit Date: 1/17/2024    Plan: Home with wife Ambar [phone] transporting.   Discharge Needs Assessment       Row Name 01/19/24 1604       Living Environment    People in Home spouse    Name(s) of People in Home stephen Villalta [phone]    Primary Care Provided by self    Provides Primary Care For no one    Family Caregiver if Needed spouse    Family Caregiver Names stephen Villalta [phone]    Quality of Family Relationships involved;helpful    Able to Return to Prior Arrangements yes       Transition Planning    Transportation Anticipated family or friend will provide  stephen Villalta [phone]       Discharge Needs Assessment    Readmission Within the Last 30 Days no previous admission in last 30 days    Equipment Currently Used at Home none                   Discharge Plan       Row Name 01/19/24 1608       Plan    Plan Home with wife Ambar [phone] transporting.    Plan Comments 'er met with patient and wife at bedside. Lives in [facility] with wife. Patient is independent with ADL's, no DME or HH.  Patients PCP is Richa Mary DO. Patient has [place] [employer] insurance. Plan is home with wife  Ambar [phone] transporting.    Final Discharge Disposition Code 01 - home or self-care                  Continued Care and Services - Admitted Since 1/17/2024    Coordination has not been started for this encounter.       Expected Discharge Date and Time       Expected Discharge Date Expected Discharge Time    Jan 22, 2024            Demographic Summary       Row Name 01/19/24 1604       General Information    Admission Type inpatient    Referral Source admission list    Reason for Consult discharge planning                   Functional Status       Row Name 01/19/24 1604       Functional Status    Usual Activity Tolerance good    Current Activity Tolerance good        Functional Status, IADL    Medications independent    Meal Preparation independent    Housekeeping independent    Laundry independent    Shopping independent    IADL Comments independent with ADL's, no DME or HH       Employment/    Employment/ Comments [employer] insurance                   Psychosocial    No documentation.                  Abuse/Neglect    No documentation.                  Legal    No documentation.                  Substance Abuse    No documentation.                  Patient Forms    No documentation.                     DEMETRIS Parmar (Kay)

## 2024-01-19 NOTE — PLAN OF CARE
Goal Outcome Evaluation:                                            VSS. Ileostomy with dark green liquid drainage. DELROY x1 to bulb suction. Pain controlled with PCA. Ambulated halls 2x tonight. Crouch in place. Tolerating PO.

## 2024-01-19 NOTE — PLAN OF CARE
Goal Outcome Evaluation:  Plan of Care Reviewed With: patient        Progress: improving  Outcome Evaluation: vss, PCA continues, good pain response, up ambulating in halls and tolerating well,

## 2024-01-19 NOTE — SIGNIFICANT NOTE
Patient states he is doing well post-op and he is using PCA pump for pain management. He has been ambulating the halls several times a day, no reports of nausea, and tolerating the advancement of diet. The ostomy nurse came to his room and delivered supplies, explained ostomy care, and patient [facility]. Patient and spouse to call with any additional questions or concerns.

## 2024-01-20 LAB
ANION GAP SERPL CALCULATED.3IONS-SCNC: 16 MMOL/L (ref 5–15)
BUN SERPL-MCNC: 11 MG/DL (ref 8–23)
BUN/CREAT SERPL: 18.6 (ref 7–25)
CALCIUM SPEC-SCNC: 9.1 MG/DL (ref 8.6–10.5)
CHLORIDE SERPL-SCNC: 93 MMOL/L (ref 98–107)
CO2 SERPL-SCNC: 24 MMOL/L (ref 22–29)
CREAT SERPL-MCNC: 0.59 MG/DL (ref 0.76–1.27)
DEPRECATED RDW RBC AUTO: 51.4 FL (ref 37–54)
EGFRCR SERPLBLD CKD-EPI 2021: 110.4 ML/MIN/1.73
ERYTHROCYTE [DISTWIDTH] IN BLOOD BY AUTOMATED COUNT: 13.8 % (ref 12.3–15.4)
GLUCOSE SERPL-MCNC: 91 MG/DL (ref 65–99)
HCT VFR BLD AUTO: 39.4 % (ref 37.5–51)
HGB BLD-MCNC: 13.7 G/DL (ref 13–17.7)
MAGNESIUM SERPL-MCNC: 2.3 MG/DL (ref 1.6–2.4)
MCH RBC QN AUTO: 34.9 PG (ref 26.6–33)
MCHC RBC AUTO-ENTMCNC: 34.8 G/DL (ref 31.5–35.7)
MCV RBC AUTO: 100.3 FL (ref 79–97)
PHOSPHATE SERPL-MCNC: 4.3 MG/DL (ref 2.5–4.5)
PLATELET # BLD AUTO: 329 10*3/MM3 (ref 140–450)
PMV BLD AUTO: 9.1 FL (ref 6–12)
POTASSIUM SERPL-SCNC: 4.1 MMOL/L (ref 3.5–5.2)
RBC # BLD AUTO: 3.93 10*6/MM3 (ref 4.14–5.8)
SODIUM SERPL-SCNC: 133 MMOL/L (ref 136–145)
WBC NRBC COR # BLD AUTO: 13.05 10*3/MM3 (ref 3.4–10.8)

## 2024-01-20 PROCEDURE — 25010000002 HYDROMORPHONE HCL PF 50 MG/5ML SOLUTION: Performed by: SURGERY

## 2024-01-20 PROCEDURE — 25010000002 METOCLOPRAMIDE PER 10 MG: Performed by: SURGERY

## 2024-01-20 PROCEDURE — 85027 COMPLETE CBC AUTOMATED: CPT | Performed by: SURGERY

## 2024-01-20 PROCEDURE — 94799 UNLISTED PULMONARY SVC/PX: CPT

## 2024-01-20 PROCEDURE — 80048 BASIC METABOLIC PNL TOTAL CA: CPT | Performed by: SURGERY

## 2024-01-20 PROCEDURE — 25010000002 HEPARIN (PORCINE) PER 1000 UNITS: Performed by: SURGERY

## 2024-01-20 PROCEDURE — 25810000003 SODIUM CHLORIDE 0.9 % SOLUTION: Performed by: SURGERY

## 2024-01-20 PROCEDURE — 83735 ASSAY OF MAGNESIUM: CPT | Performed by: SURGERY

## 2024-01-20 PROCEDURE — 84100 ASSAY OF PHOSPHORUS: CPT | Performed by: SURGERY

## 2024-01-20 RX ORDER — PANTOPRAZOLE SODIUM 40 MG/10ML
40 INJECTION, POWDER, LYOPHILIZED, FOR SOLUTION INTRAVENOUS
Status: DISCONTINUED | OUTPATIENT
Start: 2024-01-21 | End: 2024-01-23

## 2024-01-20 RX ORDER — SENNOSIDES A AND B 8.6 MG/1
1 TABLET, FILM COATED ORAL NIGHTLY
Status: DISCONTINUED | OUTPATIENT
Start: 2024-01-20 | End: 2024-02-05

## 2024-01-20 RX ORDER — METOCLOPRAMIDE HYDROCHLORIDE 5 MG/ML
10 INJECTION INTRAMUSCULAR; INTRAVENOUS EVERY 6 HOURS
Status: DISCONTINUED | OUTPATIENT
Start: 2024-01-20 | End: 2024-01-23

## 2024-01-20 RX ORDER — BUDESONIDE AND FORMOTEROL FUMARATE DIHYDRATE 160; 4.5 UG/1; UG/1
2 AEROSOL RESPIRATORY (INHALATION)
Status: DISCONTINUED | OUTPATIENT
Start: 2024-01-20 | End: 2024-02-15 | Stop reason: HOSPADM

## 2024-01-20 RX ORDER — ACETAMINOPHEN 325 MG/1
650 TABLET ORAL EVERY 6 HOURS PRN
Status: DISCONTINUED | OUTPATIENT
Start: 2024-01-20 | End: 2024-02-15 | Stop reason: HOSPADM

## 2024-01-20 RX ADMIN — ROSUVASTATIN 10 MG: 10 TABLET, FILM COATED ORAL at 21:26

## 2024-01-20 RX ADMIN — HEPARIN SODIUM 5000 UNITS: 5000 INJECTION INTRAVENOUS; SUBCUTANEOUS at 06:03

## 2024-01-20 RX ADMIN — ACETAMINOPHEN 650 MG: 325 TABLET ORAL at 00:22

## 2024-01-20 RX ADMIN — METOCLOPRAMIDE 10 MG: 5 INJECTION, SOLUTION INTRAMUSCULAR; INTRAVENOUS at 21:26

## 2024-01-20 RX ADMIN — ALBUTEROL SULFATE 2 PUFF: 90 AEROSOL, METERED RESPIRATORY (INHALATION) at 00:36

## 2024-01-20 RX ADMIN — HEPARIN SODIUM 5000 UNITS: 5000 INJECTION INTRAVENOUS; SUBCUTANEOUS at 15:23

## 2024-01-20 RX ADMIN — ALVIMOPAN 12 MG: 12 CAPSULE ORAL at 21:26

## 2024-01-20 RX ADMIN — METOCLOPRAMIDE 10 MG: 5 INJECTION, SOLUTION INTRAMUSCULAR; INTRAVENOUS at 09:43

## 2024-01-20 RX ADMIN — BUDESONIDE AND FORMOTEROL FUMARATE DIHYDRATE 2 PUFF: 160; 4.5 AEROSOL RESPIRATORY (INHALATION) at 19:38

## 2024-01-20 RX ADMIN — ALVIMOPAN 12 MG: 12 CAPSULE ORAL at 09:43

## 2024-01-20 RX ADMIN — HEPARIN SODIUM 5000 UNITS: 5000 INJECTION INTRAVENOUS; SUBCUTANEOUS at 21:26

## 2024-01-20 RX ADMIN — METOCLOPRAMIDE 10 MG: 5 INJECTION, SOLUTION INTRAMUSCULAR; INTRAVENOUS at 15:23

## 2024-01-20 RX ADMIN — ACETAMINOPHEN 650 MG: 325 TABLET ORAL at 06:03

## 2024-01-20 RX ADMIN — HYDROMORPHONE HYDROCHLORIDE: 10 INJECTION, SOLUTION INTRAMUSCULAR; INTRAVENOUS; SUBCUTANEOUS at 19:58

## 2024-01-20 RX ADMIN — SENNOSIDES 1 TABLET: 8.6 TABLET, FILM COATED ORAL at 21:26

## 2024-01-20 RX ADMIN — BUPROPION HYDROCHLORIDE 150 MG: 150 TABLET, EXTENDED RELEASE ORAL at 06:03

## 2024-01-20 RX ADMIN — PANTOPRAZOLE SODIUM 40 MG: 40 TABLET, DELAYED RELEASE ORAL at 06:03

## 2024-01-20 NOTE — PROGRESS NOTES
"Patient Name:  Roger Bhat  YOB: 1960  [id number]    Surgery Progress Note    Date of visit: 1/20/2024      Subjective: No acute events. He reports increased indigestion this morning and after ambulating he had a small volume emesis. He reports resolution of symptoms since then. He denies nausea other than immediately at the time that he had emesis. He does feel somewhat more bloated. Denies fevers or chills. Pain controlled with the PCA. Ambulated X1 yesterday. Voided after hillman removed         Objective:     /87 (BP Location: Right arm, Patient Position: Lying)   Pulse 109   Temp 98.2 °F (36.8 °C) (Temporal)   Resp 16   Ht 175.3 cm (69\")   Wt 66.3 kg (146 lb 0.9 oz)   SpO2 91%   BMI 21.57 kg/m²     Intake/Output Summary (Last 24 hours) at 1/20/2024 0801  Last data filed at 1/20/2024 0758  Gross per 24 hour   Intake 1.3 ml   Output 1990 ml   Net -1988.7 ml       GEN:   Awake, alert, sitting up in bed, no obvious distress  CV:   Regular rate and rhythm  L:  Symmetric expansion, not labored on oxygen by NC, there is some faint wheezing  Abd:  Soft, appropriately tender along incision. Incision is clean, dry, and intact. Ileostomy on the right side is pink and patent with bilious thin fluid in the bag, DELROY in the RLQ is serosang   Ext:  No cyanosis, clubbing, or edema    Recent labs that are back at this time have been reviewed.           Assessment/ Plan:    Mr. Bhat is a 63-year-old gentleman who is admitted following operative intervention for gallbladder and sigmoid colon mass on January 17     #Sigmoid colon mass, gallbladder mass  -Postop day 3 following lap vladimir, open sigmoid colectomy with diverting loop ileostomy  -WBC up slightly at 13 but so is Hb and he has been afebrile  -Increase IV fluid rate to 75 mL/hr  -Given nausea and episode of emesis, make NPO with ice chips today. Will add reglan. May require NGT if recurrent nausea & vomiting   -Continue PCA for " now  -Continue stoma bridge for now. Will remove prior to discharge  -Some mild wheezing on exam this morning. Will restart his home Symbicort  -Ambulate. IS      Jimmy Craig MD  1/20/2024  08:01 EST

## 2024-01-20 NOTE — PLAN OF CARE
Problem: Adult Inpatient Plan of Care  Goal: Absence of Hospital-Acquired Illness or Injury  Intervention: Prevent Infection  Recent Flowsheet Documentation  Taken 1/20/2024 0200 by Delfina Gonzalez RN  Infection Prevention: rest/sleep promoted  Taken 1/20/2024 0000 by Delfina Gonzalez RN  Infection Prevention: rest/sleep promoted  Taken 1/19/2024 2255 by Delfina Gonzalez RN  Infection Prevention: rest/sleep promoted  Taken 1/19/2024 2155 by Delfina Gonzalez RN  Infection Prevention: rest/sleep promoted  Taken 1/19/2024 2000 by Delfina Gonzalez RN  Infection Prevention: rest/sleep promoted     Problem: Adult Inpatient Plan of Care  Goal: Optimal Comfort and Wellbeing  Intervention: Provide Person-Centered Care  Recent Flowsheet Documentation  Taken 1/20/2024 0000 by Delfina Gonzalez RN  Trust Relationship/Rapport:   care explained   choices provided   thoughts/feelings acknowledged  Taken 1/19/2024 2255 by Delfina Gonzalez RN  Trust Relationship/Rapport:   care explained   choices provided   reassurance provided  Taken 1/19/2024 2000 by Delfina Gonzalez RN  Trust Relationship/Rapport:   care explained   choices provided   reassurance provided   thoughts/feelings acknowledged   Goal Outcome Evaluation:  Pt is on his 3rd day of admission with VSS and pain controled with PCA. Ostomy with 175ml green liquid stool, 85 serosanguinous from DELROY, and UOP. Plan is to DC home with wife when medically ready. Will CTM and provide care.

## 2024-01-21 LAB
ANION GAP SERPL CALCULATED.3IONS-SCNC: 15 MMOL/L (ref 5–15)
BUN SERPL-MCNC: 28 MG/DL (ref 8–23)
BUN/CREAT SERPL: 41.8 (ref 7–25)
CALCIUM SPEC-SCNC: 8.8 MG/DL (ref 8.6–10.5)
CHLORIDE SERPL-SCNC: 99 MMOL/L (ref 98–107)
CO2 SERPL-SCNC: 23 MMOL/L (ref 22–29)
CREAT SERPL-MCNC: 0.67 MG/DL (ref 0.76–1.27)
DEPRECATED RDW RBC AUTO: 51.2 FL (ref 37–54)
EGFRCR SERPLBLD CKD-EPI 2021: 106.2 ML/MIN/1.73
ERYTHROCYTE [DISTWIDTH] IN BLOOD BY AUTOMATED COUNT: 13.8 % (ref 12.3–15.4)
GLUCOSE SERPL-MCNC: 126 MG/DL (ref 65–99)
HCT VFR BLD AUTO: 39.2 % (ref 37.5–51)
HGB BLD-MCNC: 13.5 G/DL (ref 13–17.7)
MAGNESIUM SERPL-MCNC: 2.1 MG/DL (ref 1.6–2.4)
MCH RBC QN AUTO: 34.7 PG (ref 26.6–33)
MCHC RBC AUTO-ENTMCNC: 34.4 G/DL (ref 31.5–35.7)
MCV RBC AUTO: 100.8 FL (ref 79–97)
PHOSPHATE SERPL-MCNC: 4.3 MG/DL (ref 2.5–4.5)
PLATELET # BLD AUTO: 385 10*3/MM3 (ref 140–450)
PMV BLD AUTO: 9.6 FL (ref 6–12)
POTASSIUM SERPL-SCNC: 4.6 MMOL/L (ref 3.5–5.2)
RBC # BLD AUTO: 3.89 10*6/MM3 (ref 4.14–5.8)
SODIUM SERPL-SCNC: 137 MMOL/L (ref 136–145)
WBC NRBC COR # BLD AUTO: 10.3 10*3/MM3 (ref 3.4–10.8)

## 2024-01-21 PROCEDURE — 84100 ASSAY OF PHOSPHORUS: CPT | Performed by: SURGERY

## 2024-01-21 PROCEDURE — 25010000002 ONDANSETRON PER 1 MG: Performed by: SURGERY

## 2024-01-21 PROCEDURE — 94799 UNLISTED PULMONARY SVC/PX: CPT

## 2024-01-21 PROCEDURE — 25010000002 DIPHENHYDRAMINE PER 50 MG: Performed by: SURGERY

## 2024-01-21 PROCEDURE — 25010000002 HEPARIN (PORCINE) PER 1000 UNITS: Performed by: SURGERY

## 2024-01-21 PROCEDURE — 25010000002 METOCLOPRAMIDE PER 10 MG: Performed by: SURGERY

## 2024-01-21 PROCEDURE — 85027 COMPLETE CBC AUTOMATED: CPT | Performed by: SURGERY

## 2024-01-21 PROCEDURE — 83735 ASSAY OF MAGNESIUM: CPT | Performed by: SURGERY

## 2024-01-21 PROCEDURE — 80048 BASIC METABOLIC PNL TOTAL CA: CPT | Performed by: SURGERY

## 2024-01-21 RX ORDER — HYDROMORPHONE HCL/0.9% NACL/PF 10 MG/50ML
PATIENT CONTROLLED ANALGESIA SYRINGE INTRAVENOUS CONTINUOUS
Status: DISCONTINUED | OUTPATIENT
Start: 2024-01-21 | End: 2024-01-22

## 2024-01-21 RX ORDER — DEXTROSE MONOHYDRATE, SODIUM CHLORIDE, AND POTASSIUM CHLORIDE 50; 1.49; 4.5 G/1000ML; G/1000ML; G/1000ML
100 INJECTION, SOLUTION INTRAVENOUS CONTINUOUS
Status: DISCONTINUED | OUTPATIENT
Start: 2024-01-21 | End: 2024-01-24

## 2024-01-21 RX ADMIN — DIPHENHYDRAMINE HYDROCHLORIDE 25 MG: 50 INJECTION INTRAMUSCULAR; INTRAVENOUS at 20:46

## 2024-01-21 RX ADMIN — ONDANSETRON 4 MG: 2 INJECTION INTRAMUSCULAR; INTRAVENOUS at 14:31

## 2024-01-21 RX ADMIN — POTASSIUM CHLORIDE, DEXTROSE MONOHYDRATE AND SODIUM CHLORIDE 125 ML/HR: 150; 5; 450 INJECTION, SOLUTION INTRAVENOUS at 16:34

## 2024-01-21 RX ADMIN — PANTOPRAZOLE SODIUM 40 MG: 40 INJECTION, POWDER, FOR SOLUTION INTRAVENOUS at 06:32

## 2024-01-21 RX ADMIN — HEPARIN SODIUM 5000 UNITS: 5000 INJECTION INTRAVENOUS; SUBCUTANEOUS at 06:32

## 2024-01-21 RX ADMIN — HEPARIN SODIUM 5000 UNITS: 5000 INJECTION INTRAVENOUS; SUBCUTANEOUS at 20:50

## 2024-01-21 RX ADMIN — METOCLOPRAMIDE 10 MG: 5 INJECTION, SOLUTION INTRAMUSCULAR; INTRAVENOUS at 08:33

## 2024-01-21 RX ADMIN — METOCLOPRAMIDE 10 MG: 5 INJECTION, SOLUTION INTRAMUSCULAR; INTRAVENOUS at 14:31

## 2024-01-21 RX ADMIN — SENNOSIDES 1 TABLET: 8.6 TABLET, FILM COATED ORAL at 20:45

## 2024-01-21 RX ADMIN — ONDANSETRON 4 MG: 2 INJECTION INTRAMUSCULAR; INTRAVENOUS at 20:49

## 2024-01-21 RX ADMIN — ONDANSETRON 4 MG: 2 INJECTION INTRAMUSCULAR; INTRAVENOUS at 08:33

## 2024-01-21 RX ADMIN — BUDESONIDE AND FORMOTEROL FUMARATE DIHYDRATE 2 PUFF: 160; 4.5 AEROSOL RESPIRATORY (INHALATION) at 12:03

## 2024-01-21 RX ADMIN — BUPROPION HYDROCHLORIDE 150 MG: 150 TABLET, EXTENDED RELEASE ORAL at 06:32

## 2024-01-21 RX ADMIN — ALVIMOPAN 12 MG: 12 CAPSULE ORAL at 20:45

## 2024-01-21 RX ADMIN — POTASSIUM CHLORIDE, DEXTROSE MONOHYDRATE AND SODIUM CHLORIDE 125 ML/HR: 150; 5; 450 INJECTION, SOLUTION INTRAVENOUS at 08:33

## 2024-01-21 RX ADMIN — METOCLOPRAMIDE 10 MG: 5 INJECTION, SOLUTION INTRAMUSCULAR; INTRAVENOUS at 20:48

## 2024-01-21 RX ADMIN — ALVIMOPAN 12 MG: 12 CAPSULE ORAL at 08:33

## 2024-01-21 RX ADMIN — ROSUVASTATIN 10 MG: 10 TABLET, FILM COATED ORAL at 20:45

## 2024-01-21 RX ADMIN — BUDESONIDE AND FORMOTEROL FUMARATE DIHYDRATE 2 PUFF: 160; 4.5 AEROSOL RESPIRATORY (INHALATION) at 20:08

## 2024-01-21 RX ADMIN — HEPARIN SODIUM 5000 UNITS: 5000 INJECTION INTRAVENOUS; SUBCUTANEOUS at 14:31

## 2024-01-21 RX ADMIN — METOCLOPRAMIDE 10 MG: 5 INJECTION, SOLUTION INTRAMUSCULAR; INTRAVENOUS at 02:51

## 2024-01-21 NOTE — PROGRESS NOTES
Nutrition Services    Patient Name:  Roger Bhat  YOB: 1960  MRN: [id number]  Admit Date:  1/17/2024    Pt identified NPO/Clear Liquid Diet >72 hrs. Advance diet as medically appropriate. RD to monitor for diet advance. Please consult RD if nutrition support indicated.        Electronically signed by:  Chelsy Gustafson RD  01/21/24 09:06 EST

## 2024-01-21 NOTE — PLAN OF CARE
Problem: Adult Inpatient Plan of Care  Goal: Optimal Comfort and Wellbeing  Intervention: Provide Person-Centered Care  Recent Flowsheet Documentation  Taken 1/20/2024 2126 by Delfina Gonzalez, RN  Trust Relationship/Rapport:   care explained   choices provided   reassurance provided   thoughts/feelings acknowledged  Taken 1/20/2024 2000 by Delfina Gonzalez RN  Trust Relationship/Rapport:   care explained   choices provided   reassurance provided   thoughts/feelings acknowledged   Goal Outcome Evaluation:   Pt is A&OX4 with VSS and no c/o pain but some indigestion and spit up of green bile appearing sputum. Standing at bedside he sounds congested but lungs are clear upon auscultation. 250 ml serosanguinous fluid form DELROY. LR@75ml. Decreased UOP. ABD is still mildly distended with bowel sounds of tinkling in all 4 quadrants. Will CTM and provide care.

## 2024-01-21 NOTE — PROGRESS NOTES
"Patient Name:  Roger Bhat  YOB: 1960  [id number]    Surgery Progress Note    Date of visit: 1/21/2024    Subjective   Subjective: Reports some regurgitation and GERD symptoms, as well as bloating. Some ileostomy output. Minimal output from rectum.         Objective     Objective:     /92 (BP Location: Right arm, Patient Position: Lying)   Pulse 100   Temp 97.6 °F (36.4 °C) (Temporal)   Resp 18   Ht 175.3 cm (69\")   Wt 66.3 kg (146 lb 0.9 oz)   SpO2 92%   BMI 21.57 kg/m²     Intake/Output Summary (Last 24 hours) at 1/21/2024 0742  Last data filed at 1/21/2024 0737  Gross per 24 hour   Intake 2.8 ml   Output 1555 ml   Net -1552.2 ml       CV:  Rhythm  regular and rate regular   L:  Mild rhonchi to auscultation bilaterally   Abd:  Bowel sounds hypoactive, soft, distended. Minimally tender. Incision c/d/I, perhaps a slight amount of erythema in the midportion.  Ext:  No cyanosis, clubbing, edema    Recent labs that are back at this time have been reviewed. Not acidotic. WBC 10. HGB stable           Assessment/ Plan:    Problem List Items Addressed This Visit          Gastrointestinal Abdominal     Gallbladder mass    Relevant Orders    Tissue Pathology Exam    * (Principal) Colonic mass- Stable after sigmoid resection, though still distended with poor GI function to date. Keep NPO for now. I had recommended NG tube placement, but patient insisted on waiting. However, if he does not improve over the next few hours, may place NG to LWS. Continue ambulation.       Relevant Orders    Tissue Pathology Exam        Active Hospital Problems    Diagnosis  POA    **Colonic mass [K63.89]  Yes      Resolved Hospital Problems   No resolved problems to display.              Marcello Edgar MD  1/21/2024  07:42 EST      "

## 2024-01-22 LAB
ALBUMIN SERPL-MCNC: 3.2 G/DL (ref 3.5–5.2)
ALBUMIN/GLOB SERPL: 1.7 G/DL
ALP SERPL-CCNC: 65 U/L (ref 39–117)
ALT SERPL W P-5'-P-CCNC: 15 U/L (ref 1–41)
ANION GAP SERPL CALCULATED.3IONS-SCNC: 9 MMOL/L (ref 5–15)
AST SERPL-CCNC: 13 U/L (ref 1–40)
BILIRUB SERPL-MCNC: 0.3 MG/DL (ref 0–1.2)
BUN SERPL-MCNC: 16 MG/DL (ref 8–23)
BUN/CREAT SERPL: 33.3 (ref 7–25)
CALCIUM SPEC-SCNC: 8.3 MG/DL (ref 8.6–10.5)
CHLORIDE SERPL-SCNC: 101 MMOL/L (ref 98–107)
CO2 SERPL-SCNC: 25 MMOL/L (ref 22–29)
CREAT SERPL-MCNC: 0.48 MG/DL (ref 0.76–1.27)
DEPRECATED RDW RBC AUTO: 53.1 FL (ref 37–54)
EGFRCR SERPLBLD CKD-EPI 2021: 117.5 ML/MIN/1.73
ERYTHROCYTE [DISTWIDTH] IN BLOOD BY AUTOMATED COUNT: 14.1 % (ref 12.3–15.4)
GLOBULIN UR ELPH-MCNC: 1.9 GM/DL
GLUCOSE SERPL-MCNC: 133 MG/DL (ref 65–99)
HCT VFR BLD AUTO: 35.1 % (ref 37.5–51)
HGB BLD-MCNC: 12.4 G/DL (ref 13–17.7)
MCH RBC QN AUTO: 35.9 PG (ref 26.6–33)
MCHC RBC AUTO-ENTMCNC: 35.3 G/DL (ref 31.5–35.7)
MCV RBC AUTO: 101.7 FL (ref 79–97)
PLATELET # BLD AUTO: 360 10*3/MM3 (ref 140–450)
PMV BLD AUTO: 9.7 FL (ref 6–12)
POTASSIUM SERPL-SCNC: 4.4 MMOL/L (ref 3.5–5.2)
PROT SERPL-MCNC: 5.1 G/DL (ref 6–8.5)
RBC # BLD AUTO: 3.45 10*6/MM3 (ref 4.14–5.8)
SODIUM SERPL-SCNC: 135 MMOL/L (ref 136–145)
WBC NRBC COR # BLD AUTO: 7.99 10*3/MM3 (ref 3.4–10.8)

## 2024-01-22 PROCEDURE — 25010000002 METOCLOPRAMIDE PER 10 MG: Performed by: SURGERY

## 2024-01-22 PROCEDURE — 94799 UNLISTED PULMONARY SVC/PX: CPT

## 2024-01-22 PROCEDURE — 85027 COMPLETE CBC AUTOMATED: CPT | Performed by: SURGERY

## 2024-01-22 PROCEDURE — 80053 COMPREHEN METABOLIC PANEL: CPT | Performed by: SURGERY

## 2024-01-22 PROCEDURE — 25010000002 ONDANSETRON PER 1 MG: Performed by: SURGERY

## 2024-01-22 PROCEDURE — 25010000002 HEPARIN (PORCINE) PER 1000 UNITS: Performed by: SURGERY

## 2024-01-22 RX ORDER — OXYCODONE HYDROCHLORIDE AND ACETAMINOPHEN 5; 325 MG/1; MG/1
1 TABLET ORAL EVERY 4 HOURS PRN
Status: DISCONTINUED | OUTPATIENT
Start: 2024-01-22 | End: 2024-01-31

## 2024-01-22 RX ORDER — MORPHINE SULFATE 2 MG/ML
2 INJECTION, SOLUTION INTRAMUSCULAR; INTRAVENOUS EVERY 4 HOURS PRN
Status: ACTIVE | OUTPATIENT
Start: 2024-01-22 | End: 2024-01-27

## 2024-01-22 RX ADMIN — METOCLOPRAMIDE 10 MG: 5 INJECTION, SOLUTION INTRAMUSCULAR; INTRAVENOUS at 16:41

## 2024-01-22 RX ADMIN — POTASSIUM CHLORIDE, DEXTROSE MONOHYDRATE AND SODIUM CHLORIDE 50 ML/HR: 150; 5; 450 INJECTION, SOLUTION INTRAVENOUS at 08:21

## 2024-01-22 RX ADMIN — METOCLOPRAMIDE 10 MG: 5 INJECTION, SOLUTION INTRAMUSCULAR; INTRAVENOUS at 20:50

## 2024-01-22 RX ADMIN — HEPARIN SODIUM 5000 UNITS: 5000 INJECTION INTRAVENOUS; SUBCUTANEOUS at 20:51

## 2024-01-22 RX ADMIN — ONDANSETRON 4 MG: 2 INJECTION INTRAMUSCULAR; INTRAVENOUS at 23:15

## 2024-01-22 RX ADMIN — PANTOPRAZOLE SODIUM 40 MG: 40 INJECTION, POWDER, FOR SOLUTION INTRAVENOUS at 06:54

## 2024-01-22 RX ADMIN — HEPARIN SODIUM 5000 UNITS: 5000 INJECTION INTRAVENOUS; SUBCUTANEOUS at 16:41

## 2024-01-22 RX ADMIN — BUDESONIDE AND FORMOTEROL FUMARATE DIHYDRATE 2 PUFF: 160; 4.5 AEROSOL RESPIRATORY (INHALATION) at 19:55

## 2024-01-22 RX ADMIN — METOCLOPRAMIDE 10 MG: 5 INJECTION, SOLUTION INTRAMUSCULAR; INTRAVENOUS at 03:32

## 2024-01-22 RX ADMIN — ONDANSETRON 4 MG: 2 INJECTION INTRAMUSCULAR; INTRAVENOUS at 17:19

## 2024-01-22 RX ADMIN — BUPROPION HYDROCHLORIDE 150 MG: 150 TABLET, EXTENDED RELEASE ORAL at 06:54

## 2024-01-22 RX ADMIN — ACETAMINOPHEN 650 MG: 325 TABLET ORAL at 12:16

## 2024-01-22 RX ADMIN — METOCLOPRAMIDE 10 MG: 5 INJECTION, SOLUTION INTRAMUSCULAR; INTRAVENOUS at 08:28

## 2024-01-22 RX ADMIN — OXYCODONE AND ACETAMINOPHEN 1 TABLET: 5; 325 TABLET ORAL at 12:16

## 2024-01-22 RX ADMIN — HEPARIN SODIUM 5000 UNITS: 5000 INJECTION INTRAVENOUS; SUBCUTANEOUS at 06:54

## 2024-01-22 RX ADMIN — ONDANSETRON 4 MG: 2 INJECTION INTRAMUSCULAR; INTRAVENOUS at 08:28

## 2024-01-22 RX ADMIN — SENNOSIDES 1 TABLET: 8.6 TABLET, FILM COATED ORAL at 20:50

## 2024-01-22 RX ADMIN — ROSUVASTATIN 10 MG: 10 TABLET, FILM COATED ORAL at 20:51

## 2024-01-22 RX ADMIN — ALVIMOPAN 12 MG: 12 CAPSULE ORAL at 08:28

## 2024-01-22 NOTE — PLAN OF CARE
Problem: Adult Inpatient Plan of Care  Goal: Absence of Hospital-Acquired Illness or Injury  Intervention: Prevent Infection  Recent Flowsheet Documentation  Taken 1/22/2024 0400 by Delfina Gonzalez RN  Infection Prevention: rest/sleep promoted  Taken 1/22/2024 0200 by Delfina Gonzalez RN  Infection Prevention: rest/sleep promoted  Taken 1/22/2024 0000 by Delfina Gonzalez RN  Infection Prevention: rest/sleep promoted  Taken 1/21/2024 2200 by Delfina Gonzalez RN  Infection Prevention: rest/sleep promoted  Taken 1/21/2024 2045 by Delfina Gonzalez RN  Infection Prevention: rest/sleep promoted     Problem: Adult Inpatient Plan of Care  Goal: Optimal Comfort and Wellbeing  Intervention: Provide Person-Centered Care  Recent Flowsheet Documentation  Taken 1/21/2024 2045 by Delfina Gonzalez RN  Trust Relationship/Rapport:   care explained   choices provided   reassurance provided   thoughts/feelings acknowledged   Goal Outcome Evaluation:  Pt remains NPO with sips and chips and IV D51/2NSK+ @ 125ml/hr. He has ambulated this shift and is experiencing less nausea, bloating and ABD distention. 475 ml ostomy out put, 140 ml DELROY out put, and 550 UOP. Still on Dilaudid PCA. Will CMT and provide care.

## 2024-01-22 NOTE — PAYOR COMM NOTE
"[facility]   phone: [phone]  fax: [phone]    Clinical update    Alice Bhat (63 y.o. Male)       Date of Birth   1960    Social Security Number       Address   [address] [place] [address] [facility] [address]    Home Phone   [phone]    MRN   [id number]       Hoahaoism   Scientologist    Marital Status   Significant Other                            Admission Date   1/17/24    Admission Type   Elective    Admitting Provider   Jimmy Craig MD    Attending Provider   Jimmy Craig MD    Department, Room/Bed   [facility] 5B, N549/1       Discharge Date       Discharge Disposition       Discharge Destination                                 Attending Provider: Jimmy Craig MD    Allergies: No Known Allergies    Isolation: None   Infection: None   Code Status: CPR    Ht: 175.3 cm (69\")   Wt: 66.3 kg (146 lb 0.9 oz)    Admission Cmt: None   Principal Problem: Colonic mass [K63.89]                   Active Insurance as of 1/17/2024       Primary Coverage       Payor Plan Insurance Group Employer/Plan Group    [employer] PPO [id number]       Payor Plan Address Payor Plan Phone Number Payor Plan Fax Number Effective Dates    [address] [phone]  2/1/2021 - None Entered    [place] [address]         Subscriber Name Subscriber Birth Date Member ID       ALICE BHAT 1960 [id number]                     Emergency Contacts        (Rel.) Home Phone Work Phone Mobile Phone    Ambar Daniels (Significant Other) [phone] -- [phone]    Seferino Dalal (Friend) [phone] -- [phone]              Lab Results (last 48 hours)       Procedure Component Value Units Date/Time    Comprehensive Metabolic Panel [[id number]]  (Abnormal) Collected: 01/22/24 0502    Specimen: Blood Updated: 01/22/24 0641     Glucose 133 mg/dL      BUN 16 mg/dL      Creatinine 0.48 mg/dL      Sodium 135 mmol/L      Potassium 4.4 " mmol/L      Chloride 101 mmol/L      CO2 25.0 mmol/L      Calcium 8.3 mg/dL      Total Protein 5.1 g/dL      Albumin 3.2 g/dL      ALT (SGPT) 15 U/L      AST (SGOT) 13 U/L      Alkaline Phosphatase 65 U/L      Total Bilirubin 0.3 mg/dL      Globulin 1.9 gm/dL      Comment: Calculated Result        A/G Ratio 1.7 g/dL      BUN/Creatinine Ratio 33.3     Anion Gap 9.0 mmol/L      eGFR 117.5 mL/min/1.73     Narrative:      GFR Normal >60  Chronic Kidney Disease <60  Kidney Failure <15      CBC (No Diff) [[id number]]  (Abnormal) Collected: 01/22/24 0502    Specimen: Blood Updated: 01/22/24 0618     WBC 7.99 10*3/mm3      RBC 3.45 10*6/mm3      Hemoglobin 12.4 g/dL      Hematocrit 35.1 %      .7 fL      MCH 35.9 pg      MCHC 35.3 g/dL      RDW 14.1 %      RDW-SD 53.1 fl      MPV 9.7 fL      Platelets 360 10*3/mm3     Basic Metabolic Panel [[id number]]  (Abnormal) Collected: 01/21/24 0509    Specimen: Blood Updated: 01/21/24 0659     Glucose 126 mg/dL      BUN 28 mg/dL      Creatinine 0.67 mg/dL      Sodium 137 mmol/L      Potassium 4.6 mmol/L      Chloride 99 mmol/L      CO2 23.0 mmol/L      Calcium 8.8 mg/dL      BUN/Creatinine Ratio 41.8     Anion Gap 15.0 mmol/L      eGFR 106.2 mL/min/1.73     Narrative:      GFR Normal >60  Chronic Kidney Disease <60  Kidney Failure <15      Magnesium [[id number]]  (Normal) Collected: 01/21/24 0509    Specimen: Blood Updated: 01/21/24 0659     Magnesium 2.1 mg/dL     Phosphorus [[id number]]  (Normal) Collected: 01/21/24 0509    Specimen: Blood Updated: 01/21/24 0655     Phosphorus 4.3 mg/dL     CBC (No Diff) [[id number]]  (Abnormal) Collected: 01/21/24 0509    Specimen: Blood Updated: 01/21/24 0621     WBC 10.30 10*3/mm3      RBC 3.89 10*6/mm3      Hemoglobin 13.5 g/dL      Hematocrit 39.2 %      .8 fL      MCH 34.7 pg      MCHC 34.4 g/dL      RDW 13.8 %      RDW-SD 51.2 fl      MPV 9.6 fL      Platelets 385 10*3/mm3           Imaging Results (Last 48 Hours)       ** No  "results found for the last 48 hours. **             Physician Progress Notes (last 48 hours)        Jimmy Craig MD at 01/22/24 0815          Patient Name:  Roger Bhat  YOB: 1960  [id number]    Surgery Progress Note    Date of visit: 1/22/2024      Subjective: No acute events.  Reports that he is hungry and feels less bloated this morning.  Ambulated yesterday.  Pain is controlled.  Had some indigestion, nausea, and episodes of coughing up some clear fluid yesterday, none overnight.  Having more ileostomy output          Objective:     /82 (BP Location: Right arm, Patient Position: Lying)   Pulse 93   Temp 98.4 °F (36.9 °C) (Temporal)   Resp 18   Ht 175.3 cm (69\")   Wt 66.3 kg (146 lb 0.9 oz)   SpO2 94%   BMI 21.57 kg/m²     Intake/Output Summary (Last 24 hours) at 1/22/2024 0816  Last data filed at 1/22/2024 0600  Gross per 24 hour   Intake 92.9 ml   Output 1625 ml   Net -1532.1 ml       GEN:   Awake, alert, in no acute distress, resting comfortably in bed   CV:   Regular rate and rhythm  L:  Symmetric expansion, not labored on oxygen by nasal cannula  Abd:  Soft, appropriately tender palpation along incision, incisions are clean dry and intact, ileostomy in the right side is pink and patent with quite a bit of liquid stool in the bag, DELROY drain is mostly serous  Ext:  No cyanosis, clubbing, or edema    Recent labs that are back at this time have been reviewed.           Assessment/ Plan:    Mr. Bhat is a 63-year-old gentleman who is admitted following operative intervention for gallbladder and sigmoid colon mass on January 17     #Sigmoid colon mass, gallbladder mass  -Postop day 5 following lap vladimir, open sigmoid colectomy with diverting loop ileostomy  -Labs are appropriate  -Restart clear liquid diet  -Decrease IV fluid rate  -Discontinue PCA today.  Add as needed Percocet and morphine  -Stoma bridge removed this morning  -Ambulate. IS  -Awaiting more reliable " "tolerance of oral diet and ileostomy output      Jimmy Craig MD  1/22/2024  08:16 EST        Electronically signed by Jimmy Craig MD at 01/22/24 0817       Marcello Edgar MD at 01/21/24 0742          Patient Name:  Roger Bhat  YOB: 1960  [id number]    Surgery Progress Note    Date of visit: 1/21/2024    Subjective   Subjective: Reports some regurgitation and GERD symptoms, as well as bloating. Some ileostomy output. Minimal output from rectum.        Objective     Objective:     /92 (BP Location: Right arm, Patient Position: Lying)   Pulse 100   Temp 97.6 °F (36.4 °C) (Temporal)   Resp 18   Ht 175.3 cm (69\")   Wt 66.3 kg (146 lb 0.9 oz)   SpO2 92%   BMI 21.57 kg/m²     Intake/Output Summary (Last 24 hours) at 1/21/2024 0742  Last data filed at 1/21/2024 0737  Gross per 24 hour   Intake 2.8 ml   Output 1555 ml   Net -1552.2 ml       CV:  Rhythm  regular and rate regular   L:  Mild rhonchi to auscultation bilaterally   Abd:  Bowel sounds hypoactive, soft, distended. Minimally tender. Incision c/d/I, perhaps a slight amount of erythema in the midportion.  Ext:  No cyanosis, clubbing, edema    Recent labs that are back at this time have been reviewed. Not acidotic. WBC 10. HGB stable          Assessment/ Plan:    Problem List Items Addressed This Visit          Gastrointestinal Abdominal     Gallbladder mass    Relevant Orders    Tissue Pathology Exam    * (Principal) Colonic mass- Stable after sigmoid resection, though still distended with poor GI function to date. Keep NPO for now. I had recommended NG tube placement, but patient insisted on waiting. However, if he does not improve over the next few hours, may place NG to LWS. Continue ambulation.       Relevant Orders    Tissue Pathology Exam        Active Hospital Problems    Diagnosis  POA    **Colonic mass [K63.89]  Yes      Resolved Hospital Problems   No resolved problems to display.              Marcello BORGES " MD [place]  1/21/2024  07:42 EST        Electronically signed by Marcello Edgar MD at 01/21/24 0744       Consult Notes (last 48 hours)  Notes from 01/20/24 0917 through 01/22/24 0917   No notes of this type exist for this encounter.

## 2024-01-22 NOTE — PROGRESS NOTES
"Patient Name:  Roger Bhat  YOB: 1960  [id number]    Surgery Progress Note    Date of visit: 1/22/2024      Subjective: No acute events.  Reports that he is hungry and feels less bloated this morning.  Ambulated yesterday.  Pain is controlled.  Had some indigestion, nausea, and episodes of coughing up some clear fluid yesterday, none overnight.  Having more ileostomy output          Objective:     /82 (BP Location: Right arm, Patient Position: Lying)   Pulse 93   Temp 98.4 °F (36.9 °C) (Temporal)   Resp 18   Ht 175.3 cm (69\")   Wt 66.3 kg (146 lb 0.9 oz)   SpO2 94%   BMI 21.57 kg/m²     Intake/Output Summary (Last 24 hours) at 1/22/2024 0816  Last data filed at 1/22/2024 0600  Gross per 24 hour   Intake 92.9 ml   Output 1625 ml   Net -1532.1 ml       GEN:   Awake, alert, in no acute distress, resting comfortably in bed   CV:   Regular rate and rhythm  L:  Symmetric expansion, not labored on oxygen by nasal cannula  Abd:  Soft, appropriately tender palpation along incision, incisions are clean dry and intact, ileostomy in the right side is pink and patent with quite a bit of liquid stool in the bag, DELROY drain is mostly serous  Ext:  No cyanosis, clubbing, or edema    Recent labs that are back at this time have been reviewed.           Assessment/ Plan:    Mr. Bhat is a 63-year-old gentleman who is admitted following operative intervention for gallbladder and sigmoid colon mass on January 17     #Sigmoid colon mass, gallbladder mass  -Postop day 5 following lap vladimir, open sigmoid colectomy with diverting loop ileostomy  -Labs are appropriate  -Restart clear liquid diet  -Decrease IV fluid rate  -Discontinue PCA today.  Add as needed Percocet and morphine  -Stoma bridge removed this morning  -Ambulate. IS  -Awaiting more reliable tolerance of oral diet and ileostomy output      Jimmy Craig MD  1/22/2024  08:16 EST      " Patient returned call.

## 2024-01-22 NOTE — NURSING NOTE
[facility] visit for Stoma care and assessment    Surgery date: 01/17/24  Surgical Procedures Since Admission:  Procedure(s):  CHOLECYSTECTOMY LAPAROSCOPIC  OPEN SIGMOID COLECTOMY, LIVER BIOPSY, DIVERTING LOOP ILEOSTOMY CREATION, TAKE DOWN OF THE SPLENIC FLEXURE, AND APPENDECTOMY  Surgeon:  Jimmy Craig MD  Status:  5 Days Post-Op  -------------------     Patient sitting up in bed. Patient seen Medical/Surgical Floor. patient's spouse at beside.  Patient just beginning ambulation outside of room at visit    Stoma Type: Loop Ileostomy  Diameter/shape: batool  Location: RLQ  Protrusion: Budded  Stoma Characteristics: Edematous, Moist, and Pink  Wes: -Removed by Dr. Craig, still noted in bottom of bag, nurse asked to throw away next time empty bag  Peristomal skin: BATOOL =unable to assess  Character of output: Brown and Liquid, scant  Emptying frequency per day: per nursing flowsheet  Current pouching system: Coloplast yellow flex adhesive lock flat -intact  Accessory products, appliance placed:   Goal wear time:3-4 days  Last appliance change: 1/19  Image:     Surgical Incision: Midline abdominal incision  Degree of approximation: 100  Approximating Devices: staples  Drainage Characteristics:none  Method of Management: open    Drain(s) type: DELROY drain  Effluent characteristics: serosanguineous    Education provided:   Ambulation promoted  Will see tomorrow for final appliance change lesson with patient and wife  Marked catalog for ordering which patient and family have been instructed to do 1 day after discharge.  Marked 2-1/4 flat 2 piece Bam with CeraPlus barrier ring.  Discharge supplies have been provided.    [facility] Nurse will continue to follow for ostomy education. Please contact #[phone] with questions or if ostomy leaks occur.

## 2024-01-22 NOTE — CASE MANAGEMENT/SOCIAL WORK
Continued Stay Note  [facility]     Patient Name: Roger Bhat  MRN: [id number]  Today's Date: 1/22/2024    Admit Date: 1/17/2024    Plan: Home with wife transporting.   Discharge Plan       Row Name 01/22/24 1623       Plan    Plan Home with wife transporting.    Plan Comments 'er met with patient and wife at beside. No discharge planning needs at this time. Plan is home with wife transporting.    Final Discharge Disposition Code 01 - home or self-care                   Discharge Codes    No documentation.                 Expected Discharge Date and Time       Expected Discharge Date Expected Discharge Time    Jan 22, 2024               DEMETRIS Parmar (Kay)

## 2024-01-23 ENCOUNTER — APPOINTMENT (OUTPATIENT)
Dept: WOUND CARE | Facility: HOSPITAL | Age: 64
End: 2024-01-23
Payer: COMMERCIAL

## 2024-01-23 PROCEDURE — 25010000002 ONDANSETRON PER 1 MG: Performed by: SURGERY

## 2024-01-23 PROCEDURE — 94664 DEMO&/EVAL PT USE INHALER: CPT

## 2024-01-23 PROCEDURE — 25010000002 METOCLOPRAMIDE PER 10 MG: Performed by: SURGERY

## 2024-01-23 PROCEDURE — 94799 UNLISTED PULMONARY SVC/PX: CPT

## 2024-01-23 PROCEDURE — 25010000002 HEPARIN (PORCINE) PER 1000 UNITS: Performed by: SURGERY

## 2024-01-23 RX ORDER — CALCIUM CARBONATE 500 MG/1
1 TABLET, CHEWABLE ORAL 2 TIMES DAILY PRN
Status: DISCONTINUED | OUTPATIENT
Start: 2024-01-23 | End: 2024-01-23

## 2024-01-23 RX ORDER — ALUMINA, MAGNESIA, AND SIMETHICONE 2400; 2400; 240 MG/30ML; MG/30ML; MG/30ML
15 SUSPENSION ORAL EVERY 6 HOURS PRN
Status: DISCONTINUED | OUTPATIENT
Start: 2024-01-23 | End: 2024-02-05

## 2024-01-23 RX ORDER — CHOLECALCIFEROL (VITAMIN D3) 125 MCG
5 CAPSULE ORAL ONCE
Status: COMPLETED | OUTPATIENT
Start: 2024-01-23 | End: 2024-01-23

## 2024-01-23 RX ORDER — CALCIUM CARBONATE 500 MG/1
1 TABLET, CHEWABLE ORAL 2 TIMES DAILY PRN
Status: DISCONTINUED | OUTPATIENT
Start: 2024-01-23 | End: 2024-02-05

## 2024-01-23 RX ORDER — PANTOPRAZOLE SODIUM 40 MG/1
40 TABLET, DELAYED RELEASE ORAL
Status: DISCONTINUED | OUTPATIENT
Start: 2024-01-24 | End: 2024-01-24

## 2024-01-23 RX ADMIN — BUDESONIDE AND FORMOTEROL FUMARATE DIHYDRATE 2 PUFF: 160; 4.5 AEROSOL RESPIRATORY (INHALATION) at 10:05

## 2024-01-23 RX ADMIN — SENNOSIDES 1 TABLET: 8.6 TABLET, FILM COATED ORAL at 22:44

## 2024-01-23 RX ADMIN — BUPROPION HYDROCHLORIDE 150 MG: 150 TABLET, EXTENDED RELEASE ORAL at 05:55

## 2024-01-23 RX ADMIN — ALUMINUM HYDROXIDE, MAGNESIUM HYDROXIDE, AND DIMETHICONE 15 ML: 400; 400; 40 SUSPENSION ORAL at 16:11

## 2024-01-23 RX ADMIN — OXYCODONE AND ACETAMINOPHEN 1 TABLET: 5; 325 TABLET ORAL at 22:54

## 2024-01-23 RX ADMIN — ROSUVASTATIN 10 MG: 10 TABLET, FILM COATED ORAL at 22:44

## 2024-01-23 RX ADMIN — METOCLOPRAMIDE 10 MG: 5 INJECTION, SOLUTION INTRAMUSCULAR; INTRAVENOUS at 09:06

## 2024-01-23 RX ADMIN — ACETAMINOPHEN 650 MG: 325 TABLET ORAL at 09:06

## 2024-01-23 RX ADMIN — ONDANSETRON 4 MG: 2 INJECTION INTRAMUSCULAR; INTRAVENOUS at 16:12

## 2024-01-23 RX ADMIN — HEPARIN SODIUM 5000 UNITS: 5000 INJECTION INTRAVENOUS; SUBCUTANEOUS at 13:58

## 2024-01-23 RX ADMIN — HEPARIN SODIUM 5000 UNITS: 5000 INJECTION INTRAVENOUS; SUBCUTANEOUS at 05:55

## 2024-01-23 RX ADMIN — POTASSIUM CHLORIDE, DEXTROSE MONOHYDRATE AND SODIUM CHLORIDE 100 ML/HR: 150; 5; 450 INJECTION, SOLUTION INTRAVENOUS at 21:39

## 2024-01-23 RX ADMIN — HEPARIN SODIUM 5000 UNITS: 5000 INJECTION INTRAVENOUS; SUBCUTANEOUS at 22:44

## 2024-01-23 RX ADMIN — BUDESONIDE AND FORMOTEROL FUMARATE DIHYDRATE 2 PUFF: 160; 4.5 AEROSOL RESPIRATORY (INHALATION) at 20:57

## 2024-01-23 RX ADMIN — PSYLLIUM HUSK 1 PACKET: 3.4 POWDER ORAL at 12:33

## 2024-01-23 RX ADMIN — ONDANSETRON 4 MG: 2 INJECTION INTRAMUSCULAR; INTRAVENOUS at 22:42

## 2024-01-23 RX ADMIN — PANTOPRAZOLE SODIUM 40 MG: 40 INJECTION, POWDER, FOR SOLUTION INTRAVENOUS at 09:06

## 2024-01-23 RX ADMIN — POTASSIUM CHLORIDE, DEXTROSE MONOHYDRATE AND SODIUM CHLORIDE 50 ML/HR: 150; 5; 450 INJECTION, SOLUTION INTRAVENOUS at 01:54

## 2024-01-23 RX ADMIN — METOCLOPRAMIDE 10 MG: 5 INJECTION, SOLUTION INTRAMUSCULAR; INTRAVENOUS at 01:54

## 2024-01-23 RX ADMIN — Medication 5 MG: at 22:44

## 2024-01-23 NOTE — NURSING NOTE
[facility] visit for Stoma care and assessment    Surgery date: 01/17/24  Surgical Procedures Since Admission:  Procedure(s):  CHOLECYSTECTOMY LAPAROSCOPIC  OPEN SIGMOID COLECTOMY, LIVER BIOPSY, DIVERTING LOOP ILEOSTOMY CREATION, TAKE DOWN OF THE SPLENIC FLEXURE, AND APPENDECTOMY  Surgeon:  Jimmy Craig MD  Status:  6 Days Post-Op  -------------------     Patient sitting up in bed. Patient seen Medical/Surgical Floor. patient's spouse at beside.  Patient had leaked earlier in day on day 4 of previous barrier, leaked just now again before WO could visit. Has had high output, spoke with Dr. Craig who dc'd reglan, will consider immodium tomorrow if output still elevated.     Stoma Type: Loop Ileostomy  Diameter/shape: 38mm  Location: RLQ  Protrusion: Budded  Stoma Characteristics: Edematous, Moist, and Pink  Peristomal skin: Blanchable erythema near sutures  Character of output: Brown and Liquid, copious  Emptying frequency per day: per nursing flowsheet  Current pouching system: 2 3/4 flat two piece  Accessory products, appliance placed: 2 3/4 two piece with barrier ring to high output bag and hillman bag  Goal wear time:3-4 days  Last appliance change: 1/23  Image:     Surgical Incision: Midline abdominal incision  Degree of approximation: 100  Approximating Devices: staples  Drainage Characteristics:none  Method of Management: open    Drain(s) type: DELROY drain  Effluent characteristics: serosanguineous    Education provided:   Stool characteristics for the type of stoma created, Ambulation promoted, How to measure the stoma and the importance of cutting the appliance to limit amout of peristomal skin exposed. , and Use of Imodium and other non-pharmacological interventions to bulk stool  Will see tomorrow for final appliance change lesson with patient and wife-likely tomorrow or Thursday as d/c may be pushed back  Marked catalog for ordering which patient and family have been instructed to do 1 day after discharge.   Marked 2-1/4 flat 2 piece [place] with CeraPlus barrier ring.  Discharge supplies have been provided.    [facility] Nurse will continue to follow for ostomy education. Please contact #[phone] with questions or if ostomy leaks occur.

## 2024-01-23 NOTE — PLAN OF CARE
Problem: Adult Inpatient Plan of Care  Goal: Optimal Comfort and Wellbeing  Outcome: Ongoing, Progressing  Intervention: Monitor Pain and Promote Comfort  Recent Flowsheet Documentation  Taken 1/23/2024 0906 by Malcom Carrera, RN  Pain Management Interventions: see MAR  Intervention: Provide Person-Centered Care  Recent Flowsheet Documentation  Taken 1/23/2024 0906 by Malcom Carrera, RN  Trust Relationship/Rapport:   care explained   choices provided   Goal Outcome Evaluation:  Plan of Care Reviewed With: patient, spouse        Progress: improving          Pain controlled. Tolerating PO; nausea at times (PRN given). Urinary output total for shift is low; MD aware & no new orders at this time. Ambulating in hallway. Rogelio x1 to bulb suction; tubing stripped.

## 2024-01-23 NOTE — PROGRESS NOTES
"Patient Name:  Roger Bhat  YOB: 1960  [id number]    Surgery Progress Note    Date of visit: 1/23/2024      Subjective: No acute events.  Tolerated clears yesterday but did have some nausea in the evening and 1 small volume emesis.  No fevers or chills.  Ambulated yesterday.  Now on room air.  Ileostomy with significant increase in output yesterday, 1860 mL.          Objective:     /79 (BP Location: Right arm, Patient Position: Lying)   Pulse 91   Temp 98.3 °F (36.8 °C) (Temporal)   Resp 16   Ht 175.3 cm (69\")   Wt 66.3 kg (146 lb 0.9 oz)   SpO2 92%   BMI 21.57 kg/m²     Intake/Output Summary (Last 24 hours) at 1/23/2024 0733  Last data filed at 1/23/2024 0409  Gross per 24 hour   Intake --   Output 2825 ml   Net -2825 ml       GEN:   Awake, alert, in no acute distress, resting comfortably in bed   CV:   Regular rate and rhythm  L:  Symmetric expansion, not labored on room air  Abd:  Soft, not obviously distended, appropriately tender palpation of midline incision, midline incision is clean dry intact with staples in place, ileostomy on the right side with copious thin liquid stool in the bag, DELROY drain in the right lower quadrant with serous output  Ext:  No cyanosis, clubbing, or edema    Recent labs that are back at this time have been reviewed.           Assessment/ Plan:    Mr. Bhat is a 63-year-old gentleman who is admitted following operative intervention for gallbladder and sigmoid colon mass on January 17     #Sigmoid colon mass, gallbladder mass  -Postop day 6 following lap vladimir, open sigmoid colectomy with diverting loop ileostomy  -significant increase in ileostomy output. 1860 mL yesterday. Monitor output for now and start matamucil   -Advance to regular diet  -Decrease IV fluid rate to 30 mL/hr  -Ambulate. IS  -Labs tomorrow  -Path returned yesterday and was benign with evidence of inflammatory polyp. I discussed with the patient       Jimmy Craig, " MD  1/23/2024  07:33 EST

## 2024-01-24 ENCOUNTER — APPOINTMENT (OUTPATIENT)
Dept: GENERAL RADIOLOGY | Facility: HOSPITAL | Age: 64
End: 2024-01-24
Payer: COMMERCIAL

## 2024-01-24 LAB
ANION GAP SERPL CALCULATED.3IONS-SCNC: 11 MMOL/L (ref 5–15)
BUN SERPL-MCNC: 22 MG/DL (ref 8–23)
BUN/CREAT SERPL: 37.3 (ref 7–25)
CALCIUM SPEC-SCNC: 8.4 MG/DL (ref 8.6–10.5)
CHLORIDE SERPL-SCNC: 97 MMOL/L (ref 98–107)
CO2 SERPL-SCNC: 22 MMOL/L (ref 22–29)
CREAT SERPL-MCNC: 0.59 MG/DL (ref 0.76–1.27)
DEPRECATED RDW RBC AUTO: 52 FL (ref 37–54)
EGFRCR SERPLBLD CKD-EPI 2021: 110.4 ML/MIN/1.73
ERYTHROCYTE [DISTWIDTH] IN BLOOD BY AUTOMATED COUNT: 14 % (ref 12.3–15.4)
GLUCOSE SERPL-MCNC: 175 MG/DL (ref 65–99)
HCT VFR BLD AUTO: 40.4 % (ref 37.5–51)
HGB BLD-MCNC: 14.5 G/DL (ref 13–17.7)
MAGNESIUM SERPL-MCNC: 2.1 MG/DL (ref 1.6–2.4)
MCH RBC QN AUTO: 35.9 PG (ref 26.6–33)
MCHC RBC AUTO-ENTMCNC: 35.9 G/DL (ref 31.5–35.7)
MCV RBC AUTO: 100 FL (ref 79–97)
PHOSPHATE SERPL-MCNC: 2.9 MG/DL (ref 2.5–4.5)
PLATELET # BLD AUTO: 420 10*3/MM3 (ref 140–450)
PMV BLD AUTO: 9.5 FL (ref 6–12)
POTASSIUM SERPL-SCNC: 4.3 MMOL/L (ref 3.5–5.2)
RBC # BLD AUTO: 4.04 10*6/MM3 (ref 4.14–5.8)
SODIUM SERPL-SCNC: 130 MMOL/L (ref 136–145)
WBC NRBC COR # BLD AUTO: 9.85 10*3/MM3 (ref 3.4–10.8)

## 2024-01-24 PROCEDURE — 25010000002 HEPARIN (PORCINE) PER 1000 UNITS: Performed by: SURGERY

## 2024-01-24 PROCEDURE — 85027 COMPLETE CBC AUTOMATED: CPT | Performed by: SURGERY

## 2024-01-24 PROCEDURE — 74018 RADEX ABDOMEN 1 VIEW: CPT

## 2024-01-24 PROCEDURE — 25010000002 DIPHENHYDRAMINE PER 50 MG: Performed by: SURGERY

## 2024-01-24 PROCEDURE — 80048 BASIC METABOLIC PNL TOTAL CA: CPT | Performed by: SURGERY

## 2024-01-24 PROCEDURE — 94799 UNLISTED PULMONARY SVC/PX: CPT

## 2024-01-24 PROCEDURE — 94664 DEMO&/EVAL PT USE INHALER: CPT

## 2024-01-24 PROCEDURE — 25810000003 SODIUM CHLORIDE 0.9 % SOLUTION: Performed by: SURGERY

## 2024-01-24 PROCEDURE — 84100 ASSAY OF PHOSPHORUS: CPT | Performed by: SURGERY

## 2024-01-24 PROCEDURE — 83735 ASSAY OF MAGNESIUM: CPT | Performed by: SURGERY

## 2024-01-24 PROCEDURE — 25010000002 ONDANSETRON PER 1 MG: Performed by: SURGERY

## 2024-01-24 RX ORDER — SODIUM CHLORIDE 9 MG/ML
50 INJECTION, SOLUTION INTRAVENOUS CONTINUOUS
Status: DISCONTINUED | OUTPATIENT
Start: 2024-01-24 | End: 2024-01-31

## 2024-01-24 RX ORDER — PANTOPRAZOLE SODIUM 40 MG/10ML
40 INJECTION, POWDER, LYOPHILIZED, FOR SOLUTION INTRAVENOUS
Status: DISCONTINUED | OUTPATIENT
Start: 2024-01-25 | End: 2024-02-14

## 2024-01-24 RX ORDER — LOPERAMIDE HYDROCHLORIDE 2 MG/1
2 CAPSULE ORAL
Status: DISCONTINUED | OUTPATIENT
Start: 2024-01-24 | End: 2024-01-24

## 2024-01-24 RX ADMIN — ONDANSETRON 4 MG: 2 INJECTION INTRAMUSCULAR; INTRAVENOUS at 10:06

## 2024-01-24 RX ADMIN — HEPARIN SODIUM 5000 UNITS: 5000 INJECTION INTRAVENOUS; SUBCUTANEOUS at 21:26

## 2024-01-24 RX ADMIN — DIPHENHYDRAMINE HYDROCHLORIDE 25 MG: 50 INJECTION INTRAMUSCULAR; INTRAVENOUS at 21:26

## 2024-01-24 RX ADMIN — HEPARIN SODIUM 5000 UNITS: 5000 INJECTION INTRAVENOUS; SUBCUTANEOUS at 13:59

## 2024-01-24 RX ADMIN — BUPROPION HYDROCHLORIDE 150 MG: 150 TABLET, EXTENDED RELEASE ORAL at 09:16

## 2024-01-24 RX ADMIN — SODIUM CHLORIDE 100 ML/HR: 9 INJECTION, SOLUTION INTRAVENOUS at 21:27

## 2024-01-24 RX ADMIN — LOPERAMIDE HYDROCHLORIDE 2 MG: 2 CAPSULE ORAL at 12:09

## 2024-01-24 RX ADMIN — SODIUM CHLORIDE 50 ML/HR: 9 INJECTION, SOLUTION INTRAVENOUS at 07:35

## 2024-01-24 RX ADMIN — BUDESONIDE AND FORMOTEROL FUMARATE DIHYDRATE 2 PUFF: 160; 4.5 AEROSOL RESPIRATORY (INHALATION) at 21:17

## 2024-01-24 RX ADMIN — LOPERAMIDE HYDROCHLORIDE 2 MG: 2 CAPSULE ORAL at 09:17

## 2024-01-24 RX ADMIN — PSYLLIUM HUSK 1 PACKET: 3.4 POWDER ORAL at 09:18

## 2024-01-24 RX ADMIN — ONDANSETRON 4 MG: 2 INJECTION INTRAMUSCULAR; INTRAVENOUS at 21:26

## 2024-01-24 RX ADMIN — PANTOPRAZOLE SODIUM 40 MG: 40 TABLET, DELAYED RELEASE ORAL at 06:19

## 2024-01-24 RX ADMIN — BUDESONIDE AND FORMOTEROL FUMARATE DIHYDRATE 2 PUFF: 160; 4.5 AEROSOL RESPIRATORY (INHALATION) at 10:02

## 2024-01-24 RX ADMIN — HEPARIN SODIUM 5000 UNITS: 5000 INJECTION INTRAVENOUS; SUBCUTANEOUS at 06:20

## 2024-01-24 NOTE — CONSULTS
Clinical Nutrition   Nutrition Assessment  Reason for Visit: Consult, Need for education    Patient Name: Roger Bhat  YOB: 1960  MRN: [id number]  Date of Encounter: 01/24/24 14:13 EST  Admission date: 1/17/2024    Pt meets criteria severe malnutrition in the context of acute illness, see MSA for full assessment. 2/2 preporation and NPO status for surgery followed by slow to return bowel function and eventual intolerance diet advancement. Note highly significant weight loss of 7.5% body weight in just 2 weeks.     Pt has not tolerated any solid oral intake in 9 days and struggling to tolerate even liquids. All he has been able to keep down today is 8 oz fluid (4oz gatorade and 4oz juice).    Given above as well as high ileostomy output and increased energy/protein needs from significant surgery, RD with significant concern for pt's nutrition and hydration status. Recommend keofeed placement and initiating nutrition support if unable to tolerate PO in the next 24-48 hr. Discussed with pt and spouse, of course would like to avoid but in understanding if needed.     Nutritional education for new ileostomy and high ileostomy output introduced and will f/u as needed. More pertinent issue is getting pt to tolerate any oral nutrition at this time which would lend to normalized output.     Nutrition Assessment   Admission Diagnosis:  Colonic mass [K63.89]    Problem List:    Colonic mass      PMH:   He  has a past medical history of Allergies, Cellulitis of hand, Clotting disorder, Colonic mass, COPD (chronic obstructive pulmonary disease), Cough, Current smoker, Erectile dysfunction, Fracture, foot (Sept 7 2022), GERD (gastroesophageal reflux disease), Hemochromatosis, Hyperlipidemia, Hypertension, and Wears dentures.    PSH:  He  has a past surgical history that includes Esophagogastroduodenoscopy (05/2016); Colonoscopy (2021); Esophagogastroduodenoscopy (N/A, 10/19/2021);  Esophagogastroduodenoscopy (N/A, 11/16/2021); Hand surgery (2022); Cholecystectomy (N/A, 1/17/2024); and Colectomy (N/A, 1/17/2024).    Applicable Nutrition Concerns:   Skin:  Oral:  GI: s/p lap vladimir, open sigmoid colectomy with diverting loop ileostomy     Applicable Interval History:       Reported/Observed/Food/Nutrition Related History:     Visited with pt and spouse at bedside for consult per WOC for new ileostomy education/high ileostomy output. Unfortunately pt's significant nutrition concerns were not reported prior and pt is new to RD, due to no previous consults and no nutritional concerns reported on nursing screen. Together pt and spouse tell me pt has not had any signifiant oral intake in 9 days due to preporation and NPO status for surgery followed by slow to return bowel function and eventual intolerance diet advancement. Did drink Impact AR per surgery protocol in the days prior to surgery.They note he has lost a lot of weight and muscle. He has emesis bag at time of my visit and tells me he only got sick once yesterday but many times today despite no oral intake. All he has been able to keep down today is 8 oz fluid (4oz gatorade and 4oz juice). RD encouraged liquids as needed and trying solids when able.   Discussed with pt and spouse that given above as well as high ileostomy output and increased energy/protein needs from significant surgery, RD with significant concern for pt's nutrition and hydration status. They tell me no one has mentioned possibility of nutrition support, RD educated this may be necessary if unable to tolerate PO soon. Of course would like to avoid but in understanding if needed. Nutritional education for new ileostomy and high ileostomy output introduced and will f/u as needed. More pertinent issue is getting pt to tolerate any oral nutrition at this time which would lend to normalized output. Pt and spouse receptive and appreciative of visit. Deny further needs or  "questions/concerns at this time, NKFA.     Anthropometrics     Height: Height: 175.3 cm (69\")  Last Filed Weight: Weight: 66.3 kg (146 lb 0.9 oz) (01/17/24 0902)  Method:    BMI: BMI (Calculated): 21.6  BMI classification: Normal: 18.5-24.9kg/m2  IBW:   155 lb    UBW:     Weight       Weight (kg) Weight (lbs) Weight Method Visit Report   12/15/2023 64.683 kg  142 lb 9.6 oz   --    1/8/2024 63.504 kg  140 lb      1/10/2024 66.25 kg  146 lb 0.9 oz  Standing scale     1/17/2024 66.25 kg  146 lb 0.9 oz      1/24/2024 61.236 kg  135 lb  Bed scale         Weight change: weight loss of 11 lbs (7.5%) over the past 2 week(s)    Intentional?  No    Nutrition Focused Physical Exam     Date:  1/24       Patient meets criteria for malnutrition diagnosis, see MSA note.     Current Nutrition Prescription   PO: Diet: Regular/House Diet, Gastrointestinal Diets; Fiber-Restricted; Fluid Consistency: Thin (IDDSI 0)  Oral Nutrition Supplement:   Intake: Insufficient data has not tolerated any more than sips liquids t/o admission per pt and spouse. 1 meal documented at 75%, inaccurate documentation.     Nutrition Diagnosis   Date:  1/24            Updated:    Problem Malnutrition  severe/acute   Etiology Energy needs>energy intake 2/2 recent colectomy with ileostomy placement with slow to return bowel function followed by intolerance diet.    Signs/Symptoms PO intakes <50% EEN >/=5 days and loss 7.5% body weight with past 2 weeks, moderate muscle wasting, and moderate subcutaneous fat loss.    Status: New    Date:   1/24    Updated:     Problem Food and nutrition knowledge deficit   Etiology S/p colectomy with new ileostomy complicated by high ileostomy output   Signs/Symptoms Pt with prior lack of knowledge appropriate MNT/diet for condition   Status: New    Goal:   General: Nutrition to support treatment  PO: Tolerate PO, Increase intake  EN/PN: Initiate EN if needed    Nutrition Intervention      Follow treatment progress, Care plan " reviewed, Advise alternate selection, Advised available snacks, Interview for preferences, Menu provided, Encourage intake, Supplement provided, Education provided or new ileostomy and high ileostomy output    Recommend EN if unable to tolerate PO within next 24-48 hr.     Ensure Clear BID, Premier Protein QD until tolerating fulls/solids    Will f/u for further education as indicated and appropriate    Monitoring/Evaluation:   Per protocol, I&O, PO intake, Supplement intake, Pertinent labs, GI status, Symptoms, POC/GOC      Stacie Maldonado RD, CNSC  Time Spent: 45m

## 2024-01-24 NOTE — NURSING NOTE
[facility] visit for Stoma care and assessment    Surgery date: 01/17/24  Surgical Procedures Since Admission:  Procedure(s):  CHOLECYSTECTOMY LAPAROSCOPIC  OPEN SIGMOID COLECTOMY, LIVER BIOPSY, DIVERTING LOOP ILEOSTOMY CREATION, TAKE DOWN OF THE SPLENIC FLEXURE, AND APPENDECTOMY  Surgeon:  Jimmy Craig MD  Status:  7 Days Post-Op  -------------------     Patient sitting up in bed. Patient seen Medical/Surgical Floor. patient's spouse at beside.  Patient receiving KUB, patient has been nauseous and vomiting per family member.    Stoma Type: Loop Ileostomy  Diameter/shape: batool  Location: RLQ  Protrusion: Budded  Stoma Characteristics: Edematous, Moist, and Pink  Peristomal skin: BATOOL =unable to assess  Character of output: Still with high output, greenish tinged bile, slowing down per nursing though  Emptying frequency per day: per nursing flowsheet  Current pouching system: Bam 2 piece 2 and three-quarter flat with barrier ring connected to high output bag.  Accessory products, appliance placed:   Goal wear time:3-4 days  Last appliance change: 1/23  Image:     Surgical Incision: Midline abdominal incision  Degree of approximation: 100  Approximating Devices: staples  Drainage Characteristics:none  Method of Management: open    Drain(s) type: DELROY drain  Effluent characteristics: serosanguineous    Education provided:   Ambulation promoted  Patient now n.p.o. again after KUB results planning on placing NG to low wall suction.  Will hold off on another appliance change today as this appliance is still intact, planning for stoma board model tomorrow as time allows with patient's family member, still need to do 1 more appliance change with patient and family member.  Marked catalog for ordering which patient and family have been instructed to do 1 day after discharge.  Marked 2-1/4 flat 2 piece Bam with CeraPlus barrier ring.  Discharge supplies have been provided.    [facility] Nurse will continue to follow for  ostomy education. Please contact #[phone] with questions or if ostomy leaks occur.

## 2024-01-24 NOTE — PROGRESS NOTES
"Patient Name:  Roger Bhat  YOB: 1960  [id number]    Surgery Progress Note    Date of visit: 1/24/2024      Subjective: No acute events.  Ileostomy output 1500 mL yesterday.  Did have 1 episode of emesis yesterday evening but none overnight.  Did not eat much yesterday.  Afebrile white blood cell count of 9.8.  DELROY drain with slightly murky output.          Objective:     /79 (BP Location: Left arm, Patient Position: Lying)   Pulse 105   Temp 97.6 °F (36.4 °C) (Temporal)   Resp 16   Ht 175.3 cm (69\")   Wt 66.3 kg (146 lb 0.9 oz)   SpO2 90%   BMI 21.57 kg/m²     Intake/Output Summary (Last 24 hours) at 1/24/2024 0817  Last data filed at 1/24/2024 0734  Gross per 24 hour   Intake 1297.49 ml   Output 1875 ml   Net -577.51 ml       GEN:   Awake, alert, in no acute distress, resting comfortably in bed   CV:   Regular rate and rhythm  L:  Symmetric expansion, not labored on room air  Abd:  Soft, appropriately tender palpation along incision, midline incision is clean dry intact with staples in place, ileostomy on the right side is pink and patent with thin liquid stool in the bag, DELROY drain in the right lower quadrant has some dark murky fluid  Ext:  No cyanosis, clubbing, or edema    Recent labs that are back at this time have been reviewed.           Assessment/ Plan:    Mr. Bhat is a 63-year-old gentleman who is admitted following operative intervention for gallbladder and sigmoid colon mass on January 17     #Sigmoid colon mass, gallbladder mass  -Postop day 7 following lap vladimir, open sigmoid colectomy with diverting loop ileostomy  -Still with intermittent emesis and low p.o. intake.  Has had much higher ileostomy output however, 1500 mL yesterday.  Will start on Imodium 2 mg 4 times daily.  Monitor ileostomy output  -Continue regular diet as tolerated  -Drain output is darker, however no leukocytosis or fevers and he is diverted.  Continue to monitor for now.  -Continue IV " fluids  -Ambulate. IS         Jimmy Craig MD  1/24/2024  08:17 EST    Had more episodes of emesis this afternoon. KUB obtained with significant gastric and proximal small bowel distention consistent with ileus. NGT placed with copious bilious output. Continue supportive therapies\    Electronically signed by Jimmy Craig MD, 01/24/24, 4:31 PM EST.

## 2024-01-24 NOTE — PLAN OF CARE
Problem: Adult Inpatient Plan of Care  Goal: Optimal Comfort and Wellbeing  Outcome: Ongoing, Progressing  Intervention: Provide Person-Centered Care  Recent Flowsheet Documentation  Taken 1/24/2024 0916 by Malcom Carrera, RN  Trust Relationship/Rapport:   care explained   choices provided   Goal Outcome Evaluation:  Plan of Care Reviewed With: patient, spouse        Progress: no change       Unable to tolerate PO today. DELROY to bulb suction; murky output (MD aware). Ilestomy to high output bag. Low UOP this shift again today; MD aware & no new orders. NG to LWS was placed today due to increased nausea; 600 mL of green output (MD aware at bedside). Now NPO, chips.

## 2024-01-24 NOTE — PLAN OF CARE
Goal Outcome Evaluation:  Plan of Care Reviewed With: patient        Progress: no change     A&Ox4. VSS on 2L but patient intermittently pulls oxygen off. Pain controlled with oral meds. Ambulated in cuellar with family. Emesis x1 this shift. Taking sips of clear liquids. IVF infusing. Patient with low urine output. Ostomy with liquid green output hooked to high volume output bag. Family at bedside. Incision to midline abdomen well approximated with staples, some redness noted around site.

## 2024-01-24 NOTE — PROGRESS NOTES
Malnutrition Severity Assessment    Patient Name:  Roger Bhat  YOB: 1960  MRN: [id number]  Admit Date:  1/17/2024    Patient meets criteria for : Severe Malnutrition (PO intakes <50% EEN >/=5 days and loss 7.5% body weight with past 2 weeks, moderate muscle wasting, and moderate subcutaneous fat loss.)    Malnutrition Severity Assessment  Malnutrition Type: Acute Disease or Injury - Related Malnutrition  Malnutrition Type (last 8 hours)       Malnutrition Severity Assessment       Row Name 01/24/24 1431       Malnutrition Severity Assessment    Malnutrition Type Acute Disease or Injury - Related Malnutrition      Row Name 01/24/24 1431       Insufficient Energy Intake     Insufficient Energy Intake Findings Severe  PO intakes <50% EEN >/=5 days    Insufficient Energy Intake  < or equal to 50% of est. energy requirement for > or equal to 5d)      Row Name 01/24/24 1431       Unintentional Weight Loss     Unintentional Weight Loss Findings Severe  loss 7.5% body weight with past 2 weeks    Unintentional Weight Loss  Weight loss greater than 5% in one month      Row Name 01/24/24 1431       Muscle Loss    Loss of Muscle Mass Findings Moderate    [place] Region Moderate - slight depression    Clavicle Bone Region Moderate - some protrusion in females, visible in males    Acromion Bone Region Moderate - acromion may slightly protrude    Scapular Bone Region Moderate - mild depression, bones may show slightly    Dorsal Hand Region Moderate - slight depression    Patellar Region Moderate - patella more prominent, less muscle definition around patella    Anterior Thigh Region Moderate - mild depression on inner thigh    Posterior Calf Region Moderate - some roundness, slight firmness      Row Name 01/24/24 1431       Fat Loss    Subcutaneous Fat Loss Findings Moderate    Orbital Region  Moderate -  somewhat hollowness, slightly dark circles    Upper Arm Region Moderate - some fat tissue, not ample       Row Name 01/24/24 1431       Criteria Met (Must meet criteria for severity in at least 2 of these categories: M Wasting, Fat Loss, Fluid, Secondary Signs, Wt. Status, Intake)    Patient meets criteria for  Severe Malnutrition  PO intakes <50% EEN >/=5 days and loss 7.5% body weight with past 2 weeks, moderate muscle wasting, and moderate subcutaneous fat loss.                    Electronically signed by:  Stacie Maldonado RD  01/24/24 14:32 EST

## 2024-01-25 ENCOUNTER — APPOINTMENT (OUTPATIENT)
Dept: CT IMAGING | Facility: HOSPITAL | Age: 64
End: 2024-01-25
Payer: COMMERCIAL

## 2024-01-25 LAB
ANION GAP SERPL CALCULATED.3IONS-SCNC: 16 MMOL/L (ref 5–15)
BUN SERPL-MCNC: 29 MG/DL (ref 8–23)
BUN/CREAT SERPL: 43.9 (ref 7–25)
CALCIUM SPEC-SCNC: 8.5 MG/DL (ref 8.6–10.5)
CHLORIDE SERPL-SCNC: 102 MMOL/L (ref 98–107)
CO2 SERPL-SCNC: 19 MMOL/L (ref 22–29)
CREAT SERPL-MCNC: 0.66 MG/DL (ref 0.76–1.27)
DEPRECATED RDW RBC AUTO: 52.6 FL (ref 37–54)
EGFRCR SERPLBLD CKD-EPI 2021: 106.7 ML/MIN/1.73
ERYTHROCYTE [DISTWIDTH] IN BLOOD BY AUTOMATED COUNT: 14.4 % (ref 12.3–15.4)
GLUCOSE SERPL-MCNC: 101 MG/DL (ref 65–99)
HCT VFR BLD AUTO: 39.4 % (ref 37.5–51)
HGB BLD-MCNC: 14.2 G/DL (ref 13–17.7)
MAGNESIUM SERPL-MCNC: 2.3 MG/DL (ref 1.6–2.4)
MCH RBC QN AUTO: 35.9 PG (ref 26.6–33)
MCHC RBC AUTO-ENTMCNC: 36 G/DL (ref 31.5–35.7)
MCV RBC AUTO: 99.5 FL (ref 79–97)
PHOSPHATE SERPL-MCNC: 3.6 MG/DL (ref 2.5–4.5)
PLATELET # BLD AUTO: 485 10*3/MM3 (ref 140–450)
PMV BLD AUTO: 9.4 FL (ref 6–12)
POTASSIUM SERPL-SCNC: 4.2 MMOL/L (ref 3.5–5.2)
RBC # BLD AUTO: 3.96 10*6/MM3 (ref 4.14–5.8)
SODIUM SERPL-SCNC: 137 MMOL/L (ref 136–145)
WBC NRBC COR # BLD AUTO: 11.66 10*3/MM3 (ref 3.4–10.8)

## 2024-01-25 PROCEDURE — 25010000002 DIPHENHYDRAMINE PER 50 MG: Performed by: SURGERY

## 2024-01-25 PROCEDURE — 25010000002 ONDANSETRON PER 1 MG: Performed by: SURGERY

## 2024-01-25 PROCEDURE — 83735 ASSAY OF MAGNESIUM: CPT | Performed by: SURGERY

## 2024-01-25 PROCEDURE — 94799 UNLISTED PULMONARY SVC/PX: CPT

## 2024-01-25 PROCEDURE — 74177 CT ABD & PELVIS W/CONTRAST: CPT

## 2024-01-25 PROCEDURE — 80048 BASIC METABOLIC PNL TOTAL CA: CPT | Performed by: SURGERY

## 2024-01-25 PROCEDURE — 25810000003 LACTATED RINGERS SOLUTION: Performed by: STUDENT IN AN ORGANIZED HEALTH CARE EDUCATION/TRAINING PROGRAM

## 2024-01-25 PROCEDURE — 25510000001 IOPAMIDOL 61 % SOLUTION: Performed by: SURGERY

## 2024-01-25 PROCEDURE — 84100 ASSAY OF PHOSPHORUS: CPT | Performed by: SURGERY

## 2024-01-25 PROCEDURE — 25810000003 SODIUM CHLORIDE 0.9 % SOLUTION: Performed by: SURGERY

## 2024-01-25 PROCEDURE — 25010000002 HEPARIN (PORCINE) PER 1000 UNITS: Performed by: SURGERY

## 2024-01-25 PROCEDURE — 94664 DEMO&/EVAL PT USE INHALER: CPT

## 2024-01-25 PROCEDURE — 85027 COMPLETE CBC AUTOMATED: CPT | Performed by: SURGERY

## 2024-01-25 RX ADMIN — HEPARIN SODIUM 5000 UNITS: 5000 INJECTION INTRAVENOUS; SUBCUTANEOUS at 21:28

## 2024-01-25 RX ADMIN — ONDANSETRON 4 MG: 2 INJECTION INTRAMUSCULAR; INTRAVENOUS at 21:24

## 2024-01-25 RX ADMIN — BUDESONIDE AND FORMOTEROL FUMARATE DIHYDRATE 2 PUFF: 160; 4.5 AEROSOL RESPIRATORY (INHALATION) at 09:00

## 2024-01-25 RX ADMIN — PANTOPRAZOLE SODIUM 40 MG: 40 INJECTION, POWDER, LYOPHILIZED, FOR SOLUTION INTRAVENOUS at 09:51

## 2024-01-25 RX ADMIN — ONDANSETRON 4 MG: 2 INJECTION INTRAMUSCULAR; INTRAVENOUS at 05:22

## 2024-01-25 RX ADMIN — BUDESONIDE AND FORMOTEROL FUMARATE DIHYDRATE 2 PUFF: 160; 4.5 AEROSOL RESPIRATORY (INHALATION) at 19:23

## 2024-01-25 RX ADMIN — SODIUM CHLORIDE, POTASSIUM CHLORIDE, SODIUM LACTATE AND CALCIUM CHLORIDE 500 ML: 600; 310; 30; 20 INJECTION, SOLUTION INTRAVENOUS at 06:09

## 2024-01-25 RX ADMIN — ALBUTEROL SULFATE 2 PUFF: 90 AEROSOL, METERED RESPIRATORY (INHALATION) at 09:00

## 2024-01-25 RX ADMIN — HEPARIN SODIUM 5000 UNITS: 5000 INJECTION INTRAVENOUS; SUBCUTANEOUS at 05:07

## 2024-01-25 RX ADMIN — IOPAMIDOL 90 ML: 612 INJECTION, SOLUTION INTRAVENOUS at 11:42

## 2024-01-25 RX ADMIN — DIPHENHYDRAMINE HYDROCHLORIDE 25 MG: 50 INJECTION INTRAMUSCULAR; INTRAVENOUS at 21:23

## 2024-01-25 RX ADMIN — SODIUM CHLORIDE 100 ML/HR: 9 INJECTION, SOLUTION INTRAVENOUS at 05:07

## 2024-01-25 NOTE — CASE MANAGEMENT/SOCIAL WORK
Continued Stay Note  [facility]     Patient Name: Roger Bhat  MRN: [id number]  Today's Date: 1/24/2024    Admit Date: 1/17/2024    Plan: Home   Discharge Plan       Row Name 01/24/24 1923       Plan    Plan Home    Plan Comments Case Management continues to follow for all needs/discharge planning.  Anticipate patient will return home when medically stable.  Beulah Perez, Ext. [phone]    Final Discharge Disposition Code 01 - home or self-care                   Discharge Codes    No documentation.                 Expected Discharge Date and Time       Expected Discharge Date Expected Discharge Time    Jan 25, 2024               DEMETRIS Ramos

## 2024-01-25 NOTE — PLAN OF CARE
Goal Outcome Evaluation:  Plan of Care Reviewed With: patient, spouse           Outcome Evaluation: A&O x4. On RA. SBP 120s-130s. Tachycardic. NPO. NG continues to LWS. 1550 ml bile out (MD aware). Ostomy output 525 ml this shift. 10 ml murky output from DELROY (MD aware). Low UOP. MD notified. LR bolus ordered. PRN zofran given x2 for nausea/acid reflux. Pt states relief with NG. No c/o pain. Midline incision LEONEL. No drainage. Some redness noted under staples. Redness unchanged throughout the shift. pt able to stand at bedside to urinate, but felt too weak to ambulate in halls this shift. Pt slept well.

## 2024-01-25 NOTE — PROGRESS NOTES
Nutrition Services    Patient Name:  Roger Bhat  YOB: 1960  MRN: [id number]  Admit Date:  1/17/2024    Pt unable to tolerate oral intake, day 10 without any significant source nutrition. Now NPO with NG to LWS. KUB obtained suggestive of ileus or SBO.     Pt with severe acute malnutrition in setting of increased nutrition needs s/p colectomy with new ileostomy. As now with predicted further prolonged period of retracted nutrition, RD recommends PICC line and initiating TPN to support nutrition needs and post op healing. Could at minimal consider PPN via peripheral line in place. Please consult if desired. See note 1/24 for full assessment.     Electronically signed by:  Stacie Maldonado RD  01/25/24 07:13 EST

## 2024-01-25 NOTE — PROGRESS NOTES
"Patient Name:  Roger Bhat  YOB: 1960  [id number]    Surgery Progress Note    Date of visit: 1/25/2024      Subjective: No acute events.  Feels much better today after NG tube was placed yesterday.  Had over 2 L out from the NG tube yesterday.  Ileostomy has continued to be productive however with 107 5 mL yesterday.  No fevers or chills.  DELROY with low output however this is turbid brown material.  Ambulating.          Objective:     /90 (BP Location: Right arm, Patient Position: Lying)   Pulse 99   Temp 97.8 °F (36.6 °C) (Temporal)   Resp 18   Ht 175.3 cm (69\")   Wt 61.2 kg (135 lb)   SpO2 90%   BMI 19.94 kg/m²     Intake/Output Summary (Last 24 hours) at 1/25/2024 0731  Last data filed at 1/25/2024 0609  Gross per 24 hour   Intake 2909.49 ml   Output 3650 ml   Net -740.51 ml       GEN:   Awake, alert, in no acute distress, resting comfortably in bed   CV:   Regular rate and rhythm  L:  Symmetric expansion, not labored on room air  Abd:  Soft, not obviously distended, appropriately tender palpation on midline incision,  Incision is clean dry intact, ileostomy in the right side is pink and patent with some liquid stool in the bag, DELROY drain in the right lower quadrant with a small amount of dark turbid output  Ext:  No cyanosis, clubbing, or edema    Recent labs that are back at this time have been reviewed.           Assessment/ Plan:    Mr. Bhat is a 63-year-old gentleman who is admitted following operative intervention for gallbladder and sigmoid colon mass on January 17     #Sigmoid colon mass, gallbladder mass  # Ileus  -Postop day 8 following lap vladimir, open sigmoid colectomy with diverting loop ileostomy  -Afebrile  -White count up slightly at 11 from 9.  Otherwise labs without acute findings  -Over 2 L of output from the NG tube yesterday.  Feels significantly improved.  Still having ileostomy output however  -He does have some dark turbid output in his drain which is " suspicious for an anastomotic leak.  He is diverted proximally however and is already being treated for this.  However given his ongoing ileus we will plan to proceed forward with a CT of the abdomen and pelvis today  -N.p.o. except ice chips for now  -Continue IV fluids          Jimmy Craig MD  1/25/2024  07:31 EST

## 2024-01-26 LAB
ALBUMIN SERPL-MCNC: 3.1 G/DL (ref 3.5–5.2)
ALBUMIN SERPL-MCNC: 3.1 G/DL (ref 3.5–5.2)
ALBUMIN/GLOB SERPL: 1.2 G/DL
ALP SERPL-CCNC: 68 U/L (ref 39–117)
ALP SERPL-CCNC: 68 U/L (ref 39–117)
ALT SERPL W P-5'-P-CCNC: 15 U/L (ref 1–41)
ALT SERPL W P-5'-P-CCNC: 15 U/L (ref 1–41)
ANION GAP SERPL CALCULATED.3IONS-SCNC: 16 MMOL/L (ref 5–15)
ANION GAP SERPL CALCULATED.3IONS-SCNC: 18 MMOL/L (ref 5–15)
AST SERPL-CCNC: 9 U/L (ref 1–40)
AST SERPL-CCNC: 9 U/L (ref 1–40)
BILIRUB CONJ SERPL-MCNC: <0.2 MG/DL (ref 0–0.3)
BILIRUB INDIRECT SERPL-MCNC: ABNORMAL MG/DL
BILIRUB SERPL-MCNC: 0.2 MG/DL (ref 0–1.2)
BILIRUB SERPL-MCNC: 0.2 MG/DL (ref 0–1.2)
BUN SERPL-MCNC: 24 MG/DL (ref 8–23)
BUN SERPL-MCNC: 26 MG/DL (ref 8–23)
BUN/CREAT SERPL: 42.1 (ref 7–25)
BUN/CREAT SERPL: 45.6 (ref 7–25)
CA-I SERPL ISE-MCNC: 1.27 MMOL/L (ref 1.12–1.32)
CALCIUM SPEC-SCNC: 8.5 MG/DL (ref 8.6–10.5)
CALCIUM SPEC-SCNC: 8.9 MG/DL (ref 8.6–10.5)
CHLORIDE SERPL-SCNC: 105 MMOL/L (ref 98–107)
CHLORIDE SERPL-SCNC: 105 MMOL/L (ref 98–107)
CHOLEST SERPL-MCNC: 108 MG/DL (ref 0–200)
CO2 SERPL-SCNC: 19 MMOL/L (ref 22–29)
CO2 SERPL-SCNC: 21 MMOL/L (ref 22–29)
CREAT SERPL-MCNC: 0.57 MG/DL (ref 0.76–1.27)
CREAT SERPL-MCNC: 0.57 MG/DL (ref 0.76–1.27)
CRP SERPL-MCNC: 18.23 MG/DL (ref 0–0.5)
DEPRECATED RDW RBC AUTO: 56 FL (ref 37–54)
EGFRCR SERPLBLD CKD-EPI 2021: 111.5 ML/MIN/1.73
EGFRCR SERPLBLD CKD-EPI 2021: 111.5 ML/MIN/1.73
ERYTHROCYTE [DISTWIDTH] IN BLOOD BY AUTOMATED COUNT: 14.7 % (ref 12.3–15.4)
GLOBULIN UR ELPH-MCNC: 2.6 GM/DL
GLUCOSE BLDC GLUCOMTR-MCNC: 89 MG/DL (ref 70–130)
GLUCOSE SERPL-MCNC: 95 MG/DL (ref 65–99)
GLUCOSE SERPL-MCNC: 96 MG/DL (ref 65–99)
HCT VFR BLD AUTO: 39.9 % (ref 37.5–51)
HGB BLD-MCNC: 13.9 G/DL (ref 13–17.7)
MAGNESIUM SERPL-MCNC: 2.4 MG/DL (ref 1.6–2.4)
MCH RBC QN AUTO: 36.1 PG (ref 26.6–33)
MCHC RBC AUTO-ENTMCNC: 34.8 G/DL (ref 31.5–35.7)
MCV RBC AUTO: 103.6 FL (ref 79–97)
PHOSPHATE SERPL-MCNC: 3.5 MG/DL (ref 2.5–4.5)
PLATELET # BLD AUTO: 517 10*3/MM3 (ref 140–450)
PMV BLD AUTO: 9.4 FL (ref 6–12)
POTASSIUM SERPL-SCNC: 3.9 MMOL/L (ref 3.5–5.2)
POTASSIUM SERPL-SCNC: 3.9 MMOL/L (ref 3.5–5.2)
PREALB SERPL-MCNC: 8.8 MG/DL (ref 20–40)
PROT SERPL-MCNC: 5.7 G/DL (ref 6–8.5)
PROT SERPL-MCNC: 5.7 G/DL (ref 6–8.5)
RBC # BLD AUTO: 3.85 10*6/MM3 (ref 4.14–5.8)
SODIUM SERPL-SCNC: 142 MMOL/L (ref 136–145)
SODIUM SERPL-SCNC: 142 MMOL/L (ref 136–145)
TRIGL SERPL-MCNC: 148 MG/DL (ref 0–150)
WBC NRBC COR # BLD AUTO: 11.92 10*3/MM3 (ref 3.4–10.8)

## 2024-01-26 PROCEDURE — 25010000002 METOCLOPRAMIDE PER 10 MG: Performed by: SURGERY

## 2024-01-26 PROCEDURE — 25010000002 DIPHENHYDRAMINE PER 50 MG: Performed by: SURGERY

## 2024-01-26 PROCEDURE — 02HV33Z INSERTION OF INFUSION DEVICE INTO SUPERIOR VENA CAVA, PERCUTANEOUS APPROACH: ICD-10-PCS | Performed by: SURGERY

## 2024-01-26 PROCEDURE — 86140 C-REACTIVE PROTEIN: CPT | Performed by: SURGERY

## 2024-01-26 PROCEDURE — 84100 ASSAY OF PHOSPHORUS: CPT | Performed by: SURGERY

## 2024-01-26 PROCEDURE — 25010000002 CALCIUM GLUCONATE PER 10 ML

## 2024-01-26 PROCEDURE — 25010000002 PIPERACILLIN SOD-TAZOBACTAM PER 1 G: Performed by: SURGERY

## 2024-01-26 PROCEDURE — 80053 COMPREHEN METABOLIC PANEL: CPT | Performed by: SURGERY

## 2024-01-26 PROCEDURE — 94799 UNLISTED PULMONARY SVC/PX: CPT

## 2024-01-26 PROCEDURE — 82948 REAGENT STRIP/BLOOD GLUCOSE: CPT

## 2024-01-26 PROCEDURE — 94664 DEMO&/EVAL PT USE INHALER: CPT

## 2024-01-26 PROCEDURE — 84478 ASSAY OF TRIGLYCERIDES: CPT | Performed by: SURGERY

## 2024-01-26 PROCEDURE — 82465 ASSAY BLD/SERUM CHOLESTEROL: CPT | Performed by: SURGERY

## 2024-01-26 PROCEDURE — 3E0436Z INTRODUCTION OF NUTRITIONAL SUBSTANCE INTO CENTRAL VEIN, PERCUTANEOUS APPROACH: ICD-10-PCS | Performed by: SURGERY

## 2024-01-26 PROCEDURE — 25810000003 SODIUM CHLORIDE 0.9 % SOLUTION: Performed by: SURGERY

## 2024-01-26 PROCEDURE — 84134 ASSAY OF PREALBUMIN: CPT | Performed by: SURGERY

## 2024-01-26 PROCEDURE — 82330 ASSAY OF CALCIUM: CPT | Performed by: SURGERY

## 2024-01-26 PROCEDURE — 25010000002 HEPARIN (PORCINE) PER 1000 UNITS: Performed by: SURGERY

## 2024-01-26 PROCEDURE — 83735 ASSAY OF MAGNESIUM: CPT | Performed by: SURGERY

## 2024-01-26 PROCEDURE — 85027 COMPLETE CBC AUTOMATED: CPT | Performed by: SURGERY

## 2024-01-26 PROCEDURE — 82248 BILIRUBIN DIRECT: CPT | Performed by: SURGERY

## 2024-01-26 PROCEDURE — 25010000002 MAGNESIUM SULFATE PER 500 MG OF MAGNESIUM

## 2024-01-26 RX ORDER — IBUPROFEN 600 MG/1
1 TABLET ORAL
Status: DISCONTINUED | OUTPATIENT
Start: 2024-01-26 | End: 2024-02-15 | Stop reason: HOSPADM

## 2024-01-26 RX ORDER — DEXTROSE MONOHYDRATE 25 G/50ML
25 INJECTION, SOLUTION INTRAVENOUS
Status: DISCONTINUED | OUTPATIENT
Start: 2024-01-26 | End: 2024-02-15 | Stop reason: HOSPADM

## 2024-01-26 RX ORDER — METOCLOPRAMIDE HYDROCHLORIDE 5 MG/ML
10 INJECTION INTRAMUSCULAR; INTRAVENOUS EVERY 6 HOURS SCHEDULED
Status: DISCONTINUED | OUTPATIENT
Start: 2024-01-26 | End: 2024-02-14

## 2024-01-26 RX ORDER — NICOTINE POLACRILEX 4 MG
15 LOZENGE BUCCAL
Status: DISCONTINUED | OUTPATIENT
Start: 2024-01-26 | End: 2024-02-15 | Stop reason: HOSPADM

## 2024-01-26 RX ADMIN — POTASSIUM PHOSPHATE, MONOBASIC POTASSIUM PHOSPHATE, DIBASIC: 224; 236 INJECTION, SOLUTION, CONCENTRATE INTRAVENOUS at 18:30

## 2024-01-26 RX ADMIN — PIPERACILLIN SODIUM AND TAZOBACTAM SODIUM 3.38 G: 3; .375 INJECTION, SOLUTION INTRAVENOUS at 15:30

## 2024-01-26 RX ADMIN — PANTOPRAZOLE SODIUM 40 MG: 40 INJECTION, POWDER, LYOPHILIZED, FOR SOLUTION INTRAVENOUS at 07:31

## 2024-01-26 RX ADMIN — BUDESONIDE AND FORMOTEROL FUMARATE DIHYDRATE 2 PUFF: 160; 4.5 AEROSOL RESPIRATORY (INHALATION) at 10:43

## 2024-01-26 RX ADMIN — PIPERACILLIN SODIUM AND TAZOBACTAM SODIUM 3.38 G: 3; .375 INJECTION, SOLUTION INTRAVENOUS at 09:56

## 2024-01-26 RX ADMIN — BUPROPION HYDROCHLORIDE 150 MG: 150 TABLET, EXTENDED RELEASE ORAL at 08:23

## 2024-01-26 RX ADMIN — HEPARIN SODIUM 5000 UNITS: 5000 INJECTION INTRAVENOUS; SUBCUTANEOUS at 14:01

## 2024-01-26 RX ADMIN — ACETAMINOPHEN 650 MG: 325 TABLET ORAL at 11:32

## 2024-01-26 RX ADMIN — ROSUVASTATIN 10 MG: 10 TABLET, FILM COATED ORAL at 22:22

## 2024-01-26 RX ADMIN — SODIUM CHLORIDE 100 ML/HR: 9 INJECTION, SOLUTION INTRAVENOUS at 09:57

## 2024-01-26 RX ADMIN — ALBUTEROL SULFATE 2 PUFF: 90 AEROSOL, METERED RESPIRATORY (INHALATION) at 21:01

## 2024-01-26 RX ADMIN — SMOFLIPID 100 ML: 6; 6; 5; 3 INJECTION, EMULSION INTRAVENOUS at 18:30

## 2024-01-26 RX ADMIN — METOCLOPRAMIDE HYDROCHLORIDE 10 MG: 5 INJECTION INTRAMUSCULAR; INTRAVENOUS at 17:52

## 2024-01-26 RX ADMIN — SODIUM CHLORIDE 100 ML/HR: 9 INJECTION, SOLUTION INTRAVENOUS at 02:50

## 2024-01-26 RX ADMIN — METOCLOPRAMIDE HYDROCHLORIDE 10 MG: 5 INJECTION INTRAMUSCULAR; INTRAVENOUS at 11:11

## 2024-01-26 RX ADMIN — DIPHENHYDRAMINE HYDROCHLORIDE 25 MG: 50 INJECTION INTRAMUSCULAR; INTRAVENOUS at 22:22

## 2024-01-26 RX ADMIN — METOCLOPRAMIDE HYDROCHLORIDE 10 MG: 5 INJECTION INTRAMUSCULAR; INTRAVENOUS at 07:31

## 2024-01-26 RX ADMIN — PIPERACILLIN SODIUM AND TAZOBACTAM SODIUM 3.38 G: 3; .375 INJECTION, SOLUTION INTRAVENOUS at 22:22

## 2024-01-26 RX ADMIN — SENNOSIDES 1 TABLET: 8.6 TABLET, FILM COATED ORAL at 22:22

## 2024-01-26 RX ADMIN — HEPARIN SODIUM 5000 UNITS: 5000 INJECTION INTRAVENOUS; SUBCUTANEOUS at 06:55

## 2024-01-26 RX ADMIN — HEPARIN SODIUM 5000 UNITS: 5000 INJECTION INTRAVENOUS; SUBCUTANEOUS at 22:22

## 2024-01-26 RX ADMIN — ALBUTEROL SULFATE 2 PUFF: 90 AEROSOL, METERED RESPIRATORY (INHALATION) at 10:43

## 2024-01-26 RX ADMIN — BUDESONIDE AND FORMOTEROL FUMARATE DIHYDRATE 2 PUFF: 160; 4.5 AEROSOL RESPIRATORY (INHALATION) at 21:01

## 2024-01-26 NOTE — NURSING NOTE
[facility] visit for Stoma care and assessment    Surgery date: 01/17/24  Surgical Procedures Since Admission:  Procedure(s):  CHOLECYSTECTOMY LAPAROSCOPIC  OPEN SIGMOID COLECTOMY, LIVER BIOPSY, DIVERTING LOOP ILEOSTOMY CREATION, TAKE DOWN OF THE SPLENIC FLEXURE, AND APPENDECTOMY  Surgeon:  Jimmy Craig MD  Status:  9 Days Post-Op  -------------------     Patient resting flat in bed, utilizing sponge for ice chip/mouth swab purposes.  NG tube now clamped checking residuals.  Patient seen Medical/Surgical Floor. patient's spouse at beside.     Stoma Type: Loop Ileostomy  Diameter/shape: 38mm  Location: RLQ  Protrusion: Budded  Stoma Characteristics: Edematous, Moist, and Pink  Peristomal skin: Intact, slight erythema no breakdown  Character of output: Greenish-brown, quite a bit thicker than previously  Emptying frequency per day: per nursing flowsheet  Current pouching system: Bam 2 piece 2 and three-quarter flat with barrier ring connected to high output bag.--still intact but changed for lesson purposes  Accessory products, appliance placed:   Goal wear time:3-4 days  Last appliance change: 1/26-95% of the work was done by Ambar.  Image:     Surgical Incision: Midline abdominal incision  Degree of approximation: 100  Approximating Devices: staples  Drainage Characteristics:none  Method of Management: open    Drain(s) type: DELROY drain  Effluent characteristics: Not assessed today    Education provided:   Showering/Bathing, How to open/close the drainable end of the appliance, How to empty the appliance, Indications for changing the appliance, Stool characteristics for the type of stoma created, Lesson #2 - appliance change process. Completed by patient/support person, Accessory products reviewed, When to seek medical attention, and Discharge supplies provided  Very proud of Ambar for changing appliance by herself, if patient is still here Monday [facility] will provide quick appliance change and work with Ambar and  Roger on setting up outpatient appointment at their convenience.  Marked catalog for ordering which patient and family have been instructed to do 1 day after discharge.  Marked 2-1/4 flat 2 piece Bam with CeraPlus barrier ring.  Discharge supplies have been provided.    [facility] Nurse will continue to follow for ostomy education. Please contact #[phone] with questions or if ostomy leaks occur.

## 2024-01-26 NOTE — CASE MANAGEMENT/SOCIAL WORK
Continued Stay Note  [facility]     Patient Name: Roger Bhat  MRN: [id number]  Today's Date: 1/26/2024    Admit Date: 1/17/2024    Plan: Home with wife transporting   Discharge Plan       Row Name 01/26/24 1102       Plan    Plan Home with wife transporting    Plan Comments SW'er met with patient and wife at bedside. No discharge needs at this time. Patient still has NG tube.Patient is starting IV Abx. CM following for discharge planning needs.  Plan is home with wife transporting.    Final Discharge Disposition Code 01 - home or self-care                   Discharge Codes    No documentation.                 Expected Discharge Date and Time       Expected Discharge Date Expected Discharge Time    Jan 29, 2024               DEMETRIS Parmar (Kay)

## 2024-01-26 NOTE — PROGRESS NOTES
"Patient Name:  Roger Bhat  YOB: 1960  [id number]    Surgery Progress Note    Date of visit: 1/26/2024      Subjective: No acute events.  Overall feeling quite a bit better and tells me that he is quite hungry.  No fevers or chills.  Denies any abdominal pain.  NG had over 2 L of output yesterday, however also taking in quite a bit of ice chips.  Stoma with 450 mL of output.  DELROY with low output.  Ambulating multiple times          Objective:     /84 (BP Location: Left arm, Patient Position: Lying)   Pulse 104   Temp 98 °F (36.7 °C) (Axillary)   Resp 18   Ht 175.3 cm (69\")   Wt 61.2 kg (135 lb)   SpO2 91%   BMI 19.94 kg/m²     Intake/Output Summary (Last 24 hours) at 1/26/2024 0809  Last data filed at 1/26/2024 0600  Gross per 24 hour   Intake 105 ml   Output 4410 ml   Net -4305 ml       GEN:   Awake, alert, in no acute distress, resting comfortably in bed   CV:      Regular rate and rhythm  L:         Symmetric expansion, not labored on room air  Abd:    Soft, not obviously distended, appropriately tender palpation on midline incision,  Incision is clean dry intact, ileostomy in the right side is pink and patent with some liquid stool in the bag, DELROY drain in the right lower quadrant with a small amount of dark turbid output  Ext:     No cyanosis, clubbing, or edema    Recent labs that are back at this time have been reviewed.           Assessment/ Plan:    Mr. Bhat is a 63-year-old gentleman who is admitted following operative intervention for gallbladder and sigmoid colon mass on January 17     #Sigmoid colon mass, gallbladder mass  # Ileus  -Postop day 9 following lap vladimir, open sigmoid colectomy with diverting loop ileostomy  -Afebrile  -Labs unchanged.  White count stable at 11.  Hemoglobin 13.9.  Chemistries without acute findings  -Having ileostomy output, 500 mL yesterday.  Still with over 2 L of NG tube output yesterday  -CT scan yesterday was personally reviewed.  " This does not demonstrate any obvious infectious source such as an abscess or fluid collection.  There is some generalized distention of the small bowel consistent with ileus.  -Will restart his Reglan.  -Clamp NG tube today and check residuals.    -He has been afebrile and does not have a significant leukocytosis.  There is no significant fluid collection on his CT scan.  I do have some concern for an anastomotic leak however given the character of his drain output.  Regardless he is diverted proximally and this is clearly being addressed by the drain and his CT findings.  I do have concern that a local inflammatory response may be contributing to his ileus and I will therefore go ahead and empirically start him on antibiotics.  KENY Craig MD  1/26/2024  08:09 EST

## 2024-01-26 NOTE — PROGRESS NOTES
Malnutrition Severity Assessment    Patient Name:  Roger Bhat  YOB: 1960  MRN: [id number]  Admit Date:  1/17/2024    Patient meets criteria for : Severe Malnutrition (PO intakes <50% EEN >/=5 days and loss 17.8% body weight within past month, moderate muscle wasting, and moderate subcutaneous fat loss.)    Malnutrition Severity Assessment  Malnutrition Type: Acute Disease or Injury - Related Malnutrition  Malnutrition Type (last 8 hours)       Malnutrition Severity Assessment       Row Name 01/26/24 1127       Malnutrition Severity Assessment    Malnutrition Type Acute Disease or Injury - Related Malnutrition      Row Name 01/26/24 1126       Malnutrition Severity Assessment    Malnutrition Type Acute Disease or Injury - Related Malnutrition      Row Name 01/26/24 1127       Insufficient Energy Intake     Insufficient Energy Intake Findings Severe  PO intakes <50% EEN >/=5 days    Insufficient Energy Intake  < or equal to 50% of est. energy requirement for > or equal to 5d)      Row Name 01/26/24 1127       Unintentional Weight Loss     Unintentional Weight Loss Findings Severe  Loss 17.8% body weight within past month    Unintentional Weight Loss  Weight loss greater than 5% in one month      Row Name 01/26/24 1127       Muscle Loss    Loss of Muscle Mass Findings --  moderate muscle wasting and moderate subcutaneous fat loss    [place] Region Moderate - slight depression    Clavicle Bone Region Moderate - some protrusion in females, visible in males    Acromion Bone Region Moderate - acromion may slightly protrude    Scapular Bone Region Moderate - mild depression, bones may show slightly    Dorsal Hand Region Moderate - slight depression    Patellar Region Moderate - patella more prominent, less muscle definition around patella    Anterior Thigh Region Moderate - mild depression on inner thigh    Posterior Calf Region Moderate - some roundness, slight firmness      Row Name 01/26/24 1127        Fat Loss    Subcutaneous Fat Loss Findings Moderate    Orbital Region  Moderate -  somewhat hollowness, slightly dark circles    Upper Arm Region Moderate - some fat tissue, not ample      Row Name 01/26/24 1127       Criteria Met (Must meet criteria for severity in at least 2 of these categories: M Wasting, Fat Loss, Fluid, Secondary Signs, Wt. Status, Intake)    Patient meets criteria for  Severe Malnutrition  PO intakes <50% EEN >/=5 days and loss 17.8% body weight within past month, moderate muscle wasting, and moderate subcutaneous fat loss.                    Electronically signed by:  Stacie Madlonado RD  01/26/24 11:30 EST

## 2024-01-26 NOTE — PLAN OF CARE
Goal Outcome Evaluation:  Plan of Care Reviewed With: patient           Outcome Evaluation: A&O x4. On RA. SBP 120s-150. Tachycardic. NPO with minimal sips and chips allowed. 1800 ml output from NG. 425 ml output from ostomy. Minimal turbid output from DELROY. IV fluids continue. 300 ml concentrated UOP. Midline incision LEONEL. PRN zofran given x1. Pt states he feels better than last night. Pt slept well.

## 2024-01-26 NOTE — CONSULTS
Clinical Nutrition   Nutrition Assessment  Reason for Visit: Follow-up protocol, PN    Patient Name: Roger Bhat  YOB: 1960  MRN: [id number]  Date of Encounter: 01/26/24 10:14 EST  Admission date: 1/17/2024    RD recommends the following for PPN via peripheral line:  Initiate D5% and AA4.25% @ 20 ml/hr and advance by 15 ml/hr Q 8 hr to goal rate of 50 ml/hr.  Provide 100 ml 20% SMOF lipids daily.     At goal this regimen will provide: 608 kcal(45% est. needs), 51 g protein(67% est. needs)   TGV= 1.2L, GIR= 0.76 mg/kg/min, Total Osmolarity 838 mmol/L    Pt meets criteria severe malnutrition in the context of acute illness, see MSA completed for full assessment, completed 1/24 updated today with newly obtained weight. 2/2 preporation and NPO status for surgery followed by slow to return bowel function and eventual intolerance diet advancement. Note highly significant weight loss of 17.8% body weight within the past month.     Nutrition Assessment   Admission Diagnosis:  Colonic mass [K63.89]    Problem List:    Colonic mass      PMH:   He  has a past medical history of Allergies, Cellulitis of hand, Clotting disorder, Colonic mass, COPD (chronic obstructive pulmonary disease), Cough, Current smoker, Erectile dysfunction, Fracture, foot (Sept 7 2022), GERD (gastroesophageal reflux disease), Hemochromatosis, Hyperlipidemia, Hypertension, and Wears dentures.    PSH:  He  has a past surgical history that includes Esophagogastroduodenoscopy (05/2016); Colonoscopy (2021); Esophagogastroduodenoscopy (N/A, 10/19/2021); Esophagogastroduodenoscopy (N/A, 11/16/2021); Hand surgery (2022); Cholecystectomy (N/A, 1/17/2024); and Colectomy (N/A, 1/17/2024).    Applicable Nutrition Concerns:   Skin:  Oral:  GI: s/p lap vladimir, open sigmoid colectomy with diverting loop ileostomy     Applicable Interval History:   (1/26) CT A/P - Impression:  1.Imaging features most consistent with small bowel  adynamic ileus. No focal transition point identified to suggest mechanical obstruction.  2.Postoperative changes as detailed above. Minimal free fluid surrounding right lower quadrant surgical drain. No abscess identified.  3.Other incidental findings as detailed above.    Reported/Observed/Food/Nutrition Related History:     1/26) Spoke with surgeon this morning, okay with PPN as CT suggestive of ileus and pt will need more days NPO. Hopeful will not need long as predict return of bowel function in coming days thus will initiate PPN rather than TPN. Pt also started on reglan and bowel regimen as well as IVF. Discussed with pt and spouse at bedside, in understanding of plan. Pt feeling much better but still with significant NG output that is feculent. Hopeful to be able to eat in near future just really wants a banana. RD explained rationale and importance gut rest at this time and pt understands.     1/25) Pt unable to tolerate oral intake, day 10 without any significant source nutrition. Now NPO with NG to LWS. KUB obtained suggestive of ileus or SBO.     1/24) Visited with pt and spouse at bedside for consult per WOC for new ileostomy education/high ileostomy output. Unfortunately pt's significant nutrition concerns were not reported prior and pt is new to RD, due to no previous consults and no nutritional concerns reported on nursing screen. Together pt and spouse tell me pt has not had any signifiant oral intake in 9 days due to preporation and NPO status for surgery followed by slow to return bowel function and eventual intolerance diet advancement. Did drink Impact AR per surgery protocol in the days prior to surgery.They note he has lost a lot of weight and muscle. He has emesis bag at time of my visit and tells me he only got sick once yesterday but many times today despite no oral intake. All he has been able to keep down today is 8 oz fluid (4oz gatorade and 4oz juice). RD encouraged liquids as needed and  trying solids when able.   Discussed with pt and spouse that given above as well as high ileostomy output and increased energy/protein needs from significant surgery, RD with significant concern for pt's nutrition and hydration status. They tell me no one has mentioned possibility of nutrition support, RD educated this may be necessary if unable to tolerate PO soon. Of course would like to avoid but in understanding if needed. Nutritional education for new ileostomy and high ileostomy output introduced and will f/u as needed. More pertinent issue is getting pt to tolerate any oral nutrition at this time which would lend to normalized output. Pt and spouse receptive and appreciative of visit. Deny further needs or questions/concerns at this time, NKFA.     Labs    Labs Reviewed: Yes     Results from last 7 days   Lab Units 01/26/24  0500 01/25/24  0507 01/24/24  0510 01/22/24  0502   GLUCOSE mg/dL 95  96 101* 175* 133*   BUN mg/dL 24*  26* 29* 22 16   CREATININE mg/dL 0.57*  0.57* 0.66* 0.59* 0.48*   SODIUM mmol/L 142  142 137 130* 135*   CHLORIDE mmol/L 105  105 102 97* 101   POTASSIUM mmol/L 3.9  3.9 4.2 4.3 4.4   PHOSPHORUS mg/dL 3.5 3.6 2.9  --    MAGNESIUM mg/dL 2.4 2.3 2.1  --    ALT (SGPT) U/L 15  15  --   --  15       Results from last 7 days   Lab Units 01/26/24  0843 01/26/24  0500 01/22/24  0502   ALBUMIN g/dL  --  3.1*  3.1* 3.2*   CRP mg/dL  --  18.23*  --    IONIZED CALCIUM mmol/L 1.27  --   --    CHOLESTEROL mg/dL  --  108  --    TRIGLYCERIDES mg/dL  --  148  --            Lab Results   Lab Value Date/Time    HGBA1C 5.00 01/10/2024 1508    HGBA1C 5.3 11/28/2022 1111    HGBA1C 5.20 08/17/2021 1149               Medications    Medications Reviewed: Yes  Pertinent: insulin, reglan, protonix, abx, senokot  Infusion: NS 0.9% @ 100 ml/hr  PRN:     Intake/Ouptut 24 hrs (0701 - 0700)   I&O's Reviewed: Yes   Intake & Output (last day)         01/25 0701 01/26 0700 01/26 0701 01/27 0700    P.O. 205      "I.V. (mL/kg)  3075 (56.3)    IV Piggyback      Total Intake(mL/kg) 205 (3.3) 3075 (56.3)    Urine (mL/kg/hr) 500 (0.3)     Emesis/NG output 3450     Drains 10     Stool 450     Total Output 4410     Net -4205 +3075                Anthropometrics     Height: Height: 175.3 cm (69\")  Last Filed Weight: Weight: 54.6 kg (120 lb 6.4 oz) (01/26/24 0934)  Method: Weight Method: Standing scale  BMI: BMI (Calculated): 17.8  BMI classification: Normal: 18.5-24.9kg/m2  IBW:   155 lb    UBW:     Weight       Weight (kg) Weight (lbs) Weight Method Visit Report   11/30/2022 68 kg  149 lb 14.6 oz   --    1/25/2023 68 kg  149 lb 14.6 oz   --    4/26/2023 64.683 kg  142 lb 9.6 oz   --    10/26/2023 65.137 kg  143 lb 9.6 oz   --    12/15/2023 64.683 kg  142 lb 9.6 oz   --    1/8/2024 63.504 kg  140 lb      1/10/2024 66.25 kg  146 lb 0.9 oz  Standing scale     1/17/2024 66.25 kg  146 lb 0.9 oz      1/24/2024 61.236 kg  135 lb  Bed scale     1/26/2024 54.613 kg  120 lb 6.4 oz  Standing scale       Weight change: weight loss of 26 lbs (17.8%) over the past 2 week(s)    Intentional?  No    Nutrition Focused Physical Exam     Date:  1/24       Patient meets criteria for malnutrition diagnosis, see MSA note.     Needs Assessment   Date: 1/26    Height used:Height: 175.3 cm (69\")  Weights used: 54.6 kg (ABW)      Estimated Calorie needs: ~1350 Kcal/day initial goal  Method:  Kcals/KG 25 ABW = 1365  Method:  MSJ 1340    Estimated Protein needs: ~76 g PRO/day  Method: 1.3-1.5 g/Kg = 71-82    Estimated Fluid needs: ~ ml/day   Per clinical status    Current Nutrition Prescription   PO: NPO Diet NPO Type: Ice Chips  Oral Nutrition Supplement:   Intake: Insufficient data has not tolerated any more than sips liquids t/o admission per pt and spouse. 1 meal documented at 75%, inaccurate documentation.     Nutrition Diagnosis   Date:  1/24            Updated:    Problem Malnutrition  severe/acute   Etiology Energy needs>energy intake 2/2 recent " colectomy with ileostomy placement with slow to return bowel function followed by intolerance diet.    Signs/Symptoms PO intakes <50% EEN >/=5 days and loss 17.8% body weight with past 2 weeks, moderate muscle wasting, and moderate subcutaneous fat loss.    Status: Ongoing / worsened with updated standing scale weight, PPN to initiate for nutrition support    Date:   1/24    Updated:     Problem Food and nutrition knowledge deficit   Etiology S/p colectomy with new ileostomy complicated by high ileostomy output   Signs/Symptoms Pt with prior lack of knowledge appropriate MNT/diet for condition   Status: Improvement Shown Education provided    Goal:   General: Nutrition to support treatment  PO: Tolerate PO, Increase intake  EN/PN: Initiate PN     Nutrition Intervention      Follow treatment progress, Care plan reviewed, Nutrition support order placed, Education provided or new ileostomy and high ileostomy output, nutrition support    Initiate PPN    Monitoring/Evaluation:   Per protocol, I&O, PO intake, Supplement intake, Pertinent labs, GI status, Symptoms, POC/GOC      Stacie Maldonado RD, CNSC  Time Spent: 45m

## 2024-01-26 NOTE — PROGRESS NOTES
Pharmacy Parenteral Nutrition Evaluation    Roger Bhat is a  63 y.o. male receiving TPN.     Indication: prolonged ileus  Consulting Physician: Dr. Jimmy Craig    Total Fluid Rate (if stated):     Labs  Results from last 7 days   Lab Units 01/26/24  0500 01/25/24  0507 01/24/24  0510 01/22/24  0502   SODIUM mmol/L 142  142 137 130* 135*   POTASSIUM mmol/L 3.9  3.9 4.2 4.3 4.4   CHLORIDE mmol/L 105  105 102 97* 101   CO2 mmol/L 19.0*  21.0* 19.0* 22.0 25.0   BUN mg/dL 24*  26* 29* 22 16   CREATININE mg/dL 0.57*  0.57* 0.66* 0.59* 0.48*   CALCIUM mg/dL 8.5*  8.9 8.5* 8.4* 8.3*   BILIRUBIN mg/dL 0.2  0.2  --   --  0.3   ALK PHOS U/L 68  68  --   --  65   ALT (SGPT) U/L 15  15  --   --  15   AST (SGOT) U/L 9  9  --   --  13   GLUCOSE mg/dL 95  96 101* 175* 133*       Results from last 7 days   Lab Units 01/26/24  0843 01/26/24  0500 01/25/24  0507 01/24/24  0510   MAGNESIUM mg/dL  --  2.4 2.3 2.1   PHOSPHORUS mg/dL  --  3.5 3.6 2.9   PREALBUMIN mg/dL 8.8*  --   --   --        Results from last 7 days   Lab Units 01/26/24  0500 01/25/24  0507 01/24/24  0510   WBC 10*3/mm3 11.92* 11.66* 9.85   HEMOGLOBIN g/dL 13.9 14.2 14.5   HEMATOCRIT % 39.9 39.4 40.4   PLATELETS 10*3/mm3 517* 485* 420       Triglycerides   Date Value Ref Range Status   01/26/2024 148 0 - 150 mg/dL Final     Comment:     Falsely depressed results may occur on samples drawn from patients receiving N-Acetylcysteine (NAC) or Metamizole.       estimated creatinine clearance is 105.1 mL/min (A) (by C-G formula based on SCr of 0.57 mg/dL (L)).    Intake & Output (last 3 days)         01/23 0701 01/24 0700 01/24 0701 01/25 0700 01/25 0701 01/26 0700 01/26 0701 01/27 0700    P.O.  354 205     I.V. (mL/kg) 1200 (18.1) 2055.5 (33.6)  3075 (56.3)    IV Piggyback  500      Total Intake(mL/kg) 1200 (18.1) 2909.5 (47.5) 205 (3.3) 3075 (56.3)    Urine (mL/kg/hr) 300 (0.2) 405 (0.3) 500 (0.3)     Emesis/NG output 400 2150 3450     Drains  125 20 10     Stool 1500 1075 450     Total Output 2325 3650 4410     Net -1125 -740.5 -4205 +3075            Emesis Unmeasured Occurrence  3 x              Dietitian Recommendations  Diet Orders (active) (From admission, onward)       Start     Ordered    01/26/24 1800  Adult Cyclic Peripheral 2-in-1 TPN  Cyclic TPN - See Admin Instructions         01/26/24 1225    01/26/24 1800  Fat Emul Fish Oil/Plant Based (SMOFLIPID) 20 % emulsion 100 mL  Every 24 Hours Scheduled (TPN)         01/26/24 1225    01/25/24 1200  Dietary Nutrition Supplements Premier Protein  Daily With Lunch       01/24/24 1437    01/24/24 1800  Dietary Nutrition Supplements Boost Breeze (Clear Liquid)  Daily With Breakfast & Dinner       01/24/24 1438    01/24/24 1521  NPO Diet NPO Type: Ice Chips  Diet Effective Now         01/24/24 1521    01/17/24 2200  Snack: Chewing gum x30 minutes three times daily to increase saliva flow and provide gas pain relief. Do not give to ileostomy patients.  3 Times Daily      Comments: Chewing gum x30 minutes three times daily to increase saliva flow and provide gas pain relief.      Do not give to ileostomy patients.    01/17/24 1734                    Current TPN Regimen Recommendation:  Dextrose 5% / Amino Acid 4.25% at goal rate of 50 mL/hr.  20% Lipid Emulsion 100mL every 24 hours.    Na 30 mEq/L  K 20 mEq/L  Ca 4 mEq/L  Mg 6 mEq/L  PO4 4 mmol/L  Cl:Ace; max acetate    Assessment/Plan:  Begin PPN per dietary recommendations  Adjust tpn based on daily labs and tolerance to ppn itself.    Mango Mcnamara, PharmD  1/26/2024  12:28 EST

## 2024-01-27 LAB
ANION GAP SERPL CALCULATED.3IONS-SCNC: 16 MMOL/L (ref 5–15)
BUN SERPL-MCNC: 24 MG/DL (ref 8–23)
BUN/CREAT SERPL: 38.1 (ref 7–25)
CALCIUM SPEC-SCNC: 8.6 MG/DL (ref 8.6–10.5)
CHLORIDE SERPL-SCNC: 104 MMOL/L (ref 98–107)
CO2 SERPL-SCNC: 21 MMOL/L (ref 22–29)
CREAT SERPL-MCNC: 0.63 MG/DL (ref 0.76–1.27)
EGFRCR SERPLBLD CKD-EPI 2021: 108.2 ML/MIN/1.73
GLUCOSE BLDC GLUCOMTR-MCNC: 108 MG/DL (ref 70–130)
GLUCOSE BLDC GLUCOMTR-MCNC: 110 MG/DL (ref 70–130)
GLUCOSE BLDC GLUCOMTR-MCNC: 122 MG/DL (ref 70–130)
GLUCOSE BLDC GLUCOMTR-MCNC: 159 MG/DL (ref 70–130)
GLUCOSE BLDC GLUCOMTR-MCNC: 91 MG/DL (ref 70–130)
GLUCOSE SERPL-MCNC: 103 MG/DL (ref 65–99)
MAGNESIUM SERPL-MCNC: 2.3 MG/DL (ref 1.6–2.4)
PHOSPHATE SERPL-MCNC: 3.4 MG/DL (ref 2.5–4.5)
POTASSIUM SERPL-SCNC: 3.8 MMOL/L (ref 3.5–5.2)
SODIUM SERPL-SCNC: 141 MMOL/L (ref 136–145)

## 2024-01-27 PROCEDURE — 63710000001 INSULIN REGULAR HUMAN PER 5 UNITS: Performed by: SURGERY

## 2024-01-27 PROCEDURE — 25010000002 PIPERACILLIN SOD-TAZOBACTAM PER 1 G: Performed by: SURGERY

## 2024-01-27 PROCEDURE — 94664 DEMO&/EVAL PT USE INHALER: CPT

## 2024-01-27 PROCEDURE — 82948 REAGENT STRIP/BLOOD GLUCOSE: CPT

## 2024-01-27 PROCEDURE — 25010000002 MAGNESIUM SULFATE PER 500 MG OF MAGNESIUM

## 2024-01-27 PROCEDURE — 94799 UNLISTED PULMONARY SVC/PX: CPT

## 2024-01-27 PROCEDURE — 80048 BASIC METABOLIC PNL TOTAL CA: CPT | Performed by: SURGERY

## 2024-01-27 PROCEDURE — 25010000002 DIPHENHYDRAMINE PER 50 MG: Performed by: SURGERY

## 2024-01-27 PROCEDURE — 83735 ASSAY OF MAGNESIUM: CPT | Performed by: SURGERY

## 2024-01-27 PROCEDURE — 25010000002 CALCIUM GLUCONATE PER 10 ML

## 2024-01-27 PROCEDURE — 25010000002 HEPARIN (PORCINE) PER 1000 UNITS: Performed by: SURGERY

## 2024-01-27 PROCEDURE — 84100 ASSAY OF PHOSPHORUS: CPT | Performed by: SURGERY

## 2024-01-27 PROCEDURE — 25010000002 METOCLOPRAMIDE PER 10 MG: Performed by: SURGERY

## 2024-01-27 PROCEDURE — 25010000002 ONDANSETRON PER 1 MG: Performed by: SURGERY

## 2024-01-27 RX ORDER — MORPHINE SULFATE 2 MG/ML
2 INJECTION, SOLUTION INTRAMUSCULAR; INTRAVENOUS EVERY 4 HOURS PRN
Status: DISCONTINUED | OUTPATIENT
Start: 2024-01-27 | End: 2024-01-31

## 2024-01-27 RX ADMIN — PANTOPRAZOLE SODIUM 40 MG: 40 INJECTION, POWDER, LYOPHILIZED, FOR SOLUTION INTRAVENOUS at 06:35

## 2024-01-27 RX ADMIN — ONDANSETRON 4 MG: 2 INJECTION INTRAMUSCULAR; INTRAVENOUS at 18:21

## 2024-01-27 RX ADMIN — METOCLOPRAMIDE HYDROCHLORIDE 10 MG: 5 INJECTION INTRAMUSCULAR; INTRAVENOUS at 12:42

## 2024-01-27 RX ADMIN — METOCLOPRAMIDE HYDROCHLORIDE 10 MG: 5 INJECTION INTRAMUSCULAR; INTRAVENOUS at 00:40

## 2024-01-27 RX ADMIN — INSULIN HUMAN 2 UNITS: 100 INJECTION, SOLUTION PARENTERAL at 12:42

## 2024-01-27 RX ADMIN — HEPARIN SODIUM 5000 UNITS: 5000 INJECTION INTRAVENOUS; SUBCUTANEOUS at 06:35

## 2024-01-27 RX ADMIN — METOCLOPRAMIDE HYDROCHLORIDE 10 MG: 5 INJECTION INTRAMUSCULAR; INTRAVENOUS at 06:35

## 2024-01-27 RX ADMIN — PIPERACILLIN SODIUM AND TAZOBACTAM SODIUM 3.38 G: 3; .375 INJECTION, SOLUTION INTRAVENOUS at 15:57

## 2024-01-27 RX ADMIN — PIPERACILLIN SODIUM AND TAZOBACTAM SODIUM 3.38 G: 3; .375 INJECTION, SOLUTION INTRAVENOUS at 06:47

## 2024-01-27 RX ADMIN — METOCLOPRAMIDE HYDROCHLORIDE 10 MG: 5 INJECTION INTRAMUSCULAR; INTRAVENOUS at 18:21

## 2024-01-27 RX ADMIN — HEPARIN SODIUM 5000 UNITS: 5000 INJECTION INTRAVENOUS; SUBCUTANEOUS at 21:24

## 2024-01-27 RX ADMIN — ALBUTEROL SULFATE 2 PUFF: 90 AEROSOL, METERED RESPIRATORY (INHALATION) at 08:48

## 2024-01-27 RX ADMIN — METOCLOPRAMIDE HYDROCHLORIDE 10 MG: 5 INJECTION INTRAMUSCULAR; INTRAVENOUS at 23:40

## 2024-01-27 RX ADMIN — BUDESONIDE AND FORMOTEROL FUMARATE DIHYDRATE 2 PUFF: 160; 4.5 AEROSOL RESPIRATORY (INHALATION) at 20:27

## 2024-01-27 RX ADMIN — PIPERACILLIN SODIUM AND TAZOBACTAM SODIUM 3.38 G: 3; .375 INJECTION, SOLUTION INTRAVENOUS at 23:40

## 2024-01-27 RX ADMIN — ONDANSETRON 4 MG: 2 INJECTION INTRAMUSCULAR; INTRAVENOUS at 10:53

## 2024-01-27 RX ADMIN — ALBUTEROL SULFATE 2 PUFF: 90 AEROSOL, METERED RESPIRATORY (INHALATION) at 13:45

## 2024-01-27 RX ADMIN — DIPHENHYDRAMINE HYDROCHLORIDE 25 MG: 50 INJECTION INTRAMUSCULAR; INTRAVENOUS at 21:29

## 2024-01-27 RX ADMIN — SMOFLIPID 100 ML: 6; 6; 5; 3 INJECTION, EMULSION INTRAVENOUS at 21:20

## 2024-01-27 RX ADMIN — SENNOSIDES 1 TABLET: 8.6 TABLET, FILM COATED ORAL at 21:24

## 2024-01-27 RX ADMIN — BUDESONIDE AND FORMOTEROL FUMARATE DIHYDRATE 2 PUFF: 160; 4.5 AEROSOL RESPIRATORY (INHALATION) at 08:48

## 2024-01-27 RX ADMIN — POTASSIUM PHOSPHATE, MONOBASIC POTASSIUM PHOSPHATE, DIBASIC: 224; 236 INJECTION, SOLUTION, CONCENTRATE INTRAVENOUS at 18:22

## 2024-01-27 RX ADMIN — BUPROPION HYDROCHLORIDE 150 MG: 150 TABLET, EXTENDED RELEASE ORAL at 06:34

## 2024-01-27 RX ADMIN — ROSUVASTATIN 10 MG: 10 TABLET, FILM COATED ORAL at 21:24

## 2024-01-27 NOTE — PLAN OF CARE
Goal Outcome Evaluation:  Plan of Care Reviewed With: patient        Progress: improving  Outcome Evaluation: Pt rested well throughout the shift. VSS on RA. No c/o pain or nausea this shift. Pt residuals less than 300, no suction required. DELROY output cloudy and brown. Midline incision w staples shows some redness. Pt ambulated the unit during the shift. PPN increased to 35ml/hr. Lipids infusing as well. No other concerns noted at this time.

## 2024-01-27 NOTE — PLAN OF CARE
Goal Outcome Evaluation:           Progress: improving  Outcome Evaluation: VSS on RA. Headache pain managed w/prn tylenol x1. No other prns administered. Denies nausea. Tolerating ice chips, otherwise NPO. 375 UOP via urinal, urine is concentrated but per patient's significant other is improving. 550 mL output via ileostomy, output is green and loose. 10 mL output via RLQ DELROY, output is brown/green and malodorous. Ambulated in hallway several times this shift. Fluids infusing. PPN and lipids infusing via left IV; lipids at 8.33 mL/hr and PPN at 20 mL/hr to be advanced in 8 hours. NG in right nare clamped since 0700, residual checked q4. Residuals of 175 and 275 respectively, both returned via NG. Incision is red, clean, and dry. Significant other at bedside, attentive to patient. Calm, cooperative, pleasant. Care ongoing, continue to monitor.

## 2024-01-27 NOTE — PROGRESS NOTES
"Patient Name:  Roger Bhat  YOB: 1960  [id number]    Surgery Progress Note    Date of visit: 1/27/2024      Subjective: No acute events.  Tells me that he is quite hungry.  Ileostomy had 1350 mL of output yesterday.  NG tube was clamped yesterday and residuals were less than 300.  Ambulated.          Objective:     /86 (BP Location: Right arm, Patient Position: Lying)   Pulse 90   Temp 98 °F (36.7 °C) (Temporal)   Resp 17   Ht 175.3 cm (69\")   Wt 54.6 kg (120 lb 6.4 oz)   SpO2 91%   BMI 17.78 kg/m²     Intake/Output Summary (Last 24 hours) at 1/27/2024 0943  Last data filed at 1/27/2024 0916  Gross per 24 hour   Intake 3661 ml   Output 2105 ml   Net 1556 ml       GEN:   Awake, alert, in no acute distress, resting comfortably in bed   CV:      Regular rate and rhythm  L:         Symmetric expansion, not labored on room air  Abd:    Soft, not obviously distended, appropriately tender palpation on midline incision,  Incision is clean dry intact, ileostomy in the right side is pink and patent with liquid stool in the bag, DELROY drain in the right lower quadrant with a small amount of dark turbid output  Ext:     No cyanosis, clubbing, or edema    Recent labs that are back at this time have been reviewed.           Assessment/ Plan:    Mr. Bhat is a 63-year-old gentleman who is admitted following operative intervention for gallbladder and sigmoid colon mass on January 17     #Sigmoid colon mass, gallbladder mass  # Ileus  -Postop day 10 following lap vladimir, open sigmoid colectomy with diverting loop ileostomy  -Afebrile  -Ileus appears to be improving.  Residuals less than 300 yesterday from NGT and had 1350 mL of output from the ileostomy  -Discontinue NG today  -Start clear liquid diet. Continue PPN for now  -Continue antibiotics empirically due to concern for anastomotic leak related to character of drain output, however no fevers or leukocytosis or abscess on " CT.  -Mobilize      Jimmy Craig MD  1/27/2024  09:43 EST

## 2024-01-27 NOTE — PROGRESS NOTES
CPN Review Note    Patient Name: Roger Bhat  Date of Encounter: 01/27/24 09:10 EST  MRN: [id number]  Admission date: 1/17/2024    Reason for visit: CPN review . RD to continue to follow per protocol.     Information Obtained: Pt resting in bed during visit, wife at bedside. No nutrition concerns mentioned. PPN initiated yesterday, @ 35ml/hr (goal is 50ml/hr).    EMR reviewed:  yes  Medication reviewed: yes  Labs reviewed: yes    Current diet: NPO Diet NPO Type: Ice Chips  Adult Cyclic Peripheral 2-in-1 TPN    PN Route: Peripheral   PN:   D5%, AA4.25%               GIR:0.79  Lipid: 100ml 20% SMOF daily    PN@ Goal over 24hr to Supply:  Volume 1200 mL/day     Energy 608 Kcal/day 45 % Est Need   Protein 51 g/day 67 % Est Need     ---------------------------------------------------------------------------  Formula/Rate verified at bedside: Yes  Infusing Rate at time of visit: 35ml/hr    Average Delivery from Charting: Insufficient data   PN Volume  mL/day     Lipid delivered?  yes      Energy  Kcal/day  % Est Need   Protein  g/day  % Est Need       Intervention:  Follow treatment plan  Care plan reviewed  No PN change indicated at this time    Follow up:   Per protocol      Margie Bland, MS,RD,LD  09:10 EST  Time: 15min

## 2024-01-28 ENCOUNTER — APPOINTMENT (OUTPATIENT)
Dept: GENERAL RADIOLOGY | Facility: HOSPITAL | Age: 64
End: 2024-01-28
Payer: COMMERCIAL

## 2024-01-28 LAB
ANION GAP SERPL CALCULATED.3IONS-SCNC: 12 MMOL/L (ref 5–15)
BACTERIA UR QL AUTO: ABNORMAL /HPF
BILIRUB UR QL STRIP: ABNORMAL
BUN SERPL-MCNC: 18 MG/DL (ref 8–23)
BUN/CREAT SERPL: 34 (ref 7–25)
CALCIUM SPEC-SCNC: 8.5 MG/DL (ref 8.6–10.5)
CHLORIDE SERPL-SCNC: 100 MMOL/L (ref 98–107)
CLARITY UR: CLEAR
CO2 SERPL-SCNC: 24 MMOL/L (ref 22–29)
COLOR UR: ABNORMAL
CREAT SERPL-MCNC: 0.53 MG/DL (ref 0.76–1.27)
DEPRECATED RDW RBC AUTO: 53.7 FL (ref 37–54)
EGFRCR SERPLBLD CKD-EPI 2021: 114 ML/MIN/1.73
ERYTHROCYTE [DISTWIDTH] IN BLOOD BY AUTOMATED COUNT: 14.1 % (ref 12.3–15.4)
GLUCOSE BLDC GLUCOMTR-MCNC: 115 MG/DL (ref 70–130)
GLUCOSE BLDC GLUCOMTR-MCNC: 126 MG/DL (ref 70–130)
GLUCOSE BLDC GLUCOMTR-MCNC: 129 MG/DL (ref 70–130)
GLUCOSE BLDC GLUCOMTR-MCNC: 132 MG/DL (ref 70–130)
GLUCOSE SERPL-MCNC: 125 MG/DL (ref 65–99)
GLUCOSE UR STRIP-MCNC: NEGATIVE MG/DL
HCT VFR BLD AUTO: 39.7 % (ref 37.5–51)
HGB BLD-MCNC: 13.8 G/DL (ref 13–17.7)
HGB UR QL STRIP.AUTO: NEGATIVE
HYALINE CASTS UR QL AUTO: ABNORMAL /LPF
KETONES UR QL STRIP: ABNORMAL
LEUKOCYTE ESTERASE UR QL STRIP.AUTO: NEGATIVE
MAGNESIUM SERPL-MCNC: 2.1 MG/DL (ref 1.6–2.4)
MCH RBC QN AUTO: 35.6 PG (ref 26.6–33)
MCHC RBC AUTO-ENTMCNC: 34.8 G/DL (ref 31.5–35.7)
MCV RBC AUTO: 102.3 FL (ref 79–97)
NITRITE UR QL STRIP: NEGATIVE
PH UR STRIP.AUTO: 6 [PH] (ref 5–8)
PHOSPHATE SERPL-MCNC: 3 MG/DL (ref 2.5–4.5)
PLATELET # BLD AUTO: 559 10*3/MM3 (ref 140–450)
PMV BLD AUTO: 9.8 FL (ref 6–12)
POTASSIUM SERPL-SCNC: 3.9 MMOL/L (ref 3.5–5.2)
PROT UR QL STRIP: ABNORMAL
RBC # BLD AUTO: 3.88 10*6/MM3 (ref 4.14–5.8)
RBC # UR STRIP: ABNORMAL /HPF
REF LAB TEST METHOD: ABNORMAL
SODIUM SERPL-SCNC: 136 MMOL/L (ref 136–145)
SP GR UR STRIP: 1.04 (ref 1–1.03)
SQUAMOUS #/AREA URNS HPF: ABNORMAL /HPF
UROBILINOGEN UR QL STRIP: ABNORMAL
WBC # UR STRIP: ABNORMAL /HPF
WBC NRBC COR # BLD AUTO: 20.94 10*3/MM3 (ref 3.4–10.8)

## 2024-01-28 PROCEDURE — 94799 UNLISTED PULMONARY SVC/PX: CPT

## 2024-01-28 PROCEDURE — 25010000002 PIPERACILLIN SOD-TAZOBACTAM PER 1 G: Performed by: SURGERY

## 2024-01-28 PROCEDURE — 94664 DEMO&/EVAL PT USE INHALER: CPT

## 2024-01-28 PROCEDURE — 80048 BASIC METABOLIC PNL TOTAL CA: CPT | Performed by: SURGERY

## 2024-01-28 PROCEDURE — 83735 ASSAY OF MAGNESIUM: CPT | Performed by: SURGERY

## 2024-01-28 PROCEDURE — 71045 X-RAY EXAM CHEST 1 VIEW: CPT

## 2024-01-28 PROCEDURE — 25010000002 MAGNESIUM SULFATE PER 500 MG OF MAGNESIUM

## 2024-01-28 PROCEDURE — 25010000002 HEPARIN (PORCINE) PER 1000 UNITS: Performed by: SURGERY

## 2024-01-28 PROCEDURE — 82948 REAGENT STRIP/BLOOD GLUCOSE: CPT

## 2024-01-28 PROCEDURE — 25010000002 METOCLOPRAMIDE PER 10 MG: Performed by: SURGERY

## 2024-01-28 PROCEDURE — 84100 ASSAY OF PHOSPHORUS: CPT | Performed by: SURGERY

## 2024-01-28 PROCEDURE — 85027 COMPLETE CBC AUTOMATED: CPT | Performed by: SURGERY

## 2024-01-28 PROCEDURE — 25010000002 CALCIUM GLUCONATE PER 10 ML

## 2024-01-28 PROCEDURE — 25010000002 ONDANSETRON PER 1 MG: Performed by: SURGERY

## 2024-01-28 PROCEDURE — 81001 URINALYSIS AUTO W/SCOPE: CPT | Performed by: SURGERY

## 2024-01-28 RX ADMIN — METOCLOPRAMIDE HYDROCHLORIDE 10 MG: 5 INJECTION INTRAMUSCULAR; INTRAVENOUS at 17:48

## 2024-01-28 RX ADMIN — ALBUTEROL SULFATE 2 PUFF: 90 AEROSOL, METERED RESPIRATORY (INHALATION) at 18:48

## 2024-01-28 RX ADMIN — ONDANSETRON 4 MG: 2 INJECTION INTRAMUSCULAR; INTRAVENOUS at 21:20

## 2024-01-28 RX ADMIN — POTASSIUM PHOSPHATE, MONOBASIC POTASSIUM PHOSPHATE, DIBASIC: 224; 236 INJECTION, SOLUTION, CONCENTRATE INTRAVENOUS at 17:48

## 2024-01-28 RX ADMIN — PANTOPRAZOLE SODIUM 40 MG: 40 INJECTION, POWDER, LYOPHILIZED, FOR SOLUTION INTRAVENOUS at 06:38

## 2024-01-28 RX ADMIN — BUDESONIDE AND FORMOTEROL FUMARATE DIHYDRATE 2 PUFF: 160; 4.5 AEROSOL RESPIRATORY (INHALATION) at 08:41

## 2024-01-28 RX ADMIN — PIPERACILLIN SODIUM AND TAZOBACTAM SODIUM 3.38 G: 3; .375 INJECTION, SOLUTION INTRAVENOUS at 15:39

## 2024-01-28 RX ADMIN — HEPARIN SODIUM 5000 UNITS: 5000 INJECTION INTRAVENOUS; SUBCUTANEOUS at 15:39

## 2024-01-28 RX ADMIN — ROSUVASTATIN 10 MG: 10 TABLET, FILM COATED ORAL at 21:16

## 2024-01-28 RX ADMIN — METOCLOPRAMIDE HYDROCHLORIDE 10 MG: 5 INJECTION INTRAMUSCULAR; INTRAVENOUS at 12:56

## 2024-01-28 RX ADMIN — PIPERACILLIN SODIUM AND TAZOBACTAM SODIUM 3.38 G: 3; .375 INJECTION, SOLUTION INTRAVENOUS at 06:38

## 2024-01-28 RX ADMIN — BUDESONIDE AND FORMOTEROL FUMARATE DIHYDRATE 2 PUFF: 160; 4.5 AEROSOL RESPIRATORY (INHALATION) at 18:47

## 2024-01-28 RX ADMIN — PIPERACILLIN SODIUM AND TAZOBACTAM SODIUM 3.38 G: 3; .375 INJECTION, SOLUTION INTRAVENOUS at 23:35

## 2024-01-28 RX ADMIN — HEPARIN SODIUM 5000 UNITS: 5000 INJECTION INTRAVENOUS; SUBCUTANEOUS at 06:41

## 2024-01-28 RX ADMIN — METOCLOPRAMIDE HYDROCHLORIDE 10 MG: 5 INJECTION INTRAMUSCULAR; INTRAVENOUS at 23:35

## 2024-01-28 RX ADMIN — HEPARIN SODIUM 5000 UNITS: 5000 INJECTION INTRAVENOUS; SUBCUTANEOUS at 21:15

## 2024-01-28 RX ADMIN — BUPROPION HYDROCHLORIDE 150 MG: 150 TABLET, EXTENDED RELEASE ORAL at 06:41

## 2024-01-28 RX ADMIN — SMOFLIPID 100 ML: 6; 6; 5; 3 INJECTION, EMULSION INTRAVENOUS at 17:48

## 2024-01-28 RX ADMIN — METOCLOPRAMIDE HYDROCHLORIDE 10 MG: 5 INJECTION INTRAMUSCULAR; INTRAVENOUS at 06:39

## 2024-01-28 NOTE — PROGRESS NOTES
"Patient Name:  Roger Bhat  YOB: 1960  [id number]    Surgery Progress Note    Date of visit: 1/28/2024      Subjective: Had both his breakfast and lunch tray yesterday within a short period of time, and then experienced an episode of emesis following this.  An additional episode of emesis around 2 PM yesterday afternoon.  Has not had any repeat emesis or nausea and vomiting since then.  Ileostomy with 875 mL of output.  Denies fevers or chills.  Does note that he has more of a cough with some phlegm.  Urine output 675 mL.  Tells me that he feels much better today and has ambulated multiple times          Objective:     /83 (BP Location: Right arm, Patient Position: Lying)   Pulse 87   Temp 97.5 °F (36.4 °C) (Temporal)   Resp 19   Ht 175.3 cm (69\")   Wt 54.6 kg (120 lb 6.4 oz)   SpO2 92%   BMI 17.78 kg/m²     Intake/Output Summary (Last 24 hours) at 1/28/2024 1001  Last data filed at 1/28/2024 0933  Gross per 24 hour   Intake 1893 ml   Output 1600 ml   Net 293 ml     GEN:   Awake, alert, in no acute distress, resting comfortably in bed   CV:      Regular rate and rhythm  L:         Symmetric expansion, not labored on room air  Abd:    Soft, not obviously distended, appropriately tender palpation on midline incision,  Incision is clean dry intact, ileostomy in the right side is pink and patent with liquid stool in the bag, DELROY drain in the right lower quadrant with small amount of brown thick output  Ext:     No cyanosis, clubbing, or edema    Recent labs that are back at this time have been reviewed.         Assessment/ Plan:    Mr. Bhat is a 63-year-old gentleman who is admitted following operative intervention for gallbladder and sigmoid colon mass on January 17     #Sigmoid colon mass, gallbladder mass  # Ileus  -Postop day 11 following lap vladimir, open sigmoid colectomy with diverting loop ileostomy  -Afebrile. Vitals stable  -WBC up at 20K today. Labs otherwise without new " findings  -Good ileostomy output yesterday 875 mL. Had an episode of emesis however none for over 12 hrs now and ileus appears to be improving   -My concern is that his leukocytosis and ileus is at least in part related to an anastomotic leak due to the character of his drain output which appears feculent. Regardless he is diverted proximal to this and he had a CT 3 days ago that demonstrated no abscess or undrained infection. I expect this will improve with continue supportive therapies. Continue PPN and advance diet as tolerated for now. Will add a UA and CXR for today to ensure no other infectious source. Possible repeat CT tomorrow if worsening or lack of improvement       Jimmy Craig MD  1/28/2024  10:01 EST

## 2024-01-28 NOTE — PROGRESS NOTES
CPN Review Note    Patient Name: Roger Bhat  Date of Encounter: 24 08:58 EST  MRN: [id number]  Admission date: 2024    Reason for visit: CPN review . RD to continue to follow per protocol.     Information Obtained: Pt on CLD, states he ate his breakfast and lunch trays too quickly yesterday and vomited. Pt reports able to eat and tolerate an orange sherbet for dinner and likes/drank an Ensure apple. Pt didn't receive Ensure on breakfast tray, sent from kitchen and updated in orders. Drank 1/2 juice and 1/2 sherbet this am and tolerated per pt report.    EMR reviewed:  yes  Medication reviewed: yes  Labs reviewed: yes    Current diet: Diet: Liquid Diets; Clear Liquid; Fluid Consistency: Thin (IDDSI 0)  Adult Peripheral 2-in-1 TPN    PN Route: Peripheral   PN:   D5%, AA4.25%               GIR:0.79  Lipid: 100ml 20% SMOF daily    PN@ Goal over 24hr to Supply:  Volume 1200 mL/day     Energy 608 Kcal/day 45 % Est Need   Protein 51 g/day 67 % Est Need     ---------------------------------------------------------------------------  Formula/Rate verified at bedside: Yes  Infusing Rate at time of visit: 50ml/hr    Average Delivery from Chartin Day:   PN Volume 818 mL/day     Lipid delivered?  yes      Energy  Kcal/day  % Est Need   Protein  g/day  % Est Need       Intervention:  Follow treatment plan  Care plan reviewed  No PN change indicated at this time  Continue CLD, Ensure Apple TID-diet advancement per sx recs    Follow up:   Per protocol      Margie Bland, MS,RD,LD  08:58 EST  Time: 15min

## 2024-01-28 NOTE — PLAN OF CARE
Goal Outcome Evaluation:  Plan of Care Reviewed With: patient        Progress: improving  Outcome Evaluation: Pt rested well throughout shift. VSS on RA. Pt tolerating clear liquids. No c/o pain. Midline incision red. DELROY output brown and cloudy. PPN infusing at goal rate. No other concerns noted at this time.

## 2024-01-28 NOTE — PROGRESS NOTES
Pharmacy Parenteral Nutrition Evaluation    Roger Bhat is a  63 y.o. male receiving TPN.     Indication: prolonged ileus  Consulting Physician: Dr. Jimmy Craig    Total Fluid Rate (if stated):     Labs  Results from last 7 days   Lab Units 01/28/24  0459 01/27/24  0405 01/26/24  0500 01/24/24  0510 01/22/24  0502   SODIUM mmol/L 136 141 142  142   < > 135*   POTASSIUM mmol/L 3.9 3.8 3.9  3.9   < > 4.4   CHLORIDE mmol/L 100 104 105  105   < > 101   CO2 mmol/L 24.0 21.0* 19.0*  21.0*   < > 25.0   BUN mg/dL 18 24* 24*  26*   < > 16   CREATININE mg/dL 0.53* 0.63* 0.57*  0.57*   < > 0.48*   CALCIUM mg/dL 8.5* 8.6 8.5*  8.9   < > 8.3*   BILIRUBIN mg/dL  --   --  0.2  0.2  --  0.3   ALK PHOS U/L  --   --  68  68  --  65   ALT (SGPT) U/L  --   --  15  15  --  15   AST (SGOT) U/L  --   --  9  9  --  13   GLUCOSE mg/dL 125* 103* 95  96   < > 133*    < > = values in this interval not displayed.       Results from last 7 days   Lab Units 01/28/24  0459 01/27/24  0405 01/26/24  0843 01/26/24  0500   MAGNESIUM mg/dL 2.1 2.3  --  2.4   PHOSPHORUS mg/dL 3.0 3.4  --  3.5   PREALBUMIN mg/dL  --   --  8.8*  --        Results from last 7 days   Lab Units 01/28/24  0459 01/26/24  0500 01/25/24  0507   WBC 10*3/mm3 20.94* 11.92* 11.66*   HEMOGLOBIN g/dL 13.8 13.9 14.2   HEMATOCRIT % 39.7 39.9 39.4   PLATELETS 10*3/mm3 559* 517* 485*       Triglycerides   Date Value Ref Range Status   01/26/2024 148 0 - 150 mg/dL Final     Comment:     Falsely depressed results may occur on samples drawn from patients receiving N-Acetylcysteine (NAC) or Metamizole.       estimated creatinine clearance is 113 mL/min (A) (by C-G formula based on SCr of 0.53 mg/dL (L)).    Intake & Output (last 3 days)         01/23 0701 01/24 0700 01/24 0701 01/25 0700 01/25 0701 01/26 0700 01/26 0701 01/27 0700    P.O.  354 205     I.V. (mL/kg) 1200 (18.1) 2055.5 (33.6)  3075 (56.3)    IV Piggyback  500      Total Intake(mL/kg) 1200 (18.1)  2909.5 (47.5) 205 (3.3) 3075 (56.3)    Urine (mL/kg/hr) 300 (0.2) 405 (0.3) 500 (0.3)     Emesis/NG output 400 2150 3450     Drains 125 20 10     Stool 1500 1075 450     Total Output 2325 3650 4410     Net -1125 -740.5 -4205 +3075            Emesis Unmeasured Occurrence  3 x              Dietitian Recommendations  Diet Orders (active) (From admission, onward)       Start     Ordered    01/26/24 1800  Adult Cyclic Peripheral 2-in-1 TPN  Cyclic TPN - See Admin Instructions         01/26/24 1225    01/26/24 1800  Fat Emul Fish Oil/Plant Based (SMOFLIPID) 20 % emulsion 100 mL  Every 24 Hours Scheduled (TPN)         01/26/24 1225    01/25/24 1200  Dietary Nutrition Supplements Premier Protein  Daily With Lunch       01/24/24 1437    01/24/24 1800  Dietary Nutrition Supplements Boost Breeze (Clear Liquid)  Daily With Breakfast & Dinner       01/24/24 1438    01/24/24 1521  NPO Diet NPO Type: Ice Chips  Diet Effective Now         01/24/24 1521    01/17/24 2200  Snack: Chewing gum x30 minutes three times daily to increase saliva flow and provide gas pain relief. Do not give to ileostomy patients.  3 Times Daily      Comments: Chewing gum x30 minutes three times daily to increase saliva flow and provide gas pain relief.      Do not give to ileostomy patients.    01/17/24 2064                    Current TPN Regimen Recommendation:  Dextrose 5% / Amino Acid 4.25% at goal rate of 50 mL/hr.  20% Lipid Emulsion 100mL every 24 hours.    Na 30 mEq/L  K 20 mEq/L  Ca 4 mEq/L  Mg 6 mEq/L  PO4 4 mmol/L  Cl:Ace; max acetate    Assessment/Plan:  TPN started yesterday.  No changes today per dietary.  TPN currenly infusing at goal rate of 50ml/hr.  Will continue with standard peripheral electrolytes, with max acetate.  Adjust tpn based on daily labs and tolerance to ppn itself.      Mariya Gonzalez [facility]  1/28/2024  10:41 EST

## 2024-01-29 ENCOUNTER — APPOINTMENT (OUTPATIENT)
Dept: CT IMAGING | Facility: HOSPITAL | Age: 64
End: 2024-01-29
Payer: COMMERCIAL

## 2024-01-29 LAB
ALBUMIN SERPL-MCNC: 3.2 G/DL (ref 3.5–5.2)
ALBUMIN/GLOB SERPL: 1.3 G/DL
ALP SERPL-CCNC: 79 U/L (ref 39–117)
ALT SERPL W P-5'-P-CCNC: 18 U/L (ref 1–41)
ANION GAP SERPL CALCULATED.3IONS-SCNC: 12 MMOL/L (ref 5–15)
AST SERPL-CCNC: 16 U/L (ref 1–40)
BILIRUB CONJ SERPL-MCNC: <0.2 MG/DL (ref 0–0.3)
BILIRUB SERPL-MCNC: 0.3 MG/DL (ref 0–1.2)
BUN SERPL-MCNC: 19 MG/DL (ref 8–23)
BUN/CREAT SERPL: 41.3 (ref 7–25)
CA-I SERPL ISE-MCNC: 1.27 MMOL/L (ref 1.12–1.32)
CALCIUM SPEC-SCNC: 8.6 MG/DL (ref 8.6–10.5)
CHLORIDE SERPL-SCNC: 102 MMOL/L (ref 98–107)
CO2 SERPL-SCNC: 21 MMOL/L (ref 22–29)
CREAT SERPL-MCNC: 0.46 MG/DL (ref 0.76–1.27)
CRP SERPL-MCNC: 9.45 MG/DL (ref 0–0.5)
DEPRECATED RDW RBC AUTO: 53.6 FL (ref 37–54)
EGFRCR SERPLBLD CKD-EPI 2021: 119 ML/MIN/1.73
ERYTHROCYTE [DISTWIDTH] IN BLOOD BY AUTOMATED COUNT: 14.1 % (ref 12.3–15.4)
GLOBULIN UR ELPH-MCNC: 2.5 GM/DL
GLUCOSE BLDC GLUCOMTR-MCNC: 107 MG/DL (ref 70–130)
GLUCOSE BLDC GLUCOMTR-MCNC: 120 MG/DL (ref 70–130)
GLUCOSE BLDC GLUCOMTR-MCNC: 122 MG/DL (ref 70–130)
GLUCOSE BLDC GLUCOMTR-MCNC: 125 MG/DL (ref 70–130)
GLUCOSE SERPL-MCNC: 123 MG/DL (ref 65–99)
HCT VFR BLD AUTO: 40.3 % (ref 37.5–51)
HGB BLD-MCNC: 14 G/DL (ref 13–17.7)
INR PPP: 1 (ref 0.89–1.12)
MAGNESIUM SERPL-MCNC: 2.2 MG/DL (ref 1.6–2.4)
MCH RBC QN AUTO: 35.5 PG (ref 26.6–33)
MCHC RBC AUTO-ENTMCNC: 34.7 G/DL (ref 31.5–35.7)
MCV RBC AUTO: 102.3 FL (ref 79–97)
PHOSPHATE SERPL-MCNC: 3.6 MG/DL (ref 2.5–4.5)
PLATELET # BLD AUTO: 472 10*3/MM3 (ref 140–450)
PMV BLD AUTO: 9.5 FL (ref 6–12)
POTASSIUM SERPL-SCNC: 4 MMOL/L (ref 3.5–5.2)
PREALB SERPL-MCNC: 12.7 MG/DL (ref 20–40)
PROT SERPL-MCNC: 5.7 G/DL (ref 6–8.5)
PROTHROMBIN TIME: 13.3 SECONDS (ref 12.2–14.5)
RBC # BLD AUTO: 3.94 10*6/MM3 (ref 4.14–5.8)
SODIUM SERPL-SCNC: 135 MMOL/L (ref 136–145)
TRIGL SERPL-MCNC: 129 MG/DL (ref 0–150)
WBC NRBC COR # BLD AUTO: 20.75 10*3/MM3 (ref 3.4–10.8)

## 2024-01-29 PROCEDURE — 25010000002 HEPARIN (PORCINE) PER 1000 UNITS: Performed by: SURGERY

## 2024-01-29 PROCEDURE — 83735 ASSAY OF MAGNESIUM: CPT | Performed by: SURGERY

## 2024-01-29 PROCEDURE — 85610 PROTHROMBIN TIME: CPT | Performed by: SURGERY

## 2024-01-29 PROCEDURE — 85027 COMPLETE CBC AUTOMATED: CPT | Performed by: SURGERY

## 2024-01-29 PROCEDURE — 25010000002 ONDANSETRON PER 1 MG: Performed by: SURGERY

## 2024-01-29 PROCEDURE — 82330 ASSAY OF CALCIUM: CPT | Performed by: SURGERY

## 2024-01-29 PROCEDURE — C1751 CATH, INF, PER/CENT/MIDLINE: HCPCS

## 2024-01-29 PROCEDURE — 25810000003 SODIUM CHLORIDE 0.9 % SOLUTION: Performed by: SURGERY

## 2024-01-29 PROCEDURE — 25010000002 PIPERACILLIN SOD-TAZOBACTAM PER 1 G: Performed by: SURGERY

## 2024-01-29 PROCEDURE — 82948 REAGENT STRIP/BLOOD GLUCOSE: CPT

## 2024-01-29 PROCEDURE — 94664 DEMO&/EVAL PT USE INHALER: CPT

## 2024-01-29 PROCEDURE — C1894 INTRO/SHEATH, NON-LASER: HCPCS

## 2024-01-29 PROCEDURE — 84478 ASSAY OF TRIGLYCERIDES: CPT | Performed by: SURGERY

## 2024-01-29 PROCEDURE — 0 DIATRIZOATE MEGLUMINE & SODIUM PER 1 ML

## 2024-01-29 PROCEDURE — 25010000002 METOCLOPRAMIDE PER 10 MG: Performed by: SURGERY

## 2024-01-29 PROCEDURE — 74177 CT ABD & PELVIS W/CONTRAST: CPT

## 2024-01-29 PROCEDURE — 82248 BILIRUBIN DIRECT: CPT | Performed by: SURGERY

## 2024-01-29 PROCEDURE — 25010000002 THIAMINE PER 100 MG

## 2024-01-29 PROCEDURE — 84134 ASSAY OF PREALBUMIN: CPT | Performed by: SURGERY

## 2024-01-29 PROCEDURE — 86140 C-REACTIVE PROTEIN: CPT | Performed by: SURGERY

## 2024-01-29 PROCEDURE — 94799 UNLISTED PULMONARY SVC/PX: CPT

## 2024-01-29 PROCEDURE — 84100 ASSAY OF PHOSPHORUS: CPT | Performed by: SURGERY

## 2024-01-29 PROCEDURE — 80053 COMPREHEN METABOLIC PANEL: CPT | Performed by: SURGERY

## 2024-01-29 PROCEDURE — 25510000001 IOPAMIDOL 61 % SOLUTION: Performed by: SURGERY

## 2024-01-29 RX ORDER — DEXTROSE MONOHYDRATE 100 MG/ML
50 INJECTION, SOLUTION INTRAVENOUS CONTINUOUS
Status: ACTIVE | OUTPATIENT
Start: 2024-01-29 | End: 2024-01-29

## 2024-01-29 RX ORDER — SODIUM CHLORIDE 0.9 % (FLUSH) 0.9 %
10 SYRINGE (ML) INJECTION EVERY 12 HOURS SCHEDULED
Status: DISCONTINUED | OUTPATIENT
Start: 2024-01-29 | End: 2024-02-15 | Stop reason: HOSPADM

## 2024-01-29 RX ORDER — SODIUM CHLORIDE 0.9 % (FLUSH) 0.9 %
10 SYRINGE (ML) INJECTION AS NEEDED
Status: DISCONTINUED | OUTPATIENT
Start: 2024-01-29 | End: 2024-02-15 | Stop reason: HOSPADM

## 2024-01-29 RX ORDER — THIAMINE HYDROCHLORIDE 100 MG/ML
100 INJECTION, SOLUTION INTRAMUSCULAR; INTRAVENOUS DAILY
Status: DISCONTINUED | OUTPATIENT
Start: 2024-01-29 | End: 2024-02-08

## 2024-01-29 RX ADMIN — ROSUVASTATIN 10 MG: 10 TABLET, FILM COATED ORAL at 21:42

## 2024-01-29 RX ADMIN — ASCORBIC ACID, VITAMIN A PALMITATE, CHOLECALCIFEROL, THIAMINE HYDROCHLORIDE, RIBOFLAVIN-5 PHOSPHATE SODIUM, PYRIDOXINE HYDROCHLORIDE, NIACINAMIDE, DEXPANTHENOL, ALPHA-TOCOPHEROL ACETATE, VITAMIN K1, FOLIC ACID, BIOTIN, CYANOCOBALAMIN: 200; 3300; 200; 6; 3.6; 6; 40; 15; 10; 150; 600; 60; 5 INJECTION, SOLUTION INTRAVENOUS at 17:42

## 2024-01-29 RX ADMIN — SODIUM CHLORIDE 50 ML/HR: 9 INJECTION, SOLUTION INTRAVENOUS at 21:54

## 2024-01-29 RX ADMIN — HEPARIN SODIUM 5000 UNITS: 5000 INJECTION INTRAVENOUS; SUBCUTANEOUS at 21:42

## 2024-01-29 RX ADMIN — Medication 10 ML: at 21:42

## 2024-01-29 RX ADMIN — HEPARIN SODIUM 5000 UNITS: 5000 INJECTION INTRAVENOUS; SUBCUTANEOUS at 14:37

## 2024-01-29 RX ADMIN — METOCLOPRAMIDE HYDROCHLORIDE 10 MG: 5 INJECTION INTRAMUSCULAR; INTRAVENOUS at 23:29

## 2024-01-29 RX ADMIN — PANTOPRAZOLE SODIUM 40 MG: 40 INJECTION, POWDER, LYOPHILIZED, FOR SOLUTION INTRAVENOUS at 08:20

## 2024-01-29 RX ADMIN — SMOFLIPID 100 ML: 6; 6; 5; 3 INJECTION, EMULSION INTRAVENOUS at 17:42

## 2024-01-29 RX ADMIN — SODIUM CHLORIDE 50 ML/HR: 9 INJECTION, SOLUTION INTRAVENOUS at 03:33

## 2024-01-29 RX ADMIN — HEPARIN SODIUM 5000 UNITS: 5000 INJECTION INTRAVENOUS; SUBCUTANEOUS at 06:29

## 2024-01-29 RX ADMIN — ONDANSETRON 4 MG: 2 INJECTION INTRAMUSCULAR; INTRAVENOUS at 14:36

## 2024-01-29 RX ADMIN — THIAMINE HYDROCHLORIDE 100 MG: 100 INJECTION, SOLUTION INTRAMUSCULAR; INTRAVENOUS at 14:36

## 2024-01-29 RX ADMIN — ALBUTEROL SULFATE 2 PUFF: 90 AEROSOL, METERED RESPIRATORY (INHALATION) at 21:57

## 2024-01-29 RX ADMIN — ALBUTEROL SULFATE 2 PUFF: 90 AEROSOL, METERED RESPIRATORY (INHALATION) at 15:38

## 2024-01-29 RX ADMIN — PIPERACILLIN SODIUM AND TAZOBACTAM SODIUM 3.38 G: 3; .375 INJECTION, SOLUTION INTRAVENOUS at 06:29

## 2024-01-29 RX ADMIN — BUDESONIDE AND FORMOTEROL FUMARATE DIHYDRATE 2 PUFF: 160; 4.5 AEROSOL RESPIRATORY (INHALATION) at 09:36

## 2024-01-29 RX ADMIN — ALBUTEROL SULFATE 2 PUFF: 90 AEROSOL, METERED RESPIRATORY (INHALATION) at 09:38

## 2024-01-29 RX ADMIN — METOCLOPRAMIDE HYDROCHLORIDE 10 MG: 5 INJECTION INTRAMUSCULAR; INTRAVENOUS at 17:42

## 2024-01-29 RX ADMIN — FOLIC ACID 1 MG: 5 INJECTION, SOLUTION INTRAMUSCULAR; INTRAVENOUS; SUBCUTANEOUS at 14:37

## 2024-01-29 RX ADMIN — ONDANSETRON 4 MG: 2 INJECTION INTRAMUSCULAR; INTRAVENOUS at 21:54

## 2024-01-29 RX ADMIN — METOCLOPRAMIDE HYDROCHLORIDE 10 MG: 5 INJECTION INTRAMUSCULAR; INTRAVENOUS at 06:29

## 2024-01-29 RX ADMIN — ONDANSETRON 4 MG: 2 INJECTION INTRAMUSCULAR; INTRAVENOUS at 08:20

## 2024-01-29 RX ADMIN — IOPAMIDOL 80 ML: 612 INJECTION, SOLUTION INTRAVENOUS at 10:35

## 2024-01-29 RX ADMIN — BUPROPION HYDROCHLORIDE 150 MG: 150 TABLET, EXTENDED RELEASE ORAL at 06:29

## 2024-01-29 RX ADMIN — PIPERACILLIN SODIUM AND TAZOBACTAM SODIUM 3.38 G: 3; .375 INJECTION, SOLUTION INTRAVENOUS at 14:37

## 2024-01-29 RX ADMIN — ALUMINUM HYDROXIDE, MAGNESIUM HYDROXIDE, AND DIMETHICONE 15 ML: 400; 400; 40 SUSPENSION ORAL at 21:54

## 2024-01-29 RX ADMIN — METOCLOPRAMIDE HYDROCHLORIDE 10 MG: 5 INJECTION INTRAMUSCULAR; INTRAVENOUS at 12:08

## 2024-01-29 RX ADMIN — PIPERACILLIN SODIUM AND TAZOBACTAM SODIUM 3.38 G: 3; .375 INJECTION, SOLUTION INTRAVENOUS at 23:29

## 2024-01-29 RX ADMIN — BUDESONIDE AND FORMOTEROL FUMARATE DIHYDRATE 2 PUFF: 160; 4.5 AEROSOL RESPIRATORY (INHALATION) at 20:56

## 2024-01-29 RX ADMIN — DIATRIZOATE MEGLUMINE AND DIATRIZOATE SODIUM 15 ML: 660; 100 LIQUID ORAL; RECTAL at 08:20

## 2024-01-29 NOTE — NURSING NOTE
[facility] visit for Stoma care and assessment    Surgery date: 01/17/24  Surgical Procedures Since Admission:  Procedure(s):  CHOLECYSTECTOMY LAPAROSCOPIC  OPEN SIGMOID COLECTOMY, LIVER BIOPSY, DIVERTING LOOP ILEOSTOMY CREATION, TAKE DOWN OF THE SPLENIC FLEXURE, AND APPENDECTOMY  Surgeon:  Jimmy Craig MD  Status:  12 Days Post-Op  -------------------     Patient resting flat in bed, utilizing sponge for ice chip/mouth swab purposes.  NG tube now clamped checking residuals.  Patient seen Medical/Surgical Floor. patient's spouse at beside.     Stoma Type: Loop Ileostomy  Diameter/shape: 38mm  Location: RLQ  Protrusion: Budded  Stoma Characteristics: Edematous, Moist, and Pink  Peristomal skin: Intact, slight erythema no breakdown  Character of output:brown, quite a bit thicker than previously-750ml emptied and charted by me  Emptying frequency per day: per nursing flowsheet  Current pouching system: Bam 2 piece 2 and three-quarter flat with barrier ring connected to high output bag.  Accessory products, appliance placed: Bam 2 piece 2 and three-quarter flat with barrier ring connected to high output bag.  Goal wear time:3-4 days  Last appliance change: 1/29-by me  Image: from 1-4 o clock there is slight enlargement of stoma/mucocutaneous junction changes. Likely due to poor nutrition status. Will watch      Surgical Incision: Midline abdominal incision  Degree of approximation: 100  Approximating Devices: staples  Drainage Characteristics:none  Method of Management: open    Drain(s) type: DELROY drain  Effluent characteristics: Not assessed today    Education provided:   Indications for changing the appliance, Stool characteristics for the type of stoma created, How to order supplies from ostomy supply company, Ostomy [facility] marked with specific products needed for ostomy type, and Discharge supplies provided  Education completed, will return later this week to help change appliance.  Marked catalog for  ordering which patient and family have been instructed to do 1 day after discharge.  Marked 2-1/4 flat 2 piece Bam with CeraPlus barrier ring.  Discharge supplies have been provided.    WOC Nurse will continue to follow for ostomy education. Please contact #[phone] with questions or if ostomy leaks occur.

## 2024-01-29 NOTE — CASE MANAGEMENT/SOCIAL WORK
Continued Stay Note  [facility]     Patient Name: Roger Bhat  MRN: [id number]  Today's Date: 1/29/2024    Admit Date: 1/17/2024    Plan: Home with wife transporting.   Discharge Plan       Row Name 01/29/24 1304       Plan    Plan Home with wife transporting.    Plan Comments SW'er met with patient and wife at bedside. NG Tube removed. Patient had PICC line placed today; starting TPN. CM following for discharge planning needs. Plan is home with wife transporting.    Final Discharge Disposition Code 01 - home or self-care                   Discharge Codes    No documentation.                 Expected Discharge Date and Time       Expected Discharge Date Expected Discharge Time    Jan 29, 2024               DEMETRIS Parmar (Kay)

## 2024-01-29 NOTE — PROGRESS NOTES
Pharmacy Parenteral Nutrition Evaluation    Roger Bhat is a  63 y.o. male receiving TPN.     Indication: prolonged ileus  Consulting Physician: Dr. Jimmy Craig    Total Fluid Rate (if stated):     Labs  Results from last 7 days   Lab Units 01/29/24  0541 01/28/24  0459 01/27/24  0405 01/26/24  0500   SODIUM mmol/L 135* 136 141 142  142   POTASSIUM mmol/L 4.0 3.9 3.8 3.9  3.9   CHLORIDE mmol/L 102 100 104 105  105   CO2 mmol/L 21.0* 24.0 21.0* 19.0*  21.0*   BUN mg/dL 19 18 24* 24*  26*   CREATININE mg/dL 0.46* 0.53* 0.63* 0.57*  0.57*   CALCIUM mg/dL 8.6 8.5* 8.6 8.5*  8.9   BILIRUBIN mg/dL 0.3  --   --  0.2  0.2   ALK PHOS U/L 79  --   --  68  68   ALT (SGPT) U/L 18  --   --  15  15   AST (SGOT) U/L 16  --   --  9  9   GLUCOSE mg/dL 123* 125* 103* 95  96       Results from last 7 days   Lab Units 01/29/24  0541 01/28/24  0459 01/27/24  0405 01/26/24  0843   MAGNESIUM mg/dL 2.2 2.1 2.3  --    PHOSPHORUS mg/dL 3.6 3.0 3.4  --    PREALBUMIN mg/dL 12.7*  --   --  8.8*       Results from last 7 days   Lab Units 01/29/24  0541 01/28/24  0459 01/26/24  0500   WBC 10*3/mm3 20.75* 20.94* 11.92*   HEMOGLOBIN g/dL 14.0 13.8 13.9   HEMATOCRIT % 40.3 39.7 39.9   PLATELETS 10*3/mm3 472* 559* 517*       Triglycerides   Date Value Ref Range Status   01/29/2024 129 0 - 150 mg/dL Final     Comment:     Falsely depressed results may occur on samples drawn from patients receiving N-Acetylcysteine (NAC) or Metamizole.       estimated creatinine clearance is 141.7 mL/min (A) (by C-G formula based on SCr of 0.46 mg/dL (L)).    Intake & Output (last 3 days)         01/23 0701 01/24 0700 01/24 0701 01/25 0700 01/25 0701 01/26 0700 01/26 0701 01/27 0700    P.O.  354 205     I.V. (mL/kg) 1200 (18.1) 2055.5 (33.6)  3075 (56.3)    IV Piggyback  500      Total Intake(mL/kg) 1200 (18.1) 2909.5 (47.5) 205 (3.3) 3075 (56.3)    Urine (mL/kg/hr) 300 (0.2) 405 (0.3) 500 (0.3)     Emesis/NG output 400 2150 3450     Drains  125 20 10     Stool 1500 1075 450     Total Output 2325 3650 4410     Net -1125 -740.5 -4205 +3075            Emesis Unmeasured Occurrence  3 x              Dietitian Recommendations  Diet Orders (active) (From admission, onward)       Start     Ordered    01/26/24 1800  Adult Cyclic Peripheral 2-in-1 TPN  Cyclic TPN - See Admin Instructions         01/26/24 1225    01/26/24 1800  Fat Emul Fish Oil/Plant Based (SMOFLIPID) 20 % emulsion 100 mL  Every 24 Hours Scheduled (TPN)         01/26/24 1225    01/25/24 1200  Dietary Nutrition Supplements Premier Protein  Daily With Lunch       01/24/24 1437    01/24/24 1800  Dietary Nutrition Supplements Boost Breeze (Clear Liquid)  Daily With Breakfast & Dinner       01/24/24 1438    01/24/24 1521  NPO Diet NPO Type: Ice Chips  Diet Effective Now         01/24/24 1521    01/17/24 2200  Snack: Chewing gum x30 minutes three times daily to increase saliva flow and provide gas pain relief. Do not give to ileostomy patients.  3 Times Daily      Comments: Chewing gum x30 minutes three times daily to increase saliva flow and provide gas pain relief.      Do not give to ileostomy patients.    01/17/24 1734                    Current TPN Regimen Recommendation: Clinimix-E Dextrose 15% / Amino Acid 5% at goal rate of 60 mL/hr.  20% Lipid Emulsion 100mL every 24 hours.    Assessment/Plan:  TPN started 1/26. PICC line placed today, switching from peripheral PN to central TPN. Will utilize Clinimix-E 5/15 per RD recommendation, will initiate at goal rate 60mL/hr. Will continue with standard peripheral electrolytes, with max acetate.  Thiamine 100mg daily and folic acid 1mg daily added.   Pharmacy will continue to follow.     Harsha Hyde, PharmD  1/29/2024  12:54 EST

## 2024-01-29 NOTE — PROGRESS NOTES
CPN Review Note    Patient Name: Roger Bhat  Date of Encounter: 24 15:05 EST  MRN: [id number]  Admission date: 2024    Reason for visit: CPN review . RD to continue to follow per protocol.     Information Obtained: Pt continues on clear liquids struggling with emesis unable to tolerate anything significant, is drinking clear ensure as able. PICC placed for plan to change to TPN per Surgeon note. Still with high ileostomy output >2L and 300 ml emesis through NG last 24hr. Electrolytes stable, Na slightly low. BG moderate.     EMR reviewed:  yes  Medication reviewed: yes  Labs reviewed: yes    Est. Needs (24):  ~1350 kcal initial goal  ~76 g protein     Current diet: Diet: Liquid Diets; Clear Liquid; Fluid Consistency: Thin (IDDSI 0)  Adult Peripheral 2-in-1 TPN  Adult Central CLINIMIX-E TPN    PN Route: Peripheral   PN:   D5%, AA4.25%               GIR:0.79  Lipid: 100ml 20% SMOF daily    PN@ Goal over 24hr to Supply:  Volume 1200 mL/day     Energy 608 Kcal/day 45 % Est Need   Protein 51 g/day 67 % Est Need     ---------------------------------------------------------------------------  Formula/Rate verified at bedside: Yes  Infusing Rate at time of visit: 50ml/hr    Average Delivery from Chartin Day: ?accuracy documentation  PN Volume 633 mL/day     Lipid delivered?  yes      Energy  Kcal/day  % Est Need   Protein  g/day  % Est Need       Intervention:  Follow treatment plan  Care plan reviewed    Change to TPN via PICC to better meet needs:  PN Route: PICC   PN:   D15%, AA5%  @ goal 60 ml/hr             GIR: 2.5 mg/kg/min  Lipid: 100ml 20% SMOF daily    PN@ Goal over 24hr to Supply:  Volume 1440 mL/day     Energy 1222 Kcal/day 91 % Est Need   Protein 72 g/day 95 % Est Need     +oral intake as tolerated, ensure clear all meals    Pt continues at risk refeeding syndrome, continue to monitor/replace electrolytes as nutrition advanced to meet needs    Follow up:   Per  protocol      Stacie Maldonado RD, [facility]  15:17 EST  Time: 30m

## 2024-01-29 NOTE — PLAN OF CARE
Goal Outcome Evaluation:  Plan of Care Reviewed With: patient        Progress: declining  Outcome Evaluation: VSS. Pt rested well most of shift. Pt A&O X4. No C/o pain. Significant other at bed. high volume output from ostomy. Will conitnue to monitor.

## 2024-01-29 NOTE — NURSING NOTE
Patient has not had a well day.  Patient has had numerous occurrences of emesis that some was able to be measured.  Pt stated tiredness and weakness and rested in bed most of the day.  Patient now screening positive for sepsis and on call physician was notified.

## 2024-01-29 NOTE — PROGRESS NOTES
"Patient Name:  Roger Bhat  YOB: 1960  [id number]    Surgery Progress Note    Date of visit: 1/29/2024      Subjective: No significant changes.  Had 1 emesis yesterday afternoon.  Otherwise has had some hiccuping and belching.  Denies abdominal pain.  Denies nausea this morning.  Ambulated multiple times yesterday.  Denies fevers or chills.  Reports that his cough is better          Objective:     /81 (BP Location: Right arm, Patient Position: Lying)   Pulse 94   Temp 98.1 °F (36.7 °C) (Temporal)   Resp 16   Ht 175.3 cm (69\")   Wt 59.4 kg (131 lb)   SpO2 90%   BMI 19.35 kg/m²     Intake/Output Summary (Last 24 hours) at 1/29/2024 0758  Last data filed at 1/29/2024 0629  Gross per 24 hour   Intake 2010.67 ml   Output 2555 ml   Net -544.33 ml       GEN:   Awake, alert, in no acute distress, resting comfortably in bed   CV:      Regular rate and rhythm  L:         Symmetric expansion, not labored on room air  Abd:    Soft, not obviously distended, appropriately tender palpation on midline incision,  Incision is clean dry intact, ileostomy in the right side is pink and patent with liquid stool in the bag, DELROY drain in the right lower quadrant with small amount of brown thick output  Ext:     No cyanosis, clubbing, or edema    Recent labs that are back at this time have been reviewed.           Assessment/ Plan:    Mr. Bhat is a 63-year-old gentleman who is admitted following operative intervention for gallbladder and sigmoid colon mass on January 17     #Sigmoid colon mass, gallbladder mass  # Ileus  -Postop day 12 following lap vladimir, open sigmoid colectomy with diverting loop ileostomy  -Afebrile. Vitals stable  -Leukocytosis remains elevated at 20,000.  Labs otherwise without acute findings  -Ileostomy output higher yesterday with 205 0 mL.  Continuing to have difficulty tolerating a oral diet however had an episode of emesis yesterday  -Given ongoing leukocytosis and delay in " appropriate bowel function despite his CT scan on Thursday, we will plan for repeat CT of the abdomen and pelvis today.  Will order for PICC placement today so that he can be started on TPN      Jimmy Craig MD  1/29/2024  07:58 EST

## 2024-01-30 ENCOUNTER — ANESTHESIA (OUTPATIENT)
Dept: PERIOP | Facility: HOSPITAL | Age: 64
End: 2024-01-30
Payer: COMMERCIAL

## 2024-01-30 ENCOUNTER — ANESTHESIA EVENT CONVERTED (OUTPATIENT)
Dept: ANESTHESIOLOGY | Facility: HOSPITAL | Age: 64
End: 2024-01-30
Payer: COMMERCIAL

## 2024-01-30 ENCOUNTER — APPOINTMENT (OUTPATIENT)
Dept: GENERAL RADIOLOGY | Facility: HOSPITAL | Age: 64
End: 2024-01-30
Payer: COMMERCIAL

## 2024-01-30 ENCOUNTER — ANESTHESIA EVENT (OUTPATIENT)
Dept: PERIOP | Facility: HOSPITAL | Age: 64
End: 2024-01-30
Payer: COMMERCIAL

## 2024-01-30 PROBLEM — E43 SEVERE MALNUTRITION: Status: ACTIVE | Noted: 2024-01-30

## 2024-01-30 PROBLEM — R06.03 RESPIRATORY DISTRESS: Status: ACTIVE | Noted: 2024-01-30

## 2024-01-30 LAB
ANION GAP SERPL CALCULATED.3IONS-SCNC: 13 MMOL/L (ref 5–15)
BUN SERPL-MCNC: 17 MG/DL (ref 8–23)
BUN/CREAT SERPL: 41.5 (ref 7–25)
CALCIUM SPEC-SCNC: 8.5 MG/DL (ref 8.6–10.5)
CHLORIDE SERPL-SCNC: 100 MMOL/L (ref 98–107)
CO2 SERPL-SCNC: 22 MMOL/L (ref 22–29)
CREAT SERPL-MCNC: 0.41 MG/DL (ref 0.76–1.27)
CYTO UR: NORMAL
DEPRECATED RDW RBC AUTO: 52.5 FL (ref 37–54)
EGFRCR SERPLBLD CKD-EPI 2021: 123.2 ML/MIN/1.73
ERYTHROCYTE [DISTWIDTH] IN BLOOD BY AUTOMATED COUNT: 14 % (ref 12.3–15.4)
GLUCOSE BLDC GLUCOMTR-MCNC: 123 MG/DL (ref 70–130)
GLUCOSE BLDC GLUCOMTR-MCNC: 126 MG/DL (ref 70–130)
GLUCOSE BLDC GLUCOMTR-MCNC: 129 MG/DL (ref 70–130)
GLUCOSE SERPL-MCNC: 119 MG/DL (ref 65–99)
HCT VFR BLD AUTO: 39 % (ref 37.5–51)
HGB BLD-MCNC: 13.7 G/DL (ref 13–17.7)
LAB AP CASE REPORT: NORMAL
LAB AP CLINICAL INFORMATION: NORMAL
LAB AP DIAGNOSIS COMMENT: NORMAL
MAGNESIUM SERPL-MCNC: 2.1 MG/DL (ref 1.6–2.4)
MCH RBC QN AUTO: 35.9 PG (ref 26.6–33)
MCHC RBC AUTO-ENTMCNC: 35.1 G/DL (ref 31.5–35.7)
MCV RBC AUTO: 102.1 FL (ref 79–97)
PATH REPORT.ADDENDUM SPEC: NORMAL
PATH REPORT.FINAL DX SPEC: NORMAL
PATH REPORT.GROSS SPEC: NORMAL
PHOSPHATE SERPL-MCNC: 3.4 MG/DL (ref 2.5–4.5)
PLATELET # BLD AUTO: 531 10*3/MM3 (ref 140–450)
PMV BLD AUTO: 9.6 FL (ref 6–12)
POTASSIUM SERPL-SCNC: 3.5 MMOL/L (ref 3.5–5.2)
RBC # BLD AUTO: 3.82 10*6/MM3 (ref 4.14–5.8)
SODIUM SERPL-SCNC: 135 MMOL/L (ref 136–145)
WBC NRBC COR # BLD AUTO: 26.53 10*3/MM3 (ref 3.4–10.8)

## 2024-01-30 PROCEDURE — 25010000002 DEXAMETHASONE SODIUM PHOSPHATE 10 MG/ML SOLUTION: Performed by: SURGERY

## 2024-01-30 PROCEDURE — P9041 ALBUMIN (HUMAN),5%, 50ML: HCPCS | Performed by: ANESTHESIOLOGY

## 2024-01-30 PROCEDURE — 0DNU0ZZ RELEASE OMENTUM, OPEN APPROACH: ICD-10-PCS | Performed by: SURGERY

## 2024-01-30 PROCEDURE — 85027 COMPLETE CBC AUTOMATED: CPT | Performed by: SURGERY

## 2024-01-30 PROCEDURE — 0DN80ZZ RELEASE SMALL INTESTINE, OPEN APPROACH: ICD-10-PCS | Performed by: SURGERY

## 2024-01-30 PROCEDURE — 25010000002 FENTANYL CITRATE (PF) 100 MCG/2ML SOLUTION: Performed by: ANESTHESIOLOGY

## 2024-01-30 PROCEDURE — 84484 ASSAY OF TROPONIN QUANT: CPT | Performed by: INTERNAL MEDICINE

## 2024-01-30 PROCEDURE — 25010000002 THIAMINE PER 100 MG

## 2024-01-30 PROCEDURE — 83605 ASSAY OF LACTIC ACID: CPT | Performed by: INTERNAL MEDICINE

## 2024-01-30 PROCEDURE — 88305 TISSUE EXAM BY PATHOLOGIST: CPT | Performed by: SURGERY

## 2024-01-30 PROCEDURE — 99291 CRITICAL CARE FIRST HOUR: CPT | Performed by: INTERNAL MEDICINE

## 2024-01-30 PROCEDURE — 25010000002 PROPOFOL 10 MG/ML EMULSION: Performed by: ANESTHESIOLOGY

## 2024-01-30 PROCEDURE — 74018 RADEX ABDOMEN 1 VIEW: CPT

## 2024-01-30 PROCEDURE — 94799 UNLISTED PULMONARY SVC/PX: CPT

## 2024-01-30 PROCEDURE — 25010000002 PHENYLEPHRINE 10 MG/ML SOLUTION 1 ML VIAL: Performed by: ANESTHESIOLOGY

## 2024-01-30 PROCEDURE — 80048 BASIC METABOLIC PNL TOTAL CA: CPT | Performed by: INTERNAL MEDICINE

## 2024-01-30 PROCEDURE — 25810000003 SODIUM CHLORIDE 0.9 % SOLUTION: Performed by: INTERNAL MEDICINE

## 2024-01-30 PROCEDURE — 82948 REAGENT STRIP/BLOOD GLUCOSE: CPT

## 2024-01-30 PROCEDURE — 80048 BASIC METABOLIC PNL TOTAL CA: CPT | Performed by: SURGERY

## 2024-01-30 PROCEDURE — 84100 ASSAY OF PHOSPHORUS: CPT | Performed by: SURGERY

## 2024-01-30 PROCEDURE — 71045 X-RAY EXAM CHEST 1 VIEW: CPT

## 2024-01-30 PROCEDURE — 83735 ASSAY OF MAGNESIUM: CPT | Performed by: SURGERY

## 2024-01-30 PROCEDURE — 25010000002 SUGAMMADEX 200 MG/2ML SOLUTION: Performed by: ANESTHESIOLOGY

## 2024-01-30 PROCEDURE — 25810000003 SODIUM CHLORIDE 0.9 % SOLUTION 250 ML FLEX CONT: Performed by: ANESTHESIOLOGY

## 2024-01-30 PROCEDURE — 87040 BLOOD CULTURE FOR BACTERIA: CPT | Performed by: INTERNAL MEDICINE

## 2024-01-30 PROCEDURE — 25810000003 LACTATED RINGERS PER 1000 ML: Performed by: SURGERY

## 2024-01-30 PROCEDURE — 25010000002 ALBUMIN HUMAN 5% PER 50 ML: Performed by: ANESTHESIOLOGY

## 2024-01-30 PROCEDURE — 25010000002 HEPARIN (PORCINE) PER 1000 UNITS: Performed by: SURGERY

## 2024-01-30 PROCEDURE — 25010000002 PIPERACILLIN SOD-TAZOBACTAM PER 1 G: Performed by: SURGERY

## 2024-01-30 PROCEDURE — 25810000003 LACTATED RINGERS PER 1000 ML: Performed by: ANESTHESIOLOGY

## 2024-01-30 PROCEDURE — 25010000002 ONDANSETRON PER 1 MG: Performed by: SURGERY

## 2024-01-30 PROCEDURE — 25010000002 METOCLOPRAMIDE PER 10 MG: Performed by: SURGERY

## 2024-01-30 PROCEDURE — 25010000002 BUPIVACAINE (PF) 0.25 % SOLUTION: Performed by: SURGERY

## 2024-01-30 RX ORDER — NALOXONE HCL 0.4 MG/ML
0.4 VIAL (ML) INJECTION
Status: DISCONTINUED | OUTPATIENT
Start: 2024-01-30 | End: 2024-02-15 | Stop reason: HOSPADM

## 2024-01-30 RX ORDER — BUPIVACAINE HCL/0.9 % NACL/PF 0.125 %
PLASTIC BAG, INJECTION (ML) EPIDURAL AS NEEDED
Status: DISCONTINUED | OUTPATIENT
Start: 2024-01-30 | End: 2024-01-30 | Stop reason: SURG

## 2024-01-30 RX ORDER — SODIUM CHLORIDE, SODIUM LACTATE, POTASSIUM CHLORIDE, CALCIUM CHLORIDE 600; 310; 30; 20 MG/100ML; MG/100ML; MG/100ML; MG/100ML
INJECTION, SOLUTION INTRAVENOUS CONTINUOUS PRN
Status: DISCONTINUED | OUTPATIENT
Start: 2024-01-30 | End: 2024-01-30 | Stop reason: SURG

## 2024-01-30 RX ORDER — HYDROMORPHONE HYDROCHLORIDE 1 MG/ML
0.5 INJECTION, SOLUTION INTRAMUSCULAR; INTRAVENOUS; SUBCUTANEOUS
Status: DISCONTINUED | OUTPATIENT
Start: 2024-01-30 | End: 2024-01-30 | Stop reason: HOSPADM

## 2024-01-30 RX ORDER — LIDOCAINE HYDROCHLORIDE 10 MG/ML
INJECTION, SOLUTION EPIDURAL; INFILTRATION; INTRACAUDAL; PERINEURAL AS NEEDED
Status: DISCONTINUED | OUTPATIENT
Start: 2024-01-30 | End: 2024-01-30 | Stop reason: SURG

## 2024-01-30 RX ORDER — BISACODYL 5 MG/1
10 TABLET, DELAYED RELEASE ORAL DAILY
Status: DISCONTINUED | OUTPATIENT
Start: 2024-01-30 | End: 2024-02-14

## 2024-01-30 RX ORDER — MAGNESIUM HYDROXIDE 1200 MG/15ML
LIQUID ORAL AS NEEDED
Status: DISCONTINUED | OUTPATIENT
Start: 2024-01-30 | End: 2024-01-30 | Stop reason: HOSPADM

## 2024-01-30 RX ORDER — ONDANSETRON 2 MG/ML
4 INJECTION INTRAMUSCULAR; INTRAVENOUS EVERY 6 HOURS PRN
Status: DISCONTINUED | OUTPATIENT
Start: 2024-01-30 | End: 2024-02-05

## 2024-01-30 RX ORDER — DEXAMETHASONE SODIUM PHOSPHATE 10 MG/ML
INJECTION, SOLUTION INTRAMUSCULAR; INTRAVENOUS AS NEEDED
Status: DISCONTINUED | OUTPATIENT
Start: 2024-01-30 | End: 2024-01-30 | Stop reason: SURG

## 2024-01-30 RX ORDER — PANTOPRAZOLE SODIUM 40 MG/1
40 TABLET, DELAYED RELEASE ORAL
Status: DISCONTINUED | OUTPATIENT
Start: 2024-01-31 | End: 2024-01-30

## 2024-01-30 RX ORDER — POTASSIUM CHLORIDE 20 MEQ/1
40 TABLET, EXTENDED RELEASE ORAL EVERY 4 HOURS
Status: COMPLETED | OUTPATIENT
Start: 2024-01-30 | End: 2024-01-30

## 2024-01-30 RX ORDER — FENTANYL CITRATE 50 UG/ML
50 INJECTION, SOLUTION INTRAMUSCULAR; INTRAVENOUS
Status: DISCONTINUED | OUTPATIENT
Start: 2024-01-30 | End: 2024-01-30 | Stop reason: HOSPADM

## 2024-01-30 RX ORDER — ONDANSETRON 2 MG/ML
4 INJECTION INTRAMUSCULAR; INTRAVENOUS ONCE AS NEEDED
Status: DISCONTINUED | OUTPATIENT
Start: 2024-01-30 | End: 2024-01-30 | Stop reason: HOSPADM

## 2024-01-30 RX ORDER — FENTANYL CITRATE 50 UG/ML
INJECTION, SOLUTION INTRAMUSCULAR; INTRAVENOUS AS NEEDED
Status: DISCONTINUED | OUTPATIENT
Start: 2024-01-30 | End: 2024-01-30 | Stop reason: SDUPTHER

## 2024-01-30 RX ORDER — ALBUTEROL SULFATE 90 UG/1
AEROSOL, METERED RESPIRATORY (INHALATION) AS NEEDED
Status: DISCONTINUED | OUTPATIENT
Start: 2024-01-30 | End: 2024-01-30 | Stop reason: SURG

## 2024-01-30 RX ORDER — BUPIVACAINE HYDROCHLORIDE 2.5 MG/ML
INJECTION, SOLUTION EPIDURAL; INFILTRATION; INTRACAUDAL
Status: COMPLETED | OUTPATIENT
Start: 2024-01-30 | End: 2024-01-30

## 2024-01-30 RX ORDER — NALOXONE HCL 0.4 MG/ML
0.1 VIAL (ML) INJECTION
Status: DISCONTINUED | OUTPATIENT
Start: 2024-01-30 | End: 2024-01-30

## 2024-01-30 RX ORDER — MORPHINE SULFATE 2 MG/ML
2 INJECTION, SOLUTION INTRAMUSCULAR; INTRAVENOUS
Status: DISCONTINUED | OUTPATIENT
Start: 2024-01-30 | End: 2024-01-30

## 2024-01-30 RX ORDER — PROPOFOL 10 MG/ML
VIAL (ML) INTRAVENOUS AS NEEDED
Status: DISCONTINUED | OUTPATIENT
Start: 2024-01-30 | End: 2024-01-30 | Stop reason: SURG

## 2024-01-30 RX ORDER — ALBUMIN, HUMAN INJ 5% 5 %
SOLUTION INTRAVENOUS CONTINUOUS PRN
Status: DISCONTINUED | OUTPATIENT
Start: 2024-01-30 | End: 2024-01-30 | Stop reason: SURG

## 2024-01-30 RX ORDER — ROCURONIUM BROMIDE 10 MG/ML
INJECTION, SOLUTION INTRAVENOUS AS NEEDED
Status: DISCONTINUED | OUTPATIENT
Start: 2024-01-30 | End: 2024-01-30 | Stop reason: SURG

## 2024-01-30 RX ORDER — HYDROMORPHONE HCL/0.9% NACL/PF 10 MG/50ML
PATIENT CONTROLLED ANALGESIA SYRINGE INTRAVENOUS CONTINUOUS
Status: DISPENSED | OUTPATIENT
Start: 2024-01-30 | End: 2024-02-02

## 2024-01-30 RX ORDER — SODIUM CHLORIDE, SODIUM LACTATE, POTASSIUM CHLORIDE, CALCIUM CHLORIDE 600; 310; 30; 20 MG/100ML; MG/100ML; MG/100ML; MG/100ML
125 INJECTION, SOLUTION INTRAVENOUS CONTINUOUS
Status: DISCONTINUED | OUTPATIENT
Start: 2024-01-30 | End: 2024-01-31

## 2024-01-30 RX ORDER — SUCCINYLCHOLINE/SOD CL,ISO/PF 200MG/10ML
SYRINGE (ML) INTRAVENOUS AS NEEDED
Status: DISCONTINUED | OUTPATIENT
Start: 2024-01-30 | End: 2024-01-30 | Stop reason: SURG

## 2024-01-30 RX ORDER — DIPHENHYDRAMINE HYDROCHLORIDE 50 MG/ML
25 INJECTION INTRAMUSCULAR; INTRAVENOUS EVERY 6 HOURS PRN
Status: DISCONTINUED | OUTPATIENT
Start: 2024-01-30 | End: 2024-02-15 | Stop reason: HOSPADM

## 2024-01-30 RX ORDER — DEXAMETHASONE SODIUM PHOSPHATE 10 MG/ML
INJECTION, SOLUTION INTRAMUSCULAR; INTRAVENOUS
Status: COMPLETED | OUTPATIENT
Start: 2024-01-30 | End: 2024-01-30

## 2024-01-30 RX ORDER — ONDANSETRON 4 MG/1
4 TABLET, ORALLY DISINTEGRATING ORAL EVERY 6 HOURS PRN
Status: DISCONTINUED | OUTPATIENT
Start: 2024-01-30 | End: 2024-02-05

## 2024-01-30 RX ADMIN — HEPARIN SODIUM 5000 UNITS: 5000 INJECTION INTRAVENOUS; SUBCUTANEOUS at 06:44

## 2024-01-30 RX ADMIN — ALBUTEROL SULFATE 2 PUFF: 90 AEROSOL, METERED RESPIRATORY (INHALATION) at 02:14

## 2024-01-30 RX ADMIN — Medication 160 MG: at 15:30

## 2024-01-30 RX ADMIN — METOCLOPRAMIDE HYDROCHLORIDE 10 MG: 5 INJECTION INTRAMUSCULAR; INTRAVENOUS at 06:44

## 2024-01-30 RX ADMIN — ONDANSETRON 4 MG: 2 INJECTION INTRAMUSCULAR; INTRAVENOUS at 13:01

## 2024-01-30 RX ADMIN — HEPARIN SODIUM 5000 UNITS: 5000 INJECTION INTRAVENOUS; SUBCUTANEOUS at 21:51

## 2024-01-30 RX ADMIN — FENTANYL CITRATE 100 MCG: 50 INJECTION, SOLUTION INTRAMUSCULAR; INTRAVENOUS at 15:30

## 2024-01-30 RX ADMIN — SODIUM CHLORIDE 1000 ML: 9 INJECTION, SOLUTION INTRAVENOUS at 21:47

## 2024-01-30 RX ADMIN — ALBUMIN (HUMAN): 12.5 INJECTION, SOLUTION INTRAVENOUS at 16:00

## 2024-01-30 RX ADMIN — FOLIC ACID 1 MG: 5 INJECTION, SOLUTION INTRAMUSCULAR; INTRAVENOUS; SUBCUTANEOUS at 08:57

## 2024-01-30 RX ADMIN — ONDANSETRON 4 MG: 2 INJECTION INTRAMUSCULAR; INTRAVENOUS at 06:44

## 2024-01-30 RX ADMIN — Medication 200 MCG: at 15:57

## 2024-01-30 RX ADMIN — BUPIVACAINE HYDROCHLORIDE 60 ML: 2.5 INJECTION, SOLUTION EPIDURAL; INFILTRATION; INTRACAUDAL; PERINEURAL at 15:32

## 2024-01-30 RX ADMIN — THIAMINE HYDROCHLORIDE 100 MG: 100 INJECTION, SOLUTION INTRAMUSCULAR; INTRAVENOUS at 08:57

## 2024-01-30 RX ADMIN — PIPERACILLIN SODIUM AND TAZOBACTAM SODIUM 3.38 G: 3; .375 INJECTION, SOLUTION INTRAVENOUS at 15:03

## 2024-01-30 RX ADMIN — ALBUMIN (HUMAN): 12.5 INJECTION, SOLUTION INTRAVENOUS at 16:15

## 2024-01-30 RX ADMIN — PHENYLEPHRINE HYDROCHLORIDE 20 MCG/MIN: 10 INJECTION INTRAVENOUS at 15:59

## 2024-01-30 RX ADMIN — ONDANSETRON 4 MG: 2 INJECTION INTRAMUSCULAR; INTRAVENOUS at 15:12

## 2024-01-30 RX ADMIN — PIPERACILLIN SODIUM AND TAZOBACTAM SODIUM 3.38 G: 3; .375 INJECTION, SOLUTION INTRAVENOUS at 06:45

## 2024-01-30 RX ADMIN — Medication 100 MCG: at 15:46

## 2024-01-30 RX ADMIN — PROPOFOL 25 MCG/KG/MIN: 10 INJECTION, EMULSION INTRAVENOUS at 16:18

## 2024-01-30 RX ADMIN — Medication 10 ML: at 08:58

## 2024-01-30 RX ADMIN — ALBUTEROL SULFATE 2 PUFF: 90 AEROSOL, METERED RESPIRATORY (INHALATION) at 23:05

## 2024-01-30 RX ADMIN — POTASSIUM CHLORIDE 40 MEQ: 1500 TABLET, EXTENDED RELEASE ORAL at 12:51

## 2024-01-30 RX ADMIN — ROCURONIUM BROMIDE 50 MG: 10 SOLUTION INTRAVENOUS at 15:52

## 2024-01-30 RX ADMIN — PROPOFOL 130 MG: 10 INJECTION, EMULSION INTRAVENOUS at 15:30

## 2024-01-30 RX ADMIN — DEXAMETHASONE SODIUM PHOSPHATE 4 MG: 10 INJECTION, SOLUTION INTRAMUSCULAR; INTRAVENOUS at 16:36

## 2024-01-30 RX ADMIN — ALBUTEROL SULFATE 10 PUFF: 90 AEROSOL, METERED RESPIRATORY (INHALATION) at 16:12

## 2024-01-30 RX ADMIN — SODIUM CHLORIDE, POTASSIUM CHLORIDE, SODIUM LACTATE AND CALCIUM CHLORIDE: 600; 310; 30; 20 INJECTION, SOLUTION INTRAVENOUS at 15:25

## 2024-01-30 RX ADMIN — POTASSIUM CHLORIDE 40 MEQ: 1500 TABLET, EXTENDED RELEASE ORAL at 08:57

## 2024-01-30 RX ADMIN — FENTANYL CITRATE 100 MCG: 50 INJECTION, SOLUTION INTRAMUSCULAR; INTRAVENOUS at 17:45

## 2024-01-30 RX ADMIN — BUDESONIDE AND FORMOTEROL FUMARATE DIHYDRATE 2 PUFF: 160; 4.5 AEROSOL RESPIRATORY (INHALATION) at 23:05

## 2024-01-30 RX ADMIN — ALBUTEROL SULFATE 4 PUFF: 90 AEROSOL, METERED RESPIRATORY (INHALATION) at 17:40

## 2024-01-30 RX ADMIN — DEXAMETHASONE SODIUM PHOSPHATE 4 MG: 10 INJECTION INTRAMUSCULAR; INTRAVENOUS at 15:32

## 2024-01-30 RX ADMIN — Medication 200 MCG: at 15:38

## 2024-01-30 RX ADMIN — BISACODYL 10 MG: 5 TABLET, COATED ORAL at 08:57

## 2024-01-30 RX ADMIN — ALBUTEROL SULFATE 2 PUFF: 90 AEROSOL, METERED RESPIRATORY (INHALATION) at 09:32

## 2024-01-30 RX ADMIN — BUDESONIDE AND FORMOTEROL FUMARATE DIHYDRATE 2 PUFF: 160; 4.5 AEROSOL RESPIRATORY (INHALATION) at 09:30

## 2024-01-30 RX ADMIN — SUGAMMADEX 200 MG: 100 INJECTION, SOLUTION INTRAVENOUS at 17:38

## 2024-01-30 RX ADMIN — METOCLOPRAMIDE HYDROCHLORIDE 10 MG: 5 INJECTION INTRAMUSCULAR; INTRAVENOUS at 12:51

## 2024-01-30 RX ADMIN — PANTOPRAZOLE SODIUM 40 MG: 40 INJECTION, POWDER, LYOPHILIZED, FOR SOLUTION INTRAVENOUS at 06:44

## 2024-01-30 RX ADMIN — LIDOCAINE HYDROCHLORIDE 50 MG: 10 INJECTION, SOLUTION EPIDURAL; INFILTRATION; INTRACAUDAL; PERINEURAL at 15:30

## 2024-01-30 RX ADMIN — Medication: at 19:54

## 2024-01-30 RX ADMIN — SODIUM CHLORIDE, POTASSIUM CHLORIDE, SODIUM LACTATE AND CALCIUM CHLORIDE 125 ML/HR: 600; 310; 30; 20 INJECTION, SOLUTION INTRAVENOUS at 21:51

## 2024-01-30 NOTE — PLAN OF CARE
Goal Outcome Evaluation:  Plan of Care Reviewed With: patient        Progress: no change  Outcome Evaluation: VSS, on RA. Pt slept off and on during the shift. UOP MD Enrique aware of problem. High volume out of ostomy. Will continue to monttor.

## 2024-01-30 NOTE — OP NOTE
Operative Report    Patient Name:  Roger Bhat  YOB: 1960  [id number]    1/30/2024      PREOPERATIVE DIAGNOSIS: Bowel obstruction and sepsis status post open sigmoid colectomy with diverting loop ileostomy on January 17, 2024      POSTOPERATIVE DIAGNOSIS: Same        PROCEDURE PERFORMED:     Exploratory Laparotomy   Lysis of adhesions       SURGEON: Jimmy Craig MD      ASSISTANT:    MICHELE Frazier    Assistant surgeon responsibilities: Bella assisted with dissection, retraction, mobilization of structures, and closure. Their assistance was necessary and required for successful completion of the case.        SPECIMENS: Devitalized omentum    ESTIMATED BLOOD LOSS: 150 mL      ANESTHESIA: General with TAP blocks.        FINDINGS:  1.  Significant proximal distention of the small bowel with some adhesions throughout all 4 quadrants of the abdomen.  No definite well-defined transition point.  No devitalized abdominal viscera.  No succus throughout the abdomen.  No evidence of marshall leak at the anastomosis       INDICATIONS:      This patient is a 63 y.o. male with a history of recent open sigmoid colectomy and cholecystectomy on January 17, 2024 for sigmoid colon mass.  He has been on the floor since his index operation, and although he initially appeared to be progressing well over the last week has had worsening of his clinical status with significant proximal bowel distention and inability to tolerate an oral diet as well as a rising leukocytosis. Preoperative imaging including CT scan has been unrevealing.  Due to overall continued lack of improvement in his clinical status I recommended to proceed to the operating room for abdominal exploration.  The risks, benefits, and alternatives of the procedure were discussed with the patient and their family preoperatively and they agreed to proceed.          DESCRIPTION OF PROCEDURE:      After obtaining informed consent, the patient was  taken to the operating room and placed in the modified lithotomy position on the operating room table. General anesthesia was initiated. A Crouch catheter was placed. The abdomen was prepped and draped in the usual sterile fashion. A time out was properly performed. Antibiotics were given preoperatively. SCDs were properly placed on the patient and turned on. Blocks were performed by the Anesthesia service prior to the start of the case.      We began the procedure with opening of the patient's previous midline laparotomy.  The skin staples were removed.  The subcutaneous tissues were bluntly .  The previous fascial sutures were identified and then incised.  The fascia was  and the abdominal cavity was entered bluntly.  There was some serous fluid throughout the subcutaneous wound as well as on entrance into the abdominal cavity, but no marshall purulence or succus.  We immediately identified that there were some omental adhesions to the midline.  These were taken down using a combination of blunt dissection as well as electrocautery.  Entrance into the abdominal cavity demonstrated that the small bowel was significantly distended.  There were some interloop adhesions as well as adhesions between the small bowel and omentum in all 4 abdominal quadrants.     A Bookwalter retractor was placed to aid with visualization.  We then proceeded forward with very careful lysis of adhesions.  There was a tongue of omentum which was adherent in the pelvis adjacent to his prior anastomosis.  This was carefully  from the surrounding tissues using combination of blunt as well as sharp dissection.  A portion of this omentum was frankly devitalized and was ligated with the LigaSure and passed off the field.  With this completed the omentum was able to be brought superiorly.  Using very careful blunt as well as sharp dissection, the small bowel adhesions to the pelvis were freed.  The coloproctostomy  anastomosis was identified within the pelvis.  There is no obvious abscess, leakage of succus surrounding the site, or marshall infection within the pelvis.  We then continued to proceed forward inspecting the small bowel.  The small bowel was quite distended proximally.  All of the adhesions were taken down.  This required at least 1 hour of careful lysis of adhesions.  With this completed we identified that there was significant proximal distention of the small bowel as well as the stomach, and this tapered down to more decompressed distal small bowel.  There was not a well-defined transition point.  The anastomosis within the pelvis was carefully evaluated, and there was no evidence of breakdown anteriorly.  There was a drain posterior to the anastomosis which had had some previous feculent output, however there was no obvious breakdown or surrounding contamination on gross examination of the anastomosis.  The posterior aspect of the anastomosis was difficult to visualize due to surrounding adhesions..  However the descending colon and rectum did appear completely viable.  This drain was exchanged for an additional drain that laid posterior to the anastomosis.  The posterior aspect of the anastomosis was not able to be completely visualized, as I felt that further mobilization of the site would only risk breakdown and it appeared completely viable.     We evaluated the small bowel all the way from the level of the ligament of Treitz to the diverting loop ileostomy.  There were some small serosal areas which were oversewn using 3-0 silk suture in a Lembert fashion, but no evidence of any full-thickness injury.  The small bowel was significantly distended proximally, but tapered to more decompressed distal small bowel.  There was no defined transition point.  All of the small bowel appeared viable.  The ileostomy was digitized and found to be widely patent.     The right upper quadrant was evaluated and found to be  without any obvious contamination at the site of his previous cholecystectomy.  The NG tube was palpated within the body of the stomach. The abdomen was copiously irrigated and this was all evacuated. There was some oozing from the raw surfaces and dissection planes, but no evidence of any surgical bleeding and hemostasis was confirmed.      At this point I was satisfied that there was no obvious unaddressed infection or source of obstruction within the patient's abdomen.  We therefore elected to proceed forward with abdominal closure.  The fascia was closed using simple interrupted figure-of-eight #1 PDS suture with great care to protect the underlying abdominal viscera.  The subcutaneous wound was irrigated with antibiotic-containing solution.  The skin was then closed with skin staples.  A stoma appliance was reapplied to the previous loop ileostomy on the right side.       After the procedure, the patient was awakened, extubated, and taken to the postoperative anesthesia care unit for recovery. All needle, instrument, and lap counts were correct. I was personally present and performed all portions of the procedure. There were no immediate complications         Jimmy Craig MD  1/30/2024  17:40 EST

## 2024-01-30 NOTE — ANESTHESIA PROCEDURE NOTES
"Peripheral Block      Patient reassessed immediately prior to procedure    Patient location during procedure: OR  Reason for block: at surgeon's request and post-op pain management  Performed by  Assisted by: Braden Roman CRNASRNA: Sky Gu SRNA  Preanesthetic Checklist  Completed: patient identified, IV checked, site marked, risks and benefits discussed, surgical consent, monitors and equipment checked, pre-op evaluation and timeout performed  Prep:  Pt Position: supine  Sterile barriers:cap, gloves, mask and washed/disinfected hands  Prep: ChloraPrep  Patient monitoring: blood pressure monitoring, continuous pulse oximetry and EKG  Procedure    Sedation: yes  Performed under: general  Guidance:ultrasound guided  Images:still images obtained, printed/placed on chart    Laterality:Bilateral  Block Type:TAP  Injection Technique:single-shot  Needle Type:short-bevel and echogenic  Needle Gauge:20 G  Resistance on Injection: none    Medications Used: dexamethasone sodium phosphate injection - Injection   4 mg - 1/30/2024 3:32:00 PM  bupivacaine PF (MARCAINE) 0.25 % injection - Injection   60 mL - 1/30/2024 3:32:00 PM      Medications  Comment:Block Injection:  LA dose divided between Right and Left block        Post Assessment  Injection Assessment: negative aspiration for heme, incremental injection and no paresthesia on injection  Patient Tolerance:comfortable throughout block  Complications:no  Additional Notes  Mid-Axillary/Lateral TAPs    A high-frequency linear transducer, with sterile cover, was placed in the midaxillary line between the ASIS and costal margin. The External Oblique Muscle (EOM), Internal Oblique Muscle (IOM), Transverse Abdominus Muscle (MURPHY), and Peritoneum were identified. The insertion site was prepped in sterile fashion and then localized with 2-5 ml of 1% Lidocaine. Using ultrasound-guidance, a 20-gauge [employer] 4\" Ultraplex 360 non-stimulating echogenic needle was advanced in " plane, from medial to lateral, until the tip of the needle was in the fascial plane between the IOM and MURPHY. 1-3ml of preservative free normal saline was used to hydro-dissect the fascial planes. After the fascial plane was verified, the local anesthetic (LA) was injected. The procedure was repeated on the opposite side for bilateral coverage. Aspiration every 5 ml to prevent intravascular injection. Injection was completed with negative aspiration of blood and negative intravascular injection. Injection pressures were normal with minimal resistance. Midaxillary TAPs should reach intercostal nerves T10- T11 and the subcostal nerve T12.

## 2024-01-30 NOTE — CONSULTS
Clinical Nutrition   Nutrition Assessment  Reason for Visit: Follow-up protocol, PN    Patient Name: Roger Bhat  YOB: 1960  MRN: [id number]  Date of Encounter: 01/30/24 12:29 EST  Admission date: 1/17/2024    Continue current TPN regimen of:  PN Route: PICC   PN:   D15%, AA5%  @ goal 60 ml/hr             GIR: 2.5 mg/kg/min  Lipid: 100ml 20% SMOF daily     PN@ Goal over 24hr to Supply:  Volume 1440 mL/day       Energy 1222 Kcal/day 91 % Est Need   Protein 72 g/day 95 % Est Need      Noted plan for OR for ex-lap today. Continuing to monitor and adjust nutritional care plan/regimen as indicated/per surgeon guidance.     Would consider moving antiemetics to scheduled.     Nutrition Assessment   Admission Diagnosis:  Colonic mass [K63.89]    Problem List:    Colonic mass      PMH:   He  has a past medical history of Allergies, Cellulitis of hand, Clotting disorder, Colonic mass, COPD (chronic obstructive pulmonary disease), Cough, Current smoker, Erectile dysfunction, Fracture, foot (Sept 7 2022), GERD (gastroesophageal reflux disease), Hemochromatosis, Hyperlipidemia, Hypertension, and Wears dentures.    PSH:  He  has a past surgical history that includes Esophagogastroduodenoscopy (05/2016); Colonoscopy (2021); Esophagogastroduodenoscopy (N/A, 10/19/2021); Esophagogastroduodenoscopy (N/A, 11/16/2021); Hand surgery (2022); Cholecystectomy (N/A, 1/17/2024); and Colectomy (N/A, 1/17/2024).    Applicable Nutrition Concerns:   Skin:  Oral:  GI: s/p lap vladimir, open sigmoid colectomy with diverting loop ileostomy     Applicable Interval History:   (1/26) CT A/P - Impression:  1.Imaging features most consistent with small bowel adynamic ileus. No focal transition point identified to suggest mechanical obstruction.  2.Postoperative changes as detailed above. Minimal free fluid surrounding right lower quadrant surgical drain. No abscess identified.  3.Other incidental findings as  detailed above.    Reported/Observed/Food/Nutrition Related History:     1/30) Pt has continued to struggle to tolerate CLD. PICC placed and changed to TPN yesterday to better meet needs. Meeting ~95% needs with additional from CLD, drinking ensure clear. Lytes wnl this morning with TPN at goal. Na slightly low. Ileostomy output better however again not much nutrition going in. Continues with small amounts of emesis. A repeat CT was done yesterday. Surgeon with plans to take pt back to OR today for ex lap.    1/26) Spoke with surgeon this morning, okay with PPN as CT suggestive of ileus and pt will need more days NPO. Hopeful will not need long as predict return of bowel function in coming days thus will initiate PPN rather than TPN. Pt also started on reglan and bowel regimen as well as IVF. Discussed with pt and spouse at bedside, in understanding of plan. Pt feeling much better but still with significant NG output that is feculent. Hopeful to be able to eat in near future just really wants a banana. RD explained rationale and importance gut rest at this time and pt understands.     1/25) Pt unable to tolerate oral intake, day 10 without any significant source nutrition. Now NPO with NG to LWS. KUB obtained suggestive of ileus or SBO.     1/24) Visited with pt and spouse at bedside for consult per WOC for new ileostomy education/high ileostomy output. Unfortunately pt's significant nutrition concerns were not reported prior and pt is new to RD, due to no previous consults and no nutritional concerns reported on nursing screen. Together pt and spouse tell me pt has not had any signifiant oral intake in 9 days due to preporation and NPO status for surgery followed by slow to return bowel function and eventual intolerance diet advancement. Did drink Impact AR per surgery protocol in the days prior to surgery.They note he has lost a lot of weight and muscle. He has emesis bag at time of my visit and tells me he only  got sick once yesterday but many times today despite no oral intake. All he has been able to keep down today is 8 oz fluid (4oz gatorade and 4oz juice). RD encouraged liquids as needed and trying solids when able.   Discussed with pt and spouse that given above as well as high ileostomy output and increased energy/protein needs from significant surgery, RD with significant concern for pt's nutrition and hydration status. They tell me no one has mentioned possibility of nutrition support, RD educated this may be necessary if unable to tolerate PO soon. Of course would like to avoid but in understanding if needed. Nutritional education for new ileostomy and high ileostomy output introduced and will f/u as needed. More pertinent issue is getting pt to tolerate any oral nutrition at this time which would lend to normalized output. Pt and spouse receptive and appreciative of visit. Deny further needs or questions/concerns at this time, NKFA.     Labs    Labs Reviewed: Yes     Results from last 7 days   Lab Units 01/30/24  0538 01/29/24  0541 01/28/24  0459 01/27/24  0405 01/26/24  0500   GLUCOSE mg/dL 119* 123* 125*   < > 95  96   BUN mg/dL 17 19 18   < > 24*  26*   CREATININE mg/dL 0.41* 0.46* 0.53*   < > 0.57*  0.57*   SODIUM mmol/L 135* 135* 136   < > 142  142   CHLORIDE mmol/L 100 102 100   < > 105  105   POTASSIUM mmol/L 3.5 4.0 3.9   < > 3.9  3.9   PHOSPHORUS mg/dL 3.4 3.6 3.0   < > 3.5   MAGNESIUM mg/dL 2.1 2.2 2.1   < > 2.4   ALT (SGPT) U/L  --  18  --   --  15  15    < > = values in this interval not displayed.       Results from last 7 days   Lab Units 01/29/24  0541 01/26/24  0843 01/26/24  0500   ALBUMIN g/dL 3.2*  --  3.1*  3.1*   PREALBUMIN mg/dL 12.7* 8.8*  --    CRP mg/dL 9.45*  --  18.23*   IONIZED CALCIUM mmol/L 1.27 1.27  --    CHOLESTEROL mg/dL  --   --  108   TRIGLYCERIDES mg/dL 129  --  148       Results from last 7 days   Lab Units 01/30/24  0752 01/30/24  0631 01/29/24  2302 01/29/24  8046  "01/29/24  1139 01/29/24  0638   GLUCOSE mg/dL 129 123 120 122 107 125     Lab Results   Lab Value Date/Time    HGBA1C 5.00 01/10/2024 1508    HGBA1C 5.3 11/28/2022 1111    HGBA1C 5.20 08/17/2021 1149               Medications    Medications Reviewed: Yes  Pertinent: insulin, reglan, protonix, abx, senokot, lipids, folate, thiamine  Infusion: TPN, NS .9 @ 50 ml/hr  PRN: zofran    Intake/Ouptut 24 hrs (0701 - 0700)   I&O's Reviewed: Yes   Intake & Output (last day)         01/29 0701 01/30 0700 01/30 0701 01/31 0700    P.O.      I.V. (mL/kg) 1213.3 (20.4)     IV Piggyback 100          Total Intake(mL/kg) 2189.3 (36.9)     Urine (mL/kg/hr)      Emesis/NG output 125     Drains      Stool 1300 550    Total Output 1425 550    Net +764.3 -550          Emesis Unmeasured Occurrence 1 x           Anthropometrics     Height: Height: 175.3 cm (69\")  Last Filed Weight: Weight: 59.4 kg (131 lb) (01/29/24 0549)  Method: Weight Method: Bed scale  BMI: BMI (Calculated): 19.3  BMI classification: Normal: 18.5-24.9kg/m2  IBW:   155 lb    UBW:     Weight       Weight (kg) Weight (lbs) Weight Method Visit Report   11/30/2022 68 kg  149 lb 14.6 oz   --    1/25/2023 68 kg  149 lb 14.6 oz   --    4/26/2023 64.683 kg  142 lb 9.6 oz   --    10/26/2023 65.137 kg  143 lb 9.6 oz   --    12/15/2023 64.683 kg  142 lb 9.6 oz   --    1/8/2024 63.504 kg  140 lb      1/10/2024 66.25 kg  146 lb 0.9 oz  Standing scale     1/17/2024 66.25 kg  146 lb 0.9 oz      1/24/2024 61.236 kg  135 lb  Bed scale     1/26/2024 54.613 kg  120 lb 6.4 oz  Standing scale       Weight change: weight loss of 26 lbs (17.8%) over the past 2 week(s)    Intentional?  No    Nutrition Focused Physical Exam     Date:  1/24       Patient meets criteria for malnutrition diagnosis, see MSA note.     Needs Assessment   Date: 1/26, reviewed 1/30    Height used:Height: 175.3 cm (69\")  Weights used: 54.6 kg (ABW)      Estimated Calorie needs: ~1350 Kcal/day  Method:  Kcals/KG " 25 ABW = 1365  Method:  MSJ 1340    Estimated Protein needs: ~76 g PRO/day  Method: 1.3-1.5 g/Kg = 71-82    Estimated Fluid needs: ~ ml/day   Per clinical status    Current Nutrition Prescription   PO: Adult [facility] CLINIMIX-E TPN  NPO Diet NPO Type: Sips with Meds  Oral Nutrition Supplement:   Intake: Insufficient data has not tolerated any more than sips liquids t/o admission per pt and spouse. 1 meal documented at 75%, inaccurate documentation.     (Since ) 0% X 4. 25% X 2, 75% X 1 meals documented    PN Route: PICC   PN:   D15%, AA5%  @ goal 60 ml/hr             GIR: 2.5 mg/kg/min  Lipid: 100ml 20% SMOF daily     PN@ Goal over 24hr to Supply:  Volume 1440 mL/day       Energy 1222 Kcal/day 91 % Est Need   Protein 72 g/day 95 % Est Need      ---------------------------------------------------------------------------  Formula/Rate verified at bedside: No  Infusing Rate at time of visit: at goal per MAR    Average Delivery from [place] Day: changed to TPN from PPN   PN Volume 783 mL/day     Lipid delivered?        Energy  Kcal/day  % Est Need   Protein  g/day  % Est Need     PN hx:   (-) PPN: D5%, AA4.25% @ 50 ml/hr, 100 ml lipids/daily    Nutrition Diagnosis   Date:              Updated:    Problem Malnutrition  severe/acute   Etiology Energy needs>energy intake 2/2 recent colectomy with ileostomy placement with slow to return bowel function followed by intolerance diet.    Signs/Symptoms PO intakes <50% EEN >/=5 days and loss 17.8% body weight with past 2 weeks, moderate muscle wasting, and moderate subcutaneous fat loss.    Status: Ongoing / worsened with updated standing scale weight, PPN to initiate for nutrition support    Date:       Updated:     Problem Food and nutrition knowledge deficit   Etiology S/p colectomy with new ileostomy complicated by high ileostomy output   Signs/Symptoms Pt with prior lack of knowledge appropriate MNT/diet for condition   Status: Improvement Shown  Education provided    Goal:   General: Nutrition to support treatment  PO: Tolerate PO, Increase intake  EN/PN: Maintain PN, Deliver estimated needs     Nutrition Intervention      Follow treatment progress, Care plan reviewed, Nutrition support order placed     Continue current TPN meeting ~90-95% needs    Oral intake CLD as tolerated with clear liquid ONS TID to meet additional 5-10% needs    Monitor further diagnostic findings to provide further MNT as indicated    Monitoring/Evaluation:   Per protocol, I&O, PO intake, Supplement intake, Pertinent labs, GI status, Symptoms, POC/GOC      Stacie Maldonado RD, CNSC  Time Spent: 35m

## 2024-01-30 NOTE — PROGRESS NOTES
"Patient Name:  Roger Bhat  YOB: 1960  [id number]    Surgery Progress Note    Date of visit: 1/30/2024      Subjective: No acute events.  Tells me that he feels \"not too bad \"this morning.  Had some coughing and 2 or 3 very small emesis overnight.  At 1300 mL of output from the ileostomy.  Voided 3 times yesterday.  Denies any fevers or chills.  Denies abdominal pain          Objective:     /88 (BP Location: Right arm, Patient Position: Lying)   Pulse 100   Temp 98.3 °F (36.8 °C) (Temporal)   Resp 16   Ht 175.3 cm (69\")   Wt 59.4 kg (131 lb)   SpO2 90%   BMI 19.35 kg/m²     Intake/Output Summary (Last 24 hours) at 1/30/2024 0830  Last data filed at 1/30/2024 0645  Gross per 24 hour   Intake 2189.33 ml   Output 675 ml   Net 1514.33 ml     GEN:   Awake, alert, in no acute distress, resting comfortably in bed   CV:      Regular rate and rhythm  L:         Symmetric expansion, not labored on room air  Abd:    Soft, not obviously distended, appropriately tender palpation on midline incision,  Incision is clean dry intact, ileostomy in the right side is pink and patent with liquid stool in the bag, DELROY drain in the right lower quadrant with small amount of brown thick output  Ext:     No cyanosis, clubbing, or edema    Recent labs that are back at this time have been reviewed.           Assessment/ Plan:    Mr. Bhat is a 63-year-old gentleman who is admitted following operative intervention for gallbladder and sigmoid colon mass on January 17     #Sigmoid colon mass, gallbladder mass  # Ileus  -Postop day 13 following lap vladimir, open sigmoid colectomy with diverting loop ileostomy  -Afebrile  -Labs show worsened leukocytosis today to 26k. He has continued to have ileostomy output with 1300 mL yesterday. We have repeated a CT scan again yesterday that does not show any obvious abscess or fluid collection with decompressed distal small bowel and significantly distended proximal small " bowel. At this point, I am concerned that he has not been able to reliably tolerate a diet and has a worsening leukocytosis with significant proximal bowel distention that has not resolved after two weeks. After a long discussion with the patient this morning I have recommended to return to the operating room today for abdominal exploration to ensure no unaddressed source of infection or mechanical obstruction. He understands and is agreeable with proceeding forward        Jimmy Craig MD  1/30/2024  08:30 EST

## 2024-01-30 NOTE — ANESTHESIA POSTPROCEDURE EVALUATION
Patient: Roger Bhat    Procedure Summary       Date: 01/30/24 Room / Location:  [facility] 11 /  [facility]    Anesthesia Start: 1525 Anesthesia Stop: 1803    Procedure: LAPAROTOMY EXPLORATORY (Abdomen) Diagnosis:     Surgeons: Jimmy Craig MD Provider: Tracy Johnson MD    Anesthesia Type: general with block ASA Status: 3            Anesthesia Type: general with block    Vitals  No vitals data found for the desired time range.          Post Anesthesia Care and Evaluation    Patient location during evaluation: PACU  Patient participation: complete - patient participated  Level of consciousness: awake and alert  Pain management: adequate    Airway patency: patent  Anesthetic complications: No anesthetic complications  PONV Status: none  Cardiovascular status: hemodynamically stable and acceptable  Respiratory status: nonlabored ventilation, acceptable and face mask  Hydration status: acceptable    Comments: Pt doing well in PACU SpO2 96-98%, breathing nonlabored, will be monitored in ICU overnight secondary to his acuity level

## 2024-01-30 NOTE — ANESTHESIA PROCEDURE NOTES
Airway  Urgency: elective    Date/Time: 1/30/2024 3:31 PM  Airway not difficult    General Information and Staff    Anesthesiologist: Tracy Johnson MD    Indications and Patient Condition  Indications for airway management: airway protection    Preoxygenated: yes  MILS not maintained throughout  Mask difficulty assessment: 0 - not attempted    Final Airway Details  Final airway type: endotracheal airway      Successful airway: ETT  Cuffed: yes   Successful intubation technique: video laryngoscopy and RSI  Facilitating devices/methods: cricoid pressure  Endotracheal tube insertion site: oral  Blade: Allen  Blade size: D  ETT size (mm): 7.5  Cormack-Lehane Classification: grade I - full view of glottis  Placement verified by: chest auscultation and capnometry   Measured from: gums  ETT/EBT to gums (cm): 22  Number of attempts at approach: 1  Assessment: lips, teeth, and gum same as pre-op and atraumatic intubation    Additional Comments  Upon intubation as ETT entering airway, gastric fluid began emerging from esophagus, ETT placed and balloon inflated immediately, airway suctioned prior to mechanical ventilation;  no aspirate noted in ETT tube.

## 2024-01-31 LAB
ALBUMIN SERPL-MCNC: 3.1 G/DL (ref 3.5–5.2)
ALP SERPL-CCNC: 52 U/L (ref 39–117)
ALT SERPL W P-5'-P-CCNC: 10 U/L (ref 1–41)
ANION GAP SERPL CALCULATED.3IONS-SCNC: 10 MMOL/L (ref 5–15)
ANION GAP SERPL CALCULATED.3IONS-SCNC: 16 MMOL/L (ref 5–15)
AST SERPL-CCNC: 15 U/L (ref 1–40)
BASOPHILS # BLD AUTO: 0.02 10*3/MM3 (ref 0–0.2)
BASOPHILS # BLD MANUAL: 0 10*3/MM3 (ref 0–0.2)
BASOPHILS NFR BLD AUTO: 0 % (ref 0–1.5)
BASOPHILS NFR BLD MANUAL: 0 % (ref 0–1.5)
BILIRUB CONJ SERPL-MCNC: 0.2 MG/DL (ref 0–0.3)
BILIRUB INDIRECT SERPL-MCNC: 0.3 MG/DL
BILIRUB SERPL-MCNC: 0.5 MG/DL (ref 0–1.2)
BUN SERPL-MCNC: 26 MG/DL (ref 8–23)
BUN SERPL-MCNC: 28 MG/DL (ref 8–23)
BUN/CREAT SERPL: 35.1 (ref 7–25)
BUN/CREAT SERPL: 35.4 (ref 7–25)
CALCIUM SPEC-SCNC: 7.9 MG/DL (ref 8.6–10.5)
CALCIUM SPEC-SCNC: 8.4 MG/DL (ref 8.6–10.5)
CHLORIDE SERPL-SCNC: 102 MMOL/L (ref 98–107)
CHLORIDE SERPL-SCNC: 104 MMOL/L (ref 98–107)
CO2 SERPL-SCNC: 16 MMOL/L (ref 22–29)
CO2 SERPL-SCNC: 18 MMOL/L (ref 22–29)
CREAT SERPL-MCNC: 0.74 MG/DL (ref 0.76–1.27)
CREAT SERPL-MCNC: 0.79 MG/DL (ref 0.76–1.27)
D-LACTATE SERPL-SCNC: 1.7 MMOL/L (ref 0.5–2)
D-LACTATE SERPL-SCNC: 2.2 MMOL/L (ref 0.5–2)
DEPRECATED RDW RBC AUTO: 52.4 FL (ref 37–54)
EGFRCR SERPLBLD CKD-EPI 2021: 101.1 ML/MIN/1.73
EGFRCR SERPLBLD CKD-EPI 2021: 103.1 ML/MIN/1.73
EOSINOPHIL # BLD AUTO: 0 10*3/MM3 (ref 0–0.4)
EOSINOPHIL # BLD MANUAL: 0 10*3/MM3 (ref 0–0.4)
EOSINOPHIL NFR BLD AUTO: 0 % (ref 0.3–6.2)
EOSINOPHIL NFR BLD MANUAL: 0 % (ref 0.3–6.2)
ERYTHROCYTE [DISTWIDTH] IN BLOOD BY AUTOMATED COUNT: 14.2 % (ref 12.3–15.4)
GEN 5 2HR TROPONIN T REFLEX: 18 NG/L
GLUCOSE BLDC GLUCOMTR-MCNC: 127 MG/DL (ref 70–130)
GLUCOSE BLDC GLUCOMTR-MCNC: 129 MG/DL (ref 70–130)
GLUCOSE BLDC GLUCOMTR-MCNC: 135 MG/DL (ref 70–130)
GLUCOSE BLDC GLUCOMTR-MCNC: 135 MG/DL (ref 70–130)
GLUCOSE SERPL-MCNC: 111 MG/DL (ref 65–99)
GLUCOSE SERPL-MCNC: 126 MG/DL (ref 65–99)
HCT VFR BLD AUTO: 32.9 % (ref 37.5–51)
HGB BLD-MCNC: 11.5 G/DL (ref 13–17.7)
IMM GRANULOCYTES # BLD AUTO: 1.15 10*3/MM3 (ref 0–0.05)
IMM GRANULOCYTES NFR BLD AUTO: 2.4 % (ref 0–0.5)
LYMPHOCYTES # BLD AUTO: 1.32 10*3/MM3 (ref 0.7–3.1)
LYMPHOCYTES # BLD MANUAL: 2.41 10*3/MM3 (ref 0.7–3.1)
LYMPHOCYTES NFR BLD AUTO: 2.7 % (ref 19.6–45.3)
LYMPHOCYTES NFR BLD MANUAL: 1 % (ref 5–12)
MACROCYTES BLD QL SMEAR: ABNORMAL
MAGNESIUM SERPL-MCNC: 1.9 MG/DL (ref 1.6–2.4)
MCH RBC QN AUTO: 35.5 PG (ref 26.6–33)
MCHC RBC AUTO-ENTMCNC: 35 G/DL (ref 31.5–35.7)
MCV RBC AUTO: 101.5 FL (ref 79–97)
METAMYELOCYTES NFR BLD MANUAL: 1 % (ref 0–0)
MONOCYTES # BLD AUTO: 0.53 10*3/MM3 (ref 0.1–0.9)
MONOCYTES # BLD: 0.48 10*3/MM3 (ref 0.1–0.9)
MONOCYTES NFR BLD AUTO: 1.1 % (ref 5–12)
NEUTROPHILS # BLD AUTO: 44.76 10*3/MM3 (ref 1.7–7)
NEUTROPHILS NFR BLD AUTO: 45.11 10*3/MM3 (ref 1.7–7)
NEUTROPHILS NFR BLD AUTO: 93.8 % (ref 42.7–76)
NEUTROPHILS NFR BLD MANUAL: 55 % (ref 42.7–76)
NEUTS BAND NFR BLD MANUAL: 38 % (ref 0–5)
NEUTS VAC BLD QL SMEAR: ABNORMAL
NRBC BLD AUTO-RTO: 0 /100 WBC (ref 0–0.2)
NRBC SPEC MANUAL: 0 /100 WBC (ref 0–0.2)
PHOSPHATE SERPL-MCNC: 3.8 MG/DL (ref 2.5–4.5)
PLAT MORPH BLD: NORMAL
PLATELET # BLD AUTO: 367 10*3/MM3 (ref 140–450)
PMV BLD AUTO: 10 FL (ref 6–12)
POTASSIUM SERPL-SCNC: 4.1 MMOL/L (ref 3.5–5.2)
POTASSIUM SERPL-SCNC: 4.4 MMOL/L (ref 3.5–5.2)
PROT SERPL-MCNC: 5.2 G/DL (ref 6–8.5)
RBC # BLD AUTO: 3.24 10*6/MM3 (ref 4.14–5.8)
SODIUM SERPL-SCNC: 132 MMOL/L (ref 136–145)
SODIUM SERPL-SCNC: 134 MMOL/L (ref 136–145)
TOXIC GRANULATION: ABNORMAL
TROPONIN T DELTA: -8 NG/L
TROPONIN T SERPL HS-MCNC: 26 NG/L
VARIANT LYMPHS NFR BLD MANUAL: 1 % (ref 0–5)
VARIANT LYMPHS NFR BLD MANUAL: 4 % (ref 19.6–45.3)
WBC NRBC COR # BLD AUTO: 48.13 10*3/MM3 (ref 3.4–10.8)

## 2024-01-31 PROCEDURE — 85007 BL SMEAR W/DIFF WBC COUNT: CPT | Performed by: SURGERY

## 2024-01-31 PROCEDURE — 83735 ASSAY OF MAGNESIUM: CPT | Performed by: SURGERY

## 2024-01-31 PROCEDURE — 25010000002 ALBUMIN HUMAN 5% PER 50 ML: Performed by: INTERNAL MEDICINE

## 2024-01-31 PROCEDURE — 94799 UNLISTED PULMONARY SVC/PX: CPT

## 2024-01-31 PROCEDURE — 25010000002 MAGNESIUM SULFATE PER 500 MG OF MAGNESIUM: Performed by: INTERNAL MEDICINE

## 2024-01-31 PROCEDURE — 25010000002 PIPERACILLIN SOD-TAZOBACTAM PER 1 G: Performed by: SURGERY

## 2024-01-31 PROCEDURE — 99233 SBSQ HOSP IP/OBS HIGH 50: CPT | Performed by: INTERNAL MEDICINE

## 2024-01-31 PROCEDURE — 83605 ASSAY OF LACTIC ACID: CPT | Performed by: INTERNAL MEDICINE

## 2024-01-31 PROCEDURE — 85025 COMPLETE CBC W/AUTO DIFF WBC: CPT | Performed by: SURGERY

## 2024-01-31 PROCEDURE — 25010000002 POTASSIUM CHLORIDE PER 2 MEQ OF POTASSIUM: Performed by: INTERNAL MEDICINE

## 2024-01-31 PROCEDURE — 25010000002 HEPARIN (PORCINE) PER 1000 UNITS: Performed by: SURGERY

## 2024-01-31 PROCEDURE — 84100 ASSAY OF PHOSPHORUS: CPT | Performed by: SURGERY

## 2024-01-31 PROCEDURE — 25010000002 METOCLOPRAMIDE PER 10 MG: Performed by: SURGERY

## 2024-01-31 PROCEDURE — P9041 ALBUMIN (HUMAN),5%, 50ML: HCPCS | Performed by: INTERNAL MEDICINE

## 2024-01-31 PROCEDURE — 25010000002 THIAMINE PER 100 MG: Performed by: SURGERY

## 2024-01-31 PROCEDURE — 84484 ASSAY OF TROPONIN QUANT: CPT | Performed by: INTERNAL MEDICINE

## 2024-01-31 PROCEDURE — 80076 HEPATIC FUNCTION PANEL: CPT | Performed by: INTERNAL MEDICINE

## 2024-01-31 PROCEDURE — 80048 BASIC METABOLIC PNL TOTAL CA: CPT

## 2024-01-31 PROCEDURE — 82948 REAGENT STRIP/BLOOD GLUCOSE: CPT

## 2024-01-31 PROCEDURE — 25010000002 CALCIUM GLUCONATE PER 10 ML: Performed by: INTERNAL MEDICINE

## 2024-01-31 RX ORDER — ALBUMIN, HUMAN INJ 5% 5 %
250 SOLUTION INTRAVENOUS
Qty: 500 ML | Refills: 0 | Status: COMPLETED | OUTPATIENT
Start: 2024-01-31 | End: 2024-01-31

## 2024-01-31 RX ADMIN — Medication 10 ML: at 02:16

## 2024-01-31 RX ADMIN — PIPERACILLIN SODIUM AND TAZOBACTAM SODIUM 3.38 G: 3; .375 INJECTION, SOLUTION INTRAVENOUS at 08:34

## 2024-01-31 RX ADMIN — METOCLOPRAMIDE HYDROCHLORIDE 10 MG: 5 INJECTION INTRAMUSCULAR; INTRAVENOUS at 17:56

## 2024-01-31 RX ADMIN — POTASSIUM PHOSPHATE, MONOBASIC POTASSIUM PHOSPHATE, DIBASIC: 224; 236 INJECTION, SOLUTION, CONCENTRATE INTRAVENOUS at 17:55

## 2024-01-31 RX ADMIN — HEPARIN SODIUM 5000 UNITS: 5000 INJECTION INTRAVENOUS; SUBCUTANEOUS at 14:49

## 2024-01-31 RX ADMIN — SMOFLIPID 250 ML: 6; 6; 5; 3 INJECTION, EMULSION INTRAVENOUS at 17:55

## 2024-01-31 RX ADMIN — METOCLOPRAMIDE HYDROCHLORIDE 10 MG: 5 INJECTION INTRAMUSCULAR; INTRAVENOUS at 11:50

## 2024-01-31 RX ADMIN — HEPARIN SODIUM 5000 UNITS: 5000 INJECTION INTRAVENOUS; SUBCUTANEOUS at 21:10

## 2024-01-31 RX ADMIN — PANTOPRAZOLE SODIUM 40 MG: 40 INJECTION, POWDER, LYOPHILIZED, FOR SOLUTION INTRAVENOUS at 08:33

## 2024-01-31 RX ADMIN — PIPERACILLIN SODIUM AND TAZOBACTAM SODIUM 3.38 G: 3; .375 INJECTION, SOLUTION INTRAVENOUS at 14:50

## 2024-01-31 RX ADMIN — BUDESONIDE AND FORMOTEROL FUMARATE DIHYDRATE 2 PUFF: 160; 4.5 AEROSOL RESPIRATORY (INHALATION) at 10:48

## 2024-01-31 RX ADMIN — HEPARIN SODIUM 5000 UNITS: 5000 INJECTION INTRAVENOUS; SUBCUTANEOUS at 08:33

## 2024-01-31 RX ADMIN — METOCLOPRAMIDE HYDROCHLORIDE 10 MG: 5 INJECTION INTRAMUSCULAR; INTRAVENOUS at 00:50

## 2024-01-31 RX ADMIN — ALBUMIN (HUMAN) 250 ML: 12.5 INJECTION, SOLUTION INTRAVENOUS at 00:50

## 2024-01-31 RX ADMIN — Medication 10 ML: at 21:10

## 2024-01-31 RX ADMIN — FOLIC ACID 1 MG: 5 INJECTION, SOLUTION INTRAMUSCULAR; INTRAVENOUS; SUBCUTANEOUS at 10:11

## 2024-01-31 RX ADMIN — BUDESONIDE AND FORMOTEROL FUMARATE DIHYDRATE 2 PUFF: 160; 4.5 AEROSOL RESPIRATORY (INHALATION) at 18:27

## 2024-01-31 RX ADMIN — ALBUMIN (HUMAN) 250 ML: 12.5 INJECTION, SOLUTION INTRAVENOUS at 01:52

## 2024-01-31 RX ADMIN — ALBUTEROL SULFATE 2 PUFF: 90 AEROSOL, METERED RESPIRATORY (INHALATION) at 18:27

## 2024-01-31 RX ADMIN — THIAMINE HYDROCHLORIDE 100 MG: 100 INJECTION, SOLUTION INTRAMUSCULAR; INTRAVENOUS at 10:10

## 2024-01-31 RX ADMIN — PIPERACILLIN SODIUM AND TAZOBACTAM SODIUM 3.38 G: 3; .375 INJECTION, SOLUTION INTRAVENOUS at 00:49

## 2024-01-31 RX ADMIN — METOCLOPRAMIDE HYDROCHLORIDE 10 MG: 5 INJECTION INTRAMUSCULAR; INTRAVENOUS at 08:34

## 2024-01-31 RX ADMIN — ASCORBIC ACID, VITAMIN A PALMITATE, CHOLECALCIFEROL, THIAMINE HYDROCHLORIDE, RIBOFLAVIN-5 PHOSPHATE SODIUM, PYRIDOXINE HYDROCHLORIDE, NIACINAMIDE, DEXPANTHENOL, ALPHA-TOCOPHEROL ACETATE, VITAMIN K1, FOLIC ACID, BIOTIN, CYANOCOBALAMIN: 200; 3300; 200; 6; 3.6; 6; 40; 15; 10; 150; 600; 60; 5 INJECTION, SOLUTION INTRAVENOUS at 04:55

## 2024-01-31 NOTE — CONSULTS
Clinical Nutrition   Nutrition Assessment  Reason for Visit: Follow-up protocol, Malnutrition Severity Assessment, Consult, PN    Patient Name: Roger Bhat  YOB: 1960  MRN: [id number]  Date of Encounter: 01/31/24 13:44 EST  Admission date: 1/17/2024    TPN adjusted per Intensivist, continue regimen of:    PN Route: PICC   PN:   D10.4%, AA3.75%  @ goal 100 ml/hr             GIR: 3.18 mg/kg/min  Lipid: 250 ml 20% SMOF daily     PN@ Goal over 24hr to Supply:  Volume 2400 mL/day       Energy 1710 Kcal/day 101 % Est Need   Protein 90 g/day 110 % Est Need      Pt meets criteria severe malnutrition in the context of acute illness, see MSA completed 1/26 for full assessment, 2/2 preporation and NPO status for surgery followed by slow to return bowel function and eventual intolerance diet advancement. Note highly significant weight loss of 17.8% body weight within the past month.      Nutrition Assessment   Admission Diagnosis:  Colonic mass [K63.89]    Problem List:    Colonic mass    Current smoker    Severe malnutrition    Respiratory distress      PMH:   He  has a past medical history of Allergies, Cellulitis of hand, Clotting disorder, Colonic mass, COPD (chronic obstructive pulmonary disease), Cough, Current smoker, Erectile dysfunction, Fracture, foot (Sept 7 2022), GERD (gastroesophageal reflux disease), Hemochromatosis, Hyperlipidemia, Hypertension, and Wears dentures.    PSH:  He  has a past surgical history that includes Esophagogastroduodenoscopy (05/2016); Colonoscopy (2021); Esophagogastroduodenoscopy (N/A, 10/19/2021); Esophagogastroduodenoscopy (N/A, 11/16/2021); Hand surgery (2022); Cholecystectomy (N/A, 1/17/2024); Colectomy (N/A, 1/17/2024); and Exploratory Laparotomy (N/A, 1/30/2024).    Applicable Nutrition Concerns:   Skin:  Oral:  GI: s/p lap vladimir, open sigmoid colectomy with diverting loop ileostomy     Applicable Interval History:     (1/17) open sigmoid  colectomy with diverting loop ileostomy     (1/26) CT A/P - Impression:  1.Imaging features most consistent with small bowel adynamic ileus. No focal transition point identified to suggest mechanical obstruction.  2.Postoperative changes as detailed above. Minimal free fluid surrounding right lower quadrant surgical drain. No abscess identified.  3.Other incidental findings as detailed above.    (1/30) Ex lap with ALBERTO    Reported/Observed/Food/Nutrition Related History:     1/31) Pt was transferred to ICU postop yesterday to monitor tenuous respiratory status following exlap. Concern for possible aspiration during surgery. Exlap with ALBERTO yesterday. Visited with pt and wife at bedside. Pt reports feeling well today with pain controlled and no N/V currently, though he is NPO. Explained nutritional care plan to meet 100% needs through TPN at this time pending surgery clearance for diet. Pt reports really enjoying sipping on cold ensure clear. Hopeful to be able to eat soon. All questions/concerns answered. Pt in good spirits. Discussed case with intensivist, plan to alter PN to give fluids mainly PN and stop IVF, and increase to meet 100% needs as NPO. Discussed concerns for pt continuing to be at risk refeeding syndrome.     1/30) Pt has continued to struggle to tolerate CLD. PICC placed and changed to TPN yesterday to better meet needs. Meeting ~95% needs with additional from CLD, drinking ensure clear. Lytes wnl this morning with TPN at goal. Na slightly low. Ileostomy output better however again not much nutrition going in. Continues with small amounts of emesis. A repeat CT was done yesterday. Surgeon with plans to take pt back to OR today for ex lap.    1/26) Spoke with surgeon this morning, okay with PPN as CT suggestive of ileus and pt will need more days NPO. Hopeful will not need long as predict return of bowel function in coming days thus will initiate PPN rather than TPN. Pt also started on reglan and bowel  regimen as well as IVF. Discussed with pt and spouse at bedside, in understanding of plan. Pt feeling much better but still with significant NG output that is feculent. Hopeful to be able to eat in near future just really wants a banana. RD explained rationale and importance gut rest at this time and pt understands.     1/25) Pt unable to tolerate oral intake, day 10 without any significant source nutrition. Now NPO with NG to LWS. KUB obtained suggestive of ileus or SBO.     1/24) Visited with pt and spouse at bedside for consult per WOC for new ileostomy education/high ileostomy output. Unfortunately pt's significant nutrition concerns were not reported prior and pt is new to RD, due to no previous consults and no nutritional concerns reported on nursing screen. Together pt and spouse tell me pt has not had any signifiant oral intake in 9 days due to preporation and NPO status for surgery followed by slow to return bowel function and eventual intolerance diet advancement. Did drink Impact AR per surgery protocol in the days prior to surgery.They note he has lost a lot of weight and muscle. He has emesis bag at time of my visit and tells me he only got sick once yesterday but many times today despite no oral intake. All he has been able to keep down today is 8 oz fluid (4oz gatorade and 4oz juice). RD encouraged liquids as needed and trying solids when able.   Discussed with pt and spouse that given above as well as high ileostomy output and increased energy/protein needs from significant surgery, RD with significant concern for pt's nutrition and hydration status. They tell me no one has mentioned possibility of nutrition support, RD educated this may be necessary if unable to tolerate PO soon. Of course would like to avoid but in understanding if needed. Nutritional education for new ileostomy and high ileostomy output introduced and will f/u as needed. More pertinent issue is getting pt to tolerate any oral  nutrition at this time which would lend to normalized output. Pt and spouse receptive and appreciative of visit. Deny further needs or questions/concerns at this time, NKFA.     Labs    Labs Reviewed: Yes     Results from last 7 days   Lab Units 01/31/24  0520 01/30/24  2337 01/30/24  2336 01/30/24  0538 01/29/24  0541 01/27/24  0405 01/26/24  0500   GLUCOSE mg/dL 111* 126*  --  119* 123*   < > 95  96   BUN mg/dL 28* 26*  --  17 19   < > 24*  26*   CREATININE mg/dL 0.79 0.74*  --  0.41* 0.46*   < > 0.57*  0.57*   SODIUM mmol/L 134* 132*  --  135* 135*   < > 142  142   CHLORIDE mmol/L 102 104  --  100 102   < > 105  105   POTASSIUM mmol/L 4.4 4.1  --  3.5 4.0   < > 3.9  3.9   PHOSPHORUS mg/dL 3.8  --   --  3.4 3.6   < > 3.5   MAGNESIUM mg/dL 1.9  --   --  2.1 2.2   < > 2.4   ALT (SGPT) U/L 10  --   --   --  18  --  15  15   LACTATE mmol/L 1.7  --  2.2*  --   --   --   --     < > = values in this interval not displayed.       Results from last 7 days   Lab Units 01/31/24  0520 01/29/24  0541 01/26/24  0843 01/26/24  0500   ALBUMIN g/dL 3.1* 3.2*  --  3.1*  3.1*   PREALBUMIN mg/dL  --  12.7* 8.8*  --    CRP mg/dL  --  9.45*  --  18.23*   IONIZED CALCIUM mmol/L  --  1.27 1.27  --    CHOLESTEROL mg/dL  --   --   --  108   TRIGLYCERIDES mg/dL  --  129  --  148       Results from last 7 days   Lab Units 01/31/24  1121 01/31/24  0626 01/30/24  1240 01/30/24  0752 01/30/24  0631 01/29/24  2302   GLUCOSE mg/dL 135* 129 126 129 123 120     Lab Results   Lab Value Date/Time    HGBA1C 5.00 01/10/2024 1508    HGBA1C 5.3 11/28/2022 1111    HGBA1C 5.20 08/17/2021 1149               Medications    Medications Reviewed: Yes  Pertinent: insulin, reglan, protonix, abx, senokot, lipids, folate, thiamine  Infusion: TPN, LR @ 125 ml/hr stopped 9a  PRN: zofran    Intake/Ouptut 24 hrs (0701 - 0700)   I&O's Reviewed: Yes   Intake & Output (last day)         01/30 0701 01/31 0700 01/31 0701 02/01 0700    I.V. (mL/kg) 1811 (30.5)  "485.5 (8.2)    IV Piggyback 808 50    TPN 1410.6 368    Total Intake(mL/kg) 4029.6 (67.8) 903.5 (15.2)    Urine (mL/kg/hr) 490 (0.3) 230 (0.6)    Emesis/NG output 2450     Drains 185     Stool 550 25    Blood 250     Total Output 3925 255    Net +104.6 +648.5                Anthropometrics     Height: Height: 175.3 cm (69\")  Last Filed Weight: Weight: 59.4 kg (131 lb) (01/29/24 0549)  Method: Weight Method: Bed scale  BMI: BMI (Calculated): 19.3  BMI classification: Normal: 18.5-24.9kg/m2  IBW:   155 lb    UBW:     Weight       Weight (kg) Weight (lbs) Weight Method Visit Report   11/30/2022 68 kg  149 lb 14.6 oz   --    1/25/2023 68 kg  149 lb 14.6 oz   --    4/26/2023 64.683 kg  142 lb 9.6 oz   --    10/26/2023 65.137 kg  143 lb 9.6 oz   --    12/15/2023 64.683 kg  142 lb 9.6 oz   --    1/8/2024 63.504 kg  140 lb      1/10/2024 66.25 kg  146 lb 0.9 oz  Standing scale     1/17/2024 66.25 kg  146 lb 0.9 oz      1/24/2024 61.236 kg  135 lb  Bed scale     1/26/2024 54.613 kg  120 lb 6.4 oz  Standing scale       Weight change: weight loss of 26 lbs (17.8%) over the past 2 week(s)    Intentional?  No    Nutrition Focused Physical Exam     Date:  1/24       Patient meets criteria for malnutrition diagnosis, see MSA note.     Needs Assessment   Date: 1/26, reviewed 1/30, reassessed 1/31    Height used:Height: 175.3 cm (69\")  Weights used: 54.6 kg (ABW)      Estimated Calorie needs: ~1700 Kcal/day  Method:  Kcals/KG 25-35 ABW = 1572-8860  Method:  MSJ 1340 X 1.3 = 1742    Estimated Protein needs: ~82 g PRO/day  Method: 1.3-1.5 g/Kg = 71-82    Estimated Fluid needs: ~ ml/day   Per clinical status    Current Nutrition Prescription   PO: Adult Central CLINIMIX-E TPN  NPO Diet NPO Type: Sips with Meds, Ice Chips  Adult Central 2-in-1 TPN  Oral Nutrition Supplement:   Intake: Insufficient data has not tolerated any more than sips liquids t/o admission per pt and spouse. 1 meal documented at 75%, inaccurate documentation. "     (Since ) 0% X 4. 25% X 2, 75% X 1 meals documented    PN Route: PICC   PN:   D15%, AA5%  @ goal 60 ml/hr             GIR: 2.5 mg/kg/min  Lipid: 100ml 20% SMOF daily     PN@ Goal over 24hr to Supply:  Volume 1440 mL/day       Energy 1222 Kcal/day 91 % Est Need   Protein 72 g/day 95 % Est Need      ---------------------------------------------------------------------------  Formula/Rate verified at bedside: Yes  Infusing Rate at time of visit: 60 ml/hr    Average Delivery from [facility] Day:   PN Volume 1410 mL/day     Lipid delivered?        Energy  Kcal/day  % Est Need   Protein  g/day  % Est Need     PN hx:   (-) PPN: D5%, AA4.25% @ 50 ml/hr, 100 ml lipids/daily  (-) TPN D15% AA5% @ 60 ml/hr, 100 ml lipids/daily    Nutrition Diagnosis   Date:              Updated:    Problem Malnutrition  severe/acute   Etiology Energy needs>energy intake 2/2 recent colectomy with ileostomy placement with slow to return bowel function followed by intolerance diet.    Signs/Symptoms PO intakes <50% EEN >/=5 days and loss 17.8% body weight with past 2 weeks, moderate muscle wasting, and moderate subcutaneous fat loss.    Status: Ongoing / worsened with updated standing scale weight, receiving TPN    Date:       Updated:     Problem Food and nutrition knowledge deficit   Etiology S/p colectomy with new ileostomy complicated by high ileostomy output   Signs/Symptoms Pt with prior lack of knowledge appropriate MNT/diet for condition   Status: Improvement Shown Education provided    Goal:   General: Nutrition support treatment  PO: Advace diet as medically feasible/appropriate  EN/PN: Adjust PN, Deliver estimated needs     Nutrition Intervention      Follow treatment progress, Care plan reviewed, Nutrition support order placed, Education providedfor nutrition support     Adjust TPN per MD  Ensure clear TID once able to have CLD    Monitoring/Evaluation:   Per protocol, I&O, PO intake, Supplement intake,  Pertinent labs, GI status, Symptoms, POC/GOC      Stacie Maldonado RD, CNSC  Time Spent: 35m

## 2024-01-31 NOTE — PLAN OF CARE
Goal Outcome Evaluation:  Plan of Care Reviewed With: patient        Progress: improving  Outcome Evaluation: Neuro intact. Up to chair. NSR VSS. Wean to 2LNC. UOP picked up to adequate amount. PN infusining. Stool noted from stoma. DELROY with min drainage. PCA controlling pain. SO at bedside updated and questions answered

## 2024-01-31 NOTE — CASE MANAGEMENT/SOCIAL WORK
Continued Stay Note  [facility]     Patient Name: Roger Bhat  MRN: [id number]  Today's Date: 1/31/2024    Admit Date: 1/17/2024    Plan: Ongoing   Discharge Plan       Row Name 01/31/24 1518       Plan    Plan Ongoing    Plan Comments Discussed patient in medical rounds today.  Patient continues with Dilaudid PCA pump and TPN; awaiting return of bowel function.  CM will continue to follow and assist with discharge plan.                   Discharge Codes    No documentation.                       Celeste Cabello RN

## 2024-01-31 NOTE — ADDENDUM NOTE
Addendum  created 01/31/24 0633 by Braden Roman CRNA    Clinical Note Signed, Diagnosis association updated, Intraprocedure Blocks edited

## 2024-01-31 NOTE — PROGRESS NOTES
INTENSIVIST   PROGRESS NOTE        SUBJECTIVE     Roger 63 y.o. male is followed for: No chief complaint on file.       Colonic mass    Current smoker    Severe malnutrition    Respiratory distress    As an Intensivist, we provide an integrated approach to the ICU patient and family, medical management of comorbid conditions, including but not limited to electrolytes, glycemic control, organ dysfunction, lead interdisciplinary rounds and coordinate the care with all other services, including those from other specialists.     Interval History:  POD: 1 Day Post-Op (EXP LAP 23)    No acute events overnight.    Temp  Min: 97.5 °F (36.4 °C)  Max: 98.5 °F (36.9 °C)     History     Last Reviewed by Monster Mock MD on 2024 at  6:14 PM    Sections Reviewed    Medical, Surgical, Family, Tobacco, Alcohol, Drug Use, Sexual Activity,   Social Documentation    Problem list reviewed by Monster Mock MD on 2024 at  6:14 PM  Medicines reviewed by Monster Mock MD on 2024 at  6:14 PM  Allergies reviewed by Monster Mock MD on 2024 at  6:14 PM    The patient's relevant past medical, surgical and social history were reviewed and updated in Epic as appropriate.        OBJECTIVE     Vitals:  Temp: 97.5 °F (36.4 °C) (24 1600) Temp  Min: 97.5 °F (36.4 °C)  Max: 98.5 °F (36.9 °C)   Temp core:      BP: 117/72 (24 1500) BP  Min: 78/63  Max: 137/79   MAP (non-invasive) Noninvasive MAP (mmHg): 94 (24 1500) Noninvasive MAP (mmHg)  Av.4  Min: 64  Max: 138   Pulse: 98 (24 1500) Pulse  Min: 90  Max: 130   Resp: 27 (24 1600) Resp  Min: 19  Max: 27   SpO2: 94 % (24 1500) SpO2  Min: 90 %  Max: 100 %   Device: nasal cannula (24 1600)    Flow Rate: 3 (24 1600) Flow (L/min)  Min: 2  Max: 4     Medications (drips):  Adult Central 2-in-1 TPN, Last Rate: 100 mL/hr at 24 3597  HYDROmorphone HCl-NaCl  Pharmacy to Dose TPN      Physical Examination  Telemetry:   Rhythm: normal sinus rhythm, sinus tachycardia (01/31/24 1600)      Constitutional:  No acute distress.   Cardiovascular: RRR.    Respiratory: Normal breath sounds  No adventitious sounds   Abdominal:  Soft with no tenderness.   Extremities: No Edema   Neurological:   Alert, Oriented, Cooperative.  Best Eye Response: 4-->(E4) spontaneous (01/31/24 1600)  Best Motor Response: 6-->(M6) obeys commands (01/31/24 1600)  Best Verbal Response: 5-->(V5) oriented (01/31/24 1600)  Symone Coma Scale Score: 15 (01/31/24 1600)     Results Reviewed:  Laboratory  Microbiology  Radiology  Pathology    Hematology:  Results from last 7 days   Lab Units 01/31/24  0719 01/30/24  0820 01/29/24  0541   WBC 10*3/mm3 48.13* 26.53* 20.75*   BANDS % % 38.0*  --   --    HEMOGLOBIN g/dL 11.5* 13.7 14.0   MCV fL 101.5* 102.1* 102.3*   PLATELETS 10*3/mm3 367 531* 472*     Results from last 7 days   Lab Units 01/31/24  0719   NEUTROS ABS 10*3/mm3 45.11*  44.76*   LYMPHS ABS 10*3/mm3 1.32   EOS ABS 10*3/mm3 0.00  0.00     Chemistry:  Estimated Creatinine Clearance: 82.5 mL/min (by C-G formula based on SCr of 0.79 mg/dL).    Results from last 7 days   Lab Units 01/31/24 0520 01/30/24  2337   SODIUM mmol/L 134* 132*   POTASSIUM mmol/L 4.4 4.1   CHLORIDE mmol/L 102 104   CO2 mmol/L 16.0* 18.0*   BUN mg/dL 28* 26*   CREATININE mg/dL 0.79 0.74*   GLUCOSE mg/dL 111* 126*     Results from last 7 days   Lab Units 01/31/24  0520 01/30/24  2337 01/30/24  0538 01/29/24  0541 01/27/24  0405 01/26/24  0843   IONIZED CALCIUM mmol/L  --   --   --  1.27  --  1.27   CALCIUM mg/dL 8.4* 7.9* 8.5* 8.6   < >  --    MAGNESIUM mg/dL 1.9  --  2.1 2.2   < >  --    PHOSPHORUS mg/dL 3.8  --  3.4 3.6   < >  --     < > = values in this interval not displayed.     Hepatic Panel:  Results from last 7 days   Lab Units 01/31/24  0520 01/29/24  0541 01/26/24  0500   ALBUMIN g/dL 3.1* 3.2* 3.1*  3.1*   TOTAL PROTEIN g/dL 5.2* 5.7* 5.7*  5.7*   BILIRUBIN mg/dL 0.5 0.3 0.2   0.2   BILIRUBIN DIRECT mg/dL 0.2 <0.2 <0.2   AST (SGOT) U/L 15 16 9  9   ALT (SGPT) U/L 10 18 15  15   ALK PHOS U/L 52 79 68  68     Cardiac Labs:  Results from last 7 days   Lab Units 01/31/24  0520 01/30/24  2337   HSTROP T ng/L 18 26*     Biomarkers:  Results from last 7 days   Lab Units 01/31/24  0520 01/30/24  2336 01/29/24  0541 01/26/24  0500   CRP mg/dL  --   --  9.45* 18.23*   LACTATE mmol/L 1.7 2.2*  --   --      COVID-19  Lab Results   Component Value Date    COVID19 Not Detected 11/14/2021    COVID19 Not Detected 10/17/2021    COVID19 Not Detected 09/13/2021     Images:  XR Chest 1 View    Result Date: 1/30/2024  Impression: 1. PICC line and NG tube appear to be in satisfactory position. 2. Moderately extensive bilateral pneumonia, right greater than left. 3. Subdiaphragmatic free air, expected for recent abdominal surgery. Electronically Signed: Kurt Li MD  1/30/2024 7:55 PM EST  Workstation ID: [id number]    XR Abdomen KUB    Result Date: 1/30/2024  Impression: Again seen are multiple dilated loops of small bowel with relatively decompressed colon. Electronically Signed: Ritchie Boston MD  1/30/2024 8:36 AM EST  Workstation ID: [id number]     Echo:      Results: Reviewed.  I reviewed the patient's new laboratory and imaging results.  I independently reviewed the patient's new images.    Medications: Reviewed.    Assessment   A/P     Hospital:  LOS: 14 days   ICU: 22h     Active Hospital Problems    Diagnosis  POA    **Colonic mass [K63.89]  Yes    Severe malnutrition [E43]  Yes    Respiratory distress [R06.03]  Yes    Current smoker [F17.200]  Yes     Roger is a 63 y.o. male admitted on 1/17/2024 with Colonic mass [K63.89]    Assessment/Management/Treatment Plan:    Gallbladder and Colonic mass  01/17/24 S/P Lap Johana, open sigmoid colectomy with diverting loop ileostomy, liver biopsy.   01/30/24 S/P Exploratory Lap and ALBERTO 01/30/24    Lab Results   Lab Value Date/Time    FINALDX  01/17/2024 1136      1.  GALLBLADDER, CHOLECYSTECTOMY:  Chronic cholecystitis and cholelithiasis.  Adenomyoma.  No dysplasia identified.  Benign cystic duct lymph node.  Adhesed portions of liver with inflammatory changes.    2.  LIVER, CORE NEEDLE BIOPSIES:  Benign hepatic tissue.  Increased stainable iron (see comment).  No fibrosis identified on trichrome stain with appropriate control.    3.  SIGMOID COLON, PARTIAL COLECTOMY:  Benign polyp, 4.2 cm in maximum size with features of inflammatory polyp (see comment).  16 lymph nodes negative for metastatic carcinoma (0/16).    4.  PROXIMAL ANASTOMOTIC DOUGHNUT:  Benign colonic tissue.    5.  DISTAL ANASTOMOTIC DONUT:  Benign colonic tissue.    6.  APPENDIX, APPENDECTOMY:  Acute appendicitis.      Cardiovascular  HTN  Dyslipidemia   Tobacco use  Emphysema as noticed in CT Chest 12/27/2023  Several new irregular nodular densities within the RLL, with the largest one measuring 9 mm.  GERD  Nutrition Support: Parenteral Nutrition     Lab Results   Component Value Date    PREALBUMIN 12.7 (L) 01/29/2024    PREALBUMIN 8.8 (L) 01/26/2024    CRP 9.45 (H) 01/29/2024    CRP 18.23 (H) 01/26/2024     Endocrine   Body mass index is 19.35 kg/m². Normal: 18.5-24.9kg/m2  No history of Diabetes    Lab Results   Lab Value Date/Time    HGBA1C 5.00 01/10/2024 1508    HGBA1C 5.3 11/28/2022 1111    HGBA1C 5.20 08/17/2021 1149     Results from last 7 days   Lab Units 01/31/24  1757 01/31/24  1121 01/31/24  0626 01/30/24  1240 01/30/24  0752 01/30/24  0631 01/29/24  2302 01/29/24  1731   GLUCOSE mg/dL 127 135* 129 126 129 123 120 122       Diet: NPO Diet NPO Type: Sips with Meds, Ice Chips  Adult Central 2-in-1 TPN   Advance Directives: Code Status and Medical Interventions:   Ordered at: 01/17/24 1734     Code Status (Patient has no pulse and is not breathing):    CPR (Attempt to Resuscitate)     Medical Interventions (Patient has pulse or is breathing):    Full Support        DVT prophylaxis:  Medical and  mechanical DVT prophylaxis orders are present.    In brief:  Adjust IVF and Parenteral Nutrition   Monitor electrolytes.  Continue Piperacillin-Tazobactam   Follow up WBC  Add LAMA. Continue his ICS/LABA  AM PCT  Disposition: Keep in ICU.    Plan of care and goals reviewed during interdisciplinary rounds.  I discussed the patient's findings and my recommendations with patient and nursing staff    MDM:    Problem(s) High due to: Acute or Chronic illness or injury that may poses a threat to life or bodily function  Data: Moderate due to: Review or results of each unique test and Ordering of each unique test  Risk: High due to: drug(s) requiring intensive monitoring for toxicity (Parenteral Nutrition)    High    [x] Primary Attending Intensive Care Medicine - Nutrition Support   [] Consultant    Copied text in this note has been reviewed and is accurate as of 01/31/24

## 2024-01-31 NOTE — PAYOR COMM NOTE
"Auth# [id number]   Request for additional days 1/21/24-2/3/24  Clinicals attached    Utilization Review  Phone [phone]  Fax [phone]    [address] [address]             Roger Bhat (63 y.o. Male)       Date of Birth   1960    Social Security Number       Address   [address] [place] [id number]    Home Phone   [phone]    MRN   [id number]       Rastafari   Protestant    Marital Status   Significant Other                            Admission Date   1/17/24    Admission Type   Elective    Admitting Provider   Jimmy Craig MD    Attending Provider   Jimmy Craig MD    Department, Room/Bed   [facility] 2B ICU, N231/1       Discharge Date       Discharge Disposition       Discharge Destination                                 Attending Provider: Jimmy Craig MD    Allergies: No Known Allergies    Isolation: None   Infection: None   Code Status: CPR    Ht: 175.3 cm (69\")   Wt: 59.4 kg (131 lb)    Admission Cmt: None   Principal Problem: Colonic mass [K63.89]                   Active Insurance as of 1/17/2024       Primary Coverage       Payor Plan Insurance Group Employer/Plan Group    [employer]HEM BLUE CROSS ANTHEM BLUE CROSS BLUE SHIELD PPO [id number]       Payor Plan Address Payor Plan Phone Number Payor Plan Fax Number Effective Dates    [address] [phone]  2/1/2021 - None Entered    [place] [address]         Subscriber Name Subscriber Birth Date Member ID       ROGER BHAT 1960 [id number]                     Emergency Contacts        (Rel.) Home Phone Work Phone Mobile Phone    Ambar Daniels (Significant Other) [phone] -- [phone]    Seferino Dalal (Friend) [phone] -- [phone]                 Operative/Procedure Notes (all)        Jimmy Craig MD at 01/17/24 1115  Version 1 of 1         CHOLECYSTECTOMY LAPAROSCOPIC, COLON RESECTION LOW ANTERIOR "  Progress Note    Roger Bhat  1/17/2024    Pre-op Diagnosis:   Sigmoid colon mass, gallbladder nodule       Post-Op Diagnosis Codes:   same    Procedure/CPT® Codes:        Procedure(s):  Laparoscopic cholecystectomy, laparoscopic liver biopsy, open sigmoid colectomy with primary coloproctostomy, takedown of the splenic flexure, diverting loop ileostomy creation, appendectomy, flexible sigmoidoscopy for a pneumatic leak test            Surgeon(s):  Jimmy Craig MD    Anesthesia: General with Block    Staff:   Circulator: Octavia Weinstein RN; Anabel Horne RN; Willie Sibley, KHURRAM; Marcello Pa RN  Physician Assistant: Alba Espinoza PA-C  Scrub Person: Yazan Oliveira; Bonnie Ann  Nursing Assistant: Smita Velasquez PCT  Assistant: Yazan Harding PA-C  Assistant: Yazan Harding PA-C      Estimated Blood Loss: 150 mL    Urine Voided: 250 mL    Specimens:    Gallbladder, liver biopsy, sigmoid colon, appendix, anastomotic donuts                      Drains:   Closed/Suction Drain 1 RLQ Bulb 10 Fr. (Active)       Ileostomy Loop RLQ (Active)       Urethral Catheter Silicone 16 Fr. (Active)       Findings: There is evidence of some scarring and a nodular area at the fundus of the gallbladder.  There was significant thickening of the sigmoid colon with a palpable mass.  A sigmoid colectomy was performed with a Primary coloproctostomy with a 29 EEA stapler.  Leak test was negative.  This required mobilization of the splenic flexure.  Appendectomy was performed        Complications: None immediate    Assistant: Yazan Harding PA-C  was responsible for performing the following activities: Retraction, Suction, Irrigation, Suturing, and Closing and their skilled assistance was necessary for the success of this case.    Jimmy Craig MD     Date: 1/17/2024  Time: 14:36 EST          Electronically signed by Jimmy Craig MD at 01/17/24 2176       Jimmy Craig MD at 01/17/24 4226  Version 2  of 2         Operative Report    Patient Name:  Roger Bhat  YOB: 1960  [id number]    1/17/2024      PREOPERATIVE DIAGNOSIS: Sigmoid Colon Mass, Gallbladder Mass      POSTOPERATIVE DIAGNOSIS: Same        PROCEDURE PERFORMED:     Laparoscopic Cholecystectomy  Laparoscopic Liver Biopsy  Open Sigmoid Colectomy with Primary Coloproctostomy   Mobilization of the Splenic Flexure  Flexible Sigmoidoscopy for Pneumatic Leak Test   Diverting Loop Ileostomy Creation   Appendectomy        SURGEON: Jimmy Craig MD      ASSISTANT: Assistant: Yazan Harding PA-C      Assistant responsibilities: They assisted with dissection, retraction, resection of the specimen, holding a positioning of the camera, and closure.  Their assistance was necessary and required for successful completion of the case    Colon Resection  Operation performed with curative intent Yes   Tumor Location (select all that apply) Sigmoid colon   Extent of colon and vascular resection  (select all that apply) Sigmoid resection - inferior mesenteric             SPECIMENS: Gallbladder, Liver Biopsy, Sigmoid colon, Anastomotic Donuts, Appendix       ANESTHESIA: General with TAP block      ESTIMATED BLOOD LOSS: 150 mL       FINDINGS:  1.  There was a nodular area at the dome of the gallbladder with some scarring and adherent omentum.  This did not appear to be grossly invading the liver and was completely excised as part of a cholecystectomy   2.  There was a palpable mass within the sigmoid colon, however there was no obvious local invasion and the sigmoid colon was freely mobile.  This was completely excised as part of a sigmoid colectomy up to the proximal descending colon with a primary coloproctostomy performed with a 29 EEA stapler  3. Flexible sigmoidoscopy following coloproctostomy creation with negative pneumatic air-leak test   4.  A diverting ileostomy was created approximately 30 cm proximal to the ileocecal valve  5.   Appendectomy was performed to avoid any future diagnostic uncertainty  6. Liver biopsy was performed as requested by Hematology due to concern for hemochromatosis          INDICATIONS:      This patient is a 63 y.o. male with a history of sigmoid colon mass with hematochezia as well as concern for gallbladder mass.  His case was discussed at the multidisciplinary board and a consensus decision was made to proceed forward with primary resection of both sites upfront. The risks, benefits, and alternatives of the procedure were discussed with the patient and their family preoperatively and they agreed to proceed.          DESCRIPTION OF PROCEDURE:      After obtaining informed consent, the patient was taken to the operating room and placed in the modified lithotomy position on the operating room table. General anesthesia was initiated. A Crouch catheter was placed. The abdomen was prepped and draped in the usual sterile fashion. A time-out was properly performed. Antibiotics were given preoperatively. SCDs were properly placed on the patient and turned on.  A nasogastric tube was placed by the anesthesiologist.  A block was performed by the anesthesia service prior to the start of the case.    An infraumbilical skin incision was then created inferior to the umbilicus utilizing an 11 blade scalpel. Blunt dissection was carried down to the level of the anterior abdominal wall fascia and the base of the umbilicus. The base of the umbilicus was then grasped and elevated with a Kocher clamp. A vertical incision was then made in the anterior abdominal wall fascia under direct visualization sharply. Following this, the peritoneal cavity was then entered under direct visualization. Stay sutures of 0 Vicryl were placed around the defect, and a 12 mm Jeet trocar was then advanced without difficulty into the abdominal cavity. Pneumoperitoneum was established at 15 mmHg. The abdomen was then surveyed with a 10mm 30 degree  laparoscope, with special attention to the viscera underlying the insertion site which was found to be free of injury.  Next, under direct laparoscopic visualization three additional 5 mm trocars were placed in the right upper quadrant. The patient was then positioned in the head-up position with the table tilted to the left.    Laparoscopic survey of the abdomen demonstrated no obvious evidence of peritoneal disease in all 4 quadrants.  The right and left lobe of the liver were carefully evaluated and there was no evidence of any obvious metastatic lesions.  The gallbladder did appear to have an area of nodularity high up at the dome with some adherent omentum.  We therefore elected to proceed forward with resection.    The fundus of the gallbladder was retracted cephalad while the infundibulum of the gallbladder was retracted laterally.  Some of the omental adhesions to the gallbladder were taken down using blunt dissection as well as the [place] LigaSure device.  Using a combination of hook cautery as well as a [place] dissector, the peritoneum surrounding the cystic pedicle was stripped. Cystic structures were dissected. A critical view of safety was established, meanin and only 2 structures were visualized entering the gallbladder, all fibrous and fatty tissue between the cystic artery and cystic duct were cleared, and the posterior one-third of the gallbladder was removed from the cystic plate. Next 2 clips were placed on the distal cystic duct, 1 clip was placed on the proximal cystic duct, and the duct was transected with laparoscopic scissors.  A single PDS Endoloop was applied to the cystic duct stump below the clips.  Next 2 clips were placed on the proximal cystic artery, 1 clip was placed on the distal cystic artery and this was transected with laparoscopic scissors. Next the gallbladder was removed from the cystic plate using hook electrocautery proceeding superiorly towards the dome.  As we  approached the dome of the gallbladder we noted that there was an area of scarring and nodularity in close proximity to the liver bed at the very top of the fundus of the gallbladder.  This was excised with a small portion of surrounding liver parenchyma immediately adjacent to this area.     A 5 mm 30 degree laparoscope was then inserted through the subxiphoid trocar site to visualize the placement of the gallbladder within an Endo Catch bag and then extraction of the gallbladder through the periumbilical trocar site utilizing the Endo Catch bag. Instruments were returned to their native positions. Hemostasis of the cystic plate was confirmed using electrocautery. The clipped cystic structures were carefully examined and there was no sign of bilious or bloody drainage.      We then proceeded forward with liver biopsy.  Using an 18-gauge core needle biopsy device, 3 separate passes were taken on the right lobe of the liver with an adequate core specimen identified with each pass.  Hemostasis of these biopsy sites was confirmed with electrocautery.  Specimen was then passed off the field as liver biopsy.    The table was then leveled and limited irrigation of the right upper quadrant was performed with normal saline and hemostasis again confirmed. Next, the 5 mm trocars were removed under direct laparoscopic visualization. The 12 mm trocar was then withdrawn and pneumoperitoneum was released.     We then proceeded forward with the open portion of the procedure.  In line with the previous 12 mm trocar, a midline laparotomy was created.  A skin incision was made using electrocautery and dissection was carried down to the level of the anterior abdominal wall fascia.  The anterior abdominal wall fascia was incised, and our laparotomy incision was extended from just above the umbilicus to the level of the pubic symphysis.  The abdomen was then again carefully surveyed.  We noted that there was a palpable mass within the  sigmoid colon, however the sigmoid colon was freely mobile and there was no evidence of any local invasion.  There was evidence of 2 separate tattoo sites, one at the upper rectum just distal to the mass, and 1 within the mid descending colon.  A Bookwalter retractor was placed to aid with visualization.     The small bowel and omentum were retracted superiorly and packed into the upper abdomen. The sigmoid colon was retracted upwards so that the mesentery and vascular pedicle could be visualized. Using electrocautery, we scored along the medial portion of the sigmoid colon mesentery proceeding inferiorly towards the pelvis. The mesentery was swept upwards and the presacral space was developed. The inferior mesenteric artery along with its lymph node bundle were lifted up.  We then similarly took down the lateral attachments of the sigmoid colon. the left ureter was identified and was protected throughout the course of the dissection. We then proceeded with our dissection proximally freeing the sigmoid colon mesentery from the underlying retroperitoneum.  We then turned our attention to the lateral attachments of the descending colon.  These were taken down using electrocautery proceeding superiorly.  Given that the proximal tattoo site was within the mid descending colon, it became clear that we would have to mobilize the splenic flexure in order to resect this area and have adequate reach for anastomosis in the pelvis.    We therefore proceeded forward with mobilization of the splenic flexure.  The greater omentum was reflected superiorly and the mid-transverse colon was elevated. The lesser sac was entered by dividing the gastrocolic ligament close to the colon where the leaflets meet. We then proceeded distally with this dissection such that the splenic flexure was completely mobilized from medial to lateral as well as a lateral to medial approach. Our points of dissection were met with the previously performed  mobilization of the left descending colon and we found that the entire left colon was mobilized adequately.       We again turned our attention to the pelvis.  The peritoneum surrounding the KENDRA was cleared and it was isolated. A high-ligation of the KENDRA was performed using a 0 silk tie.  There was some bleeding from the vascular pedicle following ligation, however this was controlled using a 2-0 silk figure-of-eight suture.. Hemostasis was confirmed.  At this point we confirmed that the left colon was completely mobilized.    With the left colon mobilized, we then turned our attention to the pelvic dissection again.  We again visualized both the right and left ureter and ensured that these were kept safe throughout the dissection.  Using the ligasure device, we proceeded with distal dissection of the sigmoid colon mesentery. This dissection proceeded all the way to the level of the proximal mesorectum and distal to the distal tattoo site. A distal transection site was chosen on the proximal rectum. We ensured that there was adequate circular dissection of the mesorectum at this site and divided the mesentery to the bowel wall using the [place] LigaSure. The bowel was then transected at this site using a blue load contour stapler.  Any remaining portions of the sigmoid colon mesentery were divided using LigaSure.  The proximal transection site was then chosen at the mid descending colon proximal to the tattoo site, and the colon was similarly divided using another firing of the contour stapler.  The specimen was then passed off the field the sigmoid colon.    Serial EEA sizers were then introduced up the patient's anus to the end of the distal rectal staple line.  This accommodated a 29 EEA stapler.  The anvil for the 29 EEA stapler was then secured within the divided end of the descending colon using a pursestring clamp with a 2-0 Prolene suture. Under direct visualization, the 29 EEA stapler was advanced into the  anus. It was advanced to the distal rectal staple line. The spike was deployed just anterior to the mid portion of the staple line. The anvil was advanced onto the spike. The mesentery of the colon was confirmed to lie in proper position without twisting. The EEA stapler was then fired and a stapled end-to-end coloproctostomy was created. The anastomotic donuts were confirmed to be intact. The anastomosis was then submerged in saline irrigation within the pelvis and a pneumatic leak test was performed while the surgeon performed flexible sigmoidoscopy. Flexible sigmoidoscopy demonstrated the anastomosis to be patent and hemostatic endoscopically.  This was encountered at roughly 15 cm from the anus.  The pneumatic air-leak test was noted to be negative for leak on examination. The irrigation was aspirated.  Portions of the anastomosis were oversewn using 3-0 silk suture in a Lembert fashion.  At this time a 10 flat DELROY drain was brought into the abdominal cavity through a right sided incision. The drain was positioned posterior to our anastomosis in the pelvis. The drain was secured to the abdominal wall using a 2-0 Nylon suture. Hemostasis of all operative sites was confirmed.  The surgeons changed gowns and gloves.    At this point we then noted that the appendix was oriented down into the pelvis.  To avoid any future diagnostic uncertainty I felt that it was in the patient's best interest to proceed forward with appendectomy.  A window was created in the mesoappendix adjacent to the cecum.  The appendix was then divided flush with the cecum using a KAPIL 75 blue load stapler.  The appendiceal mesentery was then sequentially divided using the LigaSure device.  The specimen was then passed off the field as appendix.    Hemostasis was confirmed throughout the abdomen.  We traced the small bowel to approximately 30 cm proximal to the ileocecal valve.  At the site was marked the small bowel for our ileostomy site.  A  trephine was created on the right side of the abdominal wall in the mid rectus plane in the standard fashion.  This portion of small bowel was brought out through this site with great care taken to ensure that the loop ileostomy was properly oriented.    We again confirmed hemostasis throughout the abdomen.  The NG tube was palpated within the stomach.  The fascia was then closed using simple interrupted figure-of-eight #1 PDS suture.  The subcutaneous wound was irrigated with antibiotic-containing solution.  The skin was then closed with skin staples.  The loop ileostomy was then matured on the right side of the abdominal wall in the standard fashion over a bridge with 3-0 Vicryl suture.  A stoma appliance was then applied to the ileostomy site and a clean and dry dressing was then applied to the midline.  The laparoscopic incision sites were closed using 4-0 Monocryl in the subcuticular layer and dermal glue was applied overlying this.     After the procedure, the patient was awakened, extubated, and taken to the postoperative anesthesia care unit for recovery. All needle, instrument, and lap counts were correct. I was personally present and performed all portions of the procedure. There were no immediate complications         Jimmy Craig MD  1/17/2024  14:59 EST     Electronically signed by Jimmy Craig MD at 01/23/24 1209       Jimmy Craig MD at 01/17/24 1115  Version 1 of 2         Operative Report    Patient Name:  Roger Bhat  YOB: 1960  [id number]    1/17/2024      PREOPERATIVE DIAGNOSIS: Sigmoid Colon Mass, Gallbladder Mass      POSTOPERATIVE DIAGNOSIS: Same        PROCEDURE PERFORMED:     Laparoscopic Cholecystectomy  Laparoscopic Liver Biopsy  Open Sigmoid Colectomy with Primary Coloproctostomy   Mobilization of the Splenic Flexure  Flexible Sigmoidoscopy for Pneumatic Leak Test   Diverting Loop Ileostomy Creation   Appendectomy        SURGEON: Jimmy Craig  MD      ASSISTANT: Assistant: Yazan Harding PA-C        Colon Resection  Operation performed with curative intent Yes   Tumor Location (select all that apply) Sigmoid colon   Extent of colon and vascular resection  (select all that apply) Sigmoid resection - inferior mesenteric             SPECIMENS: Gallbladder, Liver Biopsy, Sigmoid colon, Anastomotic Donuts, Appendix       ANESTHESIA: General with TAP block      ESTIMATED BLOOD LOSS: 150 mL       FINDINGS:  1.  There was a nodular area at the dome of the gallbladder with some scarring and adherent omentum.  This did not appear to be grossly invading the liver and was completely excised as part of a cholecystectomy   2.  There was a palpable mass within the sigmoid colon, however there was no obvious local invasion and the sigmoid colon was freely mobile.  This was completely excised as part of a sigmoid colectomy up to the proximal descending colon with a primary coloproctostomy performed with a 29 EEA stapler  3. Flexible sigmoidoscopy following coloproctostomy creation with negative pneumatic air-leak test   4.  A diverting ileostomy was created approximately 30 cm proximal to the ileocecal valve  5.  Appendectomy was performed to avoid any future diagnostic uncertainty  6. Liver biopsy was performed as requested by Hematology due to concern for hemochromatosis          INDICATIONS:      This patient is a 63 y.o. male with a history of sigmoid colon mass with hematochezia as well as concern for gallbladder mass.  His case was discussed at the multidisciplinary board and a consensus decision was made to proceed forward with primary resection of both sites upfront. The risks, benefits, and alternatives of the procedure were discussed with the patient and their family preoperatively and they agreed to proceed.          DESCRIPTION OF PROCEDURE:      After obtaining informed consent, the patient was taken to the operating room and placed in the modified lithotomy  position on the operating room table. General anesthesia was initiated. A Crouch catheter was placed. The abdomen was prepped and draped in the usual sterile fashion. A time-out was properly performed. Antibiotics were given preoperatively. SCDs were properly placed on the patient and turned on.  A nasogastric tube was placed by the anesthesiologist.  A block was performed by the anesthesia service prior to the start of the case.    An infraumbilical skin incision was then created inferior to the umbilicus utilizing an 11 blade scalpel. Blunt dissection was carried down to the level of the anterior abdominal wall fascia and the base of the umbilicus. The base of the umbilicus was then grasped and elevated with a Kocher clamp. A vertical incision was then made in the anterior abdominal wall fascia under direct visualization sharply. Following this, the peritoneal cavity was then entered under direct visualization. Stay sutures of 0 Vicryl were placed around the defect, and a 12 mm Jeet trocar was then advanced without difficulty into the abdominal cavity. Pneumoperitoneum was established at 15 mmHg. The abdomen was then surveyed with a 10mm 30 degree laparoscope, with special attention to the viscera underlying the insertion site which was found to be free of injury.  Next, under direct laparoscopic visualization three additional 5 mm trocars were placed in the right upper quadrant. The patient was then positioned in the head-up position with the table tilted to the left.    Laparoscopic survey of the abdomen demonstrated no obvious evidence of peritoneal disease in all 4 quadrants.  The right and left lobe of the liver were carefully evaluated and there was no evidence of any obvious metastatic lesions.  The gallbladder did appear to have an area of nodularity high up at the dome with some adherent omentum.  We therefore elected to proceed forward with resection.    The fundus of the gallbladder was retracted  cephalad while the infundibulum of the gallbladder was retracted laterally.  Some of the omental adhesions to the gallbladder were taken down using blunt dissection as well as the [place] LigaSure device.  Using a combination of hook cautery as well as a [place] dissector, the peritoneum surrounding the cystic pedicle was stripped. Cystic structures were dissected. A critical view of safety was established, meanin and only 2 structures were visualized entering the gallbladder, all fibrous and fatty tissue between the cystic artery and cystic duct were cleared, and the posterior one-third of the gallbladder was removed from the cystic plate. Next 2 clips were placed on the distal cystic duct, 1 clip was placed on the proximal cystic duct, and the duct was transected with laparoscopic scissors.  A single PDS Endoloop was applied to the cystic duct stump below the clips.  Next 2 clips were placed on the proximal cystic artery, 1 clip was placed on the distal cystic artery and this was transected with laparoscopic scissors. Next the gallbladder was removed from the cystic plate using hook electrocautery proceeding superiorly towards the dome.  As we approached the dome of the gallbladder we noted that there was an area of scarring and nodularity in close proximity to the liver bed at the very top of the fundus of the gallbladder.  This was excised with a small portion of surrounding liver parenchyma immediately adjacent to this area.     A 5 mm 30 degree laparoscope was then inserted through the subxiphoid trocar site to visualize the placement of the gallbladder within an Endo Catch bag and then extraction of the gallbladder through the periumbilical trocar site utilizing the Endo Catch bag. Instruments were returned to their native positions. Hemostasis of the cystic plate was confirmed using electrocautery. The clipped cystic structures were carefully examined and there was no sign of bilious or bloody  drainage.      We then proceeded forward with liver biopsy.  Using an 18-gauge core needle biopsy device, 3 separate passes were taken on the right lobe of the liver with an adequate core specimen identified with each pass.  Hemostasis of these biopsy sites was confirmed with electrocautery.  Specimen was then passed off the field as liver biopsy.    The table was then leveled and limited irrigation of the right upper quadrant was performed with normal saline and hemostasis again confirmed. Next, the 5 mm trocars were removed under direct laparoscopic visualization. The 12 mm trocar was then withdrawn and pneumoperitoneum was released.     We then proceeded forward with the open portion of the procedure.  In line with the previous 12 mm trocar, a midline laparotomy was created.  A skin incision was made using electrocautery and dissection was carried down to the level of the anterior abdominal wall fascia.  The anterior abdominal wall fascia was incised, and our laparotomy incision was extended from just above the umbilicus to the level of the pubic symphysis.  The abdomen was then again carefully surveyed.  We noted that there was a palpable mass within the sigmoid colon, however the sigmoid colon was freely mobile and there was no evidence of any local invasion.  There was evidence of 2 separate tattoo sites, one at the upper rectum just distal to the mass, and 1 within the mid descending colon.  A Bookwalter retractor was placed to aid with visualization.     The small bowel and omentum were retracted superiorly and packed into the upper abdomen. The sigmoid colon was retracted upwards so that the mesentery and vascular pedicle could be visualized. Using electrocautery, we scored along the medial portion of the sigmoid colon mesentery proceeding inferiorly towards the pelvis. The mesentery was swept upwards and the presacral space was developed. The inferior mesenteric artery along with its lymph node bundle were  lifted up.  We then similarly took down the lateral attachments of the sigmoid colon. the left ureter was identified and was protected throughout the course of the dissection. We then proceeded with our dissection proximally freeing the sigmoid colon mesentery from the underlying retroperitoneum.  We then turned our attention to the lateral attachments of the descending colon.  These were taken down using electrocautery proceeding superiorly.  Given that the proximal tattoo site was within the mid descending colon, it became clear that we would have to mobilize the splenic flexure in order to resect this area and have adequate reach for anastomosis in the pelvis.    We therefore proceeded forward with mobilization of the splenic flexure.  The greater omentum was reflected superiorly and the mid-transverse colon was elevated. The lesser sac was entered by dividing the gastrocolic ligament close to the colon where the leaflets meet. We then proceeded distally with this dissection such that the splenic flexure was completely mobilized from medial to lateral as well as a lateral to medial approach. Our points of dissection were met with the previously performed mobilization of the left descending colon and we found that the entire left colon was mobilized adequately.       We again turned our attention to the pelvis.  The peritoneum surrounding the KENDRA was cleared and it was isolated. A high-ligation of the KENDRA was performed using a 0 silk tie.  There was some bleeding from the vascular pedicle following ligation, however this was controlled using a 2-0 silk figure-of-eight suture.. Hemostasis was confirmed.  At this point we confirmed that the left colon was completely mobilized.    With the left colon mobilized, we then turned our attention to the pelvic dissection again.  We again visualized both the right and left ureter and ensured that these were kept safe throughout the dissection.  Using the ligasure device, we  proceeded with distal dissection of the sigmoid colon mesentery. This dissection proceeded all the way to the level of the proximal mesorectum and distal to the distal tattoo site. A distal transection site was chosen on the proximal rectum. We ensured that there was adequate circular dissection of the mesorectum at this site and divided the mesentery to the bowel wall using the [place] LigaSure. The bowel was then transected at this site using a blue load contour stapler.  Any remaining portions of the sigmoid colon mesentery were divided using LigaSure.  The proximal transection site was then chosen at the mid descending colon proximal to the tattoo site, and the colon was similarly divided using another firing of the contour stapler.  The specimen was then passed off the field the sigmoid colon.    Serial EEA sizers were then introduced up the patient's anus to the end of the distal rectal staple line.  This accommodated a 29 EEA stapler.  The anvil for the 29 EEA stapler was then secured within the divided end of the descending colon using a pursestring clamp with a 2-0 Prolene suture. Under direct visualization, the 29 EEA stapler was advanced into the anus. It was advanced to the distal rectal staple line. The spike was deployed just anterior to the mid portion of the staple line. The anvil was advanced onto the spike. The mesentery of the colon was confirmed to lie in proper position without twisting. The EEA stapler was then fired and a stapled end-to-end coloproctostomy was created. The anastomotic donuts were confirmed to be intact. The anastomosis was then submerged in saline irrigation within the pelvis and a pneumatic leak test was performed while the surgeon performed flexible sigmoidoscopy. Flexible sigmoidoscopy demonstrated the anastomosis to be patent and hemostatic endoscopically.  This was encountered at roughly 15 cm from the anus.  The pneumatic air-leak test was noted to be negative for leak  on examination. The irrigation was aspirated.  Portions of the anastomosis were oversewn using 3-0 silk suture in a Lembert fashion.  At this time a 10 flat DELROY drain was brought into the abdominal cavity through a right sided incision. The drain was positioned posterior to our anastomosis in the pelvis. The drain was secured to the abdominal wall using a 2-0 Nylon suture. Hemostasis of all operative sites was confirmed.  The surgeons changed gowns and gloves.    At this point we then noted that the appendix was oriented down into the pelvis.  To avoid any future diagnostic uncertainty I felt that it was in the patient's best interest to proceed forward with appendectomy.  A window was created in the mesoappendix adjacent to the cecum.  The appendix was then divided flush with the cecum using a KAPIL 75 blue load stapler.  The appendiceal mesentery was then sequentially divided using the LigaSure device.  The specimen was then passed off the field as appendix.    Hemostasis was confirmed throughout the abdomen.  We traced the small bowel to approximately 30 cm proximal to the ileocecal valve.  At the site was marked the small bowel for our ileostomy site.  A trephine was created on the right side of the abdominal wall in the mid rectus plane in the standard fashion.  This portion of small bowel was brought out through this site with great care taken to ensure that the loop ileostomy was properly oriented.    We again confirmed hemostasis throughout the abdomen.  The NG tube was palpated within the stomach.  The fascia was then closed using simple interrupted figure-of-eight #1 PDS suture.  The subcutaneous wound was irrigated with antibiotic-containing solution.  The skin was then closed with skin staples.  The loop ileostomy was then matured on the right side of the abdominal wall in the standard fashion over a bridge with 3-0 Vicryl suture.  A stoma appliance was then applied to the ileostomy site and a clean and dry  dressing was then applied to the midline.  The laparoscopic incision sites were closed using 4-0 Monocryl in the subcuticular layer and dermal glue was applied overlying this.     After the procedure, the patient was awakened, extubated, and taken to the postoperative anesthesia care unit for recovery. All needle, instrument, and lap counts were correct. I was personally present and performed all portions of the procedure. There were no immediate complications         Jimmy Craig MD  1/17/2024  14:59 EST     Electronically signed by Jimmy Craig MD at 01/17/24 1527       Jimmy Craig MD at 01/30/24 1555  Version 2 of 2           Operative Report    Patient Name:  Roger Bhat  YOB: 1960  [id number]    1/30/2024      PREOPERATIVE DIAGNOSIS: Bowel obstruction and sepsis status post open sigmoid colectomy with diverting loop ileostomy on January 17, 2024      POSTOPERATIVE DIAGNOSIS: Same        PROCEDURE PERFORMED:     Exploratory Laparotomy   Lysis of adhesions       SURGEON: Jimmy Craig MD      ASSISTANT:    MICHELE Frazier    Assistant surgeon responsibilities: Bella assisted with dissection, retraction, mobilization of structures, and closure. Their assistance was necessary and required for successful completion of the case.        SPECIMENS: Devitalized omentum    ESTIMATED BLOOD LOSS: 150 mL      ANESTHESIA: General with TAP blocks.        FINDINGS:  1.  Significant proximal distention of the small bowel with some adhesions throughout all 4 quadrants of the abdomen.  No definite well-defined transition point.  No devitalized abdominal viscera.  No succus throughout the abdomen.  No evidence of marshall leak at the anastomosis       INDICATIONS:      This patient is a 63 y.o. male with a history of recent open sigmoid colectomy and cholecystectomy on January 17, 2024 for sigmoid colon mass.  He has been on the floor since his index operation, and although he initially  appeared to be progressing well over the last week has had worsening of his clinical status with significant proximal bowel distention and inability to tolerate an oral diet as well as a rising leukocytosis. Preoperative imaging including CT scan has been unrevealing.  Due to overall continued lack of improvement in his clinical status I recommended to proceed to the operating room for abdominal exploration.  The risks, benefits, and alternatives of the procedure were discussed with the patient and their family preoperatively and they agreed to proceed.          DESCRIPTION OF PROCEDURE:      After obtaining informed consent, the patient was taken to the operating room and placed in the modified lithotomy position on the operating room table. General anesthesia was initiated. A Crouch catheter was placed. The abdomen was prepped and draped in the usual sterile fashion. A time out was properly performed. Antibiotics were given preoperatively. SCDs were properly placed on the patient and turned on. Blocks were performed by the Anesthesia service prior to the start of the case.      We began the procedure with opening of the patient's previous midline laparotomy.  The skin staples were removed.  The subcutaneous tissues were bluntly .  The previous fascial sutures were identified and then incised.  The fascia was  and the abdominal cavity was entered bluntly.  There was some serous fluid throughout the subcutaneous wound as well as on entrance into the abdominal cavity, but no marshall purulence or succus.  We immediately identified that there were some omental adhesions to the midline.  These were taken down using a combination of blunt dissection as well as electrocautery.  Entrance into the abdominal cavity demonstrated that the small bowel was significantly distended.  There were some interloop adhesions as well as adhesions between the small bowel and omentum in all 4 abdominal quadrants.     A  Bookwalter retractor was placed to aid with visualization.  We then proceeded forward with very careful lysis of adhesions.  There was a tongue of omentum which was adherent in the pelvis adjacent to his prior anastomosis.  This was carefully  from the surrounding tissues using combination of blunt as well as sharp dissection.  A portion of this omentum was frankly devitalized and was ligated with the LigaSure and passed off the field.  With this completed the omentum was able to be brought superiorly.  Using very careful blunt as well as sharp dissection, the small bowel adhesions to the pelvis were freed.  The coloproctostomy anastomosis was identified within the pelvis.  There is no obvious abscess, leakage of succus surrounding the site, or marshall infection within the pelvis.  We then continued to proceed forward inspecting the small bowel.  The small bowel was quite distended proximally.  All of the adhesions were taken down.  This required at least 1 hour of careful lysis of adhesions.  With this completed we identified that there was significant proximal distention of the small bowel as well as the stomach, and this tapered down to more decompressed distal small bowel.  There was not a well-defined transition point.  The anastomosis within the pelvis was carefully evaluated, and there was no evidence of breakdown anteriorly.  There was a drain posterior to the anastomosis which had had some previous feculent output, however there was no obvious breakdown or surrounding contamination on gross examination of the anastomosis.  The posterior aspect of the anastomosis was difficult to visualize due to surrounding adhesions..  However the descending colon and rectum did appear completely viable.  This drain was exchanged for an additional drain that laid posterior to the anastomosis.  The posterior aspect of the anastomosis was not able to be completely visualized, as I felt that further mobilization of the  site would only risk breakdown and it appeared completely viable.     We evaluated the small bowel all the way from the level of the ligament of Treitz to the diverting loop ileostomy.  There were some small serosal areas which were oversewn using 3-0 silk suture in a Lembert fashion, but no evidence of any full-thickness injury.  The small bowel was significantly distended proximally, but tapered to more decompressed distal small bowel.  There was no defined transition point.  All of the small bowel appeared viable.  The ileostomy was digitized and found to be widely patent.     The right upper quadrant was evaluated and found to be without any obvious contamination at the site of his previous cholecystectomy.  The NG tube was palpated within the body of the stomach. The abdomen was copiously irrigated and this was all evacuated. There was some oozing from the raw surfaces and dissection planes, but no evidence of any surgical bleeding and hemostasis was confirmed.      At this point I was satisfied that there was no obvious unaddressed infection or source of obstruction within the patient's abdomen.  We therefore elected to proceed forward with abdominal closure.  The fascia was closed using simple interrupted figure-of-eight #1 PDS suture with great care to protect the underlying abdominal viscera.  The subcutaneous wound was irrigated with antibiotic-containing solution.  The skin was then closed with skin staples.  A stoma appliance was reapplied to the previous loop ileostomy on the right side.       After the procedure, the patient was awakened, extubated, and taken to the postoperative anesthesia care unit for recovery. All needle, instrument, and lap counts were correct. I was personally present and performed all portions of the procedure. There were no immediate complications         Jimmy Craig MD  1/30/2024  17:40 EST      Electronically signed by Jimmy Craig MD at 01/30/24 3509        Jimmy Craig MD at 01/30/24 1555  Version 1 of 2           Operative Report    Patient Name:  Roger Bhat  YOB: 1960  [id number]    1/30/2024      PREOPERATIVE DIAGNOSIS: Bowel obstruction and sepsis status post open sigmoid colectomy with diverting loop ileostomy on January 17, 2024      POSTOPERATIVE DIAGNOSIS: Same        PROCEDURE PERFORMED:     Exploratory Laparotomy   Lysis of adhesions       SURGEON: Jimmy Craig MD      ASSISTANT:    MICHELE Frazier    Assistant surgeon responsibilities: Bella assisted with dissection, retraction, mobilization of structures, and closure. Their assistance was necessary and required for successful completion of the case.        SPECIMENS: Devitalized omentum    ESTIMATED BLOOD LOSS: 150 mL      ANESTHESIA: General with TAP blocks.        FINDINGS:  1.  Significant proximal distention of the small bowel with some adhesions throughout all 4 quadrants of the abdomen.  No definite well-defined transition point.  No devitalized abdominal viscera.  No succus throughout the abdomen.  No evidence of marshall leak at the anastomosis       INDICATIONS:      This patient is a 63 y.o. male with a history of recent open sigmoid colectomy and cholecystectomy on January 17, 2024 for sigmoid colon mass.  He has been on the floor since his index operation, and although he initially appeared to be progressing well over the last week has had worsening of his clinical status with significant proximal bowel distention and inability to tolerate an oral diet as well as a rising leukocytosis. Preoperative imaging including CT scan has been unrevealing.  Due to overall continued lack of improvement in his clinical status I recommended to proceed to the operating room for abdominal exploration.  The risks, benefits, and alternatives of the procedure were discussed with the patient and their family preoperatively and they agreed to proceed.          DESCRIPTION OF  PROCEDURE:      After obtaining informed consent, the patient was taken to the operating room and placed in the modified lithotomy position on the operating room table. General anesthesia was initiated. A Crouch catheter was placed. The abdomen was prepped and draped in the usual sterile fashion. A time out was properly performed. Antibiotics were given preoperatively. SCDs were properly placed on the patient and turned on. Blocks were performed by the Anesthesia service prior to the start of the case.      We began the procedure with opening of the patient's previous midline laparotomy.  The skin staples were removed.  The subcutaneous tissues were bluntly .  The previous fascial sutures were identified and then incised.  The fascia was  and the abdominal cavity was entered bluntly.  There was some serous fluid throughout the subcutaneous wound as well as on entrance into the abdominal cavity, but no marshall purulence or succus.  We immediately identified that there were some omental adhesions to the midline.  These were taken down using a combination of blunt dissection as well as electrocautery.  Entrance into the abdominal cavity demonstrated that the small bowel was significantly distended.  There were some interloop adhesions as well as adhesions between the small bowel and omentum in all 4 abdominal quadrants.     A Bookwalter retractor was placed to aid with visualization.  We then proceeded forward with very careful lysis of adhesions.  There was a tongue of omentum which was adherent in the pelvis adjacent to his prior anastomosis.  This was carefully  from the surrounding tissues using combination of blunt as well as sharp dissection.  A portion of this omentum was frankly devitalized and was ligated with the LigaSure and passed off the field.  With this completed the omentum was able to be brought superiorly.  Using very careful blunt as well as sharp dissection, the small bowel  adhesions to the pelvis were freed.  The coloproctostomy anastomosis was identified within the pelvis.  There is no obvious abscess, leakage of succus surrounding the site, or marshall infection within the pelvis.  We then continued to proceed forward inspecting the small bowel.  The small bowel was quite distended proximally.  All of the adhesions were taken down.  This required at least 1 hour of careful lysis of adhesions.  With this completed we identified that there was significant proximal distention of the small bowel as well as the stomach, and this tapered down to more decompressed distal small bowel.  There was not a well-defined transition point.  The anastomosis within the pelvis was carefully evaluated, and there was no evidence of breakdown anteriorly.  There was a drain posterior to the anastomosis which had had some previous feculent output, however there was no obvious breakdown or surrounding contamination on gross examination of the anastomosis.  The posterior aspect of the anastomosis was difficult to visualize due to surrounding adhesions..  However the descending colon and rectum did appear completely viable.  This drain was exchanged for an additional drain that laid posterior to the anastomosis.  The posterior aspect of the anastomosis was not able to be completely visualized, as I felt that further mobilization of the site would only risk breakdown and it appeared completely viable.     We evaluated the small bowel all the way from the level of the ligament of Treitz to the diverting loop ileostomy.  There were some small serosal areas which were oversewn using 3-0 silk suture in a Lembert fashion, but no evidence of any full-thickness injury.  The small bowel was significantly distended proximally, but tapered to more decompressed distal small bowel.  There was no defined transition point.  All of the small bowel appeared viable.  The ileostomy was digitized and found to be widely  "patent.     The right upper quadrant was evaluated and found to be without any obvious contamination at the site of his previous cholecystectomy.  The NG tube was palpated within the body of the stomach.     At this point I was satisfied that there was no obvious unaddressed infection or source of obstruction within the patient's abdomen.  We therefore elected to proceed forward with abdominal closure.  The fascia was closed using simple interrupted figure-of-eight #1 PDS suture with great care to protect the underlying abdominal viscera.  The subcutaneous wound was irrigated with antibiotic-containing solution.  The skin was then closed with skin staples.  A stoma appliance was reapplied to the previous loop ileostomy on the right side.       After the procedure, the patient was awakened, extubated, and taken to the postoperative anesthesia care unit for recovery. All needle, instrument, and lap counts were correct. I was personally present and performed all portions of the procedure. There were no immediate complications         Jimmy Craig MD  1/30/2024  17:40 EST      Electronically signed by Jimmy Craig MD at 01/30/24 1442          Physician Progress Notes (all)        Jimmy Craig MD at 01/31/24 1358          Patient Name:  Roger Bhat  YOB: 1960  [id number]    Surgery Progress Note    Date of visit: 1/31/2024      Subjective: No acute events.  Had low urine output overnight received IV fluid bolus and albumin with some improvement.  290 mL of urine output recorded but more in the bag this morning.  Stoma with a small amount of of bilious fluid out.  DELROY 160 mL of output.  Reports the pain is controlled with PCA.  Reports that he overall feels better          Objective:     /73   Pulse 98   Temp 97.6 °F (36.4 °C) (Axillary)   Resp 20   Ht 175.3 cm (69\")   Wt 59.4 kg (131 lb)   SpO2 91%   BMI 19.35 kg/m²     Intake/Output Summary (Last 24 hours) at " 1/31/2024 0725  Last data filed at 1/31/2024 0600  Gross per 24 hour   Intake 4029.56 ml   Output 3725 ml   Net 304.56 ml       GEN:   Awake, alert, sitting up in bed, chronically ill-appearing in no obvious distress  CV:   Regular rate and rhythm  L:  Symmetric expansion, not labored on nasal cannula  Abd:  Soft, moderately distended, appropriately tender palpation throughout the abdomen, midline incision with dressing in place with small amount of spotting on the dressing, DELROY drain in the right lower quadrant with serosanguineous output, ileostomy on the right side pink and patent with scant stool in the bag  Ext:  No cyanosis, clubbing, or edema    Recent labs that are back at this time have been reviewed.           Assessment/ Plan:    Mr. Bhat is a 63-year-old gentleman who is admitted following operative intervention for gallbladder and sigmoid colon mass on January 17     #Sigmoid colon mass, gallbladder mass  # Ileus  -Postop day 14 following lap vladimir, open sigmoid colectomy with diverting loop ileostomy, postoperative day 1 following exploratory laparotomy with washout  -Tachycardic initially last night but improved following IV fluid resuscitation.  -Dimitri's without acute findings.  Lactate down at 1.7.  CBC still pending  -Continue Crouch catheter for I's and O's  -Continue IV fluids and TPN  -Continue NG tube to low wall suction  -Continue empiric antibiotics  -Awaiting better return of bowel function through the ileostomy.  Continue bowel regimen  -Mobilize as able      Jimmy Craig MD  1/31/2024  07:25 EST        Electronically signed by Jimmy Craig MD at 01/31/24 0728       Rafael Escalante MD at 01/30/24 1844          Pulmonary/Critical Care Follow-up     LOS: 13 days   Patient Care Team:  Richa Mary DO as PCP - General (Family Medicine)  Cecelia Ace APRN as Nurse Practitioner (Nurse Practitioner)  Osmel Kessler MD as Consulting Physician  "(Gastroenterology)  Sarah Daly, RN as Nurse Navigator    Chief Complaint/reason: Hypoxemic respiratory failure and tenuous respiratory status postoperatively after exploratory laparotomy.      Subjective     63 y.o. active smoker with history of HTN, HLD, COPD, GERD, hemochromatosis, found to have a gallbladder mass as well as a sigmoid colon mass, who underwent laparoscopic cholecystectomy, liver biopsy, open cholecystectomy, appendectomy and diverting loop ileostomy by Dr. Craig on 1/17/2023.  Pathology from colon was negative for malignancy.  Pathology from gallbladder showed adenomyoma.  Liver core biopsies showed increased stainable iron with no fibrosis.    Patient has had issues postoperatively with ongoing nausea/vomiting, proximal small bowel dilation on imaging, as well as leukocytosis and was taken back to the operating room by Dr. Craig on 1/30/2024.  He underwent exploratory laparotomy with lysis of adhesions but no well defined transition point was seen and there was no devitalized abdominal viscera, evidence of marshall leak, or succus.  The patient did have issues with vomiting around the time of intubation and there was concern for possible aspiration.  The patient is requiring 6 L supplemental oxygen postoperatively and it was requested that he be monitored in the intensive care unit due to tenuous respiratory status and hypoxemic respiratory failure.    I saw the patient in the PACU.  He has some mild dyspnea and significant bilateral rhonchi/rales.  He is tachycardic with a heart rate in the mid to upper 120s.  They have been able to wean him from 6 L nasal cannula down to 4 L nasal cannula.    History taken from: Patient, staff, chart    PMH/FH/Social History were reviewed and updated appropriately in the electronic medical record.     Past Medical History:   Diagnosis Date    Allergies     Cellulitis of hand     Clotting disorder     \"FREE BLEEDING\"    Colonic mass     COPD (chronic " obstructive pulmonary disease)     Cough     Current smoker     Erectile dysfunction     Fracture, foot Sept 7 2022    GERD (gastroesophageal reflux disease)     Hemochromatosis     Hyperlipidemia     Hypertension     Wears dentures     FULL SET     Past Surgical History:   Procedure Laterality Date    CHOLECYSTECTOMY N/A 1/17/2024    Procedure: CHOLECYSTECTOMY LAPAROSCOPIC;  Surgeon: Jimmy Craig MD;  Location:  [facility];  Service: General;  Laterality: N/A;    COLON RESECTION N/A 1/17/2024    Procedure: OPEN SIGMOID COLECTOMY, LIVER BIOPSY, DIVERTING LOOP ILEOSTOMY CREATION, TAKE DOWN OF THE SPLENIC FLEXURE, AND APPENDECTOMY;  Surgeon: Jimmy Craig MD;  Location:  [facility];  Service: General;  Laterality: N/A;    COLONOSCOPY  2021    ENDOSCOPY  05/2016    pt say's, he was placed under general anesthesia for this    ENDOSCOPY N/A 10/19/2021    Procedure: ESOPHAGOGASTRODUODENOSCOPY;  Surgeon: Osmel Kessler MD;  Location:  [facility];  Service: Gastroenterology;  Laterality: N/A;  24 fr savory dilator used at 1251, 27 fr sdavory used at 1253, 33 Kinyarwanda savoruy used at 1257, 42 fr savory used at 1259      ENDOSCOPY N/A 11/16/2021    Procedure: ESOPHAGOGASTRODUODENOSCOPY WITH DILATATION;  Surgeon: Osmel Kessler MD;  Location:  [facility];  Service: Gastroenterology;  Laterality: N/A;  Dilation with 48fr savery    HAND SURGERY  2022     Family History   Problem Relation Age of Onset    No Known Problems Mother     No Known Problems Father     Colon cancer Neg Hx     Colon polyps Neg Hx     Esophageal cancer Neg Hx      Social History     Socioeconomic History    Marital status: Significant Other   Tobacco Use    Smoking status: Every Day     Packs/day: 0.25     Years: 15.00     Additional pack years: 0.00     Total pack years: 3.75     Types: Cigarettes    Smokeless tobacco: Never    Tobacco comments:     max 2ppd  now 1/4 ppd    Vaping Use    Vaping Use: Never used   Substance and  Sexual Activity    Alcohol use: Yes     Alcohol/week: 23.0 standard drinks of alcohol     Types: 2 Glasses of wine, 21 Cans of beer per week    Drug use: Never    Sexual activity: Defer         Review of Systems:    Review of 14 systems was completed with positives and pertinent negatives noted in the subjective section.  All other systems reviewed and are negative.         Objective     Vital Signs  Temp:  [97.7 °F (36.5 °C)-98.8 °F (37.1 °C)] 97.7 °F (36.5 °C)  Heart Rate:  [] 125  Resp:  [16-25] 21  BP: (108-134)/(75-90) 117/75  01/29 0701 - 01/30 0700  In: 2189.3 [I.V.:1213.3]  Out: 1425   Body mass index is 19.35 kg/m².  S RR:  [10-14] 14  IV drips:  [MAR Hold] Adult Central CLINIMIX-E TPN  HYDROmorphone HCl-NaCl  Pharmacy Consult  Pharmacy Consult  Pharmacy to Dose TPN  sodium chloride, Last Rate: 50 mL/hr (01/29/24 2154)       Physical Exam:     Constitutional:   Alert, in no acute distress   Head:   Normocephalic, atraumatic   Eyes:           Lids and lashes normal, conjunctivae and sclerae normal.  PER   ENMT:  Ears appear intact with no abnormalities noted     Lips normal.     Neck:  Trachea midline, no JVD   Lungs/Resp:    Normal effort, symmetric chest rise, no crepitus, clear to      auscultation bilaterally.               Heart/CV:   Regular rhythm and normal rate, no murmur   Abdomen/GI:    Soft, nontender, nondistended, midline dressing, right side ostomy pink without significant drainage.  Abdominal drain with 80 mL serosanguineous fluid.   :    Deferred   Extremities/MSK:  No clubbing or cyanosis.  No edema.     Pulses:  Pulses palpable and equal bilaterally   Skin:  No bleeding, bruising or rash   Heme/Lymph:  No cervical or supraclavicular adenopathy.   Neurologic:    Psychiatric:    Moves all extremities with no obvious focal motor deficit.  Cranial nerves 2 - 12 grossly intact  Non-agitated, normal affect.    The above physical exam findings were reviewed and reflect my exam findings as  of today's exam.   Electronically signed by:  Rafael Escalante MD  01/30/24  19:24 EST      Results Review:     I reviewed the patient's new clinical results.   Results from last 7 days   Lab Units 01/30/24  0538 01/29/24  0541 01/28/24  0459 01/27/24  0405 01/26/24  0500   SODIUM mmol/L 135* 135* 136   < > 142  142   POTASSIUM mmol/L 3.5 4.0 3.9   < > 3.9  3.9   CHLORIDE mmol/L 100 102 100   < > 105  105   CO2 mmol/L 22.0 21.0* 24.0   < > 19.0*  21.0*   BUN mg/dL 17 19 18   < > 24*  26*   CREATININE mg/dL 0.41* 0.46* 0.53*   < > 0.57*  0.57*   CALCIUM mg/dL 8.5* 8.6 8.5*   < > 8.5*  8.9   BILIRUBIN mg/dL  --  0.3  --   --  0.2  0.2   ALK PHOS U/L  --  79  --   --  68  68   ALT (SGPT) U/L  --  18  --   --  15  15   AST (SGOT) U/L  --  16  --   --  9  9   GLUCOSE mg/dL 119* 123* 125*   < > 95  96    < > = values in this interval not displayed.     Results from last 7 days   Lab Units 01/30/24  0820 01/29/24  0541 01/28/24  0459   WBC 10*3/mm3 26.53* 20.75* 20.94*   HEMOGLOBIN g/dL 13.7 14.0 13.8   HEMATOCRIT % 39.0 40.3 39.7   PLATELETS 10*3/mm3 531* 472* 559*         Results from last 7 days   Lab Units 01/30/24  0538 01/29/24  0541 01/28/24  0459   MAGNESIUM mg/dL 2.1 2.2 2.1   PHOSPHORUS mg/dL 3.4 3.6 3.0       I reviewed the patient's new imaging including images and reports.    Abdominal x-ray from this morning shows dilated small bowel and stomach.    Medication Review:   [MAR Hold] bisacodyl, 10 mg, Oral, Daily  [MAR Hold] budesonide-formoterol, 2 puff, Inhalation, BID - RT  [MAR Hold] buPROPion XL, 150 mg, Oral, QAM  [MAR Hold] Fat Emul Fish Oil/Plant Based, 100 mL, Intravenous, Q24H (TPN)  [MAR Hold] folic acid 1 mg in sodium chloride 0.9 % 50 mL IVPB, 1 mg, Intravenous, Daily  [MAR Hold] heparin (porcine), 5,000 Units, Subcutaneous, Q8H  [MAR Hold] insulin regular, 2-7 Units, Subcutaneous, Q6H  [MAR Hold] metoclopramide, 10 mg, Intravenous, Q6H  [MAR Hold] pantoprazole, 40 mg, Intravenous, QAM  AC  piperacillin-tazobactam, 3.375 g, Intravenous, Q8H  [MAR Hold] rosuvastatin, 10 mg, Oral, Nightly  [MAR Hold] senna, 1 tablet, Oral, Nightly  [MAR Hold] sodium chloride, 10 mL, Intravenous, Q12H  [MAR Hold] thiamine (B-1) IV, 100 mg, Intravenous, Daily      [MAR Hold] Adult Central CLINIMIX-E TPN,   HYDROmorphone HCl-NaCl,   Pharmacy Consult,   Pharmacy Consult,   Pharmacy to Dose TPN,   sodium chloride, 50 mL/hr, Last Rate: 50 mL/hr (01/29/24 6383)        Assessment & Plan         Colonic mass    Current smoker    Severe malnutrition    Respiratory distress    63 y.o. active smoker with history of HTN, HLD, COPD, GERD, hemochromatosis, found to have a gallbladder mass as well as a sigmoid colon mass, who underwent laparoscopic cholecystectomy, liver biopsy, open cholecystectomy, appendectomy and diverting loop ileostomy by Dr. Craig on 1/17/2023.  Pathology from colon was negative for malignancy.  Pathology from gallbladder showed adenomyoma.  Liver core biopsies showed increased stainable iron with no fibrosis.    Patient has had issues postoperatively with ongoing nausea/vomiting, proximal small bowel dilation on imaging, as well as leukocytosis and was taken back to the operating room by Dr. Craig on 1/30/2024.  He underwent exploratory laparotomy with lysis of adhesions but no well defined transition point was seen and there was no devitalized abdominal viscera, evidence of marshall leak, or succus.  The patient did have issues with vomiting around the time of intubation and there was concern for possible aspiration.  The patient is requiring 6 L supplemental oxygen postoperatively and it was requested that he be monitored in the intensive care unit due to tenuous respiratory status and hypoxemic respiratory failure.    I saw the patient in the PACU.  He has some mild dyspnea and significant bilateral rhonchi/rales.  He is tachycardic with a heart rate in the mid to upper 120s.  They have been able to wean  him from 6 L nasal cannula down to 4 L nasal cannula.    Plan:  1.  For respiratory distress: Suspect aspiration.  Supplemental oxygen as needed.  Check chest x-ray now.  Pulmonary toilet.  Monitor for fever.  Currently on Zosyn for abdomen and I we will continue this.  If patient develops significant fevers or purulent sputum consider broadening antibiotics.  Culture of fever.  2.  For tachycardia: Unclear if patient is intravascularly dry and I will give a liter of fluid.  Monitor.  Check EKG/troponin.  3.  For colon mass: Status post resection which was benign.  4.  For small bowel dilation: Status post exploratory laparotomy with ALBERTO 1/30/2024.  Bowel was viable.  Continue monitoring.  5.  Nutrition/malnutrition: Continue TPN.  6.  For cigarette nicotine dependence: Smoking cessation counseling when appropriate.  7.  DVT prophylaxis: SCDs.  Resume subcutaneous heparin when okay with surgery.    Patient is critically ill secondary to tenuous respiratory status and has high risk of life-threatening decompensation in condition.   As such, the patient requires continuous monitoring and frequent reassessment for consideration of adjustment in management to minimize this risk.  I personally reassessed the patient multiple times today.    Critical care time : 50 minutes spent by me personally and independently.(This excludes time spent performing separately reportable procedures and services).  Critical care time includes high complexity decision making to assess, manipulate, and support vital organ system failure in this individual who has impairment of one or more vital organ systems such that there is a high probability of imminent or life threatening deterioration in the patient’s condition.    Electronically signed by:    Rafael Escalante MD  01/30/24  19:24 EST      *. Please note that portions of this note were completed with [employer] - a voice recognition program.     Electronically signed by  "Rafael Escalante MD at 01/30/24 1935       Jimmy Craig MD at 01/30/24 0830          Patient Name:  Roger Bhat  YOB: 1960  [id number]    Surgery Progress Note    Date of visit: 1/30/2024      Subjective: No acute events.  Tells me that he feels \"not too bad \"this morning.  Had some coughing and 2 or 3 very small emesis overnight.  At 1300 mL of output from the ileostomy.  Voided 3 times yesterday.  Denies any fevers or chills.  Denies abdominal pain          Objective:     /88 (BP Location: Right arm, Patient Position: Lying)   Pulse 100   Temp 98.3 °F (36.8 °C) (Temporal)   Resp 16   Ht 175.3 cm (69\")   Wt 59.4 kg (131 lb)   SpO2 90%   BMI 19.35 kg/m²     Intake/Output Summary (Last 24 hours) at 1/30/2024 0830  Last data filed at 1/30/2024 0645  Gross per 24 hour   Intake 2189.33 ml   Output 675 ml   Net 1514.33 ml     GEN:   Awake, alert, in no acute distress, resting comfortably in bed   CV:      Regular rate and rhythm  L:         Symmetric expansion, not labored on room air  Abd:    Soft, not obviously distended, appropriately tender palpation on midline incision,  Incision is clean dry intact, ileostomy in the right side is pink and patent with liquid stool in the bag, DELROY drain in the right lower quadrant with small amount of brown thick output  Ext:     No cyanosis, clubbing, or edema    Recent labs that are back at this time have been reviewed.           Assessment/ Plan:    Mr. Bhat is a 63-year-old gentleman who is admitted following operative intervention for gallbladder and sigmoid colon mass on January 17     #Sigmoid colon mass, gallbladder mass  # Ileus  -Postop day 13 following lap vladimir, open sigmoid colectomy with diverting loop ileostomy  -Afebrile  -Labs show worsened leukocytosis today to 26k. He has continued to have ileostomy output with 1300 mL yesterday. We have repeated a CT scan again yesterday that does not show any obvious abscess or fluid " "collection with decompressed distal small bowel and significantly distended proximal small bowel. At this point, I am concerned that he has not been able to reliably tolerate a diet and has a worsening leukocytosis with significant proximal bowel distention that has not resolved after two weeks. After a long discussion with the patient this morning I have recommended to return to the operating room today for abdominal exploration to ensure no unaddressed source of infection or mechanical obstruction. He understands and is agreeable with proceeding forward        Jimmy Craig MD  1/30/2024  08:30 EST        Electronically signed by Jimmy Craig MD at 01/30/24 0912       Jimmy Craig MD at 01/29/24 0758          Patient Name:  Roger Bhat  YOB: 1960  [id number]    Surgery Progress Note    Date of visit: 1/29/2024      Subjective: No significant changes.  Had 1 emesis yesterday afternoon.  Otherwise has had some hiccuping and belching.  Denies abdominal pain.  Denies nausea this morning.  Ambulated multiple times yesterday.  Denies fevers or chills.  Reports that his cough is better          Objective:     /81 (BP Location: Right arm, Patient Position: Lying)   Pulse 94   Temp 98.1 °F (36.7 °C) (Temporal)   Resp 16   Ht 175.3 cm (69\")   Wt 59.4 kg (131 lb)   SpO2 90%   BMI 19.35 kg/m²     Intake/Output Summary (Last 24 hours) at 1/29/2024 0758  Last data filed at 1/29/2024 0629  Gross per 24 hour   Intake 2010.67 ml   Output 2555 ml   Net -544.33 ml       GEN:   Awake, alert, in no acute distress, resting comfortably in bed   CV:      Regular rate and rhythm  L:         Symmetric expansion, not labored on room air  Abd:    Soft, not obviously distended, appropriately tender palpation on midline incision,  Incision is clean dry intact, ileostomy in the right side is pink and patent with liquid stool in the bag, DELROY drain in the right lower quadrant with small amount " "of brown thick output  Ext:     No cyanosis, clubbing, or edema    Recent labs that are back at this time have been reviewed.           Assessment/ Plan:    Mr. Bhat is a 63-year-old gentleman who is admitted following operative intervention for gallbladder and sigmoid colon mass on January 17     #Sigmoid colon mass, gallbladder mass  # Ileus  -Postop day 12 following lap vladimir, open sigmoid colectomy with diverting loop ileostomy  -Afebrile. Vitals stable  -Leukocytosis remains elevated at 20,000.  Labs otherwise without acute findings  -Ileostomy output higher yesterday with 205 0 mL.  Continuing to have difficulty tolerating a oral diet however had an episode of emesis yesterday  -Given ongoing leukocytosis and delay in appropriate bowel function despite his CT scan on Thursday, we will plan for repeat CT of the abdomen and pelvis today.  Will order for PICC placement today so that he can be started on TPN      Jimmy Craig MD  1/29/2024  07:58 EST        Electronically signed by Jimmy Craig MD at 01/29/24 0800       Jimmy Craig MD at 01/28/24 1001          Patient Name:  Roger Bhat  YOB: 1960  [id number]    Surgery Progress Note    Date of visit: 1/28/2024      Subjective: Had both his breakfast and lunch tray yesterday within a short period of time, and then experienced an episode of emesis following this.  An additional episode of emesis around 2 PM yesterday afternoon.  Has not had any repeat emesis or nausea and vomiting since then.  Ileostomy with 875 mL of output.  Denies fevers or chills.  Does note that he has more of a cough with some phlegm.  Urine output 675 mL.  Tells me that he feels much better today and has ambulated multiple times          Objective:     /83 (BP Location: Right arm, Patient Position: Lying)   Pulse 87   Temp 97.5 °F (36.4 °C) (Temporal)   Resp 19   Ht 175.3 cm (69\")   Wt 54.6 kg (120 lb 6.4 oz)   SpO2 92%   BMI 17.78 " kg/m²     Intake/Output Summary (Last 24 hours) at 1/28/2024 1001  Last data filed at 1/28/2024 0933  Gross per 24 hour   Intake 1893 ml   Output 1600 ml   Net 293 ml     GEN:   Awake, alert, in no acute distress, resting comfortably in bed   CV:      Regular rate and rhythm  L:         Symmetric expansion, not labored on room air  Abd:    Soft, not obviously distended, appropriately tender palpation on midline incision,  Incision is clean dry intact, ileostomy in the right side is pink and patent with liquid stool in the bag, DELROY drain in the right lower quadrant with small amount of brown thick output  Ext:     No cyanosis, clubbing, or edema    Recent labs that are back at this time have been reviewed.         Assessment/ Plan:    Mr. Bhat is a 63-year-old gentleman who is admitted following operative intervention for gallbladder and sigmoid colon mass on January 17     #Sigmoid colon mass, gallbladder mass  # Ileus  -Postop day 11 following lap vladimir, open sigmoid colectomy with diverting loop ileostomy  -Afebrile. Vitals stable  -WBC up at 20K today. Labs otherwise without new findings  -Good ileostomy output yesterday 875 mL. Had an episode of emesis however none for over 12 hrs now and ileus appears to be improving   -My concern is that his leukocytosis and ileus is at least in part related to an anastomotic leak due to the character of his drain output which appears feculent. Regardless he is diverted proximal to this and he had a CT 3 days ago that demonstrated no abscess or undrained infection. I expect this will improve with continue supportive therapies. Continue PPN and advance diet as tolerated for now. Will add a UA and CXR for today to ensure no other infectious source. Possible repeat CT tomorrow if worsening or lack of improvement       Jimmy Craig MD  1/28/2024  10:01 EST        Electronically signed by Jimmy Craig MD at 01/28/24 1006       Jimmy Craig MD at 01/27/24 9708   "        Patient Name:  Roger Bhat  YOB: 1960  [id number]    Surgery Progress Note    Date of visit: 1/27/2024      Subjective: No acute events.  Tells me that he is quite hungry.  Ileostomy had 1350 mL of output yesterday.  NG tube was clamped yesterday and residuals were less than 300.  Ambulated.          Objective:     /86 (BP Location: Right arm, Patient Position: Lying)   Pulse 90   Temp 98 °F (36.7 °C) (Temporal)   Resp 17   Ht 175.3 cm (69\")   Wt 54.6 kg (120 lb 6.4 oz)   SpO2 91%   BMI 17.78 kg/m²     Intake/Output Summary (Last 24 hours) at 1/27/2024 0943  Last data filed at 1/27/2024 0916  Gross per 24 hour   Intake 3661 ml   Output 2105 ml   Net 1556 ml       GEN:   Awake, alert, in no acute distress, resting comfortably in bed   CV:      Regular rate and rhythm  L:         Symmetric expansion, not labored on room air  Abd:    Soft, not obviously distended, appropriately tender palpation on midline incision,  Incision is clean dry intact, ileostomy in the right side is pink and patent with liquid stool in the bag, DELROY drain in the right lower quadrant with a small amount of dark turbid output  Ext:     No cyanosis, clubbing, or edema    Recent labs that are back at this time have been reviewed.           Assessment/ Plan:    Mr. Bhat is a 63-year-old gentleman who is admitted following operative intervention for gallbladder and sigmoid colon mass on January 17     #Sigmoid colon mass, gallbladder mass  # Ileus  -Postop day 10 following lap vladimir, open sigmoid colectomy with diverting loop ileostomy  -Afebrile  -Ileus appears to be improving.  Residuals less than 300 yesterday from NGT and had 1350 mL of output from the ileostomy  -Discontinue NG today  -Start clear liquid diet. Continue PPN for now  -Continue antibiotics empirically due to concern for anastomotic leak related to character of drain output, however no fevers or leukocytosis or abscess on " "CT.  -Mobilize      Jimmy Craig MD  1/27/2024  09:43 EST        Electronically signed by Jimmy Craig MD at 01/27/24 0945       Jimmy Craig MD at 01/26/24 0809          Patient Name:  Roger Bhat  YOB: 1960  [id number]    Surgery Progress Note    Date of visit: 1/26/2024      Subjective: No acute events.  Overall feeling quite a bit better and tells me that he is quite hungry.  No fevers or chills.  Denies any abdominal pain.  NG had over 2 L of output yesterday, however also taking in quite a bit of ice chips.  Stoma with 450 mL of output.  DELROY with low output.  Ambulating multiple times          Objective:     /84 (BP Location: Left arm, Patient Position: Lying)   Pulse 104   Temp 98 °F (36.7 °C) (Axillary)   Resp 18   Ht 175.3 cm (69\")   Wt 61.2 kg (135 lb)   SpO2 91%   BMI 19.94 kg/m²     Intake/Output Summary (Last 24 hours) at 1/26/2024 0809  Last data filed at 1/26/2024 0600  Gross per 24 hour   Intake 105 ml   Output 4410 ml   Net -4305 ml       GEN:   Awake, alert, in no acute distress, resting comfortably in bed   CV:      Regular rate and rhythm  L:         Symmetric expansion, not labored on room air  Abd:    Soft, not obviously distended, appropriately tender palpation on midline incision,  Incision is clean dry intact, ileostomy in the right side is pink and patent with some liquid stool in the bag, DELROY drain in the right lower quadrant with a small amount of dark turbid output  Ext:     No cyanosis, clubbing, or edema    Recent labs that are back at this time have been reviewed.           Assessment/ Plan:    Mr. Bhat is a 63-year-old gentleman who is admitted following operative intervention for gallbladder and sigmoid colon mass on January 17     #Sigmoid colon mass, gallbladder mass  # Ileus  -Postop day 9 following lap vladimir, open sigmoid colectomy with diverting loop ileostomy  -Afebrile  -Labs unchanged.  White count stable at 11.  " "Hemoglobin 13.9.  Chemistries without acute findings  -Having ileostomy output, 500 mL yesterday.  Still with over 2 L of NG tube output yesterday  -CT scan yesterday was personally reviewed.  This does not demonstrate any obvious infectious source such as an abscess or fluid collection.  There is some generalized distention of the small bowel consistent with ileus.  -Will restart his Reglan.  -Clamp NG tube today and check residuals.    -He has been afebrile and does not have a significant leukocytosis.  There is no significant fluid collection on his CT scan.  I do have some concern for an anastomotic leak however given the character of his drain output.  Regardless he is diverted proximally and this is clearly being addressed by the drain and his CT findings.  I do have concern that a local inflammatory response may be contributing to his ileus and I will therefore go ahead and empirically start him on antibiotics.  KENY Craig MD  1/26/2024  08:09 EST        Electronically signed by Jimmy Craig MD at 01/26/24 0813       Jimmy Craig MD at 01/25/24 0731          Patient Name:  Roger Bhat  YOB: 1960  [id number]    Surgery Progress Note    Date of visit: 1/25/2024      Subjective: No acute events.  Feels much better today after NG tube was placed yesterday.  Had over 2 L out from the NG tube yesterday.  Ileostomy has continued to be productive however with 107 5 mL yesterday.  No fevers or chills.  DELROY with low output however this is turbid brown material.  Ambulating.          Objective:     /90 (BP Location: Right arm, Patient Position: Lying)   Pulse 99   Temp 97.8 °F (36.6 °C) (Temporal)   Resp 18   Ht 175.3 cm (69\")   Wt 61.2 kg (135 lb)   SpO2 90%   BMI 19.94 kg/m²     Intake/Output Summary (Last 24 hours) at 1/25/2024 0731  Last data filed at 1/25/2024 0609  Gross per 24 hour   Intake 2909.49 ml   Output 3650 ml   Net -740.51 ml       GEN:   " Awake, alert, in no acute distress, resting comfortably in bed   CV:   Regular rate and rhythm  L:  Symmetric expansion, not labored on room air  Abd:  Soft, not obviously distended, appropriately tender palpation on midline incision,  Incision is clean dry intact, ileostomy in the right side is pink and patent with some liquid stool in the bag, DELROY drain in the right lower quadrant with a small amount of dark turbid output  Ext:  No cyanosis, clubbing, or edema    Recent labs that are back at this time have been reviewed.           Assessment/ Plan:    Mr. Bhat is a 63-year-old gentleman who is admitted following operative intervention for gallbladder and sigmoid colon mass on January 17     #Sigmoid colon mass, gallbladder mass  # Ileus  -Postop day 8 following lap vladimir, open sigmoid colectomy with diverting loop ileostomy  -Afebrile  -White count up slightly at 11 from 9.  Otherwise labs without acute findings  -Over 2 L of output from the NG tube yesterday.  Feels significantly improved.  Still having ileostomy output however  -He does have some dark turbid output in his drain which is suspicious for an anastomotic leak.  He is diverted proximally however and is already being treated for this.  However given his ongoing ileus we will plan to proceed forward with a CT of the abdomen and pelvis today  -N.p.o. except ice chips for now  -Continue IV fluids          Jimmy Craig MD  1/25/2024  07:31 EST        Electronically signed by Jimmy Craig MD at 01/25/24 0735       Jimmy Craig MD at 01/24/24 0828          Patient Name:  Roger Bhat  YOB: 1960  [id number]    Surgery Progress Note    Date of visit: 1/24/2024      Subjective: No acute events.  Ileostomy output 1500 mL yesterday.  Did have 1 episode of emesis yesterday evening but none overnight.  Did not eat much yesterday.  Afebrile white blood cell count of 9.8.  DELROY drain with slightly murky  "output.          Objective:     /79 (BP Location: Left arm, Patient Position: Lying)   Pulse 105   Temp 97.6 °F (36.4 °C) (Temporal)   Resp 16   Ht 175.3 cm (69\")   Wt 66.3 kg (146 lb 0.9 oz)   SpO2 90%   BMI 21.57 kg/m²     Intake/Output Summary (Last 24 hours) at 1/24/2024 0817  Last data filed at 1/24/2024 0734  Gross per 24 hour   Intake 1297.49 ml   Output 1875 ml   Net -577.51 ml       GEN:   Awake, alert, in no acute distress, resting comfortably in bed   CV:   Regular rate and rhythm  L:  Symmetric expansion, not labored on room air  Abd:  Soft, appropriately tender palpation along incision, midline incision is clean dry intact with staples in place, ileostomy on the right side is pink and patent with thin liquid stool in the bag, DELROY drain in the right lower quadrant has some dark murky fluid  Ext:  No cyanosis, clubbing, or edema    Recent labs that are back at this time have been reviewed.           Assessment/ Plan:    Mr. Bhat is a 63-year-old gentleman who is admitted following operative intervention for gallbladder and sigmoid colon mass on January 17     #Sigmoid colon mass, gallbladder mass  -Postop day 7 following lap vladimir, open sigmoid colectomy with diverting loop ileostomy  -Still with intermittent emesis and low p.o. intake.  Has had much higher ileostomy output however, 1500 mL yesterday.  Will start on Imodium 2 mg 4 times daily.  Monitor ileostomy output  -Continue regular diet as tolerated  -Drain output is darker, however no leukocytosis or fevers and he is diverted.  Continue to monitor for now.  -Continue IV fluids  -Ambulate. IS         Jimmy Craig MD  1/24/2024  08:17 EST    Had more episodes of emesis this afternoon. KUB obtained with significant gastric and proximal small bowel distention consistent with ileus. NGT placed with copious bilious output. Continue supportive therapies\    Electronically signed by Jimmy Craig MD, 01/24/24, 4:31 PM " "EST.      Electronically signed by Jimmy Craig MD at 01/24/24 1631       Jimmy Craig MD at 01/23/24 4166          Patient Name:  Roger Bhat  YOB: 1960  [id number]    Surgery Progress Note    Date of visit: 1/23/2024      Subjective: No acute events.  Tolerated clears yesterday but did have some nausea in the evening and 1 small volume emesis.  No fevers or chills.  Ambulated yesterday.  Now on room air.  Ileostomy with significant increase in output yesterday, 1860 mL.          Objective:     /79 (BP Location: Right arm, Patient Position: Lying)   Pulse 91   Temp 98.3 °F (36.8 °C) (Temporal)   Resp 16   Ht 175.3 cm (69\")   Wt 66.3 kg (146 lb 0.9 oz)   SpO2 92%   BMI 21.57 kg/m²     Intake/Output Summary (Last 24 hours) at 1/23/2024 0733  Last data filed at 1/23/2024 0409  Gross per 24 hour   Intake --   Output 2825 ml   Net -2825 ml       GEN:   Awake, alert, in no acute distress, resting comfortably in bed   CV:   Regular rate and rhythm  L:  Symmetric expansion, not labored on room air  Abd:  Soft, not obviously distended, appropriately tender palpation of midline incision, midline incision is clean dry intact with staples in place, ileostomy on the right side with copious thin liquid stool in the bag, DELROY drain in the right lower quadrant with serous output  Ext:  No cyanosis, clubbing, or edema    Recent labs that are back at this time have been reviewed.           Assessment/ Plan:    Mr. Bhat is a 63-year-old gentleman who is admitted following operative intervention for gallbladder and sigmoid colon mass on January 17     #Sigmoid colon mass, gallbladder mass  -Postop day 6 following lap vladimir, open sigmoid colectomy with diverting loop ileostomy  -significant increase in ileostomy output. 1860 mL yesterday. Monitor output for now and start matamucil   -Advance to regular diet  -Decrease IV fluid rate to 30 mL/hr  -Ambulate. IS  -Labs tomorrow  -Path " "returned yesterday and was benign with evidence of inflammatory polyp. I discussed with the patient       Jimmy Craig MD  1/23/2024  07:33 EST        Electronically signed by Jimmy Craig MD at 01/23/24 0735       Jimmy Craig MD at 01/22/24 0815          Patient Name:  Roger Bhat  YOB: 1960  [id number]    Surgery Progress Note    Date of visit: 1/22/2024      Subjective: No acute events.  Reports that he is hungry and feels less bloated this morning.  Ambulated yesterday.  Pain is controlled.  Had some indigestion, nausea, and episodes of coughing up some clear fluid yesterday, none overnight.  Having more ileostomy output          Objective:     /82 (BP Location: Right arm, Patient Position: Lying)   Pulse 93   Temp 98.4 °F (36.9 °C) (Temporal)   Resp 18   Ht 175.3 cm (69\")   Wt 66.3 kg (146 lb 0.9 oz)   SpO2 94%   BMI 21.57 kg/m²     Intake/Output Summary (Last 24 hours) at 1/22/2024 0816  Last data filed at 1/22/2024 0600  Gross per 24 hour   Intake 92.9 ml   Output 1625 ml   Net -1532.1 ml       GEN:   Awake, alert, in no acute distress, resting comfortably in bed   CV:   Regular rate and rhythm  L:  Symmetric expansion, not labored on oxygen by nasal cannula  Abd:  Soft, appropriately tender palpation along incision, incisions are clean dry and intact, ileostomy in the right side is pink and patent with quite a bit of liquid stool in the bag, DELROY drain is mostly serous  Ext:  No cyanosis, clubbing, or edema    Recent labs that are back at this time have been reviewed.           Assessment/ Plan:    Mr. Bhat is a 63-year-old gentleman who is admitted following operative intervention for gallbladder and sigmoid colon mass on January 17     #Sigmoid colon mass, gallbladder mass  -Postop day 5 following lap vladimir, open sigmoid colectomy with diverting loop ileostomy  -Labs are appropriate  -Restart clear liquid diet  -Decrease IV fluid rate  -Discontinue PCA " "today.  Add as needed Percocet and morphine  -Stoma bridge removed this morning  -Ambulate. IS  -Awaiting more reliable tolerance of oral diet and ileostomy output      Jimmy Craig MD  1/22/2024  08:16 EST        Electronically signed by Jimmy Craig MD at 01/22/24 0817       Marcello Edgar MD at 01/21/24 0742          Patient Name:  Roger Bhat  YOB: 1960  [id number]    Surgery Progress Note    Date of visit: 1/21/2024    Subjective   Subjective: Reports some regurgitation and GERD symptoms, as well as bloating. Some ileostomy output. Minimal output from rectum.        Objective     Objective:     /92 (BP Location: Right arm, Patient Position: Lying)   Pulse 100   Temp 97.6 °F (36.4 °C) (Temporal)   Resp 18   Ht 175.3 cm (69\")   Wt 66.3 kg (146 lb 0.9 oz)   SpO2 92%   BMI 21.57 kg/m²     Intake/Output Summary (Last 24 hours) at 1/21/2024 0742  Last data filed at 1/21/2024 0737  Gross per 24 hour   Intake 2.8 ml   Output 1555 ml   Net -1552.2 ml       CV:  Rhythm  regular and rate regular   L:  Mild rhonchi to auscultation bilaterally   Abd:  Bowel sounds hypoactive, soft, distended. Minimally tender. Incision c/d/I, perhaps a slight amount of erythema in the midportion.  Ext:  No cyanosis, clubbing, edema    Recent labs that are back at this time have been reviewed. Not acidotic. WBC 10. HGB stable          Assessment/ Plan:    Problem List Items Addressed This Visit          Gastrointestinal Abdominal     Gallbladder mass    Relevant Orders    Tissue Pathology Exam    * (Principal) Colonic mass- Stable after sigmoid resection, though still distended with poor GI function to date. Keep NPO for now. I had recommended NG tube placement, but patient insisted on waiting. However, if he does not improve over the next few hours, may place NG to LWS. Continue ambulation.       Relevant Orders    Tissue Pathology Exam        Active Hospital Problems    Diagnosis  POA    " "**Colonic mass [K63.89]  Yes      Resolved Hospital Problems   No resolved problems to display.              Marcello Edgar MD  1/21/2024  07:42 EST        Electronically signed by Marcello Edgar MD at 01/21/24 0744       Jimmy Craig MD at 01/20/24 0801          Patient Name:  Roger Bhat  YOB: 1960  [id number]    Surgery Progress Note    Date of visit: 1/20/2024      Subjective: No acute events. He reports increased indigestion this morning and after ambulating he had a small volume emesis. He reports resolution of symptoms since then. He denies nausea other than immediately at the time that he had emesis. He does feel somewhat more bloated. Denies fevers or chills. Pain controlled with the PCA. Ambulated X1 yesterday. Voided after hillman removed         Objective:     /87 (BP Location: Right arm, Patient Position: Lying)   Pulse 109   Temp 98.2 °F (36.8 °C) (Temporal)   Resp 16   Ht 175.3 cm (69\")   Wt 66.3 kg (146 lb 0.9 oz)   SpO2 91%   BMI 21.57 kg/m²     Intake/Output Summary (Last 24 hours) at 1/20/2024 0801  Last data filed at 1/20/2024 0758  Gross per 24 hour   Intake 1.3 ml   Output 1990 ml   Net -1988.7 ml       GEN:   Awake, alert, sitting up in bed, no obvious distress  CV:   Regular rate and rhythm  L:  Symmetric expansion, not labored on oxygen by NC, there is some faint wheezing  Abd:  Soft, appropriately tender along incision. Incision is clean, dry, and intact. Ileostomy on the right side is pink and patent with bilious thin fluid in the bag, DELROY in the RLQ is serosang   Ext:  No cyanosis, clubbing, or edema    Recent labs that are back at this time have been reviewed.           Assessment/ Plan:    Mr. Bhat is a 63-year-old gentleman who is admitted following operative intervention for gallbladder and sigmoid colon mass on January 17     #Sigmoid colon mass, gallbladder mass  -Postop day 3 following lap vladimir, open sigmoid colectomy with diverting " "loop ileostomy  -WBC up slightly at 13 but so is Hb and he has been afebrile  -Increase IV fluid rate to 75 mL/hr  -Given nausea and episode of emesis, make NPO with ice chips today. Will add reglan. May require NGT if recurrent nausea & vomiting   -Continue PCA for now  -Continue stoma bridge for now. Will remove prior to discharge  -Some mild wheezing on exam this morning. Will restart his home Symbicort  -Ambulate. IS      Jimmy Craig MD  1/20/2024  08:01 EST        Electronically signed by Jimmy Craig MD at 01/20/24 0808       Jimmy Craig MD at 01/19/24 0808          Patient Name:  Roger Bhat  YOB: 1960  [id number]    Surgery Progress Note    Date of visit: 1/19/2024      Subjective: No acute events. Pain better controlled but is also utilizing PCA more often. Ambulating well. Denies nausea or vomiting. Tolerated small amounts of clears yesterday without difficulty and NGT was removed in the afternoon. Urine output 1130 mL in the hillman. DELROY 140 mL. 75 mL of output from the ileostomy recorded         Objective:    /87 (BP Location: Right arm, Patient Position: Lying)   Pulse 87   Temp 98.2 °F (36.8 °C) (Temporal)   Resp 17   Ht 175.3 cm (69\")   Wt 66.3 kg (146 lb 0.9 oz)   SpO2 93%   BMI 21.57 kg/m²     CV:  Regular rate and rhythm   L:  Clear to auscultation bilaterally. Not labored on O2 by NC   ABD:  Soft, appropriately tender along midline incision, incision is clean, dry and intact without erythema, ileostomy is pink and viable on the right side with some small bilious drainage in the bag and stoma bridge in place, drain in the RLQ is serosang  EXT:  No cyanosis, clubbing or edema        Assessment/ Plan:     Mr. Bhat is a 63-year-old gentleman who is admitted following operative intervention for gallbladder and sigmoid colon mass on January 17     # Sigmoid colon mass, gallbladder mass  -Postop day 2 following lap vladimir, open sigmoid colectomy with " "diverting loop ileostomy  -Vitals are stable  -Labs are without acute findings. WBC 9, Hb 11.2, Cr 0.48  -Discontinue hillman today  -Start full liquid diet  -Decrease IV fluid rate  -Continue PCA for now  -Continue stoma bridge until tomorrow postop day 3  -Ambulate.  Mobilize       Jimmy Craig MD  1/19/2024  08:09 EST      Electronically signed by Jimmy Craig MD at 01/19/24 0816       Jimmy Craig MD at 01/18/24 0835          Patient Name:  Roger Bhat  YOB: 1960  [id number]    Surgery Progress Note    Date of visit: 1/18/2024      Subjective: No acute events.  Pain is controlled with PCA.  Ambulated in the hallway 1 time.  Denies nausea or vomiting.  1400 mL of urine output in the Hillman.  DELROY with 155 mL of output.  Has had some bilious output in the ileostomy.          Objective:     /76 (BP Location: Right arm, Patient Position: Lying)   Pulse 109   Temp 99.5 °F (37.5 °C) (Temporal)   Resp 20   Ht 175.3 cm (69\")   Wt 66.3 kg (146 lb 0.9 oz)   SpO2 95%   BMI 21.57 kg/m²     Intake/Output Summary (Last 24 hours) at 1/18/2024 0836  Last data filed at 1/18/2024 0600  Gross per 24 hour   Intake 2149.2 ml   Output 1575 ml   Net 574.2 ml       GEN:   Awake, alert, in no acute distress, resting comfortably in bed   CV:   Regular rate and rhythm  L:  Symmetric expansion, not labored on oxygen by nasal cannula  Abd:  Soft, not obviously distended, appropriately tender palpation along incision, midline incision with clean dressing in place, ileostomy on the right side is pink and patent with a small amount of bilious output in the bag, DELROY drain in the right lower quadrant is sanguinous  Ext:  No cyanosis, clubbing, or edema    Recent labs that are back at this time have been reviewed.           Assessment/ Plan:    Mr. Bhat is a 63-year-old gentleman who is admitted following operative intervention for gallbladder and sigmoid colon mass on January 17    # Sigmoid " "colon mass, gallbladder mass  -Postop day 1 following lap vladimir, open sigmoid colectomy with diverting loop ileostomy  -Vitals are stable  -Labs are appropriate  -Decrease IV fluid rate to 50 mL/h.  Continue Crouch catheter today and plan to remove tomorrow  -Continue PCA  -Subcu heparin for DVT prophylaxis  -Clamp NG today.  Allow ice chips and sips of clear liquids  -Ambulate.  Mobilize      Jimmy Craig MD  1/18/2024  08:36 EST        Electronically signed by Jimmy Craig MD at 01/18/24 0838       Jimmy Craig MD at 01/17/24 5657          Patient Name:  Roger Bhat  YOB: 1960  [id number]    Surgery Post - Operative Note    Date of visit: 1/17/2024      Subjective: Seen in PACU.  Pain controlled.  Heart rate in the 60s, systolics in the 130s        Objective:    /82   Pulse 71   Temp 97 °F (36.1 °C) (Temporal)   Resp 16   Ht 175.3 cm (69\")   Wt 66.3 kg (146 lb 0.9 oz)   SpO2 97%   BMI 21.57 kg/m²     CV:  Regular rate and rhythm   L:  Clear to auscultation bilaterally. Not labored on O2 by NC   ABD:  Soft, appropriately tender. Dressings clean, dry and intact.  Ileostomy is pink and patent on the right side.  Drain with small amount of sanguinous output  EXT:  No cyanosis, clubbing or edema        Assessment/ Plan:     Recovering well after open sigmoid colectomy and cholecystectomy. Continue Pulmonary toilet        Jimmy Craig MD  1/17/2024  15:46 EST      Electronically signed by Jimmy Craig MD at 01/17/24 1961       Consult Notes (all)    No notes of this type exist for this encounter.       "

## 2024-01-31 NOTE — PROGRESS NOTES
"Patient Name:  Roger Bhat  YOB: 1960  [id number]    Surgery Progress Note    Date of visit: 1/31/2024      Subjective: No acute events.  Had low urine output overnight received IV fluid bolus and albumin with some improvement.  290 mL of urine output recorded but more in the bag this morning.  Stoma with a small amount of of bilious fluid out.  DELROY 160 mL of output.  Reports the pain is controlled with PCA.  Reports that he overall feels better          Objective:     /73   Pulse 98   Temp 97.6 °F (36.4 °C) (Axillary)   Resp 20   Ht 175.3 cm (69\")   Wt 59.4 kg (131 lb)   SpO2 91%   BMI 19.35 kg/m²     Intake/Output Summary (Last 24 hours) at 1/31/2024 0725  Last data filed at 1/31/2024 0600  Gross per 24 hour   Intake 4029.56 ml   Output 3725 ml   Net 304.56 ml       GEN:   Awake, alert, sitting up in bed, chronically ill-appearing in no obvious distress  CV:   Regular rate and rhythm  L:  Symmetric expansion, not labored on nasal cannula  Abd:  Soft, moderately distended, appropriately tender palpation throughout the abdomen, midline incision with dressing in place with small amount of spotting on the dressing, DELROY drain in the right lower quadrant with serosanguineous output, ileostomy on the right side pink and patent with scant stool in the bag  Ext:  No cyanosis, clubbing, or edema    Recent labs that are back at this time have been reviewed.           Assessment/ Plan:    Mr. Bhat is a 63-year-old gentleman who is admitted following operative intervention for gallbladder and sigmoid colon mass on January 17     #Sigmoid colon mass, gallbladder mass  # Ileus  -Postop day 14 following lap vladiimr, open sigmoid colectomy with diverting loop ileostomy, postoperative day 1 following exploratory laparotomy with washout  -Tachycardic initially last night but improved following IV fluid resuscitation.  -Dimitri's without acute findings.  Lactate down at 1.7.  CBC still " pending  -Continue Crouch catheter for I's and O's  -Continue IV fluids and TPN  -Continue NG tube to low wall suction  -Continue empiric antibiotics  -Awaiting better return of bowel function through the ileostomy.  Continue bowel regimen  -Mobilize as able      Jimmy Craig MD  1/31/2024  07:25 EST

## 2024-01-31 NOTE — NURSING NOTE
[facility] visit for Stoma care and assessment    Surgery date: 01/17/24  Surgical Procedures Since Admission:  Procedure(s):  CHOLECYSTECTOMY LAPAROSCOPIC  OPEN SIGMOID COLECTOMY, LIVER BIOPSY, DIVERTING LOOP ILEOSTOMY CREATION, TAKE DOWN OF THE SPLENIC FLEXURE, AND APPENDECTOMY  Surgeon:  Jimmy Craig MD  Status:  14 Days Post-Op  -------------------    Procedure(s):  LAPAROTOMY EXPLORATORY  Surgeon:  Jimmy Craig MD  Status:  1 Day Post-Op  -------------------     Patient resting flat in bed, utilizing sponge for ice chip/mouth swab purposes.  NG tube now clamped checking residuals.  Patient seen Medical/Surgical Floor. patient's spouse at beside.     Stoma Type: Loop Ileostomy  Diameter/shape: 38mm x44mm  Location: RLQ  Protrusion: Budded  Stoma Characteristics: Edematous, Moist, and Pink  Peristomal skin: Intact, slight erythema no breakdown  Character of output:brown, quite a bit thicker than previously-750ml emptied and charted by me  Emptying frequency per day: per nursing flowsheet  Current pouching system: Bam 8331 intact but buckling on right side likely to leak if high output increases  Accessory products, appliance placed: Bam 2 piece 2 and three-quarter flat with barrier ring   Goal wear time:3-4 days  Last appliance change: 1/31 by me    Surgical Incision: Midline abdominal incision  Degree of approximation: 100  Approximating Devices: staples  Drainage Characteristics:none  Method of Management: open    Drain(s) type: DELROY drain  Effluent characteristics: Not assessed today    Education provided:   Ambulation promoted  Education not indicated today patient is in ICU now on family member stepped out.  Will return Friday to assess and SEE if discharge supplies that were given last week were sent to ICU/if ordering catalog and education materials made it to ICU room.  Will hopefully plan to catch Ambar when she is back on Friday as well.    [facility] Nurse will continue to follow for ostomy  education. Please contact #[phone] with questions or if ostomy leaks occur.

## 2024-02-01 LAB
ANION GAP SERPL CALCULATED.3IONS-SCNC: 11 MMOL/L (ref 5–15)
BASOPHILS # BLD MANUAL: 0 10*3/MM3 (ref 0–0.2)
BASOPHILS NFR BLD MANUAL: 0 % (ref 0–1.5)
BUN SERPL-MCNC: 21 MG/DL (ref 8–23)
BUN/CREAT SERPL: 38.2 (ref 7–25)
CALCIUM SPEC-SCNC: 8.6 MG/DL (ref 8.6–10.5)
CHLORIDE SERPL-SCNC: 101 MMOL/L (ref 98–107)
CO2 SERPL-SCNC: 24 MMOL/L (ref 22–29)
CREAT SERPL-MCNC: 0.55 MG/DL (ref 0.76–1.27)
CYTO UR: NORMAL
DEPRECATED RDW RBC AUTO: 52.7 FL (ref 37–54)
EGFRCR SERPLBLD CKD-EPI 2021: 112.8 ML/MIN/1.73
EOSINOPHIL # BLD MANUAL: 0 10*3/MM3 (ref 0–0.4)
EOSINOPHIL NFR BLD MANUAL: 0 % (ref 0.3–6.2)
ERYTHROCYTE [DISTWIDTH] IN BLOOD BY AUTOMATED COUNT: 14.3 % (ref 12.3–15.4)
GLUCOSE BLDC GLUCOMTR-MCNC: 113 MG/DL (ref 70–130)
GLUCOSE BLDC GLUCOMTR-MCNC: 115 MG/DL (ref 70–130)
GLUCOSE BLDC GLUCOMTR-MCNC: 119 MG/DL (ref 70–130)
GLUCOSE BLDC GLUCOMTR-MCNC: 122 MG/DL (ref 70–130)
GLUCOSE SERPL-MCNC: 122 MG/DL (ref 65–99)
HCT VFR BLD AUTO: 29.1 % (ref 37.5–51)
HGB BLD-MCNC: 10.2 G/DL (ref 13–17.7)
LAB AP CASE REPORT: NORMAL
LAB AP CLINICAL INFORMATION: NORMAL
LYMPHOCYTES # BLD MANUAL: 1.89 10*3/MM3 (ref 0.7–3.1)
LYMPHOCYTES NFR BLD MANUAL: 1 % (ref 5–12)
MACROCYTES BLD QL SMEAR: ABNORMAL
MAGNESIUM SERPL-MCNC: 2.2 MG/DL (ref 1.6–2.4)
MCH RBC QN AUTO: 35.5 PG (ref 26.6–33)
MCHC RBC AUTO-ENTMCNC: 35.1 G/DL (ref 31.5–35.7)
MCV RBC AUTO: 101.4 FL (ref 79–97)
MONOCYTES # BLD: 0.47 10*3/MM3 (ref 0.1–0.9)
NEUTROPHILS # BLD AUTO: 44.97 10*3/MM3 (ref 1.7–7)
NEUTROPHILS NFR BLD MANUAL: 77 % (ref 42.7–76)
NEUTS BAND NFR BLD MANUAL: 18 % (ref 0–5)
NRBC SPEC MANUAL: 0 /100 WBC (ref 0–0.2)
PATH REPORT.FINAL DX SPEC: NORMAL
PATH REPORT.GROSS SPEC: NORMAL
PHOSPHATE SERPL-MCNC: 2.2 MG/DL (ref 2.5–4.5)
PHOSPHATE SERPL-MCNC: 2.8 MG/DL (ref 2.5–4.5)
PLAT MORPH BLD: NORMAL
PLATELET # BLD AUTO: 363 10*3/MM3 (ref 140–450)
PMV BLD AUTO: 10.2 FL (ref 6–12)
POTASSIUM SERPL-SCNC: 3.7 MMOL/L (ref 3.5–5.2)
PROCALCITONIN SERPL-MCNC: 2.9 NG/ML (ref 0–0.25)
RBC # BLD AUTO: 2.87 10*6/MM3 (ref 4.14–5.8)
SODIUM SERPL-SCNC: 136 MMOL/L (ref 136–145)
TOXIC GRANULATION: ABNORMAL
VARIANT LYMPHS NFR BLD MANUAL: 4 % (ref 19.6–45.3)
WBC NRBC COR # BLD AUTO: 47.34 10*3/MM3 (ref 3.4–10.8)

## 2024-02-01 PROCEDURE — 63710000001 REVEFENACIN 175 MCG/3ML SOLUTION: Performed by: INTERNAL MEDICINE

## 2024-02-01 PROCEDURE — 82948 REAGENT STRIP/BLOOD GLUCOSE: CPT

## 2024-02-01 PROCEDURE — 85025 COMPLETE CBC W/AUTO DIFF WBC: CPT | Performed by: INTERNAL MEDICINE

## 2024-02-01 PROCEDURE — 25010000002 DIPHENHYDRAMINE PER 50 MG: Performed by: SURGERY

## 2024-02-01 PROCEDURE — 25010000002 HEPARIN (PORCINE) PER 1000 UNITS: Performed by: SURGERY

## 2024-02-01 PROCEDURE — 25010000002 PIPERACILLIN SOD-TAZOBACTAM PER 1 G: Performed by: SURGERY

## 2024-02-01 PROCEDURE — 84100 ASSAY OF PHOSPHORUS: CPT | Performed by: SURGERY

## 2024-02-01 PROCEDURE — 99233 SBSQ HOSP IP/OBS HIGH 50: CPT | Performed by: INTERNAL MEDICINE

## 2024-02-01 PROCEDURE — 25010000002 METOCLOPRAMIDE PER 10 MG: Performed by: SURGERY

## 2024-02-01 PROCEDURE — 25010000002 MICAFUNGIN SODIUM 100 MG RECONSTITUTED SOLUTION 1 EACH VIAL: Performed by: INTERNAL MEDICINE

## 2024-02-01 PROCEDURE — 87040 BLOOD CULTURE FOR BACTERIA: CPT | Performed by: INTERNAL MEDICINE

## 2024-02-01 PROCEDURE — 94799 UNLISTED PULMONARY SVC/PX: CPT

## 2024-02-01 PROCEDURE — 25010000002 CALCIUM GLUCONATE PER 10 ML: Performed by: INTERNAL MEDICINE

## 2024-02-01 PROCEDURE — 80048 BASIC METABOLIC PNL TOTAL CA: CPT | Performed by: SURGERY

## 2024-02-01 PROCEDURE — 25010000002 THIAMINE PER 100 MG: Performed by: SURGERY

## 2024-02-01 PROCEDURE — 25010000002 POTASSIUM CHLORIDE PER 2 MEQ OF POTASSIUM: Performed by: INTERNAL MEDICINE

## 2024-02-01 PROCEDURE — 25010000002 MEROPENEM PER 100 MG: Performed by: INTERNAL MEDICINE

## 2024-02-01 PROCEDURE — 85007 BL SMEAR W/DIFF WBC COUNT: CPT | Performed by: INTERNAL MEDICINE

## 2024-02-01 PROCEDURE — 84145 PROCALCITONIN (PCT): CPT | Performed by: INTERNAL MEDICINE

## 2024-02-01 PROCEDURE — 25010000002 MAGNESIUM SULFATE PER 500 MG OF MAGNESIUM: Performed by: INTERNAL MEDICINE

## 2024-02-01 PROCEDURE — 83735 ASSAY OF MAGNESIUM: CPT | Performed by: SURGERY

## 2024-02-01 RX ADMIN — ALBUTEROL SULFATE 2 PUFF: 90 AEROSOL, METERED RESPIRATORY (INHALATION) at 08:29

## 2024-02-01 RX ADMIN — MEROPENEM 2000 MG: 1 INJECTION, POWDER, FOR SOLUTION INTRAVENOUS at 10:34

## 2024-02-01 RX ADMIN — REVEFENACIN 175 MCG: 175 SOLUTION RESPIRATORY (INHALATION) at 08:33

## 2024-02-01 RX ADMIN — FOLIC ACID 1 MG: 5 INJECTION, SOLUTION INTRAMUSCULAR; INTRAVENOUS; SUBCUTANEOUS at 08:41

## 2024-02-01 RX ADMIN — BUDESONIDE AND FORMOTEROL FUMARATE DIHYDRATE 2 PUFF: 160; 4.5 AEROSOL RESPIRATORY (INHALATION) at 19:25

## 2024-02-01 RX ADMIN — PIPERACILLIN SODIUM AND TAZOBACTAM SODIUM 3.38 G: 3; .375 INJECTION, SOLUTION INTRAVENOUS at 00:56

## 2024-02-01 RX ADMIN — SMOFLIPID 250 ML: 6; 6; 5; 3 INJECTION, EMULSION INTRAVENOUS at 18:05

## 2024-02-01 RX ADMIN — BUDESONIDE AND FORMOTEROL FUMARATE DIHYDRATE 2 PUFF: 160; 4.5 AEROSOL RESPIRATORY (INHALATION) at 08:33

## 2024-02-01 RX ADMIN — ROSUVASTATIN 10 MG: 10 TABLET, FILM COATED ORAL at 20:12

## 2024-02-01 RX ADMIN — METOCLOPRAMIDE HYDROCHLORIDE 10 MG: 5 INJECTION INTRAMUSCULAR; INTRAVENOUS at 18:05

## 2024-02-01 RX ADMIN — HEPARIN SODIUM 5000 UNITS: 5000 INJECTION INTRAVENOUS; SUBCUTANEOUS at 14:42

## 2024-02-01 RX ADMIN — Medication 10 ML: at 08:46

## 2024-02-01 RX ADMIN — PIPERACILLIN SODIUM AND TAZOBACTAM SODIUM 3.38 G: 3; .375 INJECTION, SOLUTION INTRAVENOUS at 06:45

## 2024-02-01 RX ADMIN — METOCLOPRAMIDE HYDROCHLORIDE 10 MG: 5 INJECTION INTRAMUSCULAR; INTRAVENOUS at 00:57

## 2024-02-01 RX ADMIN — POTASSIUM PHOSPHATE, MONOBASIC POTASSIUM PHOSPHATE, DIBASIC 15 MMOL: 224; 236 INJECTION, SOLUTION, CONCENTRATE INTRAVENOUS at 06:45

## 2024-02-01 RX ADMIN — METOCLOPRAMIDE HYDROCHLORIDE 10 MG: 5 INJECTION INTRAMUSCULAR; INTRAVENOUS at 06:38

## 2024-02-01 RX ADMIN — BISACODYL 10 MG: 5 TABLET, COATED ORAL at 08:45

## 2024-02-01 RX ADMIN — HEPARIN SODIUM 5000 UNITS: 5000 INJECTION INTRAVENOUS; SUBCUTANEOUS at 21:30

## 2024-02-01 RX ADMIN — PANTOPRAZOLE SODIUM 40 MG: 40 INJECTION, POWDER, LYOPHILIZED, FOR SOLUTION INTRAVENOUS at 08:40

## 2024-02-01 RX ADMIN — Medication: at 04:55

## 2024-02-01 RX ADMIN — METOCLOPRAMIDE HYDROCHLORIDE 10 MG: 5 INJECTION INTRAMUSCULAR; INTRAVENOUS at 12:00

## 2024-02-01 RX ADMIN — ALBUTEROL SULFATE 2 PUFF: 90 AEROSOL, METERED RESPIRATORY (INHALATION) at 19:25

## 2024-02-01 RX ADMIN — POTASSIUM PHOSPHATE, MONOBASIC POTASSIUM PHOSPHATE, DIBASIC: 224; 236 INJECTION, SOLUTION, CONCENTRATE INTRAVENOUS at 18:05

## 2024-02-01 RX ADMIN — SENNOSIDES 1 TABLET: 8.6 TABLET, FILM COATED ORAL at 20:12

## 2024-02-01 RX ADMIN — MEROPENEM 2000 MG: 1 INJECTION, POWDER, FOR SOLUTION INTRAVENOUS at 16:22

## 2024-02-01 RX ADMIN — Medication 10 ML: at 20:13

## 2024-02-01 RX ADMIN — HEPARIN SODIUM 5000 UNITS: 5000 INJECTION INTRAVENOUS; SUBCUTANEOUS at 06:38

## 2024-02-01 RX ADMIN — MICAFUNGIN 100 MG: 20 INJECTION, POWDER, LYOPHILIZED, FOR SOLUTION INTRAVENOUS at 11:13

## 2024-02-01 RX ADMIN — DIPHENHYDRAMINE HYDROCHLORIDE 25 MG: 50 INJECTION, SOLUTION INTRAMUSCULAR; INTRAVENOUS at 21:30

## 2024-02-01 RX ADMIN — THIAMINE HYDROCHLORIDE 100 MG: 100 INJECTION, SOLUTION INTRAMUSCULAR; INTRAVENOUS at 08:41

## 2024-02-01 NOTE — PROGRESS NOTES
"Patient Name:  Roger Bhat  YOB: 1960  [id number]    Surgery Progress Note    Date of visit: 2/1/2024      Subjective: No significant changes.  Tells me that his stomach is sore this morning.  He has been afebrile and vital signs have been stable.  He tells me that he is coughing quite a bit.  Ileostomy with only small output yesterday.  NG tube with 600 mL of bilious output recorded.  Urine output 1330 amount of the Crouch catheter.          Objective:     /70   Pulse 97   Temp 97.8 °F (36.6 °C) (Oral)   Resp 20   Ht 175.3 cm (69\")   Wt 59.4 kg (131 lb)   SpO2 92%   BMI 19.35 kg/m²     Intake/Output Summary (Last 24 hours) at 2/1/2024 0643  Last data filed at 2/1/2024 0455  Gross per 24 hour   Intake 1992.2 ml   Output 2155 ml   Net -162.8 ml       GEN:   Awake, alert, sitting up in bed, chronically ill-appearing in no obvious distress  CV:      Regular rate and rhythm  L:         Symmetric expansion, not labored on nasal cannula  Abd:    Soft, moderately distended, appropriately tender palpation throughout the abdomen, midline incision with dressing in place with small amount of spotting on the dressing, DELROY drain in the right lower quadrant with serosanguineous output, ileostomy on the right side pink and patent with scant stool in the bag  Ext:     No cyanosis, clubbing, or edema    Recent labs that are back at this time have been reviewed.           Assessment/ Plan:    Mr. Bhat is a 63-year-old gentleman who is admitted following operative intervention for gallbladder and sigmoid colon mass on January 17     #Sigmoid colon mass, gallbladder mass  # Ileus  -Postop day 15 following lap vladimir, open sigmoid colectomy with diverting loop ileostomy, postoperative day 2 following exploratory laparotomy with washout  -Vitals have been stable  -Significant leukocytosis since repeat surgery.  White count not significantly changed at 47,000 today.  Hemoglobin and renal indices are " without significant change  -Has not had significant ileostomy output since repeat abdominal exploration.  -Continue NG tube to continuous low wall suction.  Monitor output  -Continue TPN and IV fluids  -Continue antibiotics  -Awaiting more reliable return of bowel function to the ileostomy.  I would like for him to remain in the ICU until he has some improvement in his leukocytosis and some bowel function through the ileostomy         Jimmy Craig MD  2/1/2024  06:43 EST

## 2024-02-01 NOTE — PROGRESS NOTES
CPN Review Note    Patient Name: Roger Bhat  Date of Encounter: 24 11:24 EST  MRN: [id number]  Admission date: 2024    Reason for visit: CPN review . RD to continue to follow per protocol.     Information Obtained: No significant changes.  TPN continued at goal rate.  NGT to suction.      EMR reviewed:  NGT 600ml, ileostomy 25ml/hr   Medication reviewed: yes   Labs reviewed: yes     Current diet: NPO Diet NPO Type: Sips with Meds, Ice Chips  Adult Central 2-in-1 TPN    PN Route: PICC   PN:   D10.4%, AA3.75%  @ goal 100 ml/hr             GIR: 3.18 mg/kg/min  Lipid: 250 ml 20% SMOF daily     PN@ Goal over 24hr to Supply:  Volume 2400 mL/day       Energy 1710 Kcal/day 101 % Est Need   Protein 90 g/day 110 % Est Need     ---------------------------------------------------------------------------  Formula/Rate verified at bedside: Yes  Infusing Rate at time of visit: 100ml    Average Delivery from Chartin Day:   PN Volume 2293 mL/day     Lipid delivered?  yes      Energy 1640 Kcal/day 96 % Est Need   Protein 86 g/day 105 % Est Need       Intervention:  Follow treatment plan  Care plan reviewed  No PN change indicated at this time    Follow up:   Per protocol      Chelsy Gustafson RD  11:24 EST  Time: 15min

## 2024-02-01 NOTE — PLAN OF CARE
Goal Outcome Evaluation:    VSS. 2-3 L NC. NG to continuous low wall suction, 625 total output. Ostomy output minimal. Dilaudid PCA continued. TPN continued.     Small bloody drainage from abdominal incision. Controlled. Marked on bandage.     Patient up in the chair for several hours. Ambulated inside & outside of the room x 2 times.

## 2024-02-01 NOTE — PROGRESS NOTES
INTENSIVIST   PROGRESS NOTE        SUBJECTIVE     Roger 63 y.o. male is followed for: No chief complaint on file.       Colonic mass    Current smoker    Severe malnutrition    Respiratory distress    As an Intensivist, we provide an integrated approach to the ICU patient and family, medical management of comorbid conditions, including but not limited to electrolytes, glycemic control, organ dysfunction, lead interdisciplinary rounds and coordinate the care with all other services, including those from other specialists.     Interval History:  POD: 2 Days Post-Op (EXP LAP 23)    No acute events overnight.    Feeling well.     No fevers.    Temp  Min: 97.5 °F (36.4 °C)  Max: 98.5 °F (36.9 °C)     History     Last Reviewed by Monster Mock MD on 2024 at  6:14 PM    Sections Reviewed    Medical, Surgical, Family, Tobacco, Alcohol, Drug Use, Sexual Activity,   Social Documentation    Problem list reviewed by Monster Mock MD on 2024 at  6:14 PM  Medicines reviewed by Monster Mock MD on 2024 at  6:14 PM  Allergies reviewed by Monster Mock MD on 2024 at  6:14 PM    The patient's relevant past medical, surgical and social history were reviewed and updated in Epic as appropriate.        OBJECTIVE     Vitals:  Temp: 98.2 °F (36.8 °C) (24 0800) Temp  Min: 97.5 °F (36.4 °C)  Max: 98.5 °F (36.9 °C)   Temp core:      BP: 129/79 (24 1100) BP  Min: 108/71  Max: 137/79   MAP (non-invasive) Noninvasive MAP (mmHg): 97 (24 1100) Noninvasive MAP (mmHg)  Av.7  Min: 64  Max: 138   Pulse: 98 (24 1100) Pulse  Min: 93  Max: 110   Resp: 20 (24 1000) Resp  Min: 18  Max: 27   SpO2: 92 % (24 1100) SpO2  Min: 90 %  Max: 95 %   Device: nasal cannula (24 1000)    Flow Rate: 2 (24 1000) Flow (L/min)  Min: 2  Max: 3     Medications (drips):  Adult Central 2-in-1 TPN, Last Rate: 100 mL/hr at 24 2693  HYDROmorphone HCl-NaCl  Pharmacy to Dose  TPN      Physical Examination  Telemetry:  Rhythm: normal sinus rhythm (02/01/24 1000)      Constitutional:  No acute distress.   Cardiovascular: RRR.    Respiratory: Normal breath sounds  No adventitious sounds   Abdominal:  Soft with no tenderness.   Extremities: No Edema   Neurological:   Alert, Oriented, Cooperative.  Best Eye Response: 4-->(E4) spontaneous (02/01/24 1000)  Best Motor Response: 6-->(M6) obeys commands (02/01/24 1000)  Best Verbal Response: 5-->(V5) oriented (02/01/24 1000)  Symone Coma Scale Score: 15 (02/01/24 1000)     Results Reviewed:  Laboratory  Microbiology  Radiology  Pathology    Hematology:  Results from last 7 days   Lab Units 02/01/24 0426 01/31/24  0719 01/30/24  0820   WBC 10*3/mm3 47.34* 48.13* 26.53*   BANDS % % 18.0* 38.0*  --    HEMOGLOBIN g/dL 10.2* 11.5* 13.7   MCV fL 101.4* 101.5* 102.1*   PLATELETS 10*3/mm3 363 367 531*     Results from last 7 days   Lab Units 02/01/24 0426 01/31/24  0719   NEUTROS ABS 10*3/mm3 44.97* 45.11*  44.76*   LYMPHS ABS 10*3/mm3  --  1.32   EOS ABS 10*3/mm3 0.00 0.00  0.00     Chemistry:  Estimated Creatinine Clearance: 118.5 mL/min (A) (by C-G formula based on SCr of 0.55 mg/dL (L)).    Results from last 7 days   Lab Units 02/01/24 0426 01/31/24  0520   SODIUM mmol/L 136 134*   POTASSIUM mmol/L 3.7 4.4   CHLORIDE mmol/L 101 102   CO2 mmol/L 24.0 16.0*   BUN mg/dL 21 28*   CREATININE mg/dL 0.55* 0.79   GLUCOSE mg/dL 122* 111*     Results from last 7 days   Lab Units 02/01/24  0426 01/31/24  0520 01/30/24  0538 01/29/24  0541 01/27/24  0405 01/26/24  0843   IONIZED CALCIUM mmol/L  --   --   --  1.27  --  1.27   CALCIUM mg/dL 8.6 8.4*   < > 8.6   < >  --    MAGNESIUM mg/dL 2.2 1.9   < > 2.2   < >  --    PHOSPHORUS mg/dL 2.2* 3.8   < > 3.6   < >  --     < > = values in this interval not displayed.     Hepatic Panel:  Results from last 7 days   Lab Units 01/31/24  0520 01/29/24  0541 01/26/24  0500   ALBUMIN g/dL 3.1* 3.2* 3.1*  3.1*   TOTAL  PROTEIN g/dL 5.2* 5.7* 5.7*  5.7*   BILIRUBIN mg/dL 0.5 0.3 0.2  0.2   BILIRUBIN DIRECT mg/dL 0.2 <0.2 <0.2   AST (SGOT) U/L 15 16 9  9   ALT (SGPT) U/L 10 18 15  15   ALK PHOS U/L 52 79 68  68     Cardiac Labs:  Results from last 7 days   Lab Units 01/31/24  0520 01/30/24  2337   HSTROP T ng/L 18 26*     Biomarkers:  Results from last 7 days   Lab Units 02/01/24  0426 01/31/24  0520 01/30/24  2336 01/29/24  0541 01/26/24  0500   CRP mg/dL  --   --   --  9.45* 18.23*   LACTATE mmol/L  --  1.7 2.2*  --   --    PROCALCITONIN ng/mL 2.90*  --   --   --   --      COVID-19  Lab Results   Component Value Date    COVID19 Not Detected 11/14/2021    COVID19 Not Detected 10/17/2021    COVID19 Not Detected 09/13/2021     Images:  XR Chest 1 View    Result Date: 1/30/2024  Impression: 1. PICC line and NG tube appear to be in satisfactory position. 2. Moderately extensive bilateral pneumonia, right greater than left. 3. Subdiaphragmatic free air, expected for recent abdominal surgery. Electronically Signed: Kurt Li MD  1/30/2024 7:55 PM EST  Workstation ID: [id number]     Echo:      Results: Reviewed.  I reviewed the patient's new laboratory and imaging results.  I independently reviewed the patient's new images.    Medications: Reviewed.    Assessment   A/P     Hospital:  LOS: 15 days   ICU: 1d 16h     Active Hospital Problems    Diagnosis  POA    **Colonic mass [K63.89]  Yes    Severe malnutrition [E43]  Yes    Respiratory distress [R06.03]  Yes    Current smoker [F17.200]  Yes     Roger is a 63 y.o. male admitted on 1/17/2024 with Colonic mass [K63.89]    Assessment/Management/Treatment Plan:    Gallbladder and Colonic mass  01/17/24 S/P Lap Johana, open sigmoid colectomy with diverting loop ileostomy, liver biopsy.   01/30/24 S/P Exploratory Lap and ALBERTO 01/30/24    Lab Results   Lab Value Date/Time    FINALDX  01/17/2024 1138     1.  GALLBLADDER, CHOLECYSTECTOMY:  Chronic cholecystitis and  cholelithiasis.  Adenomyoma.  No dysplasia identified.  Benign cystic duct lymph node.  Adhesed portions of liver with inflammatory changes.    2.  LIVER, CORE NEEDLE BIOPSIES:  Benign hepatic tissue.  Increased stainable iron (see comment).  No fibrosis identified on trichrome stain with appropriate control.    3.  SIGMOID COLON, PARTIAL COLECTOMY:  Benign polyp, 4.2 cm in maximum size with features of inflammatory polyp (see comment).  16 lymph nodes negative for metastatic carcinoma (0/16).    4.  PROXIMAL ANASTOMOTIC DOUGHNUT:  Benign colonic tissue.    5.  DISTAL ANASTOMOTIC DONUT:  Benign colonic tissue.    6.  APPENDIX, APPENDECTOMY:  Acute appendicitis.      Cardiovascular  HTN  Dyslipidemia   Tobacco use  Emphysema as noticed in CT Chest 12/27/2023  Several new irregular nodular densities within the RLL, with the largest one measuring 9 mm.  GERD  Nutrition Support: Parenteral Nutrition     Lab Results   Component Value Date    PREALBUMIN 12.7 (L) 01/29/2024    PREALBUMIN 8.8 (L) 01/26/2024    CRP 9.45 (H) 01/29/2024    CRP 18.23 (H) 01/26/2024     Endocrine   Body mass index is 19.35 kg/m². Normal: 18.5-24.9kg/m2  No history of Diabetes    Lab Results   Lab Value Date/Time    HGBA1C 5.00 01/10/2024 1508    HGBA1C 5.3 11/28/2022 1111    HGBA1C 5.20 08/17/2021 1149     Results from last 7 days   Lab Units 02/01/24  1137 02/01/24  0534 01/31/24  2346 01/31/24  1757 01/31/24  1121 01/31/24  0626 01/30/24  1240 01/30/24  0752   GLUCOSE mg/dL 119 122 135* 127 135* 129 126 129       Diet: NPO Diet NPO Type: Sips with Meds, Ice Chips  Adult Central 2-in-1 TPN   Advance Directives: Code Status and Medical Interventions:   Ordered at: 01/17/24 1733     Code Status (Patient has no pulse and is not breathing):    CPR (Attempt to Resuscitate)     Medical Interventions (Patient has pulse or is breathing):    Full Support        DVT prophylaxis:  Medical and mechanical DVT prophylaxis orders are present.    In  brief:  Discussed with Dr. Craig  We will adjust antibiotics, although clinically he seems to be improving.  Start BRODERICK and Micafungin  Goal IVF (including Parenteral Nutrition) 100 mL/h  Monitor electrolytes.  Follow up WBC and PCT in AM  Expect leukocytosis to start dropping tomorrow  Continue PCA pump.  Disposition: Keep in ICU.    Plan of care and goals reviewed during interdisciplinary rounds.  I discussed the patient's findings and my recommendations with patient and nursing staff    MDM:    Problem(s) High due to: Acute or Chronic illness or injury that may poses a threat to life or bodily function  Data: Moderate due to: Review or results of each unique test and Ordering of each unique test  Risk: High due to: drug(s) requiring intensive monitoring for toxicity (Parenteral Nutrition)    High    [x] Primary Attending Intensive Care Medicine - Nutrition Support   [] Consultant    Copied text in this note has been reviewed and is accurate as of 02/01/24

## 2024-02-02 ENCOUNTER — APPOINTMENT (OUTPATIENT)
Dept: GENERAL RADIOLOGY | Facility: HOSPITAL | Age: 64
End: 2024-02-02
Payer: COMMERCIAL

## 2024-02-02 LAB
ANION GAP SERPL CALCULATED.3IONS-SCNC: 11 MMOL/L (ref 5–15)
BASOPHILS # BLD MANUAL: 0 10*3/MM3 (ref 0–0.2)
BASOPHILS NFR BLD MANUAL: 0 % (ref 0–1.5)
BUN SERPL-MCNC: 16 MG/DL (ref 8–23)
BUN/CREAT SERPL: 41 (ref 7–25)
CALCIUM SPEC-SCNC: 8.8 MG/DL (ref 8.6–10.5)
CHLORIDE SERPL-SCNC: 100 MMOL/L (ref 98–107)
CO2 SERPL-SCNC: 26 MMOL/L (ref 22–29)
CREAT SERPL-MCNC: 0.39 MG/DL (ref 0.76–1.27)
DEPRECATED RDW RBC AUTO: 54.1 FL (ref 37–54)
EGFRCR SERPLBLD CKD-EPI 2021: 125.1 ML/MIN/1.73
EOSINOPHIL # BLD MANUAL: 0.63 10*3/MM3 (ref 0–0.4)
EOSINOPHIL NFR BLD MANUAL: 2 % (ref 0.3–6.2)
ERYTHROCYTE [DISTWIDTH] IN BLOOD BY AUTOMATED COUNT: 14.6 % (ref 12.3–15.4)
GLUCOSE BLDC GLUCOMTR-MCNC: 110 MG/DL (ref 70–130)
GLUCOSE BLDC GLUCOMTR-MCNC: 113 MG/DL (ref 70–130)
GLUCOSE BLDC GLUCOMTR-MCNC: 118 MG/DL (ref 70–130)
GLUCOSE BLDC GLUCOMTR-MCNC: 123 MG/DL (ref 70–130)
GLUCOSE SERPL-MCNC: 95 MG/DL (ref 65–99)
HCT VFR BLD AUTO: 30.7 % (ref 37.5–51)
HGB BLD-MCNC: 10.6 G/DL (ref 13–17.7)
LYMPHOCYTES # BLD MANUAL: 1.27 10*3/MM3 (ref 0.7–3.1)
LYMPHOCYTES NFR BLD MANUAL: 4 % (ref 5–12)
MACROCYTES BLD QL SMEAR: ABNORMAL
MAGNESIUM SERPL-MCNC: 2.2 MG/DL (ref 1.6–2.4)
MCH RBC QN AUTO: 35.2 PG (ref 26.6–33)
MCHC RBC AUTO-ENTMCNC: 34.5 G/DL (ref 31.5–35.7)
MCV RBC AUTO: 102 FL (ref 79–97)
MONOCYTES # BLD: 1.27 10*3/MM3 (ref 0.1–0.9)
NEUTROPHILS # BLD AUTO: 28.51 10*3/MM3 (ref 1.7–7)
NEUTROPHILS NFR BLD MANUAL: 78 % (ref 42.7–76)
NEUTS BAND NFR BLD MANUAL: 12 % (ref 0–5)
NRBC SPEC MANUAL: 0 /100 WBC (ref 0–0.2)
PHOSPHATE SERPL-MCNC: 2.9 MG/DL (ref 2.5–4.5)
PLAT MORPH BLD: NORMAL
PLATELET # BLD AUTO: 354 10*3/MM3 (ref 140–450)
PMV BLD AUTO: 10.8 FL (ref 6–12)
POTASSIUM SERPL-SCNC: 3.9 MMOL/L (ref 3.5–5.2)
PROCALCITONIN SERPL-MCNC: 1.3 NG/ML (ref 0–0.25)
RBC # BLD AUTO: 3.01 10*6/MM3 (ref 4.14–5.8)
SODIUM SERPL-SCNC: 137 MMOL/L (ref 136–145)
VARIANT LYMPHS NFR BLD MANUAL: 1 % (ref 0–5)
VARIANT LYMPHS NFR BLD MANUAL: 3 % (ref 19.6–45.3)
WBC MORPH BLD: NORMAL
WBC NRBC COR # BLD AUTO: 31.68 10*3/MM3 (ref 3.4–10.8)

## 2024-02-02 PROCEDURE — 84145 PROCALCITONIN (PCT): CPT | Performed by: INTERNAL MEDICINE

## 2024-02-02 PROCEDURE — 99233 SBSQ HOSP IP/OBS HIGH 50: CPT | Performed by: INTERNAL MEDICINE

## 2024-02-02 PROCEDURE — 94761 N-INVAS EAR/PLS OXIMETRY MLT: CPT

## 2024-02-02 PROCEDURE — 25010000002 CALCIUM GLUCONATE PER 10 ML: Performed by: INTERNAL MEDICINE

## 2024-02-02 PROCEDURE — 94799 UNLISTED PULMONARY SVC/PX: CPT

## 2024-02-02 PROCEDURE — 25010000002 POTASSIUM CHLORIDE PER 2 MEQ OF POTASSIUM: Performed by: INTERNAL MEDICINE

## 2024-02-02 PROCEDURE — 83735 ASSAY OF MAGNESIUM: CPT | Performed by: SURGERY

## 2024-02-02 PROCEDURE — 25010000002 METOCLOPRAMIDE PER 10 MG: Performed by: SURGERY

## 2024-02-02 PROCEDURE — 94664 DEMO&/EVAL PT USE INHALER: CPT

## 2024-02-02 PROCEDURE — 80048 BASIC METABOLIC PNL TOTAL CA: CPT | Performed by: INTERNAL MEDICINE

## 2024-02-02 PROCEDURE — 25010000002 THIAMINE PER 100 MG: Performed by: SURGERY

## 2024-02-02 PROCEDURE — 25010000002 MEROPENEM PER 100 MG: Performed by: INTERNAL MEDICINE

## 2024-02-02 PROCEDURE — 84100 ASSAY OF PHOSPHORUS: CPT | Performed by: SURGERY

## 2024-02-02 PROCEDURE — 85025 COMPLETE CBC W/AUTO DIFF WBC: CPT | Performed by: INTERNAL MEDICINE

## 2024-02-02 PROCEDURE — 63710000001 REVEFENACIN 175 MCG/3ML SOLUTION: Performed by: INTERNAL MEDICINE

## 2024-02-02 PROCEDURE — 97116 GAIT TRAINING THERAPY: CPT

## 2024-02-02 PROCEDURE — 82948 REAGENT STRIP/BLOOD GLUCOSE: CPT

## 2024-02-02 PROCEDURE — 85007 BL SMEAR W/DIFF WBC COUNT: CPT | Performed by: INTERNAL MEDICINE

## 2024-02-02 PROCEDURE — 25010000002 MICAFUNGIN SODIUM 100 MG RECONSTITUTED SOLUTION 1 EACH VIAL: Performed by: INTERNAL MEDICINE

## 2024-02-02 PROCEDURE — 25010000002 MAGNESIUM SULFATE PER 500 MG OF MAGNESIUM: Performed by: INTERNAL MEDICINE

## 2024-02-02 PROCEDURE — 97166 OT EVAL MOD COMPLEX 45 MIN: CPT

## 2024-02-02 PROCEDURE — 71045 X-RAY EXAM CHEST 1 VIEW: CPT

## 2024-02-02 PROCEDURE — 25010000002 DIPHENHYDRAMINE PER 50 MG: Performed by: SURGERY

## 2024-02-02 PROCEDURE — 97162 PT EVAL MOD COMPLEX 30 MIN: CPT

## 2024-02-02 PROCEDURE — 25010000002 ONDANSETRON PER 1 MG: Performed by: SURGERY

## 2024-02-02 PROCEDURE — 25010000002 HEPARIN (PORCINE) PER 1000 UNITS: Performed by: SURGERY

## 2024-02-02 RX ORDER — HYDROMORPHONE HCL/0.9% NACL/PF 10 MG/50ML
PATIENT CONTROLLED ANALGESIA SYRINGE INTRAVENOUS CONTINUOUS
Status: DISCONTINUED | OUTPATIENT
Start: 2024-02-02 | End: 2024-02-05 | Stop reason: SDUPTHER

## 2024-02-02 RX ADMIN — SMOFLIPID 250 ML: 6; 6; 5; 3 INJECTION, EMULSION INTRAVENOUS at 18:06

## 2024-02-02 RX ADMIN — MEROPENEM 2000 MG: 1 INJECTION, POWDER, FOR SOLUTION INTRAVENOUS at 00:03

## 2024-02-02 RX ADMIN — Medication 10 ML: at 20:14

## 2024-02-02 RX ADMIN — BUDESONIDE AND FORMOTEROL FUMARATE DIHYDRATE 2 PUFF: 160; 4.5 AEROSOL RESPIRATORY (INHALATION) at 19:33

## 2024-02-02 RX ADMIN — METOCLOPRAMIDE HYDROCHLORIDE 10 MG: 5 INJECTION INTRAMUSCULAR; INTRAVENOUS at 18:21

## 2024-02-02 RX ADMIN — BUPROPION HYDROCHLORIDE 150 MG: 150 TABLET, EXTENDED RELEASE ORAL at 08:05

## 2024-02-02 RX ADMIN — ALBUTEROL SULFATE 2 PUFF: 90 AEROSOL, METERED RESPIRATORY (INHALATION) at 07:21

## 2024-02-02 RX ADMIN — MICAFUNGIN 100 MG: 20 INJECTION, POWDER, LYOPHILIZED, FOR SOLUTION INTRAVENOUS at 09:36

## 2024-02-02 RX ADMIN — Medication: at 17:42

## 2024-02-02 RX ADMIN — HEPARIN SODIUM 5000 UNITS: 5000 INJECTION INTRAVENOUS; SUBCUTANEOUS at 05:54

## 2024-02-02 RX ADMIN — POTASSIUM PHOSPHATE, MONOBASIC POTASSIUM PHOSPHATE, DIBASIC: 224; 236 INJECTION, SOLUTION, CONCENTRATE INTRAVENOUS at 18:06

## 2024-02-02 RX ADMIN — BISACODYL 10 MG: 5 TABLET, COATED ORAL at 08:05

## 2024-02-02 RX ADMIN — Medication 10 ML: at 16:34

## 2024-02-02 RX ADMIN — REVEFENACIN 175 MCG: 175 SOLUTION RESPIRATORY (INHALATION) at 07:18

## 2024-02-02 RX ADMIN — METOCLOPRAMIDE HYDROCHLORIDE 10 MG: 5 INJECTION INTRAMUSCULAR; INTRAVENOUS at 05:54

## 2024-02-02 RX ADMIN — ROSUVASTATIN 10 MG: 10 TABLET, FILM COATED ORAL at 20:14

## 2024-02-02 RX ADMIN — ACETAMINOPHEN 650 MG: 325 TABLET ORAL at 08:05

## 2024-02-02 RX ADMIN — MEROPENEM 2000 MG: 1 INJECTION, POWDER, FOR SOLUTION INTRAVENOUS at 16:32

## 2024-02-02 RX ADMIN — METOCLOPRAMIDE HYDROCHLORIDE 10 MG: 5 INJECTION INTRAMUSCULAR; INTRAVENOUS at 00:03

## 2024-02-02 RX ADMIN — METOCLOPRAMIDE HYDROCHLORIDE 10 MG: 5 INJECTION INTRAMUSCULAR; INTRAVENOUS at 14:35

## 2024-02-02 RX ADMIN — PANTOPRAZOLE SODIUM 40 MG: 40 INJECTION, POWDER, LYOPHILIZED, FOR SOLUTION INTRAVENOUS at 08:05

## 2024-02-02 RX ADMIN — BUDESONIDE AND FORMOTEROL FUMARATE DIHYDRATE 2 PUFF: 160; 4.5 AEROSOL RESPIRATORY (INHALATION) at 07:21

## 2024-02-02 RX ADMIN — SENNOSIDES 1 TABLET: 8.6 TABLET, FILM COATED ORAL at 20:14

## 2024-02-02 RX ADMIN — FOLIC ACID 1 MG: 5 INJECTION, SOLUTION INTRAMUSCULAR; INTRAVENOUS; SUBCUTANEOUS at 08:44

## 2024-02-02 RX ADMIN — MEROPENEM 2000 MG: 1 INJECTION, POWDER, FOR SOLUTION INTRAVENOUS at 09:37

## 2024-02-02 RX ADMIN — THIAMINE HYDROCHLORIDE 100 MG: 100 INJECTION, SOLUTION INTRAMUSCULAR; INTRAVENOUS at 08:05

## 2024-02-02 RX ADMIN — HEPARIN SODIUM 5000 UNITS: 5000 INJECTION INTRAVENOUS; SUBCUTANEOUS at 21:24

## 2024-02-02 RX ADMIN — ONDANSETRON 4 MG: 2 INJECTION INTRAMUSCULAR; INTRAVENOUS at 04:06

## 2024-02-02 RX ADMIN — HEPARIN SODIUM 5000 UNITS: 5000 INJECTION INTRAVENOUS; SUBCUTANEOUS at 14:34

## 2024-02-02 RX ADMIN — ALBUTEROL SULFATE 2 PUFF: 90 AEROSOL, METERED RESPIRATORY (INHALATION) at 19:33

## 2024-02-02 RX ADMIN — DIPHENHYDRAMINE HYDROCHLORIDE 25 MG: 50 INJECTION, SOLUTION INTRAMUSCULAR; INTRAVENOUS at 22:04

## 2024-02-02 NOTE — PLAN OF CARE
Goal Outcome Evaluation:  Plan of Care Reviewed With: patient, significant other        Progress: improving  Outcome Evaluation: OT eval complete. Pt presents w/ generalized weakness and mild balance deficits warranting cont skilled IPOT POC to promote return to PLOF. Recommend pt DC home w/ assist and  OT/PT services.      Anticipated Discharge Disposition (OT): home with assist, home with home health

## 2024-02-02 NOTE — PROGRESS NOTES
"Patient Name:  Roger Bhat  YOB: 1960  [id number]    Surgery Progress Note    Date of visit: 2/2/2024      Subjective: No acute changes.  Leukocytosis down today.  Has not had significant ileostomy output.  Reports some upper abdominal pain and chest soreness.  Respirations are slightly more tachypneic and coarse today, although oxygenating well and is conversant on 4 L by nasal cannula.  Ambulated yesterday.  Having some leakage around the Crouch catheter          Objective:     /87 (BP Location: Left arm, Patient Position: Lying)   Pulse 105   Temp 98.1 °F (36.7 °C) (Oral)   Resp 20   Ht 175.3 cm (69\")   Wt 59.9 kg (132 lb 0.9 oz)   SpO2 91%   BMI 19.50 kg/m²     Intake/Output Summary (Last 24 hours) at 2/2/2024 0824  Last data filed at 2/2/2024 0600  Gross per 24 hour   Intake 3258.4 ml   Output 2258 ml   Net 1000.4 ml     GEN:   Awake, alert, sitting up in bed, chronically ill-appearing in no obvious distress  CV:      Regular rate and rhythm  L:         Symmetric expansion, on 4 L of oxygen by nasal cannula with coarse respirations  Abd:    Soft, moderately distended, appropriately tender palpation throughout the abdomen, midline incision with some reactive erythema surrounding the site and serous drainage, DELROY drain in the right lower quadrant with serosanguineous output, ileostomy on the right side pink and patent with scant stool in the bag  Ext:     No cyanosis, clubbing, or edema    Recent labs that are back at this time have been reviewed.           Assessment/ Plan:    Mr. Bhat is a 63-year-old gentleman who is admitted following operative intervention for gallbladder and sigmoid colon mass on January 17     #Sigmoid colon mass, gallbladder mass  # Ileus  -Postop day 16 following lap vladimir, open sigmoid colectomy with diverting loop ileostomy, postoperative day 3 following exploratory laparotomy with washout  -Vital signs are stable.  Slightly more tachypneic " however  -Leukocytosis is improved to 31,000 today after broadening antibiotics yesterday.  Labs otherwise without acute findings  -Still without significant ileostomy output.  Continue with NG tube to low wall suction for now.  -Continue TPN   -Continue antibiotics  -DC Crouch today  -Awaiting resolution of ileus and continuing with supportive measures including TPN, nasogastric decompression, and symptomatic management         Jimmy Craig MD  2/2/2024  08:24 EST

## 2024-02-02 NOTE — PROGRESS NOTES
CPN Review Note    Patient Name: Roger Bhat  Date of Encounter: 24 13:03 EST  MRN: [id number]  Admission date: 2024    Reason for visit: CPN review . RD to continue to follow per protocol.     Information Obtained: Continues with TPN, NPO awaiting ileus resolution. Not much output ileostomy. Visited with pt and supportive person at bedside, they understand plan.     EMR reviewed:  NGT 1025 ml, ileostomy 100 ml/hr   Medication reviewed: yes   Labs reviewed: yes     Current diet: NPO Diet NPO Type: Sips with Meds, Ice Chips  Adult Central 2-in-1 TPN  Adult Central 2-in-1 TPN    PN Route: PICC   PN:   D10.4%, AA3.75%  @ goal 100 ml/hr             GIR: 3.18 mg/kg/min  Lipid: 250 ml 20% SMOF daily     PN@ Goal over 24hr to Supply:  Volume 2400 mL/day       Energy 1710 Kcal/day 101 % Est Need   Protein 90 g/day 110 % Est Need     ---------------------------------------------------------------------------  Formula/Rate verified at bedside: Yes  Infusing Rate at time of visit: 100ml    Average Delivery from Chartin Day:   PN Volume 2353 mL/day      98 %goal   Lipid delivered?  yes      Energy  Kcal/day  % Est Need   Protein  g/day  % Est Need       Intervention:  Follow treatment plan  Care plan reviewed    Continue PN per Intensivist orders, plan for today pending.     RD would recommend for less volume change to:  Clinimix E-D15% AA5% @ 70 ml/hr, 250 ml lipids/daily   =1693 kcal, 84 g protein, JDV=7238 ml, GIR=3.2 mg/kg/min    Follow up:   Per protocol      Stacie Maldonado RD, [facility]  13:03 EST  Time: 25min

## 2024-02-02 NOTE — CASE MANAGEMENT/SOCIAL WORK
Continued Stay Note   [place]     Patient Name: Roger Bhat  MRN: [id number]  Today's Date: 2/2/2024    Admit Date: 1/17/2024    Plan: Ongoing   Discharge Plan       Row Name 02/02/24 1349       Plan    Plan Ongoing    Plan Comments Discussed patient in MDR today.  Patient remains in ICU; awaiting resolution of ileus and continues with TPN, nasogastric decompression, and symptomatic management.  CM will continue to follow and assist with discharge plan.                   Discharge Codes    No documentation.                       Celeste Cabello RN

## 2024-02-02 NOTE — THERAPY EVALUATION
"Patient Name: Roger Bhat  : 1960    MRN: [id number]                              Today's Date: 2024       Admit Date: 2024    Visit Dx:     ICD-10-CM ICD-9-CM   1. Colonic mass  K63.89 569.89   2. Gallbladder mass  K82.8 575.8   3. Bowel obstruction  K56.609 560.9     Patient Active Problem List   Diagnosis    Current smoker    Cellulitis of right hand    Gastroesophageal reflux disease without esophagitis    Screening, lipid    Dysphagia    Hereditary hemochromatosis    Gallbladder mass    Colonic mass    Severe malnutrition    Respiratory distress     Past Medical History:   Diagnosis Date    Allergies     Cellulitis of hand     Clotting disorder     \"FREE BLEEDING\"    Colonic mass     COPD (chronic obstructive pulmonary disease)     Cough     Current smoker     Erectile dysfunction     Fracture, foot Sept 2022    GERD (gastroesophageal reflux disease)     Hemochromatosis     Hyperlipidemia     Hypertension     Wears dentures     FULL SET     Past Surgical History:   Procedure Laterality Date    CHOLECYSTECTOMY N/A 2024    Procedure: CHOLECYSTECTOMY LAPAROSCOPIC;  Surgeon: Jimmy Craig MD;  Location: [facility] OR;  Service: General;  Laterality: N/A;    COLON RESECTION N/A 2024    Procedure: OPEN SIGMOID COLECTOMY, LIVER BIOPSY, DIVERTING LOOP ILEOSTOMY CREATION, TAKE DOWN OF THE SPLENIC FLEXURE, AND APPENDECTOMY;  Surgeon: Jimmy Craig MD;  Location:  [facility] OR;  Service: General;  Laterality: N/A;    COLONOSCOPY      ENDOSCOPY  2016    pt say's, he was placed under general anesthesia for this    ENDOSCOPY N/A 10/19/2021    Procedure: ESOPHAGOGASTRODUODENOSCOPY;  Surgeon: Osmel Kessler MD;  Location:  [facility]COPY;  Service: Gastroenterology;  Laterality: N/A;  24 fr savory dilator used at 1251, 27 fr sdavory used at 1253, 33 Kyr[place]z savoruy used at 1257, 42 fr savory used at 1259      ENDOSCOPY N/A 2021    Procedure: ESOPHAGOGASTRODUODENOSCOPY " WITH DILATATION;  Surgeon: Osmel Kessler MD;  Location: [facility] END[facility]COPY;  Service: Gastroenterology;  Laterality: N/A;  Dilation with 48fr savery    EXPLORATORY LAPAROTOMY N/A 1/30/2024    Procedure: LAPAROTOMY EXPLORATORY;  Surgeon: Jimmy Craig MD;  Location: [facility] OR;  Service: General;  Laterality: N/A;    HAND SURGERY  2022      General Information       Row Name 02/02/24 1439          OT Time and Intention    Document Type evaluation  -CS     Mode of Treatment occupational therapy  -CS       Row Name 02/02/24 1439          General Information    Patient Profile Reviewed yes  -CS     Prior Level of Function independent:;all household mobility;ADL's  -CS     Existing Precautions/Restrictions fall;oxygen therapy device and L/min;other (see comments)  abd incision, NG, DELROY  -CS     Barriers to Rehab medically complex  -CS       Row Name 02/02/24 1439          Living Environment    People in Home spouse  -CS       Row Name 02/02/24 1439          Home Main Entrance    Number of Stairs, Main Entrance three  -CS       Row Name 02/02/24 1439          Stairs Within Home, Primary    Number of Stairs, Within Home, Primary two  -CS       Row Name 02/02/24 1439          Cognition    Orientation Status (Cognition) oriented x 3  -CS       Row Name 02/02/24 1439          Safety Issues, Functional Mobility    Impairments Affecting Function (Mobility) endurance/activity tolerance;pain;strength  -CS               User Key  (r) = Recorded By, (t) = Taken By, (c) = Cosigned By      Initials Name Provider Type    CS Elena Velasquez OT Occupational Therapist                     Mobility/ADL's       Row Name 02/02/24 1439          Transfers    Transfers stand-sit transfer  -CS       Row Name 02/02/24 1439          Sit-Stand Transfer    Sit-Stand [place] (Transfers) contact guard;verbal cues  -CS       Row Name 02/02/24 1439          Stand-Sit Transfer    Stand-Sit [place] (Transfers) contact guard;verbal cues   -     Assistive Device (Stand-Sit Transfers) walker, front-wheeled  -       Row Name 02/02/24 1439          Activities of Daily Living    BADL Assessment/Intervention grooming;lower body dressing  -       Row Name 02/02/24 1439          Grooming Assessment/Training    [place] Level (Grooming) wash face, hands;set up  -     Position (Grooming) supported sitting  -       Row Name 02/02/24 1439          Lower Body Dressing Assessment/Training    [place] Level (Lower Body Dressing) don;socks;dependent (less than 25% patient effort)  -     Position (Lower Body Dressing) supported sitting  -     Comment, (Lower Body Dressing) adjustment  -               User Key  (r) = Recorded By, (t) = Taken By, (c) = Cosigned By      Initials Name Provider Type    Elena Gonsales OT Occupational Therapist                   Obj/Interventions       Row Name 02/02/24 1440          Sensory Assessment (Somatosensory)    Sensory Assessment (Somatosensory) UE sensation intact  -[facility] Name 02/02/24 1440          Range of Motion Comprehensive    General Range of Motion bilateral upper extremity ROM WFL  -[facility] Name 02/02/24 1440          Strength Comprehensive (MMT)    Comment, General Manual Muscle Testing (MMT) Assessment BUE grossly 3+/5  -[facility] Name 02/02/24 1440          Balance    Balance Assessment sitting static balance;sitting dynamic balance;standing static balance;standing dynamic balance  -     Static Sitting Balance standby assist  -     Dynamic Sitting Balance standby assist  -     Position, Sitting Balance sitting in chair  -     Static Standing Balance contact guard  -     Dynamic Standing Balance contact guard  -     Position/Device Used, Standing Balance walker, rolling  -     Balance Interventions sitting;standing;dynamic;static;dynamic reaching;occupation based/functional task;weight shifting activity  -               User Key  (r) = Recorded By, (t) = Taken  By, (c) = Cosigned By      Initials Name Provider Type    Elena Gonsales OT Occupational Therapist                   Goals/Plan       Row Name 02/02/24 1443          Transfer Goal 1 (OT)    Activity/Assistive Device (Transfer Goal 1, OT) sit-to-stand/stand-to-sit;toilet  -CS     [place] Level/Cues Needed (Transfer Goal 1, OT) standby assist  -CS     Time Frame (Transfer Goal 1, OT) long term goal (LTG);10 days  -CS     Progress/Outcome (Transfer Goal 1, OT) goal ongoing  -CS       Row Name 02/02/24 1443          Dressing Goal 1 (OT)    Activity/Device (Dressing Goal 1, OT) lower body dressing  -CS     [place]/Cues Needed (Dressing Goal 1, OT) moderate assist (50-74% patient effort)  -CS     Time Frame (Dressing Goal 1, OT) long term goal (LTG);10 days  -CS     Strategies/Barriers (Dressing Goal 1, OT) don/doff socks w/ AAD  -CS     Progress/Outcome (Dressing Goal 1, OT) goal ongoing  -CS       Row Name 02/02/24 1443          Grooming Goal 1 (OT)    Activity/Device (Grooming Goal 1, OT) hair care;oral care  -CS     [place] (Grooming Goal 1, OT) standby assist  -CS     Time Frame (Grooming Goal 1, OT) long term goal (LTG);10 days  -CS     Strategies/Barriers (Grooming Goal 1, OT) at sink w/ EC/WS techniques  -CS     Progress/Outcome (Grooming Goal 1, OT) goal ongoing  -CS       Row Name 02/02/24 1443          Therapy Assessment/Plan (OT)    Planned Therapy Interventions (OT) activity tolerance training;BADL retraining;adaptive equipment training;functional balance retraining;occupation/activity based interventions;ROM/therapeutic exercise;strengthening exercise;transfer/mobility retraining;patient/caregiver education/training  -CS               User Key  (r) = Recorded By, (t) = Taken By, (c) = Cosigned By      Initials Name Provider Type    Elena Gonsales OT Occupational Therapist                   Clinical Impression       Row Name 02/02/24 1441          Pain Assessment    Pain Intervention(s)  Repositioned;Ambulation/increased activity  -CS       Row Name 02/02/24 1441          Pain Scale: FACES Pre/Post-Treatment    Pain: FACES Scale, Pretreatment 2-->hurts little bit  -CS     Posttreatment Pain Rating 2-->hurts little bit  -CS     Pain Location incisional  -CS     Pain Location - abdomen  -CS     Pre/Posttreatment Pain Comment tolerated  -CS       Row Name 02/02/24 1441          Plan of Care Review    Plan of Care Reviewed With patient;significant other  -CS     Progress improving  -CS     Outcome Evaluation OT eval complete. Pt presents w/ generalized weakness and mild balance deficits warranting cont skilled IPOT POC to promote return to PLOF. Recommend pt DC home w/ assist and HH OT/PT services.  -CS       Row Name 02/02/24 1441          Therapy Assessment/Plan (OT)    Patient/Family Therapy Goal Statement (OT) Return to PLOF  -CS     Rehab Potential (OT) good, to achieve stated therapy goals  -CS     Criteria for Skilled Therapeutic Interventions Met (OT) yes;skilled treatment is necessary  -CS     Therapy Frequency (OT) daily  -CS       Row Name 02/02/24 1441          Therapy Plan Review/Discharge Plan (OT)    Anticipated Discharge Disposition (OT) home with assist;home with home health  -CS       Row Name 02/02/24 1441          Vital Signs    Pre Systolic BP Rehab 130  -CS     Pre Treatment Diastolic BP 79  -CS     Post Systolic BP Rehab 118  -CS     Post Treatment Diastolic BP 79  -CS     Pretreatment Heart Rate (beats/min) 87  -CS     Posttreatment Heart Rate (beats/min) 88  -CS     Pre SpO2 (%) 97  -CS     O2 Delivery Pre Treatment nasal cannula  -CS     Post SpO2 (%) 93  -CS     O2 Delivery Post Treatment nasal cannula  -CS     Pre Patient Position Sitting  -CS     Intra Patient Position Standing  -CS     Post Patient Position Sitting  -CS       Row Name 02/02/24 1441          Positioning and Restraints    Pre-Treatment Position sitting in chair/recliner  -CS     Post Treatment Position chair   -CS     In Chair notified nsg;reclined;call light within reach;encouraged to call for assist;exit alarm on;with family/caregiver;waffle cushion;legs elevated  -CS               User Key  (r) = Recorded By, (t) = Taken By, (c) = Cosigned By      Initials Name Provider Type    CS Elena Velasquez OT Occupational Therapist                   Outcome Measures       Row Name 02/02/24 1444          How much help from another is currently needed...    Putting on and taking off regular lower body clothing? 2  -CS     Bathing (including washing, rinsing, and drying) 2  -CS     Toileting (which includes using toilet bed pan or urinal) 2  -CS     Putting on and taking off regular upper body clothing 2  -CS     Taking care of personal grooming (such as brushing teeth) 3  -CS     Eating meals 4  -CS     AM-PAC 6 Clicks Score (OT) 15  -CS       Row Name 02/02/24 1440 02/02/24 0800       How much help from another person do you currently need...    Turning from your back to your side while in flat bed without using bedrails? 3  -ES 3  -QH    Moving from lying on back to sitting on the side of a flat bed without bedrails? 3  -ES 3  -QH    Moving to and from a bed to a chair (including a wheelchair)? 3  -ES 3  -QH    Standing up from a chair using your arms (e.g., wheelchair, bedside chair)? 3  -ES 3  -QH    Climbing 3-5 steps with a railing? 2  -ES 2  -QH    To walk in hospital room? 3  -ES 3  -QH    AM-PAC 6 Clicks Score (PT) 17  -ES 17  -QH    Highest Level of Mobility Goal 5 --> Static standing  -ES 5 --> Static standing  -QH      Row Name 02/02/24 1444 02/02/24 1440       Functional Assessment    Outcome Measure Options AM-PAC 6 Clicks Daily Activity (OT)  -CS AM-PAC 6 Clicks Basic Mobility (PT)  -ES              User Key  (r) = Recorded By, (t) = Taken By, (c) = Cosigned By      Initials Name Provider Type    Elena Gonsales, OT Occupational Therapist    ES Holly Hylton, PT Physical Therapist    Q Lanette Butt RN  Registered Nurse                    Occupational Therapy Education       Title: PT OT SLP Therapies (In Progress)       Topic: Occupational Therapy (In Progress)       Point: ADL training (In Progress)       Description:   Instruct learner(s) on proper safety adaptation and remediation techniques during self care or transfers.   Instruct in proper use of assistive devices.                  Learning Progress Summary             Patient Acceptance, E, NR by  at 2/2/2024 1444                         Point: Home exercise program (Not Started)       Description:   Instruct learner(s) on appropriate technique for monitoring, assisting and/or progressing therapeutic exercises/activities.                  Learner Progress:  Not documented in this visit.              Point: Precautions (In Progress)       Description:   Instruct learner(s) on prescribed precautions during self-care and functional transfers.                  Learning Progress Summary             Patient Acceptance, E, NR by  at 2/2/2024 1444                         Point: Body mechanics (In Progress)       Description:   Instruct learner(s) on proper positioning and spine alignment during self-care, functional mobility activities and/or exercises.                  Learning Progress Summary             Patient Acceptance, E, NR by  at 2/2/2024 1444                                         User Key       Initials Effective Dates Name Provider Type Discipline     09/02/21 -  Elena Velasquez OT Occupational Therapist OT                  OT Recommendation and Plan  Planned Therapy Interventions (OT): activity tolerance training, BADL retraining, adaptive equipment training, functional balance retraining, occupation/activity based interventions, ROM/therapeutic exercise, strengthening exercise, transfer/mobility retraining, patient/caregiver education/training  Therapy Frequency (OT): daily  Plan of Care Review  Plan of Care Reviewed With: patient,  significant other  Progress: improving  Outcome Evaluation: OT eval complete. Pt presents w/ generalized weakness and mild balance deficits warranting cont skilled IPOT POC to promote return to PLOF. Recommend pt DC home w/ assist and HH OT/PT services.     Time Calculation:   Evaluation Complexity (OT)  Review Occupational Profile/Medical/Therapy History Complexity: expanded/moderate complexity  Assessment, Occupational Performance/Identification of Deficit Complexity: 3-5 performance deficits  Clinical Decision Making Complexity (OT): detailed assessment/moderate complexity  Overall Complexity of Evaluation (OT): moderate complexity     Time Calculation- OT       Row Name 02/02/24 1445 02/02/24 1441          Time Calculation- OT    OT Start Time 1140  -CS --     OT Received On 02/02/24  -CS --     OT Goal Re-Cert Due Date 02/12/24  -CS --        Timed Charges    96181 - Gait Training Minutes  -- 12  -ES        Untimed Charges    OT Eval/Re-eval Minutes 31  -CS --        Total Minutes    Timed Charges Total Minutes -- 12  -ES     Untimed Charges Total Minutes 31  -CS --      Total Minutes 31  -CS 12  -ES               User Key  (r) = Recorded By, (t) = Taken By, (c) = Cosigned By      Initials Name Provider Type    CS Elena Velasquez OT Occupational Therapist    ES Holly Hylton PT Physical Therapist                  Therapy Charges for Today       Code Description Service Date Service Provider Modifiers Qty    [id number] HC OT EVAL MOD COMPLEXITY 3 2/2/2024 Elena Velasquez OT GO 1                 Elena Velasquez OT  2/2/2024

## 2024-02-02 NOTE — THERAPY EVALUATION
"Patient Name: Roger Bhat  : 1960    MRN: [id number]                              Today's Date: 2024       Admit Date: 2024    Visit Dx:     ICD-10-CM ICD-9-CM   1. Colonic mass  K63.89 569.89   2. Gallbladder mass  K82.8 575.8   3. Bowel obstruction  K56.609 560.9     Patient Active Problem List   Diagnosis    Current smoker    Cellulitis of right hand    Gastroesophageal reflux disease without esophagitis    Screening, lipid    Dysphagia    Hereditary hemochromatosis    Gallbladder mass    Colonic mass    Severe malnutrition    Respiratory distress     Past Medical History:   Diagnosis Date    Allergies     Cellulitis of hand     Clotting disorder     \"FREE BLEEDING\"    Colonic mass     COPD (chronic obstructive pulmonary disease)     Cough     Current smoker     Erectile dysfunction     Fracture, foot Sept 2022    GERD (gastroesophageal reflux disease)     Hemochromatosis     Hyperlipidemia     Hypertension     Wears dentures     FULL SET     Past Surgical History:   Procedure Laterality Date    CHOLECYSTECTOMY N/A 2024    Procedure: CHOLECYSTECTOMY LAPAROSCOPIC;  Surgeon: Jimmy Craig MD;  Location: [facility] OR;  Service: General;  Laterality: N/A;    COLON RESECTION N/A 2024    Procedure: OPEN SIGMOID COLECTOMY, LIVER BIOPSY, DIVERTING LOOP ILEOSTOMY CREATION, TAKE DOWN OF THE SPLENIC FLEXURE, AND APPENDECTOMY;  Surgeon: Jimmy Craig MD;  Location:  [facility] OR;  Service: General;  Laterality: N/A;    COLONOSCOPY      ENDOSCOPY  2016    pt say's, he was placed under general anesthesia for this    ENDOSCOPY N/A 10/19/2021    Procedure: ESOPHAGOGASTRODUODENOSCOPY;  Surgeon: Osmel Kessler MD;  Location:  [facility] ENDOSCOPY;  Service: Gastroenterology;  Laterality: N/A;  24 fr savory dilator used at 1251, 27 fr sdavory used at 1253, 33 [place] savoruy used at 1257, 42 fr savory used at 1259      ENDOSCOPY N/A 2021    Procedure: ESOPHAGOGASTRODUODENOSCOPY " WITH DILATATION;  Surgeon: Osmel Kessler MD;  Location: [facility] ENDOSCOPY;  Service: Gastroenterology;  Laterality: N/A;  Dilation with 48fr savery    EXPLORATORY LAPAROTOMY N/A 1/30/2024    Procedure: LAPAROTOMY EXPLORATORY;  Surgeon: Jimmy Craig MD;  Location: [facility] OR;  Service: General;  Laterality: N/A;    HAND SURGERY  2022      General Information       Row Name 02/02/24 1424          Physical Therapy Time and Intention    Document Type evaluation  -ES     Mode of Treatment physical therapy  -ES       Row Name 02/02/24 1424          General Information    Patient Profile Reviewed yes  -ES     Prior Level of Function independent:;all household mobility;community mobility;transfer;bed mobility;ADL's;work  No AD at baseline. Denies recent falls  -ES     Existing Precautions/Restrictions fall;oxygen therapy device and L/min;other (see comments)  abd incision, DELROY, NG  -ES     Barriers to Rehab medically complex  -ES       Row Name 02/02/24 1424          Living Environment    People in Home spouse  -ES       Row Name 02/02/24 1424          Home Main Entrance    Number of Stairs, Main Entrance three  -ES     Stair Railings, Main Entrance none  -ES       Row Name 02/02/24 1424          Stairs Within Home, Primary    Number of Stairs, Within Home, Primary two  -ES       Row Name 02/02/24 1424          Cognition    Orientation Status (Cognition) oriented x 3  -ES       Row Name 02/02/24 1424          Safety Issues, Functional Mobility    Safety Issues Affecting Function (Mobility) awareness of need for assistance;insight into deficits/self-awareness;safety precaution awareness;safety precautions follow-through/compliance;sequencing abilities  -ES     Impairments Affecting Function (Mobility) endurance/activity tolerance;pain;strength  -ES               User Key  (r) = Recorded By, (t) = Taken By, (c) = Cosigned By      Initials Name Provider Type    ES Holly Hylton PT Physical Therapist                    Mobility       Row Name 02/02/24 1425          Bed Mobility    Comment, (Bed Mobility) UIC  -ES       Row Name 02/02/24 1425          Sit-Stand Transfer    Sit-Stand [place] (Transfers) contact guard;verbal cues  -ES     Assistive Device (Sit-Stand Transfers) walker, front-wheeled  -ES     Comment, (Sit-Stand Transfer) v/c for hand placement  -ES       Row Name 02/02/24 1425          Gait/Stairs (Locomotion)    [place] Level (Gait) contact guard;verbal cues  -ES     Assistive Device (Gait) walker, front-wheeled  -ES     Distance in Feet (Gait) 120  -ES     Deviations/Abnormal Patterns (Gait) bilateral deviations;gait speed decreased;stride length decreased;base of support, narrow  -ES     Bilateral Gait Deviations forward flexed posture  -ES     Comment, (Gait/Stairs) Pt ambulated with CGA and FWW. Demo'd forward flexed posture, decreased stride length, and narrow MARY. Required v/c to promote upright posture and placement of AD. Further mobility limited by fatigue.  -ES               User Key  (r) = Recorded By, (t) = Taken By, (c) = Cosigned By      Initials Name Provider Type    ES Holly Hylton PT Physical Therapist                   Obj/Interventions       Row Name 02/02/24 1429          Range of Motion Comprehensive    General Range of Motion bilateral lower extremity ROM WFL  -ES       Row Name 02/02/24 1429          Strength Comprehensive (MMT)    General Manual Muscle Testing (MMT) Assessment lower extremity strength deficits identified  -ES     Comment, General Manual Muscle Testing (MMT) Assessment BLE grossly 4/5  -ES       Row Name 02/02/24 1429          Balance    Balance Assessment sitting static balance;sitting dynamic balance;sit to stand dynamic balance;standing static balance;standing dynamic balance  -ES     Static Sitting Balance contact guard  -ES     Dynamic Sitting Balance contact guard;verbal cues  -ES     Position, Sitting Balance unsupported;sitting in chair  -ES     Sit  to Stand Dynamic Balance contact guard;verbal cues  -ES     Static Standing Balance contact guard  -ES     Dynamic Standing Balance contact guard;verbal cues  -ES     Position/Device Used, Standing Balance supported;walker, front-wheeled  -ES     Balance Interventions sitting;standing;sit to stand;supported;static;dynamic;occupation based/functional task  -ES       Row Name 02/02/24 1429          Sensory Assessment (Somatosensory)    Sensory Assessment (Somatosensory) LE sensation intact  -ES               User Key  (r) = Recorded By, (t) = Taken By, (c) = Cosigned By      Initials Name Provider Type    ES Holly Hylton, PT Physical Therapist                   Goals/Plan       Row Name 02/02/24 1438          Bed Mobility Goal 1 (PT)    Activity/Assistive Device (Bed Mobility Goal 1, PT) sit to supine/supine to sit  -ES     [place] Level/Cues Needed (Bed Mobility Goal 1, PT) standby assist  -ES     Time Frame (Bed Mobility Goal 1, PT) long term goal (LTG);10 days  -ES       Row Name 02/02/24 1438          Transfer Goal 1 (PT)    Activity/Assistive Device (Transfer Goal 1, PT) sit-to-stand/stand-to-sit;bed-to-chair/chair-to-bed;other (see comments)  least restrictive AD  -ES     [place] Level/Cues Needed (Transfer Goal 1, PT) standby assist  -ES     Time Frame (Transfer Goal 1, PT) long term goal (LTG);10 days  -ES       Row Name 02/02/24 1438          Gait Training Goal 1 (PT)    Activity/Assistive Device (Gait Training Goal 1, PT) gait (walking locomotion);decrease fall risk;increase endurance/gait distance;other (see comments)  least restrictive AD  -ES     [place] Level (Gait Training Goal 1, PT) standby assist  -ES     Distance (Gait Training Goal 1, PT) 250  -ES     Time Frame (Gait Training Goal 1, PT) long term goal (LTG);10 days  -ES       Row Name 02/02/24 1438          Stairs Goal 1 (PT)    Activity/Assistive Device (Stairs Goal 1, PT) ascending stairs;descending stairs  -ES      [place] Level/Cues Needed (Stairs Goal 1, PT) standby assist  -ES     Number of Stairs (Stairs Goal 1, PT) 3  -ES     Time Frame (Stairs Goal 1, PT) long term goal (LTG);by discharge  -ES       Row Name 02/02/24 1438          Therapy Assessment/Plan (PT)    Planned Therapy Interventions (PT) balance training;bed mobility training;gait training;neuromuscular re-education;patient/family education;strengthening;stair training;transfer training;postural re-education  -ES               User Key  (r) = Recorded By, (t) = Taken By, (c) = Cosigned By      Initials Name Provider Type    ES Holly Hylton, PT Physical Therapist                   Clinical Impression       Row Name 02/02/24 1430          Pain    Pain Intervention(s) Repositioned;Ambulation/increased activity  -ES     Additional Documentation Pain Scale: FACES Pre/Post-Treatment (Group)  -ES       Row Name 02/02/24 1430          Pain Scale: FACES Pre/Post-Treatment    Pain: FACES Scale, Pretreatment 2-->hurts little bit  -ES     Posttreatment Pain Rating 2-->hurts little bit  -ES     Pain Location incisional  -ES     Pain Location - abdomen  -ES     Pre/Posttreatment Pain Comment tolerated, RN aware and managing  -ES       Row Name 02/02/24 1430          Plan of Care Review    Plan of Care Reviewed With patient  -ES     Progress no change  PT IE  -ES     Outcome Evaluation PT eval complete. Pt presents s/p ex lap with generalized weakness, increased pain, and balance deficits warranting skilled IPPT services. Pt ambulated with CGA and FWW, requiring v/c for sequencing and AD placement. PT rec home with assist and HHPT at d/c.  -ES       Row Name 02/02/24 1430          Therapy Assessment/Plan (PT)    Rehab Potential (PT) good, to achieve stated therapy goals  -ES     Criteria for Skilled Interventions Met (PT) yes;meets criteria;skilled treatment is necessary  -ES     Therapy Frequency (PT) daily  -ES       Row Name 02/02/24 1430          Vital Signs     Pre Systolic BP Rehab 118  -ES     Pre Treatment Diastolic BP 79  -ES     Post Systolic BP Rehab 130  -ES     Post Treatment Diastolic BP 79  -ES     Pretreatment Heart Rate (beats/min) 84  -ES     Posttreatment Heart Rate (beats/min) 86  -ES     Pre SpO2 (%) 96  -ES     O2 Delivery Pre Treatment nasal cannula  -ES     O2 Delivery Intra Treatment nasal cannula  -ES     Post SpO2 (%) 93  -ES     O2 Delivery Post Treatment nasal cannula  -ES     Pre Patient Position Sitting  -ES     Intra Patient Position Standing  -ES     Post Patient Position Sitting  -ES       Row Name 02/02/24 1430          Positioning and Restraints    Pre-Treatment Position sitting in chair/recliner  -ES     Post Treatment Position chair  -ES     In Chair notified nsg;reclined;sitting;call light within reach;encouraged to call for assist;waffle cushion;legs elevated;heels elevated  RN deferred exit alarm  -ES               User Key  (r) = Recorded By, (t) = Taken By, (c) = Cosigned By      Initials Name Provider Type    ES Holly Hylton, PT Physical Therapist                   Outcome Measures       Row Name 02/02/24 1440 02/02/24 0800       How much help from another person do you currently need...    Turning from your back to your side while in flat bed without using bedrails? 3  -ES 3  -QH    Moving from lying on back to sitting on the side of a flat bed without bedrails? 3  -ES 3  -QH    Moving to and from a bed to a chair (including a wheelchair)? 3  -ES 3  -QH    Standing up from a chair using your arms (e.g., wheelchair, bedside chair)? 3  -ES 3  -QH    Climbing 3-5 steps with a railing? 2  -ES 2  -QH    To walk in hospital room? 3  -ES 3  -QH    AM-PAC 6 Clicks Score (PT) 17  -ES 17  -QH    Highest Level of Mobility Goal 5 --> Static standing  -ES 5 --> Static standing  -QH      Row Name 02/02/24 1440          Functional Assessment    Outcome Measure Options AM-PAC 6 Clicks Basic Mobility (PT)  -ES               User Key  (r) =  Recorded By, (t) = Taken By, (c) = Cosigned By      Initials Name Provider Type    ES Holyl Hylton PT Physical Therapist    Lanette Agrawal RN Registered Nurse                                 Physical Therapy Education       Title: PT OT SLP Therapies (In Progress)       Topic: Physical Therapy (In Progress)       Point: Mobility training (Done)       Learning Progress Summary             Patient Acceptance, E,TB, VU by ES at 2/2/2024 1441   Significant Other Acceptance, E,TB, VU by ES at 2/2/2024 1441                         Point: Home exercise program (Not Started)       Learner Progress:  Not documented in this visit.              Point: Body mechanics (Done)       Learning Progress Summary             Patient Acceptance, E,TB, VU by ES at 2/2/2024 1441   Significant Other Acceptance, E,TB, VU by ES at 2/2/2024 1441                         Point: Precautions (Done)       Learning Progress Summary             Patient Acceptance, E,TB, VU by ES at 2/2/2024 1441   Significant Other Acceptance, E,TB, VU by ES at 2/2/2024 1441                                         User Key       Initials Effective Dates Name Provider Type Discipline     08/11/22 -  Holly Hylton PT Physical Therapist PT                  PT Recommendation and Plan  Planned Therapy Interventions (PT): balance training, bed mobility training, gait training, neuromuscular re-education, patient/family education, strengthening, stair training, transfer training, postural re-education  Plan of Care Reviewed With: patient  Progress: no change (PT IE)  Outcome Evaluation: PT eval complete. Pt presents s/p ex lap with generalized weakness, increased pain, and balance deficits warranting skilled IPPT services. Pt ambulated with CGA and FWW, requiring v/c for sequencing and AD placement. PT rec home with assist and HHPT at d/c.     Time Calculation:   PT Evaluation Complexity  History, PT Evaluation Complexity: 1-2 personal factors and/or  comorbidities  Examination of Body Systems (PT Eval Complexity): total of 3 or more elements  Clinical Presentation (PT Evaluation Complexity): evolving  Clinical Decision Making (PT Evaluation Complexity): moderate complexity  Overall Complexity (PT Evaluation Complexity): moderate complexity     PT Charges       Row Name 02/02/24 1441             Time Calculation    Start Time 1126  -ES      PT Received On 02/02/24  -ES      PT Goal Re-Cert Due Date 02/12/24  -ES         Time Calculation- PT    Total Timed Code Minutes- PT 12 minute(s)  -ES         Timed Charges    75723 - Gait Training Minutes  12  -ES         Untimed Charges    PT Eval/Re-eval Minutes 32  -ES         Total Minutes    Timed Charges Total Minutes 12  -ES      Untimed Charges Total Minutes 32  -ES       Total Minutes 44  -ES                User Key  (r) = Recorded By, (t) = Taken By, (c) = Cosigned By      Initials Name Provider Type    ES Holly Hylton, PT Physical Therapist                  Therapy Charges for Today       Code Description Service Date Service Provider Modifiers Qty    26646958600 HC GAIT TRAINING EA 15 MIN 2/2/2024 Holly Hylton, PT GP 1    84401102685 HC PT EVAL MOD COMPLEXITY 3 2/2/2024 Holly Hylton, PT GP 1            PT G-Codes  Outcome Measure Options: AM-PAC 6 Clicks Basic Mobility (PT)  AM-PAC 6 Clicks Score (PT): 17  PT Discharge Summary  Anticipated Discharge Disposition (PT): home with assist, home with home health    Holly Hylton PT  2/2/2024

## 2024-02-02 NOTE — PROGRESS NOTES
INTENSIVIST   PROGRESS NOTE        SUBJECTIVE     Roger 63 y.o. male is followed for: No chief complaint on file.       Colonic mass    Current smoker    Severe malnutrition    Respiratory distress    As an Intensivist, we provide an integrated approach to the ICU patient and family, medical management of comorbid conditions, including but not limited to electrolytes, glycemic control, organ dysfunction, lead interdisciplinary rounds and coordinate the care with all other services, including those from other specialists.     Interval History:  POD: 3 Days Post-Op (EXP LAP 23)    No acute events overnight.    No complaints.    No fevers.    Temp  Min: 97.9 °F (36.6 °C)  Max: 99.1 °F (37.3 °C)     History     Last Reviewed by Monster Mock MD on 2024 at  6:14 PM    Sections Reviewed    Medical, Surgical, Family, Tobacco, Alcohol, Drug Use, Sexual Activity,   Social Documentation    Problem list reviewed by Monster Mock MD on 2024 at  6:14 PM  Medicines reviewed by Monster Mock MD on 2024 at  6:14 PM  Allergies reviewed by Monster Mock MD on 2024 at  6:14 PM    The patient's relevant past medical, surgical and social history were reviewed and updated in Epic as appropriate.        OBJECTIVE     Vitals:  Temp: 98.1 °F (36.7 °C) (24 0800) Temp  Min: 97.9 °F (36.6 °C)  Max: 99.1 °F (37.3 °C)   Temp core:      BP: 110/71 (24 1000) BP  Min: 109/68  Max: 145/86   MAP (non-invasive) Noninvasive MAP (mmHg): 87 (24 1000) Noninvasive MAP (mmHg)  Av.7  Min: 64  Max: 138   Pulse: 84 (24 1000) Pulse  Min: 82  Max: 105   Resp: (!) 32 (24 1000) Resp  Min: 16  Max: 32   SpO2: 97 % (24 1000) SpO2  Min: 89 %  Max: 98 %   Device: nasal cannula (24 1000)    Flow Rate: 3 (24 1000) Flow (L/min)  Min: 3  Max: 4     Medications (drips):  Adult Central 2-in-1 TPN, Last Rate: 100 mL/hr at 24 3974  Adult Central 2-in-1 TPN  HYDROmorphone  HCl-NaCl  Pharmacy to Dose TPN      Physical Examination  Telemetry:  Rhythm: sinus tachycardia, normal sinus rhythm (02/02/24 1000)      Constitutional:  No acute distress.   Cardiovascular: RRR.    Respiratory: Normal breath sounds  (+) Rhonchi   Abdominal:  Soft with no tenderness.   Extremities: No Edema   Neurological:   Alert, Oriented, Cooperative.  Best Eye Response: 4-->(E4) spontaneous (02/02/24 1000)  Best Motor Response: 6-->(M6) obeys commands (02/02/24 1000)  Best Verbal Response: 5-->(V5) oriented (02/02/24 1000)  Symone Coma Scale Score: 15 (02/02/24 1000)     Results Reviewed:  Laboratory  Microbiology  Radiology  Pathology    Hematology:  Results from last 7 days   Lab Units 02/02/24 0431 02/01/24  0426 01/31/24  0719   WBC 10*3/mm3 31.68* 47.34* 48.13*   BANDS % % 12.0* 18.0* 38.0*   HEMOGLOBIN g/dL 10.6* 10.2* 11.5*   MCV fL 102.0* 101.4* 101.5*   PLATELETS 10*3/mm3 354 363 367     Results from last 7 days   Lab Units 02/02/24 0431 02/01/24  0426 01/31/24  0719   NEUTROS ABS 10*3/mm3 28.51* 44.97* 45.11*  44.76*   LYMPHS ABS 10*3/mm3  --   --  1.32   EOS ABS 10*3/mm3 0.63* 0.00 0.00  0.00     Chemistry:  Estimated Creatinine Clearance: 168.5 mL/min (A) (by C-G formula based on SCr of 0.39 mg/dL (L)).    Results from last 7 days   Lab Units 02/02/24 0431 02/01/24  0426   SODIUM mmol/L 137 136   POTASSIUM mmol/L 3.9 3.7   CHLORIDE mmol/L 100 101   CO2 mmol/L 26.0 24.0   BUN mg/dL 16 21   CREATININE mg/dL 0.39* 0.55*   GLUCOSE mg/dL 95 122*     Results from last 7 days   Lab Units 02/02/24  0431 02/01/24  1424 02/01/24  0426 01/30/24  0538 01/29/24  0541   IONIZED CALCIUM mmol/L  --   --   --   --  1.27   CALCIUM mg/dL 8.8  --  8.6   < > 8.6   MAGNESIUM mg/dL 2.2  --  2.2   < > 2.2   PHOSPHORUS mg/dL 2.9 2.8 2.2*   < > 3.6    < > = values in this interval not displayed.     Hepatic Panel:  Results from last 7 days   Lab Units 01/31/24  0520 01/29/24  0541   ALBUMIN g/dL 3.1* 3.2*   TOTAL  PROTEIN g/dL 5.2* 5.7*   BILIRUBIN mg/dL 0.5 0.3   BILIRUBIN DIRECT mg/dL 0.2 <0.2   AST (SGOT) U/L 15 16   ALT (SGPT) U/L 10 18   ALK PHOS U/L 52 79     Cardiac Labs:  Results from last 7 days   Lab Units 01/31/24  0520 01/30/24  2337   HSTROP T ng/L 18 26*     Biomarkers:  Results from last 7 days   Lab Units 02/02/24  0431 02/01/24  0426 01/31/24  0520 01/30/24  2336 01/29/24  0541   CRP mg/dL  --   --   --   --  9.45*   LACTATE mmol/L  --   --  1.7 2.2*  --    PROCALCITONIN ng/mL 1.30* 2.90*  --   --   --      COVID-19  Lab Results   Component Value Date    COVID19 Not Detected 11/14/2021    COVID19 Not Detected 10/17/2021    COVID19 Not Detected 09/13/2021     Images:  XR Chest 1 View    Result Date: 2/2/2024  Impression: Right lung pneumonia appears similar. Left lower lobe pneumonia has worsened. There may be an associated small effusion. Electronically Signed: Petty Stein MD  2/2/2024 7:42 AM EST  Workstation ID: [id number]     Echo:      Results: Reviewed.  I reviewed the patient's new laboratory and imaging results.  I independently reviewed the patient's new images.    Medications: Reviewed.    Assessment   A/P     Hospital:  LOS: 16 days   ICU: 2d 17h     Active Hospital Problems    Diagnosis  POA    **Colonic mass [K63.89]  Yes    Severe malnutrition [E43]  Yes    Respiratory distress [R06.03]  Yes    Current smoker [F17.200]  Yes     Roger is a 63 y.o. male admitted on 1/17/2024 with Colonic mass [K63.89]    Assessment/Management/Treatment Plan:    Gallbladder and Colonic mass  01/17/24 S/P Lap Johana, open sigmoid colectomy with diverting loop ileostomy, liver biopsy.   01/30/24 S/P Exploratory Lap and ALBERTO 01/30/24    Lab Results   Lab Value Date/Time    FINALDX  01/17/2024 1137     1.  GALLBLADDER, CHOLECYSTECTOMY:  Chronic cholecystitis and cholelithiasis.  Adenomyoma.  No dysplasia identified.  Benign cystic duct lymph node.  Adhesed portions of liver with inflammatory changes.    2.  LIVER, CORE  NEEDLE BIOPSIES:  Benign hepatic tissue.  Increased stainable iron (see comment).  No fibrosis identified on trichrome stain with appropriate control.    3.  SIGMOID COLON, PARTIAL COLECTOMY:  Benign polyp, 4.2 cm in maximum size with features of inflammatory polyp (see comment).  16 lymph nodes negative for metastatic carcinoma (0/16).    4.  PROXIMAL ANASTOMOTIC DOUGHNUT:  Benign colonic tissue.    5.  DISTAL ANASTOMOTIC DONUT:  Benign colonic tissue.    6.  APPENDIX, APPENDECTOMY:  Acute appendicitis.      Cardiovascular  HTN  Dyslipidemia   Tobacco use  Emphysema as noticed in CT Chest 12/27/2023  Several new irregular nodular densities within the RLL, with the largest one measuring 9 mm.  GERD  Nutrition Support: Parenteral Nutrition     Lab Results   Component Value Date    PREALBUMIN 12.7 (L) 01/29/2024    PREALBUMIN 8.8 (L) 01/26/2024    CRP 9.45 (H) 01/29/2024    CRP 18.23 (H) 01/26/2024     Endocrine   Body mass index is 19.5 kg/m². Normal: 18.5-24.9kg/m2  No history of Diabetes    Lab Results   Lab Value Date/Time    HGBA1C 5.00 01/10/2024 1508    HGBA1C 5.3 11/28/2022 1111    HGBA1C 5.20 08/17/2021 1149     Results from last 7 days   Lab Units 02/02/24  1112 02/02/24  0508 02/01/24  2338 02/01/24  1731 02/01/24  1137 02/01/24  0534 01/31/24  2346 01/31/24  1757   GLUCOSE mg/dL 123 118 115 113 119 122 135* 127       Diet: NPO Diet NPO Type: Sips with Meds, Ice Chips  Adult Central 2-in-1 TPN  Adult Central 2-in-1 TPN   Advance Directives: Code Status and Medical Interventions:   Ordered at: 01/17/24 1734     Code Status (Patient has no pulse and is not breathing):    CPR (Attempt to Resuscitate)     Medical Interventions (Patient has pulse or is breathing):    Full Support        DVT prophylaxis:  Medical and mechanical DVT prophylaxis orders are present.    In brief:  Leukocytosis trending down, so is PCT, continue BRODERICK and Micafungin  Decrease Goal IVF (including Parenteral Nutrition) to 80 mL/h  Monitor  electrolytes.  Follow up WBC and PCT in AM  Continue PCA pump.  Disposition: Keep in ICU.    Plan of care and goals reviewed during interdisciplinary rounds.  I discussed the patient's findings and my recommendations with patient and nursing staff    MDM:    Problem(s) High due to: Acute or Chronic illness or injury that may poses a threat to life or bodily function  Data: Moderate due to: Review or results of each unique test and Ordering of each unique test  Risk: High due to: drug(s) requiring intensive monitoring for toxicity (Parenteral Nutrition)    High    [x] Primary Attending Intensive Care Medicine - Nutrition Support   [] Consultant    Copied text in this note has been reviewed and is accurate as of 02/02/24

## 2024-02-02 NOTE — PLAN OF CARE
Goal Outcome Evaluation:  Plan of Care Reviewed With: patient        Progress: no change (PT IE)  Outcome Evaluation: PT eval complete. Pt presents s/p ex lap with generalized weakness, increased pain, and balance deficits warranting skilled IPPT services. Pt ambulated with CGA and FWW, requiring v/c for sequencing and AD placement. PT rec home with assist and HHPT at d/c.      Anticipated Discharge Disposition (PT): home with assist, home with home health

## 2024-02-03 ENCOUNTER — APPOINTMENT (OUTPATIENT)
Dept: GENERAL RADIOLOGY | Facility: HOSPITAL | Age: 64
End: 2024-02-03
Payer: COMMERCIAL

## 2024-02-03 LAB
ANION GAP SERPL CALCULATED.3IONS-SCNC: 7 MMOL/L (ref 5–15)
BACTERIA SPEC RESP CULT: NORMAL
BASOPHILS # BLD AUTO: 0.08 10*3/MM3 (ref 0–0.2)
BASOPHILS NFR BLD AUTO: 0.4 % (ref 0–1.5)
BUN SERPL-MCNC: 16 MG/DL (ref 8–23)
BUN/CREAT SERPL: 37.2 (ref 7–25)
CALCIUM SPEC-SCNC: 8.8 MG/DL (ref 8.6–10.5)
CHLORIDE SERPL-SCNC: 99 MMOL/L (ref 98–107)
CO2 SERPL-SCNC: 29 MMOL/L (ref 22–29)
CREAT SERPL-MCNC: 0.43 MG/DL (ref 0.76–1.27)
DEPRECATED RDW RBC AUTO: 54 FL (ref 37–54)
EGFRCR SERPLBLD CKD-EPI 2021: 121.5 ML/MIN/1.73
EOSINOPHIL # BLD AUTO: 0.28 10*3/MM3 (ref 0–0.4)
EOSINOPHIL NFR BLD AUTO: 1.4 % (ref 0.3–6.2)
ERYTHROCYTE [DISTWIDTH] IN BLOOD BY AUTOMATED COUNT: 14.6 % (ref 12.3–15.4)
GLUCOSE BLDC GLUCOMTR-MCNC: 114 MG/DL (ref 70–130)
GLUCOSE BLDC GLUCOMTR-MCNC: 122 MG/DL (ref 70–130)
GLUCOSE BLDC GLUCOMTR-MCNC: 125 MG/DL (ref 70–130)
GLUCOSE BLDC GLUCOMTR-MCNC: 144 MG/DL (ref 70–130)
GLUCOSE SERPL-MCNC: 118 MG/DL (ref 65–99)
GRAM STN SPEC: NORMAL
HCT VFR BLD AUTO: 29.5 % (ref 37.5–51)
HGB BLD-MCNC: 10.3 G/DL (ref 13–17.7)
IMM GRANULOCYTES # BLD AUTO: 0.18 10*3/MM3 (ref 0–0.05)
IMM GRANULOCYTES NFR BLD AUTO: 0.9 % (ref 0–0.5)
LYMPHOCYTES # BLD AUTO: 1.6 10*3/MM3 (ref 0.7–3.1)
LYMPHOCYTES NFR BLD AUTO: 7.7 % (ref 19.6–45.3)
MAGNESIUM SERPL-MCNC: 2.2 MG/DL (ref 1.6–2.4)
MCH RBC QN AUTO: 35.3 PG (ref 26.6–33)
MCHC RBC AUTO-ENTMCNC: 34.9 G/DL (ref 31.5–35.7)
MCV RBC AUTO: 101 FL (ref 79–97)
MONOCYTES # BLD AUTO: 1.19 10*3/MM3 (ref 0.1–0.9)
MONOCYTES NFR BLD AUTO: 5.8 % (ref 5–12)
NEUTROPHILS NFR BLD AUTO: 17.36 10*3/MM3 (ref 1.7–7)
NEUTROPHILS NFR BLD AUTO: 83.8 % (ref 42.7–76)
NRBC BLD AUTO-RTO: 0 /100 WBC (ref 0–0.2)
PHOSPHATE SERPL-MCNC: 3 MG/DL (ref 2.5–4.5)
PLATELET # BLD AUTO: 391 10*3/MM3 (ref 140–450)
PMV BLD AUTO: 10.4 FL (ref 6–12)
POTASSIUM SERPL-SCNC: 4.4 MMOL/L (ref 3.5–5.2)
PROCALCITONIN SERPL-MCNC: 0.94 NG/ML (ref 0–0.25)
RBC # BLD AUTO: 2.92 10*6/MM3 (ref 4.14–5.8)
SODIUM SERPL-SCNC: 135 MMOL/L (ref 136–145)
WBC NRBC COR # BLD AUTO: 20.69 10*3/MM3 (ref 3.4–10.8)

## 2024-02-03 PROCEDURE — 80048 BASIC METABOLIC PNL TOTAL CA: CPT | Performed by: INTERNAL MEDICINE

## 2024-02-03 PROCEDURE — 25010000002 BUMETANIDE PER 0.5 MG: Performed by: INTERNAL MEDICINE

## 2024-02-03 PROCEDURE — 71045 X-RAY EXAM CHEST 1 VIEW: CPT

## 2024-02-03 PROCEDURE — 83735 ASSAY OF MAGNESIUM: CPT | Performed by: INTERNAL MEDICINE

## 2024-02-03 PROCEDURE — 25010000002 MEROPENEM PER 100 MG: Performed by: INTERNAL MEDICINE

## 2024-02-03 PROCEDURE — 63710000001 REVEFENACIN 175 MCG/3ML SOLUTION: Performed by: INTERNAL MEDICINE

## 2024-02-03 PROCEDURE — 84100 ASSAY OF PHOSPHORUS: CPT | Performed by: INTERNAL MEDICINE

## 2024-02-03 PROCEDURE — 25010000002 HEPARIN (PORCINE) PER 1000 UNITS: Performed by: SURGERY

## 2024-02-03 PROCEDURE — 25010000002 THIAMINE PER 100 MG: Performed by: SURGERY

## 2024-02-03 PROCEDURE — 94799 UNLISTED PULMONARY SVC/PX: CPT

## 2024-02-03 PROCEDURE — 25010000002 POTASSIUM CHLORIDE PER 2 MEQ OF POTASSIUM: Performed by: INTERNAL MEDICINE

## 2024-02-03 PROCEDURE — 85025 COMPLETE CBC W/AUTO DIFF WBC: CPT | Performed by: INTERNAL MEDICINE

## 2024-02-03 PROCEDURE — 25010000002 MICAFUNGIN SODIUM 100 MG RECONSTITUTED SOLUTION 1 EACH VIAL: Performed by: INTERNAL MEDICINE

## 2024-02-03 PROCEDURE — 25010000002 MAGNESIUM SULFATE PER 500 MG OF MAGNESIUM: Performed by: INTERNAL MEDICINE

## 2024-02-03 PROCEDURE — 82948 REAGENT STRIP/BLOOD GLUCOSE: CPT

## 2024-02-03 PROCEDURE — 87205 SMEAR GRAM STAIN: CPT | Performed by: INTERNAL MEDICINE

## 2024-02-03 PROCEDURE — 84145 PROCALCITONIN (PCT): CPT | Performed by: INTERNAL MEDICINE

## 2024-02-03 PROCEDURE — 25010000002 CALCIUM GLUCONATE PER 10 ML: Performed by: INTERNAL MEDICINE

## 2024-02-03 PROCEDURE — 25010000002 METOCLOPRAMIDE PER 10 MG: Performed by: SURGERY

## 2024-02-03 PROCEDURE — 94761 N-INVAS EAR/PLS OXIMETRY MLT: CPT

## 2024-02-03 PROCEDURE — 99233 SBSQ HOSP IP/OBS HIGH 50: CPT | Performed by: INTERNAL MEDICINE

## 2024-02-03 PROCEDURE — 94664 DEMO&/EVAL PT USE INHALER: CPT

## 2024-02-03 RX ORDER — BUMETANIDE 0.25 MG/ML
2 INJECTION INTRAMUSCULAR; INTRAVENOUS ONCE
Status: COMPLETED | OUTPATIENT
Start: 2024-02-03 | End: 2024-02-03

## 2024-02-03 RX ADMIN — MEROPENEM 2000 MG: 1 INJECTION, POWDER, FOR SOLUTION INTRAVENOUS at 08:28

## 2024-02-03 RX ADMIN — HEPARIN SODIUM 5000 UNITS: 5000 INJECTION INTRAVENOUS; SUBCUTANEOUS at 22:34

## 2024-02-03 RX ADMIN — PANTOPRAZOLE SODIUM 40 MG: 40 INJECTION, POWDER, LYOPHILIZED, FOR SOLUTION INTRAVENOUS at 08:25

## 2024-02-03 RX ADMIN — METOCLOPRAMIDE HYDROCHLORIDE 10 MG: 5 INJECTION INTRAMUSCULAR; INTRAVENOUS at 00:18

## 2024-02-03 RX ADMIN — BUDESONIDE AND FORMOTEROL FUMARATE DIHYDRATE 2 PUFF: 160; 4.5 AEROSOL RESPIRATORY (INHALATION) at 08:00

## 2024-02-03 RX ADMIN — HEPARIN SODIUM 5000 UNITS: 5000 INJECTION INTRAVENOUS; SUBCUTANEOUS at 14:51

## 2024-02-03 RX ADMIN — Medication 10 ML: at 08:26

## 2024-02-03 RX ADMIN — ALBUTEROL SULFATE 2 PUFF: 90 AEROSOL, METERED RESPIRATORY (INHALATION) at 12:26

## 2024-02-03 RX ADMIN — ROSUVASTATIN 10 MG: 10 TABLET, FILM COATED ORAL at 20:30

## 2024-02-03 RX ADMIN — BUDESONIDE AND FORMOTEROL FUMARATE DIHYDRATE 2 PUFF: 160; 4.5 AEROSOL RESPIRATORY (INHALATION) at 20:13

## 2024-02-03 RX ADMIN — SENNOSIDES 1 TABLET: 8.6 TABLET, FILM COATED ORAL at 20:30

## 2024-02-03 RX ADMIN — BISACODYL 10 MG: 5 TABLET, COATED ORAL at 08:25

## 2024-02-03 RX ADMIN — BUMETANIDE 2 MG: 0.25 INJECTION INTRAMUSCULAR; INTRAVENOUS at 11:30

## 2024-02-03 RX ADMIN — METOCLOPRAMIDE HYDROCHLORIDE 10 MG: 5 INJECTION INTRAMUSCULAR; INTRAVENOUS at 18:11

## 2024-02-03 RX ADMIN — BUPROPION HYDROCHLORIDE 150 MG: 150 TABLET, EXTENDED RELEASE ORAL at 08:25

## 2024-02-03 RX ADMIN — METOCLOPRAMIDE HYDROCHLORIDE 10 MG: 5 INJECTION INTRAMUSCULAR; INTRAVENOUS at 12:53

## 2024-02-03 RX ADMIN — REVEFENACIN 175 MCG: 175 SOLUTION RESPIRATORY (INHALATION) at 08:00

## 2024-02-03 RX ADMIN — Medication 10 ML: at 20:30

## 2024-02-03 RX ADMIN — METOCLOPRAMIDE HYDROCHLORIDE 10 MG: 5 INJECTION INTRAMUSCULAR; INTRAVENOUS at 05:51

## 2024-02-03 RX ADMIN — MICAFUNGIN 100 MG: 20 INJECTION, POWDER, LYOPHILIZED, FOR SOLUTION INTRAVENOUS at 09:42

## 2024-02-03 RX ADMIN — SMOFLIPID 250 ML: 6; 6; 5; 3 INJECTION, EMULSION INTRAVENOUS at 18:07

## 2024-02-03 RX ADMIN — MEROPENEM 2000 MG: 1 INJECTION, POWDER, FOR SOLUTION INTRAVENOUS at 00:18

## 2024-02-03 RX ADMIN — HEPARIN SODIUM 5000 UNITS: 5000 INJECTION INTRAVENOUS; SUBCUTANEOUS at 05:51

## 2024-02-03 RX ADMIN — POTASSIUM PHOSPHATE, MONOBASIC POTASSIUM PHOSPHATE, DIBASIC: 224; 236 INJECTION, SOLUTION, CONCENTRATE INTRAVENOUS at 18:04

## 2024-02-03 RX ADMIN — FOLIC ACID 1 MG: 5 INJECTION, SOLUTION INTRAMUSCULAR; INTRAVENOUS; SUBCUTANEOUS at 08:28

## 2024-02-03 RX ADMIN — MEROPENEM 2000 MG: 1 INJECTION, POWDER, FOR SOLUTION INTRAVENOUS at 16:33

## 2024-02-03 RX ADMIN — THIAMINE HYDROCHLORIDE 100 MG: 100 INJECTION, SOLUTION INTRAMUSCULAR; INTRAVENOUS at 08:26

## 2024-02-03 NOTE — PROGRESS NOTES
"Patient Name:  Roger Bhat  YOB: 1960  [id number]    Surgery Progress Note    Date of visit: 2/3/2024      Subjective: No significant changes.  Slept some overnight.  Had some increased pain around 1 AM.  Moved to the chair and the pain is better.  He describes this as pain in his upper back.  Increased oxygen requirement to 5 L.  He tells me that he is intermittently short of breath.  He reports no nausea or vomiting.  NG tube had 650 mL of output yesterday.  Stoma had 100 mL of output.  Urine output 1775 mL          Objective:     BP (!) 176/101   Pulse 104   Temp 98.2 °F (36.8 °C) (Axillary)   Resp 18   Ht 175.3 cm (69\")   Wt 59.8 kg (131 lb 13.4 oz)   SpO2 93%   BMI 19.47 kg/m²     Intake/Output Summary (Last 24 hours) at 2/3/2024 0941  Last data filed at 2/3/2024 0556  Gross per 24 hour   Intake 3386.5 ml   Output 2405 ml   Net 981.5 ml       GEN:   Awake, alert, sitting up in bed, chronically ill-appearing in no obvious distress  CV:      Regular rate and rhythm  L:         Symmetric expansion, on 4 L of oxygen by nasal cannula with coarse respirations  Abd:    Soft, moderately distended, appropriately tender palpation throughout the abdomen, midline incision with some reactive erythema surrounding the site and serous drainage, DELROY drain in the right lower quadrant with serosanguineous output, ileostomy on the right side pink and patent with scant stool in the bag  Ext:     No cyanosis, clubbing, or edema    Recent labs that are back at this time have been reviewed.           Assessment/ Plan:    Mr. Bhat is a 63-year-old gentleman who is admitted following operative intervention for gallbladder and sigmoid colon mass on January 17     #Sigmoid colon mass, gallbladder mass  # Ileus  -Postop day 17 following lap vladimir, open sigmoid colectomy with diverting loop ileostomy, postoperative day 4 following exploratory laparotomy with washout  -Has been slightly more tachypneic and " oxygen requirement is up at 5 L, however appears stable  -White blood cell count continues to downtrend.  20,000 today  -Still not with significant ileostomy output.  -Continue NG tube to low wall suction.  Okay for ice chips and small sips of clears for comfort  -Continue TPN   -Continue antibiotics.  White count has improved since we broaden his antibiotics on Tuesday  -No significant changes from my standpoint today.  Awaiting resolution of ileus and continuing with supportive measures including TPN, nasogastric decompression, and symptomatic management         Jimmy Craig MD  2/3/2024  09:41 EST

## 2024-02-03 NOTE — PROGRESS NOTES
CPN Review Note    Patient Name: Roger Bhat  Date of Encounter: 24 10:07 EST  MRN: [id number]  Admission date: 2024    Reason for visit: CPN review . RD to continue to follow per protocol.     Information Obtained: Continues with TPN, NPO awaiting ileus resolution. No significant changes with plans for continuing TPN, sips for comfort per surgeon note.     EMR reviewed:   ml, ileostomy 100 ml/hr   Medication reviewed: yes   Labs reviewed: yes Na 135    Est. Needs (24):  ~1700 kcal  ~90 g protein (1.5 g/kg)    Current diet: Adult Central 2-in-1 TPN  NPO Diet NPO Type: Sips with Meds, Ice Chips    PN Route: PICC   PN:   D13%, AA4.7%  @ goal 80 ml/hr             GIR: 2.9 mg/kg/min  Lipid: 250 ml 20% SMOF daily     PN@ Goal over 24hr to Supply:  Volume 1920 mL/day       Energy 1710 Kcal/day 101 % Est Need   Protein 90 g/day 110 % Est Need     ---------------------------------------------------------------------------  Formula/Rate verified at bedside: Yes  Infusing Rate at time of visit: 100ml    Average Delivery from Chartin Day:   PN Volume 2344 mL/day      98 %previous goal   Lipid delivered?  241 mL/day     Energy  Kcal/day  % Est Need   Protein  g/day  % Est Need       Intervention:  Follow treatment plan  Care plan reviewed    Continue current TPN regimen as above    Follow up:   Per protocol      Stacie Maldonado RD, [facility]  10:14 EST  Time: 25min

## 2024-02-03 NOTE — PROGRESS NOTES
INTENSIVIST   PROGRESS NOTE        SUBJECTIVE     Roger 63 y.o. male is followed for: No chief complaint on file.       Colonic mass    Current smoker    Severe malnutrition    Respiratory distress    As an Intensivist, we provide an integrated approach to the ICU patient and family, medical management of comorbid conditions, including but not limited to electrolytes, glycemic control, organ dysfunction, lead interdisciplinary rounds and coordinate the care with all other services, including those from other specialists.     Interval History:  POD: 4 Days Post-Op (EXP LAP 23)    (+) Wet cough    (+) Dyspnea    Requiring more FiO2    PCA working well.    Temp  Min: 97.7 °F (36.5 °C)  Max: 98.8 °F (37.1 °C)     History     Last Reviewed by Holly Hylton, EVELIO on 2024 at  2:24 PM    Sections Reviewed    Medical, Surgical, Family, Tobacco, Custom, Alcohol, Drug Use, Sexual   Activity    Problem list reviewed by Monster Mock MD on 2024 at  6:14 PM  Medicines reviewed by Monster Mock MD on 2024 at  6:14 PM  Allergies reviewed by Monster Mock MD on 2024 at  6:14 PM    The patient's relevant past medical, surgical and social history were reviewed and updated in Epic as appropriate.        OBJECTIVE     Vitals:  Temp: 97.7 °F (36.5 °C) (24 0801) Temp  Min: 97.7 °F (36.5 °C)  Max: 98.8 °F (37.1 °C)   Temp core:      BP: 142/95 (24 1000) BP  Min: 119/84  Max: 180/102   MAP (non-invasive) Noninvasive MAP (mmHg): 113 (24 1000) Noninvasive MAP (mmHg)  Av.3  Min: 64  Max: 138   Pulse: 95 (24 1243) Pulse  Min: 81  Max: 108   Resp: 22 (24 1243) Resp  Min: 16  Max: 40   SpO2: 94 % (24 1243) SpO2  Min: 88 %  Max: 98 %   Device: heated, high-flow nasal cannula, humidified (24 1243)    Flow Rate: (S) 50 (24 1243) Flow (L/min)  Min: 3  Max: 50     Medications (drips):  Adult Central 2-in-1 TPN, Last Rate: 80 mL/hr at 24 1806  HYDROmorphone  HCl-NaCl  Pharmacy to Dose TPN      Physical Examination  Telemetry:  Rhythm: normal sinus rhythm, sinus tachycardia (02/03/24 1000)      Constitutional:  No acute distress.   Cardiovascular: RRR.    Respiratory: Normal breath sounds  (+) Rhonchi   Abdominal:  Soft with no tenderness.   Extremities: No Edema   Neurological:   Alert, Oriented, Cooperative.  Best Eye Response: 4-->(E4) spontaneous (02/03/24 1000)  Best Motor Response: 6-->(M6) obeys commands (02/03/24 1000)  Best Verbal Response: 5-->(V5) oriented (02/03/24 1000)  [place] Coma Scale Score: 15 (02/03/24 1000)     Results Reviewed:  Laboratory  Microbiology  Radiology  Pathology    Hematology:  Results from last 7 days   Lab Units 02/03/24 0359 02/02/24 0431 02/01/24 0426   WBC 10*3/mm3 20.69* 31.68* 47.34*   BANDS % %  --  12.0* 18.0*   HEMOGLOBIN g/dL 10.3* 10.6* 10.2*   MCV fL 101.0* 102.0* 101.4*   PLATELETS 10*3/mm3 391 354 363     Results from last 7 days   Lab Units 02/03/24  0359 02/02/24 0431 02/01/24  0426 01/31/24  0719   NEUTROS ABS 10*3/mm3 17.36* 28.51* 44.97* 45.11*  44.76*   LYMPHS ABS 10*3/mm3 1.60  --   --  1.32   EOS ABS 10*3/mm3 0.28 0.63* 0.00 0.00  0.00     Chemistry:  Estimated Creatinine Clearance: 152.6 mL/min (A) (by C-G formula based on SCr of 0.43 mg/dL (L)).    Results from last 7 days   Lab Units 02/03/24 0359 02/02/24 0431   SODIUM mmol/L 135* 137   POTASSIUM mmol/L 4.4 3.9   CHLORIDE mmol/L 99 100   CO2 mmol/L 29.0 26.0   BUN mg/dL 16 16   CREATININE mg/dL 0.43* 0.39*   GLUCOSE mg/dL 118* 95     Results from last 7 days   Lab Units 02/03/24 0359 02/02/24 0431 01/30/24  0538 01/29/24  0541   IONIZED CALCIUM mmol/L  --   --   --  1.27   CALCIUM mg/dL 8.8 8.8   < > 8.6   MAGNESIUM mg/dL 2.2 2.2   < > 2.2   PHOSPHORUS mg/dL 3.0 2.9   < > 3.6    < > = values in this interval not displayed.     Hepatic Panel:  Results from last 7 days   Lab Units 01/31/24  0520 01/29/24  0541   ALBUMIN g/dL 3.1* 3.2*   TOTAL PROTEIN  g/dL 5.2* 5.7*   BILIRUBIN mg/dL 0.5 0.3   BILIRUBIN DIRECT mg/dL 0.2 <0.2   AST (SGOT) U/L 15 16   ALT (SGPT) U/L 10 18   ALK PHOS U/L 52 79     Cardiac Labs:  Results from last 7 days   Lab Units 01/31/24  0520 01/30/24  2337   HSTROP T ng/L 18 26*     Biomarkers:  Results from last 7 days   Lab Units 02/03/24  0359 02/02/24  0431 02/01/24  0426 01/31/24  0520 01/30/24  2336 01/29/24  0541   CRP mg/dL  --   --   --   --   --  9.45*   LACTATE mmol/L  --   --   --  1.7 2.2*  --    PROCALCITONIN ng/mL 0.94* 1.30* 2.90*  --   --   --      COVID-19  Lab Results   Component Value Date    COVID19 Not Detected 11/14/2021    COVID19 Not Detected 10/17/2021    COVID19 Not Detected 09/13/2021     Images:  XR Chest 1 View    Result Date: 2/2/2024  Impression: Right lung pneumonia appears similar. Left lower lobe pneumonia has worsened. There may be an associated small effusion. Electronically Signed: Petty Stein MD  2/2/2024 7:42 AM EST  Workstation ID: [id number]     Echo:      Results: Reviewed.  I reviewed the patient's new laboratory and imaging results.  I independently reviewed the patient's new images.    Medications: Reviewed.    Assessment   A/P     Hospital:  LOS: 17 days   ICU: 3d 17h     Active Hospital Problems    Diagnosis  POA    **Colonic mass [K63.89]  Yes    Severe malnutrition [E43]  Yes    Respiratory distress [R06.03]  Yes    Current smoker [F17.200]  Yes     Roger is a 63 y.o. male admitted on 1/17/2024 with Colonic mass [K63.89]    Assessment/Management/Treatment Plan:    Gallbladder and Colonic mass  01/17/24 S/P Lap Johana, open sigmoid colectomy with diverting loop ileostomy, liver biopsy.   01/30/24 S/P Exploratory Lap and ALBERTO 01/30/24    Lab Results   Lab Value Date/Time    FINALDX  01/17/2024 1137     1.  GALLBLADDER, CHOLECYSTECTOMY:  Chronic cholecystitis and cholelithiasis.  Adenomyoma.  No dysplasia identified.  Benign cystic duct lymph node.  Adhesed portions of liver with inflammatory  changes.    2.  LIVER, CORE NEEDLE BIOPSIES:  Benign hepatic tissue.  Increased stainable iron (see comment).  No fibrosis identified on trichrome stain with appropriate control.    3.  SIGMOID COLON, PARTIAL COLECTOMY:  Benign polyp, 4.2 cm in maximum size with features of inflammatory polyp (see comment).  16 lymph nodes negative for metastatic carcinoma (0/16).    4.  PROXIMAL ANASTOMOTIC DOUGHNUT:  Benign colonic tissue.    5.  DISTAL ANASTOMOTIC DONUT:  Benign colonic tissue.    6.  APPENDIX, APPENDECTOMY:  Acute appendicitis.      Cardiovascular  HTN  Dyslipidemia   Tobacco use  Emphysema as noticed in CT Chest 12/27/2023  Several new irregular nodular densities within the RLL, with the largest one measuring 9 mm.  GERD  Nutrition Support: Parenteral Nutrition     Lab Results   Component Value Date    PREALBUMIN 12.7 (L) 01/29/2024    PREALBUMIN 8.8 (L) 01/26/2024    CRP 9.45 (H) 01/29/2024    CRP 18.23 (H) 01/26/2024     Endocrine   Body mass index is 19.47 kg/m². Normal: 18.5-24.9kg/m2  No history of Diabetes    Lab Results   Lab Value Date/Time    HGBA1C 5.00 01/10/2024 1508    HGBA1C 5.3 11/28/2022 1111    HGBA1C 5.20 08/17/2021 1149     Results from last 7 days   Lab Units 02/03/24  1122 02/03/24  0522 02/02/24  2321 02/02/24  1729 02/02/24  1112 02/02/24  0508 02/01/24  2338 02/01/24  1731   GLUCOSE mg/dL 122 125 113 110 123 118 115 113       Diet: Adult Central 2-in-1 TPN  NPO Diet NPO Type: Sips with Meds, Ice Chips   Advance Directives: Code Status and Medical Interventions:   Ordered at: 01/17/24 1734     Code Status (Patient has no pulse and is not breathing):    CPR (Attempt to Resuscitate)     Medical Interventions (Patient has pulse or is breathing):    Full Support        DVT prophylaxis:  Medical and mechanical DVT prophylaxis orders are present.    In brief:  Leukocytosis and PCT better, continue BRODERICK and Micafungin  Decrease Goal IVF (including Parenteral Nutrition) to 60 mL/h  Diuresis  today.  Aerobika  Respiratory Culture  Follow up WBC and PCT in AM  Continue PCA pump.  Disposition: Keep in ICU.    Plan of care and goals reviewed during interdisciplinary rounds.  I discussed the patient's findings and my recommendations with patient and nursing staff    MDM:    Problem(s) High due to: Acute or Chronic illness or injury that may poses a threat to life or bodily function  Data: Moderate due to: Review or results of each unique test and Ordering of each unique test  Risk: High due to: drug(s) requiring intensive monitoring for toxicity (Parenteral Nutrition)    High    [x] Primary Attending Intensive Care Medicine - Nutrition Support   [] Consultant    Copied text in this note has been reviewed and is accurate as of 02/03/24

## 2024-02-04 ENCOUNTER — APPOINTMENT (OUTPATIENT)
Dept: GENERAL RADIOLOGY | Facility: HOSPITAL | Age: 64
End: 2024-02-04
Payer: COMMERCIAL

## 2024-02-04 LAB
ANION GAP SERPL CALCULATED.3IONS-SCNC: 8 MMOL/L (ref 5–15)
BASOPHILS # BLD AUTO: 0.07 10*3/MM3 (ref 0–0.2)
BASOPHILS NFR BLD AUTO: 0.4 % (ref 0–1.5)
BUN SERPL-MCNC: 21 MG/DL (ref 8–23)
BUN/CREAT SERPL: 50 (ref 7–25)
CALCIUM SPEC-SCNC: 8.8 MG/DL (ref 8.6–10.5)
CHLORIDE SERPL-SCNC: 99 MMOL/L (ref 98–107)
CO2 SERPL-SCNC: 31 MMOL/L (ref 22–29)
CREAT SERPL-MCNC: 0.42 MG/DL (ref 0.76–1.27)
DEPRECATED RDW RBC AUTO: 53.6 FL (ref 37–54)
EGFRCR SERPLBLD CKD-EPI 2021: 122.3 ML/MIN/1.73
EOSINOPHIL # BLD AUTO: 0.38 10*3/MM3 (ref 0–0.4)
EOSINOPHIL NFR BLD AUTO: 2.2 % (ref 0.3–6.2)
ERYTHROCYTE [DISTWIDTH] IN BLOOD BY AUTOMATED COUNT: 14.5 % (ref 12.3–15.4)
GLUCOSE BLDC GLUCOMTR-MCNC: 122 MG/DL (ref 70–130)
GLUCOSE BLDC GLUCOMTR-MCNC: 128 MG/DL (ref 70–130)
GLUCOSE BLDC GLUCOMTR-MCNC: 133 MG/DL (ref 70–130)
GLUCOSE BLDC GLUCOMTR-MCNC: 133 MG/DL (ref 70–130)
GLUCOSE SERPL-MCNC: 121 MG/DL (ref 65–99)
HCT VFR BLD AUTO: 30.1 % (ref 37.5–51)
HGB BLD-MCNC: 10.4 G/DL (ref 13–17.7)
IMM GRANULOCYTES # BLD AUTO: 0.22 10*3/MM3 (ref 0–0.05)
IMM GRANULOCYTES NFR BLD AUTO: 1.3 % (ref 0–0.5)
LYMPHOCYTES # BLD AUTO: 1.64 10*3/MM3 (ref 0.7–3.1)
LYMPHOCYTES NFR BLD AUTO: 9.5 % (ref 19.6–45.3)
MAGNESIUM SERPL-MCNC: 2.1 MG/DL (ref 1.6–2.4)
MCH RBC QN AUTO: 34.7 PG (ref 26.6–33)
MCHC RBC AUTO-ENTMCNC: 34.6 G/DL (ref 31.5–35.7)
MCV RBC AUTO: 100.3 FL (ref 79–97)
MONOCYTES # BLD AUTO: 1.2 10*3/MM3 (ref 0.1–0.9)
MONOCYTES NFR BLD AUTO: 7 % (ref 5–12)
NEUTROPHILS NFR BLD AUTO: 13.71 10*3/MM3 (ref 1.7–7)
NEUTROPHILS NFR BLD AUTO: 79.6 % (ref 42.7–76)
NRBC BLD AUTO-RTO: 0 /100 WBC (ref 0–0.2)
PHOSPHATE SERPL-MCNC: 2.9 MG/DL (ref 2.5–4.5)
PLATELET # BLD AUTO: 399 10*3/MM3 (ref 140–450)
PMV BLD AUTO: 10.5 FL (ref 6–12)
POTASSIUM SERPL-SCNC: 4.1 MMOL/L (ref 3.5–5.2)
PROCALCITONIN SERPL-MCNC: 0.56 NG/ML (ref 0–0.25)
RBC # BLD AUTO: 3 10*6/MM3 (ref 4.14–5.8)
SODIUM SERPL-SCNC: 138 MMOL/L (ref 136–145)
WBC NRBC COR # BLD AUTO: 17.22 10*3/MM3 (ref 3.4–10.8)

## 2024-02-04 PROCEDURE — 83735 ASSAY OF MAGNESIUM: CPT | Performed by: INTERNAL MEDICINE

## 2024-02-04 PROCEDURE — 94761 N-INVAS EAR/PLS OXIMETRY MLT: CPT

## 2024-02-04 PROCEDURE — 97530 THERAPEUTIC ACTIVITIES: CPT

## 2024-02-04 PROCEDURE — 80048 BASIC METABOLIC PNL TOTAL CA: CPT | Performed by: INTERNAL MEDICINE

## 2024-02-04 PROCEDURE — 25010000002 HEPARIN (PORCINE) PER 1000 UNITS: Performed by: SURGERY

## 2024-02-04 PROCEDURE — 99233 SBSQ HOSP IP/OBS HIGH 50: CPT | Performed by: INTERNAL MEDICINE

## 2024-02-04 PROCEDURE — 25010000002 MEROPENEM PER 100 MG: Performed by: INTERNAL MEDICINE

## 2024-02-04 PROCEDURE — 84100 ASSAY OF PHOSPHORUS: CPT | Performed by: INTERNAL MEDICINE

## 2024-02-04 PROCEDURE — 25010000002 POTASSIUM CHLORIDE PER 2 MEQ OF POTASSIUM: Performed by: INTERNAL MEDICINE

## 2024-02-04 PROCEDURE — 82948 REAGENT STRIP/BLOOD GLUCOSE: CPT

## 2024-02-04 PROCEDURE — 94664 DEMO&/EVAL PT USE INHALER: CPT

## 2024-02-04 PROCEDURE — 94799 UNLISTED PULMONARY SVC/PX: CPT

## 2024-02-04 PROCEDURE — 71045 X-RAY EXAM CHEST 1 VIEW: CPT

## 2024-02-04 PROCEDURE — 87070 CULTURE OTHR SPECIMN AEROBIC: CPT | Performed by: INTERNAL MEDICINE

## 2024-02-04 PROCEDURE — 84145 PROCALCITONIN (PCT): CPT | Performed by: INTERNAL MEDICINE

## 2024-02-04 PROCEDURE — 63710000001 REVEFENACIN 175 MCG/3ML SOLUTION: Performed by: INTERNAL MEDICINE

## 2024-02-04 PROCEDURE — 85025 COMPLETE CBC W/AUTO DIFF WBC: CPT | Performed by: INTERNAL MEDICINE

## 2024-02-04 PROCEDURE — 97110 THERAPEUTIC EXERCISES: CPT

## 2024-02-04 PROCEDURE — 25010000002 METOCLOPRAMIDE PER 10 MG: Performed by: SURGERY

## 2024-02-04 PROCEDURE — 25010000002 MAGNESIUM SULFATE PER 500 MG OF MAGNESIUM: Performed by: INTERNAL MEDICINE

## 2024-02-04 PROCEDURE — 25010000002 CALCIUM GLUCONATE PER 10 ML: Performed by: INTERNAL MEDICINE

## 2024-02-04 PROCEDURE — 25010000002 THIAMINE PER 100 MG: Performed by: SURGERY

## 2024-02-04 PROCEDURE — 87205 SMEAR GRAM STAIN: CPT | Performed by: INTERNAL MEDICINE

## 2024-02-04 PROCEDURE — 25010000002 MICAFUNGIN SODIUM 100 MG RECONSTITUTED SOLUTION 1 EACH VIAL: Performed by: INTERNAL MEDICINE

## 2024-02-04 RX ADMIN — MEROPENEM 2000 MG: 1 INJECTION, POWDER, FOR SOLUTION INTRAVENOUS at 16:26

## 2024-02-04 RX ADMIN — POTASSIUM PHOSPHATE, MONOBASIC POTASSIUM PHOSPHATE, DIBASIC: 224; 236 INJECTION, SOLUTION, CONCENTRATE INTRAVENOUS at 17:59

## 2024-02-04 RX ADMIN — BUDESONIDE AND FORMOTEROL FUMARATE DIHYDRATE 2 PUFF: 160; 4.5 AEROSOL RESPIRATORY (INHALATION) at 08:23

## 2024-02-04 RX ADMIN — MEROPENEM 2000 MG: 1 INJECTION, POWDER, FOR SOLUTION INTRAVENOUS at 23:42

## 2024-02-04 RX ADMIN — METOCLOPRAMIDE HYDROCHLORIDE 10 MG: 5 INJECTION INTRAMUSCULAR; INTRAVENOUS at 00:15

## 2024-02-04 RX ADMIN — SMOFLIPID 250 ML: 6; 6; 5; 3 INJECTION, EMULSION INTRAVENOUS at 18:08

## 2024-02-04 RX ADMIN — METOCLOPRAMIDE HYDROCHLORIDE 10 MG: 5 INJECTION INTRAMUSCULAR; INTRAVENOUS at 23:42

## 2024-02-04 RX ADMIN — REVEFENACIN 175 MCG: 175 SOLUTION RESPIRATORY (INHALATION) at 08:23

## 2024-02-04 RX ADMIN — BISACODYL 10 MG: 5 TABLET, COATED ORAL at 08:05

## 2024-02-04 RX ADMIN — METOCLOPRAMIDE HYDROCHLORIDE 10 MG: 5 INJECTION INTRAMUSCULAR; INTRAVENOUS at 18:00

## 2024-02-04 RX ADMIN — ROSUVASTATIN 10 MG: 10 TABLET, FILM COATED ORAL at 21:16

## 2024-02-04 RX ADMIN — FOLIC ACID 1 MG: 5 INJECTION, SOLUTION INTRAMUSCULAR; INTRAVENOUS; SUBCUTANEOUS at 08:00

## 2024-02-04 RX ADMIN — SENNOSIDES 1 TABLET: 8.6 TABLET, FILM COATED ORAL at 21:15

## 2024-02-04 RX ADMIN — MEROPENEM 2000 MG: 1 INJECTION, POWDER, FOR SOLUTION INTRAVENOUS at 00:15

## 2024-02-04 RX ADMIN — MICAFUNGIN 100 MG: 20 INJECTION, POWDER, LYOPHILIZED, FOR SOLUTION INTRAVENOUS at 08:40

## 2024-02-04 RX ADMIN — MEROPENEM 2000 MG: 1 INJECTION, POWDER, FOR SOLUTION INTRAVENOUS at 07:59

## 2024-02-04 RX ADMIN — HEPARIN SODIUM 5000 UNITS: 5000 INJECTION INTRAVENOUS; SUBCUTANEOUS at 21:14

## 2024-02-04 RX ADMIN — ACETAMINOPHEN 650 MG: 325 TABLET ORAL at 22:15

## 2024-02-04 RX ADMIN — HEPARIN SODIUM 5000 UNITS: 5000 INJECTION INTRAVENOUS; SUBCUTANEOUS at 14:55

## 2024-02-04 RX ADMIN — BUPROPION HYDROCHLORIDE 150 MG: 150 TABLET, EXTENDED RELEASE ORAL at 07:59

## 2024-02-04 RX ADMIN — Medication 10 ML: at 08:06

## 2024-02-04 RX ADMIN — HEPARIN SODIUM 5000 UNITS: 5000 INJECTION INTRAVENOUS; SUBCUTANEOUS at 05:22

## 2024-02-04 RX ADMIN — METOCLOPRAMIDE HYDROCHLORIDE 10 MG: 5 INJECTION INTRAMUSCULAR; INTRAVENOUS at 12:17

## 2024-02-04 RX ADMIN — Medication 10 ML: at 21:16

## 2024-02-04 RX ADMIN — METOCLOPRAMIDE HYDROCHLORIDE 10 MG: 5 INJECTION INTRAMUSCULAR; INTRAVENOUS at 05:22

## 2024-02-04 RX ADMIN — PANTOPRAZOLE SODIUM 40 MG: 40 INJECTION, POWDER, LYOPHILIZED, FOR SOLUTION INTRAVENOUS at 07:59

## 2024-02-04 RX ADMIN — THIAMINE HYDROCHLORIDE 100 MG: 100 INJECTION, SOLUTION INTRAMUSCULAR; INTRAVENOUS at 08:06

## 2024-02-04 NOTE — PLAN OF CARE
Goal Outcome Evaluation:  Plan of Care Reviewed With: patient           Outcome Evaluation: Pt on HFNC this date, limiting ambulation distance. Pt amb from recliner to EOB with CGA x2 with gait belt support, gave good effort with therex. Pt remains below baseline level of function for mobility and IPPT services continue to be warranted at this time.

## 2024-02-04 NOTE — THERAPY TREATMENT NOTE
"Patient Name: Roger Bhat  : 1960    MRN: [id number]                              Today's Date: 2024       Admit Date: 2024    Visit Dx:     ICD-10-CM ICD-9-CM   1. Colonic mass  K63.89 569.89   2. Gallbladder mass  K82.8 575.8   3. Bowel obstruction  K56.609 560.9     Patient Active Problem List   Diagnosis    Current smoker    Cellulitis of right hand    Gastroesophageal reflux disease without esophagitis    Screening, lipid    Dysphagia    Hereditary hemochromatosis    Gallbladder mass    Colonic mass    Severe malnutrition    Respiratory distress     Past Medical History:   Diagnosis Date    Allergies     Cellulitis of hand     Clotting disorder     \"FREE BLEEDING\"    Colonic mass     COPD (chronic obstructive pulmonary disease)     Cough     Current smoker     Erectile dysfunction     Fracture, foot Sept 2022    GERD (gastroesophageal reflux disease)     Hemochromatosis     Hyperlipidemia     Hypertension     Wears dentures     FULL SET     Past Surgical History:   Procedure Laterality Date    CHOLECYSTECTOMY N/A 2024    Procedure: CHOLECYSTECTOMY LAPAROSCOPIC;  Surgeon: Jimmy Craig MD;  Location: [facility] OR;  Service: General;  Laterality: N/A;    COLON RESECTION N/A 2024    Procedure: OPEN SIGMOID COLECTOMY, LIVER BIOPSY, DIVERTING LOOP ILEOSTOMY CREATION, TAKE DOWN OF THE SPLENIC FLEXURE, AND APPENDECTOMY;  Surgeon: Jimmy Craig MD;  Location:  [facility];  Service: General;  Laterality: N/A;    COLONOSCOPY      ENDOSCOPY  2016    pt say's, he was placed under general anesthesia for this    ENDOSCOPY N/A 10/19/2021    Procedure: ESOPHAGOGASTRODUODENOSCOPY;  Surgeon: Osmel Kessler MD;  Location:  [facility];  Service: Gastroenterology;  Laterality: N/A;  24 fr savory dilator used at 1251, 27 fr sdavory used at 1253, 33 Croatian savoruy used at 1257, 42 fr savory used at 1259      ENDOSCOPY N/A 2021    Procedure: ESOPHAGOGASTRODUODENOSCOPY " WITH DILATATION;  Surgeon: Osmel Kessler MD;  Location: [facility] ENDOSCOPY;  Service: Gastroenterology;  Laterality: N/A;  Dilation with 48fr savery    EXPLORATORY LAPAROTOMY N/A 1/30/2024    Procedure: LAPAROTOMY EXPLORATORY;  Surgeon: Jimmy Craig MD;  Location: [facility] OR;  Service: General;  Laterality: N/A;    HAND SURGERY  2022      General Information       Row Name 02/04/24 1600          Physical Therapy Time and Intention    Document Type therapy note (daily note)  -SD     Mode of Treatment physical therapy  -SD       Row Name 02/04/24 1600          General Information    Existing Precautions/Restrictions fall;oxygen therapy device and L/min;other (see comments);NPO  HFNC, ng, hillman, ostomy, hermelinda drain  -SD       Row Name 02/04/24 1600          Cognition    Orientation Status (Cognition) oriented x 3  -SD       Row Name 02/04/24 1600          Safety Issues, Functional Mobility    Safety Issues Affecting Function (Mobility) insight into deficits/self-awareness;sequencing abilities;safety precautions follow-through/compliance  -SD     Impairments Affecting Function (Mobility) endurance/activity tolerance;pain;strength;balance  -SD               User Key  (r) = Recorded By, (t) = Taken By, (c) = Cosigned By      Initials Name Provider Type    SD Mayda Gabriel, PT Physical Therapist                   Mobility       Row Name 02/04/24 1621          Bed Mobility    Bed Mobility sit-supine;scooting/bridging  -SD     Scooting/Bridging [place] (Bed Mobility) modified independence  -SD     Sit-Supine [place] (Bed Mobility) contact guard  -SD     Comment, (Bed Mobility) Pt able to perform bed mobility with assist for lines only  -SD       Row Name 02/04/24 1621          Transfers    Comment, (Transfers) STS from recliner with cues for safety with lines and sequencing  -SD       Row Name 02/04/24 1621          Sit-Stand Transfer    Sit-Stand [place] (Transfers) contact guard;verbal cues   -SD       Row Name 02/04/24 1621          Gait/Stairs (Locomotion)    [place] Level (Gait) contact guard;2 person assist;verbal cues  -SD     Distance in Feet (Gait) 10ft  -SD     Deviations/Abnormal Patterns (Gait) bilateral deviations;gait speed decreased;stride length decreased;base of support, narrow  -SD     Bilateral Gait Deviations forward flexed posture;heel strike decreased  -SD     Comment, (Gait/Stairs) Pt on HFNC this date, limiting ambulation distance. Pt amb from recliner to EOB with CGA x2 with gait belt support.  -SD               User Key  (r) = Recorded By, (t) = Taken By, (c) = Cosigned By      Initials Name Provider Type    Mayda Chowdary PT Physical Therapist                   Obj/Interventions       Row Name 02/04/24 1623          Motor Skills    Therapeutic Exercise hip;knee  -SD       Row Name 02/04/24 1623          Knee (Therapeutic Exercise)    Knee (Therapeutic Exercise) strengthening exercise  -SD     Knee Strengthening (Therapeutic Exercise) bilateral;LAQ (long arc quad);marching while seated;10 repetitions;sitting  -SD       Row Name 02/04/24 1623          Balance    Balance Assessment sitting static balance;standing static balance  -SD     Dynamic Sitting Balance supervision  -SD     Position, Sitting Balance unsupported;sitting in chair;sitting edge of bed  -SD     Dynamic Standing Balance contact guard  -SD     Position/Device Used, Standing Balance unsupported  -SD               User Key  (r) = Recorded By, (t) = Taken By, (c) = Cosigned By      Initials Name Provider Type    Mayda Chowdary PT Physical Therapist                   Goals/Plan    No documentation.                  Clinical Impression       Row Name 02/04/24 1624          Pain    Pretreatment Pain Rating 2/10  -SD     Posttreatment Pain Rating 2/10  -SD     Pain Location incisional  -SD     Pain Location - abdomen  -SD       Row Name 02/04/24 1624          Plan of Care Review    Plan of Care  Reviewed With patient  -SD     Outcome Evaluation Pt on HFNC this date, limiting ambulation distance. Pt amb from recliner to EOB with CGA x2 with gait belt support, gave good effort with therex. Pt remains below baseline level of function for mobility and IPPT services continue to be warranted at this time.  -SD       Row Name 02/04/24 1624          Vital Signs    Pre Systolic BP Rehab 144  -SD     Pre Treatment Diastolic BP 93  -SD     Posttreatment Heart Rate (beats/min) 97  -SD     O2 Delivery Pre Treatment hi-flow  -SD     Post SpO2 (%) 93  -SD     Pre Patient Position Sitting  -SD     Post Patient Position Supine  -SD       Row Name 02/04/24 1624          Positioning and Restraints    Pre-Treatment Position sitting in chair/recliner  -SD     Post Treatment Position bed  -SD     In Bed fowlers;call light within reach;encouraged to call for assist;with nsg;with family/caregiver  -SD               User Key  (r) = Recorded By, (t) = Taken By, (c) = Cosigned By      Initials Name Provider Type    Mayda Cohwdary, PT Physical Therapist                   Outcome Measures       Row Name 02/04/24 1628 02/04/24 0800       How much help from another person do you currently need...    Turning from your back to your side while in flat bed without using bedrails? 3  -SD 2  -AM    Moving from lying on back to sitting on the side of a flat bed without bedrails? 3  -SD 3  -AM    Moving to and from a bed to a chair (including a wheelchair)? 3  -SD 3  -AM    Standing up from a chair using your arms (e.g., wheelchair, bedside chair)? 3  -SD 3  -AM    Climbing 3-5 steps with a railing? 3  -SD 1  -AM    To walk in hospital room? 3  -SD 2  -AM    AM-PAC 6 Clicks Score (PT) 18  -SD 14  -AM    Highest Level of Mobility Goal 6 --> Walk 10 steps or more  -SD 4 --> Transfer to chair/commode  -AM      Row Name 02/04/24 1628 02/04/24 1538       Functional Assessment    Outcome Measure Options AM-PAC 6 Clicks Basic Mobility (PT)   -SD AM-PAC 6 Clicks Daily Activity (OT)  -CS              User Key  (r) = Recorded By, (t) = Taken By, (c) = Cosigned By      Initials Name Provider Type    SD Mayda Gabriel, PT Physical Therapist    CS Tim Flores, OT Occupational Therapist    AM Gina Salvador, RN Registered Nurse                                 Physical Therapy Education       Title: PT OT SLP Therapies (In Progress)       Topic: Physical Therapy (Done)       Point: Mobility training (Done)       Learning Progress Summary             Patient Acceptance, E, VU,DU by SD at 2/4/2024 1628    Acceptance, E,TB, VU by ES at 2/2/2024 1441   Significant Other Acceptance, E,TB, VU by ES at 2/2/2024 1441                         Point: Home exercise program (Done)       Learning Progress Summary             Patient Acceptance, E, VU,DU by SD at 2/4/2024 1628                         Point: Body mechanics (Done)       Learning Progress Summary             Patient Acceptance, E, VU,DU by SD at 2/4/2024 1628    Acceptance, E,TB, VU by ES at 2/2/2024 1441   Significant Other Acceptance, E,TB, VU by ES at 2/2/2024 1441                         Point: Precautions (Done)       Learning Progress Summary             Patient Acceptance, E, VU,DU by SD at 2/4/2024 1628    Acceptance, E,TB, VU by ES at 2/2/2024 1441   Significant Other Acceptance, E,TB, VU by ES at 2/2/2024 1441                                         User Key       Initials Effective Dates Name Provider Type Discipline    SD 03/13/23 -  Mayda Gabriel, PT Physical Therapist PT     08/11/22 -  Holly Hylton PT Physical Therapist PT                  PT Recommendation and Plan     Plan of Care Reviewed With: patient  Outcome Evaluation: Pt on HFNC this date, limiting ambulation distance. Pt amb from recliner to EOB with CGA x2 with gait belt support, gave good effort with therex. Pt remains below baseline level of function for mobility and IPPT services continue to be warranted at  this time.     Time Calculation:         PT Charges       Row Name 02/04/24 1629             Time Calculation    Start Time 1600  -SD      PT Received On 02/04/24  -SD      PT Goal Re-Cert Due Date 02/12/24  -SD         Time Calculation- PT    Total Timed Code Minutes- PT 15 minute(s)  -SD         Timed Charges    57269 - PT Therapeutic Activity Minutes 15  -SD         Total Minutes    Timed Charges Total Minutes 15  -SD       Total Minutes 15  -SD                User Key  (r) = Recorded By, (t) = Taken By, (c) = Cosigned By      Initials Name Provider Type    Mayda Chowdary, EVELIO Physical Therapist                  Therapy Charges for Today       Code Description Service Date Service Provider Modifiers Qty    62539364512 HC PT THERAPEUTIC ACT EA 15 MIN 2/4/2024 Mayda Gabriel, PT GP 1            PT G-Codes  Outcome Measure Options: AM-PAC 6 Clicks Basic Mobility (PT)  AM-PAC 6 Clicks Score (PT): 18  AM-PAC 6 Clicks Score (OT): 15       Mayda Gabriel PT  2/4/2024

## 2024-02-04 NOTE — PROGRESS NOTES
CPN Review Note    Patient Name: Roger Bhat  Date of Encounter: 02/04/24 09:31 EST  MRN: [id number]  Admission date: 1/17/2024    Reason for visit: CPN review . RD to continue to follow per protocol.     Information Obtained: Continues with TPN, NPO awaiting ileus resolution. No significant changes with plans for continuing TPN, sips for comfort per surgeon note. Intensivist decreased volume yesterday, sodium normalized today.     EMR reviewed:  ml, ileostomy 45 ml out past 24 hr  Medication reviewed: yes   Labs reviewed: yes     Est. Needs (1/31/24):  ~1700 kcal  ~90 g protein (1.5 g/kg)    Current diet: NPO Diet NPO Type: Sips with Meds, Ice Chips  Adult Central 2-in-1 TPN  Adult Central 2-in-1 TPN    PN Route: PICC   PN:  D15.2%, AA6.25%  @ goal 60 ml/hr             GIR: 2.55 mg/kg/min  Lipid: 250 ml 20% SMOF daily     PN@ Goal over 24hr to Supply:  Volume 1440 mL/day       Energy 1608 Kcal/day 95 % Est Need   Protein 90 g/day 110 % Est Need     ---------------------------------------------------------------------------  Formula/Rate verified at bedside: No  Infusing Rate at time of visit: 60 ml/hr per MAR    Average Delivery from Charting: Insufficient data   PN Volume ? mL/day       %previous goal   Lipid delivered?  230 mL/day     Energy  Kcal/day  % Est Need   Protein  g/day  % Est Need       Intervention:  Follow treatment plan  Care plan reviewed    Continue current TPN regimen as above    Follow up:   Per protocol      Stacie Maldonado RD, [facility]  09:38 EST  Time: 25min

## 2024-02-04 NOTE — PROGRESS NOTES
"Patient Name:  Roger Bhat  YOB: 1960  [id number]    Surgery Progress Note    Date of visit: 2/4/2024      Subjective:   - No acute events overnight  - Pain is well controlled  - Up from bed to chair this AM  - Still intermittently short of breath, now on HFNC. CXR stable this AM, states he is breathing comfortably on high flow  - Only 45 recorded out of ostomy, but bag is full of succus this AM  - NGT with 725 out      Objective:     BP (!) 157/102   Pulse 99   Temp 98.2 °F (36.8 °C) (Oral)   Resp (!) 32   Ht 175.3 cm (69\")   Wt 59.8 kg (131 lb 13.4 oz)   SpO2 96%   BMI 19.47 kg/m²     Intake/Output Summary (Last 24 hours) at 2/4/2024 1101  Last data filed at 2/4/2024 1000  Gross per 24 hour   Intake 1237.92 ml   Output 3200 ml   Net -1962.08 ml       GEN:   Awake, alert, sitting up in bed, chronically ill-appearing in no obvious distress  CV:      Regular rate and rhythm  L:         Symmetric expansion, on HFNC with coarse respirations  Abd:    Soft, moderately distended, appropriately tender palpation throughout the abdomen, midline incision with some reactive erythema surrounding the site and serous drainage, DELROY drain in the right lower quadrant with serosanguineous output, ileostomy on the right side pink and patent with stool in the bag  Ext:     No cyanosis, clubbing, or edema    Recent labs that are back at this time have been reviewed.       Assessment/ Plan:    Mr. Bhat is a 63-year-old gentleman who is admitted following operative intervention for gallbladder and sigmoid colon mass on January 17     #Sigmoid colon mass, gallbladder mass  # Ileus  -Postop day 18 following lap vladimir, open sigmoid colectomy with diverting loop ileostomy, postoperative day 5 following exploratory laparotomy with washout  -Has been slightly more tachypneic and oxygen requirement is up needing HFNC  -White blood cell count continues to downtrend. 17 today  -Ileostomy output increasing, but NGT " still putting out close to 1 L  -Continue NG tube to low wall suction.  Okay for ice chips and small sips of clears for comfort  -Continue TPN   -Continue antibiotics.  White count has improved since we broaden his antibiotics on Tuesday  -No significant changes from surgical standpoint. Continue TPN, nasogastric decompression until output drops, and symptomatic management         Gilbert Torres MD  2/4/2024  11:01 EST

## 2024-02-04 NOTE — PROGRESS NOTES
INTENSIVIST   PROGRESS NOTE        SUBJECTIVE     Roger 63 y.o. male is followed for: No chief complaint on file.       Colonic mass    Current smoker    Severe malnutrition    Respiratory distress    As an Intensivist, we provide an integrated approach to the ICU patient and family, medical management of comorbid conditions, including but not limited to electrolytes, glycemic control, organ dysfunction, lead interdisciplinary rounds and coordinate the care with all other services, including those from other specialists.     Interval History:  POD: 5 Days Post-Op (EXP LAP 23)    Watching Football on his iPad.    Doing better.    Weaning FiO2     Device (Oxygen Therapy): high-flow nasal cannula (24 1502): Flow (L/min): 40 (24 1502). Oxygen Concentration (%): 38 (24 1502).     Temp  Min: 97.6 °F (36.4 °C)  Max: 98.3 °F (36.8 °C)     History     Last Reviewed by Holly Hylton, PT on 2024 at  2:24 PM    Sections Reviewed    Medical, Surgical, Family, Tobacco, Custom, Alcohol, Drug Use, Sexual   Activity    Problem list reviewed by Monster Mock MD on 2024 at  6:14 PM  Medicines reviewed by Monster Mock MD on 2024 at  6:14 PM  Allergies reviewed by Monster Mock MD on 2024 at  6:14 PM    The patient's relevant past medical, surgical and social history were reviewed and updated in Epic as appropriate.        OBJECTIVE     Vitals:  Temp: 98.2 °F (36.8 °C) (24 1200) Temp  Min: 97.6 °F (36.4 °C)  Max: 98.3 °F (36.8 °C)   Temp core:      BP: 145/92 (24 1502) BP  Min: 131/91  Max: 169/107   MAP (non-invasive) Noninvasive MAP (mmHg): 109 (24 1502) Noninvasive MAP (mmHg)  Av.3  Min: 64  Max: 138   Pulse: 85 (24 1502) Pulse  Min: 85  Max: 103   Resp: 24 (24 1400) Resp  Min: 18  Max: 42   SpO2: 93 % (24 1502) SpO2  Min: 92 %  Max: 97 %   Device: high-flow nasal cannula (24 1502)    Flow Rate: 40 (24 1502) Flow (L/min)  Min: 35   Max: 50     Medications (drips):  Adult Central 2-in-1 TPN, Last Rate: 60 mL/hr at 02/03/24 1804  Adult Central 2-in-1 TPN  HYDROmorphone HCl-NaCl  Pharmacy to Dose TPN      Intake & Output (last 3 days)         02/01 0701 02/02 0700 02/02 0701 02/03 0700 02/03 0701 02/04 0700 02/04 0701 02/05 0700    P.O.   300 150    I.V. (mL/kg) 708 (11.8) 897.5 (15) 295.4 (4.9) 6 (0.1)    NG/ 160 110 60    IV Piggyback 184 115 350     TPN 2618.9 2585 230 574.8    Total Intake(mL/kg) 3630.9 (60.6) 3757.5 (62.8) 1285.4 (21.5) 790.8 (13.2)    Urine (mL/kg/hr) 1265 (0.9) 1775 (1.2) 2300 (1.6) 475 (0.9)    Emesis/NG output 1025 650 725 325    Drains 18 5 5 0    Stool 100 100 45 100    Total Output 2408 2530 3075 900    Net +1222.9 +1227.5 -1789.6 -109.2            Urine Unmeasured Occurrence 3 x             Physical Examination  Telemetry:  Rhythm: normal sinus rhythm, sinus tachycardia (02/04/24 1400)      Constitutional:  No acute distress.   Cardiovascular: RRR.    Respiratory: Normal breath sounds  (+) Rhonchi   Abdominal:  Soft with no tenderness.   Extremities: No Edema   Neurological:   Alert, Oriented, Cooperative.  Best Eye Response: 4-->(E4) spontaneous (02/04/24 1400)  Best Motor Response: 6-->(M6) obeys commands (02/04/24 1400)  Best Verbal Response: 5-->(V5) oriented (02/04/24 1400)  [place] Coma Scale Score: 15 (02/04/24 1400)     Results Reviewed:  Laboratory  Microbiology  Radiology  Pathology    Hematology:  Results from last 7 days   Lab Units 02/04/24  0428 02/03/24  0359 02/02/24  0431 02/01/24  0426   WBC 10*3/mm3 17.22* 20.69* 31.68* 47.34*   BANDS % %  --   --  12.0* 18.0*   HEMOGLOBIN g/dL 10.4* 10.3* 10.6* 10.2*   MCV fL 100.3* 101.0* 102.0* 101.4*   PLATELETS 10*3/mm3 399 391 354 363     Results from last 7 days   Lab Units 02/04/24  0428 02/03/24  0359 02/02/24  0431 02/01/24  0426 01/31/24  0719   NEUTROS ABS 10*3/mm3 13.71* 17.36* 28.51*   < > 45.11*  44.76*   LYMPHS ABS 10*3/mm3 1.64 1.60  --    --  1.32   EOS ABS 10*3/mm3 0.38 0.28 0.63*   < > 0.00  0.00    < > = values in this interval not displayed.     Chemistry:  Estimated Creatinine Clearance: 156.2 mL/min (A) (by C-G formula based on SCr of 0.42 mg/dL (L)).    Results from last 7 days   Lab Units 02/04/24 0428 02/03/24  0359   SODIUM mmol/L 138 135*   POTASSIUM mmol/L 4.1 4.4   CHLORIDE mmol/L 99 99   CO2 mmol/L 31.0* 29.0   BUN mg/dL 21 16   CREATININE mg/dL 0.42* 0.43*   GLUCOSE mg/dL 121* 118*     Results from last 7 days   Lab Units 02/04/24 0428 02/03/24 0359 01/30/24  0538 01/29/24  0541   IONIZED CALCIUM mmol/L  --   --   --  1.27   CALCIUM mg/dL 8.8 8.8   < > 8.6   MAGNESIUM mg/dL 2.1 2.2   < > 2.2   PHOSPHORUS mg/dL 2.9 3.0   < > 3.6    < > = values in this interval not displayed.     Hepatic Panel:  Results from last 7 days   Lab Units 01/31/24 0520 01/29/24  0541   ALBUMIN g/dL 3.1* 3.2*   TOTAL PROTEIN g/dL 5.2* 5.7*   BILIRUBIN mg/dL 0.5 0.3   BILIRUBIN DIRECT mg/dL 0.2 <0.2   AST (SGOT) U/L 15 16   ALT (SGPT) U/L 10 18   ALK PHOS U/L 52 79     Cardiac Labs:  Results from last 7 days   Lab Units 01/31/24 0520 01/30/24  2337   HSTROP T ng/L 18 26*     Biomarkers:  Results from last 7 days   Lab Units 02/04/24 0428 02/03/24 0359 02/02/24  0431 02/01/24 0426 01/31/24  0520 01/30/24  2336 01/29/24  0541   CRP mg/dL  --   --   --   --   --   --  9.45*   LACTATE mmol/L  --   --   --   --  1.7 2.2*  --    PROCALCITONIN ng/mL 0.56* 0.94* 1.30*   < >  --   --   --     < > = values in this interval not displayed.     COVID-19  Lab Results   Component Value Date    COVID19 Not Detected 11/14/2021    COVID19 Not Detected 10/17/2021    COVID19 Not Detected 09/13/2021     Images:  XR Chest 1 View    Result Date: 2/4/2024  Impression: 1.Stable appearance of ill-defined multifocal airspace infiltrates bilaterally with diffuse interstitial changes. Bilateral pleural effusions are also noted, more pronounced on the left. 2.Stable positioning of  support lines/tubes. Electronically Signed: Isael Duron MD  2/4/2024 9:15 AM EST  Workstation ID: [id number]    XR Chest 1 View    Result Date: 2/3/2024  Impression: 1. Improving bilateral airspace disease. No new or worsening airspace consolidation. Electronically Signed: Thad Tiwari MD  2/3/2024 2:30 PM EST  Workstation ID: [id number]     Echo:      Results: Reviewed.  I reviewed the patient's new laboratory and imaging results.  I independently reviewed the patient's new images.    Medications: Reviewed.    Assessment   A/P     Hospital:  LOS: 18 days   ICU: 4d 20h     Active Hospital Problems    Diagnosis  POA    **Colonic mass [K63.89]  Yes    Severe malnutrition [E43]  Yes    Respiratory distress [R06.03]  Yes    Current smoker [F17.200]  Yes     Roger is a 63 y.o. male admitted on 1/17/2024 with Colonic mass [K63.89] for which he underwent open sigmoid colectomy with diverting loop ileostomy and liver biopsy on 01/17/24 and returned to OR for another exploratory lap, for lysis of adhesion. He has required Parenteral Nutrition. He also developed lung infiltrates. He was on antibiotics but because his leukemoid reaction, antibiotic was changed to BRODERICK and Micafungin. He continues to improve, weaning his FiO2.    Assessment/Management/Treatment Plan:    Gallbladder and Colonic mass  01/17/24 S/P Lap Johana, open sigmoid colectomy with diverting loop ileostomy, liver biopsy.   01/30/24 S/P Exploratory Lap and ALBERTO 01/30/24    Lab Results   Lab Value Date/Time    FINALDX  01/17/2024 1137     1.  GALLBLADDER, CHOLECYSTECTOMY:  Chronic cholecystitis and cholelithiasis.  Adenomyoma.  No dysplasia identified.  Benign cystic duct lymph node.  Adhesed portions of liver with inflammatory changes.    2.  LIVER, CORE NEEDLE BIOPSIES:  Benign hepatic tissue.  Increased stainable iron (see comment).  No fibrosis identified on trichrome stain with appropriate control.    3.  SIGMOID COLON, PARTIAL COLECTOMY:  Benign polyp,  4.2 cm in maximum size with features of inflammatory polyp (see comment).  16 lymph nodes negative for metastatic carcinoma (0/16).    4.  PROXIMAL ANASTOMOTIC DOUGHNUT:  Benign colonic tissue.    5.  DISTAL ANASTOMOTIC DONUT:  Benign colonic tissue.    6.  APPENDIX, APPENDECTOMY:  Acute appendicitis.      Cardiovascular  HTN  Dyslipidemia   Tobacco use  Emphysema as noticed in CT Chest 12/27/2023  Several new irregular nodular densities within the RLL, with the largest one measuring 9 mm.  GERD  Nutrition Support: Parenteral Nutrition     Lab Results   Component Value Date    PREALBUMIN 12.7 (L) 01/29/2024    PREALBUMIN 8.8 (L) 01/26/2024    CRP 9.45 (H) 01/29/2024    CRP 18.23 (H) 01/26/2024     Endocrine   Body mass index is 19.47 kg/m². Normal: 18.5-24.9kg/m2  No history of Diabetes    Lab Results   Lab Value Date/Time    HGBA1C 5.00 01/10/2024 1508    HGBA1C 5.3 11/28/2022 1111    HGBA1C 5.20 08/17/2021 1149     Results from last 7 days   Lab Units 02/04/24  1119 02/04/24  0516 02/03/24  2331 02/03/24  1717 02/03/24  1122 02/03/24  0522 02/02/24  2321 02/02/24  1729   GLUCOSE mg/dL 133* 133* 144* 114 122 125 113 110       Diet: NPO Diet NPO Type: Sips with Meds, Ice Chips  Adult Central 2-in-1 TPN  Adult Central 2-in-1 TPN   Advance Directives: Code Status and Medical Interventions:   Ordered at: 01/17/24 1734     Code Status (Patient has no pulse and is not breathing):    CPR (Attempt to Resuscitate)     Medical Interventions (Patient has pulse or is breathing):    Full Support        DVT prophylaxis:  Medical and mechanical DVT prophylaxis orders are present.    In brief:  Leukocytosis and PCT continue to decrease.   Continue Parenteral Nutrition   Continue Aerobika  Respiratory Culture - GS no organisms  Labs in AM including CRP and PAB  Continue PCA pump.  Disposition: Keep in ICU.    Plan of care and goals reviewed during interdisciplinary rounds.  I discussed the patient's findings and my recommendations  with patient and nursing staff    MDM:    Problem(s) High due to: Acute or Chronic illness or injury that may poses a threat to life or bodily function  Data: Moderate due to: Review or results of each unique test and Ordering of each unique test  Risk: High due to: drug(s) requiring intensive monitoring for toxicity (Parenteral Nutrition)    High    [x] Primary Attending Intensive Care Medicine - Nutrition Support   [] Consultant    Copied text in this note has been reviewed and is accurate as of 02/04/24

## 2024-02-04 NOTE — THERAPY TREATMENT NOTE
"Patient Name: Roger Bhat  : 1960    MRN: [id number]                              Today's Date: 2024       Admit Date: 2024    Visit Dx:     ICD-10-CM ICD-9-CM   1. Colonic mass  K63.89 569.89   2. Gallbladder mass  K82.8 575.8   3. Bowel obstruction  K56.609 560.9     Patient Active Problem List   Diagnosis    Current smoker    Cellulitis of right hand    Gastroesophageal reflux disease without esophagitis    Screening, lipid    Dysphagia    Hereditary hemochromatosis    Gallbladder mass    Colonic mass    Severe malnutrition    Respiratory distress     Past Medical History:   Diagnosis Date    Allergies     Cellulitis of hand     Clotting disorder     \"FREE BLEEDING\"    Colonic mass     COPD (chronic obstructive pulmonary disease)     Cough     Current smoker     Erectile dysfunction     Fracture, foot Sept 2022    GERD (gastroesophageal reflux disease)     Hemochromatosis     Hyperlipidemia     Hypertension     Wears dentures     FULL SET     Past Surgical History:   Procedure Laterality Date    CHOLECYSTECTOMY N/A 2024    Procedure: CHOLECYSTECTOMY LAPAROSCOPIC;  Surgeon: Jimmy Craig MD;  Location: [facility] OR;  Service: General;  Laterality: N/A;    COLON RESECTION N/A 2024    Procedure: OPEN SIGMOID COLECTOMY, LIVER BIOPSY, DIVERTING LOOP ILEOSTOMY CREATION, TAKE DOWN OF THE SPLENIC FLEXURE, AND APPENDECTOMY;  Surgeon: Jimmy Craig MD;  Location:  [facility] OR;  Service: General;  Laterality: N/A;    COLONOSCOPY      ENDOSCOPY  2016    pt say's, he was placed under general anesthesia for this    ENDOSCOPY N/A 10/19/2021    Procedure: ESOPHAGOGASTRODUODENOSCOPY;  Surgeon: Osmel Kessler MD;  Location:  [facility]COPY;  Service: Gastroenterology;  Laterality: N/A;  24 fr savory dilator used at 1251, 27 fr sdavory used at 1253, 33 [place] savoruy used at 1257, 42 fr savory used at 1259      ENDOSCOPY N/A 2021    Procedure: ESOPHAGOGASTRODUODENOSCOPY " WITH DILATATION;  Surgeon: Osmel Kessler MD;  Location: [facility] ENDOSCOPY;  Service: Gastroenterology;  Laterality: N/A;  Dilation with 48fr savery    EXPLORATORY LAPAROTOMY N/A 1/30/2024    Procedure: LAPAROTOMY EXPLORATORY;  Surgeon: Jimmy Craig MD;  Location: [facility] OR;  Service: General;  Laterality: N/A;    HAND SURGERY  2022      General Information       Row Name 02/04/24 1530          OT Time and Intention    Document Type therapy note (daily note)  -CS     Mode of Treatment occupational therapy  -CS       Row Name 02/04/24 1530          General Information    Patient Profile Reviewed yes  -CS     Existing Precautions/Restrictions fall;oxygen therapy device and L/min;other (see comments)  HFNC, abd incision, NG, DELROY  -CS     Barriers to Rehab medically complex  -CS       Row Name 02/04/24 1530          Cognition    Orientation Status (Cognition) oriented x 3  -CS       Row Name 02/04/24 1530          Safety Issues, Functional Mobility    Safety Issues Affecting Function (Mobility) insight into deficits/self-awareness;safety precaution awareness;safety precautions follow-through/compliance  -CS     Impairments Affecting Function (Mobility) endurance/activity tolerance;pain;strength;balance  -CS               User Key  (r) = Recorded By, (t) = Taken By, (c) = Cosigned By      Initials Name Provider Type    CS Tim Flores OT Occupational Therapist                     Mobility/ADL's       Row Name 02/04/24 1531          Bed Mobility    Comment, (Bed Mobility) UIC, returned to chair  -CS       Row Name 02/04/24 1531          Transfers    Transfers sit-stand transfer;stand-sit transfer  -CS     Comment, (Transfers) STS x 4 during session, last stand Rachael due to fatigue, postural cues upon standing, fading cues for improved sequencing/safety  -CS       Row Name 02/04/24 1531          Sit-Stand Transfer    Sit-Stand [place] (Transfers) contact guard;verbal cues  -CS       Row Name 02/04/24  1531          Stand-Sit Transfer    Stand-Sit [place] (Transfers) contact guard;verbal cues  -     Assistive Device (Stand-Sit Transfers) walker, front-wheeled  -       Row Name 02/04/24 1531          Functional Mobility    Functional Mobility- Comment pre-gait activities, MIP and lateral weight shifting  -       Row Name 02/04/24 1531          Activities of Daily Living    BADL Assessment/Intervention lower body dressing;grooming  -       Row Name 02/04/24 1531          Grooming Assessment/Training    [place] Level (Grooming) wash face, hands;set up  -     Position (Grooming) supported sitting  -       Row Name 02/04/24 1531          Lower Body Dressing Assessment/Training    [place] Level (Lower Body Dressing) don;socks;dependent (less than 25% patient effort)  -     Position (Lower Body Dressing) supported sitting  -     Comment, (Lower Body Dressing) reach to distal BLEs remains limited by abd pain and dynamic sitting balance deficit  -               User Key  (r) = Recorded By, (t) = Taken By, (c) = Cosigned By      Initials Name Provider Type     Tim Flores OT Occupational Therapist                   Obj/Interventions       [facility] Name 02/04/24 1533          Shoulder (Therapeutic Exercise)    Shoulder (Therapeutic Exercise) AROM (active range of motion)  -     Shoulder AROM (Therapeutic Exercise) bilateral;flexion;extension;10 repetitions;2 sets  -[facility] Name 02/04/24 1533          Elbow/Forearm (Therapeutic Exercise)    Elbow/Forearm (Therapeutic Exercise) AROM (active range of motion)  -     Elbow/Forearm AROM (Therapeutic Exercise) bilateral;flexion;extension;2 sets;10 repetitions  -       Row Name 02/04/24 1533          Motor Skills    Motor Skills functional endurance  -     Functional Endurance improving, stable on HFNC  -     Therapeutic Exercise shoulder;elbow/forearm;other (see comments)  BUE AROM incorporated into targeted reaching activities  paired with anterior weight shifting, sitting and standing sets  -CS       Row Name 02/04/24 1533          Balance    Balance Assessment sitting static balance;sitting dynamic balance;standing static balance;standing dynamic balance  -CS     Static Sitting Balance supervision  -CS     Dynamic Sitting Balance contact guard  -CS     Position, Sitting Balance unsupported;sitting in chair  -CS     Static Standing Balance contact guard  -CS     Dynamic Standing Balance contact guard  -CS     Position/Device Used, Standing Balance unsupported  -CS     Balance Interventions sitting;standing;sit to stand;core stability exercise;UE activity with balance activity;dynamic reaching  -CS     Comment, Balance no LOB during dynamic reaching (random, targeted) with Min challenge  -CS               User Key  (r) = Recorded By, (t) = Taken By, (c) = Cosigned By      Initials Name Provider Type     Tim Flores OT Occupational Therapist                   Goals/Plan    No documentation.                  Clinical Impression       Row Name 02/04/24 1534          Pain Assessment    Additional Documentation Pain Scale: FACES Pre/Post-Treatment (Group)  -       Row Name 02/04/24 1534          Pain Scale: FACES Pre/Post-Treatment    Pain: FACES Scale, Pretreatment 4-->hurts little more  -CS     Posttreatment Pain Rating 4-->hurts little more  -CS     Pain Location incisional  -CS     Pain Location - abdomen  -CS       Row Name 02/04/24 1537          Plan of Care Review    Plan of Care Reviewed With patient;friend  -     Progress improving  -     Outcome Evaluation Pt demonstrating good effort and participation with therapy. O2 sats stable on HFNC (40L/45%) during standing endurance activities with dynamic balance challenges, reach to distal BLEs remains Dep due to abd pain/discomfort. Remains below baseline due to ongoing pain, weakness, and decreased activity tolerance - will cont IPOT per POC as tolerated. Rec d/c to IRF.  -CS        Row Name 02/04/24 1534          Therapy Plan Review/Discharge Plan (OT)    Anticipated Discharge Disposition (OT) inpatient rehabilitation facility  -CS       Row Name 02/04/24 1534          Vital Signs    Pre Systolic BP Rehab 148  RN cleared for tx  -CS     Pre Treatment Diastolic BP 98  -CS     Post Systolic BP Rehab 154  -CS     Post Treatment Diastolic BP 96  -CS     O2 Delivery Pre Treatment hi-flow  -CS     O2 Delivery Intra Treatment hi-flow  -CS     O2 Delivery Post Treatment hi-flow  -CS     Pre Patient Position Sitting  -CS     Intra Patient Position Standing  -CS     Post Patient Position Sitting  -CS       Row Name 02/04/24 1534          Positioning and Restraints    Pre-Treatment Position sitting in chair/recliner  -CS     Post Treatment Position chair  -CS     In Chair notified nsg;reclined;sitting;call light within reach;encouraged to call for assist;with family/caregiver;exit alarm on;legs elevated;waffle cushion;heels elevated  -CS               User Key  (r) = Recorded By, (t) = Taken By, (c) = Cosigned By      Initials Name Provider Type    CS Tim Flores, OT Occupational Therapist                   Outcome Measures       Row Name 02/04/24 1538          How much help from another is currently needed...    Putting on and taking off regular lower body clothing? 2  -CS     Bathing (including washing, rinsing, and drying) 2  -CS     Toileting (which includes using toilet bed pan or urinal) 2  -CS     Putting on and taking off regular upper body clothing 2  -CS     Taking care of personal grooming (such as brushing teeth) 3  -CS     Eating meals 4  -CS     AM-PAC 6 Clicks Score (OT) 15  -CS       Row Name 02/04/24 0800          How much help from another person do you currently need...    Turning from your back to your side while in flat bed without using bedrails? 2  -AM     Moving from lying on back to sitting on the side of a flat bed without bedrails? 3  -AM     Moving to and from a bed  to a chair (including a wheelchair)? 3  -AM     Standing up from a chair using your arms (e.g., wheelchair, bedside chair)? 3  -AM     Climbing 3-5 steps with a railing? 1  -AM     To walk in hospital room? 2  -AM     AM-PAC 6 Clicks Score (PT) 14  -AM     Highest Level of Mobility Goal 4 --> Transfer to chair/commode  -AM       Row Name 02/04/24 1538          Functional Assessment    Outcome Measure Options AM-PAC 6 Clicks Daily Activity (OT)  -CS               User Key  (r) = Recorded By, (t) = Taken By, (c) = Cosigned By      Initials Name Provider Type    CS Tim Flores OT Occupational Therapist    AM Gina Salvador, RN Registered Nurse                    Occupational Therapy Education       Title: PT OT SLP Therapies (In Progress)       Topic: Occupational Therapy (In Progress)       Point: ADL training (In Progress)       Description:   Instruct learner(s) on proper safety adaptation and remediation techniques during self care or transfers.   Instruct in proper use of assistive devices.                  Learning Progress Summary             Patient Acceptance, E,D, NR by CS at 2/4/2024 1539    Acceptance, E, NR by CS1 at 2/2/2024 1444   Family Acceptance, E,D, NR by CS at 2/4/2024 1539                         Point: Home exercise program (In Progress)       Description:   Instruct learner(s) on appropriate technique for monitoring, assisting and/or progressing therapeutic exercises/activities.                  Learning Progress Summary             Patient Acceptance, E,D, NR by CS at 2/4/2024 1539   Family Acceptance, E,D, NR by CS at 2/4/2024 1539                         Point: Precautions (In Progress)       Description:   Instruct learner(s) on prescribed precautions during self-care and functional transfers.                  Learning Progress Summary             Patient Acceptance, E,D, NR by CS at 2/4/2024 1539    Acceptance, E, NR by CS1 at 2/2/2024 1444   Family Acceptance, E,D, NR by CS at  2/4/2024 1539                         Point: Body mechanics (In Progress)       Description:   Instruct learner(s) on proper positioning and spine alignment during self-care, functional mobility activities and/or exercises.                  Learning Progress Summary             Patient Acceptance, E,D, NR by  at 2/4/2024 1539    Acceptance, E, NR by 1 at 2/2/2024 1444   Family Acceptance, E,D, NR by  at 2/4/2024 1539                                         User Key       Initials Effective Dates Name Provider Type Discipline    1 09/02/21 -  Elena Velasquez OT Occupational Therapist OT     06/16/21 -  Tim Flores OT Occupational Therapist OT                  OT Recommendation and Plan     Plan of Care Review  Plan of Care Reviewed With: patient, friend  Progress: improving  Outcome Evaluation: Pt demonstrating good effort and participation with therapy. O2 sats stable on HFNC (40L/45%) during standing endurance activities with dynamic balance challenges, reach to distal BLEs remains Dep due to abd pain/discomfort. Remains below baseline due to ongoing pain, weakness, and decreased activity tolerance - will cont IPOT per POC as tolerated. Rec d/c to IRF.     Time Calculation:         Time Calculation- OT       Row Name 02/04/24 1539             Time Calculation- OT    OT Start Time 1230  -CS      OT Received On 02/04/24  -CS      OT Goal Re-Cert Due Date 02/12/24  -CS         Timed Charges    84772 - OT Therapeutic Exercise Minutes 8  -CS      39722 - OT Therapeutic Activity Minutes 15  -CS         Total Minutes    Timed Charges Total Minutes 23  -CS       Total Minutes 23  -CS                User Key  (r) = Recorded By, (t) = Taken By, (c) = Cosigned By      Initials Name Provider Type    CS Tim Flores OT Occupational Therapist                  Therapy Charges for Today       Code Description Service Date Service Provider Modifiers Qty    87944597779 HC OT THER PROC EA 15 MIN 2/4/2024  Tim Flores, OT GO 1    [id number]  OT THERAPEUTIC ACT EA 15 MIN 2/4/2024 Tim Flores, OT GO 1                 Tim Flores OT  2/4/2024

## 2024-02-04 NOTE — PLAN OF CARE
Goal Outcome Evaluation:  Plan of Care Reviewed With: patient, friend        Progress: improving  Outcome Evaluation: Pt demonstrating good effort and participation with therapy. O2 sats stable on HFNC (40L/45%) during standing endurance activities with dynamic balance challenges, reach to distal BLEs remains Dep due to abd pain/discomfort. Remains below baseline due to ongoing pain, weakness, and decreased activity tolerance - will cont IPOT per POC as tolerated. Rec d/c to IRF.      Anticipated Discharge Disposition (OT): inpatient rehabilitation facility

## 2024-02-05 PROBLEM — J43.2 CENTRILOBULAR EMPHYSEMA: Status: ACTIVE | Noted: 2024-02-05

## 2024-02-05 LAB
ALBUMIN SERPL-MCNC: 2.4 G/DL (ref 3.5–5.2)
ALBUMIN/GLOB SERPL: 0.7 G/DL
ALP SERPL-CCNC: 181 U/L (ref 39–117)
ALT SERPL W P-5'-P-CCNC: 36 U/L (ref 1–41)
ANION GAP SERPL CALCULATED.3IONS-SCNC: 8 MMOL/L (ref 5–15)
AST SERPL-CCNC: 25 U/L (ref 1–40)
BACTERIA SPEC AEROBE CULT: NORMAL
BACTERIA SPEC AEROBE CULT: NORMAL
BASOPHILS # BLD AUTO: 0.07 10*3/MM3 (ref 0–0.2)
BASOPHILS NFR BLD AUTO: 0.6 % (ref 0–1.5)
BILIRUB SERPL-MCNC: 0.2 MG/DL (ref 0–1.2)
BUN SERPL-MCNC: 21 MG/DL (ref 8–23)
BUN/CREAT SERPL: 53.8 (ref 7–25)
CALCIUM SPEC-SCNC: 9.1 MG/DL (ref 8.6–10.5)
CHLORIDE SERPL-SCNC: 101 MMOL/L (ref 98–107)
CO2 SERPL-SCNC: 28 MMOL/L (ref 22–29)
CREAT SERPL-MCNC: 0.39 MG/DL (ref 0.76–1.27)
CRP SERPL-MCNC: 9.46 MG/DL (ref 0–0.5)
DEPRECATED RDW RBC AUTO: 54.3 FL (ref 37–54)
EGFRCR SERPLBLD CKD-EPI 2021: 125.1 ML/MIN/1.73
EOSINOPHIL # BLD AUTO: 0.39 10*3/MM3 (ref 0–0.4)
EOSINOPHIL NFR BLD AUTO: 3.2 % (ref 0.3–6.2)
ERYTHROCYTE [DISTWIDTH] IN BLOOD BY AUTOMATED COUNT: 14.6 % (ref 12.3–15.4)
GLOBULIN UR ELPH-MCNC: 3.6 GM/DL
GLUCOSE BLDC GLUCOMTR-MCNC: 113 MG/DL (ref 70–130)
GLUCOSE BLDC GLUCOMTR-MCNC: 117 MG/DL (ref 70–130)
GLUCOSE BLDC GLUCOMTR-MCNC: 125 MG/DL (ref 70–130)
GLUCOSE SERPL-MCNC: 116 MG/DL (ref 65–99)
HCT VFR BLD AUTO: 30.9 % (ref 37.5–51)
HGB BLD-MCNC: 10.5 G/DL (ref 13–17.7)
IMM GRANULOCYTES # BLD AUTO: 0.16 10*3/MM3 (ref 0–0.05)
IMM GRANULOCYTES NFR BLD AUTO: 1.3 % (ref 0–0.5)
LYMPHOCYTES # BLD AUTO: 1.87 10*3/MM3 (ref 0.7–3.1)
LYMPHOCYTES NFR BLD AUTO: 15.5 % (ref 19.6–45.3)
MAGNESIUM SERPL-MCNC: 2.4 MG/DL (ref 1.6–2.4)
MCH RBC QN AUTO: 34.4 PG (ref 26.6–33)
MCHC RBC AUTO-ENTMCNC: 34 G/DL (ref 31.5–35.7)
MCV RBC AUTO: 101.3 FL (ref 79–97)
MONOCYTES # BLD AUTO: 0.81 10*3/MM3 (ref 0.1–0.9)
MONOCYTES NFR BLD AUTO: 6.7 % (ref 5–12)
NEUTROPHILS NFR BLD AUTO: 72.7 % (ref 42.7–76)
NEUTROPHILS NFR BLD AUTO: 8.79 10*3/MM3 (ref 1.7–7)
NRBC BLD AUTO-RTO: 0 /100 WBC (ref 0–0.2)
PHOSPHATE SERPL-MCNC: 2.6 MG/DL (ref 2.5–4.5)
PLATELET # BLD AUTO: 426 10*3/MM3 (ref 140–450)
PMV BLD AUTO: 10.4 FL (ref 6–12)
POTASSIUM SERPL-SCNC: 4.1 MMOL/L (ref 3.5–5.2)
PREALB SERPL-MCNC: 10.5 MG/DL (ref 20–40)
PROT SERPL-MCNC: 6 G/DL (ref 6–8.5)
RBC # BLD AUTO: 3.05 10*6/MM3 (ref 4.14–5.8)
SODIUM SERPL-SCNC: 137 MMOL/L (ref 136–145)
TRIGL SERPL-MCNC: 137 MG/DL (ref 0–150)
WBC NRBC COR # BLD AUTO: 12.09 10*3/MM3 (ref 3.4–10.8)

## 2024-02-05 PROCEDURE — 63710000001 REVEFENACIN 175 MCG/3ML SOLUTION: Performed by: INTERNAL MEDICINE

## 2024-02-05 PROCEDURE — 85025 COMPLETE CBC W/AUTO DIFF WBC: CPT | Performed by: INTERNAL MEDICINE

## 2024-02-05 PROCEDURE — 25010000002 METHYLNALTREXONE 12 MG/0.6ML SOLUTION: Performed by: SURGERY

## 2024-02-05 PROCEDURE — 97530 THERAPEUTIC ACTIVITIES: CPT

## 2024-02-05 PROCEDURE — 80053 COMPREHEN METABOLIC PANEL: CPT | Performed by: INTERNAL MEDICINE

## 2024-02-05 PROCEDURE — 94664 DEMO&/EVAL PT USE INHALER: CPT

## 2024-02-05 PROCEDURE — 94799 UNLISTED PULMONARY SVC/PX: CPT

## 2024-02-05 PROCEDURE — 86140 C-REACTIVE PROTEIN: CPT | Performed by: INTERNAL MEDICINE

## 2024-02-05 PROCEDURE — 25010000002 MICAFUNGIN SODIUM 100 MG RECONSTITUTED SOLUTION 1 EACH VIAL

## 2024-02-05 PROCEDURE — 25010000002 MEROPENEM PER 100 MG: Performed by: INTERNAL MEDICINE

## 2024-02-05 PROCEDURE — 83735 ASSAY OF MAGNESIUM: CPT | Performed by: INTERNAL MEDICINE

## 2024-02-05 PROCEDURE — 25010000002 MICAFUNGIN SODIUM 100 MG RECONSTITUTED SOLUTION 1 EACH VIAL: Performed by: INTERNAL MEDICINE

## 2024-02-05 PROCEDURE — 99233 SBSQ HOSP IP/OBS HIGH 50: CPT | Performed by: INTERNAL MEDICINE

## 2024-02-05 PROCEDURE — 25010000002 THIAMINE PER 100 MG: Performed by: SURGERY

## 2024-02-05 PROCEDURE — 25010000002 METOCLOPRAMIDE PER 10 MG: Performed by: SURGERY

## 2024-02-05 PROCEDURE — 84134 ASSAY OF PREALBUMIN: CPT | Performed by: INTERNAL MEDICINE

## 2024-02-05 PROCEDURE — 25010000002 HEPARIN (PORCINE) PER 1000 UNITS: Performed by: SURGERY

## 2024-02-05 PROCEDURE — 84100 ASSAY OF PHOSPHORUS: CPT | Performed by: INTERNAL MEDICINE

## 2024-02-05 PROCEDURE — 94761 N-INVAS EAR/PLS OXIMETRY MLT: CPT

## 2024-02-05 PROCEDURE — 82948 REAGENT STRIP/BLOOD GLUCOSE: CPT

## 2024-02-05 PROCEDURE — 84478 ASSAY OF TRIGLYCERIDES: CPT | Performed by: INTERNAL MEDICINE

## 2024-02-05 RX ORDER — ACETAMINOPHEN 160 MG/5ML
650 SOLUTION ORAL EVERY 6 HOURS PRN
Status: DISCONTINUED | OUTPATIENT
Start: 2024-02-05 | End: 2024-02-08

## 2024-02-05 RX ORDER — ROSUVASTATIN CALCIUM 10 MG/1
10 TABLET, COATED ORAL NIGHTLY
Status: DISCONTINUED | OUTPATIENT
Start: 2024-02-05 | End: 2024-02-08

## 2024-02-05 RX ORDER — ONDANSETRON 4 MG/1
4 TABLET, ORALLY DISINTEGRATING ORAL EVERY 6 HOURS PRN
Status: DISCONTINUED | OUTPATIENT
Start: 2024-02-05 | End: 2024-02-08

## 2024-02-05 RX ORDER — CALCIUM CARBONATE 500 MG/1
1 TABLET, CHEWABLE ORAL 2 TIMES DAILY PRN
Status: DISCONTINUED | OUTPATIENT
Start: 2024-02-05 | End: 2024-02-15 | Stop reason: HOSPADM

## 2024-02-05 RX ORDER — ONDANSETRON 2 MG/ML
4 INJECTION INTRAMUSCULAR; INTRAVENOUS EVERY 6 HOURS PRN
Status: DISCONTINUED | OUTPATIENT
Start: 2024-02-05 | End: 2024-02-08

## 2024-02-05 RX ORDER — ALUMINA, MAGNESIA, AND SIMETHICONE 2400; 2400; 240 MG/30ML; MG/30ML; MG/30ML
15 SUSPENSION ORAL EVERY 6 HOURS PRN
Status: DISCONTINUED | OUTPATIENT
Start: 2024-02-05 | End: 2024-02-08

## 2024-02-05 RX ORDER — BUPROPION HYDROCHLORIDE 100 MG/1
50 TABLET ORAL EVERY 8 HOURS SCHEDULED
Status: DISCONTINUED | OUTPATIENT
Start: 2024-02-05 | End: 2024-02-08

## 2024-02-05 RX ORDER — SENNOSIDES A AND B 8.6 MG/1
1 TABLET, FILM COATED ORAL NIGHTLY
Status: DISCONTINUED | OUTPATIENT
Start: 2024-02-05 | End: 2024-02-08

## 2024-02-05 RX ORDER — BUPROPION HYDROCHLORIDE 100 MG/1
50 TABLET ORAL EVERY 8 HOURS SCHEDULED
Status: DISCONTINUED | OUTPATIENT
Start: 2024-02-05 | End: 2024-02-05

## 2024-02-05 RX ORDER — HYDROMORPHONE HCL/0.9% NACL/PF 10 MG/50ML
PATIENT CONTROLLED ANALGESIA SYRINGE INTRAVENOUS CONTINUOUS
Status: DISCONTINUED | OUTPATIENT
Start: 2024-02-05 | End: 2024-02-07

## 2024-02-05 RX ADMIN — BUPROPION HYDROCHLORIDE 50 MG: 100 TABLET, FILM COATED ORAL at 08:44

## 2024-02-05 RX ADMIN — HEPARIN SODIUM 5000 UNITS: 5000 INJECTION INTRAVENOUS; SUBCUTANEOUS at 14:51

## 2024-02-05 RX ADMIN — ROSUVASTATIN 10 MG: 10 TABLET, FILM COATED ORAL at 20:09

## 2024-02-05 RX ADMIN — HEPARIN SODIUM 5000 UNITS: 5000 INJECTION INTRAVENOUS; SUBCUTANEOUS at 05:00

## 2024-02-05 RX ADMIN — BUPROPION HYDROCHLORIDE 50 MG: 100 TABLET, FILM COATED ORAL at 14:51

## 2024-02-05 RX ADMIN — MEROPENEM 2000 MG: 1 INJECTION, POWDER, FOR SOLUTION INTRAVENOUS at 16:20

## 2024-02-05 RX ADMIN — ASCORBIC ACID, VITAMIN A PALMITATE, CHOLECALCIFEROL, THIAMINE HYDROCHLORIDE, RIBOFLAVIN-5 PHOSPHATE SODIUM, PYRIDOXINE HYDROCHLORIDE, NIACINAMIDE, DEXPANTHENOL, ALPHA-TOCOPHEROL ACETATE, VITAMIN K1, FOLIC ACID, BIOTIN, CYANOCOBALAMIN: 200; 3300; 200; 6; 3.6; 6; 40; 15; 10; 150; 600; 60; 5 INJECTION, SOLUTION INTRAVENOUS at 17:53

## 2024-02-05 RX ADMIN — METOCLOPRAMIDE HYDROCHLORIDE 10 MG: 5 INJECTION INTRAMUSCULAR; INTRAVENOUS at 05:00

## 2024-02-05 RX ADMIN — Medication 10 ML: at 08:46

## 2024-02-05 RX ADMIN — THIAMINE HYDROCHLORIDE 100 MG: 100 INJECTION, SOLUTION INTRAMUSCULAR; INTRAVENOUS at 08:45

## 2024-02-05 RX ADMIN — FOLIC ACID 1 MG: 5 INJECTION, SOLUTION INTRAMUSCULAR; INTRAVENOUS; SUBCUTANEOUS at 09:01

## 2024-02-05 RX ADMIN — REVEFENACIN 175 MCG: 175 SOLUTION RESPIRATORY (INHALATION) at 09:45

## 2024-02-05 RX ADMIN — ACETAMINOPHEN 650 MG: 650 SOLUTION ORAL at 20:13

## 2024-02-05 RX ADMIN — METOCLOPRAMIDE HYDROCHLORIDE 10 MG: 5 INJECTION INTRAMUSCULAR; INTRAVENOUS at 12:16

## 2024-02-05 RX ADMIN — SENNOSIDES 1 TABLET: 8.6 TABLET, FILM COATED ORAL at 20:09

## 2024-02-05 RX ADMIN — SMOFLIPID 250 ML: 6; 6; 5; 3 INJECTION, EMULSION INTRAVENOUS at 17:53

## 2024-02-05 RX ADMIN — Medication 10 ML: at 20:09

## 2024-02-05 RX ADMIN — BUDESONIDE AND FORMOTEROL FUMARATE DIHYDRATE 2 PUFF: 160; 4.5 AEROSOL RESPIRATORY (INHALATION) at 09:45

## 2024-02-05 RX ADMIN — BUDESONIDE AND FORMOTEROL FUMARATE DIHYDRATE 2 PUFF: 160; 4.5 AEROSOL RESPIRATORY (INHALATION) at 19:50

## 2024-02-05 RX ADMIN — HEPARIN SODIUM 5000 UNITS: 5000 INJECTION INTRAVENOUS; SUBCUTANEOUS at 21:27

## 2024-02-05 RX ADMIN — BISACODYL 10 MG: 5 TABLET, COATED ORAL at 08:44

## 2024-02-05 RX ADMIN — MEROPENEM 2000 MG: 1 INJECTION, POWDER, FOR SOLUTION INTRAVENOUS at 08:44

## 2024-02-05 RX ADMIN — BUPROPION HYDROCHLORIDE 50 MG: 100 TABLET, FILM COATED ORAL at 21:27

## 2024-02-05 RX ADMIN — METOCLOPRAMIDE HYDROCHLORIDE 10 MG: 5 INJECTION INTRAMUSCULAR; INTRAVENOUS at 17:53

## 2024-02-05 RX ADMIN — Medication: at 02:23

## 2024-02-05 RX ADMIN — MICAFUNGIN 100 MG: 20 INJECTION, POWDER, LYOPHILIZED, FOR SOLUTION INTRAVENOUS at 12:16

## 2024-02-05 RX ADMIN — METHYLNALTREXONE BROMIDE 8 MG: 12 INJECTION, SOLUTION SUBCUTANEOUS at 17:54

## 2024-02-05 RX ADMIN — PANTOPRAZOLE SODIUM 40 MG: 40 INJECTION, POWDER, LYOPHILIZED, FOR SOLUTION INTRAVENOUS at 08:44

## 2024-02-05 NOTE — PAYOR COMM NOTE
"  [facility]# [id number]   Clinical Update      Utilization Review  Phone [phone]  Fax [phone]    [address] [address]           Roger Bhat (63 y.o. Male)       Date of Birth   1960    Social Security Number       Address   [address] [place] [id number]    Home Phone   [phone]    MRN   [id number]       Jain   Jainism    Marital Status   Significant Other                            Admission Date   1/17/24    Admission Type   Elective    Admitting Provider   Jimmy Craig MD    Attending Provider   Jimmy Craig MD    Department, Room/Bed   [facility] 2B ICU, N231/1       Discharge Date       Discharge Disposition       Discharge Destination                                 Attending Provider: Jimmy Craig MD    Allergies: No Known Allergies    Isolation: None   Infection: None   Code Status: CPR    Ht: 175.3 cm (69\")   Wt: 59.8 kg (131 lb 13.4 oz)    Admission Cmt: None   Principal Problem: Colonic mass [K63.89]                   Active Insurance as of 1/17/2024       Primary Coverage       Payor Plan Insurance Group Employer/Plan Group    [employer] BLUE CROSS ANTHEM BLUE CROSS BLUE SHIELD PPO [id number]       Payor Plan Address Payor Plan Phone Number Payor Plan Fax Number Effective Dates    [address] [phone]  2/1/2021 - None Entered    [place] [address]         Subscriber Name Subscriber Birth Date Member ID       ROGER BHAT 1960 [id number]                     Emergency Contacts        (Rel.) Home Phone Work Phone Mobile Phone    Ambar Daniels (Significant Other) [phone] -- [phone]    Seferino Dalal (Friend) [phone] -- [phone]              Oxygen Therapy (last 5 days)       Date/Time SpO2 Device (Oxygen Therapy) Flow (L/min) Oxygen Concentration (%) ETCO2 (mmHg)    02/05/24 1400 -- humidified;heated;high-flow nasal cannula 40 40 --    " 02/05/24 1300 93 -- -- -- --    02/05/24 1200 97 heated;humidified;high-flow nasal cannula 40 40 --    02/05/24 1100 95 -- -- -- --    02/05/24 1000 95 heated;humidified;high-flow nasal cannula 40 40 --    02/05/24 0945 94 heated;humidified;high-flow nasal cannula 40 40 --    02/05/24 0800 96 heated;humidified;high-flow nasal cannula 40 40 --    02/05/24 0700 97 -- -- -- --    02/05/24 0601 -- heated;humidified;high-flow nasal cannula 40 40 --    02/05/24 0600 96 -- -- -- --    02/05/24 0500 93 heated;humidified;high-flow nasal cannula 40 40 --    02/05/24 0400 95 heated;humidified;high-flow nasal cannula 40 40 --    02/05/24 0300 96 -- -- -- --    02/05/24 0200 94 heated;humidified;high-flow nasal cannula 40 40 --    02/05/24 0100 94 -- -- -- --    02/05/24 0000 94 heated;humidified;high-flow nasal cannula 40 40 --    02/04/24 2358 94 humidified;high-flow nasal cannula;heated 40 40 --    02/04/24 2339 95 heated;high-flow nasal cannula;humidified 40 40 --    02/04/24 2300 95 -- -- -- --    02/04/24 2200 95 heated;humidified;high-flow nasal cannula 40 40 --    02/04/24 2100 96 heated;humidified;high-flow nasal cannula 40 40 --    02/04/24 2045 -- heated;humidified;high-flow nasal cannula 40 40 --    02/04/24 1800 95 heated;high-flow nasal cannula;humidified 40 40 --    02/04/24 1700 94 -- -- -- --    02/04/24 1600 94 heated;high-flow nasal cannula;humidified 40 36 --    02/04/24 1502 93 high-flow nasal cannula 40 38 --    02/04/24 1400 96 heated;high-flow nasal cannula;humidified 40 38 --    02/04/24 1300 94 -- -- -- --    02/04/24 1200 96 heated;high-flow nasal cannula;humidified 40 40 --    02/04/24 1100 94 heated;high-flow nasal cannula;humidified 40 40 --    02/04/24 1000 96 heated;high-flow nasal cannula;humidified 35 36 --    02/04/24 0823 94 high-flow nasal cannula 40 62 --    02/04/24 0800 93 high-flow nasal cannula 40 60 --    02/04/24 0700 96 -- -- -- --    02/04/24 0600 92 -- 40 60 --    02/04/24 0500 96  -- -- -- --    02/04/24 0426 95 humidified;heated;high-flow nasal cannula 40 60 --    02/04/24 0400 93 -- 45 55 --    02/04/24 0300 92 -- -- -- --    02/04/24 0200 94 -- -- -- --    02/04/24 0100 93 -- -- -- --    02/04/24 0000 97 heated;humidified;high-flow nasal cannula 45 55 --    02/03/24 2300 96 -- -- -- --    02/03/24 2200 97 -- -- -- --    02/03/24 2100 96 -- -- -- --    02/03/24 2013 96 humidified;heated;high-flow nasal cannula 45 55 --    02/03/24 2000 97 heated;humidified;high-flow nasal cannula 45 55 --    02/03/24 1900 95 -- -- -- --    02/03/24 1800 95 -- 50 60 --    02/03/24 1700 95 -- -- -- --    02/03/24 1600 95 heated;high-flow nasal cannula;humidified 50 60 --    02/03/24 1500 93 -- -- -- --    02/03/24 1400 95 heated;high-flow nasal cannula;humidified 50 60 --    02/03/24 1300 95 -- -- -- --    02/03/24 1243 94 heated;high-flow nasal cannula;humidified 50  60  --    02/03/24 1226 88 -- 6  -- --    02/03/24 1200 91 nasal cannula 6 -- --    02/03/24 1100 95 -- -- -- --    02/03/24 1000 96 nasal cannula 5 -- --    02/03/24 0900 89 -- -- -- --    02/03/24 0800 93 nasal cannula 5 -- --    02/03/24 0700 96 -- -- -- --    02/03/24 0600 92 -- -- -- --    02/03/24 0500 94 -- -- -- --    02/03/24 0400 93 nasal cannula 5 -- --    02/03/24 0300 98 -- -- -- --    02/03/24 0200 98 -- -- -- 1    02/03/24 0100 89 -- 5 -- 1    02/03/24 0000 93 nasal cannula 4 -- 1 02/02/24 2300 90 -- -- -- 2    02/02/24 2200 93 -- -- -- 4    02/02/24 2100 93 -- -- -- 6    02/02/24 2000 95 nasal cannula 3 -- 21 02/02/24 1933 94 nasal cannula 3 -- 22 02/02/24 1900 94 -- -- -- 31    02/02/24 1800 95 nasal cannula with ETCO2 3 -- 28 02/02/24 1600 94 nasal cannula with ETCO2 3 -- --    02/02/24 1500 96 -- -- -- --    02/02/24 1400 97 nasal cannula with ETCO2 3 -- 27 02/02/24 1300 97 -- -- -- 10    02/02/24 1200 92 nasal cannula with ETCO2 3 -- --    02/02/24 1100 97 -- -- -- 32 02/02/24 1000 97 nasal cannula 3 -- 32     02/02/24 0900 95 -- -- -- 32    02/02/24 0800 91 nasal cannula 3 -- --    02/02/24 0718 95 nasal cannula 4 -- --    02/02/24 0700 97 -- -- -- --    02/02/24 0600 95 nasal cannula 4 -- --    02/02/24 0500 93 -- -- -- --    02/02/24 0400 90 nasal cannula 3 -- --    02/02/24 0300 91 -- -- -- --    02/02/24 0200 95 -- -- -- --    02/02/24 0100 94 -- -- -- --    02/02/24 0000 97 nasal cannula 3 -- 1    02/01/24 2300 96 -- -- -- 1    02/01/24 2200 97 -- -- -- 2    02/01/24 2100 92 -- -- -- 1    02/01/24 2000 96 nasal cannula 3 -- 14    02/01/24 1925 94 nasal cannula 3 -- 14    02/01/24 1815 89 nasal cannula 3 -- --    02/01/24 1715 92 -- -- -- --    02/01/24 1700 98 -- -- -- --    02/01/24 1600 92 nasal cannula 3 -- --    02/01/24 1500 92 -- -- -- --    02/01/24 1400 93 nasal cannula 3 -- --    02/01/24 1300 91 -- -- -- --    02/01/24 1200 94 nasal cannula 2 -- --    02/01/24 1100 92 -- -- -- --    02/01/24 1000 92 nasal cannula 2 -- --    02/01/24 0900 91 -- -- -- --    02/01/24 0833 -- nasal cannula 2 -- --    02/01/24 0800 91 nasal cannula 2 -- 1    02/01/24 0700 92 -- -- -- 1    02/01/24 0600 94 nasal cannula 2 -- 18    02/01/24 0500 92 -- -- -- 9    02/01/24 0400 92 nasal cannula 2 -- 15    02/01/24 0300 94 -- -- -- 14    02/01/24 0200 95 nasal cannula 2 -- 17    02/01/24 0100 95 -- -- -- --    02/01/24 0000 90 nasal cannula 3 -- 1 01/31/24 2300 93 -- -- -- 3    01/31/24 2200 94 nasal cannula 2 -- 11 01/31/24 2100 92 -- -- -- 2    01/31/24 2000 91 nasal cannula 2 -- 4    01/31/24 1827 95 nasal cannula 3 -- --    01/31/24 1800 92 nasal cannula 3 -- 1 01/31/24 1700 91 -- -- -- 18    01/31/24 1600 -- nasal cannula 3 -- --    01/31/24 1500 94 -- -- -- 9    01/31/24 1400 -- nasal cannula 2 -- 14 01/31/24 1300 92 -- -- -- 3    01/31/24 1200 95 nasal cannula 3 -- 17 01/31/24 1100 92 -- -- -- 11 01/31/24 1000 95 -- 3 -- 25    01/31/24 0900 -- -- 3 -- 16    01/31/24 0800 96 nasal cannula 4 -- 9     01/31/24 0700 93 -- -- -- 12 01/31/24 0645 91 -- -- -- 22 01/31/24 0630 92 -- -- -- 20 01/31/24 0615 93 -- -- -- 21 01/31/24 0600 92 nasal cannula 4 -- 21 01/31/24 0545 93 -- -- -- 23 01/31/24 0530 92 -- -- -- 22 01/31/24 0515 91 -- -- -- 20 01/31/24 0500 92 -- -- -- 21 01/31/24 0445 93 -- -- -- 23 01/31/24 0430 91 -- -- -- 22 01/31/24 0415 90 -- -- -- 21 01/31/24 0400 92 nasal cannula 4 -- 23 01/31/24 0345 93 -- -- -- 21 01/31/24 0330 92 -- -- -- 21 01/31/24 0315 93 -- -- -- 21 01/31/24 0300 92 -- -- -- 22 01/31/24 0245 92 -- -- -- 18 01/31/24 0230 91 -- -- -- 14 01/31/24 0215 93 -- -- -- 15    01/31/24 0200 91 nasal cannula 4 -- 20 01/31/24 0145 93 -- -- -- 18 01/31/24 0130 93 -- -- -- 18 01/31/24 0115 94 -- -- -- 18 01/31/24 0100 93 -- -- -- 19    01/31/24 0045 93 -- -- -- 18    01/31/24 0030 93 -- -- -- 18    01/31/24 0015 94 -- -- -- 18    01/31/24 0000 93 nasal cannula 4 -- 18             Operative/Procedure Notes (all)        Jimmy Craig MD at 01/17/24 1115  Version 1 of 1         CHOLECYSTECTOMY LAPAROSCOPIC, COLON RESECTION LOW ANTERIOR  Progress Note    Roger hBat  1/17/2024    Pre-op Diagnosis:   Sigmoid colon mass, gallbladder nodule       Post-Op Diagnosis Codes:   same    Procedure/CPT® Codes:        Procedure(s):  Laparoscopic cholecystectomy, laparoscopic liver biopsy, open sigmoid colectomy with primary coloproctostomy, takedown of the splenic flexure, diverting loop ileostomy creation, appendectomy, flexible sigmoidoscopy for a pneumatic leak test            Surgeon(s):  Jimmy Craig MD    Anesthesia: General with Block    Staff:   Circulator: Octavia Weinstein RN; Anabel Horne RN; Willie Sibley RN; Marcello Pa RN  Physician Assistant: Alba Espinoza PA-C  Scrub Person: Yaazn Oliveira; Bonnie Ann  Nursing Assistant: Smita Velasquez PCT  Assistant: Yazan Harding PA-C  Assistant: Yazan Harding  TURNER MCPHERSON      Estimated Blood Loss: 150 mL    Urine Voided: 250 mL    Specimens:    Gallbladder, liver biopsy, sigmoid colon, appendix, anastomotic donuts                      Drains:   Closed/Suction Drain 1 RLQ Bulb 10 Fr. (Active)       Ileostomy Loop RLQ (Active)       Urethral Catheter Silicone 16 Fr. (Active)       Findings: There is evidence of some scarring and a nodular area at the fundus of the gallbladder.  There was significant thickening of the sigmoid colon with a palpable mass.  A sigmoid colectomy was performed with a Primary coloproctostomy with a 29 EEA stapler.  Leak test was negative.  This required mobilization of the splenic flexure.  Appendectomy was performed        Complications: None immediate    Assistant: Yazan Harding PA-C  was responsible for performing the following activities: Retraction, Suction, Irrigation, Suturing, and Closing and their skilled assistance was necessary for the success of this case.    Jimmy Craig MD     Date: 1/17/2024  Time: 14:36 EST          Electronically signed by Jimmy Craig MD at 01/17/24 1439       Jimmy Craig MD at 01/17/24 1115  Version 2 of 2         Operative Report    Patient Name:  Roger Bhat  YOB: 1960  [id number]    1/17/2024      PREOPERATIVE DIAGNOSIS: Sigmoid Colon Mass, Gallbladder Mass      POSTOPERATIVE DIAGNOSIS: Same        PROCEDURE PERFORMED:     Laparoscopic Cholecystectomy  Laparoscopic Liver Biopsy  Open Sigmoid Colectomy with Primary Coloproctostomy   Mobilization of the Splenic Flexure  Flexible Sigmoidoscopy for Pneumatic Leak Test   Diverting Loop Ileostomy Creation   Appendectomy        SURGEON: Jimmy Craig MD      ASSISTANT: Assistant: Yazan Harding PA-C      Assistant responsibilities: They assisted with dissection, retraction, resection of the specimen, holding a positioning of the camera, and closure.  Their assistance was necessary and required for successful completion of  the case    Colon Resection  Operation performed with curative intent Yes   Tumor Location (select all that apply) Sigmoid colon   Extent of colon and vascular resection  (select all that apply) Sigmoid resection - inferior mesenteric             SPECIMENS: Gallbladder, Liver Biopsy, Sigmoid colon, Anastomotic Donuts, Appendix       ANESTHESIA: General with TAP block      ESTIMATED BLOOD LOSS: 150 mL       FINDINGS:  1.  There was a nodular area at the dome of the gallbladder with some scarring and adherent omentum.  This did not appear to be grossly invading the liver and was completely excised as part of a cholecystectomy   2.  There was a palpable mass within the sigmoid colon, however there was no obvious local invasion and the sigmoid colon was freely mobile.  This was completely excised as part of a sigmoid colectomy up to the proximal descending colon with a primary coloproctostomy performed with a 29 EEA stapler  3. Flexible sigmoidoscopy following coloproctostomy creation with negative pneumatic air-leak test   4.  A diverting ileostomy was created approximately 30 cm proximal to the ileocecal valve  5.  Appendectomy was performed to avoid any future diagnostic uncertainty  6. Liver biopsy was performed as requested by Hematology due to concern for hemochromatosis          INDICATIONS:      This patient is a 63 y.o. male with a history of sigmoid colon mass with hematochezia as well as concern for gallbladder mass.  His case was discussed at the multidisciplinary board and a consensus decision was made to proceed forward with primary resection of both sites upfront. The risks, benefits, and alternatives of the procedure were discussed with the patient and their family preoperatively and they agreed to proceed.          DESCRIPTION OF PROCEDURE:      After obtaining informed consent, the patient was taken to the operating room and placed in the modified lithotomy position on the operating room table. General  anesthesia was initiated. A Crouch catheter was placed. The abdomen was prepped and draped in the usual sterile fashion. A time-out was properly performed. Antibiotics were given preoperatively. SCDs were properly placed on the patient and turned on.  A nasogastric tube was placed by the anesthesiologist.  A block was performed by the anesthesia service prior to the start of the case.    An infraumbilical skin incision was then created inferior to the umbilicus utilizing an 11 blade scalpel. Blunt dissection was carried down to the level of the anterior abdominal wall fascia and the base of the umbilicus. The base of the umbilicus was then grasped and elevated with a Kocher clamp. A vertical incision was then made in the anterior abdominal wall fascia under direct visualization sharply. Following this, the peritoneal cavity was then entered under direct visualization. Stay sutures of 0 Vicryl were placed around the defect, and a 12 mm Jeet trocar was then advanced without difficulty into the abdominal cavity. Pneumoperitoneum was established at 15 mmHg. The abdomen was then surveyed with a 10mm 30 degree laparoscope, with special attention to the viscera underlying the insertion site which was found to be free of injury.  Next, under direct laparoscopic visualization three additional 5 mm trocars were placed in the right upper quadrant. The patient was then positioned in the head-up position with the table tilted to the left.    Laparoscopic survey of the abdomen demonstrated no obvious evidence of peritoneal disease in all 4 quadrants.  The right and left lobe of the liver were carefully evaluated and there was no evidence of any obvious metastatic lesions.  The gallbladder did appear to have an area of nodularity high up at the dome with some adherent omentum.  We therefore elected to proceed forward with resection.    The fundus of the gallbladder was retracted cephalad while the infundibulum of the gallbladder  was retracted laterally.  Some of the omental adhesions to the gallbladder were taken down using blunt dissection as well as the Maryland LigaSure device.  Using a combination of hook cautery as well as a Maryland dissector, the peritoneum surrounding the cystic pedicle was stripped. Cystic structures were dissected. A critical view of safety was established, meanin and only 2 structures were visualized entering the gallbladder, all fibrous and fatty tissue between the cystic artery and cystic duct were cleared, and the posterior one-third of the gallbladder was removed from the cystic plate. Next 2 clips were placed on the distal cystic duct, 1 clip was placed on the proximal cystic duct, and the duct was transected with laparoscopic scissors.  A single PDS Endoloop was applied to the cystic duct stump below the clips.  Next 2 clips were placed on the proximal cystic artery, 1 clip was placed on the distal cystic artery and this was transected with laparoscopic scissors. Next the gallbladder was removed from the cystic plate using hook electrocautery proceeding superiorly towards the dome.  As we approached the dome of the gallbladder we noted that there was an area of scarring and nodularity in close proximity to the liver bed at the very top of the fundus of the gallbladder.  This was excised with a small portion of surrounding liver parenchyma immediately adjacent to this area.     A 5 mm 30 degree laparoscope was then inserted through the subxiphoid trocar site to visualize the placement of the gallbladder within an Endo Catch bag and then extraction of the gallbladder through the periumbilical trocar site utilizing the Endo Catch bag. Instruments were returned to their native positions. Hemostasis of the cystic plate was confirmed using electrocautery. The clipped cystic structures were carefully examined and there was no sign of bilious or bloody drainage.      We then proceeded forward with liver biopsy.   Using an 18-gauge core needle biopsy device, 3 separate passes were taken on the right lobe of the liver with an adequate core specimen identified with each pass.  Hemostasis of these biopsy sites was confirmed with electrocautery.  Specimen was then passed off the field as liver biopsy.    The table was then leveled and limited irrigation of the right upper quadrant was performed with normal saline and hemostasis again confirmed. Next, the 5 mm trocars were removed under direct laparoscopic visualization. The 12 mm trocar was then withdrawn and pneumoperitoneum was released.     We then proceeded forward with the open portion of the procedure.  In line with the previous 12 mm trocar, a midline laparotomy was created.  A skin incision was made using electrocautery and dissection was carried down to the level of the anterior abdominal wall fascia.  The anterior abdominal wall fascia was incised, and our laparotomy incision was extended from just above the umbilicus to the level of the pubic symphysis.  The abdomen was then again carefully surveyed.  We noted that there was a palpable mass within the sigmoid colon, however the sigmoid colon was freely mobile and there was no evidence of any local invasion.  There was evidence of 2 separate tattoo sites, one at the upper rectum just distal to the mass, and 1 within the mid descending colon.  A Bookwalter retractor was placed to aid with visualization.     The small bowel and omentum were retracted superiorly and packed into the upper abdomen. The sigmoid colon was retracted upwards so that the mesentery and vascular pedicle could be visualized. Using electrocautery, we scored along the medial portion of the sigmoid colon mesentery proceeding inferiorly towards the pelvis. The mesentery was swept upwards and the presacral space was developed. The inferior mesenteric artery along with its lymph node bundle were lifted up.  We then similarly took down the lateral  attachments of the sigmoid colon. the left ureter was identified and was protected throughout the course of the dissection. We then proceeded with our dissection proximally freeing the sigmoid colon mesentery from the underlying retroperitoneum.  We then turned our attention to the lateral attachments of the descending colon.  These were taken down using electrocautery proceeding superiorly.  Given that the proximal tattoo site was within the mid descending colon, it became clear that we would have to mobilize the splenic flexure in order to resect this area and have adequate reach for anastomosis in the pelvis.    We therefore proceeded forward with mobilization of the splenic flexure.  The greater omentum was reflected superiorly and the mid-transverse colon was elevated. The lesser sac was entered by dividing the gastrocolic ligament close to the colon where the leaflets meet. We then proceeded distally with this dissection such that the splenic flexure was completely mobilized from medial to lateral as well as a lateral to medial approach. Our points of dissection were met with the previously performed mobilization of the left descending colon and we found that the entire left colon was mobilized adequately.       We again turned our attention to the pelvis.  The peritoneum surrounding the KENDRA was cleared and it was isolated. A high-ligation of the KENDRA was performed using a 0 silk tie.  There was some bleeding from the vascular pedicle following ligation, however this was controlled using a 2-0 silk figure-of-eight suture.. Hemostasis was confirmed.  At this point we confirmed that the left colon was completely mobilized.    With the left colon mobilized, we then turned our attention to the pelvic dissection again.  We again visualized both the right and left ureter and ensured that these were kept safe throughout the dissection.  Using the ligasure device, we proceeded with distal dissection of the sigmoid  colon mesentery. This dissection proceeded all the way to the level of the proximal mesorectum and distal to the distal tattoo site. A distal transection site was chosen on the proximal rectum. We ensured that there was adequate circular dissection of the mesorectum at this site and divided the mesentery to the bowel wall using the [place] LigaSure. The bowel was then transected at this site using a blue load contour stapler.  Any remaining portions of the sigmoid colon mesentery were divided using LigaSure.  The proximal transection site was then chosen at the mid descending colon proximal to the tattoo site, and the colon was similarly divided using another firing of the contour stapler.  The specimen was then passed off the field the sigmoid colon.    Serial EEA sizers were then introduced up the patient's anus to the end of the distal rectal staple line.  This accommodated a 29 EEA stapler.  The anvil for the 29 EEA stapler was then secured within the divided end of the descending colon using a pursestring clamp with a 2-0 Prolene suture. Under direct visualization, the 29 EEA stapler was advanced into the anus. It was advanced to the distal rectal staple line. The spike was deployed just anterior to the mid portion of the staple line. The anvil was advanced onto the spike. The mesentery of the colon was confirmed to lie in proper position without twisting. The EEA stapler was then fired and a stapled end-to-end coloproctostomy was created. The anastomotic donuts were confirmed to be intact. The anastomosis was then submerged in saline irrigation within the pelvis and a pneumatic leak test was performed while the surgeon performed flexible sigmoidoscopy. Flexible sigmoidoscopy demonstrated the anastomosis to be patent and hemostatic endoscopically.  This was encountered at roughly 15 cm from the anus.  The pneumatic air-leak test was noted to be negative for leak on examination. The irrigation was aspirated.   Portions of the anastomosis were oversewn using 3-0 silk suture in a Lembert fashion.  At this time a 10 flat DELROY drain was brought into the abdominal cavity through a right sided incision. The drain was positioned posterior to our anastomosis in the pelvis. The drain was secured to the abdominal wall using a 2-0 Nylon suture. Hemostasis of all operative sites was confirmed.  The surgeons changed gowns and gloves.    At this point we then noted that the appendix was oriented down into the pelvis.  To avoid any future diagnostic uncertainty I felt that it was in the patient's best interest to proceed forward with appendectomy.  A window was created in the mesoappendix adjacent to the cecum.  The appendix was then divided flush with the cecum using a KAPIL 75 blue load stapler.  The appendiceal mesentery was then sequentially divided using the LigaSure device.  The specimen was then passed off the field as appendix.    Hemostasis was confirmed throughout the abdomen.  We traced the small bowel to approximately 30 cm proximal to the ileocecal valve.  At the site was marked the small bowel for our ileostomy site.  A trephine was created on the right side of the abdominal wall in the mid rectus plane in the standard fashion.  This portion of small bowel was brought out through this site with great care taken to ensure that the loop ileostomy was properly oriented.    We again confirmed hemostasis throughout the abdomen.  The NG tube was palpated within the stomach.  The fascia was then closed using simple interrupted figure-of-eight #1 PDS suture.  The subcutaneous wound was irrigated with antibiotic-containing solution.  The skin was then closed with skin staples.  The loop ileostomy was then matured on the right side of the abdominal wall in the standard fashion over a bridge with 3-0 Vicryl suture.  A stoma appliance was then applied to the ileostomy site and a clean and dry dressing was then applied to the midline.  The  laparoscopic incision sites were closed using 4-0 Monocryl in the subcuticular layer and dermal glue was applied overlying this.     After the procedure, the patient was awakened, extubated, and taken to the postoperative anesthesia care unit for recovery. All needle, instrument, and lap counts were correct. I was personally present and performed all portions of the procedure. There were no immediate complications         Jimmy Craig MD  1/17/2024  14:59 EST     Electronically signed by Jimmy Craig MD at 01/23/24 1209       Jimmy Craig MD at 01/17/24 1115  Version 1 of 2         Operative Report    Patient Name:  Roger Bhat  YOB: 1960  [id number]    1/17/2024      PREOPERATIVE DIAGNOSIS: Sigmoid Colon Mass, Gallbladder Mass      POSTOPERATIVE DIAGNOSIS: Same        PROCEDURE PERFORMED:     Laparoscopic Cholecystectomy  Laparoscopic Liver Biopsy  Open Sigmoid Colectomy with Primary Coloproctostomy   Mobilization of the Splenic Flexure  Flexible Sigmoidoscopy for Pneumatic Leak Test   Diverting Loop Ileostomy Creation   Appendectomy        SURGEON: Jimmy Craig MD      ASSISTANT: Assistant: Yazan Harding PA-C        Colon Resection  Operation performed with curative intent Yes   Tumor Location (select all that apply) Sigmoid colon   Extent of colon and vascular resection  (select all that apply) Sigmoid resection - inferior mesenteric             SPECIMENS: Gallbladder, Liver Biopsy, Sigmoid colon, Anastomotic Donuts, Appendix       ANESTHESIA: General with TAP block      ESTIMATED BLOOD LOSS: 150 mL       FINDINGS:  1.  There was a nodular area at the dome of the gallbladder with some scarring and adherent omentum.  This did not appear to be grossly invading the liver and was completely excised as part of a cholecystectomy   2.  There was a palpable mass within the sigmoid colon, however there was no obvious local invasion and the sigmoid colon was freely mobile.   This was completely excised as part of a sigmoid colectomy up to the proximal descending colon with a primary coloproctostomy performed with a 29 EEA stapler  3. Flexible sigmoidoscopy following coloproctostomy creation with negative pneumatic air-leak test   4.  A diverting ileostomy was created approximately 30 cm proximal to the ileocecal valve  5.  Appendectomy was performed to avoid any future diagnostic uncertainty  6. Liver biopsy was performed as requested by Hematology due to concern for hemochromatosis          INDICATIONS:      This patient is a 63 y.o. male with a history of sigmoid colon mass with hematochezia as well as concern for gallbladder mass.  His case was discussed at the multidisciplinary board and a consensus decision was made to proceed forward with primary resection of both sites upfront. The risks, benefits, and alternatives of the procedure were discussed with the patient and their family preoperatively and they agreed to proceed.          DESCRIPTION OF PROCEDURE:      After obtaining informed consent, the patient was taken to the operating room and placed in the modified lithotomy position on the operating room table. General anesthesia was initiated. A Crouch catheter was placed. The abdomen was prepped and draped in the usual sterile fashion. A time-out was properly performed. Antibiotics were given preoperatively. SCDs were properly placed on the patient and turned on.  A nasogastric tube was placed by the anesthesiologist.  A block was performed by the anesthesia service prior to the start of the case.    An infraumbilical skin incision was then created inferior to the umbilicus utilizing an 11 blade scalpel. Blunt dissection was carried down to the level of the anterior abdominal wall fascia and the base of the umbilicus. The base of the umbilicus was then grasped and elevated with a Kocher clamp. A vertical incision was then made in the anterior abdominal wall fascia under direct  visualization sharply. Following this, the peritoneal cavity was then entered under direct visualization. Stay sutures of 0 Vicryl were placed around the defect, and a 12 mm Jeet trocar was then advanced without difficulty into the abdominal cavity. Pneumoperitoneum was established at 15 mmHg. The abdomen was then surveyed with a 10mm 30 degree laparoscope, with special attention to the viscera underlying the insertion site which was found to be free of injury.  Next, under direct laparoscopic visualization three additional 5 mm trocars were placed in the right upper quadrant. The patient was then positioned in the head-up position with the table tilted to the left.    Laparoscopic survey of the abdomen demonstrated no obvious evidence of peritoneal disease in all 4 quadrants.  The right and left lobe of the liver were carefully evaluated and there was no evidence of any obvious metastatic lesions.  The gallbladder did appear to have an area of nodularity high up at the dome with some adherent omentum.  We therefore elected to proceed forward with resection.    The fundus of the gallbladder was retracted cephalad while the infundibulum of the gallbladder was retracted laterally.  Some of the omental adhesions to the gallbladder were taken down using blunt dissection as well as the [place] LigaSure device.  Using a combination of hook cautery as well as a [place] dissector, the peritoneum surrounding the cystic pedicle was stripped. Cystic structures were dissected. A critical view of safety was established, meanin and only 2 structures were visualized entering the gallbladder, all fibrous and fatty tissue between the cystic artery and cystic duct were cleared, and the posterior one-third of the gallbladder was removed from the cystic plate. Next 2 clips were placed on the distal cystic duct, 1 clip was placed on the proximal cystic duct, and the duct was transected with laparoscopic scissors.  A single PDS  Endoloop was applied to the cystic duct stump below the clips.  Next 2 clips were placed on the proximal cystic artery, 1 clip was placed on the distal cystic artery and this was transected with laparoscopic scissors. Next the gallbladder was removed from the cystic plate using hook electrocautery proceeding superiorly towards the dome.  As we approached the dome of the gallbladder we noted that there was an area of scarring and nodularity in close proximity to the liver bed at the very top of the fundus of the gallbladder.  This was excised with a small portion of surrounding liver parenchyma immediately adjacent to this area.     A 5 mm 30 degree laparoscope was then inserted through the subxiphoid trocar site to visualize the placement of the gallbladder within an Endo Catch bag and then extraction of the gallbladder through the periumbilical trocar site utilizing the Endo Catch bag. Instruments were returned to their native positions. Hemostasis of the cystic plate was confirmed using electrocautery. The clipped cystic structures were carefully examined and there was no sign of bilious or bloody drainage.      We then proceeded forward with liver biopsy.  Using an 18-gauge core needle biopsy device, 3 separate passes were taken on the right lobe of the liver with an adequate core specimen identified with each pass.  Hemostasis of these biopsy sites was confirmed with electrocautery.  Specimen was then passed off the field as liver biopsy.    The table was then leveled and limited irrigation of the right upper quadrant was performed with normal saline and hemostasis again confirmed. Next, the 5 mm trocars were removed under direct laparoscopic visualization. The 12 mm trocar was then withdrawn and pneumoperitoneum was released.     We then proceeded forward with the open portion of the procedure.  In line with the previous 12 mm trocar, a midline laparotomy was created.  A skin incision was made using  electrocautery and dissection was carried down to the level of the anterior abdominal wall fascia.  The anterior abdominal wall fascia was incised, and our laparotomy incision was extended from just above the umbilicus to the level of the pubic symphysis.  The abdomen was then again carefully surveyed.  We noted that there was a palpable mass within the sigmoid colon, however the sigmoid colon was freely mobile and there was no evidence of any local invasion.  There was evidence of 2 separate tattoo sites, one at the upper rectum just distal to the mass, and 1 within the mid descending colon.  A Bookwalter retractor was placed to aid with visualization.     The small bowel and omentum were retracted superiorly and packed into the upper abdomen. The sigmoid colon was retracted upwards so that the mesentery and vascular pedicle could be visualized. Using electrocautery, we scored along the medial portion of the sigmoid colon mesentery proceeding inferiorly towards the pelvis. The mesentery was swept upwards and the presacral space was developed. The inferior mesenteric artery along with its lymph node bundle were lifted up.  We then similarly took down the lateral attachments of the sigmoid colon. the left ureter was identified and was protected throughout the course of the dissection. We then proceeded with our dissection proximally freeing the sigmoid colon mesentery from the underlying retroperitoneum.  We then turned our attention to the lateral attachments of the descending colon.  These were taken down using electrocautery proceeding superiorly.  Given that the proximal tattoo site was within the mid descending colon, it became clear that we would have to mobilize the splenic flexure in order to resect this area and have adequate reach for anastomosis in the pelvis.    We therefore proceeded forward with mobilization of the splenic flexure.  The greater omentum was reflected superiorly and the mid-transverse  colon was elevated. The lesser sac was entered by dividing the gastrocolic ligament close to the colon where the leaflets meet. We then proceeded distally with this dissection such that the splenic flexure was completely mobilized from medial to lateral as well as a lateral to medial approach. Our points of dissection were met with the previously performed mobilization of the left descending colon and we found that the entire left colon was mobilized adequately.       We again turned our attention to the pelvis.  The peritoneum surrounding the KENDRA was cleared and it was isolated. A high-ligation of the KENDRA was performed using a 0 silk tie.  There was some bleeding from the vascular pedicle following ligation, however this was controlled using a 2-0 silk figure-of-eight suture.. Hemostasis was confirmed.  At this point we confirmed that the left colon was completely mobilized.    With the left colon mobilized, we then turned our attention to the pelvic dissection again.  We again visualized both the right and left ureter and ensured that these were kept safe throughout the dissection.  Using the ligasure device, we proceeded with distal dissection of the sigmoid colon mesentery. This dissection proceeded all the way to the level of the proximal mesorectum and distal to the distal tattoo site. A distal transection site was chosen on the proximal rectum. We ensured that there was adequate circular dissection of the mesorectum at this site and divided the mesentery to the bowel wall using the [place] LigaSure. The bowel was then transected at this site using a blue load contour stapler.  Any remaining portions of the sigmoid colon mesentery were divided using LigaSure.  The proximal transection site was then chosen at the mid descending colon proximal to the tattoo site, and the colon was similarly divided using another firing of the contour stapler.  The specimen was then passed off the field the sigmoid  colon.    Serial EEA sizers were then introduced up the patient's anus to the end of the distal rectal staple line.  This accommodated a 29 EEA stapler.  The anvil for the 29 EEA stapler was then secured within the divided end of the descending colon using a pursestring clamp with a 2-0 Prolene suture. Under direct visualization, the 29 EEA stapler was advanced into the anus. It was advanced to the distal rectal staple line. The spike was deployed just anterior to the mid portion of the staple line. The anvil was advanced onto the spike. The mesentery of the colon was confirmed to lie in proper position without twisting. The EEA stapler was then fired and a stapled end-to-end coloproctostomy was created. The anastomotic donuts were confirmed to be intact. The anastomosis was then submerged in saline irrigation within the pelvis and a pneumatic leak test was performed while the surgeon performed flexible sigmoidoscopy. Flexible sigmoidoscopy demonstrated the anastomosis to be patent and hemostatic endoscopically.  This was encountered at roughly 15 cm from the anus.  The pneumatic air-leak test was noted to be negative for leak on examination. The irrigation was aspirated.  Portions of the anastomosis were oversewn using 3-0 silk suture in a Lembert fashion.  At this time a 10 flat DELROY drain was brought into the abdominal cavity through a right sided incision. The drain was positioned posterior to our anastomosis in the pelvis. The drain was secured to the abdominal wall using a 2-0 Nylon suture. Hemostasis of all operative sites was confirmed.  The surgeons changed gowns and gloves.    At this point we then noted that the appendix was oriented down into the pelvis.  To avoid any future diagnostic uncertainty I felt that it was in the patient's best interest to proceed forward with appendectomy.  A window was created in the mesoappendix adjacent to the cecum.  The appendix was then divided flush with the cecum using a  KAPIL 75 blue load stapler.  The appendiceal mesentery was then sequentially divided using the LigaSure device.  The specimen was then passed off the field as appendix.    Hemostasis was confirmed throughout the abdomen.  We traced the small bowel to approximately 30 cm proximal to the ileocecal valve.  At the site was marked the small bowel for our ileostomy site.  A trephine was created on the right side of the abdominal wall in the mid rectus plane in the standard fashion.  This portion of small bowel was brought out through this site with great care taken to ensure that the loop ileostomy was properly oriented.    We again confirmed hemostasis throughout the abdomen.  The NG tube was palpated within the stomach.  The fascia was then closed using simple interrupted figure-of-eight #1 PDS suture.  The subcutaneous wound was irrigated with antibiotic-containing solution.  The skin was then closed with skin staples.  The loop ileostomy was then matured on the right side of the abdominal wall in the standard fashion over a bridge with 3-0 Vicryl suture.  A stoma appliance was then applied to the ileostomy site and a clean and dry dressing was then applied to the midline.  The laparoscopic incision sites were closed using 4-0 Monocryl in the subcuticular layer and dermal glue was applied overlying this.     After the procedure, the patient was awakened, extubated, and taken to the postoperative anesthesia care unit for recovery. All needle, instrument, and lap counts were correct. I was personally present and performed all portions of the procedure. There were no immediate complications         Jimmy Craig MD  1/17/2024  14:59 EST     Electronically signed by Jimmy Craig MD at 01/17/24 1527       Jimmy Craig MD at 01/30/24 1555  Version 2 of 2           Operative Report    Patient Name:  Roger Bhat  YOB: 1960  [id number]    1/30/2024      PREOPERATIVE DIAGNOSIS: Bowel  obstruction and sepsis status post open sigmoid colectomy with diverting loop ileostomy on January 17, 2024      POSTOPERATIVE DIAGNOSIS: Same        PROCEDURE PERFORMED:     Exploratory Laparotomy   Lysis of adhesions       SURGEON: Jimmy Craig MD      ASSISTANT:    MICHELE Frazier    Assistant surgeon responsibilities: Bella assisted with dissection, retraction, mobilization of structures, and closure. Their assistance was necessary and required for successful completion of the case.        SPECIMENS: Devitalized omentum    ESTIMATED BLOOD LOSS: 150 mL      ANESTHESIA: General with TAP blocks.        FINDINGS:  1.  Significant proximal distention of the small bowel with some adhesions throughout all 4 quadrants of the abdomen.  No definite well-defined transition point.  No devitalized abdominal viscera.  No succus throughout the abdomen.  No evidence of marshall leak at the anastomosis       INDICATIONS:      This patient is a 63 y.o. male with a history of recent open sigmoid colectomy and cholecystectomy on January 17, 2024 for sigmoid colon mass.  He has been on the floor since his index operation, and although he initially appeared to be progressing well over the last week has had worsening of his clinical status with significant proximal bowel distention and inability to tolerate an oral diet as well as a rising leukocytosis. Preoperative imaging including CT scan has been unrevealing.  Due to overall continued lack of improvement in his clinical status I recommended to proceed to the operating room for abdominal exploration.  The risks, benefits, and alternatives of the procedure were discussed with the patient and their family preoperatively and they agreed to proceed.          DESCRIPTION OF PROCEDURE:      After obtaining informed consent, the patient was taken to the operating room and placed in the modified lithotomy position on the operating room table. General anesthesia was initiated. A Crouch  catheter was placed. The abdomen was prepped and draped in the usual sterile fashion. A time out was properly performed. Antibiotics were given preoperatively. SCDs were properly placed on the patient and turned on. Blocks were performed by the Anesthesia service prior to the start of the case.      We began the procedure with opening of the patient's previous midline laparotomy.  The skin staples were removed.  The subcutaneous tissues were bluntly .  The previous fascial sutures were identified and then incised.  The fascia was  and the abdominal cavity was entered bluntly.  There was some serous fluid throughout the subcutaneous wound as well as on entrance into the abdominal cavity, but no marshall purulence or succus.  We immediately identified that there were some omental adhesions to the midline.  These were taken down using a combination of blunt dissection as well as electrocautery.  Entrance into the abdominal cavity demonstrated that the small bowel was significantly distended.  There were some interloop adhesions as well as adhesions between the small bowel and omentum in all 4 abdominal quadrants.     A Bookwalter retractor was placed to aid with visualization.  We then proceeded forward with very careful lysis of adhesions.  There was a tongue of omentum which was adherent in the pelvis adjacent to his prior anastomosis.  This was carefully  from the surrounding tissues using combination of blunt as well as sharp dissection.  A portion of this omentum was frankly devitalized and was ligated with the LigaSure and passed off the field.  With this completed the omentum was able to be brought superiorly.  Using very careful blunt as well as sharp dissection, the small bowel adhesions to the pelvis were freed.  The coloproctostomy anastomosis was identified within the pelvis.  There is no obvious abscess, leakage of succus surrounding the site, or marshall infection within the pelvis.   We then continued to proceed forward inspecting the small bowel.  The small bowel was quite distended proximally.  All of the adhesions were taken down.  This required at least 1 hour of careful lysis of adhesions.  With this completed we identified that there was significant proximal distention of the small bowel as well as the stomach, and this tapered down to more decompressed distal small bowel.  There was not a well-defined transition point.  The anastomosis within the pelvis was carefully evaluated, and there was no evidence of breakdown anteriorly.  There was a drain posterior to the anastomosis which had had some previous feculent output, however there was no obvious breakdown or surrounding contamination on gross examination of the anastomosis.  The posterior aspect of the anastomosis was difficult to visualize due to surrounding adhesions..  However the descending colon and rectum did appear completely viable.  This drain was exchanged for an additional drain that laid posterior to the anastomosis.  The posterior aspect of the anastomosis was not able to be completely visualized, as I felt that further mobilization of the site would only risk breakdown and it appeared completely viable.     We evaluated the small bowel all the way from the level of the ligament of Treitz to the diverting loop ileostomy.  There were some small serosal areas which were oversewn using 3-0 silk suture in a Lembert fashion, but no evidence of any full-thickness injury.  The small bowel was significantly distended proximally, but tapered to more decompressed distal small bowel.  There was no defined transition point.  All of the small bowel appeared viable.  The ileostomy was digitized and found to be widely patent.     The right upper quadrant was evaluated and found to be without any obvious contamination at the site of his previous cholecystectomy.  The NG tube was palpated within the body of the stomach. The abdomen was  copiously irrigated and this was all evacuated. There was some oozing from the raw surfaces and dissection planes, but no evidence of any surgical bleeding and hemostasis was confirmed.      At this point I was satisfied that there was no obvious unaddressed infection or source of obstruction within the patient's abdomen.  We therefore elected to proceed forward with abdominal closure.  The fascia was closed using simple interrupted figure-of-eight #1 PDS suture with great care to protect the underlying abdominal viscera.  The subcutaneous wound was irrigated with antibiotic-containing solution.  The skin was then closed with skin staples.  A stoma appliance was reapplied to the previous loop ileostomy on the right side.       After the procedure, the patient was awakened, extubated, and taken to the postoperative anesthesia care unit for recovery. All needle, instrument, and lap counts were correct. I was personally present and performed all portions of the procedure. There were no immediate complications         Jimmy Craig MD  1/30/2024  17:40 EST      Electronically signed by Jimmy Craig MD at 01/30/24 1833       Jimmy Craig MD at 01/30/24 1555  Version 1 of 2           Operative Report    Patient Name:  Roger Bhat  YOB: 1960  [id number]    1/30/2024      PREOPERATIVE DIAGNOSIS: Bowel obstruction and sepsis status post open sigmoid colectomy with diverting loop ileostomy on January 17, 2024      POSTOPERATIVE DIAGNOSIS: Same        PROCEDURE PERFORMED:     Exploratory Laparotomy   Lysis of adhesions       SURGEON: Jimmy Craig MD      ASSISTANT:    MICHELE Frazier    Assistant surgeon responsibilities: Bella assisted with dissection, retraction, mobilization of structures, and closure. Their assistance was necessary and required for successful completion of the case.        SPECIMENS: Devitalized omentum    ESTIMATED BLOOD LOSS: 150 mL      ANESTHESIA: General  with TAP blocks.        FINDINGS:  1.  Significant proximal distention of the small bowel with some adhesions throughout all 4 quadrants of the abdomen.  No definite well-defined transition point.  No devitalized abdominal viscera.  No succus throughout the abdomen.  No evidence of marshall leak at the anastomosis       INDICATIONS:      This patient is a 63 y.o. male with a history of recent open sigmoid colectomy and cholecystectomy on January 17, 2024 for sigmoid colon mass.  He has been on the floor since his index operation, and although he initially appeared to be progressing well over the last week has had worsening of his clinical status with significant proximal bowel distention and inability to tolerate an oral diet as well as a rising leukocytosis. Preoperative imaging including CT scan has been unrevealing.  Due to overall continued lack of improvement in his clinical status I recommended to proceed to the operating room for abdominal exploration.  The risks, benefits, and alternatives of the procedure were discussed with the patient and their family preoperatively and they agreed to proceed.          DESCRIPTION OF PROCEDURE:      After obtaining informed consent, the patient was taken to the operating room and placed in the modified lithotomy position on the operating room table. General anesthesia was initiated. A Crouch catheter was placed. The abdomen was prepped and draped in the usual sterile fashion. A time out was properly performed. Antibiotics were given preoperatively. SCDs were properly placed on the patient and turned on. Blocks were performed by the Anesthesia service prior to the start of the case.      We began the procedure with opening of the patient's previous midline laparotomy.  The skin staples were removed.  The subcutaneous tissues were bluntly .  The previous fascial sutures were identified and then incised.  The fascia was  and the abdominal cavity was entered  bluntly.  There was some serous fluid throughout the subcutaneous wound as well as on entrance into the abdominal cavity, but no marshall purulence or succus.  We immediately identified that there were some omental adhesions to the midline.  These were taken down using a combination of blunt dissection as well as electrocautery.  Entrance into the abdominal cavity demonstrated that the small bowel was significantly distended.  There were some interloop adhesions as well as adhesions between the small bowel and omentum in all 4 abdominal quadrants.     A Bookwalter retractor was placed to aid with visualization.  We then proceeded forward with very careful lysis of adhesions.  There was a tongue of omentum which was adherent in the pelvis adjacent to his prior anastomosis.  This was carefully  from the surrounding tissues using combination of blunt as well as sharp dissection.  A portion of this omentum was frankly devitalized and was ligated with the LigaSure and passed off the field.  With this completed the omentum was able to be brought superiorly.  Using very careful blunt as well as sharp dissection, the small bowel adhesions to the pelvis were freed.  The coloproctostomy anastomosis was identified within the pelvis.  There is no obvious abscess, leakage of succus surrounding the site, or marshall infection within the pelvis.  We then continued to proceed forward inspecting the small bowel.  The small bowel was quite distended proximally.  All of the adhesions were taken down.  This required at least 1 hour of careful lysis of adhesions.  With this completed we identified that there was significant proximal distention of the small bowel as well as the stomach, and this tapered down to more decompressed distal small bowel.  There was not a well-defined transition point.  The anastomosis within the pelvis was carefully evaluated, and there was no evidence of breakdown anteriorly.  There was a drain posterior  to the anastomosis which had had some previous feculent output, however there was no obvious breakdown or surrounding contamination on gross examination of the anastomosis.  The posterior aspect of the anastomosis was difficult to visualize due to surrounding adhesions..  However the descending colon and rectum did appear completely viable.  This drain was exchanged for an additional drain that laid posterior to the anastomosis.  The posterior aspect of the anastomosis was not able to be completely visualized, as I felt that further mobilization of the site would only risk breakdown and it appeared completely viable.     We evaluated the small bowel all the way from the level of the ligament of Treitz to the diverting loop ileostomy.  There were some small serosal areas which were oversewn using 3-0 silk suture in a Lembert fashion, but no evidence of any full-thickness injury.  The small bowel was significantly distended proximally, but tapered to more decompressed distal small bowel.  There was no defined transition point.  All of the small bowel appeared viable.  The ileostomy was digitized and found to be widely patent.     The right upper quadrant was evaluated and found to be without any obvious contamination at the site of his previous cholecystectomy.  The NG tube was palpated within the body of the stomach.     At this point I was satisfied that there was no obvious unaddressed infection or source of obstruction within the patient's abdomen.  We therefore elected to proceed forward with abdominal closure.  The fascia was closed using simple interrupted figure-of-eight #1 PDS suture with great care to protect the underlying abdominal viscera.  The subcutaneous wound was irrigated with antibiotic-containing solution.  The skin was then closed with skin staples.  A stoma appliance was reapplied to the previous loop ileostomy on the right side.       After the procedure, the patient was awakened, extubated, and  taken to the postoperative anesthesia care unit for recovery. All needle, instrument, and lap counts were correct. I was personally present and performed all portions of the procedure. There were no immediate complications         Jimmy Craig MD  1/30/2024  17:40 EST      Electronically signed by Jimmy Craig MD at 01/30/24 1801          Physician Progress Notes (last 5 days)        Praveen Thomson DO at 02/05/24 1142            Intensive Care Follow-up     Hospital:  LOS: 19 days   Mr. Roger Bhat, 63 y.o. male is followed for:   Colonic mass     Subjective       History of present illness:   Roger is a 63 y.o. male admitted on 1/17/2024 with Colonic mass [K63.89] for which he underwent open sigmoid colectomy with diverting loop ileostomy and liver biopsy on 01/17/24 and returned to OR for another exploratory lap, for lysis of adhesion. He has required Parenteral Nutrition. He also developed lung infiltrates. He was on antibiotics but because his leukemoid reaction, antibiotic was changed to BRODERICK and Micafungin. He continues to improve, weaning his FiO2.       Subjective   Interval History:  Patient doing well this morning.  Having bowel movements.  Still on TPN.  Pain overall fairly well-controlled.  Remains on high flow nasal cannula.             The patient's past medical, surgical and social history were reviewed and updated in Epic as appropriate.    Objective   Objective     Infusions:  Adult Central 2-in-1 TPN, , Last Rate: 60 mL/hr at 02/04/24 0348  Adult Central CLINIMIX-E TPN,   HYDROmorphone HCl-NaCl,   Pharmacy to Dose TPN,       Medications:  bisacodyl, 10 mg, Oral, Daily  budesonide-formoterol, 2 puff, Inhalation, BID - RT  buPROPion, 50 mg, Nasogastric, Q8H  Fat Emul Fish Oil/Plant Based, 250 mL, Intravenous, Q24H  folic acid 1 mg in sodium chloride 0.9 % 50 mL IVPB, 1 mg, Intravenous, Daily  heparin (porcine), 5,000 Units, Subcutaneous, Q8H  insulin regular, 2-7  Units, Subcutaneous, Q6H  meropenem, 2,000 mg, Intravenous, Q8H  metoclopramide, 10 mg, Intravenous, Q6H  micafungin (MYCAMINE) IV, 100 mg, Intravenous, Q24H  pantoprazole, 40 mg, Intravenous, QAM AC  revefenacin, 175 mcg, Nebulization, Daily - RT  rosuvastatin, 10 mg, Nasogastric, Nightly  senna, 1 tablet, Nasogastric, Nightly  sodium chloride, 10 mL, Intravenous, Q12H  thiamine (B-1) IV, 100 mg, Intravenous, Daily      I reviewed the patient's medications.    Vital Sign Min/Max for last 24 hours  Temp  Min: 96.9 °F (36.1 °C)  Max: 98.2 °F (36.8 °C)   BP  Min: 132/77  Max: 174/97   Pulse  Min: 76  Max: 103   Resp  Min: 18  Max: 38   SpO2  Min: 93 %  Max: 97 %   Flow (L/min)  Min: 40  Max: 40       Input/Output for last 24 hour shift  02/04 0701 - 02/05 0700  In: 2337.3 [P.O.:295; I.V.:240.8]  Out: 2675 [Urine:1475]      GENERAL : NAD, conversant  RESPIRATORY/THORAX : normal respiratory effort and no intercostal retractions, Coarse crackles bilaterally  CARDIOVASCULAR : Normal S1/S2, RRR. 1+ lower ext edema.  GASTROINTESTINAL : Soft, NT/ND. BS x 4 normoactive. No hepatosplenomegaly.  MUSCULOSKELETAL : No cyanosis, clubbing, or ischemia  NEUROLOGICAL: alert and oriented to person, place and time  PSYCHOLOGICAL : Appropriate affect         Results from last 7 days   Lab Units 02/05/24  0509 02/04/24  0428 02/03/24  0359   WBC 10*3/mm3 12.09* 17.22* 20.69*   HEMOGLOBIN g/dL 10.5* 10.4* 10.3*   PLATELETS 10*3/mm3 426 399 391     Results from last 7 days   Lab Units 02/05/24  0509 02/04/24  0428 02/03/24  0359   SODIUM mmol/L 137 138 135*   POTASSIUM mmol/L 4.1 4.1 4.4   CO2 mmol/L 28.0 31.0* 29.0   BUN mg/dL 21 21 16   CREATININE mg/dL 0.39* 0.42* 0.43*   MAGNESIUM mg/dL 2.4 2.1 2.2   PHOSPHORUS mg/dL 2.6 2.9 3.0   GLUCOSE mg/dL 116* 121* 118*     Estimated Creatinine Clearance: 168.2 mL/min (A) (by C-G formula based on SCr of 0.39 mg/dL (L)).          I reviewed the patient's new clinical results.  I reviewed the  patient's new imaging results/reports including actual images and agree with reports.           Assessment & Plan   Impression        Colonic mass    Current smoker    Gastroesophageal reflux disease without esophagitis    Severe malnutrition    Respiratory distress    Centrilobular emphysema       Plan        Roger is a 63 y.o. male admitted on 1/17/2024 with Colonic mass [K63.89] for which he underwent open sigmoid colectomy with diverting loop ileostomy and liver biopsy on 01/17/24 and returned to OR for another exploratory lap, for lysis of adhesion. He has required Parenteral Nutrition. He also developed lung infiltrates. He was on antibiotics but because his leukemoid reaction, antibiotic was changed to meropenem and Micafungin. He continues on high flow nasal cannula.    -Continue postop orders per surgery  -Ensure adequate pain control, currently on Dilaudid PCA pump  -Wean high flow nasal cannula to saturation greater than 88%  -Continue Symbicort and Yupelri for COPD  -Continue statin for hyperlipidemia  -Continue TPN for now  -Mobilize patient  -A.m. labs    I conducted multidisciplinary rounds in the plan of care was discussed with the multidisciplinary team at that time. In attendance at multidisciplinary rounds was clinical pharmacist, dietitian, nursing staff, and case management.    I discussed the patient's findings and my recommendations with patient and nursing staff     High level of risk due to:  parenteral controlled substances.      Yen Thomson DO  Pulmonary, Critical care and Sleep Medicine         Electronically signed by Praveen Thomson DO at 02/05/24 7988       Jimmy Craig MD at 02/05/24 3474          Patient Name:  Roger Bhat  YOB: 1960  [id number]    Surgery Progress Note    Date of visit: 2/5/2024      Subjective: Remains on high flow nasal cannula.  Tells me that he overall feels better.  Asking when he will be able to get the NG tube  "out.  975 mL from the NG yesterday.  Ileostomy with 325 mL recorded.  Urine output 1475 mL.          Objective:     /94   Pulse 77   Temp 96.9 °F (36.1 °C) (Axillary)   Resp 20   Ht 175.3 cm (69\")   Wt 59.8 kg (131 lb 13.4 oz)   SpO2 96%   BMI 19.47 kg/m²     Intake/Output Summary (Last 24 hours) at 2/5/2024 0751  Last data filed at 2/5/2024 0615  Gross per 24 hour   Intake 2337.34 ml   Output 2675 ml   Net -337.66 ml       GEN:   Awake, alert, sitting up in bed, chronically ill-appearing in no obvious distress  CV:      Regular rate and rhythm  L:         Symmetric expansion, on high flow nasal cannula and not obviously labored, coarse cough  Abd:    Soft, moderately distended, appropriately tender palpation throughout the abdomen, midline incision with some reactive erythema surrounding the site and serous drainage, DELROY drain in the right lower quadrant with seros output, ileostomy on the right side pink and patent with scant stool in the bag  Ext:     No cyanosis, clubbing, or edema    Recent labs that are back at this time have been reviewed.           Assessment/ Plan:    Mr. Bhat is a 63-year-old gentleman who is admitted following operative intervention for gallbladder and sigmoid colon mass on January 17     #Sigmoid colon mass, gallbladder mass  # Ileus  -Postop day 19 following lap vladimir, open sigmoid colectomy with diverting loop ileostomy, postoperative day 6 following exploratory laparotomy with washout  -Labs are improving.  White blood cell count is down to 12.  Hemoglobin is 10.5.  -Ileostomy output is still not to a level that I would expect with to 25 mL yesterday and continues to have quite a bit of bilious NG output.  -Continue NG tube to low wall suction.  Okay for ice chips and small sips of clears for comfort  -Continue TPN   -Continue antibiotics  -Not ready to advance diet yet or get NG tube out.  Continue supportive management         Jimmy Craig MD  2/5/2024  07:51 " EST        Electronically signed by Jimmy Craig MD at 24 [phone]       Monster Mock MD at 24 1526            INTENSIVIST   PROGRESS NOTE     Subjective   SUBJECTIVE     Roger 63 y.o. male is followed for: No chief complaint on file.       Colonic mass    Current smoker    Severe malnutrition    Respiratory distress    As an Intensivist, we provide an integrated approach to the ICU patient and family, medical management of comorbid conditions, including but not limited to electrolytes, glycemic control, organ dysfunction, lead interdisciplinary rounds and coordinate the care with all other services, including those from other specialists.     Interval History:  POD: 5 Days Post-Op (EXP LAP 23)    Watching Football on his iPad.    Doing better.    Weaning FiO2     Device (Oxygen Therapy): high-flow nasal cannula (24 1502): Flow (L/min): 40 (24 1502). Oxygen Concentration (%): 38 (24 1502).     Temp  Min: 97.6 °F (36.4 °C)  Max: 98.3 °F (36.8 °C)     History     Last Reviewed by Holly Hylton, PT on 2024 at  2:24 PM    Sections Reviewed    Medical, Surgical, Family, Tobacco, Custom, Alcohol, Drug Use, Sexual   Activity    Problem list reviewed by Monster Mock MD on 2024 at  6:14 PM  Medicines reviewed by Monster Mock MD on 2024 at  6:14 PM  Allergies reviewed by Monster Mock MD on 2024 at  6:14 PM    The patient's relevant past medical, surgical and social history were reviewed and updated in Epic as appropriate.     Objective   OBJECTIVE     Vitals:  Temp: 98.2 °F (36.8 °C) (24 1200) Temp  Min: 97.6 °F (36.4 °C)  Max: 98.3 °F (36.8 °C)   Temp core:      BP: 145/92 (24 1502) BP  Min: 131/91  Max: 169/107   MAP (non-invasive) Noninvasive MAP (mmHg): 109 (24 1502) Noninvasive MAP (mmHg)  Av.3  Min: 64  Max: 138   Pulse: 85 (24 1502) Pulse  Min: 85  Max: 103   Resp: 24 (24 1400) Resp  Min: 18  Max: 42   SpO2: 93 %  (02/04/24 1502) SpO2  Min: 92 %  Max: 97 %   Device: high-flow nasal cannula (02/04/24 1502)    Flow Rate: 40 (02/04/24 1502) Flow (L/min)  Min: 35  Max: 50     Medications (drips):  Adult Central 2-in-1 TPN, Last Rate: 60 mL/hr at 02/03/24 1804  Adult Central 2-in-1 TPN  HYDROmorphone HCl-NaCl  Pharmacy to Dose TPN      Intake & Output (last 3 days)         02/01 0701 02/02 0700 02/02 0701 02/03 0700 02/03 0701  02/04 0700 02/04 0701 02/05 0700    P.O.   300 150    I.V. (mL/kg) 708 (11.8) 897.5 (15) 295.4 (4.9) 6 (0.1)    NG/ 160 110 60    IV Piggyback 184 115 350     TPN 2618.9 2585 230 574.8    Total Intake(mL/kg) 3630.9 (60.6) 3757.5 (62.8) 1285.4 (21.5) 790.8 (13.2)    Urine (mL/kg/hr) 1265 (0.9) 1775 (1.2) 2300 (1.6) 475 (0.9)    Emesis/NG output 1025 650 725 325    Drains 18 5 5 0    Stool 100 100 45 100    Total Output 2408 2530 3075 900    Net +1222.9 +1227.5 -1789.6 -109.2            Urine Unmeasured Occurrence 3 x             Physical Examination  Telemetry:  Rhythm: normal sinus rhythm, sinus tachycardia (02/04/24 1400)      Constitutional:  No acute distress.   Cardiovascular: RRR.    Respiratory: Normal breath sounds  (+) Rhonchi   Abdominal:  Soft with no tenderness.   Extremities: No Edema   Neurological:   Alert, Oriented, Cooperative.  Best Eye Response: 4-->(E4) spontaneous (02/04/24 1400)  Best Motor Response: 6-->(M6) obeys commands (02/04/24 1400)  Best Verbal Response: 5-->(V5) oriented (02/04/24 1400)  Reyno Coma Scale Score: 15 (02/04/24 1400)     Results Reviewed:  Laboratory  Microbiology  Radiology  Pathology    Hematology:  Results from last 7 days   Lab Units 02/04/24  0428 02/03/24  0359 02/02/24  0431 02/01/24 0426   WBC 10*3/mm3 17.22* 20.69* 31.68* 47.34*   BANDS % %  --   --  12.0* 18.0*   HEMOGLOBIN g/dL 10.4* 10.3* 10.6* 10.2*   MCV fL 100.3* 101.0* 102.0* 101.4*   PLATELETS 10*3/mm3 399 391 354 363     Results from last 7 days   Lab Units 02/04/24  0428  02/03/24 0359 02/02/24 0431 02/01/24 0426 01/31/24  0719   NEUTROS ABS 10*3/mm3 13.71* 17.36* 28.51*   < > 45.11*  44.76*   LYMPHS ABS 10*3/mm3 1.64 1.60  --   --  1.32   EOS ABS 10*3/mm3 0.38 0.28 0.63*   < > 0.00  0.00    < > = values in this interval not displayed.     Chemistry:  Estimated Creatinine Clearance: 156.2 mL/min (A) (by C-G formula based on SCr of 0.42 mg/dL (L)).    Results from last 7 days   Lab Units 02/04/24 0428 02/03/24  0359   SODIUM mmol/L 138 135*   POTASSIUM mmol/L 4.1 4.4   CHLORIDE mmol/L 99 99   CO2 mmol/L 31.0* 29.0   BUN mg/dL 21 16   CREATININE mg/dL 0.42* 0.43*   GLUCOSE mg/dL 121* 118*     Results from last 7 days   Lab Units 02/04/24 0428 02/03/24 0359 01/30/24  0538 01/29/24  0541   IONIZED CALCIUM mmol/L  --   --   --  1.27   CALCIUM mg/dL 8.8 8.8   < > 8.6   MAGNESIUM mg/dL 2.1 2.2   < > 2.2   PHOSPHORUS mg/dL 2.9 3.0   < > 3.6    < > = values in this interval not displayed.     Hepatic Panel:  Results from last 7 days   Lab Units 01/31/24  0520 01/29/24  0541   ALBUMIN g/dL 3.1* 3.2*   TOTAL PROTEIN g/dL 5.2* 5.7*   BILIRUBIN mg/dL 0.5 0.3   BILIRUBIN DIRECT mg/dL 0.2 <0.2   AST (SGOT) U/L 15 16   ALT (SGPT) U/L 10 18   ALK PHOS U/L 52 79     Cardiac Labs:  Results from last 7 days   Lab Units 01/31/24  0520 01/30/24  2337   HSTROP T ng/L 18 26*     Biomarkers:  Results from last 7 days   Lab Units 02/04/24 0428 02/03/24 0359 02/02/24  0431 02/01/24  0426 01/31/24  0520 01/30/24  2336 01/29/24  0541   CRP mg/dL  --   --   --   --   --   --  9.45*   LACTATE mmol/L  --   --   --   --  1.7 2.2*  --    PROCALCITONIN ng/mL 0.56* 0.94* 1.30*   < >  --   --   --     < > = values in this interval not displayed.     COVID-19  Lab Results   Component Value Date    COVID19 Not Detected 11/14/2021    COVID19 Not Detected 10/17/2021    COVID19 Not Detected 09/13/2021     Images:  XR Chest 1 View    Result Date: 2/4/2024  Impression: 1.Stable appearance of ill-defined multifocal  airspace infiltrates bilaterally with diffuse interstitial changes. Bilateral pleural effusions are also noted, more pronounced on the left. 2.Stable positioning of support lines/tubes. Electronically Signed: Isael Duron MD  2/4/2024 9:15 AM EST  Workstation ID: [id number]    XR Chest 1 View    Result Date: 2/3/2024  Impression: 1. Improving bilateral airspace disease. No new or worsening airspace consolidation. Electronically Signed: Thad Tiwari MD  2/3/2024 2:30 PM EST  Workstation ID: [id number]     Echo:      Results: Reviewed.  I reviewed the patient's new laboratory and imaging results.  I independently reviewed the patient's new images.    Medications: Reviewed.    Assessment   A/P     Hospital:  LOS: 18 days   ICU: 4d 20h     Active Hospital Problems    Diagnosis  POA    **Colonic mass [K63.89]  Yes    Severe malnutrition [E43]  Yes    Respiratory distress [R06.03]  Yes    Current smoker [F17.200]  Yes     Roger is a 63 y.o. male admitted on 1/17/2024 with Colonic mass [K63.89] for which he underwent open sigmoid colectomy with diverting loop ileostomy and liver biopsy on 01/17/24 and returned to OR for another exploratory lap, for lysis of adhesion. He has required Parenteral Nutrition. He also developed lung infiltrates. He was on antibiotics but because his leukemoid reaction, antibiotic was changed to BRODERICK and Micafungin. He continues to improve, weaning his FiO2.    Assessment/Management/Treatment Plan:    Gallbladder and Colonic mass  01/17/24 S/P Lap Johana, open sigmoid colectomy with diverting loop ileostomy, liver biopsy.   01/30/24 S/P Exploratory Lap and ALBERTO 01/30/24    Lab Results   Lab Value Date/Time    FINALDX  01/17/2024 1137     1.  GALLBLADDER, CHOLECYSTECTOMY:  Chronic cholecystitis and cholelithiasis.  Adenomyoma.  No dysplasia identified.  Benign cystic duct lymph node.  Adhesed portions of liver with inflammatory changes.    2.  LIVER, CORE NEEDLE BIOPSIES:  Benign hepatic  tissue.  Increased stainable iron (see comment).  No fibrosis identified on trichrome stain with appropriate control.    3.  SIGMOID COLON, PARTIAL COLECTOMY:  Benign polyp, 4.2 cm in maximum size with features of inflammatory polyp (see comment).  16 lymph nodes negative for metastatic carcinoma (0/16).    4.  PROXIMAL ANASTOMOTIC DOUGHNUT:  Benign colonic tissue.    5.  DISTAL ANASTOMOTIC DONUT:  Benign colonic tissue.    6.  APPENDIX, APPENDECTOMY:  Acute appendicitis.      Cardiovascular  HTN  Dyslipidemia   Tobacco use  Emphysema as noticed in CT Chest 12/27/2023  Several new irregular nodular densities within the RLL, with the largest one measuring 9 mm.  GERD  Nutrition Support: Parenteral Nutrition     Lab Results   Component Value Date    PREALBUMIN 12.7 (L) 01/29/2024    PREALBUMIN 8.8 (L) 01/26/2024    CRP 9.45 (H) 01/29/2024    CRP 18.23 (H) 01/26/2024     Endocrine   Body mass index is 19.47 kg/m². Normal: 18.5-24.9kg/m2  No history of Diabetes    Lab Results   Lab Value Date/Time    HGBA1C 5.00 01/10/2024 1508    HGBA1C 5.3 11/28/2022 1111    HGBA1C 5.20 08/17/2021 1149     Results from last 7 days   Lab Units 02/04/24  1119 02/04/24  0516 02/03/24  2331 02/03/24  1717 02/03/24  1122 02/03/24  0522 02/02/24  2321 02/02/24  1729   GLUCOSE mg/dL 133* 133* 144* 114 122 125 113 110       Diet: NPO Diet NPO Type: Sips with Meds, Ice Chips  Adult Central 2-in-1 TPN  Adult Central 2-in-1 TPN   Advance Directives: Code Status and Medical Interventions:   Ordered at: 01/17/24 1734     Code Status (Patient has no pulse and is not breathing):    CPR (Attempt to Resuscitate)     Medical Interventions (Patient has pulse or is breathing):    Full Support        DVT prophylaxis:  Medical and mechanical DVT prophylaxis orders are present.    In brief:  Leukocytosis and PCT continue to decrease.   Continue Parenteral Nutrition   Continue Aerobika  Respiratory Culture - GS no organisms  Labs in AM including CRP and  "PAB  Continue PCA pump.  Disposition: Keep in ICU.    Plan of care and goals reviewed during interdisciplinary rounds.  I discussed the patient's findings and my recommendations with patient and nursing staff    MDM:    Problem(s) High due to: Acute or Chronic illness or injury that may poses a threat to life or bodily function  Data: Moderate due to: Review or results of each unique test and Ordering of each unique test  Risk: High due to: drug(s) requiring intensive monitoring for toxicity (Parenteral Nutrition)    High    [x] Primary Attending Intensive Care Medicine - Nutrition Support   [] Consultant    Copied text in this note has been reviewed and is accurate as of 02/04/24        Electronically signed by Monster Mock MD at 02/04/24 1533       Gilbert Torres MD at 02/04/24 1101          Patient Name:  Roger Bhat  YOB: 1960  [id number]    Surgery Progress Note    Date of visit: 2/4/2024      Subjective:   - No acute events overnight  - Pain is well controlled  - Up from bed to chair this AM  - Still intermittently short of breath, now on HFNC. CXR stable this AM, states he is breathing comfortably on high flow  - Only 45 recorded out of ostomy, but bag is full of succus this AM  - NGT with 725 out      Objective:     BP (!) 157/102   Pulse 99   Temp 98.2 °F (36.8 °C) (Oral)   Resp (!) 32   Ht 175.3 cm (69\")   Wt 59.8 kg (131 lb 13.4 oz)   SpO2 96%   BMI 19.47 kg/m²     Intake/Output Summary (Last 24 hours) at 2/4/2024 1101  Last data filed at 2/4/2024 1000  Gross per 24 hour   Intake 1237.92 ml   Output 3200 ml   Net -1962.08 ml       GEN:   Awake, alert, sitting up in bed, chronically ill-appearing in no obvious distress  CV:      Regular rate and rhythm  L:         Symmetric expansion, on HFNC with coarse respirations  Abd:    Soft, moderately distended, appropriately tender palpation throughout the abdomen, midline incision with some reactive erythema surrounding the site " and serous drainage, DELROY drain in the right lower quadrant with serosanguineous output, ileostomy on the right side pink and patent with stool in the bag  Ext:     No cyanosis, clubbing, or edema    Recent labs that are back at this time have been reviewed.       Assessment/ Plan:    Mr. Bhat is a 63-year-old gentleman who is admitted following operative intervention for gallbladder and sigmoid colon mass on January 17     #Sigmoid colon mass, gallbladder mass  # Ileus  -Postop day 18 following lap vladimir, open sigmoid colectomy with diverting loop ileostomy, postoperative day 5 following exploratory laparotomy with washout  -Has been slightly more tachypneic and oxygen requirement is up needing HFNC  -White blood cell count continues to downtrend. 17 today  -Ileostomy output increasing, but NGT still putting out close to 1 L  -Continue NG tube to low wall suction.  Okay for ice chips and small sips of clears for comfort  -Continue TPN   -Continue antibiotics.  White count has improved since we broaden his antibiotics on Tuesday  -No significant changes from surgical standpoint. Continue TPN, nasogastric decompression until output drops, and symptomatic management         Gilbert Torres MD  2/4/2024  11:01 EST        Electronically signed by Gilbert Torres MD at 02/04/24 1108       Monster Mock MD at 02/03/24 1307            INTENSIVIST   PROGRESS NOTE     Subjective   SUBJECTIVE     Roger 63 y.o. male is followed for: No chief complaint on file.       Colonic mass    Current smoker    Severe malnutrition    Respiratory distress    As an Intensivist, we provide an integrated approach to the ICU patient and family, medical management of comorbid conditions, including but not limited to electrolytes, glycemic control, organ dysfunction, lead interdisciplinary rounds and coordinate the care with all other services, including those from other specialists.     Interval History:  POD: 4 Days Post-Op (EXP LAP  23)    (+) Wet cough    (+) Dyspnea    Requiring more FiO2    PCA working well.    Temp  Min: 97.7 °F (36.5 °C)  Max: 98.8 °F (37.1 °C)     History     Last Reviewed by Holly Hylton PT on 2024 at  2:24 PM    Sections Reviewed    Medical, Surgical, Family, Tobacco, Custom, Alcohol, Drug Use, Sexual   Activity    Problem list reviewed by Monster Mock MD on 2024 at  6:14 PM  Medicines reviewed by Monster Mock MD on 2024 at  6:14 PM  Allergies reviewed by Monster Mock MD on 2024 at  6:14 PM    The patient's relevant past medical, surgical and social history were reviewed and updated in Epic as appropriate.     Objective   OBJECTIVE     Vitals:  Temp: 97.7 °F (36.5 °C) (24 0801) Temp  Min: 97.7 °F (36.5 °C)  Max: 98.8 °F (37.1 °C)   Temp core:      BP: 142/95 (24 1000) BP  Min: 119/84  Max: 180/102   MAP (non-invasive) Noninvasive MAP (mmHg): 113 (24 1000) Noninvasive MAP (mmHg)  Av.3  Min: 64  Max: 138   Pulse: 95 (24 1243) Pulse  Min: 81  Max: 108   Resp: 22 (24 1243) Resp  Min: 16  Max: 40   SpO2: 94 % (24 1243) SpO2  Min: 88 %  Max: 98 %   Device: heated, high-flow nasal cannula, humidified (24 1243)    Flow Rate: (S) 50 (24 1243) Flow (L/min)  Min: 3  Max: 50     Medications (drips):  Adult Central 2-in-1 TPN, Last Rate: 80 mL/hr at 24 1806  HYDROmorphone HCl-NaCl  Pharmacy to Dose TPN      Physical Examination  Telemetry:  Rhythm: normal sinus rhythm, sinus tachycardia (24 1000)      Constitutional:  No acute distress.   Cardiovascular: RRR.    Respiratory: Normal breath sounds  (+) Rhonchi   Abdominal:  Soft with no tenderness.   Extremities: No Edema   Neurological:   Alert, Oriented, Cooperative.  Best Eye Response: 4-->(E4) spontaneous (24 1000)  Best Motor Response: 6-->(M6) obeys commands (24 1000)  Best Verbal Response: 5-->(V5) oriented (24 1000)  Symone Coma Scale Score: 15 (24  1000)     Results Reviewed:  Laboratory  Microbiology  Radiology  Pathology    Hematology:  Results from last 7 days   Lab Units 02/03/24 0359 02/02/24 0431 02/01/24 0426   WBC 10*3/mm3 20.69* 31.68* 47.34*   BANDS % %  --  12.0* 18.0*   HEMOGLOBIN g/dL 10.3* 10.6* 10.2*   MCV fL 101.0* 102.0* 101.4*   PLATELETS 10*3/mm3 391 354 363     Results from last 7 days   Lab Units 02/03/24 0359 02/02/24 0431 02/01/24 0426 01/31/24  0719   NEUTROS ABS 10*3/mm3 17.36* 28.51* 44.97* 45.11*  44.76*   LYMPHS ABS 10*3/mm3 1.60  --   --  1.32   EOS ABS 10*3/mm3 0.28 0.63* 0.00 0.00  0.00     Chemistry:  Estimated Creatinine Clearance: 152.6 mL/min (A) (by C-G formula based on SCr of 0.43 mg/dL (L)).    Results from last 7 days   Lab Units 02/03/24 0359 02/02/24  0431   SODIUM mmol/L 135* 137   POTASSIUM mmol/L 4.4 3.9   CHLORIDE mmol/L 99 100   CO2 mmol/L 29.0 26.0   BUN mg/dL 16 16   CREATININE mg/dL 0.43* 0.39*   GLUCOSE mg/dL 118* 95     Results from last 7 days   Lab Units 02/03/24 0359 02/02/24 0431 01/30/24  0538 01/29/24  0541   IONIZED CALCIUM mmol/L  --   --   --  1.27   CALCIUM mg/dL 8.8 8.8   < > 8.6   MAGNESIUM mg/dL 2.2 2.2   < > 2.2   PHOSPHORUS mg/dL 3.0 2.9   < > 3.6    < > = values in this interval not displayed.     Hepatic Panel:  Results from last 7 days   Lab Units 01/31/24  0520 01/29/24  0541   ALBUMIN g/dL 3.1* 3.2*   TOTAL PROTEIN g/dL 5.2* 5.7*   BILIRUBIN mg/dL 0.5 0.3   BILIRUBIN DIRECT mg/dL 0.2 <0.2   AST (SGOT) U/L 15 16   ALT (SGPT) U/L 10 18   ALK PHOS U/L 52 79     Cardiac Labs:  Results from last 7 days   Lab Units 01/31/24  0520 01/30/24  2337   HSTROP T ng/L 18 26*     Biomarkers:  Results from last 7 days   Lab Units 02/03/24  0359 02/02/24  0431 02/01/24  0426 01/31/24  0520 01/30/24  2336 01/29/24  0541   CRP mg/dL  --   --   --   --   --  9.45*   LACTATE mmol/L  --   --   --  1.7 2.2*  --    PROCALCITONIN ng/mL 0.94* 1.30* 2.90*  --   --   --      COVID-19  Lab Results    Component Value Date    COVID19 Not Detected 11/14/2021    COVID19 Not Detected 10/17/2021    COVID19 Not Detected 09/13/2021     Images:  XR Chest 1 View    Result Date: 2/2/2024  Impression: Right lung pneumonia appears similar. Left lower lobe pneumonia has worsened. There may be an associated small effusion. Electronically Signed: Petty Stein MD  2/2/2024 7:42 AM EST  Workstation ID: [id number]     Echo:      Results: Reviewed.  I reviewed the patient's new laboratory and imaging results.  I independently reviewed the patient's new images.    Medications: Reviewed.    Assessment   A/P     Hospital:  LOS: 17 days   ICU: 3d 17h     Active Hospital Problems    Diagnosis  POA    **Colonic mass [K63.89]  Yes    Severe malnutrition [E43]  Yes    Respiratory distress [R06.03]  Yes    Current smoker [F17.200]  Yes     Roger is a 63 y.o. male admitted on 1/17/2024 with Colonic mass [K63.89]    Assessment/Management/Treatment Plan:    Gallbladder and Colonic mass  01/17/24 S/P Lap Johana, open sigmoid colectomy with diverting loop ileostomy, liver biopsy.   01/30/24 S/P Exploratory Lap and ALBERTO 01/30/24    Lab Results   Lab Value Date/Time    FINALDX  01/17/2024 1137     1.  GALLBLADDER, CHOLECYSTECTOMY:  Chronic cholecystitis and cholelithiasis.  Adenomyoma.  No dysplasia identified.  Benign cystic duct lymph node.  Adhesed portions of liver with inflammatory changes.    2.  LIVER, CORE NEEDLE BIOPSIES:  Benign hepatic tissue.  Increased stainable iron (see comment).  No fibrosis identified on trichrome stain with appropriate control.    3.  SIGMOID COLON, PARTIAL COLECTOMY:  Benign polyp, 4.2 cm in maximum size with features of inflammatory polyp (see comment).  16 lymph nodes negative for metastatic carcinoma (0/16).    4.  PROXIMAL ANASTOMOTIC DOUGHNUT:  Benign colonic tissue.    5.  DISTAL ANASTOMOTIC DONUT:  Benign colonic tissue.    6.  APPENDIX, APPENDECTOMY:  Acute appendicitis.       Cardiovascular  HTN  Dyslipidemia   Tobacco use  Emphysema as noticed in CT Chest 12/27/2023  Several new irregular nodular densities within the RLL, with the largest one measuring 9 mm.  GERD  Nutrition Support: Parenteral Nutrition     Lab Results   Component Value Date    PREALBUMIN 12.7 (L) 01/29/2024    PREALBUMIN 8.8 (L) 01/26/2024    CRP 9.45 (H) 01/29/2024    CRP 18.23 (H) 01/26/2024     Endocrine   Body mass index is 19.47 kg/m². Normal: 18.5-24.9kg/m2  No history of Diabetes    Lab Results   Lab Value Date/Time    HGBA1C 5.00 01/10/2024 1508    HGBA1C 5.3 11/28/2022 1111    HGBA1C 5.20 08/17/2021 1149     Results from last 7 days   Lab Units 02/03/24  1122 02/03/24  0522 02/02/24  2321 02/02/24  1729 02/02/24  1112 02/02/24  0508 02/01/24  2338 02/01/24  1731   GLUCOSE mg/dL 122 125 113 110 123 118 115 113       Diet: Adult Central 2-in-1 TPN  NPO Diet NPO Type: Sips with Meds, Ice Chips   Advance Directives: Code Status and Medical Interventions:   Ordered at: 01/17/24 1734     Code Status (Patient has no pulse and is not breathing):    CPR (Attempt to Resuscitate)     Medical Interventions (Patient has pulse or is breathing):    Full Support        DVT prophylaxis:  Medical and mechanical DVT prophylaxis orders are present.    In brief:  Leukocytosis and PCT better, continue BRODERICK and Micafungin  Decrease Goal IVF (including Parenteral Nutrition) to 60 mL/h  Diuresis today.  Aerobika  Respiratory Culture  Follow up WBC and PCT in AM  Continue PCA pump.  Disposition: Keep in ICU.    Plan of care and goals reviewed during interdisciplinary rounds.  I discussed the patient's findings and my recommendations with patient and nursing staff    MDM:    Problem(s) High due to: Acute or Chronic illness or injury that may poses a threat to life or bodily function  Data: Moderate due to: Review or results of each unique test and Ordering of each unique test  Risk: High due to: drug(s) requiring intensive monitoring  "for toxicity (Parenteral Nutrition)    High    [x] Primary Attending Intensive Care Medicine - Nutrition Support   [] Consultant    Copied text in this note has been reviewed and is accurate as of 02/03/24        Electronically signed by Monster Mock MD at 02/03/24 1316       Jimmy Craig MD at 02/03/24 0932          Patient Name:  Roger Bhat  YOB: 1960  [id number]    Surgery Progress Note    Date of visit: 2/3/2024      Subjective: No significant changes.  Slept some overnight.  Had some increased pain around 1 AM.  Moved to the chair and the pain is better.  He describes this as pain in his upper back.  Increased oxygen requirement to 5 L.  He tells me that he is intermittently short of breath.  He reports no nausea or vomiting.  NG tube had 650 mL of output yesterday.  Stoma had 100 mL of output.  Urine output 1775 mL          Objective:     BP (!) 176/101   Pulse 104   Temp 98.2 °F (36.8 °C) (Axillary)   Resp 18   Ht 175.3 cm (69\")   Wt 59.8 kg (131 lb 13.4 oz)   SpO2 93%   BMI 19.47 kg/m²     Intake/Output Summary (Last 24 hours) at 2/3/2024 0941  Last data filed at 2/3/2024 0556  Gross per 24 hour   Intake 3386.5 ml   Output 2405 ml   Net 981.5 ml       GEN:   Awake, alert, sitting up in bed, chronically ill-appearing in no obvious distress  CV:      Regular rate and rhythm  L:         Symmetric expansion, on 4 L of oxygen by nasal cannula with coarse respirations  Abd:    Soft, moderately distended, appropriately tender palpation throughout the abdomen, midline incision with some reactive erythema surrounding the site and serous drainage, DELROY drain in the right lower quadrant with serosanguineous output, ileostomy on the right side pink and patent with scant stool in the bag  Ext:     No cyanosis, clubbing, or edema    Recent labs that are back at this time have been reviewed.           Assessment/ Plan:    Mr. Bhat is a 63-year-old gentleman who is admitted following " operative intervention for gallbladder and sigmoid colon mass on January 17     #Sigmoid colon mass, gallbladder mass  # Ileus  -Postop day 17 following lap vladimir, open sigmoid colectomy with diverting loop ileostomy, postoperative day 4 following exploratory laparotomy with washout  -Has been slightly more tachypneic and oxygen requirement is up at 5 L, however appears stable  -White blood cell count continues to downtrend.  20,000 today  -Still not with significant ileostomy output.  -Continue NG tube to low wall suction.  Okay for ice chips and small sips of clears for comfort  -Continue TPN   -Continue antibiotics.  White count has improved since we broaden his antibiotics on Tuesday  -No significant changes from my standpoint today.  Awaiting resolution of ileus and continuing with supportive measures including TPN, nasogastric decompression, and symptomatic management         Jimmy Craig MD  2/3/2024  09:41 EST        Electronically signed by Jimmy Craig MD at 02/03/24 0943       Monster Mock MD at 02/02/24 1311            INTENSIVIST   PROGRESS NOTE     Subjective   SUBJECTIVE     Roger 63 y.o. male is followed for: No chief complaint on file.       Colonic mass    Current smoker    Severe malnutrition    Respiratory distress    As an Intensivist, we provide an integrated approach to the ICU patient and family, medical management of comorbid conditions, including but not limited to electrolytes, glycemic control, organ dysfunction, lead interdisciplinary rounds and coordinate the care with all other services, including those from other specialists.     Interval History:  POD: 3 Days Post-Op (EXP LAP 01/30/23)    No acute events overnight.    No complaints.    No fevers.    Temp  Min: 97.9 °F (36.6 °C)  Max: 99.1 °F (37.3 °C)     History     Last Reviewed by Monster Mock MD on 1/31/2024 at  6:14 PM    Sections Reviewed    Medical, Surgical, Family, Tobacco, Alcohol, Drug Use, Sexual  Activity,   Social Documentation    Problem list reviewed by Monster Mock MD on 2024 at  6:14 PM  Medicines reviewed by Monster Mock MD on 2024 at  6:14 PM  Allergies reviewed by Monster Mock MD on 2024 at  6:14 PM    The patient's relevant past medical, surgical and social history were reviewed and updated in Epic as appropriate.     Objective   OBJECTIVE     Vitals:  Temp: 98.1 °F (36.7 °C) (24 0800) Temp  Min: 97.9 °F (36.6 °C)  Max: 99.1 °F (37.3 °C)   Temp core:      BP: 110/71 (24 1000) BP  Min: 109/68  Max: 145/86   MAP (non-invasive) Noninvasive MAP (mmHg): 87 (24 1000) Noninvasive MAP (mmHg)  Av.7  Min: 64  Max: 138   Pulse: 84 (24 1000) Pulse  Min: 82  Max: 105   Resp: (!) 32 (24 1000) Resp  Min: 16  Max: 32   SpO2: 97 % (24 1000) SpO2  Min: 89 %  Max: 98 %   Device: nasal cannula (24 1000)    Flow Rate: 3 (24 1000) Flow (L/min)  Min: 3  Max: 4     Medications (drips):  Adult Central 2-in-1 TPN, Last Rate: 100 mL/hr at 24 1805  Adult Central 2-in-1 TPN  HYDROmorphone HCl-NaCl  Pharmacy to Dose TPN      Physical Examination  Telemetry:  Rhythm: sinus tachycardia, normal sinus rhythm (24 1000)      Constitutional:  No acute distress.   Cardiovascular: RRR.    Respiratory: Normal breath sounds  (+) Rhonchi   Abdominal:  Soft with no tenderness.   Extremities: No Edema   Neurological:   Alert, Oriented, Cooperative.  Best Eye Response: 4-->(E4) spontaneous (24 1000)  Best Motor Response: 6-->(M6) obeys commands (24 1000)  Best Verbal Response: 5-->(V5) oriented (24 1000)  Symone Coma Scale Score: 15 (02/02/24 1000)     Results Reviewed:  Laboratory  Microbiology  Radiology  Pathology    Hematology:  Results from last 7 days   Lab Units 24  0431 24  0426 24  0719   WBC 10*3/mm3 31.68* 47.34* 48.13*   BANDS % % 12.0* 18.0* 38.0*   HEMOGLOBIN g/dL 10.6* 10.2* 11.5*   MCV fL 102.0* 101.4*  101.5*   PLATELETS 10*3/mm3 354 363 367     Results from last 7 days   Lab Units 02/02/24 0431 02/01/24 0426 01/31/24  0719   NEUTROS ABS 10*3/mm3 28.51* 44.97* 45.11*  44.76*   LYMPHS ABS 10*3/mm3  --   --  1.32   EOS ABS 10*3/mm3 0.63* 0.00 0.00  0.00     Chemistry:  Estimated Creatinine Clearance: 168.5 mL/min (A) (by C-G formula based on SCr of 0.39 mg/dL (L)).    Results from last 7 days   Lab Units 02/02/24 0431 02/01/24  0426   SODIUM mmol/L 137 136   POTASSIUM mmol/L 3.9 3.7   CHLORIDE mmol/L 100 101   CO2 mmol/L 26.0 24.0   BUN mg/dL 16 21   CREATININE mg/dL 0.39* 0.55*   GLUCOSE mg/dL 95 122*     Results from last 7 days   Lab Units 02/02/24 0431 02/01/24  1424 02/01/24 0426 01/30/24  0538 01/29/24  0541   IONIZED CALCIUM mmol/L  --   --   --   --  1.27   CALCIUM mg/dL 8.8  --  8.6   < > 8.6   MAGNESIUM mg/dL 2.2  --  2.2   < > 2.2   PHOSPHORUS mg/dL 2.9 2.8 2.2*   < > 3.6    < > = values in this interval not displayed.     Hepatic Panel:  Results from last 7 days   Lab Units 01/31/24 0520 01/29/24  0541   ALBUMIN g/dL 3.1* 3.2*   TOTAL PROTEIN g/dL 5.2* 5.7*   BILIRUBIN mg/dL 0.5 0.3   BILIRUBIN DIRECT mg/dL 0.2 <0.2   AST (SGOT) U/L 15 16   ALT (SGPT) U/L 10 18   ALK PHOS U/L 52 79     Cardiac Labs:  Results from last 7 days   Lab Units 01/31/24 0520 01/30/24  2337   HSTROP T ng/L 18 26*     Biomarkers:  Results from last 7 days   Lab Units 02/02/24 0431 02/01/24 0426 01/31/24  0520 01/30/24  2336 01/29/24  0541   CRP mg/dL  --   --   --   --  9.45*   LACTATE mmol/L  --   --  1.7 2.2*  --    PROCALCITONIN ng/mL 1.30* 2.90*  --   --   --      COVID-19  Lab Results   Component Value Date    COVID19 Not Detected 11/14/2021    COVID19 Not Detected 10/17/2021    COVID19 Not Detected 09/13/2021     Images:  XR Chest 1 View    Result Date: 2/2/2024  Impression: Right lung pneumonia appears similar. Left lower lobe pneumonia has worsened. There may be an associated small effusion. Electronically  Signed: Petty Stein MD  2/2/2024 7:42 AM EST  Workstation ID: [id number]     Echo:      Results: Reviewed.  I reviewed the patient's new laboratory and imaging results.  I independently reviewed the patient's new images.    Medications: Reviewed.    Assessment   A/P     Hospital:  LOS: 16 days   ICU: 2d 17h     Active Hospital Problems    Diagnosis  POA    **Colonic mass [K63.89]  Yes    Severe malnutrition [E43]  Yes    Respiratory distress [R06.03]  Yes    Current smoker [F17.200]  Yes     Roger is a 63 y.o. male admitted on 1/17/2024 with Colonic mass [K63.89]    Assessment/Management/Treatment Plan:    Gallbladder and Colonic mass  01/17/24 S/P Lap Johana, open sigmoid colectomy with diverting loop ileostomy, liver biopsy.   01/30/24 S/P Exploratory Lap and ALBERTO 01/30/24    Lab Results   Lab Value Date/Time    FINALDX  01/17/2024 1137     1.  GALLBLADDER, CHOLECYSTECTOMY:  Chronic cholecystitis and cholelithiasis.  Adenomyoma.  No dysplasia identified.  Benign cystic duct lymph node.  Adhesed portions of liver with inflammatory changes.    2.  LIVER, CORE NEEDLE BIOPSIES:  Benign hepatic tissue.  Increased stainable iron (see comment).  No fibrosis identified on trichrome stain with appropriate control.    3.  SIGMOID COLON, PARTIAL COLECTOMY:  Benign polyp, 4.2 cm in maximum size with features of inflammatory polyp (see comment).  16 lymph nodes negative for metastatic carcinoma (0/16).    4.  PROXIMAL ANASTOMOTIC DOUGHNUT:  Benign colonic tissue.    5.  DISTAL ANASTOMOTIC DONUT:  Benign colonic tissue.    6.  APPENDIX, APPENDECTOMY:  Acute appendicitis.      Cardiovascular  HTN  Dyslipidemia   Tobacco use  Emphysema as noticed in CT Chest 12/27/2023  Several new irregular nodular densities within the RLL, with the largest one measuring 9 mm.  GERD  Nutrition Support: Parenteral Nutrition     Lab Results   Component Value Date    PREALBUMIN 12.7 (L) 01/29/2024    PREALBUMIN 8.8 (L) 01/26/2024    CRP 9.45 (H)  01/29/2024    CRP 18.23 (H) 01/26/2024     Endocrine   Body mass index is 19.5 kg/m². Normal: 18.5-24.9kg/m2  No history of Diabetes    Lab Results   Lab Value Date/Time    HGBA1C 5.00 01/10/2024 1508    HGBA1C 5.3 11/28/2022 1111    HGBA1C 5.20 08/17/2021 1149     Results from last 7 days   Lab Units 02/02/24  1112 02/02/24  0508 02/01/24  2338 02/01/24  1731 02/01/24  1137 02/01/24  0534 01/31/24  2346 01/31/24  1757   GLUCOSE mg/dL 123 118 115 113 119 122 135* 127       Diet: NPO Diet NPO Type: Sips with Meds, Ice Chips  Adult Central 2-in-1 TPN  Adult Central 2-in-1 TPN   Advance Directives: Code Status and Medical Interventions:   Ordered at: 01/17/24 1734     Code Status (Patient has no pulse and is not breathing):    CPR (Attempt to Resuscitate)     Medical Interventions (Patient has pulse or is breathing):    Full Support        DVT prophylaxis:  Medical and mechanical DVT prophylaxis orders are present.    In brief:  Leukocytosis trending down, so is PCT, continue BRODERICK and Micafungin  Decrease Goal IVF (including Parenteral Nutrition) to 80 mL/h  Monitor electrolytes.  Follow up WBC and PCT in AM  Continue PCA pump.  Disposition: Keep in ICU.    Plan of care and goals reviewed during interdisciplinary rounds.  I discussed the patient's findings and my recommendations with patient and nursing staff    MDM:    Problem(s) High due to: Acute or Chronic illness or injury that may poses a threat to life or bodily function  Data: Moderate due to: Review or results of each unique test and Ordering of each unique test  Risk: High due to: drug(s) requiring intensive monitoring for toxicity (Parenteral Nutrition)    High    [x] Primary Attending Intensive Care Medicine - Nutrition Support   [] Consultant    Copied text in this note has been reviewed and is accurate as of 02/02/24        Electronically signed by Monster Mock MD at 02/02/24 1317       Jimmy Craig MD at 02/02/24 0824          Patient Name:   "Roger Bhat  YOB: 1960  [id number]    Surgery Progress Note    Date of visit: 2/2/2024      Subjective: No acute changes.  Leukocytosis down today.  Has not had significant ileostomy output.  Reports some upper abdominal pain and chest soreness.  Respirations are slightly more tachypneic and coarse today, although oxygenating well and is conversant on 4 L by nasal cannula.  Ambulated yesterday.  Having some leakage around the Crouch catheter          Objective:     /87 (BP Location: Left arm, Patient Position: Lying)   Pulse 105   Temp 98.1 °F (36.7 °C) (Oral)   Resp 20   Ht 175.3 cm (69\")   Wt 59.9 kg (132 lb 0.9 oz)   SpO2 91%   BMI 19.50 kg/m²     Intake/Output Summary (Last 24 hours) at 2/2/2024 0824  Last data filed at 2/2/2024 0600  Gross per 24 hour   Intake 3258.4 ml   Output 2258 ml   Net 1000.4 ml     GEN:   Awake, alert, sitting up in bed, chronically ill-appearing in no obvious distress  CV:      Regular rate and rhythm  L:         Symmetric expansion, on 4 L of oxygen by nasal cannula with coarse respirations  Abd:    Soft, moderately distended, appropriately tender palpation throughout the abdomen, midline incision with some reactive erythema surrounding the site and serous drainage, DELROY drain in the right lower quadrant with serosanguineous output, ileostomy on the right side pink and patent with scant stool in the bag  Ext:     No cyanosis, clubbing, or edema    Recent labs that are back at this time have been reviewed.           Assessment/ Plan:    Mr. Bhat is a 63-year-old gentleman who is admitted following operative intervention for gallbladder and sigmoid colon mass on January 17     #Sigmoid colon mass, gallbladder mass  # Ileus  -Postop day 16 following lap vladimir, open sigmoid colectomy with diverting loop ileostomy, postoperative day 3 following exploratory laparotomy with washout  -Vital signs are stable.  Slightly more tachypneic however  -Leukocytosis " is improved to 31,000 today after broadening antibiotics yesterday.  Labs otherwise without acute findings  -Still without significant ileostomy output.  Continue with NG tube to low wall suction for now.  -Continue TPN   -Continue antibiotics  -DC Crouch today  -Awaiting resolution of ileus and continuing with supportive measures including TPN, nasogastric decompression, and symptomatic management         Jimmy Craig MD  2/2/2024  08:24 EST        Electronically signed by Jimmy Craig MD at 02/02/24 0867       Monster Mock MD at 02/01/24 1154            INTENSIVIST   PROGRESS NOTE     Subjective   SUBJECTIVE     Roger 63 y.o. male is followed for: No chief complaint on file.       Colonic mass    Current smoker    Severe malnutrition    Respiratory distress    As an Intensivist, we provide an integrated approach to the ICU patient and family, medical management of comorbid conditions, including but not limited to electrolytes, glycemic control, organ dysfunction, lead interdisciplinary rounds and coordinate the care with all other services, including those from other specialists.     Interval History:  POD: 2 Days Post-Op (EXP LAP 01/30/23)    No acute events overnight.    Feeling well.     No fevers.    Temp  Min: 97.5 °F (36.4 °C)  Max: 98.5 °F (36.9 °C)     History     Last Reviewed by Monster Mock MD on 1/31/2024 at  6:14 PM    Sections Reviewed    Medical, Surgical, Family, Tobacco, Alcohol, Drug Use, Sexual Activity,   Social Documentation    Problem list reviewed by Monster Mock MD on 1/31/2024 at  6:14 PM  Medicines reviewed by Monster Mock MD on 1/31/2024 at  6:14 PM  Allergies reviewed by Monster Mock MD on 1/31/2024 at  6:14 PM    The patient's relevant past medical, surgical and social history were reviewed and updated in Epic as appropriate.     Objective   OBJECTIVE     Vitals:  Temp: 98.2 °F (36.8 °C) (02/01/24 0800) Temp  Min: 97.5 °F (36.4 °C)  Max: 98.5 °F (36.9 °C)    Temp core:      BP: 129/79 (24 1100) BP  Min: 108/71  Max: 137/79   MAP (non-invasive) Noninvasive MAP (mmHg): 97 (24 1100) Noninvasive MAP (mmHg)  Av.7  Min: 64  Max: 138   Pulse: 98 (24 1100) Pulse  Min: 93  Max: 110   Resp: 20 (24 1000) Resp  Min: 18  Max: 27   SpO2: 92 % (24 1100) SpO2  Min: 90 %  Max: 95 %   Device: nasal cannula (24 1000)    Flow Rate: 2 (24 1000) Flow (L/min)  Min: 2  Max: 3     Medications (drips):  Adult Central 2-in-1 TPN, Last Rate: 100 mL/hr at 24 1755  HYDROmorphone HCl-NaCl  Pharmacy to Dose TPN      Physical Examination  Telemetry:  Rhythm: normal sinus rhythm (24 1000)      Constitutional:  No acute distress.   Cardiovascular: RRR.    Respiratory: Normal breath sounds  No adventitious sounds   Abdominal:  Soft with no tenderness.   Extremities: No Edema   Neurological:   Alert, Oriented, Cooperative.  Best Eye Response: 4-->(E4) spontaneous (24 1000)  Best Motor Response: 6-->(M6) obeys commands (24 1000)  Best Verbal Response: 5-->(V5) oriented (24 1000)  [place] Coma Scale Score: 15 (24 1000)     Results Reviewed:  Laboratory  Microbiology  Radiology  Pathology    Hematology:  Results from last 7 days   Lab Units 24  04224  0719 24  0820   WBC 10*3/mm3 47.34* 48.13* 26.53*   BANDS % % 18.0* 38.0*  --    HEMOGLOBIN g/dL 10.2* 11.5* 13.7   MCV fL 101.4* 101.5* 102.1*   PLATELETS 10*3/mm3 363 367 531*     Results from last 7 days   Lab Units 24  0719   NEUTROS ABS 10*3/mm3 44.97* 45.11*  44.76*   LYMPHS ABS 10*3/mm3  --  1.32   EOS ABS 10*3/mm3 0.00 0.00  0.00     Chemistry:  Estimated Creatinine Clearance: 118.5 mL/min (A) (by C-G formula based on SCr of 0.55 mg/dL (L)).    Results from last 7 days   Lab Units 24  0426 24  0520   SODIUM mmol/L 136 134*   POTASSIUM mmol/L 3.7 4.4   CHLORIDE mmol/L 101 102   CO2 mmol/L 24.0 16.0*   BUN mg/dL 21 28*    CREATININE mg/dL 0.55* 0.79   GLUCOSE mg/dL 122* 111*     Results from last 7 days   Lab Units 02/01/24  0426 01/31/24  0520 01/30/24  0538 01/29/24  0541 01/27/24  0405 01/26/24  0843   IONIZED CALCIUM mmol/L  --   --   --  1.27  --  1.27   CALCIUM mg/dL 8.6 8.4*   < > 8.6   < >  --    MAGNESIUM mg/dL 2.2 1.9   < > 2.2   < >  --    PHOSPHORUS mg/dL 2.2* 3.8   < > 3.6   < >  --     < > = values in this interval not displayed.     Hepatic Panel:  Results from last 7 days   Lab Units 01/31/24  0520 01/29/24  0541 01/26/24  0500   ALBUMIN g/dL 3.1* 3.2* 3.1*  3.1*   TOTAL PROTEIN g/dL 5.2* 5.7* 5.7*  5.7*   BILIRUBIN mg/dL 0.5 0.3 0.2  0.2   BILIRUBIN DIRECT mg/dL 0.2 <0.2 <0.2   AST (SGOT) U/L 15 16 9  9   ALT (SGPT) U/L 10 18 15  15   ALK PHOS U/L 52 79 68  68     Cardiac Labs:  Results from last 7 days   Lab Units 01/31/24  0520 01/30/24  2337   HSTROP T ng/L 18 26*     Biomarkers:  Results from last 7 days   Lab Units 02/01/24  0426 01/31/24  0520 01/30/24  2336 01/29/24  0541 01/26/24  0500   CRP mg/dL  --   --   --  9.45* 18.23*   LACTATE mmol/L  --  1.7 2.2*  --   --    PROCALCITONIN ng/mL 2.90*  --   --   --   --      COVID-19  Lab Results   Component Value Date    COVID19 Not Detected 11/14/2021    COVID19 Not Detected 10/17/2021    COVID19 Not Detected 09/13/2021     Images:  XR Chest 1 View    Result Date: 1/30/2024  Impression: 1. PICC line and NG tube appear to be in satisfactory position. 2. Moderately extensive bilateral pneumonia, right greater than left. 3. Subdiaphragmatic free air, expected for recent abdominal surgery. Electronically Signed: Kurt Li MD  1/30/2024 7:55 PM EST  Workstation ID: [id number]     Echo:      Results: Reviewed.  I reviewed the patient's new laboratory and imaging results.  I independently reviewed the patient's new images.    Medications: Reviewed.    Assessment   A/P     Hospital:  LOS: 15 days   ICU: 1d 16h     Active Hospital Problems    Diagnosis  POA     **Colonic mass [K63.89]  Yes    Severe malnutrition [E43]  Yes    Respiratory distress [R06.03]  Yes    Current smoker [F17.200]  Yes     Roger is a 63 y.o. male admitted on 1/17/2024 with Colonic mass [K63.89]    Assessment/Management/Treatment Plan:    Gallbladder and Colonic mass  01/17/24 S/P Lap Johana, open sigmoid colectomy with diverting loop ileostomy, liver biopsy.   01/30/24 S/P Exploratory Lap and ALBERTO 01/30/24    Lab Results   Lab Value Date/Time    FINALDX  01/17/2024 1137     1.  GALLBLADDER, CHOLECYSTECTOMY:  Chronic cholecystitis and cholelithiasis.  Adenomyoma.  No dysplasia identified.  Benign cystic duct lymph node.  Adhesed portions of liver with inflammatory changes.    2.  LIVER, CORE NEEDLE BIOPSIES:  Benign hepatic tissue.  Increased stainable iron (see comment).  No fibrosis identified on trichrome stain with appropriate control.    3.  SIGMOID COLON, PARTIAL COLECTOMY:  Benign polyp, 4.2 cm in maximum size with features of inflammatory polyp (see comment).  16 lymph nodes negative for metastatic carcinoma (0/16).    4.  PROXIMAL ANASTOMOTIC DOUGHNUT:  Benign colonic tissue.    5.  DISTAL ANASTOMOTIC DONUT:  Benign colonic tissue.    6.  APPENDIX, APPENDECTOMY:  Acute appendicitis.      Cardiovascular  HTN  Dyslipidemia   Tobacco use  Emphysema as noticed in CT Chest 12/27/2023  Several new irregular nodular densities within the RLL, with the largest one measuring 9 mm.  GERD  Nutrition Support: Parenteral Nutrition     Lab Results   Component Value Date    PREALBUMIN 12.7 (L) 01/29/2024    PREALBUMIN 8.8 (L) 01/26/2024    CRP 9.45 (H) 01/29/2024    CRP 18.23 (H) 01/26/2024     Endocrine   Body mass index is 19.35 kg/m². Normal: 18.5-24.9kg/m2  No history of Diabetes    Lab Results   Lab Value Date/Time    HGBA1C 5.00 01/10/2024 1508    HGBA1C 5.3 11/28/2022 1111    HGBA1C 5.20 08/17/2021 1149     Results from last 7 days   Lab Units 02/01/24  1137 02/01/24  0534 01/31/24  2346 01/31/24  1750  01/31/24  1121 01/31/24  0626 01/30/24  1240 01/30/24  0752   GLUCOSE mg/dL 119 122 135* 127 135* 129 126 129       Diet: NPO Diet NPO Type: Sips with Meds, Ice Chips  Adult Central 2-in-1 TPN   Advance Directives: Code Status and Medical Interventions:   Ordered at: 01/17/24 3365     Code Status (Patient has no pulse and is not breathing):    CPR (Attempt to Resuscitate)     Medical Interventions (Patient has pulse or is breathing):    Full Support        DVT prophylaxis:  Medical and mechanical DVT prophylaxis orders are present.    In brief:  Discussed with Dr. Craig  We will adjust antibiotics, although clinically he seems to be improving.  Start BRODERICK and Micafungin  Goal IVF (including Parenteral Nutrition) 100 mL/h  Monitor electrolytes.  Follow up WBC and PCT in AM  Expect leukocytosis to start dropping tomorrow  Continue PCA pump.  Disposition: Keep in ICU.    Plan of care and goals reviewed during interdisciplinary rounds.  I discussed the patient's findings and my recommendations with patient and nursing staff    MDM:    Problem(s) High due to: Acute or Chronic illness or injury that may poses a threat to life or bodily function  Data: Moderate due to: Review or results of each unique test and Ordering of each unique test  Risk: High due to: drug(s) requiring intensive monitoring for toxicity (Parenteral Nutrition)    High    [x] Primary Attending Intensive Care Medicine - Nutrition Support   [] Consultant    Copied text in this note has been reviewed and is accurate as of 02/01/24        Electronically signed by Monster Mock MD at 02/01/24 1545       Jimmy Craig MD at 02/01/24 0643          Patient Name:  Roger Bhat  YOB: 1960  [id number]    Surgery Progress Note    Date of visit: 2/1/2024      Subjective: No significant changes.  Tells me that his stomach is sore this morning.  He has been afebrile and vital signs have been stable.  He tells me that he is coughing quite  "a bit.  Ileostomy with only small output yesterday.  NG tube with 600 mL of bilious output recorded.  Urine output 1330 amount of the Crouch catheter.          Objective:     /70   Pulse 97   Temp 97.8 °F (36.6 °C) (Oral)   Resp 20   Ht 175.3 cm (69\")   Wt 59.4 kg (131 lb)   SpO2 92%   BMI 19.35 kg/m²     Intake/Output Summary (Last 24 hours) at 2/1/2024 0643  Last data filed at 2/1/2024 0455  Gross per 24 hour   Intake 1992.2 ml   Output 2155 ml   Net -162.8 ml       GEN:   Awake, alert, sitting up in bed, chronically ill-appearing in no obvious distress  CV:      Regular rate and rhythm  L:         Symmetric expansion, not labored on nasal cannula  Abd:    Soft, moderately distended, appropriately tender palpation throughout the abdomen, midline incision with dressing in place with small amount of spotting on the dressing, DELROY drain in the right lower quadrant with serosanguineous output, ileostomy on the right side pink and patent with scant stool in the bag  Ext:     No cyanosis, clubbing, or edema    Recent labs that are back at this time have been reviewed.           Assessment/ Plan:    Mr. Bhat is a 63-year-old gentleman who is admitted following operative intervention for gallbladder and sigmoid colon mass on January 17     #Sigmoid colon mass, gallbladder mass  # Ileus  -Postop day 15 following lap vladimir, open sigmoid colectomy with diverting loop ileostomy, postoperative day 2 following exploratory laparotomy with washout  -Vitals have been stable  -Significant leukocytosis since repeat surgery.  White count not significantly changed at 47,000 today.  Hemoglobin and renal indices are without significant change  -Has not had significant ileostomy output since repeat abdominal exploration.  -Continue NG tube to continuous low wall suction.  Monitor output  -Continue TPN and IV fluids  -Continue antibiotics  -Awaiting more reliable return of bowel function to the ileostomy.  I would like for him " to remain in the ICU until he has some improvement in his leukocytosis and some bowel function through the ileostomy         Jimmy Craig MD  2024  06:43 EST        Electronically signed by Jimmy Craig MD at 24 0645       Monster Mock MD at 24 1810            INTENSIVIST   PROGRESS NOTE     Subjective   SUBJECTIVE     Roger 63 y.o. male is followed for: No chief complaint on file.       Colonic mass    Current smoker    Severe malnutrition    Respiratory distress    As an Intensivist, we provide an integrated approach to the ICU patient and family, medical management of comorbid conditions, including but not limited to electrolytes, glycemic control, organ dysfunction, lead interdisciplinary rounds and coordinate the care with all other services, including those from other specialists.     Interval History:  POD: 1 Day Post-Op (EXP LAP 23)    No acute events overnight.    Temp  Min: 97.5 °F (36.4 °C)  Max: 98.5 °F (36.9 °C)     History     Last Reviewed by Monster Mock MD on 2024 at  6:14 PM    Sections Reviewed    Medical, Surgical, Family, Tobacco, Alcohol, Drug Use, Sexual Activity,   Social Documentation    Problem list reviewed by Monster Mock MD on 2024 at  6:14 PM  Medicines reviewed by Monster Mock MD on 2024 at  6:14 PM  Allergies reviewed by Monster Mock MD on 2024 at  6:14 PM    The patient's relevant past medical, surgical and social history were reviewed and updated in Epic as appropriate.     Objective   OBJECTIVE     Vitals:  Temp: 97.5 °F (36.4 °C) (24 1600) Temp  Min: 97.5 °F (36.4 °C)  Max: 98.5 °F (36.9 °C)   Temp core:      BP: 117/72 (24 1500) BP  Min: 78/63  Max: 137/79   MAP (non-invasive) Noninvasive MAP (mmHg): 94 (24 1500) Noninvasive MAP (mmHg)  Av.4  Min: 64  Max: 138   Pulse: 98 (24 1500) Pulse  Min: 90  Max: 130   Resp: 27 (24 1600) Resp  Min: 19  Max: 27   SpO2: 94 % (24 1500)  SpO2  Min: 90 %  Max: 100 %   Device: nasal cannula (01/31/24 1600)    Flow Rate: 3 (01/31/24 1600) Flow (L/min)  Min: 2  Max: 4     Medications (drips):  Adult Central 2-in-1 TPN, Last Rate: 100 mL/hr at 01/31/24 1755  HYDROmorphone HCl-NaCl  Pharmacy to Dose TPN      Physical Examination  Telemetry:  Rhythm: normal sinus rhythm, sinus tachycardia (01/31/24 1600)      Constitutional:  No acute distress.   Cardiovascular: RRR.    Respiratory: Normal breath sounds  No adventitious sounds   Abdominal:  Soft with no tenderness.   Extremities: No Edema   Neurological:   Alert, Oriented, Cooperative.  Best Eye Response: 4-->(E4) spontaneous (01/31/24 1600)  Best Motor Response: 6-->(M6) obeys commands (01/31/24 1600)  Best Verbal Response: 5-->(V5) oriented (01/31/24 1600)  Symone Coma Scale Score: 15 (01/31/24 1600)     Results Reviewed:  Laboratory  Microbiology  Radiology  Pathology    Hematology:  Results from last 7 days   Lab Units 01/31/24  0719 01/30/24  0820 01/29/24  0541   WBC 10*3/mm3 48.13* 26.53* 20.75*   BANDS % % 38.0*  --   --    HEMOGLOBIN g/dL 11.5* 13.7 14.0   MCV fL 101.5* 102.1* 102.3*   PLATELETS 10*3/mm3 367 531* 472*     Results from last 7 days   Lab Units 01/31/24  0719   NEUTROS ABS 10*3/mm3 45.11*  44.76*   LYMPHS ABS 10*3/mm3 1.32   EOS ABS 10*3/mm3 0.00  0.00     Chemistry:  Estimated Creatinine Clearance: 82.5 mL/min (by C-G formula based on SCr of 0.79 mg/dL).    Results from last 7 days   Lab Units 01/31/24  0520 01/30/24  2337   SODIUM mmol/L 134* 132*   POTASSIUM mmol/L 4.4 4.1   CHLORIDE mmol/L 102 104   CO2 mmol/L 16.0* 18.0*   BUN mg/dL 28* 26*   CREATININE mg/dL 0.79 0.74*   GLUCOSE mg/dL 111* 126*     Results from last 7 days   Lab Units 01/31/24  0520 01/30/24  2337 01/30/24  0538 01/29/24  0541 01/27/24  0405 01/26/24  0843   IONIZED CALCIUM mmol/L  --   --   --  1.27  --  1.27   CALCIUM mg/dL 8.4* 7.9* 8.5* 8.6   < >  --    MAGNESIUM mg/dL 1.9  --  2.1 2.2   < >  --     PHOSPHORUS mg/dL 3.8  --  3.4 3.6   < >  --     < > = values in this interval not displayed.     Hepatic Panel:  Results from last 7 days   Lab Units 01/31/24  0520 01/29/24  0541 01/26/24  0500   ALBUMIN g/dL 3.1* 3.2* 3.1*  3.1*   TOTAL PROTEIN g/dL 5.2* 5.7* 5.7*  5.7*   BILIRUBIN mg/dL 0.5 0.3 0.2  0.2   BILIRUBIN DIRECT mg/dL 0.2 <0.2 <0.2   AST (SGOT) U/L 15 16 9  9   ALT (SGPT) U/L 10 18 15  15   ALK PHOS U/L 52 79 68  68     Cardiac Labs:  Results from last 7 days   Lab Units 01/31/24  0520 01/30/24  2337   HSTROP T ng/L 18 26*     Biomarkers:  Results from last 7 days   Lab Units 01/31/24  0520 01/30/24  2336 01/29/24  0541 01/26/24  0500   CRP mg/dL  --   --  9.45* 18.23*   LACTATE mmol/L 1.7 2.2*  --   --      COVID-19  Lab Results   Component Value Date    COVID19 Not Detected 11/14/2021    COVID19 Not Detected 10/17/2021    COVID19 Not Detected 09/13/2021     Images:  XR Chest 1 View    Result Date: 1/30/2024  Impression: 1. PICC line and NG tube appear to be in satisfactory position. 2. Moderately extensive bilateral pneumonia, right greater than left. 3. Subdiaphragmatic free air, expected for recent abdominal surgery. Electronically Signed: Kurt Li MD  1/30/2024 7:55 PM EST  Workstation ID: [id number]    XR Abdomen KUB    Result Date: 1/30/2024  Impression: Again seen are multiple dilated loops of small bowel with relatively decompressed colon. Electronically Signed: Ritchie Boston MD  1/30/2024 8:36 AM EST  Workstation ID: [id number]     Echo:      Results: Reviewed.  I reviewed the patient's new laboratory and imaging results.  I independently reviewed the patient's new images.    Medications: Reviewed.    Assessment   A/P     Hospital:  LOS: 14 days   ICU: 22h     Active Hospital Problems    Diagnosis  POA    **Colonic mass [K63.89]  Yes    Severe malnutrition [E43]  Yes    Respiratory distress [R06.03]  Yes    Current smoker [F17.200]  Yes     Roger is a 63 y.o. male admitted on  1/17/2024 with Colonic mass [K63.89]    Assessment/Management/Treatment Plan:    Gallbladder and Colonic mass  01/17/24 S/P Lap Johana, open sigmoid colectomy with diverting loop ileostomy, liver biopsy.   01/30/24 S/P Exploratory Lap and ALBERTO 01/30/24    Lab Results   Lab Value Date/Time    FINALDX  01/17/2024 1137     1.  GALLBLADDER, CHOLECYSTECTOMY:  Chronic cholecystitis and cholelithiasis.  Adenomyoma.  No dysplasia identified.  Benign cystic duct lymph node.  Adhesed portions of liver with inflammatory changes.    2.  LIVER, CORE NEEDLE BIOPSIES:  Benign hepatic tissue.  Increased stainable iron (see comment).  No fibrosis identified on trichrome stain with appropriate control.    3.  SIGMOID COLON, PARTIAL COLECTOMY:  Benign polyp, 4.2 cm in maximum size with features of inflammatory polyp (see comment).  16 lymph nodes negative for metastatic carcinoma (0/16).    4.  PROXIMAL ANASTOMOTIC DOUGHNUT:  Benign colonic tissue.    5.  DISTAL ANASTOMOTIC DONUT:  Benign colonic tissue.    6.  APPENDIX, APPENDECTOMY:  Acute appendicitis.      Cardiovascular  HTN  Dyslipidemia   Tobacco use  Emphysema as noticed in CT Chest 12/27/2023  Several new irregular nodular densities within the RLL, with the largest one measuring 9 mm.  GERD  Nutrition Support: Parenteral Nutrition     Lab Results   Component Value Date    PREALBUMIN 12.7 (L) 01/29/2024    PREALBUMIN 8.8 (L) 01/26/2024    CRP 9.45 (H) 01/29/2024    CRP 18.23 (H) 01/26/2024     Endocrine   Body mass index is 19.35 kg/m². Normal: 18.5-24.9kg/m2  No history of Diabetes    Lab Results   Lab Value Date/Time    HGBA1C 5.00 01/10/2024 1508    HGBA1C 5.3 11/28/2022 1111    HGBA1C 5.20 08/17/2021 1149     Results from last 7 days   Lab Units 01/31/24  1757 01/31/24  1121 01/31/24  0626 01/30/24  1240 01/30/24  0752 01/30/24  0631 01/29/24  2302 01/29/24  1731   GLUCOSE mg/dL 127 135* 129 126 129 123 120 122       Diet: NPO Diet NPO Type: Sips with Meds, Ice Chips  Adult  Central 2-in-1 TPN   Advance Directives: Code Status and Medical Interventions:   Ordered at: 01/17/24 9461     Code Status (Patient has no pulse and is not breathing):    CPR (Attempt to Resuscitate)     Medical Interventions (Patient has pulse or is breathing):    Full Support        DVT prophylaxis:  Medical and mechanical DVT prophylaxis orders are present.    In brief:  Adjust IVF and Parenteral Nutrition   Monitor electrolytes.  Continue Piperacillin-Tazobactam   Follow up WBC  Add LAMA. Continue his ICS/LABA  AM PCT  Disposition: Keep in ICU.    Plan of care and goals reviewed during interdisciplinary rounds.  I discussed the patient's findings and my recommendations with patient and nursing staff    MDM:    Problem(s) High due to: Acute or Chronic illness or injury that may poses a threat to life or bodily function  Data: Moderate due to: Review or results of each unique test and Ordering of each unique test  Risk: High due to: drug(s) requiring intensive monitoring for toxicity (Parenteral Nutrition)    High    [x] Primary Attending Intensive Care Medicine - Nutrition Support   [] Consultant    Copied text in this note has been reviewed and is accurate as of 01/31/24        Electronically signed by Monster Mock MD at 01/31/24 1829       Consult Notes (last 5 days)  Notes from 01/31/24 1526 through 02/05/24 1526   No notes of this type exist for this encounter.

## 2024-02-05 NOTE — CASE MANAGEMENT/SOCIAL WORK
Continued Stay Note  Psychiatric     Patient Name: Roger Bhat  MRN: [id number]  Today's Date: 2/5/2024    Admit Date: 1/17/2024    Plan: Ongoing   Discharge Plan       Row Name 02/05/24 1017       Plan    Plan Ongoing    Plan Comments Discussed patient in MDR and spoke with patient and his friend, Ambar, at bedside.  Patient remains on HFNC, TPN and Dilaudid PCA.  Discharge plan is ongoing at this time and CM will continue to follow.                   Discharge Codes    No documentation.                       Celeste Cabello RN

## 2024-02-05 NOTE — THERAPY TREATMENT NOTE
"Patient Name: Roger Bhat  : 1960    MRN: [id number]                              Today's Date: 2024       Admit Date: 2024    Visit Dx:     ICD-10-CM ICD-9-CM   1. Colonic mass  K63.89 569.89   2. Gallbladder mass  K82.8 575.8   3. Bowel obstruction  K56.609 560.9     Patient Active Problem List   Diagnosis    Current smoker    Cellulitis of right hand    Gastroesophageal reflux disease without esophagitis    Screening, lipid    Dysphagia    Hereditary hemochromatosis    Gallbladder mass    Colonic mass    Severe malnutrition    Respiratory distress     Past Medical History:   Diagnosis Date    Allergies     Cellulitis of hand     Clotting disorder     \"FREE BLEEDING\"    Colonic mass     COPD (chronic obstructive pulmonary disease)     Cough     Current smoker     Erectile dysfunction     Fracture, foot Sept 2022    GERD (gastroesophageal reflux disease)     Hemochromatosis     Hyperlipidemia     Hypertension     Wears dentures     FULL SET     Past Surgical History:   Procedure Laterality Date    CHOLECYSTECTOMY N/A 2024    Procedure: CHOLECYSTECTOMY LAPAROSCOPIC;  Surgeon: Jimmy Craig MD;  Location: [facility] OR;  Service: General;  Laterality: N/A;    COLON RESECTION N/A 2024    Procedure: OPEN SIGMOID COLECTOMY, LIVER BIOPSY, DIVERTING LOOP ILEOSTOMY CREATION, TAKE DOWN OF THE SPLENIC FLEXURE, AND APPENDECTOMY;  Surgeon: Jimmy Craig MD;  Location:  [facility] OR;  Service: General;  Laterality: N/A;    COLONOSCOPY      ENDOSCOPY  2016    pt say's, he was placed under general anesthesia for this    ENDOSCOPY N/A 10/19/2021    Procedure: ESOPHAGOGASTRODUODENOSCOPY;  Surgeon: Osmel Kessler MD;  Location:  [facility]OSCOPY;  Service: Gastroenterology;  Laterality: N/A;  24 fr savory dilator used at 1251, 27 fr sdavory used at 1253, 33 [place] savoruy used at 1257, 42 fr savory used at 1259      ENDOSCOPY N/A 2021    Procedure: ESOPHAGOGASTRODUODENOSCOPY " WITH DILATATION;  Surgeon: Osmel Kessler MD;  Location: [facility] ENDOSCOPY;  Service: Gastroenterology;  Laterality: N/A;  Dilation with 48fr savery    EXPLORATORY LAPAROTOMY N/A 1/30/2024    Procedure: LAPAROTOMY EXPLORATORY;  Surgeon: Jimmy Craig MD;  Location: [facility] OR;  Service: General;  Laterality: N/A;    HAND SURGERY  2022      General Information       Row Name 02/05/24 1129          Physical Therapy Time and Intention    Document Type therapy note (daily note)  -AY     Mode of Treatment physical therapy  -AY       Row Name 02/05/24 1129          General Information    Patient Profile Reviewed yes  -AY     Existing Precautions/Restrictions fall;oxygen therapy device and L/min;other (see comments)  NG to suction; ostomy; abdominal incision; PCA; DELROY; HFNC  -AY     Barriers to Rehab medically complex  -AY       Row Name 02/05/24 1129          Cognition    Orientation Status (Cognition) oriented x 3  -AY       Row Name 02/05/24 1129          Safety Issues, Functional Mobility    Safety Issues Affecting Function (Mobility) insight into deficits/self-awareness;sequencing abilities;awareness of need for assistance  -AY     Impairments Affecting Function (Mobility) endurance/activity tolerance;pain;strength;balance  -AY               User Key  (r) = Recorded By, (t) = Taken By, (c) = Cosigned By      Initials Name Provider Type    AY Aaliyah Rios, EVELIO Physical Therapist                   Mobility       Row Name 02/05/24 1130          Bed Mobility    Bed Mobility supine-sit  -AY     Supine-Sit [place] (Bed Mobility) contact guard;verbal cues  -AY     Assistive Device (Bed Mobility) bed rails;head of bed elevated  -AY       Row Name 02/05/24 1130          Bed-Chair Transfer    Bed-Chair [place] (Transfers) minimum assist (75% patient effort);1 person assist  -AY     Comment, (Bed-Chair Transfer) bed>BSC  -AY       Row Name 02/05/24 1130          Sit-Stand Transfer    Sit-Stand [place]  (Transfers) contact guard  -AY       Row Name 02/05/24 1130          Gait/Stairs (Locomotion)    [place] Level (Gait) minimum assist (75% patient effort);1 person assist;verbal cues  -AY     Assistive Device (Gait) other (see comments)  UE support on tele monitor  -AY     Distance in Feet (Gait) 90  -AY     Deviations/Abnormal Patterns (Gait) bilateral deviations;gait speed decreased;stride length decreased;base of support, narrow  -AY     Bilateral Gait Deviations forward flexed posture;heel strike decreased  -AY     Comment, (Gait/Stairs) pt t/f to NRB for mobility with O2 remaining >90% throughout. Cueing for posture, increased stride length, and heel strike required. overall gait distance limited by incisional pain and SOA.  -AY               User Key  (r) = Recorded By, (t) = Taken By, (c) = Cosigned By      Initials Name Provider Type    Aaliyah Kennedy PT Physical Therapist                   Obj/Interventions       Row Name 02/05/24 1132          Balance    Balance Assessment sitting static balance;sitting dynamic balance;sit to stand dynamic balance;standing static balance;standing dynamic balance  -AY     Static Sitting Balance supervision  -AY     Dynamic Sitting Balance supervision  -AY     Position, Sitting Balance unsupported;sitting edge of bed  -AY     Sit to Stand Dynamic Balance minimal assist  -AY     Static Standing Balance contact guard  -AY     Dynamic Standing Balance minimal assist  -AY     Position/Device Used, Standing Balance supported  -AY               User Key  (r) = Recorded By, (t) = Taken By, (c) = Cosigned By      Initials Name Provider Type    Aaliyah Kennedy PT Physical Therapist                   Goals/Plan    No documentation.                  Clinical Impression       Row Name 02/05/24 1132          Pain    Pretreatment Pain Rating 2/10  -AY     Posttreatment Pain Rating 4/10  -AY     Pain Location incisional  -AY     Pain Location - abdomen  -AY     Pre/Posttreatment  Pain Comment PCA intact and within reach  -AY     Pain Intervention(s) Ambulation/increased activity;Repositioned;Nursing Notified  -AY     Additional Documentation Pain Scale: Numbers Pre/Post-Treatment (Group)  -AY       Row Name 02/05/24 1132          Plan of Care Review    Plan of Care Reviewed With patient  -AY     Progress improving  -AY     Outcome Evaluation Pt showing improvement as he increased ambulation distance to 90ft with min A and UE support. NRB utilized for mobility with O2 remaining >90% throughout. Cont to progress as able; cont to be limited by incisional pain, balance deficit, and generalized weakness compared to baseline. d/c rec for IRF, but will cont to monitor progress closely.  -AY       Row Name 02/05/24 1132          Vital Signs    Pre Systolic BP Rehab 148  -AY     Pre Treatment Diastolic BP 99  -AY     Post Systolic BP Rehab 153  -AY     Post Treatment Diastolic BP 98  -AY     Pretreatment Heart Rate (beats/min) 115  -AY     Posttreatment Heart Rate (beats/min) 114  -AY     Pre SpO2 (%) 92  -AY     O2 Delivery Pre Treatment hi-flow  -AY     Intra SpO2 (%) 90  -AY     O2 Delivery Intra Treatment non-rebreather  -AY     Post SpO2 (%) 91  -AY     O2 Delivery Post Treatment hi-flow  -AY     Pre Patient Position Supine  -AY     Intra Patient Position Standing  -AY     Post Patient Position Sitting  -AY       Row Name 02/05/24 1132          Positioning and Restraints    Pre-Treatment Position in bed  -AY     Post Treatment Position chair  -AY     In Chair notified nsg;reclined;sitting;call light within reach;encouraged to call for assist;exit alarm on;legs elevated;LUE elevated;RUE elevated;on mechanical lift sling;waffle cushion  -AY               User Key  (r) = Recorded By, (t) = Taken By, (c) = Cosigned By      Initials Name Provider Type    AY Aaliyah Rios, PT Physical Therapist                   Outcome Measures       Row Name 02/05/24 1134          How much help from another person  do you currently need...    Turning from your back to your side while in flat bed without using bedrails? 3  -AY     Moving from lying on back to sitting on the side of a flat bed without bedrails? 3  -AY     Moving to and from a bed to a chair (including a wheelchair)? 3  -AY     Standing up from a chair using your arms (e.g., wheelchair, bedside chair)? 3  -AY     Climbing 3-5 steps with a railing? 3  -AY     To walk in hospital room? 3  -AY     AM-PAC 6 Clicks Score (PT) 18  -AY     Highest Level of Mobility Goal 6 --> Walk 10 steps or more  -AY       Row Name 02/05/24 1134          Functional Assessment    Outcome Measure Options AM-PAC 6 Clicks Basic Mobility (PT)  -AY               User Key  (r) = Recorded By, (t) = Taken By, (c) = Cosigned By      Initials Name Provider Type    Aaliyah Kennedy PT Physical Therapist                                 Physical Therapy Education       Title: PT OT SLP Therapies (In Progress)       Topic: Physical Therapy (In Progress)       Point: Mobility training (In Progress)       Learning Progress Summary             Patient Acceptance, E,TB, NR by AY at 2/5/2024 1134    Acceptance, E, VU,DU by SD at 2/4/2024 1628    Acceptance, E,TB, VU by ES at 2/2/2024 1441   Significant Other Acceptance, E,TB, VU by ES at 2/2/2024 1441                         Point: Home exercise program (Done)       Learning Progress Summary             Patient Acceptance, E, VU,DU by SD at 2/4/2024 1628                         Point: Body mechanics (In Progress)       Learning Progress Summary             Patient Acceptance, E,TB, NR by AY at 2/5/2024 1134    Acceptance, E, VU,DU by SD at 2/4/2024 1628    Acceptance, E,TB, VU by ES at 2/2/2024 1441   Significant Other Acceptance, E,TB, VU by ES at 2/2/2024 1441                         Point: Precautions (In Progress)       Learning Progress Summary             Patient Acceptance, E,TB, NR by AY at 2/5/2024 1134    Acceptance, E, VU,DU by SD at 2/4/2024  1628    Acceptance, E,TB, VU by ES at 2/2/2024 1441   Significant Other Acceptance, E,TB, VU by ES at 2/2/2024 1441                                         User Key       Initials Effective Dates Name Provider Type Discipline    SD 03/13/23 -  Mayda Gabriel, PT Physical Therapist PT    AY 11/10/20 -  Aaliyah Rios, EVELIO Physical Therapist PT    ES 08/11/22 -  Holly Hylotn, PT Physical Therapist PT                  PT Recommendation and Plan     Plan of Care Reviewed With: patient  Progress: improving  Outcome Evaluation: Pt showing improvement as he increased ambulation distance to 90ft with min A and UE support. NRB utilized for mobility with O2 remaining >90% throughout. Cont to progress as able; cont to be limited by incisional pain, balance deficit, and generalized weakness compared to baseline. d/c rec for IRF, but will cont to monitor progress closely.     Time Calculation:         PT Charges       Row Name 02/05/24 1134             Time Calculation    Start Time 0840  -AY      PT Received On 02/05/24  -AY         Timed Charges    58998 - PT Therapeutic Activity Minutes 23  -AY         Total Minutes    Timed Charges Total Minutes 23  -AY       Total Minutes 23  -AY                User Key  (r) = Recorded By, (t) = Taken By, (c) = Cosigned By      Initials Name Provider Type    AY Aaliyah Rios, EVELIO Physical Therapist                  Therapy Charges for Today       Code Description Service Date Service Provider Modifiers Qty    [id number]  PT THERAPEUTIC ACT EA 15 MIN 2/5/2024 Aaliyah Rios, PT GP 2            PT G-Codes  Outcome Measure Options: AM-PAC 6 Clicks Basic Mobility (PT)  AM-PAC 6 Clicks Score (PT): 18  AM-PAC 6 Clicks Score (OT): 15  PT Discharge Summary  Anticipated Discharge Disposition (PT): inpatient rehabilitation facility    Aaliyah Rios PT  2/5/2024

## 2024-02-05 NOTE — PROGRESS NOTES
"Patient Name:  Roger Bhat  YOB: 1960  [id number]    Surgery Progress Note    Date of visit: 2/5/2024      Subjective: Remains on high flow nasal cannula.  Tells me that he overall feels better.  Asking when he will be able to get the NG tube out.  975 mL from the NG yesterday.  Ileostomy with 325 mL recorded.  Urine output 1475 mL.          Objective:     /94   Pulse 77   Temp 96.9 °F (36.1 °C) (Axillary)   Resp 20   Ht 175.3 cm (69\")   Wt 59.8 kg (131 lb 13.4 oz)   SpO2 96%   BMI 19.47 kg/m²     Intake/Output Summary (Last 24 hours) at 2/5/2024 0751  Last data filed at 2/5/2024 0615  Gross per 24 hour   Intake 2337.34 ml   Output 2675 ml   Net -337.66 ml       GEN:   Awake, alert, sitting up in bed, chronically ill-appearing in no obvious distress  CV:      Regular rate and rhythm  L:         Symmetric expansion, on high flow nasal cannula and not obviously labored, coarse cough  Abd:    Soft, moderately distended, appropriately tender palpation throughout the abdomen, midline incision with some reactive erythema surrounding the site and serous drainage, DELROY drain in the right lower quadrant with seros output, ileostomy on the right side pink and patent with scant stool in the bag  Ext:     No cyanosis, clubbing, or edema    Recent labs that are back at this time have been reviewed.           Assessment/ Plan:    Mr. Bhat is a 63-year-old gentleman who is admitted following operative intervention for gallbladder and sigmoid colon mass on January 17     #Sigmoid colon mass, gallbladder mass  # Ileus  -Postop day 19 following lap vladimir, open sigmoid colectomy with diverting loop ileostomy, postoperative day 6 following exploratory laparotomy with washout  -Labs are improving.  White blood cell count is down to 12.  Hemoglobin is 10.5.  -Ileostomy output is still not to a level that I would expect with to 25 mL yesterday and continues to have quite a bit of bilious NG " output.  -Continue NG tube to low wall suction.  Okay for ice chips and small sips of clears for comfort  -Continue TPN   -Continue antibiotics  -Not ready to advance diet yet or get NG tube out.  Continue supportive management         Jimmy Craig MD  2/5/2024  07:51 EST

## 2024-02-05 NOTE — PLAN OF CARE
Goal Outcome Evaluation:  Plan of Care Reviewed With: patient        Progress: improving  Outcome Evaluation: Pt showing improvement as he increased ambulation distance to 90ft with min A and UE support. NRB utilized for mobility with O2 remaining >90% throughout. Cont to progress as able; cont to be limited by incisional pain, balance deficit, and generalized weakness compared to baseline. d/c rec for IRF, but will cont to monitor progress closely.      Anticipated Discharge Disposition (PT): inpatient rehabilitation facility

## 2024-02-05 NOTE — PLAN OF CARE
Goal Outcome Evaluation:      Pt reports feeling better today. Pt on HFNC 40L 40%. RR 20-40. SR on monitor. Pt was able to work PT 2x today. Pain controlled with Dilaudid PCA. TPN infusing at 60ml/hr. Pt allowed sips of clears for comfort. NG tube to continuous LW suction. Abdominal incision open to air with small amount serosanguinous drainage. PW with adequate UOP. Family at bedside.

## 2024-02-05 NOTE — NURSING NOTE
"[facility] visit for Stoma care and assessment    Surgery date: 01/17/24  Surgical Procedures Since Admission:  Procedure(s):  CHOLECYSTECTOMY LAPAROSCOPIC  OPEN SIGMOID COLECTOMY, LIVER BIOPSY, DIVERTING LOOP ILEOSTOMY CREATION, TAKE DOWN OF THE SPLENIC FLEXURE, AND APPENDECTOMY  Surgeon:  Jimmy Craig MD  Status:  19 Days Post-Op  -------------------    Procedure(s):  LAPAROTOMY EXPLORATORY  Surgeon:  Jimmy Craig MD  Status:  6 Days Post-Op  -------------------     Patient laying in chair on high flow nasal cannula, congested cough, per patient's support person patient had brown bowel movement from rectum earlier when sat on bedside commode.  Nurse states \"small\" amount of stool patient seen Intensive Care Unit (ICU). patient's spouse at beside.     Stoma Type: Loop Ileostomy  Diameter/shape: [id number]  Location: RLQ  Protrusion: Budded  Stoma Characteristics: Edematous, Moist, and Pink  Peristomal skin: Intact, slight erythema no breakdown  Character of output:scant amounts in bag, not enough to measure  Current pouching system: Bam 8331 intact but buckling on right side likely to leak if high output increases  Accessory products, appliance placed: Bam 2 piece 2 and three-quarter flat with barrier ring   Goal wear time:5-7 days  Last appliance change: 2/5 by me    Surgical Incision: Midline abdominal incision  Degree of approximation: 100  Approximating Devices: staples  Drainage Characteristics:none  Method of Management: open    Drain(s) type: DELROY drain  Effluent characteristics: Not assessed today    Education provided:   Ambulation promoted  Education not indicated today patient is in ICU.  Will need to provide another folder before discharge, will need to provide more discharge supplies before discharge.  Planning to return Thursday or Friday for another pouch change    [facility] Nurse will continue to follow for ostomy education. Please contact #[phone] with questions or if ostomy leaks occur.   "

## 2024-02-05 NOTE — CONSULTS
Clinical Nutrition   Nutrition Assessment  Reason for Visit: Follow-up protocol, Malnutrition Severity Assessment, Consult, PN    Patient Name: Roger Bhat  YOB: 1960  MRN: [id number]  Date of Encounter: 02/05/24 08:31 EST  Admission date: 1/17/2024    Comments:  To better meet needs will adjust PN to the following:  Clinimix E-D15% AA5% @ 70 ml/hr, 250 ml lipids/daily   =1693 kcal, 84 g protein, LNY=8696 ml, GIR=3.2 mg/kg/min    PN@ Goal over 24hr to Supply:  Volume 1680 mL/day       Energy 1693 Kcal/day 100 % Est Need   Protein 84 g/day 102 % Est Need      Monitor BG levels with adjusted PN; this will provide slightly higher amount of dextrose amount current Rx.    Monitor electrolytes with adjusted PN     Nutrition Assessment   Admission Diagnosis:  Colonic mass [K63.89]    Problem List:    Colonic mass    Current smoker    Severe malnutrition    Respiratory distress      PMH:   He  has a past medical history of Allergies, Cellulitis of hand, Clotting disorder, Colonic mass, COPD (chronic obstructive pulmonary disease), Cough, Current smoker, Erectile dysfunction, Fracture, foot (Sept 7 2022), GERD (gastroesophageal reflux disease), Hemochromatosis, Hyperlipidemia, Hypertension, and Wears dentures.    PSH:  He  has a past surgical history that includes Esophagogastroduodenoscopy (05/2016); Colonoscopy (2021); Esophagogastroduodenoscopy (N/A, 10/19/2021); Esophagogastroduodenoscopy (N/A, 11/16/2021); Hand surgery (2022); Cholecystectomy (N/A, 1/17/2024); Colectomy (N/A, 1/17/2024); and Exploratory Laparotomy (N/A, 1/30/2024).    Applicable Nutrition Concerns:   Skin:  Oral:  GI: s/p lap vladimir, open sigmoid colectomy with diverting loop ileostomy     Applicable Interval History:     (1/17) open sigmoid colectomy with diverting loop ileostomy     (1/26) CT A/P - Impression:  1.Imaging features most consistent with small bowel adynamic ileus. No focal transition point  identified to suggest mechanical obstruction.  2.Postoperative changes as detailed above. Minimal free fluid surrounding right lower quadrant surgical drain. No abscess identified.  3.Other incidental findings as detailed above.    (1/30) Ex lap with ALBERTO    Reported/Observed/Food/Nutrition Related History:   2/5) still NPO with NGT to suction.  No significant ostomy output per documentation. Plan to keep NPO per surgeons note.  TPN for nutrition support.      Patient sitting in bedside chair at time of visit, no issues to report. NGT in place, aware of plans for stay NPO/sips/NTG/TPN for today.  Wife at beside. No questions for RD       1/31) Pt was transferred to ICU postop yesterday to monitor tenuous respiratory status following exlap. Concern for possible aspiration during surgery. Exlap with ALBERTO yesterday. Visited with pt and wife at bedside. Pt reports feeling well today with pain controlled and no N/V currently, though he is NPO. Explained nutritional care plan to meet 100% needs through TPN at this time pending surgery clearance for diet. Pt reports really enjoying sipping on cold ensure clear. Hopeful to be able to eat soon. All questions/concerns answered. Pt in good spirits. Discussed case with intensivist, plan to alter PN to give fluids mainly PN and stop IVF, and increase to meet 100% needs as NPO. Discussed concerns for pt continuing to be at risk refeeding syndrome.     1/30) Pt has continued to struggle to tolerate CLD. PICC placed and changed to TPN yesterday to better meet needs. Meeting ~95% needs with additional from CLD, drinking ensure clear. Lytes wnl this morning with TPN at goal. Na slightly low. Ileostomy output better however again not much nutrition going in. Continues with small amounts of emesis. A repeat CT was done yesterday. Surgeon with plans to take pt back to OR today for ex lap.    1/26) Spoke with surgeon this morning, okay with PPN as CT suggestive of ileus and pt will need  more days NPO. Hopeful will not need long as predict return of bowel function in coming days thus will initiate PPN rather than TPN. Pt also started on reglan and bowel regimen as well as IVF. Discussed with pt and spouse at bedside, in understanding of plan. Pt feeling much better but still with significant NG output that is feculent. Hopeful to be able to eat in near future just really wants a banana. RD explained rationale and importance gut rest at this time and pt understands.     1/25) Pt unable to tolerate oral intake, day 10 without any significant source nutrition. Now NPO with NG to LWS. KUB obtained suggestive of ileus or SBO.     1/24) Visited with pt and spouse at bedside for consult per WOC for new ileostomy education/high ileostomy output. Unfortunately pt's significant nutrition concerns were not reported prior and pt is new to RD, due to no previous consults and no nutritional concerns reported on nursing screen. Together pt and spouse tell me pt has not had any signifiant oral intake in 9 days due to preporation and NPO status for surgery followed by slow to return bowel function and eventual intolerance diet advancement. Did drink Impact AR per surgery protocol in the days prior to surgery.They note he has lost a lot of weight and muscle. He has emesis bag at time of my visit and tells me he only got sick once yesterday but many times today despite no oral intake. All he has been able to keep down today is 8 oz fluid (4oz gatorade and 4oz juice). RD encouraged liquids as needed and trying solids when able.   Discussed with pt and spouse that given above as well as high ileostomy output and increased energy/protein needs from significant surgery, RD with significant concern for pt's nutrition and hydration status. They tell me no one has mentioned possibility of nutrition support, RD educated this may be necessary if unable to tolerate PO soon. Of course would like to avoid but in understanding if  "needed. Nutritional education for new ileostomy and high ileostomy output introduced and will f/u as needed. More pertinent issue is getting pt to tolerate any oral nutrition at this time which would lend to normalized output. Pt and spouse receptive and appreciative of visit. Deny further needs or questions/concerns at this time, NKFA.     Labs    Labs Reviewed: Yes     Results from last 7 days   Lab Units 02/05/24  0509 02/04/24  0428 02/03/24  0359 02/01/24  0426 01/31/24  0520 01/30/24  2337 01/30/24  2336   GLUCOSE mg/dL 116* 121* 118*   < > 111*   < >  --    BUN mg/dL 21 21 16   < > 28*   < >  --    CREATININE mg/dL 0.39* 0.42* 0.43*   < > 0.79   < >  --    SODIUM mmol/L 137 138 135*   < > 134*   < >  --    CHLORIDE mmol/L 101 99 99   < > 102   < >  --    POTASSIUM mmol/L 4.1 4.1 4.4   < > 4.4   < >  --    PHOSPHORUS mg/dL 2.6 2.9 3.0   < > 3.8  --   --    MAGNESIUM mg/dL 2.4 2.1 2.2   < > 1.9  --   --    ALT (SGPT) U/L 36  --   --   --  10  --   --    LACTATE mmol/L  --   --   --   --  1.7  --  2.2*    < > = values in this interval not displayed.       Results from last 7 days   Lab Units 02/05/24  0509 01/31/24  0520   ALBUMIN g/dL 2.4* 3.1*   CRP mg/dL 9.46*  --    TRIGLYCERIDES mg/dL 137  --        Results from last 7 days   Lab Units 02/05/24  0542 02/04/24  2311 02/04/24  1716 02/04/24  1119 02/04/24  0516 02/03/24  2331   GLUCOSE mg/dL 125 128 122 133* 133* 144*     Lab Results   Lab Value Date/Time    HGBA1C 5.00 01/10/2024 1508    HGBA1C 5.3 11/28/2022 1111    HGBA1C 5.20 08/17/2021 1149               Medications    Medications Reviewed: Yes  Pertinent: Dulcolax, Wellbutrin, folic acid, insulin, reglan, abx, protonix, senokot, thiamine   Infusion: TPN, @ 60ml/hr   PRN: zofran    Intake/Ouptut 24 hrs (0701 - 0700)   I&O's Reviewed: Yes   + BM today   NGT 975ml  Ostomy: 225ml     Anthropometrics     Height: Height: 175.3 cm (69\")  Last Filed Weight: Weight: 59.8 kg (131 lb 13.4 oz) (02/03/24 " "0600)  Method: Weight Method: Bed scale  BMI: BMI (Calculated): 19.5  BMI classification: Normal: 18.5-24.9kg/m2  IBW:   155 lb    UBW:     Weight       Weight (kg) Weight (lbs) Weight Method Visit Report   2022 68 kg  149 lb 14.6 oz   --    2023 68 kg  149 lb 14.6 oz   --    2023 64.683 kg  142 lb 9.6 oz   --    10/26/2023 65.137 kg  143 lb 9.6 oz   --    12/15/2023 64.683 kg  142 lb 9.6 oz   --    2024 63.504 kg  140 lb      1/10/2024 66.25 kg  146 lb 0.9 oz  Standing scale     2024 66.25 kg  146 lb 0.9 oz      2024 61.236 kg  135 lb  Bed scale     2024 54.613 kg  120 lb 6.4 oz  Standing scale       Weight change: weight loss of 26 lbs (17.8%) over the past 2 week(s)    Intentional?  No    Nutrition Focused Physical Exam     Date:         Patient meets criteria for malnutrition diagnosis, see MSA note.     Needs Assessment   Date: , reviewed , reassessed     Height used:Height: 175.3 cm (69\")  Weights used: 54.6 kg (ABW)    Estimated Calorie needs: ~1700 Kcal/day  Method:  Kcals/KG 25-35 ABW = 5356-2162  Method:  MSJ 1340 X 1.3 = 1742    Estimated Protein needs: ~82 g PRO/day  Method: 1.3-1.5 g/Kg = 71-82    Estimated Fluid needs: ~ ml/day   Per clinical status    Current Nutrition Prescription   PO: NPO Diet NPO Type: Sips with Meds, Ice Chips  Adult Central 2-in-1 TPN  Oral Nutrition Supplement:   Intake: Insufficient data NPO since     PN Route: PICC   PN:   D16.25% (220g), AA6.25% (90g)  @ goal 60 ml/hr             GIR: 2.55 mg/kg/min  Lipid: 250ml 20% SMOF daily     PN@ Goal over 24hr to Supply:  Volume 1440 mL/day       Energy 1608 Kcal/day 95 % Est Need   Protein 90 g/day 110 % Est Need      ---------------------------------------------------------------------------  Formula/Rate verified at bedside: Yes  Infusing Rate at time of visit: 60 ml/hr    Average Delivery from Chartin Day:   PN Volume 1416 mL/day     Lipid delivered?  230      Energy  " Kcal/day  % Est Need   Protein  g/day  % Est Need     PN hx:   (1/26-1/29) PPN: D5%, AA4.25% @ 50 ml/hr, 100 ml lipids/daily  (1/30-1/31) TPN D15% AA5% @ 60 ml/hr, 100 ml lipids/daily    Nutrition Diagnosis   Date:  1/24            Updated:    Problem Malnutrition  severe/acute   Etiology Energy needs>energy intake 2/2 recent colectomy with ileostomy placement with slow to return bowel function followed by intolerance diet.    Signs/Symptoms PO intakes <50% EEN >/=5 days and loss 17.8% body weight with past 2 weeks, moderate muscle wasting, and moderate subcutaneous fat loss.    Status: Ongoing / worsened with updated standing scale weight, receiving TPN    Date:   1/24    Updated:     Problem Food and nutrition knowledge deficit   Etiology S/p colectomy with new ileostomy complicated by high ileostomy output   Signs/Symptoms Pt with prior lack of knowledge appropriate MNT/diet for condition   Status: Improvement Shown Education provided    Goal:   General: Nutrition support treatment  PO: Advace diet as medically feasible/appropriate  EN/PN: Adjust PN, Deliver estimated needs     Nutrition Intervention      Follow treatment progress, Care plan reviewed, Nutrition support order placed    To better meet needs will adjust PN to the following:  Clinimix E-D15% AA5% @ 70 ml/hr, 250 ml lipids/daily   =1693 kcal, 84 g protein, TOZ=4809 ml, GIR=3.2 mg/kg/min    PN@ Goal over 24hr to Supply:  Volume 1680 mL/day       Energy 1693 Kcal/day 100 % Est Need   Protein 84 g/day 102 % Est Need        Monitoring/Evaluation:   Per protocol, I&O, PO intake, Supplement intake, Pertinent labs, GI status, Symptoms, POC/GOC      Chelsy Gustafson RD, CNSC  Time Spent: 35m

## 2024-02-05 NOTE — PROGRESS NOTES
Pharmacy Parenteral Nutrition Evaluation    Roger Bhat is a  63 y.o. male receiving TPN.     Indication: Prolonged paralytic illus  Consulting Physician: Intensivist     Total Fluid Rate (if stated): -    Labs  Results from last 7 days   Lab Units 02/05/24  0509 02/04/24 0428 02/03/24 0359 02/01/24 0426 01/31/24  0520   SODIUM mmol/L 137 138 135*   < > 134*   POTASSIUM mmol/L 4.1 4.1 4.4   < > 4.4   CHLORIDE mmol/L 101 99 99   < > 102   CO2 mmol/L 28.0 31.0* 29.0   < > 16.0*   BUN mg/dL 21 21 16   < > 28*   CREATININE mg/dL 0.39* 0.42* 0.43*   < > 0.79   CALCIUM mg/dL 9.1 8.8 8.8   < > 8.4*   BILIRUBIN mg/dL 0.2  --   --   --  0.5   ALK PHOS U/L 181*  --   --   --  52   ALT (SGPT) U/L 36  --   --   --  10   AST (SGOT) U/L 25  --   --   --  15   GLUCOSE mg/dL 116* 121* 118*   < > 111*    < > = values in this interval not displayed.       Results from last 7 days   Lab Units 02/05/24  0509 02/04/24 0428 02/03/24  0359   MAGNESIUM mg/dL 2.4 2.1 2.2   PHOSPHORUS mg/dL 2.6 2.9 3.0   PREALBUMIN mg/dL 10.5*  --   --        Results from last 7 days   Lab Units 02/05/24  0509 02/04/24 0428 02/03/24  0359   WBC 10*3/mm3 12.09* 17.22* 20.69*   HEMOGLOBIN g/dL 10.5* 10.4* 10.3*   HEMATOCRIT % 30.9* 30.1* 29.5*   PLATELETS 10*3/mm3 426 399 391       Triglycerides   Date Value Ref Range Status   02/05/2024 137 0 - 150 mg/dL Final     Comment:     Falsely depressed results may occur on samples drawn from patients receiving N-Acetylcysteine (NAC) or Metamizole.       estimated creatinine clearance is 168.2 mL/min (A) (by C-G formula based on SCr of 0.39 mg/dL (L)).    Intake & Output (last 3 days)         02/02 0701 02/03 0700 02/03 0701 02/04 0700 02/04 0701 02/05 0700 02/05 0701 02/06 0700    P.O.  300 295     I.V. (mL/kg) 897.5 (15) 295.4 (4.9) 240.8 (4)     NG/ 110 160     IV Piggyback 115 350      TPN 2585 230 1641.5     Total Intake(mL/kg) 3757.5 (62.8) 1285.4 (21.5) 2337.3 (39.1)     Urine (mL/kg/hr)  1775 (1.2) 2300 (1.6) 1475 (1)     Emesis/NG output 650 725 975     Drains 5 5 0 0    Stool 100 45 225 0    Total Output 2530 3075 2675 0    Net +1227.5 -1789.6 -337.7 0            Stool Unmeasured Occurrence    1 x            Dietitian Recommendations  Diet Orders (active) (From admission, onward)       Start     Ordered    02/03/24 0942  NPO Diet NPO Type: Sips with Meds, Ice Chips  Diet Effective Now        Comments: Ok for sips of clears    02/03/24 0941    01/28/24 1200  Dietary Nutrition Supplements Other (see comment); Ensure Clear Apple  Daily With Breakfast, Lunch & Dinner       01/28/24 0857                    Current TPN Regimen Recommendation:  Dextrose 15% / Amino Acid 5% at goal rate of 70 mL/hr.  20% SMOF Lipid Emulsion 250 mL every 24 hours.    Na 35 mEq/L  K 30 mEq/L  Ca 4.5 mEq/L  Mg 5 mEq/L  PO4 15 mmol/L       Assessment/Plan:  TPN for prolonged paralytic illus.  Clinical Nutrition to manage macronutrients and Pharmacy to manage micronutrients as above.   Insulin, potassium and phosphate replacement protocols available.   Pharmacy will continue to follow and adjust TPN as clinically indicated.   Thank you,   Marcin Morgan, PharmD  2/5/2024  11:35 EST

## 2024-02-06 ENCOUNTER — APPOINTMENT (OUTPATIENT)
Dept: GENERAL RADIOLOGY | Facility: HOSPITAL | Age: 64
End: 2024-02-06
Payer: COMMERCIAL

## 2024-02-06 LAB
ANION GAP SERPL CALCULATED.3IONS-SCNC: 10 MMOL/L (ref 5–15)
BACTERIA SPEC AEROBE CULT: NORMAL
BACTERIA SPEC AEROBE CULT: NORMAL
BACTERIA SPEC RESP CULT: NO GROWTH
BASOPHILS # BLD AUTO: 0.07 10*3/MM3 (ref 0–0.2)
BASOPHILS NFR BLD AUTO: 0.8 % (ref 0–1.5)
BUN SERPL-MCNC: 22 MG/DL (ref 8–23)
BUN/CREAT SERPL: 61.1 (ref 7–25)
CALCIUM SPEC-SCNC: 8.4 MG/DL (ref 8.6–10.5)
CHLORIDE SERPL-SCNC: 99 MMOL/L (ref 98–107)
CO2 SERPL-SCNC: 26 MMOL/L (ref 22–29)
CREAT SERPL-MCNC: 0.36 MG/DL (ref 0.76–1.27)
DEPRECATED RDW RBC AUTO: 56 FL (ref 37–54)
EGFRCR SERPLBLD CKD-EPI 2021: 128.1 ML/MIN/1.73
EOSINOPHIL # BLD AUTO: 0.47 10*3/MM3 (ref 0–0.4)
EOSINOPHIL NFR BLD AUTO: 5.3 % (ref 0.3–6.2)
ERYTHROCYTE [DISTWIDTH] IN BLOOD BY AUTOMATED COUNT: 14.6 % (ref 12.3–15.4)
GLUCOSE BLDC GLUCOMTR-MCNC: 113 MG/DL (ref 70–130)
GLUCOSE BLDC GLUCOMTR-MCNC: 113 MG/DL (ref 70–130)
GLUCOSE BLDC GLUCOMTR-MCNC: 116 MG/DL (ref 70–130)
GLUCOSE BLDC GLUCOMTR-MCNC: 121 MG/DL (ref 70–130)
GLUCOSE BLDC GLUCOMTR-MCNC: 129 MG/DL (ref 70–130)
GLUCOSE BLDC GLUCOMTR-MCNC: 139 MG/DL (ref 70–130)
GLUCOSE SERPL-MCNC: 123 MG/DL (ref 65–99)
GRAM STN SPEC: NORMAL
HCT VFR BLD AUTO: 30.6 % (ref 37.5–51)
HGB BLD-MCNC: 10.4 G/DL (ref 13–17.7)
IMM GRANULOCYTES # BLD AUTO: 0.13 10*3/MM3 (ref 0–0.05)
IMM GRANULOCYTES NFR BLD AUTO: 1.5 % (ref 0–0.5)
LYMPHOCYTES # BLD AUTO: 1.74 10*3/MM3 (ref 0.7–3.1)
LYMPHOCYTES NFR BLD AUTO: 19.5 % (ref 19.6–45.3)
MAGNESIUM SERPL-MCNC: 2.7 MG/DL (ref 1.6–2.4)
MCH RBC QN AUTO: 35.4 PG (ref 26.6–33)
MCHC RBC AUTO-ENTMCNC: 34 G/DL (ref 31.5–35.7)
MCV RBC AUTO: 104.1 FL (ref 79–97)
MONOCYTES # BLD AUTO: 0.63 10*3/MM3 (ref 0.1–0.9)
MONOCYTES NFR BLD AUTO: 7 % (ref 5–12)
NEUTROPHILS NFR BLD AUTO: 5.9 10*3/MM3 (ref 1.7–7)
NEUTROPHILS NFR BLD AUTO: 65.9 % (ref 42.7–76)
NRBC BLD AUTO-RTO: 0 /100 WBC (ref 0–0.2)
PHOSPHATE SERPL-MCNC: 2.9 MG/DL (ref 2.5–4.5)
PLATELET # BLD AUTO: 441 10*3/MM3 (ref 140–450)
PMV BLD AUTO: 10.3 FL (ref 6–12)
POTASSIUM SERPL-SCNC: 3.9 MMOL/L (ref 3.5–5.2)
RBC # BLD AUTO: 2.94 10*6/MM3 (ref 4.14–5.8)
SODIUM SERPL-SCNC: 135 MMOL/L (ref 136–145)
WBC NRBC COR # BLD AUTO: 8.94 10*3/MM3 (ref 3.4–10.8)

## 2024-02-06 PROCEDURE — 84100 ASSAY OF PHOSPHORUS: CPT | Performed by: INTERNAL MEDICINE

## 2024-02-06 PROCEDURE — 25010000002 THIAMINE PER 100 MG: Performed by: SURGERY

## 2024-02-06 PROCEDURE — 94799 UNLISTED PULMONARY SVC/PX: CPT

## 2024-02-06 PROCEDURE — 25010000002 MEROPENEM PER 100 MG: Performed by: SURGERY

## 2024-02-06 PROCEDURE — 80048 BASIC METABOLIC PNL TOTAL CA: CPT | Performed by: INTERNAL MEDICINE

## 2024-02-06 PROCEDURE — 74018 RADEX ABDOMEN 1 VIEW: CPT

## 2024-02-06 PROCEDURE — 82948 REAGENT STRIP/BLOOD GLUCOSE: CPT

## 2024-02-06 PROCEDURE — 83735 ASSAY OF MAGNESIUM: CPT | Performed by: INTERNAL MEDICINE

## 2024-02-06 PROCEDURE — 25010000002 HEPARIN (PORCINE) PER 1000 UNITS: Performed by: SURGERY

## 2024-02-06 PROCEDURE — 99233 SBSQ HOSP IP/OBS HIGH 50: CPT | Performed by: INTERNAL MEDICINE

## 2024-02-06 PROCEDURE — 85025 COMPLETE CBC W/AUTO DIFF WBC: CPT | Performed by: INTERNAL MEDICINE

## 2024-02-06 PROCEDURE — 25010000002 METOCLOPRAMIDE PER 10 MG: Performed by: SURGERY

## 2024-02-06 PROCEDURE — 25010000002 MEROPENEM PER 100 MG: Performed by: INTERNAL MEDICINE

## 2024-02-06 PROCEDURE — 94761 N-INVAS EAR/PLS OXIMETRY MLT: CPT

## 2024-02-06 PROCEDURE — 63710000001 REVEFENACIN 175 MCG/3ML SOLUTION: Performed by: INTERNAL MEDICINE

## 2024-02-06 PROCEDURE — 25010000002 DIPHENHYDRAMINE PER 50 MG: Performed by: SURGERY

## 2024-02-06 PROCEDURE — 94664 DEMO&/EVAL PT USE INHALER: CPT

## 2024-02-06 PROCEDURE — 97110 THERAPEUTIC EXERCISES: CPT

## 2024-02-06 PROCEDURE — 25010000002 MICAFUNGIN SODIUM 100 MG RECONSTITUTED SOLUTION 1 EACH VIAL: Performed by: SURGERY

## 2024-02-06 RX ADMIN — PANTOPRAZOLE SODIUM 40 MG: 40 INJECTION, POWDER, LYOPHILIZED, FOR SOLUTION INTRAVENOUS at 08:31

## 2024-02-06 RX ADMIN — METOCLOPRAMIDE HYDROCHLORIDE 10 MG: 5 INJECTION INTRAMUSCULAR; INTRAVENOUS at 11:51

## 2024-02-06 RX ADMIN — BISACODYL 10 MG: 5 TABLET, COATED ORAL at 08:31

## 2024-02-06 RX ADMIN — ASCORBIC ACID, VITAMIN A PALMITATE, CHOLECALCIFEROL, THIAMINE HYDROCHLORIDE, RIBOFLAVIN-5 PHOSPHATE SODIUM, PYRIDOXINE HYDROCHLORIDE, NIACINAMIDE, DEXPANTHENOL, ALPHA-TOCOPHEROL ACETATE, VITAMIN K1, FOLIC ACID, BIOTIN, CYANOCOBALAMIN: 200; 3300; 200; 6; 3.6; 6; 40; 15; 10; 150; 600; 60; 5 INJECTION, SOLUTION INTRAVENOUS at 18:42

## 2024-02-06 RX ADMIN — MEROPENEM 2000 MG: 1 INJECTION, POWDER, FOR SOLUTION INTRAVENOUS at 08:32

## 2024-02-06 RX ADMIN — BUDESONIDE AND FORMOTEROL FUMARATE DIHYDRATE 2 PUFF: 160; 4.5 AEROSOL RESPIRATORY (INHALATION) at 21:42

## 2024-02-06 RX ADMIN — METOCLOPRAMIDE HYDROCHLORIDE 10 MG: 5 INJECTION INTRAMUSCULAR; INTRAVENOUS at 01:27

## 2024-02-06 RX ADMIN — THIAMINE HYDROCHLORIDE 100 MG: 100 INJECTION, SOLUTION INTRAMUSCULAR; INTRAVENOUS at 08:32

## 2024-02-06 RX ADMIN — ALBUTEROL SULFATE 2 PUFF: 90 AEROSOL, METERED RESPIRATORY (INHALATION) at 07:46

## 2024-02-06 RX ADMIN — METOCLOPRAMIDE HYDROCHLORIDE 10 MG: 5 INJECTION INTRAMUSCULAR; INTRAVENOUS at 05:55

## 2024-02-06 RX ADMIN — HEPARIN SODIUM 5000 UNITS: 5000 INJECTION INTRAVENOUS; SUBCUTANEOUS at 14:31

## 2024-02-06 RX ADMIN — SMOFLIPID 250 ML: 6; 6; 5; 3 INJECTION, EMULSION INTRAVENOUS at 18:41

## 2024-02-06 RX ADMIN — Medication 10 ML: at 20:58

## 2024-02-06 RX ADMIN — HEPARIN SODIUM 5000 UNITS: 5000 INJECTION INTRAVENOUS; SUBCUTANEOUS at 22:07

## 2024-02-06 RX ADMIN — BUPROPION HYDROCHLORIDE 50 MG: 100 TABLET, FILM COATED ORAL at 14:31

## 2024-02-06 RX ADMIN — Medication 10 ML: at 08:32

## 2024-02-06 RX ADMIN — MICAFUNGIN 100 MG: 20 INJECTION, POWDER, LYOPHILIZED, FOR SOLUTION INTRAVENOUS at 13:06

## 2024-02-06 RX ADMIN — HEPARIN SODIUM 5000 UNITS: 5000 INJECTION INTRAVENOUS; SUBCUTANEOUS at 05:55

## 2024-02-06 RX ADMIN — BUPROPION HYDROCHLORIDE 50 MG: 100 TABLET, FILM COATED ORAL at 22:07

## 2024-02-06 RX ADMIN — DIPHENHYDRAMINE HYDROCHLORIDE 25 MG: 50 INJECTION, SOLUTION INTRAMUSCULAR; INTRAVENOUS at 22:07

## 2024-02-06 RX ADMIN — MEROPENEM 2000 MG: 1 INJECTION, POWDER, FOR SOLUTION INTRAVENOUS at 17:17

## 2024-02-06 RX ADMIN — REVEFENACIN 175 MCG: 175 SOLUTION RESPIRATORY (INHALATION) at 07:45

## 2024-02-06 RX ADMIN — BUPROPION HYDROCHLORIDE 50 MG: 100 TABLET, FILM COATED ORAL at 05:55

## 2024-02-06 RX ADMIN — METOCLOPRAMIDE HYDROCHLORIDE 10 MG: 5 INJECTION INTRAMUSCULAR; INTRAVENOUS at 18:42

## 2024-02-06 RX ADMIN — MEROPENEM 2000 MG: 1 INJECTION, POWDER, FOR SOLUTION INTRAVENOUS at 01:27

## 2024-02-06 RX ADMIN — ROSUVASTATIN 10 MG: 10 TABLET, FILM COATED ORAL at 20:58

## 2024-02-06 RX ADMIN — SENNOSIDES 1 TABLET: 8.6 TABLET, FILM COATED ORAL at 20:58

## 2024-02-06 RX ADMIN — FOLIC ACID 1 MG: 5 INJECTION, SOLUTION INTRAMUSCULAR; INTRAVENOUS; SUBCUTANEOUS at 08:31

## 2024-02-06 RX ADMIN — BUDESONIDE AND FORMOTEROL FUMARATE DIHYDRATE 2 PUFF: 160; 4.5 AEROSOL RESPIRATORY (INHALATION) at 07:47

## 2024-02-06 NOTE — PROGRESS NOTES
"Patient Name:  Roger Bhat  YOB: 1960  [id number]    Surgery Progress Note    Date of visit: 2/6/2024      Subjective: No acute events overnight.  Tells me that he slept well and feels better.  Denies abdominal pain when I ask him, but also tells me he is using the PCA whenever he can.  NG had less output yesterday with 250 mL recorded.  Fluid in the canister however remains bilious.  300 mL of ileostomy output recorded.  Urine output over 2 L.          Objective:     /91   Pulse 85   Temp 97.8 °F (36.6 °C) (Axillary)   Resp 18   Ht 175.3 cm (69\")   Wt 57.6 kg (126 lb 15.8 oz)   SpO2 94%   BMI 18.75 kg/m²     Intake/Output Summary (Last 24 hours) at 2/6/2024 0830  Last data filed at 2/6/2024 0600  Gross per 24 hour   Intake 2487.8 ml   Output 2653 ml   Net -165.2 ml       GEN:   Awake, alert, sitting up in bed, chronically ill-appearing in no obvious distress  CV:      Regular rate and rhythm  L:         Symmetric expansion, on high flow nasal cannula and not obviously labored, coarse cough  Abd:    Soft, moderately distended, appropriately tender palpation throughout the abdomen, midline incision with some reactive erythema surrounding the site and serous drainage, DELROY drain in the right lower quadrant with seros output, ileostomy on the right side pink and patent with scant stool in the bag  Ext:     No cyanosis, clubbing, or edema    Recent labs that are back at this time have been reviewed.           Assessment/ Plan:    Mr. Bhat is a 63-year-old gentleman who is admitted following operative intervention for gallbladder and sigmoid colon mass on January 17     #Sigmoid colon mass, gallbladder mass  # Ileus  -Postop day 20 following lap vladimir, open sigmoid colectomy with diverting loop ileostomy, postoperative day 7 following exploratory laparotomy with washout  -Labs are without acute findings  -Ileostomy output appears to be slowly increasing.  300 mL yesterday.  NG tube " output is less however output remains bilious.  I expect him to have much higher ileostomy output when he is GI motility improves  -KUB today  -Continue NG tube to low wall suction.  Okay for clears for comfort  -Continue TPN   -Continue antibiotics  -KUB today to assess bowel dilation.  Hopefully can move out of the ICU soon when she has been off high flow for 24 hours      Jimmy Craig MD  2/6/2024  08:30 EST

## 2024-02-06 NOTE — PROGRESS NOTES
Intensive Care Follow-up     Hospital:  LOS: 20 days   Mr. Roger Bhat, 63 y.o. male is followed for:   Colonic mass            History of present illness:   Roger is a 63 y.o. male admitted on 1/17/2024 with Colonic mass [K63.89] for which he underwent open sigmoid colectomy with diverting loop ileostomy and liver biopsy on 01/17/24 and returned to OR for another exploratory lap, for lysis of adhesion. He has required Parenteral Nutrition. He also developed lung infiltrates. He was on antibiotics but because his leukemoid reaction, antibiotic was changed to BRODERICK and Micafungin. He continues to improve, weaning his FiO2.        Subjective   Interval History:  Patient doing well this morning.  Breathing much better today.  Pain well-controlled.  Now on nasal cannula.             The patient's past medical, surgical and social history were reviewed and updated in Epic as appropriate.       Objective     Infusions:  Adult Central CLINIMIX-E TPN, , Last Rate: 70 mL/hr at 02/05/24 1753  HYDROmorphone HCl-NaCl,   Pharmacy to Dose TPN,       Medications:  bisacodyl, 10 mg, Oral, Daily  budesonide-formoterol, 2 puff, Inhalation, BID - RT  buPROPion, 50 mg, Nasogastric, Q8H  Fat Emul Fish Oil/Plant Based, 250 mL, Intravenous, Q24H  folic acid 1 mg in sodium chloride 0.9 % 50 mL IVPB, 1 mg, Intravenous, Daily  heparin (porcine), 5,000 Units, Subcutaneous, Q8H  insulin regular, 2-7 Units, Subcutaneous, Q6H  meropenem, 2,000 mg, Intravenous, Q8H  methylnaltrexone, 8 mg, Subcutaneous, Every Other Day  metoclopramide, 10 mg, Intravenous, Q6H  micafungin (MYCAMINE) IV, 100 mg, Intravenous, Q24H  pantoprazole, 40 mg, Intravenous, QAM AC  revefenacin, 175 mcg, Nebulization, Daily - RT  rosuvastatin, 10 mg, Nasogastric, Nightly  senna, 1 tablet, Nasogastric, Nightly  sodium chloride, 10 mL, Intravenous, Q12H  thiamine (B-1) IV, 100 mg, Intravenous, Daily      I reviewed the patient's medications.    Vital Sign Min/Max for  last 24 hours  Temp  Min: 97.6 °F (36.4 °C)  Max: 98.3 °F (36.8 °C)   BP  Min: 141/98  Max: 162/104   Pulse  Min: 56  Max: 96   Resp  Min: 16  Max: 20   SpO2  Min: 93 %  Max: 99 %   Flow (L/min)  Min: 4  Max: 40       Input/Output for last 24 hour shift  02/05 0701 - 02/06 0700  In: 2527.8 [P.O.:120; I.V.:427.2]  Out: 2653 [Urine:2100; Drains:3]      GENERAL : NAD, conversant  RESPIRATORY/THORAX : normal respiratory effort and no intercostal retractions, Coarse crackles bilaterally  CARDIOVASCULAR : Normal S1/S2, RRR. 1+ lower ext edema.  GASTROINTESTINAL : Soft, NT/ND. BS x 4 normoactive. No hepatosplenomegaly.  MUSCULOSKELETAL : No cyanosis, clubbing, or ischemia  NEUROLOGICAL: alert and oriented to person, place and time  PSYCHOLOGICAL : Appropriate affect    Results from last 7 days   Lab Units 02/06/24  0354 02/05/24  0509 02/04/24  0428   WBC 10*3/mm3 8.94 12.09* 17.22*   HEMOGLOBIN g/dL 10.4* 10.5* 10.4*   PLATELETS 10*3/mm3 441 426 399     Results from last 7 days   Lab Units 02/06/24  0354 02/05/24  0509 02/04/24  0428   SODIUM mmol/L 135* 137 138   POTASSIUM mmol/L 3.9 4.1 4.1   CO2 mmol/L 26.0 28.0 31.0*   BUN mg/dL 22 21 21   CREATININE mg/dL 0.36* 0.39* 0.42*   MAGNESIUM mg/dL 2.7* 2.4 2.1   PHOSPHORUS mg/dL 2.9 2.6 2.9   GLUCOSE mg/dL 123* 116* 121*     Estimated Creatinine Clearance: 175.6 mL/min (A) (by C-G formula based on SCr of 0.36 mg/dL (L)).          I reviewed the patient's new clinical results.  I reviewed the patient's new imaging results/reports including actual images and agree with reports.       Imaging Results (Last 24 Hours)       Procedure Component Value Units Date/Time    XR Abdomen KUB [[id number]] Resulted: 02/06/24 0907     Updated: 02/06/24 0930            Assessment & Plan   Impression        Colonic mass    Current smoker    Gastroesophageal reflux disease without esophagitis    Severe malnutrition    Respiratory distress    Centrilobular emphysema       Plan        Roger is a  63 y.o. male admitted on 1/17/2024 with Colonic mass [K63.89] for which he underwent open sigmoid colectomy with diverting loop ileostomy and liver biopsy on 01/17/24 and returned to OR for another exploratory lap, for lysis of adhesion. He has required Parenteral Nutrition. He also developed lung infiltrates. He was on antibiotics but because his leukemoid reaction, antibiotic was changed to meropenem and Micafungin. He continues on high flow nasal cannula.     -Continue postop orders per surgery  -Ensure adequate pain control, currently on Dilaudid PCA pump  -Wean high flow nasal cannula to saturation greater than 88%  -Continue Symbicort and Yupelri for COPD  -Continue statin for hyperlipidemia  -Continue TPN for now  -Mobilize patient  -A.m. labs    I conducted multidisciplinary rounds in the plan of care was discussed with the multidisciplinary team at that time. In attendance at multidisciplinary rounds was clinical pharmacist, dietitian, nursing staff, and case management.    I discussed the patient's findings and my recommendations with patient and nursing staff     High level of risk due to:  parenteral controlled substances.      Yen Thomson, DO  Pulmonary, Critical care and Sleep Medicine

## 2024-02-06 NOTE — PROGRESS NOTES
CPN Review Note    Patient Name: Roger Bhat  Date of Encounter: 24 11:09 EST  MRN: [id number]  Admission date: 2024    Reason for visit: CPN review . RD to continue to follow per protocol.     Information Obtained: Continues with TPN, NPO awaiting ileus resolution. No significant changes with plans for continuing TPN, sips for comfort per surgeon note. Slightly more ileostomy output, continues with NG output but decreasing. Sodium slightly down with volume increase PN to better meet needs yesterday, will continue to monitor and adjust if continues to drop. Negative fluid balance yesterday, wt back down closer to suspect ABW.     EMR reviewed:  ml, ileostomy 300 ml out past 24 hr  Medication reviewed: yes   Labs reviewed: yes     Est. Needs (24):  ~1700 kcal  ~82 g protein (1.5 g/kg)    Current diet: NPO Diet NPO Type: Sips with Meds, Ice Chips  Adult Central CLINIMIX-E TPN    PN Route: PICC   PN:  Clinimix E-D15% AA5% @ 70 ml/hr         GIR: 3.2 mg/kg/min  Lipid: 250 ml 20% SMOF daily     PN @ Goal over 24hr to Supply:  Volume 1680 mL/day       Energy 1693 Kcal/day 100 % Est Need   Protein 84 g/day 102 % Est Need     ---------------------------------------------------------------------------  Formula/Rate verified at bedside: No  Infusing Rate at time of visit: 70 ml/hr per MAR    Average Delivery from Chartin Day:   PN Volume 1607 mL/day       %previous goal   Lipid delivered?  230 mL/day     Energy  Kcal/day  % Est Need   Protein  g/day  % Est Need       Intervention:  Follow treatment plan  Care plan reviewed    Continue current TPN regimen as above    Follow up:   Per protocol      Stacie Maldonado RD, [facility]  11:15 EST  Time: 25min

## 2024-02-06 NOTE — PLAN OF CARE
Goal Outcome Evaluation:  Plan of Care Reviewed With: patient        Progress: improving  Outcome Evaluation: Pt improved to CGA with functional transfers, but continues to be limited by weakness, decreased endurance, and impaired balance. Pt tolerated UE ther-ex and activity tolerance building. SpO2 remained >95% on 2L throughout session. Cont POC.      Anticipated Discharge Disposition (OT): inpatient rehabilitation facility

## 2024-02-06 NOTE — PROGRESS NOTES
Pharmacy Parenteral Nutrition Evaluation    Roger Bhat is a  63 y.o. male receiving TPN.     Indication: Prolonged paralytic illus  Consulting Physician: Intensivist     Total Fluid Rate (if stated): -    Labs  Results from last 7 days   Lab Units 02/06/24  0354 02/05/24  0509 02/04/24 0428 02/01/24  0426 01/31/24  0520   SODIUM mmol/L 135* 137 138   < > 134*   POTASSIUM mmol/L 3.9 4.1 4.1   < > 4.4   CHLORIDE mmol/L 99 101 99   < > 102   CO2 mmol/L 26.0 28.0 31.0*   < > 16.0*   BUN mg/dL 22 21 21   < > 28*   CREATININE mg/dL 0.36* 0.39* 0.42*   < > 0.79   CALCIUM mg/dL 8.4* 9.1 8.8   < > 8.4*   BILIRUBIN mg/dL  --  0.2  --   --  0.5   ALK PHOS U/L  --  181*  --   --  52   ALT (SGPT) U/L  --  36  --   --  10   AST (SGOT) U/L  --  25  --   --  15   GLUCOSE mg/dL 123* 116* 121*   < > 111*    < > = values in this interval not displayed.       Results from last 7 days   Lab Units 02/06/24  0354 02/05/24  0509 02/04/24 0428   MAGNESIUM mg/dL 2.7* 2.4 2.1   PHOSPHORUS mg/dL 2.9 2.6 2.9   PREALBUMIN mg/dL  --  10.5*  --        Results from last 7 days   Lab Units 02/06/24  0354 02/05/24  0509 02/04/24 0428   WBC 10*3/mm3 8.94 12.09* 17.22*   HEMOGLOBIN g/dL 10.4* 10.5* 10.4*   HEMATOCRIT % 30.6* 30.9* 30.1*   PLATELETS 10*3/mm3 441 426 399       Triglycerides   Date Value Ref Range Status   02/05/2024 137 0 - 150 mg/dL Final     Comment:     Falsely depressed results may occur on samples drawn from patients receiving N-Acetylcysteine (NAC) or Metamizole.       estimated creatinine clearance is 175.6 mL/min (A) (by C-G formula based on SCr of 0.36 mg/dL (L)).    Intake & Output (last 3 days)         02/02 0701 02/03 0700 02/03 0701 02/04 0700 02/04 0701 02/05 0700 02/05 0701 02/06 0700    P.O.  300 295     I.V. (mL/kg) 897.5 (15) 295.4 (4.9) 240.8 (4)     NG/ 110 160     IV Piggyback 115 350      TPN 2585 230 1641.5     Total Intake(mL/kg) 3757.5 (62.8) 1285.4 (21.5) 2337.3 (39.1)     Urine (mL/kg/hr)  1775 (1.2) 2300 (1.6) 1475 (1)     Emesis/NG output 650 725 975     Drains 5 5 0 0    Stool 100 45 225 0    Total Output 2530 3075 2675 0    Net +1227.5 -1789.6 -337.7 0            Stool Unmeasured Occurrence    1 x            Dietitian Recommendations  Diet Orders (active) (From admission, onward)       Start     Ordered    02/03/24 0942  NPO Diet NPO Type: Sips with Meds, Ice Chips  Diet Effective Now        Comments: Ok for sips of clears    02/03/24 0941    01/28/24 1200  Dietary Nutrition Supplements Other (see comment); Ensure Clear Apple  Daily With Breakfast, Lunch & Dinner       01/28/24 0857                    Current TPN Regimen Recommendation:  Dextrose 15% / Amino Acid 5% at goal rate of 70 mL/hr.  20% SMOF Lipid Emulsion 250 mL every 24 hours.    Na 35 mEq/L  K 30 mEq/L  Ca 4.5 mEq/L  Mg 5 mEq/L  PO4 15 mmol/L       Assessment/Plan:  TPN for prolonged paralytic illus.  Clinical Nutrition to manage macronutrients and Pharmacy to manage micronutrients as above. No changes today.   Insulin, potassium and phosphate replacement protocols available.   Pharmacy will continue to follow and adjust TPN as clinically indicated.     Thank you,   Toya Dee, PharmD, BCPS  2/6/2024  13:36 EST

## 2024-02-06 NOTE — THERAPY TREATMENT NOTE
"Patient Name: Roger Bhat  : 1960    MRN: [id number]                              Today's Date: 2024       Admit Date: 2024    Visit Dx:     ICD-10-CM ICD-9-CM   1. Colonic mass  K63.89 569.89   2. Gallbladder mass  K82.8 575.8   3. Bowel obstruction  K56.609 560.9     Patient Active Problem List   Diagnosis    Current smoker    Cellulitis of right hand    Gastroesophageal reflux disease without esophagitis    Screening, lipid    Dysphagia    Hereditary hemochromatosis    Gallbladder mass    Colonic mass    Severe malnutrition    Respiratory distress    Centrilobular emphysema     Past Medical History:   Diagnosis Date    Allergies     Cellulitis of hand     Clotting disorder     \"FREE BLEEDING\"    Colonic mass     COPD (chronic obstructive pulmonary disease)     Cough     Current smoker     Erectile dysfunction     Fracture, foot Sept 2022    GERD (gastroesophageal reflux disease)     Hemochromatosis     Hyperlipidemia     Hypertension     Wears dentures     FULL SET     Past Surgical History:   Procedure Laterality Date    CHOLECYSTECTOMY N/A 2024    Procedure: CHOLECYSTECTOMY LAPAROSCOPIC;  Surgeon: Jimmy Craig MD;  Location: [facility] OR;  Service: General;  Laterality: N/A;    COLON RESECTION N/A 2024    Procedure: OPEN SIGMOID COLECTOMY, LIVER BIOPSY, DIVERTING LOOP ILEOSTOMY CREATION, TAKE DOWN OF THE SPLENIC FLEXURE, AND APPENDECTOMY;  Surgeon: Jimmy Craig MD;  Location: [facility] OR;  Service: General;  Laterality: N/A;    COLONOSCOPY      ENDOSCOPY  2016    pt say's, he was placed under general anesthesia for this    ENDOSCOPY N/A 10/19/2021    Procedure: ESOPHAGOGASTRODUODENOSCOPY;  Surgeon: Osmel Kessler MD;  Location:  [facility];  Service: Gastroenterology;  Laterality: N/A;  24 fr savory dilator used at 1251, 27 fr sdavory used at 1253, 33 Lebanese savoruy used at 1257, 42 fr savory used at 1259      ENDOSCOPY N/A 2021    Procedure: " ESOPHAGOGASTRODUODENOSCOPY WITH DILATATION;  Surgeon: Osmel Kessler MD;  Location: [facility] ENDOSCOPY;  Service: Gastroenterology;  Laterality: N/A;  Dilation with 48fr savery    EXPLORATORY LAPAROTOMY N/A 1/30/2024    Procedure: LAPAROTOMY EXPLORATORY;  Surgeon: Jimmy Craig MD;  Location: [facility] OR;  Service: General;  Laterality: N/A;    HAND SURGERY  2022      General Information       Row Name 02/06/24 1508          OT Time and Intention    Document Type therapy note (daily note)  -AN     Mode of Treatment occupational therapy  -AN       Row Name 02/06/24 1508          General Information    Patient Profile Reviewed yes  -AN     Existing Precautions/Restrictions fall;oxygen therapy device and L/min;other (see comments)  NG; ostomy; abdominal incision; PCA; DELROY  -AN     Barriers to Rehab medically complex  -AN       Row Name 02/06/24 1508          Cognition    Orientation Status (Cognition) oriented x 3  -AN       Row Name 02/06/24 1508          Safety Issues, Functional Mobility    Safety Issues Affecting Function (Mobility) safety precautions follow-through/compliance;safety precaution awareness  -AN     Impairments Affecting Function (Mobility) endurance/activity tolerance;pain;strength;balance  -AN               User Key  (r) = Recorded By, (t) = Taken By, (c) = Cosigned By      Initials Name Provider Type    AN Indira Santoyo OT Occupational Therapist                     Mobility/ADL's       Row Name 02/06/24 1508          Bed Mobility    Comment, (Bed Mobility) UIC upon arrival  -AN       Row Name 02/06/24 1508          Transfers    Transfers sit-stand transfer;stand-sit transfer  -AN     Comment, (Transfers) STS performed x 10 reps for activity tolerance building and strengthening. Pt required frequent rest breaks throughout and prolonged seated rest break after 5 reps.  -AN       Row Name 02/06/24 1508          Sit-Stand Transfer    Sit-Stand Riley (Transfers) contact guard  -AN        Row Name 02/06/24 1508          Stand-Sit Transfer    Stand-Sit [place] (Transfers) contact guard  -AN       Row Name 02/06/24 1508          Functional Mobility    Functional Mobility- Ind. Level not tested  -AN       Row Name 02/06/24 1508          Activities of Daily Living    BADL Assessment/Intervention lower body dressing;upper body dressing  -AN       Row Name 02/06/24 1508          Lower Body Dressing Assessment/Training    [place] Level (Lower Body Dressing) don;socks;maximum assist (25% patient effort)  -AN     Position (Lower Body Dressing) unsupported sitting  -AN       Row Name 02/06/24 1508          Upper Body Dressing Assessment/Training    [place] Level (Upper Body Dressing) don;pajama/robe;minimum assist (75% patient effort)  -AN     Position (Upper Body Dressing) unsupported sitting  -AN               User Key  (r) = Recorded By, (t) = Taken By, (c) = Cosigned By      Initials Name Provider Type    AN Indira Santoyo OT Occupational Therapist                   Obj/Interventions       [facility] Name 02/06/24 1513          Shoulder (Therapeutic Exercise)    Shoulder (Therapeutic Exercise) AROM (active range of motion)  -AN     Shoulder AROM (Therapeutic Exercise) bilateral;flexion;extension;horizontal aBduction/aDduction;scapular retraction;sitting;10 repetitions  -AN       Row Name 02/06/24 1513          Motor Skills    Motor Skills functional endurance  -AN     Functional Endurance decreased functional endurance  -AN     Therapeutic Exercise shoulder  -AN       [facility] Name 02/06/24 1513          Balance    Balance Assessment sitting static balance;sitting dynamic balance;sit to stand dynamic balance;standing static balance  -AN     Static Sitting Balance supervision  -AN     Dynamic Sitting Balance supervision  -AN     Position, Sitting Balance sitting in chair  -AN     Sit to Stand Dynamic Balance verbal cues;contact guard  -AN     Static Standing Balance contact guard  -AN      Position/Device Used, Standing Balance supported  -AN     Balance Interventions standing;sit to stand;supported;static;minimal challenge;occupation based/functional task  -AN               User Key  (r) = Recorded By, (t) = Taken By, (c) = Cosigned By      Initials Name Provider Type    Indira Francis OT Occupational Therapist                   Goals/Plan    No documentation.                  Clinical Impression       Row Name 02/06/24 1516          Pain Assessment    Pretreatment Pain Rating 0/10 - no pain  -AN     Posttreatment Pain Rating 0/10 - no pain  -AN     Pre/Posttreatment Pain Comment tolerated  -AN     Pain Intervention(s) Repositioned;Ambulation/increased activity  -AN       Row Name 02/06/24 1516          Plan of Care Review    Plan of Care Reviewed With patient  -AN     Progress improving  -AN     Outcome Evaluation Pt improved to CGA with functional transfers, but continues to be limited by weakness, decreased endurance, and impaired balance. Pt tolerated UE ther-ex and activity tolerance building. SpO2 remained >95% on 2L throughout session. Cont POC.  -AN       Row Name 02/06/24 1516          Therapy Plan Review/Discharge Plan (OT)    Anticipated Discharge Disposition (OT) inpatient rehabilitation facility  -AN       Row Name 02/06/24 1516          Vital Signs    Pre Systolic BP Rehab 162  -AN     Pre Treatment Diastolic   -AN     Post Systolic BP Rehab 155  -AN     Post Treatment Diastolic   -AN     Pre SpO2 (%) 98  -AN     O2 Delivery Pre Treatment nasal cannula  -AN     Intra SpO2 (%) 95  -AN     O2 Delivery Intra Treatment nasal cannula  -AN     Post SpO2 (%) 98  -AN     O2 Delivery Post Treatment nasal cannula  -AN     Pre Patient Position Sitting  -AN     Intra Patient Position Standing  -AN     Post Patient Position Sitting  -AN       Row Name 02/06/24 1516          Positioning and Restraints    Pre-Treatment Position sitting in chair/recliner  -AN     Post Treatment  Position chair  -AN     In Chair notified nsg;reclined;call light within reach;encouraged to call for assist;exit alarm on;with family/caregiver;LUE elevated;RUE elevated;legs elevated;waffle cushion  -AN               User Key  (r) = Recorded By, (t) = Taken By, (c) = Cosigned By      Initials Name Provider Type    Indira Francis OT Occupational Therapist                   Outcome Measures       Row Name 02/06/24 1524          How much help from another is currently needed...    Putting on and taking off regular lower body clothing? 3  -AN     Bathing (including washing, rinsing, and drying) 2  -AN     Toileting (which includes using toilet bed pan or urinal) 2  -AN     Putting on and taking off regular upper body clothing 3  -AN     Taking care of personal grooming (such as brushing teeth) 3  -AN     Eating meals 1  -AN     AM-PAC 6 Clicks Score (OT) 14  -AN       Row Name 02/06/24 1524          Functional Assessment    Outcome Measure Options AM-PAC 6 Clicks Daily Activity (OT)  -AN               User Key  (r) = Recorded By, (t) = Taken By, (c) = Cosigned By      Initials Name Provider Type    Indira Francis OT Occupational Therapist                    Occupational Therapy Education       Title: PT OT SLP Therapies (In Progress)       Topic: Occupational Therapy (In Progress)       Point: ADL training (In Progress)       Description:   Instruct learner(s) on proper safety adaptation and remediation techniques during self care or transfers.   Instruct in proper use of assistive devices.                  Learning Progress Summary             Patient Acceptance, E, VU by AN at 2/6/2024 1527    Acceptance, E,D, NR by CS at 2/4/2024 1539    Acceptance, E, NR by CS1 at 2/2/2024 1444   Family Acceptance, E,D, NR by CS at 2/4/2024 1539                         Point: Home exercise program (In Progress)       Description:   Instruct learner(s) on appropriate technique for monitoring, assisting and/or  progressing therapeutic exercises/activities.                  Learning Progress Summary             Patient Acceptance, E, VU by AN at 2/6/2024 1527    Acceptance, E,D, NR by CS at 2/4/2024 1539   Family Acceptance, E,D, NR by CS at 2/4/2024 1539                         Point: Precautions (In Progress)       Description:   Instruct learner(s) on prescribed precautions during self-care and functional transfers.                  Learning Progress Summary             Patient Acceptance, E, VU by AN at 2/6/2024 1527    Acceptance, E,D, NR by CS at 2/4/2024 1539    Acceptance, E, NR by CS1 at 2/2/2024 1444   Family Acceptance, E,D, NR by CS at 2/4/2024 1539                         Point: Body mechanics (In Progress)       Description:   Instruct learner(s) on proper positioning and spine alignment during self-care, functional mobility activities and/or exercises.                  Learning Progress Summary             Patient Acceptance, E, VU by AN at 2/6/2024 1527    Acceptance, E,D, NR by CS at 2/4/2024 1539    Acceptance, E, NR by CS1 at 2/2/2024 1444   Family Acceptance, E,D, NR by CS at 2/4/2024 1539                                         User Key       Initials Effective Dates Name Provider Type Discipline    [facility] 09/02/21 -  Elena Velasquez, OT Occupational Therapist OT     06/16/21 -  Tim Flores OT Occupational Therapist OT     09/21/21 -  Indira Santoyo OT Occupational Therapist OT                  OT Recommendation and Plan     Plan of Care Review  Plan of Care Reviewed With: patient  Progress: improving  Outcome Evaluation: Pt improved to CGA with functional transfers, but continues to be limited by weakness, decreased endurance, and impaired balance. Pt tolerated UE ther-ex and activity tolerance building. SpO2 remained >95% on 2L throughout session. Cont POC.     Time Calculation:         Time Calculation- OT       Row Name 02/06/24 1529             Time Calculation- OT    OT Start Time 1450   -AN      OT Received On 02/06/24  -AN         Timed Charges    14346 - OT Therapeutic Exercise Minutes 15  -AN         Total Minutes    Timed Charges Total Minutes 15  -AN       Total Minutes 15  -AN                User Key  (r) = Recorded By, (t) = Taken By, (c) = Cosigned By      Initials Name Provider Type    Indira Francis OT Occupational Therapist                  Therapy Charges for Today       Code Description Service Date Service Provider Modifiers Qty    [id number]  OT THER PROC EA 15 MIN 2/6/2024 Indira Santoyo OT GO 1                 Indira Santoyo OT  2/6/2024

## 2024-02-07 LAB
ANION GAP SERPL CALCULATED.3IONS-SCNC: 9 MMOL/L (ref 5–15)
BASOPHILS # BLD AUTO: 0.09 10*3/MM3 (ref 0–0.2)
BASOPHILS NFR BLD AUTO: 1 % (ref 0–1.5)
BUN SERPL-MCNC: 20 MG/DL (ref 8–23)
BUN/CREAT SERPL: 43.5 (ref 7–25)
CALCIUM SPEC-SCNC: 8.9 MG/DL (ref 8.6–10.5)
CHLORIDE SERPL-SCNC: 101 MMOL/L (ref 98–107)
CO2 SERPL-SCNC: 25 MMOL/L (ref 22–29)
CREAT SERPL-MCNC: 0.46 MG/DL (ref 0.76–1.27)
DEPRECATED RDW RBC AUTO: 54.2 FL (ref 37–54)
EGFRCR SERPLBLD CKD-EPI 2021: 119 ML/MIN/1.73
EOSINOPHIL # BLD AUTO: 0.49 10*3/MM3 (ref 0–0.4)
EOSINOPHIL NFR BLD AUTO: 5.2 % (ref 0.3–6.2)
ERYTHROCYTE [DISTWIDTH] IN BLOOD BY AUTOMATED COUNT: 14.6 % (ref 12.3–15.4)
GLUCOSE BLDC GLUCOMTR-MCNC: 108 MG/DL (ref 70–130)
GLUCOSE BLDC GLUCOMTR-MCNC: 109 MG/DL (ref 70–130)
GLUCOSE BLDC GLUCOMTR-MCNC: 115 MG/DL (ref 70–130)
GLUCOSE BLDC GLUCOMTR-MCNC: 125 MG/DL (ref 70–130)
GLUCOSE SERPL-MCNC: 109 MG/DL (ref 65–99)
HCT VFR BLD AUTO: 31.3 % (ref 37.5–51)
HGB BLD-MCNC: 10.6 G/DL (ref 13–17.7)
IMM GRANULOCYTES # BLD AUTO: 0.15 10*3/MM3 (ref 0–0.05)
IMM GRANULOCYTES NFR BLD AUTO: 1.6 % (ref 0–0.5)
LYMPHOCYTES # BLD AUTO: 1.72 10*3/MM3 (ref 0.7–3.1)
LYMPHOCYTES NFR BLD AUTO: 18.3 % (ref 19.6–45.3)
MAGNESIUM SERPL-MCNC: 2.1 MG/DL (ref 1.6–2.4)
MCH RBC QN AUTO: 34.2 PG (ref 26.6–33)
MCHC RBC AUTO-ENTMCNC: 33.9 G/DL (ref 31.5–35.7)
MCV RBC AUTO: 101 FL (ref 79–97)
MONOCYTES # BLD AUTO: 0.65 10*3/MM3 (ref 0.1–0.9)
MONOCYTES NFR BLD AUTO: 6.9 % (ref 5–12)
NEUTROPHILS NFR BLD AUTO: 6.3 10*3/MM3 (ref 1.7–7)
NEUTROPHILS NFR BLD AUTO: 67 % (ref 42.7–76)
NRBC BLD AUTO-RTO: 0 /100 WBC (ref 0–0.2)
PHOSPHATE SERPL-MCNC: 2.8 MG/DL (ref 2.5–4.5)
PLATELET # BLD AUTO: 481 10*3/MM3 (ref 140–450)
PMV BLD AUTO: 9.7 FL (ref 6–12)
POTASSIUM SERPL-SCNC: 4.3 MMOL/L (ref 3.5–5.2)
RBC # BLD AUTO: 3.1 10*6/MM3 (ref 4.14–5.8)
SODIUM SERPL-SCNC: 135 MMOL/L (ref 136–145)
WBC NRBC COR # BLD AUTO: 9.4 10*3/MM3 (ref 3.4–10.8)

## 2024-02-07 PROCEDURE — 25010000002 MEROPENEM PER 100 MG: Performed by: SURGERY

## 2024-02-07 PROCEDURE — 99232 SBSQ HOSP IP/OBS MODERATE 35: CPT | Performed by: INTERNAL MEDICINE

## 2024-02-07 PROCEDURE — 94799 UNLISTED PULMONARY SVC/PX: CPT

## 2024-02-07 PROCEDURE — 25010000002 HEPARIN (PORCINE) PER 1000 UNITS: Performed by: SURGERY

## 2024-02-07 PROCEDURE — 85025 COMPLETE CBC W/AUTO DIFF WBC: CPT | Performed by: INTERNAL MEDICINE

## 2024-02-07 PROCEDURE — 84100 ASSAY OF PHOSPHORUS: CPT | Performed by: INTERNAL MEDICINE

## 2024-02-07 PROCEDURE — 94761 N-INVAS EAR/PLS OXIMETRY MLT: CPT

## 2024-02-07 PROCEDURE — 63710000001 REVEFENACIN 175 MCG/3ML SOLUTION: Performed by: INTERNAL MEDICINE

## 2024-02-07 PROCEDURE — 25010000002 MICAFUNGIN SODIUM 100 MG RECONSTITUTED SOLUTION 1 EACH VIAL: Performed by: SURGERY

## 2024-02-07 PROCEDURE — 25010000002 THIAMINE PER 100 MG: Performed by: SURGERY

## 2024-02-07 PROCEDURE — 94664 DEMO&/EVAL PT USE INHALER: CPT

## 2024-02-07 PROCEDURE — 25010000002 METOCLOPRAMIDE PER 10 MG: Performed by: SURGERY

## 2024-02-07 PROCEDURE — 82948 REAGENT STRIP/BLOOD GLUCOSE: CPT

## 2024-02-07 PROCEDURE — 83735 ASSAY OF MAGNESIUM: CPT | Performed by: INTERNAL MEDICINE

## 2024-02-07 PROCEDURE — 80048 BASIC METABOLIC PNL TOTAL CA: CPT | Performed by: INTERNAL MEDICINE

## 2024-02-07 PROCEDURE — 25010000002 METHYLNALTREXONE 12 MG/0.6ML SOLUTION: Performed by: SURGERY

## 2024-02-07 PROCEDURE — 25010000002 DIPHENHYDRAMINE PER 50 MG: Performed by: SURGERY

## 2024-02-07 RX ORDER — HYDROMORPHONE HYDROCHLORIDE 1 MG/ML
0.2 INJECTION, SOLUTION INTRAMUSCULAR; INTRAVENOUS; SUBCUTANEOUS
Status: DISCONTINUED | OUTPATIENT
Start: 2024-02-07 | End: 2024-02-14

## 2024-02-07 RX ORDER — OXYCODONE HYDROCHLORIDE 5 MG/1
5 TABLET ORAL EVERY 4 HOURS PRN
Status: ACTIVE | OUTPATIENT
Start: 2024-02-07 | End: 2024-02-14

## 2024-02-07 RX ADMIN — METOCLOPRAMIDE HYDROCHLORIDE 10 MG: 5 INJECTION INTRAMUSCULAR; INTRAVENOUS at 01:35

## 2024-02-07 RX ADMIN — METHYLNALTREXONE BROMIDE 8 MG: 12 INJECTION, SOLUTION SUBCUTANEOUS at 08:39

## 2024-02-07 RX ADMIN — BUPROPION HYDROCHLORIDE 50 MG: 100 TABLET, FILM COATED ORAL at 14:19

## 2024-02-07 RX ADMIN — FOLIC ACID 1 MG: 5 INJECTION, SOLUTION INTRAMUSCULAR; INTRAVENOUS; SUBCUTANEOUS at 08:39

## 2024-02-07 RX ADMIN — SMOFLIPID 250 ML: 6; 6; 5; 3 INJECTION, EMULSION INTRAVENOUS at 17:52

## 2024-02-07 RX ADMIN — DIPHENHYDRAMINE HYDROCHLORIDE 25 MG: 50 INJECTION, SOLUTION INTRAMUSCULAR; INTRAVENOUS at 22:23

## 2024-02-07 RX ADMIN — METOCLOPRAMIDE HYDROCHLORIDE 10 MG: 5 INJECTION INTRAMUSCULAR; INTRAVENOUS at 12:15

## 2024-02-07 RX ADMIN — Medication 10 ML: at 08:40

## 2024-02-07 RX ADMIN — BUPROPION HYDROCHLORIDE 50 MG: 100 TABLET, FILM COATED ORAL at 20:58

## 2024-02-07 RX ADMIN — MEROPENEM 2000 MG: 1 INJECTION, POWDER, FOR SOLUTION INTRAVENOUS at 01:35

## 2024-02-07 RX ADMIN — SENNOSIDES 1 TABLET: 8.6 TABLET, FILM COATED ORAL at 20:54

## 2024-02-07 RX ADMIN — BISACODYL 10 MG: 5 TABLET, COATED ORAL at 08:38

## 2024-02-07 RX ADMIN — PANTOPRAZOLE SODIUM 40 MG: 40 INJECTION, POWDER, LYOPHILIZED, FOR SOLUTION INTRAVENOUS at 08:38

## 2024-02-07 RX ADMIN — HEPARIN SODIUM 5000 UNITS: 5000 INJECTION INTRAVENOUS; SUBCUTANEOUS at 20:58

## 2024-02-07 RX ADMIN — METOCLOPRAMIDE HYDROCHLORIDE 10 MG: 5 INJECTION INTRAMUSCULAR; INTRAVENOUS at 05:50

## 2024-02-07 RX ADMIN — HEPARIN SODIUM 5000 UNITS: 5000 INJECTION INTRAVENOUS; SUBCUTANEOUS at 05:50

## 2024-02-07 RX ADMIN — ACETAMINOPHEN 650 MG: 325 TABLET ORAL at 12:18

## 2024-02-07 RX ADMIN — MICAFUNGIN 100 MG: 20 INJECTION, POWDER, LYOPHILIZED, FOR SOLUTION INTRAVENOUS at 13:13

## 2024-02-07 RX ADMIN — BUDESONIDE AND FORMOTEROL FUMARATE DIHYDRATE 2 PUFF: 160; 4.5 AEROSOL RESPIRATORY (INHALATION) at 19:30

## 2024-02-07 RX ADMIN — BUPROPION HYDROCHLORIDE 50 MG: 100 TABLET, FILM COATED ORAL at 05:50

## 2024-02-07 RX ADMIN — BUDESONIDE AND FORMOTEROL FUMARATE DIHYDRATE 2 PUFF: 160; 4.5 AEROSOL RESPIRATORY (INHALATION) at 09:30

## 2024-02-07 RX ADMIN — REVEFENACIN 175 MCG: 175 SOLUTION RESPIRATORY (INHALATION) at 09:29

## 2024-02-07 RX ADMIN — MEROPENEM 2000 MG: 1 INJECTION, POWDER, FOR SOLUTION INTRAVENOUS at 16:01

## 2024-02-07 RX ADMIN — MEROPENEM 2000 MG: 1 INJECTION, POWDER, FOR SOLUTION INTRAVENOUS at 08:38

## 2024-02-07 RX ADMIN — Medication 10 ML: at 20:54

## 2024-02-07 RX ADMIN — ASCORBIC ACID, VITAMIN A PALMITATE, CHOLECALCIFEROL, THIAMINE HYDROCHLORIDE, RIBOFLAVIN-5 PHOSPHATE SODIUM, PYRIDOXINE HYDROCHLORIDE, NIACINAMIDE, DEXPANTHENOL, ALPHA-TOCOPHEROL ACETATE, VITAMIN K1, FOLIC ACID, BIOTIN, CYANOCOBALAMIN: 200; 3300; 200; 6; 3.6; 6; 40; 15; 10; 150; 600; 60; 5 INJECTION, SOLUTION INTRAVENOUS at 17:52

## 2024-02-07 RX ADMIN — ROSUVASTATIN 10 MG: 10 TABLET, FILM COATED ORAL at 20:54

## 2024-02-07 RX ADMIN — THIAMINE HYDROCHLORIDE 100 MG: 100 INJECTION, SOLUTION INTRAMUSCULAR; INTRAVENOUS at 08:38

## 2024-02-07 RX ADMIN — METOCLOPRAMIDE HYDROCHLORIDE 10 MG: 5 INJECTION INTRAMUSCULAR; INTRAVENOUS at 18:51

## 2024-02-07 RX ADMIN — HEPARIN SODIUM 5000 UNITS: 5000 INJECTION INTRAVENOUS; SUBCUTANEOUS at 14:19

## 2024-02-07 NOTE — PROGRESS NOTES
Intensive Care Follow-up     Hospital:  LOS: 21 days   Mr. Roger Bhat, 63 y.o. male is followed for:   Colonic mass            History of present illness:   Roger is a 63 y.o. male admitted on 1/17/2024 with Colonic mass [K63.89] for which he underwent open sigmoid colectomy with diverting loop ileostomy and liver biopsy on 01/17/24 and returned to OR for another exploratory lap, for lysis of adhesion. He has required Parenteral Nutrition. He also developed lung infiltrates. He was on antibiotics but because his leukemoid reaction, antibiotic was changed to BRODERICK and Micafungin. He continues to improve, weaning his FiO2.         Subjective   Interval History:  Patient doing well this morning.  Continues to do much better from breathing standpoint.  Pain well-controlled.  Dilaudid PCA pump discontinued this morning.  No other acute issues.             The patient's past medical, surgical and social history were reviewed and updated in Epic as appropriate.       Objective     Infusions:  Adult [facility] CLINIMIX-E TPN, , Last Rate: 70 mL/hr at 02/06/24 1842  Pharmacy to Dose TPN,       Medications:  bisacodyl, 10 mg, Oral, Daily  budesonide-formoterol, 2 puff, Inhalation, BID - RT  buPROPion, 50 mg, Nasogastric, Q8H  Fat Emul Fish Oil/Plant Based, 250 mL, Intravenous, Q24H  folic acid 1 mg in sodium chloride 0.9 % 50 mL IVPB, 1 mg, Intravenous, Daily  heparin (porcine), 5,000 Units, Subcutaneous, Q8H  insulin regular, 2-7 Units, Subcutaneous, Q6H  meropenem, 2,000 mg, Intravenous, Q8H  methylnaltrexone, 8 mg, Subcutaneous, Every Other Day  metoclopramide, 10 mg, Intravenous, Q6H  micafungin (MYCAMINE) IV, 100 mg, Intravenous, Q24H  pantoprazole, 40 mg, Intravenous, QAM AC  revefenacin, 175 mcg, Nebulization, Daily - RT  rosuvastatin, 10 mg, Nasogastric, Nightly  senna, 1 tablet, Nasogastric, Nightly  sodium chloride, 10 mL, Intravenous, Q12H  thiamine (B-1) IV, 100 mg, Intravenous, Daily      I reviewed the  patient's medications.    Vital Sign Min/Max for last 24 hours  Temp  Min: 95.9 °F (35.5 °C)  Max: 98.7 °F (37.1 °C)   BP  Min: 123/92  Max: 165/98   Pulse  Min: 76  Max: 102   Resp  Min: 18  Max: 30   SpO2  Min: 92 %  Max: 99 %   Flow (L/min)  Min: 3  Max: 4       Input/Output for last 24 hour shift  02/06 0701 - 02/07 0700  In: 2630.2 [I.V.:482.4]  Out: 3554 [Urine:2750; Drains:4]      GENERAL : NAD, conversant  RESPIRATORY/THORAX : normal respiratory effort and no intercostal retractions, Coarse crackles bilaterally  CARDIOVASCULAR : Normal S1/S2, RRR. 1+ lower ext edema.  GASTROINTESTINAL : Soft, NT/ND. BS x 4 normoactive. No hepatosplenomegaly.  MUSCULOSKELETAL : No cyanosis, clubbing, or ischemia  NEUROLOGICAL: alert and oriented to person, place and time  PSYCHOLOGICAL : Appropriate affect    Results from last 7 days   Lab Units 02/07/24  0332 02/06/24  0354 02/05/24  0509   WBC 10*3/mm3 9.40 8.94 12.09*   HEMOGLOBIN g/dL 10.6* 10.4* 10.5*   PLATELETS 10*3/mm3 481* 441 426     Results from last 7 days   Lab Units 02/07/24  0332 02/06/24  0354 02/05/24  0509   SODIUM mmol/L 135* 135* 137   POTASSIUM mmol/L 4.3 3.9 4.1   CO2 mmol/L 25.0 26.0 28.0   BUN mg/dL 20 22 21   CREATININE mg/dL 0.46* 0.36* 0.39*   MAGNESIUM mg/dL 2.1 2.7* 2.4   PHOSPHORUS mg/dL 2.8 2.9 2.6   GLUCOSE mg/dL 109* 123* 116*     Estimated Creatinine Clearance: 139.8 mL/min (A) (by C-G formula based on SCr of 0.46 mg/dL (L)).          I reviewed the patient's new clinical results.  I reviewed the patient's new imaging results/reports including actual images and agree with reports.       Imaging Results (Last 24 Hours)       Procedure Component Value Units Date/Time    XR Abdomen KUB [[id number]] Collected: 02/06/24 1919     Updated: 02/06/24 1927    Narrative:      XR ABDOMEN KUB    Date of Exam: 2/6/2024 6:50 PM EST    Indication: s/p sigmoid colectomy with diverting ileostomy with persistent ileus    Comparison: Abdominal radiograph  1/30/2024.    Findings:  Nasogastric tube tip and sidehole overlie the stomach. Multiple loops of dilated small bowel, slightly decreased in caliber compared to prior exam. DELROY drain overlies the pelvis. Cutaneous midline staples. Right lower quadrant ileostomy.      Impression:      Impression:  Postsurgical abdomen with slightly decreased caliber of multiple dilated small bowel loops.      Electronically Signed: Dennis Tee MD    2/6/2024 7:24 PM EST    Workstation ID: [id number]            Assessment & Plan   Impression        Colonic mass    Current smoker    Gastroesophageal reflux disease without esophagitis    Severe malnutrition    Respiratory distress    Centrilobular emphysema       Plan        Roger is a 63 y.o. male admitted on 1/17/2024 with Colonic mass [K63.89] for which he underwent open sigmoid colectomy with diverting loop ileostomy and liver biopsy on 01/17/24 and returned to OR for another exploratory lap, for lysis of adhesion. He has required Parenteral Nutrition. He also developed lung infiltrates. He was on antibiotics but because his leukemoid reaction, antibiotic was changed to meropenem and Micafungin.  He was weaned off high flow nasal cannula on 2/6/2024.    -Continue postop orders per surgery  -Ensure adequate pain control  -Continue to wean oxygen to saturation greater than 88%  -Continue Symbicort and Yupelri for COPD  -Continue statin for hyperlipidemia  -Continue TPN for now, will defer to general surgery meant to reinitiate enteral feeds  -Mobilize patient  -A.m. labs  -Medically okay to be transferred to the floor    I conducted multidisciplinary rounds in the plan of care was discussed with the multidisciplinary team at that time. In attendance at multidisciplinary rounds was clinical pharmacist, dietitian, nursing staff, and case management.    I discussed the patient's findings and my recommendations with patient, family, and nursing staff         Yen Thomson,   Pulmonary,  Critical care and Sleep Medicine

## 2024-02-07 NOTE — PLAN OF CARE
Goal Outcome Evaluation:      VSS this shift, pain controlled with dilaudid PCA, pt remains A/Ox4, NGT to LCWS, 3L o2 via nc. Patients states feeling better this shift.

## 2024-02-07 NOTE — PROGRESS NOTES
"Patient Name:  Roger Bhat  YOB: 1960  [id number]    Surgery Progress Note    Date of visit: 2/7/2024      Subjective: No acute events overnight.  Has been weaned down to 3 L by nasal cannula.  Had more gas and stool output from the ileostomy yesterday, total 550 mL.  NG output 250 mL but still bilious.  Urine output over 2 L.  Overall feeling better.  No fevers or chills          Objective:     /96   Pulse 86   Temp 97.8 °F (36.6 °C) (Axillary)   Resp 18   Ht 175.3 cm (69\")   Wt 58.6 kg (129 lb 3 oz)   SpO2 92%   BMI 19.08 kg/m²     Intake/Output Summary (Last 24 hours) at 2/7/2024 0743  Last data filed at 2/7/2024 0600  Gross per 24 hour   Intake 2630.2 ml   Output 3554 ml   Net -923.8 ml       GEN:   Awake, alert, sitting up in bed, chronically ill-appearing in no obvious distress  CV:      Regular rate and rhythm  L:         Symmetric expansion, on oxygen by nasal cannula  Abd:    Soft, moderately distended, appropriately tender palpation throughout the abdomen, midline incision with some reactive erythema surrounding the site and serous drainage, DELROY drain in the right lower quadrant with seros output, ileostomy on the right side pink and patent with liquid stool in the bag  Ext:     No cyanosis, clubbing, or edema    Recent labs that are back at this time have been reviewed.           Assessment/ Plan:    Mr. Bhat is a 63-year-old gentleman who is admitted following operative intervention for gallbladder and sigmoid colon mass on January 17     #Sigmoid colon mass, gallbladder mass  # Ileus  -Postop day 21 following lap vladimir, open sigmoid colectomy with diverting loop ileostomy, postoperative day 8 following exploratory laparotomy with washout  -Labs are appropriate.  White blood cell count is 9.4.  Hemoglobin 10.6.  Chemistries without acute findings  -Ileostomy output continues to improve.  550 mL yesterday.  NG output is less but still remains bilious  -Clamp NG today " and check residuals  -Okay for ice chips, popsicles, sips of clears for comfort  -Discontinue PCA today  -Continue TPN   -Continue antibiotics  -I will discuss with Dr. Thomson but I think he is okay to transition out of ICU today         Jimmy Craig MD  2/7/2024  07:43 EST

## 2024-02-07 NOTE — CONSULTS
Clinical Nutrition   Nutrition Assessment  Reason for Visit: Follow-up protocol, PN    Patient Name: Roger Bhat  YOB: 1960  MRN: [id number]  Date of Encounter: 02/07/24 14:06 EST  Admission date: 1/17/2024    Continue current TPN regimen of:    PN Route: PICC   PN:  Clinimix E-D15% AA5% @ 70 ml/hr         GIR: 3.2 mg/kg/min  Lipid: 250 ml 20% SMOF daily    PN@ Goal over 24hr to Supply:  Volume 1680 mL/day       Energy 1693 Kcal/day 100 % Est Need   Protein 84 g/day 102 % Est Need      Nutrition Assessment   Admission Diagnosis:  Colonic mass [K63.89]    Problem List:    Colonic mass    Current smoker    Gastroesophageal reflux disease without esophagitis    Severe malnutrition    Respiratory distress    Centrilobular emphysema      PMH:   He  has a past medical history of Allergies, Cellulitis of hand, Clotting disorder, Colonic mass, COPD (chronic obstructive pulmonary disease), Cough, Current smoker, Erectile dysfunction, Fracture, foot (Sept 7 2022), GERD (gastroesophageal reflux disease), Hemochromatosis, Hyperlipidemia, Hypertension, and Wears dentures.    PSH:  He  has a past surgical history that includes Esophagogastroduodenoscopy (05/2016); Colonoscopy (2021); Esophagogastroduodenoscopy (N/A, 10/19/2021); Esophagogastroduodenoscopy (N/A, 11/16/2021); Hand surgery (2022); Cholecystectomy (N/A, 1/17/2024); Colectomy (N/A, 1/17/2024); and Exploratory Laparotomy (N/A, 1/30/2024).    Applicable Nutrition Concerns:   Skin:  Oral:  GI: s/p lap vladimir, open sigmoid colectomy with diverting loop ileostomy     Applicable Interval History:     (1/17) open sigmoid colectomy with diverting loop ileostomy     (1/26) CT A/P - Impression:  1.Imaging features most consistent with small bowel adynamic ileus. No focal transition point identified to suggest mechanical obstruction.  2.Postoperative changes as detailed above. Minimal free fluid surrounding right lower quadrant surgical  drain. No abscess identified.  3.Other incidental findings as detailed above.    (1/30) Ex lap with ALBERTO    Reported/Observed/Food/Nutrition Related History:     2/7) Pt remains NPO, is allowed sips for comfort. Ileostomy output increasing, NG decreasing but still bilous. Plan for clamping trials today. Respiratory status improving and pt to move to the floor. Visited with pt and supportive person at bedside. Pt sitting up in chair in good spirits. Understands care plan but hopeful to be able to eat in near future. Na remains slightly low but unchanged.     2/6) Continues with TPN, NPO awaiting ileus resolution. No significant changes with plans for continuing TPN, sips for comfort per surgeon note. Slightly more ileostomy output, continues with NG output but decreasing. Sodium slightly down with volume increase PN to better meet needs yesterday, will continue to monitor and adjust if continues to drop. Negative fluid balance yesterday, wt back down closer to suspect ABW.     2/5) still NPO with NGT to suction.  No significant ostomy output per documentation. Plan to keep NPO per surgeons note.  TPN for nutrition support.      Patient sitting in bedside chair at time of visit, no issues to report. NGT in place, aware of plans for stay NPO/sips/NTG/TPN for today.  Wife at beside. No questions for RD       1/31) Pt was transferred to ICU postop yesterday to monitor tenuous respiratory status following exlap. Concern for possible aspiration during surgery. Exlap with ALBERTO yesterday. Visited with pt and wife at bedside. Pt reports feeling well today with pain controlled and no N/V currently, though he is NPO. Explained nutritional care plan to meet 100% needs through TPN at this time pending surgery clearance for diet. Pt reports really enjoying sipping on cold ensure clear. Hopeful to be able to eat soon. All questions/concerns answered. Pt in good spirits. Discussed case with intensivist, plan to alter PN to give fluids  mainly PN and stop IVF, and increase to meet 100% needs as NPO. Discussed concerns for pt continuing to be at risk refeeding syndrome.     1/30) Pt has continued to struggle to tolerate CLD. PICC placed and changed to TPN yesterday to better meet needs. Meeting ~95% needs with additional from CLD, drinking ensure clear. Lytes wnl this morning with TPN at goal. Na slightly low. Ileostomy output better however again not much nutrition going in. Continues with small amounts of emesis. A repeat CT was done yesterday. Surgeon with plans to take pt back to OR today for ex lap.    1/26) Spoke with surgeon this morning, okay with PPN as CT suggestive of ileus and pt will need more days NPO. Hopeful will not need long as predict return of bowel function in coming days thus will initiate PPN rather than TPN. Pt also started on reglan and bowel regimen as well as IVF. Discussed with pt and spouse at bedside, in understanding of plan. Pt feeling much better but still with significant NG output that is feculent. Hopeful to be able to eat in near future just really wants a banana. RD explained rationale and importance gut rest at this time and pt understands.     1/25) Pt unable to tolerate oral intake, day 10 without any significant source nutrition. Now NPO with NG to LWS. KUB obtained suggestive of ileus or SBO.     1/24) Visited with pt and spouse at bedside for consult per WOC for new ileostomy education/high ileostomy output. Unfortunately pt's significant nutrition concerns were not reported prior and pt is new to RD, due to no previous consults and no nutritional concerns reported on nursing screen. Together pt and spouse tell me pt has not had any signifiant oral intake in 9 days due to preporation and NPO status for surgery followed by slow to return bowel function and eventual intolerance diet advancement. Did drink Impact AR per surgery protocol in the days prior to surgery.They note he has lost a lot of weight and  muscle. He has emesis bag at time of my visit and tells me he only got sick once yesterday but many times today despite no oral intake. All he has been able to keep down today is 8 oz fluid (4oz gatorade and 4oz juice). RD encouraged liquids as needed and trying solids when able.   Discussed with pt and spouse that given above as well as high ileostomy output and increased energy/protein needs from significant surgery, RD with significant concern for pt's nutrition and hydration status. They tell me no one has mentioned possibility of nutrition support, RD educated this may be necessary if unable to tolerate PO soon. Of course would like to avoid but in understanding if needed. Nutritional education for new ileostomy and high ileostomy output introduced and will f/u as needed. More pertinent issue is getting pt to tolerate any oral nutrition at this time which would lend to normalized output. Pt and spouse receptive and appreciative of visit. Deny further needs or questions/concerns at this time, NKFA.     Labs    Labs Reviewed: Yes     Results from last 7 days   Lab Units 02/07/24  0332 02/06/24  0354 02/05/24  0509   GLUCOSE mg/dL 109* 123* 116*   BUN mg/dL 20 22 21   CREATININE mg/dL 0.46* 0.36* 0.39*   SODIUM mmol/L 135* 135* 137   CHLORIDE mmol/L 101 99 101   POTASSIUM mmol/L 4.3 3.9 4.1   PHOSPHORUS mg/dL 2.8 2.9 2.6   MAGNESIUM mg/dL 2.1 2.7* 2.4   ALT (SGPT) U/L  --   --  36       Results from last 7 days   Lab Units 02/05/24  0509   ALBUMIN g/dL 2.4*   PREALBUMIN mg/dL 10.5*   CRP mg/dL 9.46*   TRIGLYCERIDES mg/dL 137       Results from last 7 days   Lab Units 02/07/24  1111 02/07/24  0542 02/06/24  2330 02/06/24  1725 02/06/24  1147 02/06/24  1120   GLUCOSE mg/dL 125 115 116 113 129 113     Lab Results   Lab Value Date/Time    HGBA1C 5.00 01/10/2024 1508    HGBA1C 5.3 11/28/2022 1111    HGBA1C 5.20 08/17/2021 1149               Medications    Medications Reviewed: Yes  Pertinent: Dulcolax, Wellbutrin,  "folic acid, insulin, reglan, abx, protonix, senokot, thiamine, lipids  Infusion: TPN  PRN: zofran    Intake/Ouptut 24 hrs (0701 - 0700)   I&O's Reviewed: Yes   Intake & Output (last day)         02/06 0701 02/07 0700 02/07 0701 02/08 0700    P.O.  236    I.V. (mL/kg) 482.4 (8.2) 176.1 (3)    NG/ 35    IV Piggyback      TPN 1997.8 440    Total Intake(mL/kg) 2630.2 (44.9) 887.1 (15.1)    Urine (mL/kg/hr) 2750 (2) 500 (1.2)    Emesis/NG output 250 300    Drains 4 0    Stool 550     Total Output 3554 800    Net -923.8 +87.1                Anthropometrics     Height: Height: 175.3 cm (69\")  Last Filed Weight: Weight: 58.6 kg (129 lb 3 oz) (02/07/24 0600)  Method: Weight Method: Bed scale  BMI: BMI (Calculated): 19.1  BMI classification: Normal: 18.5-24.9kg/m2  IBW:   155 lb    UBW:     Weight       Weight (kg) Weight (lbs) Weight Method Visit Report   11/30/2022 68 kg  149 lb 14.6 oz   --    1/25/2023 68 kg  149 lb 14.6 oz   --    4/26/2023 64.683 kg  142 lb 9.6 oz   --    10/26/2023 65.137 kg  143 lb 9.6 oz   --    12/15/2023 64.683 kg  142 lb 9.6 oz   --    1/8/2024 63.504 kg  140 lb      1/10/2024 66.25 kg  146 lb 0.9 oz  Standing scale     1/17/2024 66.25 kg  146 lb 0.9 oz      1/24/2024 61.236 kg  135 lb  Bed scale     1/26/2024 54.613 kg  120 lb 6.4 oz  Standing scale       Weight change: weight loss of 26 lbs (17.8%) over the past 2 week(s)    Intentional?  No    Nutrition Focused Physical Exam     Date:  1/24       Patient meets criteria for malnutrition diagnosis, see MSA note.     Needs Assessment   Date: 1/26, reviewed 1/30, reassessed 1/31, reviewed 2/7    Height used:Height: 175.3 cm (69\")  Weights used: 54.6 kg (ABW)    Estimated Calorie needs: ~1700 Kcal/day  Method:  Kcals/KG 25-35 ABW = 3714-0749  Method:  MSJ 1340 X 1.3 = 1742    Estimated Protein needs: ~82 g PRO/day  Method: 1.3-1.5 g/Kg = 71-82    Estimated Fluid needs: ~ ml/day   Per clinical status    Current Nutrition Prescription   PO: " Adult [facility] CLINIMIX-E TPN  NPO Diet NPO Type: Sips with Meds, Ice Chips  Adult [facility] CLINIMIX-E TPN  Oral Nutrition Supplement:   Intake: N/A NPO since     PN Route: PICC   PN:  Clinimix E-D15% AA5% @ 70 ml/hr         GIR: 3.2 mg/kg/min  Lipid: 250 ml 20% SMOF daily     PN @ Goal over 24hr to Supply:  Volume 1680 mL/day       Energy 1693 Kcal/day 100 % Est Need   Protein 84 g/day 102 % Est Need      ---------------------------------------------------------------------------  Formula/Rate verified at bedside: Yes  Infusing Rate at time of visit: 70 ml/hr    Average Delivery from [facility] Day:   PN Volume 1745 mL/day     Lipid delivered?  253      Energy  Kcal/day  % Est Need   Protein  g/day  % Est Need     PN hx:   (-) PPN: D5%, AA4.25% @ 50 ml/hr, 100 ml lipids/daily  (-) TPN D15% AA5% @ 60 ml/hr, 100 ml lipids/daily    Nutrition Diagnosis   Date:              Updated:    Problem Malnutrition  severe/acute   Etiology Energy needs>energy intake 2/2 recent colectomy with ileostomy placement with slow to return bowel function followed by intolerance diet.    Signs/Symptoms PO intakes <50% EEN >/=5 days and loss 17.8% body weight with past 2 weeks, moderate muscle wasting, and moderate subcutaneous fat loss.    Status: Ongoing / worsened with updated standing scale weight, receiving TPN    Date:       Updated:     Problem Food and nutrition knowledge deficit   Etiology S/p colectomy with new ileostomy complicated by high ileostomy output   Signs/Symptoms Pt with prior lack of knowledge appropriate MNT/diet for condition   Status: Improvement Shown Education provided    Goal:   General: Nutrition support treatment  PO: Advace diet as medically feasible/appropriate  EN/PN: Maintain PN, Deliver estimated needs     Nutrition Intervention      Follow treatment progress, Care plan reviewed, Nutrition support order placed    Continue current PN regimen  Plan to begin weaning once diet  advanced and tolerating    Monitoring/Evaluation:   Per protocol, I&O, PO intake, Supplement intake, Pertinent labs, GI status, Symptoms, POC/GOC      Stacie Maldonado RD, CNSC  Time Spent: 35m

## 2024-02-07 NOTE — PROGRESS NOTES
Pharmacy Parenteral Nutrition Evaluation    Roger Bhat is a  63 y.o. male receiving TPN.     Indication: Prolonged paralytic illus  Consulting Physician: Intensivist     Total Fluid Rate (if stated): -    Labs  Results from last 7 days   Lab Units 02/07/24 0332 02/06/24 0354 02/05/24  0509   SODIUM mmol/L 135* 135* 137   POTASSIUM mmol/L 4.3 3.9 4.1   CHLORIDE mmol/L 101 99 101   CO2 mmol/L 25.0 26.0 28.0   BUN mg/dL 20 22 21   CREATININE mg/dL 0.46* 0.36* 0.39*   CALCIUM mg/dL 8.9 8.4* 9.1   BILIRUBIN mg/dL  --   --  0.2   ALK PHOS U/L  --   --  181*   ALT (SGPT) U/L  --   --  36   AST (SGOT) U/L  --   --  25   GLUCOSE mg/dL 109* 123* 116*       Results from last 7 days   Lab Units 02/07/24 0332 02/06/24 0354 02/05/24  0509   MAGNESIUM mg/dL 2.1 2.7* 2.4   PHOSPHORUS mg/dL 2.8 2.9 2.6   PREALBUMIN mg/dL  --   --  10.5*       Results from last 7 days   Lab Units 02/07/24 0332 02/06/24  0354 02/05/24  0509   WBC 10*3/mm3 9.40 8.94 12.09*   HEMOGLOBIN g/dL 10.6* 10.4* 10.5*   HEMATOCRIT % 31.3* 30.6* 30.9*   PLATELETS 10*3/mm3 481* 441 426       Triglycerides   Date Value Ref Range Status   02/05/2024 137 0 - 150 mg/dL Final     Comment:     Falsely depressed results may occur on samples drawn from patients receiving N-Acetylcysteine (NAC) or Metamizole.       estimated creatinine clearance is 139.8 mL/min (A) (by C-G formula based on SCr of 0.46 mg/dL (L)).    Intake & Output (last 3 days)         02/02 0701 02/03 0700 02/03 0701 02/04 0700 02/04 0701 02/05 0700 02/05 0701 02/06 0700    P.O.  300 295     I.V. (mL/kg) 897.5 (15) 295.4 (4.9) 240.8 (4)     NG/ 110 160     IV Piggyback 115 350      TPN 2585 230 1641.5     Total Intake(mL/kg) 3757.5 (62.8) 1285.4 (21.5) 2337.3 (39.1)     Urine (mL/kg/hr) 1775 (1.2) 2300 (1.6) 1475 (1)     Emesis/NG output 650 725 975     Drains 5 5 0 0    Stool 100 45 225 0    Total Output 2530 3075 2675 0    Net +1227.5 -1789.6 -337.7 0            Stool  Unmeasured Occurrence    1 x            Dietitian Recommendations  Diet Orders (active) (From admission, onward)       Start     Ordered    02/03/24 0942  NPO Diet NPO Type: Sips with Meds, Ice Chips  Diet Effective Now        Comments: Ok for sips of clears    02/03/24 0941    01/28/24 1200  Dietary Nutrition Supplements Other (see comment); Ensure Clear Apple  Daily With Breakfast, Lunch & Dinner       01/28/24 0857                    Current TPN Regimen Recommendation:  Dextrose 15% / Amino Acid 5% at goal rate of 70 mL/hr.  20% SMOF Lipid Emulsion 250 mL every 24 hours.    Na 35 mEq/L  K 30 mEq/L  Ca 4.5 mEq/L  Mg 5 mEq/L  PO4 15 mmol/L       Assessment/Plan:  TPN for prolonged paralytic illus.  Clinical Nutrition to manage macronutrients and Pharmacy to manage micronutrients as above. No changes today.   Insulin, potassium and phosphate replacement protocols available.   Pharmacy will continue to follow and adjust TPN as clinically indicated.     Thank you,   Toya Dee, PharmD, BCPS  2/7/2024  13:55 EST

## 2024-02-07 NOTE — CASE MANAGEMENT/SOCIAL WORK
Continued Stay Note  [facility]     Patient Name: Roger Bhat  MRN: [id number]  Today's Date: 2/7/2024    Admit Date: 1/17/2024    Plan: Rehab   Discharge Plan       Row Name 02/07/24 1047       Plan    Plan Rehab    Patient/Family in Agreement with Plan yes    Plan Comments  spoke with Mr. Bhat, at the bedside. Pt has orders to telemetry. PT/OT are recommending Inpatient Rehab. We discussed this, pt is agreeable. Pt lives in [place], [place] and would like a referral made to [facility]. Pt continues on IV TPN today, but Dr. Craig also started him on clear liquids.  spoke with April/[facility], on the phone, and gave her referral.  will continue to follow.    Final Discharge Disposition Code 62 - inpatient rehab facility                   Discharge Codes    No documentation.                 Expected Discharge Date and Time       Expected Discharge Date Expected Discharge Time    Feb 10, 2024               Lee Ann Coughlin RN

## 2024-02-08 LAB
ANION GAP SERPL CALCULATED.3IONS-SCNC: 10 MMOL/L (ref 5–15)
BASOPHILS # BLD AUTO: 0.09 10*3/MM3 (ref 0–0.2)
BASOPHILS NFR BLD AUTO: 0.9 % (ref 0–1.5)
BUN SERPL-MCNC: 18 MG/DL (ref 8–23)
BUN/CREAT SERPL: 39.1 (ref 7–25)
CALCIUM SPEC-SCNC: 8.9 MG/DL (ref 8.6–10.5)
CHLORIDE SERPL-SCNC: 101 MMOL/L (ref 98–107)
CO2 SERPL-SCNC: 23 MMOL/L (ref 22–29)
CREAT SERPL-MCNC: 0.46 MG/DL (ref 0.76–1.27)
DEPRECATED RDW RBC AUTO: 53.8 FL (ref 37–54)
EGFRCR SERPLBLD CKD-EPI 2021: 119 ML/MIN/1.73
EOSINOPHIL # BLD AUTO: 0.53 10*3/MM3 (ref 0–0.4)
EOSINOPHIL NFR BLD AUTO: 5 % (ref 0.3–6.2)
ERYTHROCYTE [DISTWIDTH] IN BLOOD BY AUTOMATED COUNT: 14.6 % (ref 12.3–15.4)
GLUCOSE BLDC GLUCOMTR-MCNC: 112 MG/DL (ref 70–130)
GLUCOSE BLDC GLUCOMTR-MCNC: 116 MG/DL (ref 70–130)
GLUCOSE BLDC GLUCOMTR-MCNC: 117 MG/DL (ref 70–130)
GLUCOSE BLDC GLUCOMTR-MCNC: 132 MG/DL (ref 70–130)
GLUCOSE SERPL-MCNC: 105 MG/DL (ref 65–99)
HCT VFR BLD AUTO: 31.2 % (ref 37.5–51)
HGB BLD-MCNC: 10.8 G/DL (ref 13–17.7)
IMM GRANULOCYTES # BLD AUTO: 0.14 10*3/MM3 (ref 0–0.05)
IMM GRANULOCYTES NFR BLD AUTO: 1.3 % (ref 0–0.5)
LYMPHOCYTES # BLD AUTO: 1.83 10*3/MM3 (ref 0.7–3.1)
LYMPHOCYTES NFR BLD AUTO: 17.3 % (ref 19.6–45.3)
MAGNESIUM SERPL-MCNC: 2.3 MG/DL (ref 1.6–2.4)
MCH RBC QN AUTO: 34.8 PG (ref 26.6–33)
MCHC RBC AUTO-ENTMCNC: 34.6 G/DL (ref 31.5–35.7)
MCV RBC AUTO: 100.6 FL (ref 79–97)
MONOCYTES # BLD AUTO: 0.54 10*3/MM3 (ref 0.1–0.9)
MONOCYTES NFR BLD AUTO: 5.1 % (ref 5–12)
NEUTROPHILS NFR BLD AUTO: 7.45 10*3/MM3 (ref 1.7–7)
NEUTROPHILS NFR BLD AUTO: 70.4 % (ref 42.7–76)
NRBC BLD AUTO-RTO: 0 /100 WBC (ref 0–0.2)
PHOSPHATE SERPL-MCNC: 2.7 MG/DL (ref 2.5–4.5)
PLATELET # BLD AUTO: 490 10*3/MM3 (ref 140–450)
PMV BLD AUTO: 9.7 FL (ref 6–12)
POTASSIUM SERPL-SCNC: 4 MMOL/L (ref 3.5–5.2)
RBC # BLD AUTO: 3.1 10*6/MM3 (ref 4.14–5.8)
SODIUM SERPL-SCNC: 134 MMOL/L (ref 136–145)
WBC NRBC COR # BLD AUTO: 10.58 10*3/MM3 (ref 3.4–10.8)

## 2024-02-08 PROCEDURE — 99232 SBSQ HOSP IP/OBS MODERATE 35: CPT | Performed by: INTERNAL MEDICINE

## 2024-02-08 PROCEDURE — 94664 DEMO&/EVAL PT USE INHALER: CPT

## 2024-02-08 PROCEDURE — 85025 COMPLETE CBC W/AUTO DIFF WBC: CPT | Performed by: INTERNAL MEDICINE

## 2024-02-08 PROCEDURE — 25010000002 ONDANSETRON PER 1 MG: Performed by: INTERNAL MEDICINE

## 2024-02-08 PROCEDURE — 63710000001 REVEFENACIN 175 MCG/3ML SOLUTION: Performed by: INTERNAL MEDICINE

## 2024-02-08 PROCEDURE — 82948 REAGENT STRIP/BLOOD GLUCOSE: CPT

## 2024-02-08 PROCEDURE — 94761 N-INVAS EAR/PLS OXIMETRY MLT: CPT

## 2024-02-08 PROCEDURE — 25010000002 MEROPENEM PER 100 MG: Performed by: SURGERY

## 2024-02-08 PROCEDURE — 94799 UNLISTED PULMONARY SVC/PX: CPT

## 2024-02-08 PROCEDURE — 97116 GAIT TRAINING THERAPY: CPT

## 2024-02-08 PROCEDURE — 25010000002 THIAMINE PER 100 MG: Performed by: SURGERY

## 2024-02-08 PROCEDURE — 97530 THERAPEUTIC ACTIVITIES: CPT

## 2024-02-08 PROCEDURE — 25010000002 HEPARIN (PORCINE) PER 1000 UNITS: Performed by: SURGERY

## 2024-02-08 PROCEDURE — 84100 ASSAY OF PHOSPHORUS: CPT | Performed by: INTERNAL MEDICINE

## 2024-02-08 PROCEDURE — 25010000002 MICAFUNGIN SODIUM 100 MG RECONSTITUTED SOLUTION 1 EACH VIAL: Performed by: SURGERY

## 2024-02-08 PROCEDURE — 83735 ASSAY OF MAGNESIUM: CPT | Performed by: INTERNAL MEDICINE

## 2024-02-08 PROCEDURE — 25010000002 METOCLOPRAMIDE PER 10 MG: Performed by: SURGERY

## 2024-02-08 PROCEDURE — 80048 BASIC METABOLIC PNL TOTAL CA: CPT | Performed by: INTERNAL MEDICINE

## 2024-02-08 RX ORDER — ALUMINA, MAGNESIA, AND SIMETHICONE 2400; 2400; 240 MG/30ML; MG/30ML; MG/30ML
15 SUSPENSION ORAL EVERY 6 HOURS PRN
Status: DISCONTINUED | OUTPATIENT
Start: 2024-02-08 | End: 2024-02-15 | Stop reason: HOSPADM

## 2024-02-08 RX ORDER — FOLIC ACID 1 MG/1
1 TABLET ORAL DAILY
Status: DISCONTINUED | OUTPATIENT
Start: 2024-02-09 | End: 2024-02-15 | Stop reason: HOSPADM

## 2024-02-08 RX ORDER — ONDANSETRON 2 MG/ML
4 INJECTION INTRAMUSCULAR; INTRAVENOUS EVERY 6 HOURS PRN
Status: DISCONTINUED | OUTPATIENT
Start: 2024-02-08 | End: 2024-02-15 | Stop reason: HOSPADM

## 2024-02-08 RX ORDER — ONDANSETRON 4 MG/1
4 TABLET, ORALLY DISINTEGRATING ORAL EVERY 6 HOURS PRN
Status: DISCONTINUED | OUTPATIENT
Start: 2024-02-08 | End: 2024-02-15 | Stop reason: HOSPADM

## 2024-02-08 RX ORDER — SENNOSIDES A AND B 8.6 MG/1
1 TABLET, FILM COATED ORAL NIGHTLY
Status: DISCONTINUED | OUTPATIENT
Start: 2024-02-08 | End: 2024-02-15 | Stop reason: HOSPADM

## 2024-02-08 RX ORDER — BUPROPION HYDROCHLORIDE 100 MG/1
50 TABLET ORAL EVERY 8 HOURS SCHEDULED
Status: DISCONTINUED | OUTPATIENT
Start: 2024-02-08 | End: 2024-02-14 | Stop reason: ALTCHOICE

## 2024-02-08 RX ORDER — ROSUVASTATIN CALCIUM 10 MG/1
10 TABLET, COATED ORAL NIGHTLY
Status: DISCONTINUED | OUTPATIENT
Start: 2024-02-08 | End: 2024-02-15 | Stop reason: HOSPADM

## 2024-02-08 RX ORDER — ACETAMINOPHEN 160 MG/5ML
650 SOLUTION ORAL EVERY 6 HOURS PRN
Status: DISCONTINUED | OUTPATIENT
Start: 2024-02-08 | End: 2024-02-15 | Stop reason: HOSPADM

## 2024-02-08 RX ADMIN — BUPROPION HYDROCHLORIDE 50 MG: 100 TABLET, FILM COATED ORAL at 05:41

## 2024-02-08 RX ADMIN — Medication 10 ML: at 21:01

## 2024-02-08 RX ADMIN — BISACODYL 10 MG: 5 TABLET, COATED ORAL at 10:00

## 2024-02-08 RX ADMIN — ASCORBIC ACID, VITAMIN A PALMITATE, CHOLECALCIFEROL, THIAMINE HYDROCHLORIDE, RIBOFLAVIN-5 PHOSPHATE SODIUM, PYRIDOXINE HYDROCHLORIDE, NIACINAMIDE, DEXPANTHENOL, ALPHA-TOCOPHEROL ACETATE, VITAMIN K1, FOLIC ACID, BIOTIN, CYANOCOBALAMIN: 200; 3300; 200; 6; 3.6; 6; 40; 15; 10; 150; 600; 60; 5 INJECTION, SOLUTION INTRAVENOUS at 17:57

## 2024-02-08 RX ADMIN — BUPROPION HYDROCHLORIDE 50 MG: 100 TABLET, FILM COATED ORAL at 21:01

## 2024-02-08 RX ADMIN — MEROPENEM 2000 MG: 1 INJECTION, POWDER, FOR SOLUTION INTRAVENOUS at 00:18

## 2024-02-08 RX ADMIN — METOCLOPRAMIDE HYDROCHLORIDE 10 MG: 5 INJECTION INTRAMUSCULAR; INTRAVENOUS at 12:21

## 2024-02-08 RX ADMIN — MEROPENEM 2000 MG: 1 INJECTION, POWDER, FOR SOLUTION INTRAVENOUS at 16:29

## 2024-02-08 RX ADMIN — HEPARIN SODIUM 5000 UNITS: 5000 INJECTION INTRAVENOUS; SUBCUTANEOUS at 05:41

## 2024-02-08 RX ADMIN — BUDESONIDE AND FORMOTEROL FUMARATE DIHYDRATE 2 PUFF: 160; 4.5 AEROSOL RESPIRATORY (INHALATION) at 19:14

## 2024-02-08 RX ADMIN — THIAMINE HYDROCHLORIDE 100 MG: 100 INJECTION, SOLUTION INTRAMUSCULAR; INTRAVENOUS at 09:59

## 2024-02-08 RX ADMIN — FOLIC ACID 1 MG: 5 INJECTION, SOLUTION INTRAMUSCULAR; INTRAVENOUS; SUBCUTANEOUS at 10:00

## 2024-02-08 RX ADMIN — BUPROPION HYDROCHLORIDE 50 MG: 100 TABLET, FILM COATED ORAL at 14:17

## 2024-02-08 RX ADMIN — METOCLOPRAMIDE HYDROCHLORIDE 10 MG: 5 INJECTION INTRAMUSCULAR; INTRAVENOUS at 05:41

## 2024-02-08 RX ADMIN — METOCLOPRAMIDE HYDROCHLORIDE 10 MG: 5 INJECTION INTRAMUSCULAR; INTRAVENOUS at 00:18

## 2024-02-08 RX ADMIN — HEPARIN SODIUM 5000 UNITS: 5000 INJECTION INTRAVENOUS; SUBCUTANEOUS at 14:17

## 2024-02-08 RX ADMIN — Medication 10 ML: at 10:00

## 2024-02-08 RX ADMIN — ONDANSETRON 4 MG: 2 INJECTION INTRAMUSCULAR; INTRAVENOUS at 21:00

## 2024-02-08 RX ADMIN — MICAFUNGIN 100 MG: 20 INJECTION, POWDER, LYOPHILIZED, FOR SOLUTION INTRAVENOUS at 12:22

## 2024-02-08 RX ADMIN — SMOFLIPID 100 ML: 6; 6; 5; 3 INJECTION, EMULSION INTRAVENOUS at 17:58

## 2024-02-08 RX ADMIN — PANTOPRAZOLE SODIUM 40 MG: 40 INJECTION, POWDER, LYOPHILIZED, FOR SOLUTION INTRAVENOUS at 09:04

## 2024-02-08 RX ADMIN — MEROPENEM 2000 MG: 1 INJECTION, POWDER, FOR SOLUTION INTRAVENOUS at 09:03

## 2024-02-08 RX ADMIN — REVEFENACIN 175 MCG: 175 SOLUTION RESPIRATORY (INHALATION) at 09:13

## 2024-02-08 RX ADMIN — BUDESONIDE AND FORMOTEROL FUMARATE DIHYDRATE 2 PUFF: 160; 4.5 AEROSOL RESPIRATORY (INHALATION) at 09:13

## 2024-02-08 RX ADMIN — SENNOSIDES 1 TABLET: 8.6 TABLET, FILM COATED ORAL at 21:00

## 2024-02-08 RX ADMIN — METOCLOPRAMIDE HYDROCHLORIDE 10 MG: 5 INJECTION INTRAMUSCULAR; INTRAVENOUS at 18:58

## 2024-02-08 RX ADMIN — HEPARIN SODIUM 5000 UNITS: 5000 INJECTION INTRAVENOUS; SUBCUTANEOUS at 21:00

## 2024-02-08 RX ADMIN — ROSUVASTATIN CALCIUM 10 MG: 10 TABLET, FILM COATED ORAL at 21:01

## 2024-02-08 NOTE — PLAN OF CARE
Goal Outcome Evaluation:  Plan of Care Reviewed With: patient        Progress: improving  Outcome Evaluation: Pt continues to demonstrate good effort with therapy although is limited by generalized weakness and incisional pain. Will continue to progress as able to promote return to PLOF. PT rec IRF at d/c.      Anticipated Discharge Disposition (PT): inpatient rehabilitation facility

## 2024-02-08 NOTE — PLAN OF CARE
Goal Outcome Evaluation: A&Ox4. Up to chair most of the shift. Ostomy draining liquid stool. Ostomy bag changed. Spontaneously voids without issue. Enteral nutrition started initiated and TPN rate decreased.       Problem: Adult Inpatient Plan of Care  Goal: Absence of Hospital-Acquired Illness or Injury  Intervention: Identify and Manage Fall Risk  Recent Flowsheet Documentation  Taken 2/8/2024 1800 by Braden Moore RN  Safety Promotion/Fall Prevention:   safety round/check completed   activity supervised  Taken 2/8/2024 1600 by Braden Moore RN  Safety Promotion/Fall Prevention:   safety round/check completed   activity supervised  Taken 2/8/2024 1400 by Braden Moore RN  Safety Promotion/Fall Prevention:   safety round/check completed   activity supervised  Taken 2/8/2024 1200 by Braden Moore RN  Safety Promotion/Fall Prevention:   safety round/check completed   activity supervised  Taken 2/8/2024 1000 by Braden Moore RN  Safety Promotion/Fall Prevention:   safety round/check completed   activity supervised  Taken 2/8/2024 0800 by Braden Moore RN  Safety Promotion/Fall Prevention:   safety round/check completed   activity supervised     Problem: Adult Inpatient Plan of Care  Goal: Absence of Hospital-Acquired Illness or Injury  Intervention: Prevent Skin Injury  Recent Flowsheet Documentation  Taken 2/8/2024 1800 by Braden Moore RN  Body Position: position changed independently  Taken 2/8/2024 1600 by Braden Moore RN  Body Position: position changed independently  Skin Protection: tubing/devices free from skin contact  Taken 2/8/2024 1400 by Braden Moore RN  Body Position: position changed independently  Taken 2/8/2024 1200 by Braden Moore RN  Body Position: position changed independently  Skin Protection: tubing/devices free from skin contact  Taken 2/8/2024 1000 by Braden Moore RN  Body Position: position changed independently  Taken 2/8/2024 0800 by Braden Moore  RN  Body Position: position changed independently  Skin Protection:   tubing/devices free from skin contact   incontinence pads utilized     Problem: Adult Inpatient Plan of Care  Goal: Absence of Hospital-Acquired Illness or Injury  Intervention: Prevent and Manage VTE (Venous Thromboembolism) Risk  Recent Flowsheet Documentation  Taken 2/8/2024 1800 by Braden Moore RN  Activity Management: back to bed  Taken 2/8/2024 1600 by Braden Moore RN  Activity Management: up in chair  VTE Prevention/Management:   sequential compression devices off   patient refused intervention  Range of Motion: active ROM (range of motion) encouraged  Taken 2/8/2024 1400 by Braden Moore RN  Activity Management: up in chair  Taken 2/8/2024 1200 by Braden Moore RN  Activity Management: up in chair  VTE Prevention/Management:   sequential compression devices off   patient refused intervention  Range of Motion: active ROM (range of motion) encouraged  Taken 2/8/2024 1000 by Braden Moore, RN  Activity Management: up in chair  Taken 2/8/2024 0800 by Braden Moore, RN  Activity Management:   activity encouraged   ambulated outside room  VTE Prevention/Management: sequential compression devices off  Range of Motion: active ROM (range of motion) encouraged

## 2024-02-08 NOTE — CONSULTS
Clinical Nutrition   Nutrition Assessment  Reason for Visit: Follow-up protocol, Consult, EN, PN    Patient Name: Roger Bhat  YOB: 1960  MRN: [id number]  Date of Encounter: 02/08/24 10:01 EST  Admission date: 1/17/2024    Pt advanced to clear liquid diet and plan to initiate trophic EN to supplement nutrition during transition to PO. Will slightly decrease PN with plans to further wean as PO/EN tolerance established.     Plan for today:    Initiate trophic EN:  Initiate EN regimen of Peptamen 1.5 @ 10 ml/hr, water 10 ml/hr.    =200 ml, 300 kcal, 14 g protein, 154 ml water + 200 ml flushes = 354 ml total water (16% energy and 16% protein needs)    Adjust PN to:  Clinimix E-D15% AA5% @ 50 ml/hr, 100 ml lipids daily          PN@ Goal over 24hr to Supply:  Volume 1200 mL/day       Energy 1052 Kcal/day 58 % Est Need   Protein 60 g/day 69 % Est Need   GIR=2.2mg/kg/min    Encouraged clear liquid diet/ensure clear as tolerated.     EN + PN = 1352 kcal, 74 g protein (75% energy 85% pro needs)    +2 ensure clear/day = 1832 kcal, 90 g protein 101% energy 103% pro needs)    Nutrition Assessment   Admission Diagnosis:  Colonic mass [K63.89]    Problem List:    Colonic mass    Current smoker    Gastroesophageal reflux disease without esophagitis    Severe malnutrition    Respiratory distress    Centrilobular emphysema      PMH:   He  has a past medical history of Allergies, Cellulitis of hand, Clotting disorder, Colonic mass, COPD (chronic obstructive pulmonary disease), Cough, Current smoker, Erectile dysfunction, Fracture, foot (Sept 7 2022), GERD (gastroesophageal reflux disease), Hemochromatosis, Hyperlipidemia, Hypertension, and Wears dentures.    PSH:  He  has a past surgical history that includes Esophagogastroduodenoscopy (05/2016); Colonoscopy (2021); Esophagogastroduodenoscopy (N/A, 10/19/2021); Esophagogastroduodenoscopy (N/A, 11/16/2021); Hand surgery (2022); Cholecystectomy  (N/A, 1/17/2024); Colectomy (N/A, 1/17/2024); and Exploratory Laparotomy (N/A, 1/30/2024).    Applicable Nutrition Concerns:   Skin:  Oral:  GI: s/p lap vladimir, open sigmoid colectomy with diverting loop ileostomy     Applicable Interval History:     (1/17) open sigmoid colectomy with diverting loop ileostomy     (1/26) CT A/P - Impression:  1.Imaging features most consistent with small bowel adynamic ileus. No focal transition point identified to suggest mechanical obstruction.  2.Postoperative changes as detailed above. Minimal free fluid surrounding right lower quadrant surgical drain. No abscess identified.  3.Other incidental findings as detailed above.    (1/30) Ex lap with ALBERTO    Reported/Observed/Food/Nutrition Related History:     2/8) NG clamped with checking residuals X 48 hr, 350 ml output yesterday. Ileostomy output continues to slowly increase. Pt advanced to clear liquid diet and plan to initiate trophic EN to supplement nutrition during transition to PO. Will slightly decrease PN with plans to further wean as PO/EN tolerance established. Visited with pt at bedside, up in chair in excellent spirits. Tolerating clears thus far, loves supplement and willing to sip t/o day. Denies further dietary needs on CLD at this time. Understands plan for EN, encouraged to listen to body to gauge tolerance. Still pending transfer to floor. Continuing to monitor slightly low Na/volume status, suspect will improve with lowered PN volume. Several new wts obtained illustrating weight gain with nutrition support, needs reassessed.     2/7) Pt remains NPO, is allowed sips for comfort. Ileostomy output increasing, NG decreasing but still bilous. Plan for clamping trials today. Respiratory status improving and pt to move to the floor. Visited with pt and supportive person at bedside. Pt sitting up in chair in good spirits. Understands care plan but hopeful to be able to eat in near future. Na remains slightly low but  unchanged.     2/6) Continues with TPN, NPO awaiting ileus resolution. No significant changes with plans for continuing TPN, sips for comfort per surgeon note. Slightly more ileostomy output, continues with NG output but decreasing. Sodium slightly down with volume increase PN to better meet needs yesterday, will continue to monitor and adjust if continues to drop. Negative fluid balance yesterday, wt back down closer to suspect ABW.     (See several past notes for full hx)    Labs    Labs Reviewed: Yes     Results from last 7 days   Lab Units 02/08/24  0302 02/07/24  0332 02/06/24  0354 02/05/24  0509   GLUCOSE mg/dL 105* 109* 123* 116*   BUN mg/dL 18 20 22 21   CREATININE mg/dL 0.46* 0.46* 0.36* 0.39*   SODIUM mmol/L 134* 135* 135* 137   CHLORIDE mmol/L 101 101 99 101   POTASSIUM mmol/L 4.0 4.3 3.9 4.1   PHOSPHORUS mg/dL 2.7 2.8 2.9 2.6   MAGNESIUM mg/dL 2.3 2.1 2.7* 2.4   ALT (SGPT) U/L  --   --   --  36       Results from last 7 days   Lab Units 02/05/24  0509   ALBUMIN g/dL 2.4*   PREALBUMIN mg/dL 10.5*   CRP mg/dL 9.46*   TRIGLYCERIDES mg/dL 137       Results from last 7 days   Lab Units 02/08/24  0459 02/07/24  2335 02/07/24  1727 02/07/24  1111 02/07/24  0542 02/06/24  2330   GLUCOSE mg/dL 117 109 108 125 115 116     Lab Results   Lab Value Date/Time    HGBA1C 5.00 01/10/2024 1508    HGBA1C 5.3 11/28/2022 1111    HGBA1C 5.20 08/17/2021 1149               Medications    Medications Reviewed: Yes  Pertinent: Dulcolax, Wellbutrin, folic acid, insulin, reglan, abx, protonix, senokot, thiamine, lipids  Infusion: TPN  PRN:     Intake/Ouptut 24 hrs (0701 - 0700)   I&O's Reviewed: Yes   Intake & Output (last day)         02/07 0701  02/08 0700 02/08 0701  02/09 0700    P.O. 472     I.V. (mL/kg) 425.9 (7.1) 28.8 (0.5)    NG/GT 85     IV Piggyback 124     TPN 1507.7 171.5    Total Intake(mL/kg) 2614.6 (43.7) 200.3 (3.3)    Urine (mL/kg/hr) 2400 (1.7) 350 (1.9)    Emesis/NG output 350     Drains 5 0    Stool 500 140     "Total Output 5813 490    Net -640.4 -289.7                Anthropometrics     Height: Height: 175.3 cm (69\")  Last Filed Weight: Weight: 59.9 kg (132 lb 0.9 oz) (02/08/24 0500)  Method: Weight Method: Bed scale  BMI: BMI (Calculated): 19.5  BMI classification: Normal: 18.5-24.9kg/m2  IBW:   155 lb    UBW:     Weight       Weight (kg) Weight (lbs) Weight Method Visit Report   11/30/2022 68 kg  149 lb 14.6 oz   --    1/25/2023 68 kg  149 lb 14.6 oz   --    4/26/2023 64.683 kg  142 lb 9.6 oz   --    10/26/2023 65.137 kg  143 lb 9.6 oz   --    12/15/2023 64.683 kg  142 lb 9.6 oz   --    1/8/2024 63.504 kg  140 lb      1/10/2024 66.25 kg  146 lb 0.9 oz  Standing scale     1/17/2024 66.25 kg  146 lb 0.9 oz      1/24/2024 61.236 kg  135 lb  Bed scale     1/26/2024 54.613 kg  120 lb 6.4 oz  Standing scale       Weight change: weight loss of 26 lbs (17.8%) over the past 2 week(s)    Intentional?  No    Nutrition Focused Physical Exam     Date:  1/24       Patient meets criteria for malnutrition diagnosis, see MSA note.     Needs Assessment   Date: 1/26, reviewed 1/30, reassessed 1/31, 2/8    Height used:Height: 175.3 cm (69\")  Weights used: 58 kg / 129 lb (ABW)    Estimated Calorie needs: ~1800 Kcal/day  Method:  Kcals/KG 25-30 ABW = 6056-3587  Method:  MSJ 1374 X 1.3 = 1786    Estimated Protein needs: ~87 g PRO/day  Method: 1.3-1.5 g/Kg = 75-87    Estimated Fluid needs: ~ ml/day   Per clinical status    Current Nutrition Prescription   PO: Adult Central CLINIMIX-E TPN  Diet: Liquid Diets; Clear Liquid; Fluid Consistency: Thin (IDDSI 0)  Oral Nutrition Supplement:   Intake: N/A NPO since 1/30    PN Route: PICC   PN:  Clinimix E-D15% AA5% @ 70 ml/hr         GIR: 3.2 mg/kg/min  Lipid: 250 ml 20% SMOF daily     PN @ Goal over 24hr to Supply:  Volume 1680 mL/day       Energy 1693 Kcal/day *94 % Est Need   Protein 84 g/day *97 % Est Need    *(previously meeting needs prior to needs reassessment " 2/8)  ---------------------------------------------------------------------------  Formula/Rate verified at bedside: Yes  Infusing Rate at time of visit: 70 ml/hr    Average Delivery from Charting: 3 Days:   PN Volume 1538 mL/day     Lipid delivered?  243      Energy 1578 Kcal/day 88 % Est Need   Protein 77 g/day 88 % Est Need     PN hx:   (1/26-1/29) PPN: D5%, AA4.25% @ 50 ml/hr, 100 ml lipids/daily  (1/30-1/31) TPN D15% AA5% @ 60 ml/hr, 100 ml lipids/daily  (1/31-2/5) TPN D15.2%, AA6.25% @ 60 ml/hr, 250 ml lipids/daily  (2/5-2/8) TPN D15% AA5% @ 70 ml/hr, 250 ml lipids/daily     Nutrition Diagnosis   Date:  1/24            Updated:    Problem Malnutrition  severe/acute   Etiology Energy needs>energy intake 2/2 recent colectomy with ileostomy placement with slow to return bowel function followed by intolerance diet.    Signs/Symptoms PO intakes <50% EEN >/=5 days and loss 17.8% body weight with past 2 weeks, moderate muscle wasting, and moderate subcutaneous fat loss.    Status: Ongoing / worsened with updated standing scale weight, receiving TPN    Date:   1/24    Updated:     Problem Food and nutrition knowledge deficit   Etiology S/p colectomy with new ileostomy complicated by high ileostomy output   Signs/Symptoms Pt with prior lack of knowledge appropriate MNT/diet for condition   Status: Improvement Shown Education provided    Goal:   General: Nutrition support treatment, Nutrition to support treatment  PO: Advace diet as medically feasible/appropriate  EN/PN: Initiate EN, Establish EN tolerance, Adjust PN, Deliver estimated needs, PN to EN, PN to PO     Nutrition Intervention      Follow treatment progress, Care plan reviewed, Nutrition support order placed, Education providedfor nutrition support    Plan for today  Initiate trophic EN  Slightly decrease PN  Encouraged PO, continue ONS all meals pt to sip on t/o day    Plan for next ~48 hr:  Diet/EN advancement per surgeon guidance and as tolerated  Wean PN  pending changes to PO/EN    Plan for prior to d/c:  Wean nutrition support as tolerance PO intakes established  F/u for review of nutrition education for new ileostomy, malnutrition    Monitoring/Evaluation:   Per protocol, I&O, PO intake, Supplement intake, Pertinent labs, EN delivery/tolerance, PN delivery/tolerance, Weight, GI status, Symptoms, POC/GOC      Stacie Maldonado RD, CNSC  Time Spent: 40m

## 2024-02-08 NOTE — THERAPY TREATMENT NOTE
"Patient Name: Roger Bhat  : 1960    MRN: [id number]                              Today's Date: 2024       Admit Date: 2024    Visit Dx:     ICD-10-CM ICD-9-CM   1. Colonic mass  K63.89 569.89   2. Gallbladder mass  K82.8 575.8   3. Bowel obstruction  K56.609 560.9     Patient Active Problem List   Diagnosis    Current smoker    Cellulitis of right hand    Gastroesophageal reflux disease without esophagitis    Screening, lipid    Dysphagia    Hereditary hemochromatosis    Gallbladder mass    Colonic mass    Severe malnutrition    Respiratory distress    Centrilobular emphysema     Past Medical History:   Diagnosis Date    Allergies     Cellulitis of hand     Clotting disorder     \"FREE BLEEDING\"    Colonic mass     COPD (chronic obstructive pulmonary disease)     Cough     Current smoker     Erectile dysfunction     Fracture, foot Sept 2022    GERD (gastroesophageal reflux disease)     Hemochromatosis     Hyperlipidemia     Hypertension     Wears dentures     FULL SET     Past Surgical History:   Procedure Laterality Date    CHOLECYSTECTOMY N/A 2024    Procedure: CHOLECYSTECTOMY LAPAROSCOPIC;  Surgeon: Jimmy Craig MD;  Location: [facility] OR;  Service: General;  Laterality: N/A;    COLON RESECTION N/A 2024    Procedure: OPEN SIGMOID COLECTOMY, LIVER BIOPSY, DIVERTING LOOP ILEOSTOMY CREATION, TAKE DOWN OF THE SPLENIC FLEXURE, AND APPENDECTOMY;  Surgeon: Jimmy Craig MD;  Location: [facility] OR;  Service: General;  Laterality: N/A;    COLONOSCOPY      ENDOSCOPY  2016    pt say's, he was placed under general anesthesia for this    ENDOSCOPY N/A 10/19/2021    Procedure: ESOPHAGOGASTRODUODENOSCOPY;  Surgeon: Osmel Kessler MD;  Location:  [facility];  Service: Gastroenterology;  Laterality: N/A;  24 fr savory dilator used at 1251, 27 fr sdavory used at 1253, 33 Qatari savoruy used at 1257, 42 fr savory used at 1259      ENDOSCOPY N/A 2021    Procedure: " ESOPHAGOGASTRODUODENOSCOPY WITH DILATATION;  Surgeon: Osmel Kessler MD;  Location: [facility];  Service: Gastroenterology;  Laterality: N/A;  Dilation with 48fr savery    EXPLORATORY LAPAROTOMY N/A 1/30/2024    Procedure: LAPAROTOMY EXPLORATORY;  Surgeon: Jimmy Craig MD;  Location: [facility] OR;  Service: General;  Laterality: N/A;    HAND SURGERY  2022      General Information       Row Name 02/08/24 0921          Physical Therapy Time and Intention    Document Type therapy note (daily note)  -ES     Mode of Treatment physical therapy  -ES       Row Name 02/08/24 0921          General Information    Patient Profile Reviewed yes  -ES     Existing Precautions/Restrictions fall;oxygen therapy device and L/min;other (see comments)  NG; ostomy; abdominal incision; DELROY  -ES     Barriers to Rehab medically complex  -ES       Row Name 02/08/24 0921          Cognition    Orientation Status (Cognition) oriented x 3  -ES       Row Name 02/08/24 0921          Safety Issues, Functional Mobility    Safety Issues Affecting Function (Mobility) safety precaution awareness;safety precautions follow-through/compliance  -ES     Impairments Affecting Function (Mobility) endurance/activity tolerance;pain;strength;balance  -ES               User Key  (r) = Recorded By, (t) = Taken By, (c) = Cosigned By      Initials Name Provider Type    ES Holly Hylton PT Physical Therapist                   Mobility       Row Name 02/08/24 0922          Bed Mobility    Bed Mobility supine-sit  -ES     Supine-Sit [place] (Bed Mobility) contact guard;verbal cues  -ES     Assistive Device (Bed Mobility) bed rails;head of bed elevated  -ES       Row Name 02/08/24 0922          Sit-Stand Transfer    Sit-Stand [place] (Transfers) contact guard  -ES     Assistive Device (Sit-Stand Transfers) walker, front-wheeled  -ES     Comment, (Sit-Stand Transfer) v/c for hand placement  -ES       Row Name 02/08/24 0922          Gait/Stairs  (Locomotion)    [place] Level (Gait) contact guard  -ES     Assistive Device (Gait) walker, front-wheeled  -ES     Distance in Feet (Gait) 75+75  -ES     Deviations/Abnormal Patterns (Gait) bilateral deviations;gait speed decreased;stride length decreased;base of support, narrow  -ES     Bilateral Gait Deviations forward flexed posture;heel strike decreased  -ES     Comment, (Gait/Stairs) Pt ambulated with CGA and FWW. Demo'd forward flexed posture with decreased stride length and step-through gait pattern. Pt able to improve posture with cueing. Further mobility limited by fatigue.  -ES               User Key  (r) = Recorded By, (t) = Taken By, (c) = Cosigned By      Initials Name Provider Type    ES Holly Hylton PT Physical Therapist                   Obj/Interventions       Row Name 02/08/24 0923          Balance    Balance Assessment sitting static balance;sitting dynamic balance;sit to stand dynamic balance;standing static balance;standing dynamic balance  -ES     Static Sitting Balance supervision  -ES     Dynamic Sitting Balance supervision  -ES     Position, Sitting Balance unsupported;sitting in chair;sitting edge of bed  -ES     Sit to Stand Dynamic Balance contact guard;verbal cues  -ES     Static Standing Balance contact guard  -ES     Dynamic Standing Balance contact guard;verbal cues  -ES     Position/Device Used, Standing Balance supported;walker, front-wheeled  -ES     Balance Interventions sitting;standing;sit to stand;supported;static;dynamic;occupation based/functional task  -ES               User Key  (r) = Recorded By, (t) = Taken By, (c) = Cosigned By      Initials Name Provider Type    ES Holly Hylton PT Physical Therapist                   Goals/Plan    No documentation.                  Clinical Impression       Row Name 02/08/24 0924          Pain    Pain Intervention(s) Repositioned;Ambulation/increased activity  -ES     Additional Documentation Pain Scale: FACES  Pre/Post-Treatment (Group)  -ES       Row Name 02/08/24 0924          Pain Scale: FACES Pre/Post-Treatment    Pain: FACES Scale, Pretreatment 2-->hurts little bit  -ES     Posttreatment Pain Rating 2-->hurts little bit  -ES     Pain Location incisional  -ES     Pain Location - abdomen  -ES     Pre/Posttreatment Pain Comment tolerated  -ES       Row Name 02/08/24 0924          Plan of Care Review    Plan of Care Reviewed With patient  -ES     Progress improving  -ES     Outcome Evaluation Pt continues to demonstrate good effort with therapy although is limited by generalized weakness and incisional pain. Will continue to progress as able to promote return to PLOF. PT rec IRF at d/c.  -ES       Row Name 02/08/24 0924          Therapy Assessment/Plan (PT)    Rehab Potential (PT) good, to achieve stated therapy goals  -ES     Criteria for Skilled Interventions Met (PT) yes;meets criteria;skilled treatment is necessary  -ES     Therapy Frequency (PT) daily  -ES       Row Name 02/08/24 0924          Vital Signs    Pre Systolic BP Rehab 146  -ES     Pre Treatment Diastolic BP 90  -ES     Post Systolic BP Rehab 144  -ES     Post Treatment Diastolic BP 94  -ES     Pretreatment Heart Rate (beats/min) 80  -ES     Posttreatment Heart Rate (beats/min) 107  -ES     Pre SpO2 (%) 94  -ES     O2 Delivery Pre Treatment nasal cannula  -ES     O2 Delivery Intra Treatment nasal cannula  -ES     Post SpO2 (%) 92  -ES     O2 Delivery Post Treatment nasal cannula  -ES     Pre Patient Position Supine  -ES     Intra Patient Position Standing  -ES     Post Patient Position Sitting  -ES       Row Name 02/08/24 0924          Positioning and Restraints    Pre-Treatment Position in bed  -ES     Post Treatment Position chair  -ES     In Chair notified nsg;reclined;sitting;call light within reach;encouraged to call for assist;exit alarm on;waffle cushion;legs elevated  -ES               User Key  (r) = Recorded By, (t) = Taken By, (c) = Cosigned By       Initials Name Provider Type    Holly Coker, EVELIO Physical Therapist                   Outcome Measures       Row Name 02/08/24 0925 02/08/24 0800       How much help from another person do you currently need...    Turning from your back to your side while in flat bed without using bedrails? 4  -ES 4  -CD    Moving from lying on back to sitting on the side of a flat bed without bedrails? 3  -ES 4  -CD    Moving to and from a bed to a chair (including a wheelchair)? 3  -ES 3  -CD    Standing up from a chair using your arms (e.g., wheelchair, bedside chair)? 3  -ES 3  -CD    Climbing 3-5 steps with a railing? 3  -ES 2  -CD    To walk in hospital room? 3  -ES 3  -CD    AM-PAC 6 Clicks Score (PT) 19  -ES 19  -CD    Highest Level of Mobility Goal 6 --> Walk 10 steps or more  -ES 6 --> Walk 10 steps or more  -CD      Row Name 02/08/24 0925          Functional Assessment    Outcome Measure Options AM-PAC 6 Clicks Basic Mobility (PT)  -ES               User Key  (r) = Recorded By, (t) = Taken By, (c) = Cosigned By      Initials Name Provider Type    Holly Coker, EVELIO Physical Therapist    Braden Damon, RN Registered Nurse                                 Physical Therapy Education       Title: PT OT SLP Therapies (In Progress)       Topic: Physical Therapy (In Progress)       Point: Mobility training (In Progress)       Learning Progress Summary             Patient Acceptance, E,TB, NR by AY at 2/5/2024 1134    Acceptance, E, VU,DU by SD at 2/4/2024 1628    Acceptance, E,TB, VU by ES at 2/2/2024 1441   Significant Other Acceptance, E,TB, VU by ES at 2/2/2024 1441                         Point: Home exercise program (Done)       Learning Progress Summary             Patient Acceptance, E, VU,DU by SD at 2/4/2024 1628                         Point: Body mechanics (In Progress)       Learning Progress Summary             Patient Acceptance, E,TB, NR by AY at 2/5/2024 1134    Acceptance, E, VU,DU by SD at  2/4/2024 1628    Acceptance, E,TB, VU by ES at 2/2/2024 1441   Significant Other Acceptance, E,TB, VU by ES at 2/2/2024 1441                         Point: Precautions (In Progress)       Learning Progress Summary             Patient Acceptance, E,TB, NR by AY at 2/5/2024 1134    Acceptance, E, VU,DU by SD at 2/4/2024 1628    Acceptance, E,TB, VU by ES at 2/2/2024 1441   Significant Other Acceptance, E,TB, VU by ES at 2/2/2024 1441                                         User Key       Initials Effective Dates Name Provider Type Discipline    SD 03/13/23 -  Mayda Gabriel, PT Physical Therapist PT    AY 11/10/20 -  Aaliyah Rios, PT Physical Therapist PT    ES 08/11/22 -  Holly Hylton, PT Physical Therapist PT                  PT Recommendation and Plan  Planned Therapy Interventions (PT): balance training, bed mobility training, gait training, neuromuscular re-education, patient/family education, strengthening, stair training, transfer training, postural re-education  Plan of Care Reviewed With: patient  Progress: improving  Outcome Evaluation: Pt continues to demonstrate good effort with therapy although is limited by generalized weakness and incisional pain. Will continue to progress as able to promote return to PLOF. PT rec IRF at d/c.     Time Calculation:   PT Evaluation Complexity  History, PT Evaluation Complexity: 1-2 personal factors and/or comorbidities  Examination of Body Systems (PT Eval Complexity): total of 3 or more elements  Clinical Presentation (PT Evaluation Complexity): evolving  Clinical Decision Making (PT Evaluation Complexity): moderate complexity  Overall Complexity (PT Evaluation Complexity): moderate complexity     PT Charges       Row Name 02/08/24 0925             Time Calculation    Start Time 0835  -ES      PT Received On 02/08/24  -ES      PT Goal Re-Cert Due Date 02/12/24  -ES         Time Calculation- PT    Total Timed Code Minutes- PT 28 minute(s)  -ES         Timed  Charges    68735 - Gait Training Minutes  15  -ES      28487 - PT Therapeutic Activity Minutes 13  -ES         Total Minutes    Timed Charges Total Minutes 28  -ES       Total Minutes 28  -ES                User Key  (r) = Recorded By, (t) = Taken By, (c) = Cosigned By      Initials Name Provider Type    Holly Coker PT Physical Therapist                  Therapy Charges for Today       Code Description Service Date Service Provider Modifiers Qty    [id number] HC GAIT TRAINING EA 15 MIN 2/8/2024 Holly Hylton, PT GP 1    [id number]  PT THERAPEUTIC ACT EA 15 MIN 2/8/2024 Holly Hylton, PT GP 1            PT G-Codes  Outcome Measure Options: AM-PAC 6 Clicks Basic Mobility (PT)  AM-PAC 6 Clicks Score (PT): 19  AM-PAC 6 Clicks Score (OT): 14  PT Discharge Summary  Anticipated Discharge Disposition (PT): inpatient rehabilitation facility    Holly Hylton PT  2/8/2024

## 2024-02-08 NOTE — PROGRESS NOTES
"Patient Name:  Roger Bhat  YOB: 1960  [id number]    Surgery Progress Note    Date of visit: 2/8/2024      Subjective: No acute events.  Denies any nausea or vomiting.  NG tube has been clamped for 2 days.  Residuals yesterday were always less than 50 mL.  Ileostomy output 325 mL.  Total NG tube output for the day 300 mL.  Urine output over 3 L.          Objective:     /99   Pulse 94   Temp 98.1 °F (36.7 °C) (Axillary)   Resp 16   Ht 175.3 cm (69\")   Wt 59.9 kg (132 lb 0.9 oz)   SpO2 94%   BMI 19.50 kg/m²     Intake/Output Summary (Last 24 hours) at 2/8/2024 0659  Last data filed at 2/8/2024 0500  Gross per 24 hour   Intake 2444.64 ml   Output 3030 ml   Net -585.36 ml     GEN:   Awake, alert, sitting up in bed, chronically ill-appearing in no obvious distress  CV:      Regular rate and rhythm  L:         Symmetric expansion, on oxygen by nasal cannula  Abd:    Soft, moderately distended, appropriately tender palpation throughout the abdomen, midline incision with some reactive erythema surrounding the site and serous drainage, DELROY drain in the right lower quadrant with seros output, ileostomy on the right side pink and patent with liquid stool in the bag  Ext:     No cyanosis, clubbing, or edema    Recent labs that are back at this time have been reviewed.           Assessment/ Plan:    Mr. Bhat is a 63-year-old gentleman who is admitted following operative intervention for gallbladder and sigmoid colon mass on January 17     #Sigmoid colon mass, gallbladder mass  # Ileus  -Postop day 22 following lap vladimir, open sigmoid colectomy with diverting loop ileostomy, postoperative day 9 following exploratory laparotomy with washout  -Labs without new findings  -Ileostomy output 325 mL  -Has tolerated NG clamped for 2 days with low residuals.  Start clear liquids today as well as trophic tube feeds through the NG at 10 mL/h  -Continue TPN   -Continue antibiotics empirically  -Mobilize. "  Has floor orders      Jimmy Craig MD  2/8/2024  06:59 EST

## 2024-02-08 NOTE — PROGRESS NOTES
Pharmacy Parenteral Nutrition Evaluation    Roger Bhat is a  63 y.o. male receiving TPN.     Indication: Prolonged paralytic illus  Consulting Physician: Intensivist     Total Fluid Rate (if stated): -    Labs  Results from last 7 days   Lab Units 02/08/24 0302 02/07/24 0332 02/06/24 0354 02/05/24  0509   SODIUM mmol/L 134* 135* 135* 137   POTASSIUM mmol/L 4.0 4.3 3.9 4.1   CHLORIDE mmol/L 101 101 99 101   CO2 mmol/L 23.0 25.0 26.0 28.0   BUN mg/dL 18 20 22 21   CREATININE mg/dL 0.46* 0.46* 0.36* 0.39*   CALCIUM mg/dL 8.9 8.9 8.4* 9.1   BILIRUBIN mg/dL  --   --   --  0.2   ALK PHOS U/L  --   --   --  181*   ALT (SGPT) U/L  --   --   --  36   AST (SGOT) U/L  --   --   --  25   GLUCOSE mg/dL 105* 109* 123* 116*       Results from last 7 days   Lab Units 02/08/24 0302 02/07/24 0332 02/06/24 0354 02/05/24  0509   MAGNESIUM mg/dL 2.3 2.1 2.7* 2.4   PHOSPHORUS mg/dL 2.7 2.8 2.9 2.6   PREALBUMIN mg/dL  --   --   --  10.5*       Results from last 7 days   Lab Units 02/08/24 0302 02/07/24 0332 02/06/24  0354   WBC 10*3/mm3 10.58 9.40 8.94   HEMOGLOBIN g/dL 10.8* 10.6* 10.4*   HEMATOCRIT % 31.2* 31.3* 30.6*   PLATELETS 10*3/mm3 490* 481* 441       Triglycerides   Date Value Ref Range Status   02/05/2024 137 0 - 150 mg/dL Final     Comment:     Falsely depressed results may occur on samples drawn from patients receiving N-Acetylcysteine (NAC) or Metamizole.       estimated creatinine clearance is 142.9 mL/min (A) (by C-G formula based on SCr of 0.46 mg/dL (L)).    Intake & Output (last 3 days)         02/02 0701 02/03 0700 02/03 0701  02/04 0700 02/04 0701 02/05 0700 02/05 0701 02/06 0700    P.O.  300 295     I.V. (mL/kg) 897.5 (15) 295.4 (4.9) 240.8 (4)     NG/ 110 160     IV Piggyback 115 350      TPN 2585 230 1641.5     Total Intake(mL/kg) 3757.5 (62.8) 1285.4 (21.5) 2337.3 (39.1)     Urine (mL/kg/hr) 1775 (1.2) 2300 (1.6) 1475 (1)     Emesis/NG output 650 725 975     Drains 5 5 0 0    Stool 100  45 225 0    Total Output 2530 3075 2675 0    Net +1227.5 -1789.6 -337.7 0            Stool Unmeasured Occurrence    1 x            Dietitian Recommendations  Diet Orders (active) (From admission, onward)       Start     Ordered    02/03/24 0942  NPO Diet NPO Type: Sips with Meds, Ice Chips  Diet Effective Now        Comments: Ok for sips of clears    02/03/24 0941    01/28/24 1200  Dietary Nutrition Supplements Other (see comment); Ensure Clear Apple  Daily With Breakfast, Lunch & Dinner       01/28/24 0857                    Current TPN Regimen Recommendation:  Dextrose 15% / Amino Acid 5% at goal rate of 50 mL/hr.  20% SMOF Lipid Emulsion 100 mL every 24 hours. Decreasing rate today with hope to begin tube foods and clear liquids today    Na 35 mEq/L  K 30 mEq/L  Ca 4.5 mEq/L  Mg 5 mEq/L  PO4 15 mmol/L       Assessment/Plan:  TPN for prolonged paralytic illus.  Clinical Nutrition to manage macronutrients and Pharmacy to manage micronutrients as above. Rate decreased today with hope to being tube feed and clear liquids today  Insulin sliding scale, potassium and phosphate replacement protocols available.   Pharmacy will continue to follow and adjust TPN as clinically indicated.     Thank you,   Monica Diaz, PharmD, BCPS  2/8/2024  12:32 EST

## 2024-02-08 NOTE — NURSING NOTE
[facility] visit for Stoma care and assessment    Surgery date: 01/17/24  Surgical Procedures Since Admission:  Procedure(s):  CHOLECYSTECTOMY LAPAROSCOPIC  OPEN SIGMOID COLECTOMY, LIVER BIOPSY, DIVERTING LOOP ILEOSTOMY CREATION, TAKE DOWN OF THE SPLENIC FLEXURE, AND APPENDECTOMY  Surgeon:  Jimmy Craig MD  Status:  22 Days Post-Op  -------------------    Procedure(s):  LAPAROTOMY EXPLORATORY  Surgeon:  Jimmy Craig MD  Status:  9 Days Post-Op  -------------------     Patient seen in chair, has been weaned off of oxygen.  Has been having more output and gas confirmed today.  Intensive Care Unit (ICU). patient's spouse at beside.     Stoma Type: Loop Ileostomy  Diameter/shape: [id number]  Location: RLQ  Protrusion: Budded  Stoma Characteristics: Edematous, Moist, and Pink  Peristomal skin: Intact, slight erythema no breakdown  Character of output:scant amounts in bag, not enough to measure  Current pouching system: 2 and three-quarter flat with paste applied by nursing staff, apparently it had just started leaking  Accessory products, appliance placed: Garner 2 piece 2 and three-quarter flat with barrier ring placed to high output bag and gravity drainage bag  Goal wear time:5-7 days  Last appliance change: 2/8 by me    Surgical Incision: Midline abdominal incision  Degree of approximation: 100  Approximating Devices: staples  Drainage Characteristics:none  Method of Management: open    Drain(s) type: DELROY drain  Effluent characteristics: Not assessed today    Education provided:   Ambulation promoted  Education not indicated today patient is in ICU.  Will need to provide another folder before discharge, will need to provide more discharge supplies before discharge.  Planning to return Monday another appliance change    [facility] Nurse will continue to follow for ostomy education. Please contact #[phone] with questions or if ostomy leaks occur.

## 2024-02-09 LAB
ANION GAP SERPL CALCULATED.3IONS-SCNC: 10 MMOL/L (ref 5–15)
BASOPHILS # BLD AUTO: 0.08 10*3/MM3 (ref 0–0.2)
BASOPHILS NFR BLD AUTO: 0.7 % (ref 0–1.5)
BUN SERPL-MCNC: 18 MG/DL (ref 8–23)
BUN/CREAT SERPL: 36 (ref 7–25)
CALCIUM SPEC-SCNC: 8.9 MG/DL (ref 8.6–10.5)
CHLORIDE SERPL-SCNC: 101 MMOL/L (ref 98–107)
CO2 SERPL-SCNC: 23 MMOL/L (ref 22–29)
CREAT SERPL-MCNC: 0.5 MG/DL (ref 0.76–1.27)
DEPRECATED RDW RBC AUTO: 54.7 FL (ref 37–54)
EGFRCR SERPLBLD CKD-EPI 2021: 116 ML/MIN/1.73
EOSINOPHIL # BLD AUTO: 0.52 10*3/MM3 (ref 0–0.4)
EOSINOPHIL NFR BLD AUTO: 4.7 % (ref 0.3–6.2)
ERYTHROCYTE [DISTWIDTH] IN BLOOD BY AUTOMATED COUNT: 14.6 % (ref 12.3–15.4)
GLUCOSE BLDC GLUCOMTR-MCNC: 107 MG/DL (ref 70–130)
GLUCOSE BLDC GLUCOMTR-MCNC: 113 MG/DL (ref 70–130)
GLUCOSE BLDC GLUCOMTR-MCNC: 114 MG/DL (ref 70–130)
GLUCOSE BLDC GLUCOMTR-MCNC: 116 MG/DL (ref 70–130)
GLUCOSE BLDC GLUCOMTR-MCNC: 119 MG/DL (ref 70–130)
GLUCOSE SERPL-MCNC: 95 MG/DL (ref 65–99)
HCT VFR BLD AUTO: 32.4 % (ref 37.5–51)
HGB BLD-MCNC: 11.2 G/DL (ref 13–17.7)
IMM GRANULOCYTES # BLD AUTO: 0.12 10*3/MM3 (ref 0–0.05)
IMM GRANULOCYTES NFR BLD AUTO: 1.1 % (ref 0–0.5)
LYMPHOCYTES # BLD AUTO: 1.98 10*3/MM3 (ref 0.7–3.1)
LYMPHOCYTES NFR BLD AUTO: 17.9 % (ref 19.6–45.3)
MAGNESIUM SERPL-MCNC: 2.1 MG/DL (ref 1.6–2.4)
MCH RBC QN AUTO: 34.8 PG (ref 26.6–33)
MCHC RBC AUTO-ENTMCNC: 34.6 G/DL (ref 31.5–35.7)
MCV RBC AUTO: 100.6 FL (ref 79–97)
MONOCYTES # BLD AUTO: 0.62 10*3/MM3 (ref 0.1–0.9)
MONOCYTES NFR BLD AUTO: 5.6 % (ref 5–12)
NEUTROPHILS NFR BLD AUTO: 7.74 10*3/MM3 (ref 1.7–7)
NEUTROPHILS NFR BLD AUTO: 70 % (ref 42.7–76)
NRBC BLD AUTO-RTO: 0 /100 WBC (ref 0–0.2)
PHOSPHATE SERPL-MCNC: 3.2 MG/DL (ref 2.5–4.5)
PLATELET # BLD AUTO: 510 10*3/MM3 (ref 140–450)
PMV BLD AUTO: 10 FL (ref 6–12)
POTASSIUM SERPL-SCNC: 4.6 MMOL/L (ref 3.5–5.2)
RBC # BLD AUTO: 3.22 10*6/MM3 (ref 4.14–5.8)
SODIUM SERPL-SCNC: 134 MMOL/L (ref 136–145)
WBC NRBC COR # BLD AUTO: 11.06 10*3/MM3 (ref 3.4–10.8)

## 2024-02-09 PROCEDURE — 25010000002 MEROPENEM PER 100 MG: Performed by: SURGERY

## 2024-02-09 PROCEDURE — 84100 ASSAY OF PHOSPHORUS: CPT | Performed by: INTERNAL MEDICINE

## 2024-02-09 PROCEDURE — 83735 ASSAY OF MAGNESIUM: CPT | Performed by: INTERNAL MEDICINE

## 2024-02-09 PROCEDURE — 94664 DEMO&/EVAL PT USE INHALER: CPT

## 2024-02-09 PROCEDURE — 94799 UNLISTED PULMONARY SVC/PX: CPT

## 2024-02-09 PROCEDURE — 94761 N-INVAS EAR/PLS OXIMETRY MLT: CPT

## 2024-02-09 PROCEDURE — 82948 REAGENT STRIP/BLOOD GLUCOSE: CPT

## 2024-02-09 PROCEDURE — 25010000002 ONDANSETRON PER 1 MG: Performed by: INTERNAL MEDICINE

## 2024-02-09 PROCEDURE — 25010000002 METOCLOPRAMIDE PER 10 MG: Performed by: SURGERY

## 2024-02-09 PROCEDURE — 80048 BASIC METABOLIC PNL TOTAL CA: CPT | Performed by: INTERNAL MEDICINE

## 2024-02-09 PROCEDURE — 99232 SBSQ HOSP IP/OBS MODERATE 35: CPT | Performed by: INTERNAL MEDICINE

## 2024-02-09 PROCEDURE — 63710000001 REVEFENACIN 175 MCG/3ML SOLUTION: Performed by: INTERNAL MEDICINE

## 2024-02-09 PROCEDURE — 25010000002 HEPARIN (PORCINE) PER 1000 UNITS: Performed by: SURGERY

## 2024-02-09 PROCEDURE — 25010000002 MEROPENEM PER 100 MG: Performed by: INTERNAL MEDICINE

## 2024-02-09 PROCEDURE — 25010000002 HEPARIN (PORCINE) PER 1000 UNITS: Performed by: INTERNAL MEDICINE

## 2024-02-09 PROCEDURE — 25010000002 METHYLNALTREXONE 12 MG/0.6ML SOLUTION: Performed by: SURGERY

## 2024-02-09 PROCEDURE — 25010000002 MICAFUNGIN SODIUM 100 MG RECONSTITUTED SOLUTION 1 EACH VIAL: Performed by: SURGERY

## 2024-02-09 PROCEDURE — 85025 COMPLETE CBC W/AUTO DIFF WBC: CPT | Performed by: INTERNAL MEDICINE

## 2024-02-09 PROCEDURE — 25010000002 METOCLOPRAMIDE PER 10 MG: Performed by: INTERNAL MEDICINE

## 2024-02-09 RX ADMIN — ROSUVASTATIN CALCIUM 10 MG: 10 TABLET, FILM COATED ORAL at 20:28

## 2024-02-09 RX ADMIN — SENNOSIDES 1 TABLET: 8.6 TABLET, FILM COATED ORAL at 20:29

## 2024-02-09 RX ADMIN — Medication 10 ML: at 09:21

## 2024-02-09 RX ADMIN — BUDESONIDE AND FORMOTEROL FUMARATE DIHYDRATE 2 PUFF: 160; 4.5 AEROSOL RESPIRATORY (INHALATION) at 07:53

## 2024-02-09 RX ADMIN — PANTOPRAZOLE SODIUM 40 MG: 40 INJECTION, POWDER, LYOPHILIZED, FOR SOLUTION INTRAVENOUS at 07:40

## 2024-02-09 RX ADMIN — MEROPENEM 2000 MG: 1 INJECTION, POWDER, FOR SOLUTION INTRAVENOUS at 23:13

## 2024-02-09 RX ADMIN — ASCORBIC ACID, VITAMIN A PALMITATE, CHOLECALCIFEROL, THIAMINE HYDROCHLORIDE, RIBOFLAVIN-5 PHOSPHATE SODIUM, PYRIDOXINE HYDROCHLORIDE, NIACINAMIDE, DEXPANTHENOL, ALPHA-TOCOPHEROL ACETATE, VITAMIN K1, FOLIC ACID, BIOTIN, CYANOCOBALAMIN: 200; 3300; 200; 6; 3.6; 6; 40; 15; 10; 150; 600; 60; 5 INJECTION, SOLUTION INTRAVENOUS at 17:03

## 2024-02-09 RX ADMIN — METHYLNALTREXONE BROMIDE 8 MG: 12 INJECTION, SOLUTION SUBCUTANEOUS at 09:20

## 2024-02-09 RX ADMIN — FOLIC ACID 1 MG: 1 TABLET ORAL at 09:20

## 2024-02-09 RX ADMIN — BUPROPION HYDROCHLORIDE 50 MG: 100 TABLET, FILM COATED ORAL at 05:50

## 2024-02-09 RX ADMIN — METOCLOPRAMIDE HYDROCHLORIDE 10 MG: 5 INJECTION INTRAMUSCULAR; INTRAVENOUS at 12:38

## 2024-02-09 RX ADMIN — ONDANSETRON 4 MG: 2 INJECTION INTRAMUSCULAR; INTRAVENOUS at 20:43

## 2024-02-09 RX ADMIN — MEROPENEM 2000 MG: 1 INJECTION, POWDER, FOR SOLUTION INTRAVENOUS at 09:21

## 2024-02-09 RX ADMIN — MEROPENEM 2000 MG: 1 INJECTION, POWDER, FOR SOLUTION INTRAVENOUS at 17:02

## 2024-02-09 RX ADMIN — MEROPENEM 2000 MG: 1 INJECTION, POWDER, FOR SOLUTION INTRAVENOUS at 00:13

## 2024-02-09 RX ADMIN — METOCLOPRAMIDE HYDROCHLORIDE 10 MG: 5 INJECTION INTRAMUSCULAR; INTRAVENOUS at 05:50

## 2024-02-09 RX ADMIN — Medication 10 ML: at 20:30

## 2024-02-09 RX ADMIN — HEPARIN SODIUM 5000 UNITS: 5000 INJECTION INTRAVENOUS; SUBCUTANEOUS at 05:50

## 2024-02-09 RX ADMIN — BISACODYL 10 MG: 5 TABLET, COATED ORAL at 09:20

## 2024-02-09 RX ADMIN — METOCLOPRAMIDE HYDROCHLORIDE 10 MG: 5 INJECTION INTRAMUSCULAR; INTRAVENOUS at 00:13

## 2024-02-09 RX ADMIN — MICAFUNGIN 100 MG: 20 INJECTION, POWDER, LYOPHILIZED, FOR SOLUTION INTRAVENOUS at 12:38

## 2024-02-09 RX ADMIN — HEPARIN SODIUM 5000 UNITS: 5000 INJECTION INTRAVENOUS; SUBCUTANEOUS at 17:10

## 2024-02-09 RX ADMIN — THIAMINE HCL TAB 100 MG 100 MG: 100 TAB at 09:20

## 2024-02-09 RX ADMIN — REVEFENACIN 175 MCG: 175 SOLUTION RESPIRATORY (INHALATION) at 07:53

## 2024-02-09 RX ADMIN — BUPROPION HYDROCHLORIDE 50 MG: 100 TABLET, FILM COATED ORAL at 20:29

## 2024-02-09 RX ADMIN — METOCLOPRAMIDE HYDROCHLORIDE 10 MG: 5 INJECTION INTRAMUSCULAR; INTRAVENOUS at 23:13

## 2024-02-09 NOTE — PLAN OF CARE
Problem: Adult Inpatient Plan of Care  Goal: Patient-Specific Goal (Individualized)  Outcome: Ongoing, Progressing  Goal: Absence of Hospital-Acquired Illness or Injury  Outcome: Ongoing, Progressing  Intervention: Identify and Manage Fall Risk  Recent Flowsheet Documentation  Taken 2/9/2024 1533 by Miguel A Gloria RN  Safety Promotion/Fall Prevention:   assistive device/personal items within reach   activity supervised   clutter free environment maintained   fall prevention program maintained   lighting adjusted   nonskid shoes/slippers when out of bed   room organization consistent   safety round/check completed  Intervention: Prevent Skin Injury  Recent Flowsheet Documentation  Taken 2/9/2024 1533 by Miguel A Gloria RN  Body Position: position changed independently  Skin Protection:   adhesive use limited   incontinence pads utilized   transparent dressing maintained   tubing/devices free from skin contact  Intervention: Prevent and Manage VTE (Venous Thromboembolism) Risk  Recent Flowsheet Documentation  Taken 2/9/2024 1533 by Miguel A Gloria RN  Activity Management: activity encouraged  VTE Prevention/Management: (See MAR) other (see comments)  Range of Motion: active ROM (range of motion) encouraged  Intervention: Prevent Infection  Recent Flowsheet Documentation  Taken 2/9/2024 1533 by Miguel A Gloria RN  Infection Prevention:   environmental surveillance performed   hand hygiene promoted   rest/sleep promoted  Goal: Optimal Comfort and Wellbeing  Outcome: Ongoing, Progressing  Intervention: Provide Person-Centered Care  Recent Flowsheet Documentation  Taken 2/9/2024 1533 by Miguel A Gloria RN  Trust Relationship/Rapport:   care explained   choices provided  Goal: Readiness for Transition of Care  Outcome: Ongoing, Progressing     Problem: Skin Injury Risk Increased  Goal: Skin Health and Integrity  Outcome: Ongoing, Progressing  Intervention: Optimize Skin Protection  Recent Flowsheet  Documentation  Taken 2/9/2024 1533 by Miguel A Gloria RN  Pressure Reduction Techniques: frequent weight shift encouraged  Head of Bed (HOB) Positioning: HOB at 45 degrees  Pressure Reduction Devices: positioning supports utilized  Skin Protection:   adhesive use limited   incontinence pads utilized   transparent dressing maintained   tubing/devices free from skin contact     Problem: COPD (Chronic Obstructive Pulmonary Disease) Comorbidity  Goal: Maintenance of COPD Symptom Control  Outcome: Ongoing, Progressing  Intervention: Maintain COPD-Symptom Control  Recent Flowsheet Documentation  Taken 2/9/2024 1533 by Miguel A Gloria RN  Medication Review/Management: medications reviewed     Problem: Adjustment to Illness (Sepsis/Septic Shock)  Goal: Optimal Coping  Outcome: Ongoing, Progressing  Intervention: Optimize Psychosocial Adjustment to Illness  Recent Flowsheet Documentation  Taken 2/9/2024 1533 by Miguel A Gloria RN  Family/Support System Care:   self-care encouraged   support provided     Problem: Bleeding (Sepsis/Septic Shock)  Goal: Absence of Bleeding  Outcome: Ongoing, Progressing     Problem: Glycemic Control Impaired (Sepsis/Septic Shock)  Goal: Blood Glucose Level Within Desired Range  Outcome: Ongoing, Progressing  Intervention: Optimize Glycemic Control  Recent Flowsheet Documentation  Taken 2/9/2024 1533 by Miguel A Gloria RN  Glycemic Management: blood glucose monitored     Problem: Infection Progression (Sepsis/Septic Shock)  Goal: Absence of Infection Signs and Symptoms  Outcome: Ongoing, Progressing  Intervention: Initiate Sepsis Management  Recent Flowsheet Documentation  Taken 2/9/2024 1533 by Miguel A Gloria RN  Infection Prevention:   environmental surveillance performed   hand hygiene promoted   rest/sleep promoted  Intervention: Promote Recovery  Recent Flowsheet Documentation  Taken 2/9/2024 1533 by Miguel A Gloria RN  Activity Management: activity encouraged     Problem:  Nutrition Impaired (Sepsis/Septic Shock)  Goal: Optimal Nutrition Intake  Outcome: Ongoing, Progressing     Problem: Fall Injury Risk  Goal: Absence of Fall and Fall-Related Injury  Outcome: Ongoing, Progressing  Intervention: Identify and Manage Contributors  Recent Flowsheet Documentation  Taken 2/9/2024 1533 by Miguel A Gloria, RN  Medication Review/Management: medications reviewed  Intervention: Promote Injury-Free Environment  Recent Flowsheet Documentation  Taken 2/9/2024 1533 by Miguel A Gloria, RN  Safety Promotion/Fall Prevention:   assistive device/personal items within reach   activity supervised   clutter free environment maintained   fall prevention program maintained   lighting adjusted   nonskid shoes/slippers when out of bed   room organization consistent   safety round/check completed   Goal Outcome Evaluation:   Plan of care reviewed with: Patient

## 2024-02-09 NOTE — CASE MANAGEMENT/SOCIAL WORK
Continued Stay Note  [facility]     Patient Name: Roger Bhat  MRN: [id number]  Today's Date: 2/9/2024    Admit Date: 1/17/2024    Plan: [facility] acute   Discharge Plan       Row Name 02/09/24 1151       Plan    Plan [facility] acute    Patient/Family in Agreement with Plan yes    Plan Comments Discussed patient in MDR.  Patient continues with TPN, is tolerating clear liquids and tube feeds.  Discharge plan is [facility] when medically ready; April has referral.  CM will continue to follow.                   Discharge Codes    No documentation.                       Celeste Cabello RN

## 2024-02-09 NOTE — PROGRESS NOTES
"Patient Name:  Roger Bhat  YOB: 1960  [id number]    Surgery Progress Note    Date of visit: 2/9/2024      Subjective: No acute events.  Denies nausea or vomiting.  Tolerated clears yesterday without difficulty.  Tube feeds were started at 10 mL/h and he appears to be tolerating this.  Stoma with 765 mL of output.  Urine output close to 3 L yesterday.          Objective:     /95 (BP Location: Left arm, Patient Position: Lying)   Pulse 97   Temp 98.6 °F (37 °C) (Axillary)   Resp 20   Ht 175.3 cm (69\")   Wt 59.3 kg (130 lb 11.7 oz)   SpO2 93%   BMI 19.31 kg/m²     Intake/Output Summary (Last 24 hours) at 2/9/2024 0725  Last data filed at 2/9/2024 0600  Gross per 24 hour   Intake 2935.84 ml   Output 3725 ml   Net -789.16 ml       GEN:   Awake, alert, sitting up in bed, chronically ill-appearing in no obvious distress  CV:      Regular rate and rhythm  L:         Symmetric expansion, on oxygen by nasal cannula  Abd:    Soft, moderately distended, appropriately tender palpation throughout the abdomen, midline incision with some reactive erythema surrounding the site and serous drainage, DELROY drain in the right lower quadrant with seros output, ileostomy on the right side pink and patent with liquid stool in the bag  Ext:     No cyanosis, clubbing, or edema    Recent labs that are back at this time have been reviewed.           Assessment/ Plan:    Mr. Bhat is a 63-year-old gentleman who is admitted following operative intervention for gallbladder and sigmoid colon mass on January 17     #Sigmoid colon mass, gallbladder mass  # Ileus  -Postop day 23 following lap vladimir, open sigmoid colectomy with diverting loop ileostomy, postoperative day 10 following exploratory laparotomy with washout  -White count up slightly at 11 however vital signs have been stable and he appears slightly more concentrated on his labs  -Ileostomy output 765 mL  -Continue clear liquids.  Advance tube feeds to 20 " mL/h.  Going very slow with reintroduction of his diet given his prolonged ileus  -Continue TPN for now  -Continue antibiotics empirically  -Mobilize.  Has floor orders      Jimmy Craig MD  2/9/2024  07:25 EST

## 2024-02-09 NOTE — PROGRESS NOTES
CPN Review Note    Patient Name: Roger Bhat  Date of Encounter: 24 10:07 EST  MRN: [id number]  Admission date: 2024    Reason for visit: CPN review . RD to continue to follow per protocol.     Information Obtained: Continues with TPN, trophic EN, and clear liquid diet to meet nutritional needs as ileus continues to resolve and diet is slowly advanced. Trophic EN initiated yesterday at 10 ml/hr, pt tolerated and surgeon advanced to 20 ml/hr this morning. Tolerating clear liquids with ensure clear meeting some of needs. Ileostomy with more output. Na continues to be slightly low, other lytes WNL.     EMR reviewed: Ileostomy 765 ml out past 24 hr  Medication reviewed: yes   Labs reviewed: yes     Est. Needs (24):  ~1800 kcal  ~87 g protein (1.5 g/kg)    Current diet: Diet: Liquid Diets; Clear Liquid; Fluid Consistency: Thin (IDDSI 0)  Adult [facility] CLINIMIX-E TPN    Oral Intake: 75% clear liquid trays, ensure clear 2-3/day  2 ensure clear/day= 480 kcal, 16 g protein   (27% energy and 18% protein needs)    PN Route: PICC   PN:  Clinimix E-D15% AA5% @ 50 ml/hr,       GIR: 2.2 mg/kg/min  Lipid: 100 ml 20% SMOF daily      PN@ Goal over 24hr to Supply:  Volume 1200 mL/day       Energy 1052 Kcal/day 58 % Est Need   Protein 60 g/day 69 % Est Need     ---------------------------------------------------------------------------  Formula/Rate verified at bedside: Yes  Infusing Rate at time of visit: 50 ml/hr     Average Delivery from [facility]:   PN Volume 1441 mL/day       %goal   Lipid delivered?  200 mL/day     Energy  Kcal/day  % Est Need   Protein  g/day  % Est Need     EN: Peptamen 1.5 - Trophic regimen  Goal Rate: 10 ml/hr  Water Flushes: 10 ml/hr  Modular: None  Route: NG  Tube: Large bore    At goal over: 20Hrs/day    Rx will supply:   Goal Volume 200 mL/day     Flush Volume 200 mL/day     Energy 300 Kcal/day 16 % Est Need   Protein 14 g/day 16 % Est Need   Fiber 0 g/day     Water in  EN  154 mL     Total Water 354 mL     Meet DRI No        --------------------------------------------------------------------------  Product/Rate verified at bedside: Yes  Infusing Rate at time of visit: 20 ml/hr (surgeon advanced this am prior to RD visit)    Average Delivery from [facility]: (initiated midday)  Volume 116 mL/day 58  % Goal Vol.   Flush Volume 192 mL/day     Energy  Kcal/day  % Est Need   Protein  g/day  % Est Need   Fiber  g/day     Water in  EN  mL     Total Water  mL     Meet DRI No          Intervention:  Follow treatment plan  Care plan reviewed    Advance trophic EN regimen to 20 ml/hr today per surgeon:  Continue EN regimen of Peptamen 1.5 @ 20 ml/hr, water 10 ml/hr. Do not advance today.      =400 ml, 600 kcal, 27 g protein, 308 ml water + 200 ml flushes = 508 ml total water (33% energy and 31% protein needs)     Adjust PN to:  Continue Clinimix E-D15% AA5% @ 50 ml/hr, d/c lipids       PN@ Goal over 24hr to Supply:  Volume 1200 mL/day       Energy 852 Kcal/day 47 % Est Need   Protein 60 g/day 69 % Est Need   GIR=2.2mg/kg/min     Pt to continue clear liquid diet/ensure clear as tolerated.      EN + PN = 1452 kcal, 87 g protein (81% energy 100% pro needs)     +2 ensure clear/day = 1932 kcal, 103 g protein 107% energy 118% protein needs)    Plan for next ~48 hr:  Diet/EN advancement per surgeon guidance and as tolerated  Wean PN pending changes to PO/EN, per surgeon guidance    >would recommend weaning PN off in next 24-48 hr pending pt tolerance EN/PO, and consider advancing EN to goal 35-45 ml/hr depending on diet advancement/PO intakes. May be able to wean off EN if advanced to and tolerating solids.      Plan for prior to d/c:  Wean nutrition support as tolerance PO intakes established  F/u for review of nutrition education for new ileostomy, malnutrition    Follow up:   Per protocol      Stacie Maldonado RD, [facility]  10:39 EST  Time: 30min

## 2024-02-09 NOTE — PLAN OF CARE
Problem: Skin Injury Risk Increased  Goal: Skin Health and Integrity  Outcome: Ongoing, Progressing  Intervention: Optimize Skin Protection  Recent Flowsheet Documentation  Taken 2/9/2024 0000 by Carolann Means RN  Pressure Reduction Techniques:   frequent weight shift encouraged   weight shift assistance provided  Head of Bed (HOB) Positioning: HOB at 30-45 degrees  Pressure Reduction Devices:   specialty bed utilized   pressure-redistributing mattress utilized   positioning supports utilized  Skin Protection:   tubing/devices free from skin contact   transparent dressing maintained   skin-to-skin areas padded   adhesive use limited  Taken 2/8/2024 2200 by Carolann Means RN  Pressure Reduction Techniques:   frequent weight shift encouraged   weight shift assistance provided  Head of Bed (HOB) Positioning: HOB at 30-45 degrees  Pressure Reduction Devices:   specialty bed utilized   pressure-redistributing mattress utilized   positioning supports utilized  Skin Protection:   tubing/devices free from skin contact   transparent dressing maintained   skin-to-skin areas padded  Taken 2/8/2024 2000 by Carolann Means RN  Pressure Reduction Techniques: frequent weight shift encouraged  Head of Bed (HOB) Positioning: HOB at 30-45 degrees  Pressure Reduction Devices:   specialty bed utilized   pressure-redistributing mattress utilized  Skin Protection:   tubing/devices free from skin contact   transparent dressing maintained   adhesive use limited     Problem: COPD (Chronic Obstructive Pulmonary Disease) Comorbidity  Goal: Maintenance of COPD Symptom Control  Outcome: Ongoing, Progressing  Intervention: Maintain COPD-Symptom Control  Recent Flowsheet Documentation  Taken 2/9/2024 0000 by Carolann Means RN  Medication Review/Management: medications reviewed  Taken 2/8/2024 2200 by Carolann Means RN  Medication Review/Management: medications reviewed  Taken 2/8/2024 2000 by Carolann Means RN  Supportive Measures: active  listening utilized  Medication Review/Management: medications reviewed     Problem: Adjustment to Illness (Sepsis/Septic Shock)  Goal: Optimal Coping  Outcome: Ongoing, Progressing  Intervention: Optimize Psychosocial Adjustment to Illness  Recent Flowsheet Documentation  Taken 2/9/2024 0000 by Carolann Means RN  Family/Support System Care:   self-care encouraged   support provided  Taken 2/8/2024 2200 by Carolann Means RN  Family/Support System Care: self-care encouraged  Taken 2/8/2024 2000 by Carolann Means RN  Supportive Measures: active listening utilized  Family/Support System Care:   involvement promoted   self-care encouraged     Problem: Infection Progression (Sepsis/Septic Shock)  Goal: Absence of Infection Signs and Symptoms  Outcome: Ongoing, Progressing  Intervention: Initiate Sepsis Management  Recent Flowsheet Documentation  Taken 2/9/2024 0000 by Carolann Means RN  Infection Prevention:   hand hygiene promoted   environmental surveillance performed  Taken 2/8/2024 2200 by Carolann Means RN  Infection Prevention: environmental surveillance performed  Taken 2/8/2024 2000 by Carolann Means RN  Infection Prevention: environmental surveillance performed  Intervention: Promote Recovery  Recent Flowsheet Documentation  Taken 2/8/2024 2000 by Carolann Means RN  Activity Management: activity encouraged  Sleep/Rest Enhancement:   awakenings minimized   regular sleep/rest pattern promoted   family presence promoted     Problem: Nutrition Impaired (Sepsis/Septic Shock)  Goal: Optimal Nutrition Intake  Outcome: Ongoing, Progressing     Problem: Fall Injury Risk  Goal: Absence of Fall and Fall-Related Injury  Outcome: Ongoing, Progressing  Intervention: Identify and Manage Contributors  Recent Flowsheet Documentation  Taken 2/9/2024 0000 by Carolann Means RN  Medication Review/Management: medications reviewed  Taken 2/8/2024 2200 by Carolann Means RN  Medication Review/Management: medications  reviewed  Taken 2/8/2024 2000 by Carolann Means, RN  Medication Review/Management: medications reviewed  Intervention: Promote Injury-Free Environment  Recent Flowsheet Documentation  Taken 2/9/2024 0000 by Carolann Means RN  Safety Promotion/Fall Prevention: safety round/check completed  Taken 2/8/2024 2200 by Carolann Means, RN  Safety Promotion/Fall Prevention: safety round/check completed  Taken 2/8/2024 2000 by Carolann Means RN  Safety Promotion/Fall Prevention: safety round/check completed   Goal Outcome Evaluation:

## 2024-02-09 NOTE — PROGRESS NOTES
Pharmacy Parenteral Nutrition Evaluation    Roger Bhat is a  63 y.o. male receiving TPN.     Indication: Prolonged paralytic illus  Consulting Physician: Intensivist     Total Fluid Rate (if stated): -    Labs  Results from last 7 days   Lab Units 02/09/24 0409 02/08/24 0302 02/07/24 0332 02/06/24  0354 02/05/24  0509   SODIUM mmol/L 134* 134* 135*   < > 137   POTASSIUM mmol/L 4.6 4.0 4.3   < > 4.1   CHLORIDE mmol/L 101 101 101   < > 101   CO2 mmol/L 23.0 23.0 25.0   < > 28.0   BUN mg/dL 18 18 20   < > 21   CREATININE mg/dL 0.50* 0.46* 0.46*   < > 0.39*   CALCIUM mg/dL 8.9 8.9 8.9   < > 9.1   BILIRUBIN mg/dL  --   --   --   --  0.2   ALK PHOS U/L  --   --   --   --  181*   ALT (SGPT) U/L  --   --   --   --  36   AST (SGOT) U/L  --   --   --   --  25   GLUCOSE mg/dL 95 105* 109*   < > 116*    < > = values in this interval not displayed.       Results from last 7 days   Lab Units 02/09/24 0409 02/08/24 0302 02/07/24 0332 02/06/24  0354 02/05/24  0509   MAGNESIUM mg/dL 2.1 2.3 2.1   < > 2.4   PHOSPHORUS mg/dL 3.2 2.7 2.8   < > 2.6   PREALBUMIN mg/dL  --   --   --   --  10.5*    < > = values in this interval not displayed.       Results from last 7 days   Lab Units 02/09/24 0409 02/08/24 0302 02/07/24 0332   WBC 10*3/mm3 11.06* 10.58 9.40   HEMOGLOBIN g/dL 11.2* 10.8* 10.6*   HEMATOCRIT % 32.4* 31.2* 31.3*   PLATELETS 10*3/mm3 510* 490* 481*       Triglycerides   Date Value Ref Range Status   02/05/2024 137 0 - 150 mg/dL Final     Comment:     Falsely depressed results may occur on samples drawn from patients receiving N-Acetylcysteine (NAC) or Metamizole.       estimated creatinine clearance is 130.1 mL/min (A) (by C-G formula based on SCr of 0.5 mg/dL (L)).    Intake & Output (last 3 days)         02/02 0701 02/03 0700 02/03 0701 02/04 0700 02/04 0701 02/05 0700 02/05 0701 02/06 0700    P.O.  300 295     I.V. (mL/kg) 897.5 (15) 295.4 (4.9) 240.8 (4)     NG/ 110 160     IV Piggyback 115 350       TPN 2585 230 1641.5     Total Intake(mL/kg) 3757.5 (62.8) 1285.4 (21.5) 2337.3 (39.1)     Urine (mL/kg/hr) 1775 (1.2) 2300 (1.6) 1475 (1)     Emesis/NG output 650 725 975     Drains 5 5 0 0    Stool 100 45 225 0    Total Output 2530 3075 2675 0    Net +1227.5 -1789.6 -337.7 0            Stool Unmeasured Occurrence    1 x            Dietitian Recommendations  Diet Orders (active) (From admission, onward)       Start     Ordered    02/03/24 0942  NPO Diet NPO Type: Sips with Meds, Ice Chips  Diet Effective Now        Comments: Ok for sips of clears    02/03/24 0941    01/28/24 1200  Dietary Nutrition Supplements Other (see comment); Ensure Clear Apple  Daily With Breakfast, Lunch & Dinner       01/28/24 0857                    Current TPN Regimen Recommendation:  Dextrose 15% / Amino Acid 5% at goal rate of 50 mL/hr.  No lipids today as tube feeds are increasing with hope to stop TPN soon.    Na 35 mEq/L  K 30 mEq/L  Ca 4.5 mEq/L  Mg 5 mEq/L  PO4 15 mmol/L       Assessment/Plan:  TPN for Prolonged Paralytic Ileus.    Patient has a PICC for central access.  Electrolytes are currently stable and within normal limits. Regular insulin ordered per sliding scale every 6 hours. Phosphorous and Potassium replacement protocols are available.  TPN continued with no changes, except stopping lipids.  Clinical Nutrition to manage macronutrients and Pharmacy to manage micronutrients as above.   Pharmacy will continue to follow and adjust TPN as clinically indicated.       Thank you,   Monica Diaz, PharmD, BCPS  2/9/2024  11:00 EST

## 2024-02-09 NOTE — PROGRESS NOTES
Intensive Care Follow-up     Hospital:  LOS: 23 days   Mr. Roger hBat, 63 y.o. male is followed for:   Colonic mass            History of present illness:   Roger is a 63 y.o. male admitted on 1/17/2024 with Colonic mass [K63.89] for which he underwent open sigmoid colectomy with diverting loop ileostomy and liver biopsy on 01/17/24 and returned to OR for another exploratory lap, for lysis of adhesion. He has required Parenteral Nutrition. He also developed lung infiltrates. He was on antibiotics but because his leukemoid reaction, antibiotic was changed to BRODERICK and Micafungin. He continues to improve, weaning his FiO2.           Subjective   Interval History:  Patient doing much better this morning.  No longer nauseated.  Tolerating clear liquid diet.  Pain well-controlled.             The patient's past medical, surgical and social history were reviewed and updated in Epic as appropriate.       Objective     Infusions:  Adult Central CLINIMIX-E TPN, , Last Rate: 50 mL/hr at 02/08/24 1757  Adult Central CLINIMIX-E TPN,   Pharmacy to Dose TPN,       Medications:  bisacodyl, 10 mg, Oral, Daily  budesonide-formoterol, 2 puff, Inhalation, BID - RT  buPROPion, 50 mg, Oral, Q8H  folic acid, 1 mg, Oral, Daily  heparin (porcine), 5,000 Units, Subcutaneous, Q8H  insulin regular, 2-7 Units, Subcutaneous, Q6H  meropenem, 2,000 mg, Intravenous, Q8H  methylnaltrexone, 8 mg, Subcutaneous, Every Other Day  metoclopramide, 10 mg, Intravenous, Q6H  micafungin (MYCAMINE) IV, 100 mg, Intravenous, Q24H  pantoprazole, 40 mg, Intravenous, QAM AC  revefenacin, 175 mcg, Nebulization, Daily - RT  rosuvastatin, 10 mg, Oral, Nightly  senna, 1 tablet, Oral, Nightly  sodium chloride, 10 mL, Intravenous, Q12H  thiamine, 100 mg, Oral, Daily      I reviewed the patient's medications.    Vital Sign Min/Max for last 24 hours  Temp  Min: 97.1 °F (36.2 °C)  Max: 98.6 °F (37 °C)   BP  Min: 131/88  Max: 155/84   Pulse  Min: 80  Max: 107   Resp   Min: 18  Max: 26   SpO2  Min: 92 %  Max: 99 %   No data recorded       Input/Output for last 24 hour shift  02/08 0701 - 02/09 0700  In: 2935.8 [P.O.:581; I.V.:505.1]  Out: 3725 [Urine:2950; Drains:10]      GENERAL : NAD, conversant  RESPIRATORY/THORAX : normal respiratory effort and no intercostal retractions, CTAB  CARDIOVASCULAR : Normal S1/S2, RRR.  No lower ext edema.  GASTROINTESTINAL : Soft, NT/ND. BS x 4 normoactive. No hepatosplenomegaly.  MUSCULOSKELETAL : No cyanosis, clubbing, or ischemia  NEUROLOGICAL: alert and oriented to person, place and time  PSYCHOLOGICAL : Appropriate affect    Results from last 7 days   Lab Units 02/09/24  0409 02/08/24  0302 02/07/24  0332   WBC 10*3/mm3 11.06* 10.58 9.40   HEMOGLOBIN g/dL 11.2* 10.8* 10.6*   PLATELETS 10*3/mm3 510* 490* 481*     Results from last 7 days   Lab Units 02/09/24  0409 02/08/24  0302 02/07/24  0332   SODIUM mmol/L 134* 134* 135*   POTASSIUM mmol/L 4.6 4.0 4.3   CO2 mmol/L 23.0 23.0 25.0   BUN mg/dL 18 18 20   CREATININE mg/dL 0.50* 0.46* 0.46*   MAGNESIUM mg/dL 2.1 2.3 2.1   PHOSPHORUS mg/dL 3.2 2.7 2.8   GLUCOSE mg/dL 95 105* 109*     Estimated Creatinine Clearance: 130.1 mL/min (A) (by C-G formula based on SCr of 0.5 mg/dL (L)).          I reviewed the patient's new clinical results.  I reviewed the patient's new imaging results/reports including actual images and agree with reports.       Assessment & Plan   Impression        Colonic mass    Current smoker    Gastroesophageal reflux disease without esophagitis    Severe malnutrition    Respiratory distress    Centrilobular emphysema       Plan        Roger is a 63 y.o. male admitted on 1/17/2024 with Colonic mass [K63.89] for which he underwent open sigmoid colectomy with diverting loop ileostomy and liver biopsy on 01/17/24 and returned to OR for another exploratory lap, for lysis of adhesion. He has required Parenteral Nutrition. He also developed lung infiltrates. He was on antibiotics but  because his leukemoid reaction, antibiotic was changed to meropenem and Micafungin.  He was weaned off high flow nasal cannula on 2/6/2024.     -Continue postop orders per surgery  -Ensure adequate pain control  -Continue to wean oxygen to saturation greater than 88%  -Continue Symbicort and Yupelri for COPD  -Continue statin for hyperlipidemia  -Continue TPN for now, gradually advancing diet per general surgery recommendations  -Mobilize patient  -A.m. labs  -Medically okay to be transferred to the floor    I conducted multidisciplinary rounds in the plan of care was discussed with the multidisciplinary team at that time. In attendance at multidisciplinary rounds was clinical pharmacist, dietitian, nursing staff, and case management.    I discussed the patient's findings and my recommendations with patient and nursing staff       Yen Thomson,   Pulmonary, Critical care and Sleep Medicine

## 2024-02-10 LAB
ANION GAP SERPL CALCULATED.3IONS-SCNC: 7 MMOL/L (ref 5–15)
BASOPHILS # BLD AUTO: 0.07 10*3/MM3 (ref 0–0.2)
BASOPHILS NFR BLD AUTO: 0.7 % (ref 0–1.5)
BUN SERPL-MCNC: 19 MG/DL (ref 8–23)
BUN/CREAT SERPL: 32.2 (ref 7–25)
CALCIUM SPEC-SCNC: 9.5 MG/DL (ref 8.6–10.5)
CHLORIDE SERPL-SCNC: 99 MMOL/L (ref 98–107)
CO2 SERPL-SCNC: 25 MMOL/L (ref 22–29)
CREAT SERPL-MCNC: 0.59 MG/DL (ref 0.76–1.27)
DEPRECATED RDW RBC AUTO: 55.6 FL (ref 37–54)
EGFRCR SERPLBLD CKD-EPI 2021: 110.4 ML/MIN/1.73
EOSINOPHIL # BLD AUTO: 0.57 10*3/MM3 (ref 0–0.4)
EOSINOPHIL NFR BLD AUTO: 5.6 % (ref 0.3–6.2)
ERYTHROCYTE [DISTWIDTH] IN BLOOD BY AUTOMATED COUNT: 14.7 % (ref 12.3–15.4)
GLUCOSE BLDC GLUCOMTR-MCNC: 101 MG/DL (ref 70–130)
GLUCOSE BLDC GLUCOMTR-MCNC: 123 MG/DL (ref 70–130)
GLUCOSE SERPL-MCNC: 123 MG/DL (ref 65–99)
HCT VFR BLD AUTO: 33.2 % (ref 37.5–51)
HGB BLD-MCNC: 11.5 G/DL (ref 13–17.7)
IMM GRANULOCYTES # BLD AUTO: 0.09 10*3/MM3 (ref 0–0.05)
IMM GRANULOCYTES NFR BLD AUTO: 0.9 % (ref 0–0.5)
LYMPHOCYTES # BLD AUTO: 1.95 10*3/MM3 (ref 0.7–3.1)
LYMPHOCYTES NFR BLD AUTO: 19.2 % (ref 19.6–45.3)
MAGNESIUM SERPL-MCNC: 2.2 MG/DL (ref 1.6–2.4)
MCH RBC QN AUTO: 35.2 PG (ref 26.6–33)
MCHC RBC AUTO-ENTMCNC: 34.6 G/DL (ref 31.5–35.7)
MCV RBC AUTO: 101.5 FL (ref 79–97)
MONOCYTES # BLD AUTO: 0.69 10*3/MM3 (ref 0.1–0.9)
MONOCYTES NFR BLD AUTO: 6.8 % (ref 5–12)
NEUTROPHILS NFR BLD AUTO: 6.81 10*3/MM3 (ref 1.7–7)
NEUTROPHILS NFR BLD AUTO: 66.8 % (ref 42.7–76)
NRBC BLD AUTO-RTO: 0 /100 WBC (ref 0–0.2)
PHOSPHATE SERPL-MCNC: 2.8 MG/DL (ref 2.5–4.5)
PLATELET # BLD AUTO: 505 10*3/MM3 (ref 140–450)
PMV BLD AUTO: 9.9 FL (ref 6–12)
POTASSIUM SERPL-SCNC: 4.1 MMOL/L (ref 3.5–5.2)
RBC # BLD AUTO: 3.27 10*6/MM3 (ref 4.14–5.8)
SODIUM SERPL-SCNC: 131 MMOL/L (ref 136–145)
WBC NRBC COR # BLD AUTO: 10.18 10*3/MM3 (ref 3.4–10.8)

## 2024-02-10 PROCEDURE — 63710000001 REVEFENACIN 175 MCG/3ML SOLUTION: Performed by: INTERNAL MEDICINE

## 2024-02-10 PROCEDURE — 94799 UNLISTED PULMONARY SVC/PX: CPT

## 2024-02-10 PROCEDURE — 82948 REAGENT STRIP/BLOOD GLUCOSE: CPT

## 2024-02-10 PROCEDURE — 94664 DEMO&/EVAL PT USE INHALER: CPT

## 2024-02-10 PROCEDURE — 25010000002 METOCLOPRAMIDE PER 10 MG: Performed by: INTERNAL MEDICINE

## 2024-02-10 PROCEDURE — 84100 ASSAY OF PHOSPHORUS: CPT | Performed by: INTERNAL MEDICINE

## 2024-02-10 PROCEDURE — 83735 ASSAY OF MAGNESIUM: CPT | Performed by: INTERNAL MEDICINE

## 2024-02-10 PROCEDURE — 99233 SBSQ HOSP IP/OBS HIGH 50: CPT | Performed by: FAMILY MEDICINE

## 2024-02-10 PROCEDURE — 85025 COMPLETE CBC W/AUTO DIFF WBC: CPT | Performed by: INTERNAL MEDICINE

## 2024-02-10 PROCEDURE — 25010000002 HEPARIN (PORCINE) PER 1000 UNITS: Performed by: INTERNAL MEDICINE

## 2024-02-10 PROCEDURE — 25010000002 MICAFUNGIN SODIUM 100 MG RECONSTITUTED SOLUTION 1 EACH VIAL: Performed by: INTERNAL MEDICINE

## 2024-02-10 PROCEDURE — 25010000002 MEROPENEM PER 100 MG: Performed by: INTERNAL MEDICINE

## 2024-02-10 PROCEDURE — 80048 BASIC METABOLIC PNL TOTAL CA: CPT | Performed by: INTERNAL MEDICINE

## 2024-02-10 PROCEDURE — 25010000002 ONDANSETRON PER 1 MG: Performed by: INTERNAL MEDICINE

## 2024-02-10 RX ADMIN — HEPARIN SODIUM 5000 UNITS: 5000 INJECTION INTRAVENOUS; SUBCUTANEOUS at 05:58

## 2024-02-10 RX ADMIN — BUDESONIDE AND FORMOTEROL FUMARATE DIHYDRATE 2 PUFF: 160; 4.5 AEROSOL RESPIRATORY (INHALATION) at 20:04

## 2024-02-10 RX ADMIN — METOCLOPRAMIDE HYDROCHLORIDE 10 MG: 5 INJECTION INTRAMUSCULAR; INTRAVENOUS at 17:12

## 2024-02-10 RX ADMIN — ASCORBIC ACID, VITAMIN A PALMITATE, CHOLECALCIFEROL, THIAMINE HYDROCHLORIDE, RIBOFLAVIN-5 PHOSPHATE SODIUM, PYRIDOXINE HYDROCHLORIDE, NIACINAMIDE, DEXPANTHENOL, ALPHA-TOCOPHEROL ACETATE, VITAMIN K1, FOLIC ACID, BIOTIN, CYANOCOBALAMIN: 200; 3300; 200; 6; 3.6; 6; 40; 15; 10; 150; 600; 60; 5 INJECTION, SOLUTION INTRAVENOUS at 17:12

## 2024-02-10 RX ADMIN — METOCLOPRAMIDE HYDROCHLORIDE 10 MG: 5 INJECTION INTRAMUSCULAR; INTRAVENOUS at 13:04

## 2024-02-10 RX ADMIN — METOCLOPRAMIDE HYDROCHLORIDE 10 MG: 5 INJECTION INTRAMUSCULAR; INTRAVENOUS at 05:58

## 2024-02-10 RX ADMIN — FOLIC ACID 1 MG: 1 TABLET ORAL at 10:39

## 2024-02-10 RX ADMIN — HEPARIN SODIUM 5000 UNITS: 5000 INJECTION INTRAVENOUS; SUBCUTANEOUS at 21:23

## 2024-02-10 RX ADMIN — MEROPENEM 2000 MG: 1 INJECTION, POWDER, FOR SOLUTION INTRAVENOUS at 17:12

## 2024-02-10 RX ADMIN — BISACODYL 10 MG: 5 TABLET, COATED ORAL at 10:39

## 2024-02-10 RX ADMIN — MEROPENEM 2000 MG: 1 INJECTION, POWDER, FOR SOLUTION INTRAVENOUS at 10:39

## 2024-02-10 RX ADMIN — BUPROPION HYDROCHLORIDE 50 MG: 100 TABLET, FILM COATED ORAL at 05:58

## 2024-02-10 RX ADMIN — Medication 10 ML: at 10:39

## 2024-02-10 RX ADMIN — ONDANSETRON 4 MG: 2 INJECTION INTRAMUSCULAR; INTRAVENOUS at 03:32

## 2024-02-10 RX ADMIN — BUDESONIDE AND FORMOTEROL FUMARATE DIHYDRATE 2 PUFF: 160; 4.5 AEROSOL RESPIRATORY (INHALATION) at 09:02

## 2024-02-10 RX ADMIN — REVEFENACIN 175 MCG: 175 SOLUTION RESPIRATORY (INHALATION) at 09:03

## 2024-02-10 RX ADMIN — THIAMINE HCL TAB 100 MG 100 MG: 100 TAB at 10:39

## 2024-02-10 RX ADMIN — ROSUVASTATIN CALCIUM 10 MG: 10 TABLET, FILM COATED ORAL at 21:23

## 2024-02-10 RX ADMIN — MICAFUNGIN 100 MG: 20 INJECTION, POWDER, LYOPHILIZED, FOR SOLUTION INTRAVENOUS at 14:43

## 2024-02-10 RX ADMIN — MEROPENEM 2000 MG: 1 INJECTION, POWDER, FOR SOLUTION INTRAVENOUS at 23:59

## 2024-02-10 RX ADMIN — PANTOPRAZOLE SODIUM 40 MG: 40 INJECTION, POWDER, LYOPHILIZED, FOR SOLUTION INTRAVENOUS at 10:40

## 2024-02-10 RX ADMIN — BUPROPION HYDROCHLORIDE 50 MG: 100 TABLET, FILM COATED ORAL at 21:23

## 2024-02-10 RX ADMIN — Medication 10 ML: at 21:27

## 2024-02-10 RX ADMIN — HEPARIN SODIUM 5000 UNITS: 5000 INJECTION INTRAVENOUS; SUBCUTANEOUS at 14:43

## 2024-02-10 NOTE — PROGRESS NOTES
"Patient Name:  Roger Bhat  YOB: 1960  [id number]    Surgery Progress Note    Date of visit: 2/10/2024      Subjective: No acute events.  Denies any nausea.  Slept well.  Tolerated clears yesterday without any difficulty.  Stoma with 200 mL of output recorded.  Tolerating tube feeds at 20 cc/h without difficulty          Objective:     /99 (BP Location: Left arm, Patient Position: Lying)   Pulse 99   Temp 96.6 °F (35.9 °C) (Axillary)   Resp 18   Ht 175.3 cm (69\")   Wt 59.3 kg (130 lb 11.7 oz)   SpO2 94%   BMI 19.31 kg/m²     Intake/Output Summary (Last 24 hours) at 2/10/2024 0928  Last data filed at 2/10/2024 0700  Gross per 24 hour   Intake 647.2 ml   Output 1200 ml   Net -552.8 ml     GEN:   Awake, alert, sitting up in bed, chronically ill-appearing in no obvious distress  CV:      Regular rate and rhythm  L:         Symmetric expansion, on oxygen by nasal cannula  Abd:    Soft, moderately distended, appropriately tender palpation throughout the abdomen, midline incision with some reactive erythema surrounding the site and serous drainage, DELROY drain in the right lower quadrant with seros output, ileostomy on the right side pink and patent with liquid stool in the bag  Ext:     No cyanosis, clubbing, or edema      Recent labs that are back at this time have been reviewed.           Assessment/ Plan:      Mr. Bhat is a 63-year-old gentleman who is admitted following operative intervention for gallbladder and sigmoid colon mass on January 17     #Sigmoid colon mass, gallbladder mass  # Ileus  -S/p lap vladimir, open sigmoid colectomy with diverting loop ileostomy on January 17, 2024. S/p exploratory laparotomy with washout on January 30  -Labs are stable.  White blood cell count is 10.  -Tolerating tube feeds at 20 mill per hour.  Will advance to 30 mL/h today  -Advance to full liquid diet.  Cautious with the reintroduction of oral diet given prolonged ileus  -Can start weaning " TPN  -Continue antibiotics empirically.  Okay to discontinue micafungin  -Mobilize.  Appears to be improving      Jimmy Craig MD  2/10/2024  09:28 EST

## 2024-02-10 NOTE — PROGRESS NOTES
[facility] Medicine Services  PROGRESS NOTE    Patient Name: Roger Bhat  : 1960  MRN: [id number]    Date of Admission: 2024  Primary Care Physician: Richa Mary DO    Subjective   Subjective     CC:  F/U med mgmt     HPI:  Patient seen and examined. Doing better. Wife says he has talked more today than he has since he's been here. Tolerating full liquid diet. Denies pain.     Objective   Objective     Vital Signs:   Temp:  [96.6 °F (35.9 °C)-98.8 °F (37.1 °C)] 98.8 °F (37.1 °C)  Heart Rate:  [] 99  Resp:  [16-20] 16  BP: (130-156)/(92-99) 137/93     Physical Exam:  Constitutional: No acute distress, awake, alert, pleasant, talkative   HENT: NCAT, mucous membranes moist  Respiratory: Clear to auscultation bilaterally, respiratory effort normal   Cardiovascular: RRR-ST, no murmurs, rubs, or gallops  Gastrointestinal: Soft, mild distention noted, tenderness appropriate , +DELROY drain, +Ileostomy   Musculoskeletal: No bilateral ankle edema  Psychiatric: Appropriate affect, cooperative  Neurologic: Oriented x 3, JEFFRIES, speech clear  Skin: No rashes     Results Reviewed:  LAB RESULTS:      Lab 02/10/24  0514 24  0409 24  0302 24  0332 24  0354 24  0509 24  0428   WBC 10.18 11.06* 10.58 9.40 8.94 12.09* 17.22*   HEMOGLOBIN 11.5* 11.2* 10.8* 10.6* 10.4* 10.5* 10.4*   HEMATOCRIT 33.2* 32.4* 31.2* 31.3* 30.6* 30.9* 30.1*   PLATELETS 505* 510* 490* 481* 441 426 399   NEUTROS ABS 6.81 7.74* 7.45* 6.30 5.90 8.79* 13.71*   IMMATURE GRANS (ABS) 0.09* 0.12* 0.14* 0.15* 0.13* 0.16* 0.22*   LYMPHS ABS 1.95 1.98 1.83 1.72 1.74 1.87 1.64   MONOS ABS 0.69 0.62 0.54 0.65 0.63 0.81 1.20*   EOS ABS 0.57* 0.52* 0.53* 0.49* 0.47* 0.39 0.38   .5* 100.6* 100.6* 101.0* 104.1* 101.3* 100.3*   CRP  --   --   --   --   --  9.46*  --    PROCALCITONIN  --   --   --   --   --   --  0.56*         Lab 02/10/24  0514 24  0409 24  0302  02/07/24  0332 02/06/24  0354   SODIUM 131* 134* 134* 135* 135*   POTASSIUM 4.1 4.6 4.0 4.3 3.9   CHLORIDE 99 101 101 101 99   CO2 25.0 23.0 23.0 25.0 26.0   ANION GAP 7.0 10.0 10.0 9.0 10.0   BUN 19 18 18 20 22   CREATININE 0.59* 0.50* 0.46* 0.46* 0.36*   EGFR 110.4 116.0 119.0 119.0 128.1   GLUCOSE 123* 95 105* 109* 123*   CALCIUM 9.5 8.9 8.9 8.9 8.4*   MAGNESIUM 2.2 2.1 2.3 2.1 2.7*   PHOSPHORUS 2.8 3.2 2.7 2.8 2.9         Lab 02/05/24  0509   TOTAL PROTEIN 6.0   ALBUMIN 2.4*   GLOBULIN 3.6   ALT (SGPT) 36   AST (SGOT) 25   BILIRUBIN 0.2   ALK PHOS 181*             Lab 02/05/24  0509   TRIGLYCERIDES 137             Brief Urine Lab Results  (Last result in the past 365 days)        Color   Clarity   Blood   Leuk Est   Nitrite   Protein   CREAT   Urine HCG        01/28/24 1701 Dark Yellow   Clear   Negative   Negative   Negative   30 mg/dL (1+)                   Microbiology Results Abnormal       Procedure Component Value - Date/Time    Blood Culture - Blood, Hand, Left [[id number]]  (Normal) Collected: 02/01/24 0915    Lab Status: Final result Specimen: Blood from Hand, Left Updated: 02/06/24 1030     Blood Culture No growth at 5 days    Blood Culture - Blood, Blood, Central Line [[id number]]  (Normal) Collected: 02/01/24 1025    Lab Status: Final result Specimen: Blood, Central Line Updated: 02/06/24 1030     Blood Culture No growth at 5 days    Narrative:      Less than seven (7) mL's of blood was collected.  Insufficient quantity may yield false negative results.    Respiratory Culture - Sputum, Cough [[id number]] Collected: 02/04/24 1152    Lab Status: Final result Specimen: Sputum from Cough Updated: 02/06/24 0944     Respiratory Culture No growth     Gram Stain Moderate (3+) WBCs per low power field      Rare (1+) Epithelial cells per low power field      No organisms seen    Blood Culture - Blood, Hand, Left [[id number]]  (Normal) Collected: 01/30/24 2378    Lab Status: Final result Specimen: Blood from Hand,  Left Updated: 02/05/24 0000     Blood Culture No growth at 5 days    Narrative:      Less than seven (7) mL's of blood was collected.  Insufficient quantity may yield false negative results.    Blood Culture - Blood, Arm, Left [[id number]]  (Normal) Collected: 01/30/24 2330    Lab Status: Final result Specimen: Blood from Arm, Left Updated: 02/05/24 0000     Blood Culture No growth at 5 days    Narrative:      Less than seven (7) mL's of blood was collected.  Insufficient quantity may yield false negative results.    Respiratory Culture - Sputum, Cough [[id number]] Collected: 02/03/24 1222    Lab Status: Final result Specimen: Sputum from Cough Updated: 02/03/24 1337     Respiratory Culture Rejected     Gram Stain Few (2+) Epithelial cells per low power field      Rare (1+) WBCs per low power field      No organisms seen    Narrative:      Specimen rejected due to oropharyngeal contamination. Please reorder and recollect specimen if clinically necessary.            No radiology results from the last 24 hrs        Current medications:  Scheduled Meds:bisacodyl, 10 mg, Oral, Daily  budesonide-formoterol, 2 puff, Inhalation, BID - RT  buPROPion, 50 mg, Oral, Q8H  folic acid, 1 mg, Oral, Daily  heparin (porcine), 5,000 Units, Subcutaneous, Q8H  insulin regular, 2-7 Units, Subcutaneous, Q6H  meropenem, 2,000 mg, Intravenous, Q8H  methylnaltrexone, 8 mg, Subcutaneous, Every Other Day  metoclopramide, 10 mg, Intravenous, Q6H  micafungin (MYCAMINE) IV, 100 mg, Intravenous, Q24H  pantoprazole, 40 mg, Intravenous, QAM AC  revefenacin, 175 mcg, Nebulization, Daily - RT  rosuvastatin, 10 mg, Oral, Nightly  senna, 1 tablet, Oral, Nightly  sodium chloride, 10 mL, Intravenous, Q12H  thiamine, 100 mg, Oral, Daily      Continuous Infusions:Adult Central CLINIMIX-E TPN, , Last Rate: 50 mL/hr at 02/09/24 1703  Pharmacy to Dose TPN,       PRN Meds:.  acetaminophen    acetaminophen    albuterol sulfate HFA    aluminum-magnesium  hydroxide-simethicone    calcium carbonate    dextrose    dextrose    diphenhydrAMINE    glucagon (human recombinant)    HYDROmorphone    Magnesium Standard Dose Replacement - Follow Nurse / BPA Driven Protocol    [DISCONTINUED] Morphine **AND** naloxone    ondansetron ODT **OR** ondansetron    oxyCODONE    Pharmacy to Dose TPN    Phosphorus Replacement - Follow Nurse / BPA Driven Protocol    Potassium Replacement - Follow Nurse / BPA Driven Protocol    sodium chloride    Assessment & Plan   Assessment & Plan     Active Hospital Problems    Diagnosis  POA    **Colonic mass [K63.89]  Yes    Centrilobular emphysema [J43.2]  Unknown    Severe malnutrition [E43]  Yes    Respiratory distress [R06.03]  Yes    Gastroesophageal reflux disease without esophagitis [K21.9]  Yes    Current smoker [F17.200]  Yes      Resolved Hospital Problems   No resolved problems to display.        Brief Hospital Course to date:  Roger Bhat is a 63 y.o. male admitted on 2024 with Colonic mass for which he underwent open sigmoid colectomy with diverting loop ileostomy and liver biopsy on 24 and returned to OR for another exploratory lap, for lysis of adhesion. He has required Parenteral Nutrition. He also developed lung infiltrates. He was on antibiotics but because his leukemoid reaction, antibiotic was changed to meropenem and Micafungin.  He was weaned off high flow nasal cannula on 2024.     This patient's problems and plans were partially entered by my partner and updated as appropriate by me 02/10/24.   All problems are new to me today    Colonic Mass  -open sigmoid colectomy with diverting loop ileostomy and liver biopsy on 24 and returned to OR for another exploratory lap, for lysis of adhesion on 24  -Weaning TPN  -Increasing TF  -Increased to full liquids PO   -Continue Merrem   -Mycamine to    -Continue Reglan   -Continue Relistor   -Dulcolax, Senna     Respiratory Failure  Emphysema    -Resolved  -off HFNC, now on RA   -Continue Symbicort, Yupelri     Expected Discharge Location and Transportation: per primary service   Expected Discharge   Expected Discharge Date: 2/13/2024; Expected Discharge Time:      DVT prophylaxis:  Medical and mechanical DVT prophylaxis orders are present.         AM-PAC 6 Clicks Score (PT): 18 (02/09/24 2027)    CODE STATUS:   Code Status and Medical Interventions:   Ordered at: 01/17/24 1212     Code Status (Patient has no pulse and is not breathing):    CPR (Attempt to Resuscitate)     Medical Interventions (Patient has pulse or is breathing):    Full Support       Dian Bolanos, DO  02/10/24

## 2024-02-10 NOTE — PROGRESS NOTES
Pharmacy Parenteral Nutrition Evaluation    Roger Bhat is a  63 y.o. male receiving TPN.     Indication: Prolonged paralytic illus  Consulting Physician: Intensivist     Total Fluid Rate (if stated): -    Labs  Results from last 7 days   Lab Units 02/10/24  0514 02/09/24  0409 02/08/24  0302 02/06/24  0354 02/05/24  0509   SODIUM mmol/L 131* 134* 134*   < > 137   POTASSIUM mmol/L 4.1 4.6 4.0   < > 4.1   CHLORIDE mmol/L 99 101 101   < > 101   CO2 mmol/L 25.0 23.0 23.0   < > 28.0   BUN mg/dL 19 18 18   < > 21   CREATININE mg/dL 0.59* 0.50* 0.46*   < > 0.39*   CALCIUM mg/dL 9.5 8.9 8.9   < > 9.1   BILIRUBIN mg/dL  --   --   --   --  0.2   ALK PHOS U/L  --   --   --   --  181*   ALT (SGPT) U/L  --   --   --   --  36   AST (SGOT) U/L  --   --   --   --  25   GLUCOSE mg/dL 123* 95 105*   < > 116*    < > = values in this interval not displayed.       Results from last 7 days   Lab Units 02/10/24  0514 02/09/24  0409 02/08/24  0302 02/06/24  0354 02/05/24  0509   MAGNESIUM mg/dL 2.2 2.1 2.3   < > 2.4   PHOSPHORUS mg/dL 2.8 3.2 2.7   < > 2.6   PREALBUMIN mg/dL  --   --   --   --  10.5*    < > = values in this interval not displayed.       Results from last 7 days   Lab Units 02/10/24  0514 02/09/24  0409 02/08/24  0302   WBC 10*3/mm3 10.18 11.06* 10.58   HEMOGLOBIN g/dL 11.5* 11.2* 10.8*   HEMATOCRIT % 33.2* 32.4* 31.2*   PLATELETS 10*3/mm3 505* 510* 490*       Triglycerides   Date Value Ref Range Status   02/05/2024 137 0 - 150 mg/dL Final     Comment:     Falsely depressed results may occur on samples drawn from patients receiving N-Acetylcysteine (NAC) or Metamizole.       estimated creatinine clearance is 110.3 mL/min (A) (by C-G formula based on SCr of 0.59 mg/dL (L)).    Intake & Output (last 3 days)         02/02 0701 02/03 0700 02/03 0701 02/04 0700 02/04 0701 02/05 0700 02/05 0701 02/06 0700    P.O.  300 295     I.V. (mL/kg) 897.5 (15) 295.4 (4.9) 240.8 (4)     NG/ 110 160     IV Piggyback 115  350      TPN 2585 230 1641.5     Total Intake(mL/kg) 3757.5 (62.8) 1285.4 (21.5) 2337.3 (39.1)     Urine (mL/kg/hr) 1775 (1.2) 2300 (1.6) 1475 (1)     Emesis/NG output 650 725 975     Drains 5 5 0 0    Stool 100 45 225 0    Total Output 2530 3075 2675 0    Net +1227.5 -1789.6 -337.7 0            Stool Unmeasured Occurrence    1 x            Dietitian Recommendations  Diet Orders (active) (From admission, onward)       Start     Ordered    02/03/24 0942  NPO Diet NPO Type: Sips with Meds, Ice Chips  Diet Effective Now        Comments: Ok for sips of clears    02/03/24 0941    01/28/24 1200  Dietary Nutrition Supplements Other (see comment); Ensure Clear Apple  Daily With Breakfast, Lunch & Dinner       01/28/24 0857                    Current TPN Regimen Recommendation:  Dextrose 15% / Amino Acid 5% at goal rate of 25 mL/hr.  No lipids today as tube feeds are increasing with hope to stop TPN soon.    Na 35 mEq/L  K 30 mEq/L  Ca 4.5 mEq/L  Mg 5 mEq/L  PO4 15 mmol/L       Assessment/Plan:  TPN for Prolonged Paralytic Ileus.    Patient has a PICC for central access.  Electrolytes are currently stable and within normal limits. Regular insulin ordered per sliding scale every 6 hours. Phosphorous, Magnesium, and Potassium replacement protocols are available.  TPN continued with reduced rate at 25 mL/hr.  Clinical Nutrition to manage macronutrients and Pharmacy to manage micronutrients as above.   Pharmacy will continue to follow and adjust TPN as clinically indicated.       Thank you,   Yazan Helton, PharmD  Pharmacy Resident  2/10/2024  13:23 EST

## 2024-02-10 NOTE — PLAN OF CARE
Problem: Adult Inpatient Plan of Care  Goal: Patient-Specific Goal (Individualized)  Outcome: Ongoing, Progressing  Goal: Absence of Hospital-Acquired Illness or Injury  Outcome: Ongoing, Progressing  Intervention: Identify and Manage Fall Risk  Recent Flowsheet Documentation  Taken 2/10/2024 1200 by Miguel A Gloria RN  Safety Promotion/Fall Prevention:   activity supervised   assistive device/personal items within reach   clutter free environment maintained   fall prevention program maintained   lighting adjusted   nonskid shoes/slippers when out of bed   room organization consistent   safety round/check completed  Taken 2/10/2024 0800 by Miguel A Gloria RN  Safety Promotion/Fall Prevention:   activity supervised   assistive device/personal items within reach   clutter free environment maintained   fall prevention program maintained   lighting adjusted   nonskid shoes/slippers when out of bed   room organization consistent   safety round/check completed  Intervention: Prevent Skin Injury  Recent Flowsheet Documentation  Taken 2/10/2024 1600 by Miguel A Gloria RN  Body Position: position changed independently  Skin Protection:   adhesive use limited   incontinence pads utilized   transparent dressing maintained   tubing/devices free from skin contact  Taken 2/10/2024 1200 by Miguel A Gloria RN  Body Position: position changed independently  Skin Protection:   adhesive use limited   incontinence pads utilized   transparent dressing maintained   tubing/devices free from skin contact  Taken 2/10/2024 0800 by Miguel A Gloria RN  Body Position: position changed independently  Skin Protection:   adhesive use limited   incontinence pads utilized   transparent dressing maintained   tubing/devices free from skin contact  Intervention: Prevent and Manage VTE (Venous Thromboembolism) Risk  Recent Flowsheet Documentation  Taken 2/10/2024 1600 by Miguel A Gloria RN  Activity Management: activity  encouraged  Taken 2/10/2024 1200 by Miugel A Gloria RN  Activity Management: activity encouraged  Taken 2/10/2024 0800 by Miguel A Gloria RN  Activity Management: activity encouraged  Range of Motion: active ROM (range of motion) encouraged  Intervention: Prevent Infection  Recent Flowsheet Documentation  Taken 2/10/2024 1600 by Miguel A Gloria RN  Infection Prevention:   environmental surveillance performed   hand hygiene promoted   rest/sleep promoted  Taken 2/10/2024 1200 by Miguel A Gloria RN  Infection Prevention:   environmental surveillance performed   hand hygiene promoted   rest/sleep promoted  Taken 2/10/2024 0800 by Miguel A Gloria RN  Infection Prevention:   environmental surveillance performed   hand hygiene promoted   rest/sleep promoted  Goal: Optimal Comfort and Wellbeing  Outcome: Ongoing, Progressing  Intervention: Provide Person-Centered Care  Recent Flowsheet Documentation  Taken 2/10/2024 0800 by Miguel A Gloria RN  Trust Relationship/Rapport:   care explained   choices provided  Goal: Readiness for Transition of Care  Outcome: Ongoing, Progressing     Problem: Skin Injury Risk Increased  Goal: Skin Health and Integrity  Outcome: Ongoing, Progressing  Intervention: Optimize Skin Protection  Recent Flowsheet Documentation  Taken 2/10/2024 1600 by Miguel A Gloria RN  Head of Bed ([facility]) Positioning: HOB at 45 degrees  Skin Protection:   adhesive use limited   incontinence pads utilized   transparent dressing maintained   tubing/devices free from skin contact  Taken 2/10/2024 1200 by Miguel A Gloria RN  Head of Bed ([facility]) Positioning: HOB at 45 degrees  Skin Protection:   adhesive use limited   incontinence pads utilized   transparent dressing maintained   tubing/devices free from skin contact  Taken 2/10/2024 0800 by Miguel A Gloria RN  Head of Bed ([facility]) Positioning: HOB at 45 degrees  Skin Protection:   adhesive use limited   incontinence pads utilized   transparent  dressing maintained   tubing/devices free from skin contact     Problem: COPD (Chronic Obstructive Pulmonary Disease) Comorbidity  Goal: Maintenance of COPD Symptom Control  Outcome: Ongoing, Progressing  Intervention: Maintain COPD-Symptom Control  Recent Flowsheet Documentation  Taken 2/10/2024 1200 by Miguel A Gloria RN  Medication Review/Management: medications reviewed  Taken 2/10/2024 0800 by Miguel A Gloria RN  Medication Review/Management: medications reviewed     Problem: Adjustment to Illness (Sepsis/Septic Shock)  Goal: Optimal Coping  Outcome: Ongoing, Progressing  Intervention: Optimize Psychosocial Adjustment to Illness  Recent Flowsheet Documentation  Taken 2/10/2024 0800 by Miguel A Gloria RN  Family/Support System Care:   self-care encouraged   support provided     Problem: Bleeding (Sepsis/Septic Shock)  Goal: Absence of Bleeding  Outcome: Ongoing, Progressing     Problem: Glycemic Control Impaired (Sepsis/Septic Shock)  Goal: Blood Glucose Level Within Desired Range  Outcome: Ongoing, Progressing  Intervention: Optimize Glycemic Control  Recent Flowsheet Documentation  Taken 2/10/2024 0800 by Miguel A Gloria RN  Glycemic Management: blood glucose monitored     Problem: Infection Progression (Sepsis/Septic Shock)  Goal: Absence of Infection Signs and Symptoms  Outcome: Ongoing, Progressing  Intervention: Initiate Sepsis Management  Recent Flowsheet Documentation  Taken 2/10/2024 1600 by Miguel A Gloria RN  Infection Prevention:   environmental surveillance performed   hand hygiene promoted   rest/sleep promoted  Taken 2/10/2024 1200 by Miguel A Gloria RN  Infection Prevention:   environmental surveillance performed   hand hygiene promoted   rest/sleep promoted  Taken 2/10/2024 0800 by Miguel A Gloria RN  Infection Prevention:   environmental surveillance performed   hand hygiene promoted   rest/sleep promoted  Intervention: Promote Recovery  Recent Flowsheet  Documentation  Taken 2/10/2024 1600 by Miguel A Gloria, RN  Activity Management: activity encouraged  Taken 2/10/2024 1200 by Miguel A Gloria RN  Activity Management: activity encouraged  Taken 2/10/2024 0800 by Miguel A Gloria RN  Activity Management: activity encouraged     Problem: Nutrition Impaired (Sepsis/Septic Shock)  Goal: Optimal Nutrition Intake  Outcome: Ongoing, Progressing     Problem: Fall Injury Risk  Goal: Absence of Fall and Fall-Related Injury  Outcome: Ongoing, Progressing  Intervention: Identify and Manage Contributors  Recent Flowsheet Documentation  Taken 2/10/2024 1200 by Miguel A Gloria RN  Medication Review/Management: medications reviewed  Taken 2/10/2024 0800 by Miguel A Gloria RN  Medication Review/Management: medications reviewed  Intervention: Promote Injury-Free Environment  Recent Flowsheet Documentation  Taken 2/10/2024 1200 by Miguel A Gloria RN  Safety Promotion/Fall Prevention:   activity supervised   assistive device/personal items within reach   clutter free environment maintained   fall prevention program maintained   lighting adjusted   nonskid shoes/slippers when out of bed   room organization consistent   safety round/check completed  Taken 2/10/2024 0800 by Miguel A Gloria, RN  Safety Promotion/Fall Prevention:   activity supervised   assistive device/personal items within reach   clutter free environment maintained   fall prevention program maintained   lighting adjusted   nonskid shoes/slippers when out of bed   room organization consistent   safety round/check completed   Goal Outcome Evaluation:   Plan of care reviewed with: Patient

## 2024-02-10 NOTE — PROGRESS NOTES
CPN/ EN Review Note    Patient Name: Roger Bhat  Date of Encounter: 02/10/24 12:59 EST  MRN: [id number]  Admission date: 2024    Reason for visit: CPN review . RD to continue to follow per protocol.     Pt resting bed bed, frail appearing, reports tolerating full liquids at breakfast, is drinking ensure clear, wants to keep those as his supplement    Per RN: pt tolerating TF, no nausea, ate small amount at breakfast    EMR reviewed: Ileostomy 200ml out past 24 hr  Medication reviewed: yes   Labs reviewed: yes     Est. Needs (24):  ~1800 kcal  ~87 g protein (1.5 g/kg)    Current diet: Adult Central CLINIMIX-E TPN  Diet: Liquid Diets; Full Liquid; Fluid Consistency: Thin (IDDSI 0)    Oral Intake: 1/2 bowl cereal this am    PN Route: PICC   PN:  Clinimix E-D15% AA5% @ 50 ml/hr,       GIR: 2.2 mg/kg/min       PN@ Goal over 24hr to Supply:  Volume 1200 mL/day       Energy 852 Kcal/day 47 % Est Need   Protein 60 g/day 69 % Est Need     ---------------------------------------------------------------------------  Formula/Rate verified at bedside: Yes  Infusing Rate at time of visit: 50 ml/hr     Average Delivery from Chartin Day:   PN Volume 309 mL/day      26 %goal   Lipid delivered?  0 mL/day     Energy  Kcal/day  % Est Need   Protein  g/day  % Est Need     EN: Peptamen 1.5 - Trophic   Goal Rate: 30 ml/hr  Water Flushes: 10 ml/hr  Modular: None  Route: NG  Tube: Large bore    At goal over: 20Hrs/day    Rx will supply:   Goal Volume 660 mL/day     Flush Volume 220 mL/day     Energy 990 Kcal/day 55 % Est Need   Protein 45 g/day 52 % Est Need   Fiber 0 g/day     Water in   mL     Total Water 728 mL     Meet DRI No        --------------------------------------------------------------------------  Product/Rate verified at bedside: Yes  Infusing Rate at time of visit: 30 ml/hr (surgeon advanced this am)    Average Delivery from Chartin Day:   Volume 111 mL/day 17  % Goal Vol.   Flush Volume 61  mL/day     Energy  Kcal/day  % Est Need   Protein  g/day  % Est Need   Fiber  g/day     Water in  EN  mL     Total Water  mL     Meet DRI No          Intervention:  Follow treatment plan  Care plan reviewed    Advance trophic EN regimen to 30 ml/hr today per surgeon:      Wean PN to:  Wean Clinimix E-D15% AA5% to 25ml     PN@ Goal over 24hr to Supply:  Volume 600 mL/day       Energy 426 Kcal/day 24 % Est Need   Protein 30 g/day 34 % Est Need   GIR=1.1     Pt to continue Full liquid diet/ensure clear as tolerated.     Do not anticipate pt will eat much yet    If continues to tolerate EN, suggest gradually advance to goal rate @55ml/hr, free water @10ml    TF at goal volume will provide 1210ml, 1815kcal,101% kcal needs,  82g protein, 94% protein needs, 1152ml free water    Plan for prior to d/c:  Wean nutrition support as tolerance PO intakes established  F/u for review of nutrition education for new ileostomy, malnutrition    Follow up:   Per protocol      Yue Dickerson RD, [facility]  12:59 EST  Time: 45min

## 2024-02-11 LAB
ANION GAP SERPL CALCULATED.3IONS-SCNC: 9 MMOL/L (ref 5–15)
BASOPHILS # BLD AUTO: 0.07 10*3/MM3 (ref 0–0.2)
BASOPHILS NFR BLD AUTO: 0.5 % (ref 0–1.5)
BUN SERPL-MCNC: 18 MG/DL (ref 8–23)
BUN/CREAT SERPL: 34.6 (ref 7–25)
CALCIUM SPEC-SCNC: 9.3 MG/DL (ref 8.6–10.5)
CHLORIDE SERPL-SCNC: 99 MMOL/L (ref 98–107)
CO2 SERPL-SCNC: 24 MMOL/L (ref 22–29)
CREAT SERPL-MCNC: 0.52 MG/DL (ref 0.76–1.27)
DEPRECATED RDW RBC AUTO: 56.2 FL (ref 37–54)
EGFRCR SERPLBLD CKD-EPI 2021: 114.7 ML/MIN/1.73
EOSINOPHIL # BLD AUTO: 0.49 10*3/MM3 (ref 0–0.4)
EOSINOPHIL NFR BLD AUTO: 3.8 % (ref 0.3–6.2)
ERYTHROCYTE [DISTWIDTH] IN BLOOD BY AUTOMATED COUNT: 14.9 % (ref 12.3–15.4)
GLUCOSE BLDC GLUCOMTR-MCNC: 100 MG/DL (ref 70–130)
GLUCOSE BLDC GLUCOMTR-MCNC: 112 MG/DL (ref 70–130)
GLUCOSE BLDC GLUCOMTR-MCNC: 113 MG/DL (ref 70–130)
GLUCOSE SERPL-MCNC: 103 MG/DL (ref 65–99)
HCT VFR BLD AUTO: 32.7 % (ref 37.5–51)
HGB BLD-MCNC: 11.1 G/DL (ref 13–17.7)
IMM GRANULOCYTES # BLD AUTO: 0.11 10*3/MM3 (ref 0–0.05)
IMM GRANULOCYTES NFR BLD AUTO: 0.9 % (ref 0–0.5)
LYMPHOCYTES # BLD AUTO: 2.23 10*3/MM3 (ref 0.7–3.1)
LYMPHOCYTES NFR BLD AUTO: 17.4 % (ref 19.6–45.3)
MAGNESIUM SERPL-MCNC: 2.3 MG/DL (ref 1.6–2.4)
MCH RBC QN AUTO: 34.7 PG (ref 26.6–33)
MCHC RBC AUTO-ENTMCNC: 33.9 G/DL (ref 31.5–35.7)
MCV RBC AUTO: 102.2 FL (ref 79–97)
MONOCYTES # BLD AUTO: 0.88 10*3/MM3 (ref 0.1–0.9)
MONOCYTES NFR BLD AUTO: 6.9 % (ref 5–12)
NEUTROPHILS NFR BLD AUTO: 70.5 % (ref 42.7–76)
NEUTROPHILS NFR BLD AUTO: 9.03 10*3/MM3 (ref 1.7–7)
NRBC BLD AUTO-RTO: 0 /100 WBC (ref 0–0.2)
PHOSPHATE SERPL-MCNC: 2.6 MG/DL (ref 2.5–4.5)
PLATELET # BLD AUTO: 482 10*3/MM3 (ref 140–450)
PMV BLD AUTO: 9.8 FL (ref 6–12)
POTASSIUM SERPL-SCNC: 4 MMOL/L (ref 3.5–5.2)
RBC # BLD AUTO: 3.2 10*6/MM3 (ref 4.14–5.8)
SODIUM SERPL-SCNC: 132 MMOL/L (ref 136–145)
WBC NRBC COR # BLD AUTO: 12.81 10*3/MM3 (ref 3.4–10.8)

## 2024-02-11 PROCEDURE — 25010000002 METOCLOPRAMIDE PER 10 MG: Performed by: INTERNAL MEDICINE

## 2024-02-11 PROCEDURE — 25010000002 MEROPENEM PER 100 MG: Performed by: INTERNAL MEDICINE

## 2024-02-11 PROCEDURE — 25010000002 METHYLNALTREXONE 12 MG/0.6ML SOLUTION: Performed by: INTERNAL MEDICINE

## 2024-02-11 PROCEDURE — 83735 ASSAY OF MAGNESIUM: CPT | Performed by: INTERNAL MEDICINE

## 2024-02-11 PROCEDURE — 94799 UNLISTED PULMONARY SVC/PX: CPT

## 2024-02-11 PROCEDURE — 25010000002 HEPARIN (PORCINE) PER 1000 UNITS: Performed by: INTERNAL MEDICINE

## 2024-02-11 PROCEDURE — 85025 COMPLETE CBC W/AUTO DIFF WBC: CPT | Performed by: INTERNAL MEDICINE

## 2024-02-11 PROCEDURE — 94664 DEMO&/EVAL PT USE INHALER: CPT

## 2024-02-11 PROCEDURE — 84100 ASSAY OF PHOSPHORUS: CPT | Performed by: INTERNAL MEDICINE

## 2024-02-11 PROCEDURE — 80048 BASIC METABOLIC PNL TOTAL CA: CPT | Performed by: INTERNAL MEDICINE

## 2024-02-11 PROCEDURE — 82948 REAGENT STRIP/BLOOD GLUCOSE: CPT

## 2024-02-11 PROCEDURE — 63710000001 REVEFENACIN 175 MCG/3ML SOLUTION: Performed by: INTERNAL MEDICINE

## 2024-02-11 PROCEDURE — 94761 N-INVAS EAR/PLS OXIMETRY MLT: CPT

## 2024-02-11 PROCEDURE — 63710000001 ONDANSETRON ODT 4 MG TABLET DISPERSIBLE: Performed by: INTERNAL MEDICINE

## 2024-02-11 RX ADMIN — BUPROPION HYDROCHLORIDE 50 MG: 100 TABLET, FILM COATED ORAL at 14:50

## 2024-02-11 RX ADMIN — BUPROPION HYDROCHLORIDE 50 MG: 100 TABLET, FILM COATED ORAL at 20:13

## 2024-02-11 RX ADMIN — Medication 10 ML: at 08:20

## 2024-02-11 RX ADMIN — BISACODYL 10 MG: 5 TABLET, COATED ORAL at 08:20

## 2024-02-11 RX ADMIN — METOCLOPRAMIDE HYDROCHLORIDE 10 MG: 5 INJECTION INTRAMUSCULAR; INTRAVENOUS at 16:33

## 2024-02-11 RX ADMIN — THIAMINE HCL TAB 100 MG 100 MG: 100 TAB at 08:20

## 2024-02-11 RX ADMIN — ROSUVASTATIN CALCIUM 10 MG: 10 TABLET, FILM COATED ORAL at 20:13

## 2024-02-11 RX ADMIN — BUDESONIDE AND FORMOTEROL FUMARATE DIHYDRATE 2 PUFF: 160; 4.5 AEROSOL RESPIRATORY (INHALATION) at 11:17

## 2024-02-11 RX ADMIN — FOLIC ACID 1 MG: 1 TABLET ORAL at 08:20

## 2024-02-11 RX ADMIN — METOCLOPRAMIDE HYDROCHLORIDE 10 MG: 5 INJECTION INTRAMUSCULAR; INTRAVENOUS at 11:57

## 2024-02-11 RX ADMIN — MEROPENEM 2000 MG: 1 INJECTION, POWDER, FOR SOLUTION INTRAVENOUS at 16:33

## 2024-02-11 RX ADMIN — METHYLNALTREXONE BROMIDE 8 MG: 12 INJECTION, SOLUTION SUBCUTANEOUS at 08:20

## 2024-02-11 RX ADMIN — HEPARIN SODIUM 5000 UNITS: 5000 INJECTION INTRAVENOUS; SUBCUTANEOUS at 20:13

## 2024-02-11 RX ADMIN — Medication 10 ML: at 20:14

## 2024-02-11 RX ADMIN — REVEFENACIN 175 MCG: 175 SOLUTION RESPIRATORY (INHALATION) at 11:17

## 2024-02-11 RX ADMIN — HEPARIN SODIUM 5000 UNITS: 5000 INJECTION INTRAVENOUS; SUBCUTANEOUS at 05:19

## 2024-02-11 RX ADMIN — BUDESONIDE AND FORMOTEROL FUMARATE DIHYDRATE 2 PUFF: 160; 4.5 AEROSOL RESPIRATORY (INHALATION) at 21:02

## 2024-02-11 RX ADMIN — ONDANSETRON 4 MG: 4 TABLET, ORALLY DISINTEGRATING ORAL at 08:22

## 2024-02-11 RX ADMIN — PANTOPRAZOLE SODIUM 40 MG: 40 INJECTION, POWDER, LYOPHILIZED, FOR SOLUTION INTRAVENOUS at 08:20

## 2024-02-11 RX ADMIN — MEROPENEM 2000 MG: 1 INJECTION, POWDER, FOR SOLUTION INTRAVENOUS at 09:50

## 2024-02-11 NOTE — PROGRESS NOTES
"Patient Name:  Roger Bhat  YOB: 1960  [id number]    Surgery Progress Note    Date of visit: 2/11/2024      Subjective: No acute events.  Reports no problems.  Feeling much better than he has in quite a while.  Tolerating tube feeds at 30 mL/h without difficulty.  Tolerated full liquids yesterday without difficulty.          Objective:     /88 (BP Location: Left arm, Patient Position: Sitting)   Pulse 85   Temp 96.7 °F (35.9 °C) (Oral)   Resp 18   Ht 175.3 cm (69\")   Wt 50.5 kg (111 lb 4.8 oz)   SpO2 96%   BMI 16.44 kg/m²     Intake/Output Summary (Last 24 hours) at 2/11/2024 1002  Last data filed at 2/11/2024 0523  Gross per 24 hour   Intake --   Output 2400 ml   Net -2400 ml       GEN:   Awake, alert, sitting up in bed, chronically ill-appearing in no obvious distress  CV:      Regular rate and rhythm  L:         Symmetric expansion, on oxygen by nasal cannula  Abd:    Soft, not obviously distended, appropriately tender palpation throughout the abdomen, midline incision with some reactive erythema surrounding the site and serous drainage, DELROY drain in the right lower quadrant with cloudy serous output, ileostomy on the right side pink and patent with liquid stool in the bag  Ext:     No cyanosis, clubbing, or edema    Recent labs that are back at this time have been reviewed.           Assessment/ Plan:    Mr. Bhat is a 63-year-old gentleman who is admitted following operative intervention for gallbladder and sigmoid colon mass on January 17     #Sigmoid colon mass, gallbladder mass  # Ileus  -S/p lap vladimir, open sigmoid colectomy with diverting loop ileostomy on January 17, 2024. S/p exploratory laparotomy with washout on January 30  -Afebrile.  White blood cell count up slightly at 12 but otherwise clinically doing well  -Tolerating tube feeds well.  Advance to goal  -Advance to regular diet  -Wean off TPN  -Continue antibiotics empirically  -Mobilize         Jimmy V " MD Rafa  2/11/2024  10:02 EST

## 2024-02-11 NOTE — PROGRESS NOTES
CPN/ EN Review Note    Patient Name: Roger Bhat  Date of Encounter: 02/11/24 12:46 EST  MRN: [id number]  Admission date: 1/17/2024    Reason for visit: CPN review . RD to continue to follow per protocol.     Pt resting in bed, in good spirits, is eager to start eating solid food    Per RN: pt tolerating trophic feed    EMR reviewed: Ileostomy 200ml out past 24 hr  Medication reviewed: yes   Labs reviewed: yes     Est. Needs (2/8/24):  ~1800 kcal  ~87 g protein (1.5 g/kg)    Current diet: Adult Central CLINIMIX-E TPN  Diet: Gastrointestinal Diets; Fiber-Restricted; Fluid Consistency: Thin (IDDSI 0)  Ensure Clear 3x/day  Oral Intake: insufficient data    PN Route: PICC   PN:  Clinimix E-D15% AA5% @ 25 ml/hr,            PN@ Goal over 24hr to Supply:  Volume 600 mL/day       Energy 426 Kcal/day 24 % Est Need   Protein 30 g/day 34 % Est Need     ---------------------------------------------------------------------------  Formula/Rate verified at bedside: Yes  Infusing Rate at time of visit: 25 ml/hr     Average Delivery from Charting: Insufficient data   PN Volume  mL/day     %goal   Lipid delivered?   mL/day     Energy  Kcal/day  % Est Need   Protein  g/day  % Est Need     EN: Peptamen 1.5 - Trophic   Goal Rate: 30 ml/hr  Water Flushes: 10 ml/hr  Modular: None  Route: NG  Tube: Large bore    At goal over: 20Hrs/day    Rx will supply:   Goal Volume 660 mL/day     Flush Volume 220 mL/day     Energy 990 Kcal/day 55 % Est Need   Protein 45 g/day 52 % Est Need   Fiber 0 g/day     Water in   mL     Total Water 728 mL     Meet DRI No        --------------------------------------------------------------------------  Product/Rate verified at bedside: Yes  Infusing Rate at time of visit: 30 ml/hr    Average Delivery from Charting: Insufficient data   Volume  mL/day   % Goal Vol.   Flush Volume  mL/day     Energy  Kcal/day  % Est Need   Protein  g/day  % Est Need   Fiber  g/day     Water in  EN  mL     Total Water   mL     Meet DRI No          Intervention:  Follow treatment plan  Care plan reviewed    Wean off PN, let current bag run out     Diet advanced to GI Soft today, but has had minimal po intake thus far    Do not anticipate pt will eat much yet    Rec gradually advance TF to goal rate @55ml/hr, free water @10ml    TF at goal volume will provide 1210ml, 1815kcal,101% kcal needs,  82g protein, 94% protein needs, 1152ml free water    Plan for prior to d/c:  Wean nutrition support as tolerance PO intakes established  F/u for review of nutrition education for new ileostomy, malnutrition    Follow up:   Per protocol      Yue Dcikerson RD, [facility]  12:46 EST  Time: 30min

## 2024-02-11 NOTE — NURSING NOTE
Pt called RN into the room pt had a large brown mucus filled bm. MD paged and aware. No new orders at this time Will continue to monitor.

## 2024-02-11 NOTE — PROGRESS NOTES
[facility] Medicine Services  CLINICAL REVIEW NOTE    Patient Name: Roger Bhat  : 1960  MRN: [id number]    Date of Admission: 2024  Length of Stay: 25  Attending Service:  General Surgery          Patient's labs, charting, and events reviewed.  No active medical management issues to address today, the Hospitalist team will continue to follow daily, be available for any needs, and see or bill for services as needed.

## 2024-02-12 LAB
ANION GAP SERPL CALCULATED.3IONS-SCNC: 10 MMOL/L (ref 5–15)
BASOPHILS # BLD AUTO: 0.08 10*3/MM3 (ref 0–0.2)
BASOPHILS NFR BLD AUTO: 0.6 % (ref 0–1.5)
BUN SERPL-MCNC: 18 MG/DL (ref 8–23)
BUN/CREAT SERPL: 39.1 (ref 7–25)
CALCIUM SPEC-SCNC: 9.1 MG/DL (ref 8.6–10.5)
CHLORIDE SERPL-SCNC: 101 MMOL/L (ref 98–107)
CO2 SERPL-SCNC: 23 MMOL/L (ref 22–29)
CREAT SERPL-MCNC: 0.46 MG/DL (ref 0.76–1.27)
DEPRECATED RDW RBC AUTO: 56.7 FL (ref 37–54)
EGFRCR SERPLBLD CKD-EPI 2021: 119 ML/MIN/1.73
EOSINOPHIL # BLD AUTO: 0.49 10*3/MM3 (ref 0–0.4)
EOSINOPHIL NFR BLD AUTO: 3.8 % (ref 0.3–6.2)
ERYTHROCYTE [DISTWIDTH] IN BLOOD BY AUTOMATED COUNT: 15.1 % (ref 12.3–15.4)
GLUCOSE BLDC GLUCOMTR-MCNC: 110 MG/DL (ref 70–130)
GLUCOSE BLDC GLUCOMTR-MCNC: 114 MG/DL (ref 70–130)
GLUCOSE BLDC GLUCOMTR-MCNC: 117 MG/DL (ref 70–130)
GLUCOSE BLDC GLUCOMTR-MCNC: 118 MG/DL (ref 70–130)
GLUCOSE BLDC GLUCOMTR-MCNC: 99 MG/DL (ref 70–130)
GLUCOSE SERPL-MCNC: 99 MG/DL (ref 65–99)
HCT VFR BLD AUTO: 33.8 % (ref 37.5–51)
HGB BLD-MCNC: 11.6 G/DL (ref 13–17.7)
IMM GRANULOCYTES # BLD AUTO: 0.2 10*3/MM3 (ref 0–0.05)
IMM GRANULOCYTES NFR BLD AUTO: 1.5 % (ref 0–0.5)
LYMPHOCYTES # BLD AUTO: 2.19 10*3/MM3 (ref 0.7–3.1)
LYMPHOCYTES NFR BLD AUTO: 16.9 % (ref 19.6–45.3)
MAGNESIUM SERPL-MCNC: 2.1 MG/DL (ref 1.6–2.4)
MCH RBC QN AUTO: 35.4 PG (ref 26.6–33)
MCHC RBC AUTO-ENTMCNC: 34.3 G/DL (ref 31.5–35.7)
MCV RBC AUTO: 103 FL (ref 79–97)
MONOCYTES # BLD AUTO: 0.97 10*3/MM3 (ref 0.1–0.9)
MONOCYTES NFR BLD AUTO: 7.5 % (ref 5–12)
NEUTROPHILS NFR BLD AUTO: 69.7 % (ref 42.7–76)
NEUTROPHILS NFR BLD AUTO: 9.01 10*3/MM3 (ref 1.7–7)
NRBC BLD AUTO-RTO: 0 /100 WBC (ref 0–0.2)
PHOSPHATE SERPL-MCNC: 2.4 MG/DL (ref 2.5–4.5)
PLATELET # BLD AUTO: 503 10*3/MM3 (ref 140–450)
PMV BLD AUTO: 10.3 FL (ref 6–12)
POTASSIUM SERPL-SCNC: 4.7 MMOL/L (ref 3.5–5.2)
RBC # BLD AUTO: 3.28 10*6/MM3 (ref 4.14–5.8)
SODIUM SERPL-SCNC: 134 MMOL/L (ref 136–145)
WBC NRBC COR # BLD AUTO: 12.94 10*3/MM3 (ref 3.4–10.8)

## 2024-02-12 PROCEDURE — 97116 GAIT TRAINING THERAPY: CPT

## 2024-02-12 PROCEDURE — 63710000001 REVEFENACIN 175 MCG/3ML SOLUTION: Performed by: INTERNAL MEDICINE

## 2024-02-12 PROCEDURE — 84100 ASSAY OF PHOSPHORUS: CPT | Performed by: INTERNAL MEDICINE

## 2024-02-12 PROCEDURE — 25010000002 DIPHENHYDRAMINE PER 50 MG: Performed by: INTERNAL MEDICINE

## 2024-02-12 PROCEDURE — 25010000002 METOCLOPRAMIDE PER 10 MG: Performed by: INTERNAL MEDICINE

## 2024-02-12 PROCEDURE — 82948 REAGENT STRIP/BLOOD GLUCOSE: CPT

## 2024-02-12 PROCEDURE — 80048 BASIC METABOLIC PNL TOTAL CA: CPT | Performed by: INTERNAL MEDICINE

## 2024-02-12 PROCEDURE — 83735 ASSAY OF MAGNESIUM: CPT | Performed by: INTERNAL MEDICINE

## 2024-02-12 PROCEDURE — 85025 COMPLETE CBC W/AUTO DIFF WBC: CPT | Performed by: INTERNAL MEDICINE

## 2024-02-12 PROCEDURE — 97535 SELF CARE MNGMENT TRAINING: CPT

## 2024-02-12 PROCEDURE — 94799 UNLISTED PULMONARY SVC/PX: CPT

## 2024-02-12 PROCEDURE — 94664 DEMO&/EVAL PT USE INHALER: CPT

## 2024-02-12 PROCEDURE — 25010000002 MEROPENEM PER 100 MG: Performed by: INTERNAL MEDICINE

## 2024-02-12 PROCEDURE — 97530 THERAPEUTIC ACTIVITIES: CPT

## 2024-02-12 PROCEDURE — 94761 N-INVAS EAR/PLS OXIMETRY MLT: CPT

## 2024-02-12 PROCEDURE — 25010000002 HEPARIN (PORCINE) PER 1000 UNITS: Performed by: INTERNAL MEDICINE

## 2024-02-12 RX ADMIN — DIPHENHYDRAMINE HYDROCHLORIDE 25 MG: 50 INJECTION, SOLUTION INTRAMUSCULAR; INTRAVENOUS at 00:17

## 2024-02-12 RX ADMIN — METOCLOPRAMIDE HYDROCHLORIDE 10 MG: 5 INJECTION INTRAMUSCULAR; INTRAVENOUS at 12:13

## 2024-02-12 RX ADMIN — METOCLOPRAMIDE HYDROCHLORIDE 10 MG: 5 INJECTION INTRAMUSCULAR; INTRAVENOUS at 00:10

## 2024-02-12 RX ADMIN — METOCLOPRAMIDE HYDROCHLORIDE 10 MG: 5 INJECTION INTRAMUSCULAR; INTRAVENOUS at 05:07

## 2024-02-12 RX ADMIN — MEROPENEM 2000 MG: 1 INJECTION, POWDER, FOR SOLUTION INTRAVENOUS at 00:10

## 2024-02-12 RX ADMIN — HEPARIN SODIUM 5000 UNITS: 5000 INJECTION INTRAVENOUS; SUBCUTANEOUS at 13:55

## 2024-02-12 RX ADMIN — THIAMINE HCL TAB 100 MG 100 MG: 100 TAB at 08:26

## 2024-02-12 RX ADMIN — FOLIC ACID 1 MG: 1 TABLET ORAL at 08:26

## 2024-02-12 RX ADMIN — BUDESONIDE AND FORMOTEROL FUMARATE DIHYDRATE 2 PUFF: 160; 4.5 AEROSOL RESPIRATORY (INHALATION) at 22:04

## 2024-02-12 RX ADMIN — BUDESONIDE AND FORMOTEROL FUMARATE DIHYDRATE 2 PUFF: 160; 4.5 AEROSOL RESPIRATORY (INHALATION) at 08:03

## 2024-02-12 RX ADMIN — Medication 10 ML: at 08:27

## 2024-02-12 RX ADMIN — METOCLOPRAMIDE HYDROCHLORIDE 10 MG: 5 INJECTION INTRAMUSCULAR; INTRAVENOUS at 17:05

## 2024-02-12 RX ADMIN — BUPROPION HYDROCHLORIDE 50 MG: 100 TABLET, FILM COATED ORAL at 05:07

## 2024-02-12 RX ADMIN — BUPROPION HYDROCHLORIDE 50 MG: 100 TABLET, FILM COATED ORAL at 13:56

## 2024-02-12 RX ADMIN — HEPARIN SODIUM 5000 UNITS: 5000 INJECTION INTRAVENOUS; SUBCUTANEOUS at 20:10

## 2024-02-12 RX ADMIN — PANTOPRAZOLE SODIUM 40 MG: 40 INJECTION, POWDER, LYOPHILIZED, FOR SOLUTION INTRAVENOUS at 08:26

## 2024-02-12 RX ADMIN — BUPROPION HYDROCHLORIDE 50 MG: 100 TABLET, FILM COATED ORAL at 20:10

## 2024-02-12 RX ADMIN — ROSUVASTATIN CALCIUM 10 MG: 10 TABLET, FILM COATED ORAL at 20:09

## 2024-02-12 RX ADMIN — MEROPENEM 2000 MG: 1 INJECTION, POWDER, FOR SOLUTION INTRAVENOUS at 08:26

## 2024-02-12 RX ADMIN — Medication 10 ML: at 20:10

## 2024-02-12 RX ADMIN — REVEFENACIN 175 MCG: 175 SOLUTION RESPIRATORY (INHALATION) at 08:03

## 2024-02-12 RX ADMIN — HEPARIN SODIUM 5000 UNITS: 5000 INJECTION INTRAVENOUS; SUBCUTANEOUS at 05:07

## 2024-02-12 NOTE — NURSING NOTE
[facility] visit for Stoma care and assessment    Surgery date: 01/17/24  Surgical Procedures Since Admission:  Procedure(s):  CHOLECYSTECTOMY LAPAROSCOPIC  OPEN SIGMOID COLECTOMY, LIVER BIOPSY, DIVERTING LOOP ILEOSTOMY CREATION, TAKE DOWN OF THE SPLENIC FLEXURE, AND APPENDECTOMY  Surgeon:  Jimmy Craig MD  Status:  26 Days Post-Op  -------------------    Procedure(s):  LAPAROTOMY EXPLORATORY  Surgeon:  Jimmy Craig MD  Status:  13 Days Post-Op  -------------------     Patient seen in chair, appears more alert and strong than I have ever seen him.  Medical/Surgical Floor. Ambar not at beside.     Stoma Type: Loop Ileostomy  Diameter/shape: [id number]  Location: RLQ  Protrusion: Budded  Stoma Characteristics: Edematous, Moist, and Pink  Peristomal skin: Intact, slight erythema no breakdown  Character of output:scant amounts in bag, not enough to measure  Current pouching system: 2 and three-quarter flat with paste applied by nursing staff, apparently it had just started leaking  Accessory products, appliance placed: Bam 2 piece 2 and three-quarter flat with barrier ring placed to high output bag   Goal wear time:5-7 days  Last appliance change: 2/12 by me    Surgical Incision: Midline abdominal incision  Degree of approximation: 100  Approximating Devices: staples  Drainage Characteristics:none  Method of Management: open    Drain(s) type: DELROY drain  Effluent characteristics: Not assessed today    Education provided:   Ambulation promoted  Planning for 1 more appliance change for patient is discharged to review process with him and Ambar.  Discharge supplies and [employer] catalog was given temporary and apparently she has taken at home which is fine.  Will plan on seeing patient in outpatient clinic based on their schedule in the next few weeks.    [facility] Nurse will continue to follow for ostomy education. Please contact #[phone] with questions or if ostomy leaks occur.

## 2024-02-12 NOTE — PLAN OF CARE
Problem: Adult Inpatient Plan of Care  Goal: Patient-Specific Goal (Individualized)  Outcome: Ongoing, Progressing  Goal: Absence of Hospital-Acquired Illness or Injury  Outcome: Ongoing, Progressing  Intervention: Identify and Manage Fall Risk  Recent Flowsheet Documentation  Taken 2/12/2024 0830 by Yue Alan RN  Safety Promotion/Fall Prevention: activity supervised  Intervention: Prevent Skin Injury  Recent Flowsheet Documentation  Taken 2/12/2024 0830 by Yue Alan RN  Body Position: weight shifting  Skin Protection: adhesive use limited  Intervention: Prevent and Manage VTE (Venous Thromboembolism) Risk  Recent Flowsheet Documentation  Taken 2/12/2024 0800 by Yue Alan RN  Activity Management: up to bedside commode  Goal: Optimal Comfort and Wellbeing  Outcome: Ongoing, Progressing  Goal: Readiness for Transition of Care  Outcome: Ongoing, Progressing     Problem: Skin Injury Risk Increased  Goal: Skin Health and Integrity  Outcome: Ongoing, Progressing  Intervention: Promote and Optimize Oral Intake  Recent Flowsheet Documentation  Taken 2/12/2024 0830 by Yue Alan RN  Oral Nutrition Promotion: rest periods promoted  Intervention: Optimize Skin Protection  Recent Flowsheet Documentation  Taken 2/12/2024 0830 by Yue Alan RN  Pressure Reduction Techniques: frequent weight shift encouraged  Head of Bed (HOB) Positioning: HOB at 45 degrees  Pressure Reduction Devices: positioning supports utilized  Skin Protection: adhesive use limited     Problem: COPD (Chronic Obstructive Pulmonary Disease) Comorbidity  Goal: Maintenance of COPD Symptom Control  Outcome: Ongoing, Progressing     Problem: Adjustment to Illness (Sepsis/Septic Shock)  Goal: Optimal Coping  Outcome: Ongoing, Progressing     Problem: Bleeding (Sepsis/Septic Shock)  Goal: Absence of Bleeding  Outcome: Ongoing, Progressing  Intervention: Monitor and Manage Bleeding  Recent Flowsheet Documentation  Taken  2/12/2024 0830 by Yue Alan, RN  Bleeding Precautions: monitored for signs of bleeding  Bleeding Management: dressing monitored     Problem: Glycemic Control Impaired (Sepsis/Septic Shock)  Goal: Blood Glucose Level Within Desired Range  Outcome: Ongoing, Progressing  Intervention: Optimize Glycemic Control  Recent Flowsheet Documentation  Taken 2/12/2024 0830 by Yue Alan, RN  Glycemic Management: blood glucose monitored     Problem: Infection Progression (Sepsis/Septic Shock)  Goal: Absence of Infection Signs and Symptoms  Outcome: Ongoing, Progressing  Intervention: Promote Recovery  Recent Flowsheet Documentation  Taken 2/12/2024 0800 by Yue Alan, RN  Activity Management: up to bedside commode  Intervention: Promote Stabilization  Recent Flowsheet Documentation  Taken 2/12/2024 0830 by Yue Alan RN  Fluid/Electrolyte Management: fluids provided     Problem: Nutrition Impaired (Sepsis/Septic Shock)  Goal: Optimal Nutrition Intake  Outcome: Ongoing, Progressing     Problem: Fall Injury Risk  Goal: Absence of Fall and Fall-Related Injury  Outcome: Ongoing, Progressing  Intervention: Promote Injury-Free Environment  Recent Flowsheet Documentation  Taken 2/12/2024 0830 by Yue Alan RN  Safety Promotion/Fall Prevention: activity supervised   Goal Outcome Evaluation:

## 2024-02-12 NOTE — PROGRESS NOTES
[facility] Medicine Services  CLINICAL REVIEW NOTE    Patient Name: Roger Bhat  : 1960  MRN: [id number]    Date of Admission: 2024  Length of Stay: 26  Attending Service:  General Surgery          Patient's labs, charting, and events reviewed.  No active medical management issues to address today, the Hospitalist team will continue to follow daily, be available for any needs, and see or bill for services as needed.      Monitor WBC. Stable at 12. Continue current POC per Gen Surg. Plan is [facility] at [place].

## 2024-02-12 NOTE — PLAN OF CARE
Goal Outcome Evaluation:  Plan of Care Reviewed With: patient        Progress: improving  Outcome Evaluation: Physical therapy treatment complete. Patient increased ambulation distance compared to previous treatment session, but continues to fatigue quickly. The patient continues to present below baseline for functional mobility. The patient would continue to benefit from skilled PT to address strength, balance and activity tolerance deficits. Continue to current PT POC      Anticipated Discharge Disposition (PT): inpatient rehabilitation facility

## 2024-02-12 NOTE — PLAN OF CARE
Goal Outcome Evaluation:  Plan of Care Reviewed With: patient        Progress: improving  Outcome Evaluation: Pt progressed from max A to CGA to don socks, min A UBD d/t line mgmt, setup to comb hair,  CGA to ambulate in hallway with RW.  Pt progressign, but continues below baseline with self care/mobility  d/t weakenss and decr activity tolerance.  Recommend IP rehab upon d/c.

## 2024-02-12 NOTE — PROGRESS NOTES
"Patient Name:  Roger Bhat  YOB: 1960  [id number]    Surgery Progress Note    Date of visit: 2/12/2024      Subjective: No acute events.  Feeling very well.  Had a bowel movement per rectum yesterday.  Otherwise stoma with 275 mL of output recorded.  Ate to pork chops yesterday and mashed potatoes.  Had 2 Ensure shakes.  Denies nausea, vomiting, fevers or chills          Objective:     BP (!) 135/104 (BP Location: Left arm, Patient Position: Sitting)   Pulse 106   Temp 97.2 °F (36.2 °C) (Oral)   Resp 16   Ht 175.3 cm (69\")   Wt 50.8 kg (112 lb) Comment: bed scale  SpO2 92%   BMI 16.54 kg/m²     Intake/Output Summary (Last 24 hours) at 2/12/2024 1033  Last data filed at 2/12/2024 0950  Gross per 24 hour   Intake 3596 ml   Output 2190 ml   Net 1406 ml     GEN:   Awake, alert, sitting up in bed, chronically ill-appearing in no obvious distress  CV:      Regular rate and rhythm  L:         Symmetric expansion, on oxygen by nasal cannula  Abd:    Soft, not obviously distended, appropriately tender palpation throughout the abdomen, midline incision with some reactive erythema surrounding the site and serous drainage, DELROY drain in the right lower quadrant with cloudy serous output, ileostomy on the right side pink and patent with liquid stool in the bag  Ext:     No cyanosis, clubbing, or edema      Recent labs that are back at this time have been reviewed.           Assessment/ Plan:    Mr. Bhat is a 63-year-old gentleman who is admitted following operative intervention for gallbladder and sigmoid colon mass on January 17     #Sigmoid colon mass, gallbladder mass  # Ileus  -S/p lap vladimir, open sigmoid colectomy with diverting loop ileostomy on January 17, 2024. S/p exploratory laparotomy with washout on January 30  -Afebrile.  Labs are stable  -Tolerating tube feeds well.  Also tolerating oral diet without difficulty  -Discontinue antibiotics today  -Mobilize  -Should be ready for discharge " to rehab in the next day or 2.  Do not expect that he will need to continue on tube feeds at discharge as his oral intake is improving well.      Jimmy Craig MD  2/12/2024  10:33 EST

## 2024-02-12 NOTE — THERAPY PROGRESS REPORT/RE-CERT
"Patient Name: Roger Bhat  : 1960    MRN: [id number]                              Today's Date: 2024       Admit Date: 2024    Visit Dx:     ICD-10-CM ICD-9-CM   1. Colonic mass  K63.89 569.89   2. Gallbladder mass  K82.8 575.8   3. Bowel obstruction  K56.609 560.9     Patient Active Problem List   Diagnosis    Current smoker    Cellulitis of right hand    Gastroesophageal reflux disease without esophagitis    Screening, lipid    Dysphagia    Hereditary hemochromatosis    Gallbladder mass    Colonic mass    Severe malnutrition    Respiratory distress    Centrilobular emphysema     Past Medical History:   Diagnosis Date    Allergies     Cellulitis of hand     Clotting disorder     \"FREE BLEEDING\"    Colonic mass     COPD (chronic obstructive pulmonary disease)     Cough     Current smoker     Erectile dysfunction     Fracture, foot Sept 2022    GERD (gastroesophageal reflux disease)     Hemochromatosis     Hyperlipidemia     Hypertension     Wears dentures     FULL SET     Past Surgical History:   Procedure Laterality Date    CHOLECYSTECTOMY N/A 2024    Procedure: CHOLECYSTECTOMY LAPAROSCOPIC;  Surgeon: Jimmy Craig MD;  Location: [facility] OR;  Service: General;  Laterality: N/A;    COLON RESECTION N/A 2024    Procedure: OPEN SIGMOID COLECTOMY, LIVER BIOPSY, DIVERTING LOOP ILEOSTOMY CREATION, TAKE DOWN OF THE SPLENIC FLEXURE, AND APPENDECTOMY;  Surgeon: Jimmy Craig MD;  Location: [facility] OR;  Service: General;  Laterality: N/A;    COLONOSCOPY      ENDOSCOPY  2016    pt say's, he was placed under general anesthesia for this    ENDOSCOPY N/A 10/19/2021    Procedure: ESOPHAGOGASTRODUODENOSCOPY;  Surgeon: Osmel Kessler MD;  Location:  [facility];  Service: Gastroenterology;  Laterality: N/A;  24 fr savory dilator used at 1251, 27 fr sdavory used at 1253, 33 [place] savoruy used at 1257, 42 fr savory used at 1259      ENDOSCOPY N/A 2021    Procedure: " ESOPHAGOGASTRODUODENOSCOPY WITH DILATATION;  Surgeon: Osmel Kessler MD;  Location: [facility] ENDOSCOPY;  Service: Gastroenterology;  Laterality: N/A;  Dilation with 48fr savery    EXPLORATORY LAPAROTOMY N/A 1/30/2024    Procedure: LAPAROTOMY EXPLORATORY;  Surgeon: Jimmy Craig MD;  Location:  [facility] OR;  Service: General;  Laterality: N/A;    HAND SURGERY  2022      General Information       Row Name 02/12/24 0905          OT Time and Intention    Document Type progress note/recertification  -     Mode of Treatment occupational therapy  -       Row Name 02/12/24 0905          General Information    Patient Profile Reviewed yes  -     Existing Precautions/Restrictions fall  NG; ostomy; abdominal incision; DELROY  -     Barriers to Rehab medically complex  -       Row Name 02/12/24 0905          Cognition    Orientation Status (Cognition) oriented x 3  -       Row Name 02/12/24 0905          Safety Issues, Functional Mobility    Safety Issues Affecting Function (Mobility) insight into deficits/self-awareness;safety precaution awareness  -     Impairments Affecting Function (Mobility) balance;pain;strength;endurance/activity tolerance  -               User Key  (r) = Recorded By, (t) = Taken By, (c) = Cosigned By      Initials Name Provider Type     Ely Allison OT Occupational Therapist                     Mobility/ADL's       Row Name 02/12/24 1053          Bed Mobility    Bed Mobility supine-sit  -     Supine-Sit [place] (Bed Mobility) standby assist  -     Assistive Device (Bed Mobility) bed rails;head of bed elevated  -       Row Name 02/12/24 1053          Transfers    Transfers sit-stand transfer  -       Row Name 02/12/24 1053          Sit-Stand Transfer    Sit-Stand [place] (Transfers) verbal cues;contact guard  -     Assistive Device (Sit-Stand Transfers) walker, front-wheeled  -       Row Name 02/12/24 1053          Functional Mobility    Functional Mobility-  Ind. Level verbal cues required;contact guard assist  -AC     Functional Mobility- Device walker, front-wheeled  -     Functional Mobility-Distance (Feet) --  in hallway  -     Functional Mobility- Comment VCs for upright posture and to keep walker closer  -       Row Name 02/12/24 1053          Activities of Daily Living    BADL Assessment/Intervention lower body dressing;grooming;upper body dressing  -       Row Name 02/12/24 1053          Grooming Assessment/Training    [place] Level (Grooming) hair care, combing/brushing;set up  -AC     Position (Grooming) edge of bed sitting  -AC       Row Name 02/12/24 1053          Lower Body Dressing Assessment/Training    [place] Level (Lower Body Dressing) don;socks;contact guard assist  -AC     Position (Lower Body Dressing) unsupported sitting  -AC       Row Name 02/12/24 1053          Upper Body Dressing Assessment/Training    [place] Level (Upper Body Dressing) don;doff;minimum assist (75% patient effort)  d/t line mgmt  -AC     Position (Upper Body Dressing) unsupported sitting  -AC               User Key  (r) = Recorded By, (t) = Taken By, (c) = Cosigned By      Initials Name Provider Type    AC Ely Allison, OT Occupational Therapist                   Obj/Interventions    No documentation.                  Goals/Plan       Row Name 02/12/24 1212          Transfer Goal 1 (OT)    Activity/Assistive Device (Transfer Goal 1, OT) toilet;bed-to-chair/chair-to-bed;sit-to-stand/stand-to-sit  -     [place] Level/Cues Needed (Transfer Goal 1, OT) standby assist  -     Time Frame (Transfer Goal 1, OT) by discharge  -     Progress/Outcome (Transfer Goal 1, OT) new goal;goal ongoing  -       Row Name 02/12/24 1212          Dressing Goal 1 (OT)    Activity/Device (Dressing Goal 1, OT) lower body dressing  -AC     [place]/Cues Needed (Dressing Goal 1, OT) moderate assist (50-74% patient effort)  -     Time Frame (Dressing Goal 1, OT)  long term goal (LTG);10 days  -AC     Strategies/Barriers (Dressing Goal 1, OT) met without AD  -AC     Progress/Outcome (Dressing Goal 1, OT) goal met  -AC       Row Name 02/12/24 1212          Grooming Goal 1 (OT)    Activity/Device (Grooming Goal 1, OT) hair care;oral care  -AC     [place] (Grooming Goal 1, OT) standby assist  -AC     Time Frame (Grooming Goal 1, OT) long term goal (LTG);10 days  -AC     Strategies/Barriers (Grooming Goal 1, OT) at sink w/ EC/WS techniques  -AC     Progress/Outcome (Grooming Goal 1, OT) goal ongoing  -AC               User Key  (r) = Recorded By, (t) = Taken By, (c) = Cosigned By      Initials Name Provider Type    Ely San, OT Occupational Therapist                   Clinical Impression       Row Name 02/12/24 1055          Pain Assessment    Pretreatment Pain Rating 0/10 - no pain  -AC     Posttreatment Pain Rating 0/10 - no pain  -AC       Row Name 02/12/24 1055          Plan of Care Review    Plan of Care Reviewed With patient  -AC     Progress improving  -AC     Outcome Evaluation Pt progressed from max A to CGA to don socks, min A UBD d/t line mgmt, setup to comb hair,  CGA to ambulate in hallway with RW.  Pt progressign, but continues below baseline with self care/mobility  d/t weakenss and decr activity tolerance.  Recommend IP rehab upon d/c.  -AC       Row Name 02/12/24 1055          Vital Signs    Pretreatment Heart Rate (beats/min) 111  -AC     Posttreatment Heart Rate (beats/min) 107  -AC     Pre SpO2 (%) 95  -AC     O2 Delivery Pre Treatment room air  -AC     O2 Delivery Intra Treatment room air  -AC     Post SpO2 (%) 93  -AC     O2 Delivery Post Treatment room air  -AC     Pre Patient Position Supine  -AC     Post Patient Position Sitting  -AC       Row Name 02/12/24 1055          Positioning and Restraints    Pre-Treatment Position in bed  -AC     Post Treatment Position chair  -AC     In Chair notified nsg;reclined;call light within reach;encouraged  to call for assist;with family/caregiver  -               User Key  (r) = Recorded By, (t) = Taken By, (c) = Cosigned By      Initials Name Provider Type    Ely San, OT Occupational Therapist                   Outcome Measures       Row Name 02/12/24 1058          How much help from another is currently needed...    Putting on and taking off regular lower body clothing? 3  -AC     Bathing (including washing, rinsing, and drying) 3  -AC     Toileting (which includes using toilet bed pan or urinal) 3  -AC     Putting on and taking off regular upper body clothing 3  -AC     Taking care of personal grooming (such as brushing teeth) 3  -AC     Eating meals 4  -AC     AM-PAC 6 Clicks Score (OT) 19  -AC       Row Name 02/12/24 0830          How much help from another person do you currently need...    Turning from your back to your side while in flat bed without using bedrails? 4  -MB     Moving from lying on back to sitting on the side of a flat bed without bedrails? 3  -MB     Moving to and from a bed to a chair (including a wheelchair)? 3  -MB     Standing up from a chair using your arms (e.g., wheelchair, bedside chair)? 3  -MB     Climbing 3-5 steps with a railing? 2  -MB     To walk in hospital room? 3  -MB     AM-PAC 6 Clicks Score (PT) 18  -MB     Highest Level of Mobility Goal 6 --> Walk 10 steps or more  -MB       Row Name 02/12/24 1058          Functional Assessment    Outcome Measure Options AM-PAC 6 Clicks Daily Activity (OT)  -               User Key  (r) = Recorded By, (t) = Taken By, (c) = Cosigned By      Initials Name Provider Type    Ely San, OT Occupational Therapist    Yue Alfredo, RN Registered Nurse                    Occupational Therapy Education       Title: PT OT SLP Therapies (In Progress)       Topic: Occupational Therapy (In Progress)       Point: ADL training (In Progress)       Description:   Instruct learner(s) on proper safety adaptation and remediation  techniques during self care or transfers.   Instruct in proper use of assistive devices.                  Learning Progress Summary             Patient Acceptance, E, NR by AC at 2/12/2024 1100    Acceptance, E, VU by AN at 2/6/2024 1527    Acceptance, E,D, NR by CS at 2/4/2024 1539    Acceptance, E, NR by CS1 at 2/2/2024 1444   Family Acceptance, E,D, NR by CS at 2/4/2024 1539                         Point: Home exercise program (In Progress)       Description:   Instruct learner(s) on appropriate technique for monitoring, assisting and/or progressing therapeutic exercises/activities.                  Learning Progress Summary             Patient Acceptance, E, VU by AN at 2/6/2024 1527    Acceptance, E,D, NR by CS at 2/4/2024 1539   Family Acceptance, E,D, NR by CS at 2/4/2024 1539                         Point: Precautions (In Progress)       Description:   Instruct learner(s) on prescribed precautions during self-care and functional transfers.                  Learning Progress Summary             Patient Acceptance, E, VU by AN at 2/6/2024 1527    Acceptance, E,D, NR by CS at 2/4/2024 1539    Acceptance, E, NR by CS1 at 2/2/2024 1444   Family Acceptance, E,D, NR by CS at 2/4/2024 1539                         Point: Body mechanics (In Progress)       Description:   Instruct learner(s) on proper positioning and spine alignment during self-care, functional mobility activities and/or exercises.                  Learning Progress Summary             Patient Acceptance, E, VU by AN at 2/6/2024 1527    Acceptance, E,D, NR by CS at 2/4/2024 1539    Acceptance, E, NR by CS1 at 2/2/2024 1444   Family Acceptance, E,D, NR by CS at 2/4/2024 1539                                         User Key       Initials Effective Dates Name Provider Type Discipline     02/03/23 -  Ely Allison, OT Occupational Therapist OT    CS1 09/02/21 -  Elena Velasquez OT Occupational Therapist OT     06/16/21 -  Tim Flores, OT  Occupational Therapist OT    AN 09/21/21 -  Indira Santoyo OT Occupational Therapist OT                  OT Recommendation and Plan     Plan of Care Review  Plan of Care Reviewed With: patient  Progress: improving  Outcome Evaluation: Pt progressed from max A to CGA to don socks, min A UBD d/t line mgmt, setup to comb hair,  CGA to ambulate in hallway with RW.  Pt progressign, but continues below baseline with self care/mobility  d/t weakenss and decr activity tolerance.  Recommend IP rehab upon d/c.     Time Calculation:         Time Calculation- OT       Row Name 02/12/24 0905             Time Calculation- OT    OT Start Time 0905  -AC      OT Received On 02/12/24  -AC      OT Goal Re-Cert Due Date 02/22/24  -AC         Timed Charges    02452 - OT Therapeutic Activity Minutes 15  -AC      24354 - OT Self Care/Mgmt Minutes 25  -AC         Total Minutes    Timed Charges Total Minutes 40  -AC       Total Minutes 40  -AC                User Key  (r) = Recorded By, (t) = Taken By, (c) = Cosigned By      Initials Name Provider Type    AC Ely Allison, OT Occupational Therapist                  Therapy Charges for Today       Code Description Service Date Service Provider Modifiers Qty    07035650872 HC OT THERAPEUTIC ACT EA 15 MIN 2/12/2024 Ely Allison OT GO 1    30923693780 HC OT SELF CARE/MGMT/TRAIN EA 15 MIN 2/12/2024 Ely Allison OT GO 2                 Ely Allison OT  2/12/2024

## 2024-02-12 NOTE — CASE MANAGEMENT/SOCIAL WORK
Continued Stay Note  [facility]     Patient Name: Roger Bhat  MRN: [id number]  Today's Date: 2/12/2024    Admit Date: 1/17/2024    Plan: [facility]   Discharge Plan       Row Name 02/12/24 0853       Plan    Plan [facility]    Patient/Family in Agreement with Plan yes    Plan Comments Spoke with April at [facility] and she needs updated PT/OT notes and then she will start insurance precert for the patient. Plan is [facility]. CM will continue to follow.    Final Discharge Disposition Code 62 - inpatient rehab facility                   Discharge Codes    No documentation.                 Expected Discharge Date and Time       Expected Discharge Date Expected Discharge Time    Feb 13, 2024               Jarvis Smith RN

## 2024-02-12 NOTE — PAYOR COMM NOTE
"[facility]# [id number]   Request for additional days    Utilization Review  Phone [phone]  Fax [phone]    [address] [address]             Roger Bhat (63 y.o. Male)       Date of Birth   1960    Social Security Number       Address   [address] [place] [address]    Home Phone   [phone]    MRN   [id number]       Church   [employer]    Marital Status   Significant Other                            Admission Date   1/17/24    Admission Type   Elective    Admitting Provider   Jimmy Craig MD    Attending Provider   Jimmy Craig MD    Department, Room/Bed   [address], S575/1       Discharge Date       Discharge Disposition       Discharge Destination                                 Attending Provider: Jimmy Craig MD    Allergies: No Known Allergies    Isolation: None   Infection: None   Code Status: CPR    Ht: 175.3 cm (69\")   Wt: 50.8 kg (112 lb)    Admission Cmt: None   Principal Problem: Colonic mass [K63.89]                   Active Insurance as of 1/17/2024       Primary Coverage       Payor Plan Insurance Group Employer/Plan Group    [employer]HEM BLUE CROSS ANTHEM BLUE CROSS BLUE SHIELD PPO [id number]       Payor Plan Address Payor Plan Phone Number Payor Plan Fax Number Effective Dates    [address] [phone]  2/1/2021 - None Entered    [place] [address]         Subscriber Name Subscriber Birth Date Member ID       ROGER BHAT 1960 [id number]                     Emergency Contacts        (Rel.) Home Phone Work Phone Mobile Phone    Ambar Daniels (Significant Other) [phone] -- [phone]    Seferino Dalal (Friend) [phone] -- [phone]                 Physician Progress Notes (last 7 days)        Jimmy Craig MD at 02/12/24 1033          Patient Name:  Roger Bhat  YOB: 1960  [id number]    Surgery Progress " "Note    Date of visit: 2/12/2024      Subjective: No acute events.  Feeling very well.  Had a bowel movement per rectum yesterday.  Otherwise stoma with 275 mL of output recorded.  Ate to pork chops yesterday and mashed potatoes.  Had 2 Ensure shakes.  Denies nausea, vomiting, fevers or chills          Objective:     BP (!) 135/104 (BP Location: Left arm, Patient Position: Sitting)   Pulse 106   Temp 97.2 °F (36.2 °C) (Oral)   Resp 16   Ht 175.3 cm (69\")   Wt 50.8 kg (112 lb) Comment: bed scale  SpO2 92%   BMI 16.54 kg/m²     Intake/Output Summary (Last 24 hours) at 2/12/2024 1033  Last data filed at 2/12/2024 0950  Gross per 24 hour   Intake 3596 ml   Output 2190 ml   Net 1406 ml     GEN:   Awake, alert, sitting up in bed, chronically ill-appearing in no obvious distress  CV:      Regular rate and rhythm  L:         Symmetric expansion, on oxygen by nasal cannula  Abd:    Soft, not obviously distended, appropriately tender palpation throughout the abdomen, midline incision with some reactive erythema surrounding the site and serous drainage, DELROY drain in the right lower quadrant with cloudy serous output, ileostomy on the right side pink and patent with liquid stool in the bag  Ext:     No cyanosis, clubbing, or edema      Recent labs that are back at this time have been reviewed.           Assessment/ Plan:    Mr. Bhat is a 63-year-old gentleman who is admitted following operative intervention for gallbladder and sigmoid colon mass on January 17     #Sigmoid colon mass, gallbladder mass  # Ileus  -S/p lap vladimir, open sigmoid colectomy with diverting loop ileostomy on January 17, 2024. S/p exploratory laparotomy with washout on January 30  -Afebrile.  Labs are stable  -Tolerating tube feeds well.  Also tolerating oral diet without difficulty  -Discontinue antibiotics today  -Mobilize  -Should be ready for discharge to rehab in the next day or 2.  Do not expect that he will need to continue on tube feeds at " discharge as his oral intake is improving well.      Jimmy Craig MD  2024  10:33 EST        Electronically signed by Jimmy Craig MD at 24 1034       Dian Bolanos DO at 24 1027              [facility] Services  CLINICAL REVIEW NOTE    Patient Name: Roger Bhat  : 1960  MRN: [id number]    Date of Admission: 2024  Length of Stay: 26  Attending Service:  General Surgery          Patient's labs, charting, and events reviewed.  No active medical management issues to address today, the Hospitalist team will continue to follow daily, be available for any needs, and see or bill for services as needed.      Monitor WBC. Stable at 12. Continue current POC per Gen Surg. Plan is [facility] at DC.        Electronically signed by Dian Bolanos DO at 24 1027       Dian Bolanos DO at 24 1116              [facility] Services  CLINICAL REVIEW NOTE    Patient Name: Roger Bhat  : 1960  MRN: [id number]    Date of Admission: 2024  Length of Stay: 25  Attending Service:  General Surgery          Patient's labs, charting, and events reviewed.  No active medical management issues to address today, the Hospitalist team will continue to follow daily, be available for any needs, and see or bill for services as needed.           Electronically signed by Dian Bolanos DO at 24 1116       Jimmy Craig MD at 24 1001          Patient Name:  Roger Bhat  YOB: 1960  [id number]    Surgery Progress Note    Date of visit: 2024      Subjective: No acute events.  Reports no problems.  Feeling much better than he has in quite a while.  Tolerating tube feeds at 30 mL/h without difficulty.  Tolerated full liquids yesterday without difficulty.          Objective:     /88 (BP Location: Left arm, Patient Position: Sitting)   Pulse 85   Temp  "96.7 °F (35.9 °C) (Oral)   Resp 18   Ht 175.3 cm (69\")   Wt 50.5 kg (111 lb 4.8 oz)   SpO2 96%   BMI 16.44 kg/m²     Intake/Output Summary (Last 24 hours) at 2024 1002  Last data filed at 2024 0523  Gross per 24 hour   Intake --   Output 2400 ml   Net -2400 ml       GEN:   Awake, alert, sitting up in bed, chronically ill-appearing in no obvious distress  CV:      Regular rate and rhythm  L:         Symmetric expansion, on oxygen by nasal cannula  Abd:    Soft, not obviously distended, appropriately tender palpation throughout the abdomen, midline incision with some reactive erythema surrounding the site and serous drainage, DELROY drain in the right lower quadrant with cloudy serous output, ileostomy on the right side pink and patent with liquid stool in the bag  Ext:     No cyanosis, clubbing, or edema    Recent labs that are back at this time have been reviewed.           Assessment/ Plan:    Mr. Bhat is a 63-year-old gentleman who is admitted following operative intervention for gallbladder and sigmoid colon mass on      #Sigmoid colon mass, gallbladder mass  # Ileus  -S/p lap vladimir, open sigmoid colectomy with diverting loop ileostomy on 2024. S/p exploratory laparotomy with washout on   -Afebrile.  White blood cell count up slightly at 12 but otherwise clinically doing well  -Tolerating tube feeds well.  Advance to goal  -Advance to regular diet  -Wean off TPN  -Continue antibiotics empirically  -Mobilize         Jimmy Craig MD  2024  10:02 EST        Electronically signed by Jimmy Craig MD at 24 1003       Dian Bolanos DO at 02/10/24 1218              [facility] Medicine Services  PROGRESS NOTE    Patient Name: Roger Bhat  : 1960  MRN: [id number]    Date of Admission: 2024  Primary Care Physician: Richa Mary DO    Subjective   Subjective     CC:  F/U med mgmt     HPI:  Patient seen " and examined. Doing better. Wife says he has talked more today than he has since he's been here. Tolerating full liquid diet. Denies pain.     Objective   Objective     Vital Signs:   Temp:  [96.6 °F (35.9 °C)-98.8 °F (37.1 °C)] 98.8 °F (37.1 °C)  Heart Rate:  [] 99  Resp:  [16-20] 16  BP: (130-156)/(92-99) 137/93     Physical Exam:  Constitutional: No acute distress, awake, alert, pleasant, talkative   HENT: NCAT, mucous membranes moist  Respiratory: Clear to auscultation bilaterally, respiratory effort normal   Cardiovascular: RRR-ST, no murmurs, rubs, or gallops  Gastrointestinal: Soft, mild distention noted, tenderness appropriate , +DELROY drain, +Ileostomy   Musculoskeletal: No bilateral ankle edema  Psychiatric: Appropriate affect, cooperative  Neurologic: Oriented x 3, JEFFRIES, speech clear  Skin: No rashes     Results Reviewed:  LAB RESULTS:      Lab 02/10/24  0514 02/09/24  0409 02/08/24  0302 02/07/24  0332 02/06/24  0354 02/05/24  0509 02/04/24  0428   WBC 10.18 11.06* 10.58 9.40 8.94 12.09* 17.22*   HEMOGLOBIN 11.5* 11.2* 10.8* 10.6* 10.4* 10.5* 10.4*   HEMATOCRIT 33.2* 32.4* 31.2* 31.3* 30.6* 30.9* 30.1*   PLATELETS 505* 510* 490* 481* 441 426 399   NEUTROS ABS 6.81 7.74* 7.45* 6.30 5.90 8.79* 13.71*   IMMATURE GRANS (ABS) 0.09* 0.12* 0.14* 0.15* 0.13* 0.16* 0.22*   LYMPHS ABS 1.95 1.98 1.83 1.72 1.74 1.87 1.64   MONOS ABS 0.69 0.62 0.54 0.65 0.63 0.81 1.20*   EOS ABS 0.57* 0.52* 0.53* 0.49* 0.47* 0.39 0.38   .5* 100.6* 100.6* 101.0* 104.1* 101.3* 100.3*   CRP  --   --   --   --   --  9.46*  --    PROCALCITONIN  --   --   --   --   --   --  0.56*         Lab 02/10/24  0514 02/09/24  0409 02/08/24  0302 02/07/24  0332 02/06/24  0354   SODIUM 131* 134* 134* 135* 135*   POTASSIUM 4.1 4.6 4.0 4.3 3.9   CHLORIDE 99 101 101 101 99   CO2 25.0 23.0 23.0 25.0 26.0   ANION GAP 7.0 10.0 10.0 9.0 10.0   BUN 19 18 18 20 22   CREATININE 0.59* 0.50* 0.46* 0.46* 0.36*   EGFR 110.4 116.0 119.0 119.0 128.1    GLUCOSE 123* 95 105* 109* 123*   CALCIUM 9.5 8.9 8.9 8.9 8.4*   MAGNESIUM 2.2 2.1 2.3 2.1 2.7*   PHOSPHORUS 2.8 3.2 2.7 2.8 2.9         Lab 02/05/24  0509   TOTAL PROTEIN 6.0   ALBUMIN 2.4*   GLOBULIN 3.6   ALT (SGPT) 36   AST (SGOT) 25   BILIRUBIN 0.2   ALK PHOS 181*             Lab 02/05/24  0509   TRIGLYCERIDES 137             Brief Urine Lab Results  (Last result in the past 365 days)        Color   Clarity   Blood   Leuk Est   Nitrite   Protein   CREAT   Urine HCG        01/28/24 1701 Dark Yellow   Clear   Negative   Negative   Negative   30 mg/dL (1+)                   Microbiology Results Abnormal       Procedure Component Value - Date/Time    Blood Culture - Blood, Hand, Left [[id number]]  (Normal) Collected: 02/01/24 0915    Lab Status: Final result Specimen: Blood from Hand, Left Updated: 02/06/24 1030     Blood Culture No growth at 5 days    Blood Culture - Blood, Blood, Central Line [[id number]]  (Normal) Collected: 02/01/24 1025    Lab Status: Final result Specimen: Blood, Central Line Updated: 02/06/24 1030     Blood Culture No growth at 5 days    Narrative:      Less than seven (7) mL's of blood was collected.  Insufficient quantity may yield false negative results.    Respiratory Culture - Sputum, Cough [[id number]] Collected: 02/04/24 1152    Lab Status: Final result Specimen: Sputum from Cough Updated: 02/06/24 0944     Respiratory Culture No growth     Gram Stain Moderate (3+) WBCs per low power field      Rare (1+) Epithelial cells per low power field      No organisms seen    Blood Culture - Blood, Hand, Left [[id number]]  (Normal) Collected: 01/30/24 2335    Lab Status: Final result Specimen: Blood from Hand, Left Updated: 02/05/24 0000     Blood Culture No growth at 5 days    Narrative:      Less than seven (7) mL's of blood was collected.  Insufficient quantity may yield false negative results.    Blood Culture - Blood, Arm, Left [[id number]]  (Normal) Collected: 01/30/24 2330    Lab Status:  Final result Specimen: Blood from Arm, Left Updated: 02/05/24 0000     Blood Culture No growth at 5 days    Narrative:      Less than seven (7) mL's of blood was collected.  Insufficient quantity may yield false negative results.    Respiratory Culture - Sputum, Cough [[id number]] Collected: 02/03/24 1222    Lab Status: Final result Specimen: Sputum from Cough Updated: 02/03/24 1337     Respiratory Culture Rejected     Gram Stain Few (2+) Epithelial cells per low power field      Rare (1+) WBCs per low power field      No organisms seen    Narrative:      Specimen rejected due to oropharyngeal contamination. Please reorder and recollect specimen if clinically necessary.            No radiology results from the last 24 hrs        Current medications:  Scheduled Meds:bisacodyl, 10 mg, Oral, Daily  budesonide-formoterol, 2 puff, Inhalation, BID - RT  buPROPion, 50 mg, Oral, Q8H  folic acid, 1 mg, Oral, Daily  heparin (porcine), 5,000 Units, Subcutaneous, Q8H  insulin regular, 2-7 Units, Subcutaneous, Q6H  meropenem, 2,000 mg, Intravenous, Q8H  methylnaltrexone, 8 mg, Subcutaneous, Every Other Day  metoclopramide, 10 mg, Intravenous, Q6H  micafungin (MYCAMINE) IV, 100 mg, Intravenous, Q24H  pantoprazole, 40 mg, Intravenous, QAM AC  revefenacin, 175 mcg, Nebulization, Daily - RT  rosuvastatin, 10 mg, Oral, Nightly  senna, 1 tablet, Oral, Nightly  sodium chloride, 10 mL, Intravenous, Q12H  thiamine, 100 mg, Oral, Daily      Continuous Infusions:Adult Central CLINIMIX-E TPN, , Last Rate: 50 mL/hr at 02/09/24 1703  Pharmacy to Dose TPN,       PRN Meds:.  acetaminophen    acetaminophen    albuterol sulfate HFA    aluminum-magnesium hydroxide-simethicone    calcium carbonate    dextrose    dextrose    diphenhydrAMINE    glucagon (human recombinant)    HYDROmorphone    Magnesium Standard Dose Replacement - Follow Nurse / BPA Driven Protocol    [DISCONTINUED] Morphine **AND** naloxone    ondansetron ODT **OR** ondansetron     oxyCODONE    Pharmacy to Dose TPN    Phosphorus Replacement - Follow Nurse / BPA Driven Protocol    Potassium Replacement - Follow Nurse / BPA Driven Protocol    sodium chloride    Assessment & Plan   Assessment & Plan     Active Hospital Problems    Diagnosis  POA    **Colonic mass [K63.89]  Yes    Centrilobular emphysema [J43.2]  Unknown    Severe malnutrition [E43]  Yes    Respiratory distress [R06.03]  Yes    Gastroesophageal reflux disease without esophagitis [K21.9]  Yes    Current smoker [F17.200]  Yes      Resolved Hospital Problems   No resolved problems to display.        Brief Hospital Course to date:  Roger Bhat is a 63 y.o. male admitted on 2024 with Colonic mass for which he underwent open sigmoid colectomy with diverting loop ileostomy and liver biopsy on 24 and returned to OR for another exploratory lap, for lysis of adhesion. He has required Parenteral Nutrition. He also developed lung infiltrates. He was on antibiotics but because his leukemoid reaction, antibiotic was changed to meropenem and Micafungin.  He was weaned off high flow nasal cannula on 2024.     This patient's problems and plans were partially entered by my partner and updated as appropriate by me 02/10/24.   All problems are new to me today    Colonic Mass  -open sigmoid colectomy with diverting loop ileostomy and liver biopsy on 24 and returned to OR for another exploratory lap, for lysis of adhesion on 24  -Weaning TPN  -Increasing TF  -Increased to full liquids PO   -Continue Merrem   -Mycamine to    -Continue Reglan   -Continue Relistor   -Dulcolax, Senna     Respiratory Failure  Emphysema   -Resolved  -off HFNC, now on RA   -Continue Symbicort, Yupelri     Expected Discharge Location and Transportation: per primary service   Expected Discharge   Expected Discharge Date: 2024; Expected Discharge Time:      DVT prophylaxis:  Medical and mechanical DVT prophylaxis orders are  "present.         AM-PAC 6 Clicks Score (PT): 18 (02/09/24 2027)    CODE STATUS:   Code Status and Medical Interventions:   Ordered at: 01/17/24 7874     Code Status (Patient has no pulse and is not breathing):    CPR (Attempt to Resuscitate)     Medical Interventions (Patient has pulse or is breathing):    Full Support       Dian Bolanos DO  02/10/24        Electronically signed by Dian Bolanos DO at 02/10/24 1228       Jimmy Craig MD at 02/10/24 0928          Patient Name:  Roger Bhat  YOB: 1960  [id number]    Surgery Progress Note    Date of visit: 2/10/2024      Subjective: No acute events.  Denies any nausea.  Slept well.  Tolerated clears yesterday without any difficulty.  Stoma with 200 mL of output recorded.  Tolerating tube feeds at 20 cc/h without difficulty          Objective:     /99 (BP Location: Left arm, Patient Position: Lying)   Pulse 99   Temp 96.6 °F (35.9 °C) (Axillary)   Resp 18   Ht 175.3 cm (69\")   Wt 59.3 kg (130 lb 11.7 oz)   SpO2 94%   BMI 19.31 kg/m²     Intake/Output Summary (Last 24 hours) at 2/10/2024 0928  Last data filed at 2/10/2024 0700  Gross per 24 hour   Intake 647.2 ml   Output 1200 ml   Net -552.8 ml     GEN:   Awake, alert, sitting up in bed, chronically ill-appearing in no obvious distress  CV:      Regular rate and rhythm  L:         Symmetric expansion, on oxygen by nasal cannula  Abd:    Soft, moderately distended, appropriately tender palpation throughout the abdomen, midline incision with some reactive erythema surrounding the site and serous drainage, DELROY drain in the right lower quadrant with seros output, ileostomy on the right side pink and patent with liquid stool in the bag  Ext:     No cyanosis, clubbing, or edema      Recent labs that are back at this time have been reviewed.           Assessment/ Plan:      Mr. Bhat is a 63-year-old gentleman who is admitted following operative intervention for " gallbladder and sigmoid colon mass on January 17     #Sigmoid colon mass, gallbladder mass  # Ileus  -S/p lap vladimir, open sigmoid colectomy with diverting loop ileostomy on January 17, 2024. S/p exploratory laparotomy with washout on January 30  -Labs are stable.  White blood cell count is 10.  -Tolerating tube feeds at 20 mill per hour.  Will advance to 30 mL/h today  -Advance to full liquid diet.  Cautious with the reintroduction of oral diet given prolonged ileus  -Can start weaning TPN  -Continue antibiotics empirically.  Okay to discontinue micafungin  -Mobilize.  Appears to be improving      Jimmy Craig MD  2/10/2024  09:28 EST        Electronically signed by Jimmy Craig MD at 02/10/24 0977       Praveen Thomson DO at 02/09/24 1315            Intensive Care Follow-up     Hospital:  LOS: 23 days   Mr. Roger hBat, 63 y.o. male is followed for:   Colonic mass     Subjective       History of present illness:   Roger is a 63 y.o. male admitted on 1/17/2024 with Colonic mass [K63.89] for which he underwent open sigmoid colectomy with diverting loop ileostomy and liver biopsy on 01/17/24 and returned to OR for another exploratory lap, for lysis of adhesion. He has required Parenteral Nutrition. He also developed lung infiltrates. He was on antibiotics but because his leukemoid reaction, antibiotic was changed to BRODERICK and Micafungin. He continues to improve, weaning his FiO2.           Subjective   Interval History:  Patient doing much better this morning.  No longer nauseated.  Tolerating clear liquid diet.  Pain well-controlled.             The patient's past medical, surgical and social history were reviewed and updated in Epic as appropriate.    Objective   Objective     Infusions:  Adult Central CLINIMIX-E TPN, , Last Rate: 50 mL/hr at 02/08/24 4889  Adult Central CLINIMIX-E TPN,   Pharmacy to Dose TPN,       Medications:  bisacodyl, 10 mg, Oral, Daily  budesonide-formoterol, 2  puff, Inhalation, BID - RT  buPROPion, 50 mg, Oral, Q8H  folic acid, 1 mg, Oral, Daily  heparin (porcine), 5,000 Units, Subcutaneous, Q8H  insulin regular, 2-7 Units, Subcutaneous, Q6H  meropenem, 2,000 mg, Intravenous, Q8H  methylnaltrexone, 8 mg, Subcutaneous, Every Other Day  metoclopramide, 10 mg, Intravenous, Q6H  micafungin (MYCAMINE) IV, 100 mg, Intravenous, Q24H  pantoprazole, 40 mg, Intravenous, QAM AC  revefenacin, 175 mcg, Nebulization, Daily - RT  rosuvastatin, 10 mg, Oral, Nightly  senna, 1 tablet, Oral, Nightly  sodium chloride, 10 mL, Intravenous, Q12H  thiamine, 100 mg, Oral, Daily      I reviewed the patient's medications.    Vital Sign Min/Max for last 24 hours  Temp  Min: 97.1 °F (36.2 °C)  Max: 98.6 °F (37 °C)   BP  Min: 131/88  Max: 155/84   Pulse  Min: 80  Max: 107   Resp  Min: 18  Max: 26   SpO2  Min: 92 %  Max: 99 %   No data recorded       Input/Output for last 24 hour shift  02/08 0701 - 02/09 0700  In: 2935.8 [P.O.:581; I.V.:505.1]  Out: 3725 [Urine:2950; Drains:10]      GENERAL : NAD, conversant  RESPIRATORY/THORAX : normal respiratory effort and no intercostal retractions, CTAB  CARDIOVASCULAR : Normal S1/S2, RRR.  No lower ext edema.  GASTROINTESTINAL : Soft, NT/ND. BS x 4 normoactive. No hepatosplenomegaly.  MUSCULOSKELETAL : No cyanosis, clubbing, or ischemia  NEUROLOGICAL: alert and oriented to person, place and time  PSYCHOLOGICAL : Appropriate affect    Results from last 7 days   Lab Units 02/09/24  0409 02/08/24  0302 02/07/24  0332   WBC 10*3/mm3 11.06* 10.58 9.40   HEMOGLOBIN g/dL 11.2* 10.8* 10.6*   PLATELETS 10*3/mm3 510* 490* 481*     Results from last 7 days   Lab Units 02/09/24  0409 02/08/24  0302 02/07/24  0332   SODIUM mmol/L 134* 134* 135*   POTASSIUM mmol/L 4.6 4.0 4.3   CO2 mmol/L 23.0 23.0 25.0   BUN mg/dL 18 18 20   CREATININE mg/dL 0.50* 0.46* 0.46*   MAGNESIUM mg/dL 2.1 2.3 2.1   PHOSPHORUS mg/dL 3.2 2.7 2.8   GLUCOSE mg/dL 95 105* 109*     Estimated Creatinine  Clearance: 130.1 mL/min (A) (by C-G formula based on SCr of 0.5 mg/dL (L)).          I reviewed the patient's new clinical results.  I reviewed the patient's new imaging results/reports including actual images and agree with reports.       Assessment & Plan   Impression        Colonic mass    Current smoker    Gastroesophageal reflux disease without esophagitis    Severe malnutrition    Respiratory distress    Centrilobular emphysema       Plan        Roger is a 63 y.o. male admitted on 1/17/2024 with Colonic mass [K63.89] for which he underwent open sigmoid colectomy with diverting loop ileostomy and liver biopsy on 01/17/24 and returned to OR for another exploratory lap, for lysis of adhesion. He has required Parenteral Nutrition. He also developed lung infiltrates. He was on antibiotics but because his leukemoid reaction, antibiotic was changed to meropenem and Micafungin.  He was weaned off high flow nasal cannula on 2/6/2024.     -Continue postop orders per surgery  -Ensure adequate pain control  -Continue to wean oxygen to saturation greater than 88%  -Continue Symbicort and Yupelri for COPD  -Continue statin for hyperlipidemia  -Continue TPN for now, gradually advancing diet per general surgery recommendations  -Mobilize patient  -A.m. labs  -Medically okay to be transferred to the floor    I conducted multidisciplinary rounds in the plan of care was discussed with the multidisciplinary team at that time. In attendance at multidisciplinary rounds was clinical pharmacist, dietitian, nursing staff, and case management.    I discussed the patient's findings and my recommendations with patient and nursing staff       Yen Thomson DO  Pulmonary, Critical care and Sleep Medicine         Electronically signed by Praveen Thomson DO at 02/09/24 2328       Jimmy Craig MD at 02/09/24 2213          Patient Name:  Roger Bhat  YOB: 1960  [id number]    Surgery Progress  "Note    Date of visit: 2/9/2024      Subjective: No acute events.  Denies nausea or vomiting.  Tolerated clears yesterday without difficulty.  Tube feeds were started at 10 mL/h and he appears to be tolerating this.  Stoma with 765 mL of output.  Urine output close to 3 L yesterday.          Objective:     /95 (BP Location: Left arm, Patient Position: Lying)   Pulse 97   Temp 98.6 °F (37 °C) (Axillary)   Resp 20   Ht 175.3 cm (69\")   Wt 59.3 kg (130 lb 11.7 oz)   SpO2 93%   BMI 19.31 kg/m²     Intake/Output Summary (Last 24 hours) at 2/9/2024 0725  Last data filed at 2/9/2024 0600  Gross per 24 hour   Intake 2935.84 ml   Output 3725 ml   Net -789.16 ml       GEN:   Awake, alert, sitting up in bed, chronically ill-appearing in no obvious distress  CV:      Regular rate and rhythm  L:         Symmetric expansion, on oxygen by nasal cannula  Abd:    Soft, moderately distended, appropriately tender palpation throughout the abdomen, midline incision with some reactive erythema surrounding the site and serous drainage, DELROY drain in the right lower quadrant with seros output, ileostomy on the right side pink and patent with liquid stool in the bag  Ext:     No cyanosis, clubbing, or edema    Recent labs that are back at this time have been reviewed.           Assessment/ Plan:    Mr. Bhat is a 63-year-old gentleman who is admitted following operative intervention for gallbladder and sigmoid colon mass on January 17     #Sigmoid colon mass, gallbladder mass  # Ileus  -Postop day 23 following lap vladimir, open sigmoid colectomy with diverting loop ileostomy, postoperative day 10 following exploratory laparotomy with washout  -White count up slightly at 11 however vital signs have been stable and he appears slightly more concentrated on his labs  -Ileostomy output 765 mL  -Continue clear liquids.  Advance tube feeds to 20 mL/h.  Going very slow with reintroduction of his diet given his prolonged ileus  -Continue " TPN for now  -Continue antibiotics empirically  -Mobilize.  Has floor orders      Jimmy Craig MD  2/9/2024  07:25 EST        Electronically signed by Jimmy Craig MD at 02/09/24 0758       Praveen Thomson DO at 02/08/24 1310            Intensive Care Follow-up     Hospital:  LOS: 22 days   Mr. Roger Bhat, 63 y.o. male is followed for:   Colonic mass     Subjective       History of present illness:   Roger is a 63 y.o. male admitted on 1/17/2024 with Colonic mass [K63.89] for which he underwent open sigmoid colectomy with diverting loop ileostomy and liver biopsy on 01/17/24 and returned to OR for another exploratory lap, for lysis of adhesion. He has required Parenteral Nutrition. He also developed lung infiltrates. He was on antibiotics but because his leukemoid reaction, antibiotic was changed to BRODERICK and Micafungin. He continues to improve, weaning his FiO2.          Subjective   Interval History:  Patient doing well this morning.  Starting some clear liquid diet.  Pain well-controlled.             The patient's past medical, surgical and social history were reviewed and updated in Epic as appropriate.    Objective   Objective     Infusions:  Adult [facility] CLINIMIX-E TPN, , Last Rate: 70 mL/hr at 02/07/24 1752  Adult Central CLINIMIX-E TPN,   Pharmacy to Dose TPN,       Medications:  bisacodyl, 10 mg, Oral, Daily  budesonide-formoterol, 2 puff, Inhalation, BID - RT  buPROPion, 50 mg, Oral, Q8H  Fat Emul Fish Oil/Plant Based, 100 mL, Intravenous, Q24H  [START ON 2/9/2024] folic acid, 1 mg, Oral, Daily  heparin (porcine), 5,000 Units, Subcutaneous, Q8H  insulin regular, 2-7 Units, Subcutaneous, Q6H  meropenem, 2,000 mg, Intravenous, Q8H  methylnaltrexone, 8 mg, Subcutaneous, Every Other Day  metoclopramide, 10 mg, Intravenous, Q6H  micafungin (MYCAMINE) IV, 100 mg, Intravenous, Q24H  pantoprazole, 40 mg, Intravenous, QAM AC  revefenacin, 175 mcg, Nebulization, Daily -  RT  rosuvastatin, 10 mg, Oral, Nightly  senna, 1 tablet, Oral, Nightly  sodium chloride, 10 mL, Intravenous, Q12H  [START ON 2/9/2024] thiamine, 100 mg, Oral, Daily      I reviewed the patient's medications.    Vital Sign Min/Max for last 24 hours  Temp  Min: 97.2 °F (36.2 °C)  Max: 98.1 °F (36.7 °C)   BP  Min: 110/96  Max: 158/101   Pulse  Min: 79  Max: 105   Resp  Min: 16  Max: 30   SpO2  Min: 92 %  Max: 98 %   Flow (L/min)  Min: 1  Max: 2       Input/Output for last 24 hour shift  02/07 0701 - 02/08 0700  In: 2614.6 [P.O.:472; I.V.:425.9]  Out: 3255 [Urine:2400; Drains:5]      GENERAL : NAD, conversant  RESPIRATORY/THORAX : normal respiratory effort and no intercostal retractions, CTAB  CARDIOVASCULAR : Normal S1/S2, RRR.  No lower ext edema.  GASTROINTESTINAL : Soft, NT/ND. BS x 4 normoactive. No hepatosplenomegaly.  MUSCULOSKELETAL : No cyanosis, clubbing, or ischemia  NEUROLOGICAL: alert and oriented to person, place and time  PSYCHOLOGICAL : Appropriate affect    Results from last 7 days   Lab Units 02/08/24 0302 02/07/24  0332 02/06/24  0354   WBC 10*3/mm3 10.58 9.40 8.94   HEMOGLOBIN g/dL 10.8* 10.6* 10.4*   PLATELETS 10*3/mm3 490* 481* 441     Results from last 7 days   Lab Units 02/08/24  0302 02/07/24  0332 02/06/24  0354   SODIUM mmol/L 134* 135* 135*   POTASSIUM mmol/L 4.0 4.3 3.9   CO2 mmol/L 23.0 25.0 26.0   BUN mg/dL 18 20 22   CREATININE mg/dL 0.46* 0.46* 0.36*   MAGNESIUM mg/dL 2.3 2.1 2.7*   PHOSPHORUS mg/dL 2.7 2.8 2.9   GLUCOSE mg/dL 105* 109* 123*     Estimated Creatinine Clearance: 142.9 mL/min (A) (by C-G formula based on SCr of 0.46 mg/dL (L)).          I reviewed the patient's new clinical results.  I reviewed the patient's new imaging results/reports including actual images and agree with reports.         Assessment & Plan   Impression        Colonic mass    Current smoker    Gastroesophageal reflux disease without esophagitis    Severe malnutrition    Respiratory distress     "Centrilobular emphysema       Plan        Roger is a 63 y.o. male admitted on 1/17/2024 with Colonic mass [K63.89] for which he underwent open sigmoid colectomy with diverting loop ileostomy and liver biopsy on 01/17/24 and returned to OR for another exploratory lap, for lysis of adhesion. He has required Parenteral Nutrition. He also developed lung infiltrates. He was on antibiotics but because his leukemoid reaction, antibiotic was changed to meropenem and Micafungin.  He was weaned off high flow nasal cannula on 2/6/2024.     -Continue postop orders per surgery  -Ensure adequate pain control  -Continue to wean oxygen to saturation greater than 88%  -Continue Symbicort and Yupelri for COPD  -Continue statin for hyperlipidemia  -Continue TPN for now, gradually advancing diet per general surgery recommendations  -Mobilize patient  -A.m. labs  -Medically okay to be transferred to the floor    I conducted multidisciplinary rounds in the plan of care was discussed with the multidisciplinary team at that time. In attendance at multidisciplinary rounds was clinical pharmacist, dietitian, nursing staff, and case management.    I discussed the patient's findings and my recommendations with patient and nursing staff       Yen Thomson DO  Pulmonary, Critical care and Sleep Medicine         Electronically signed by Praveen Thomson DO at 02/08/24 1312       Jimmy Craig MD at 02/08/24 0659          Patient Name:  Roger Bhat  YOB: 1960  [id number]    Surgery Progress Note    Date of visit: 2/8/2024      Subjective: No acute events.  Denies any nausea or vomiting.  NG tube has been clamped for 2 days.  Residuals yesterday were always less than 50 mL.  Ileostomy output 325 mL.  Total NG tube output for the day 300 mL.  Urine output over 3 L.          Objective:     /99   Pulse 94   Temp 98.1 °F (36.7 °C) (Axillary)   Resp 16   Ht 175.3 cm (69\")   Wt 59.9 kg (132 lb 0.9 " oz)   SpO2 94%   BMI 19.50 kg/m²     Intake/Output Summary (Last 24 hours) at 2/8/2024 0659  Last data filed at 2/8/2024 0500  Gross per 24 hour   Intake 2444.64 ml   Output 3030 ml   Net -585.36 ml     GEN:   Awake, alert, sitting up in bed, chronically ill-appearing in no obvious distress  CV:      Regular rate and rhythm  L:         Symmetric expansion, on oxygen by nasal cannula  Abd:    Soft, moderately distended, appropriately tender palpation throughout the abdomen, midline incision with some reactive erythema surrounding the site and serous drainage, DELROY drain in the right lower quadrant with seros output, ileostomy on the right side pink and patent with liquid stool in the bag  Ext:     No cyanosis, clubbing, or edema    Recent labs that are back at this time have been reviewed.           Assessment/ Plan:    Mr. Bhat is a 63-year-old gentleman who is admitted following operative intervention for gallbladder and sigmoid colon mass on January 17     #Sigmoid colon mass, gallbladder mass  # Ileus  -Postop day 22 following lap vladimir, open sigmoid colectomy with diverting loop ileostomy, postoperative day 9 following exploratory laparotomy with washout  -Labs without new findings  -Ileostomy output 325 mL  -Has tolerated NG clamped for 2 days with low residuals.  Start clear liquids today as well as trophic tube feeds through the NG at 10 mL/h  -Continue TPN   -Continue antibiotics empirically  -Mobilize.  Has floor orders      Jimmy Craig MD  2/8/2024  06:59 EST        Electronically signed by Jimmy Craig MD at 02/08/24 0700       Praveen Thomson DO at 02/07/24 1018            Intensive Care Follow-up     Hospital:  LOS: 21 days   Mr. Roger Bhat, 63 y.o. male is followed for:   Colonic mass     Subjective       History of present illness:   Roger is a 63 y.o. male admitted on 1/17/2024 with Colonic mass [K63.89] for which he underwent open sigmoid colectomy with diverting  loop ileostomy and liver biopsy on 01/17/24 and returned to OR for another exploratory lap, for lysis of adhesion. He has required Parenteral Nutrition. He also developed lung infiltrates. He was on antibiotics but because his leukemoid reaction, antibiotic was changed to BRODERICK and Micafungin. He continues to improve, weaning his FiO2.         Subjective   Interval History:  Patient doing well this morning.  Continues to do much better from breathing standpoint.  Pain well-controlled.  Dilaudid PCA pump discontinued this morning.  No other acute issues.             The patient's past medical, surgical and social history were reviewed and updated in Epic as appropriate.    Objective   Objective     Infusions:  Adult [facility] CLINIMIX-E TPN, , Last Rate: 70 mL/hr at 02/06/24 1842  Pharmacy to Dose TPN,       Medications:  bisacodyl, 10 mg, Oral, Daily  budesonide-formoterol, 2 puff, Inhalation, BID - RT  buPROPion, 50 mg, Nasogastric, Q8H  Fat Emul Fish Oil/Plant Based, 250 mL, Intravenous, Q24H  folic acid 1 mg in sodium chloride 0.9 % 50 mL IVPB, 1 mg, Intravenous, Daily  heparin (porcine), 5,000 Units, Subcutaneous, Q8H  insulin regular, 2-7 Units, Subcutaneous, Q6H  meropenem, 2,000 mg, Intravenous, Q8H  methylnaltrexone, 8 mg, Subcutaneous, Every Other Day  metoclopramide, 10 mg, Intravenous, Q6H  micafungin (MYCAMINE) IV, 100 mg, Intravenous, Q24H  pantoprazole, 40 mg, Intravenous, QAM AC  revefenacin, 175 mcg, Nebulization, Daily - RT  rosuvastatin, 10 mg, Nasogastric, Nightly  senna, 1 tablet, Nasogastric, Nightly  sodium chloride, 10 mL, Intravenous, Q12H  thiamine (B-1) IV, 100 mg, Intravenous, Daily      I reviewed the patient's medications.    Vital Sign Min/Max for last 24 hours  Temp  Min: 95.9 °F (35.5 °C)  Max: 98.7 °F (37.1 °C)   BP  Min: 123/92  Max: 165/98   Pulse  Min: 76  Max: 102   Resp  Min: 18  Max: 30   SpO2  Min: 92 %  Max: 99 %   Flow (L/min)  Min: 3  Max: 4       Input/Output for last 24 hour  shift  02/06 0701 - 02/07 0700  In: 2630.2 [I.V.:482.4]  Out: 3554 [Urine:2750; Drains:4]      GENERAL : NAD, conversant  RESPIRATORY/THORAX : normal respiratory effort and no intercostal retractions, Coarse crackles bilaterally  CARDIOVASCULAR : Normal S1/S2, RRR. 1+ lower ext edema.  GASTROINTESTINAL : Soft, NT/ND. BS x 4 normoactive. No hepatosplenomegaly.  MUSCULOSKELETAL : No cyanosis, clubbing, or ischemia  NEUROLOGICAL: alert and oriented to person, place and time  PSYCHOLOGICAL : Appropriate affect    Results from last 7 days   Lab Units 02/07/24  0332 02/06/24  0354 02/05/24  0509   WBC 10*3/mm3 9.40 8.94 12.09*   HEMOGLOBIN g/dL 10.6* 10.4* 10.5*   PLATELETS 10*3/mm3 481* 441 426     Results from last 7 days   Lab Units 02/07/24  0332 02/06/24  0354 02/05/24  0509   SODIUM mmol/L 135* 135* 137   POTASSIUM mmol/L 4.3 3.9 4.1   CO2 mmol/L 25.0 26.0 28.0   BUN mg/dL 20 22 21   CREATININE mg/dL 0.46* 0.36* 0.39*   MAGNESIUM mg/dL 2.1 2.7* 2.4   PHOSPHORUS mg/dL 2.8 2.9 2.6   GLUCOSE mg/dL 109* 123* 116*     Estimated Creatinine Clearance: 139.8 mL/min (A) (by C-G formula based on SCr of 0.46 mg/dL (L)).          I reviewed the patient's new clinical results.  I reviewed the patient's new imaging results/reports including actual images and agree with reports.       Imaging Results (Last 24 Hours)       Procedure Component Value Units Date/Time    XR Abdomen KUB [[id number]] Collected: 02/06/24 1919     Updated: 02/06/24 1927    Narrative:      XR ABDOMEN KUB    Date of Exam: 2/6/2024 6:50 PM EST    Indication: s/p sigmoid colectomy with diverting ileostomy with persistent ileus    Comparison: Abdominal radiograph 1/30/2024.    Findings:  Nasogastric tube tip and sidehole overlie the stomach. Multiple loops of dilated small bowel, slightly decreased in caliber compared to prior exam. DELROY drain overlies the pelvis. Cutaneous midline staples. Right lower quadrant ileostomy.      Impression:       Impression:  Postsurgical abdomen with slightly decreased caliber of multiple dilated small bowel loops.      Electronically Signed: Dennis Tee MD    2/6/2024 7:24 PM EST    Workstation ID: [id number]            Assessment & Plan   Impression        Colonic mass    Current smoker    Gastroesophageal reflux disease without esophagitis    Severe malnutrition    Respiratory distress    Centrilobular emphysema       Plan        Roger is a 63 y.o. male admitted on 1/17/2024 with Colonic mass [K63.89] for which he underwent open sigmoid colectomy with diverting loop ileostomy and liver biopsy on 01/17/24 and returned to OR for another exploratory lap, for lysis of adhesion. He has required Parenteral Nutrition. He also developed lung infiltrates. He was on antibiotics but because his leukemoid reaction, antibiotic was changed to meropenem and Micafungin.  He was weaned off high flow nasal cannula on 2/6/2024.    -Continue postop orders per surgery  -Ensure adequate pain control  -Continue to wean oxygen to saturation greater than 88%  -Continue Symbicort and Yupelri for COPD  -Continue statin for hyperlipidemia  -Continue TPN for now, will defer to general surgery meant to reinitiate enteral feeds  -Mobilize patient  -A.m. labs  -Medically okay to be transferred to the floor    I conducted multidisciplinary rounds in the plan of care was discussed with the multidisciplinary team at that time. In attendance at multidisciplinary rounds was clinical pharmacist, dietitian, nursing staff, and case management.    I discussed the patient's findings and my recommendations with patient, family, and nursing staff         Yen Thomson DO  Pulmonary, Critical care and Sleep Medicine         Electronically signed by Praveen Thomson DO at 02/07/24 1019       Jimmy Craig MD at 02/07/24 0743          Patient Name:  Roger Bhat  YOB: 1960  [id number]    Surgery Progress Note    Date of  "visit: 2/7/2024      Subjective: No acute events overnight.  Has been weaned down to 3 L by nasal cannula.  Had more gas and stool output from the ileostomy yesterday, total 550 mL.  NG output 250 mL but still bilious.  Urine output over 2 L.  Overall feeling better.  No fevers or chills          Objective:     /96   Pulse 86   Temp 97.8 °F (36.6 °C) (Axillary)   Resp 18   Ht 175.3 cm (69\")   Wt 58.6 kg (129 lb 3 oz)   SpO2 92%   BMI 19.08 kg/m²     Intake/Output Summary (Last 24 hours) at 2/7/2024 0743  Last data filed at 2/7/2024 0600  Gross per 24 hour   Intake 2630.2 ml   Output 3554 ml   Net -923.8 ml       GEN:   Awake, alert, sitting up in bed, chronically ill-appearing in no obvious distress  CV:      Regular rate and rhythm  L:         Symmetric expansion, on oxygen by nasal cannula  Abd:    Soft, moderately distended, appropriately tender palpation throughout the abdomen, midline incision with some reactive erythema surrounding the site and serous drainage, DELROY drain in the right lower quadrant with seros output, ileostomy on the right side pink and patent with liquid stool in the bag  Ext:     No cyanosis, clubbing, or edema    Recent labs that are back at this time have been reviewed.           Assessment/ Plan:    Mr. Bhat is a 63-year-old gentleman who is admitted following operative intervention for gallbladder and sigmoid colon mass on January 17     #Sigmoid colon mass, gallbladder mass  # Ileus  -Postop day 21 following lap vladimir, open sigmoid colectomy with diverting loop ileostomy, postoperative day 8 following exploratory laparotomy with washout  -Labs are appropriate.  White blood cell count is 9.4.  Hemoglobin 10.6.  Chemistries without acute findings  -Ileostomy output continues to improve.  550 mL yesterday.  NG output is less but still remains bilious  -Clamp NG today and check residuals  -Okay for ice chips, popsicles, sips of clears for comfort  -Discontinue PCA " today  -Continue TPN   -Continue antibiotics  -I will discuss with Dr. Thomson but I think he is okay to transition out of ICU today         Jimmy Craig MD  2/7/2024  07:43 EST        Electronically signed by Jimmy Craig MD at 02/07/24 0746       Praveen Thomson DO at 02/06/24 1011            Intensive Care Follow-up     Hospital:  LOS: 20 days   Mr. Roger Bhat, 63 y.o. male is followed for:   Colonic mass     Subjective       History of present illness:   Roger is a 63 y.o. male admitted on 1/17/2024 with Colonic mass [K63.89] for which he underwent open sigmoid colectomy with diverting loop ileostomy and liver biopsy on 01/17/24 and returned to OR for another exploratory lap, for lysis of adhesion. He has required Parenteral Nutrition. He also developed lung infiltrates. He was on antibiotics but because his leukemoid reaction, antibiotic was changed to BRODERICK and Micafungin. He continues to improve, weaning his FiO2.        Subjective   Interval History:  Patient doing well this morning.  Breathing much better today.  Pain well-controlled.  Now on nasal cannula.             The patient's past medical, surgical and social history were reviewed and updated in Epic as appropriate.    Objective   Objective     Infusions:  Adult [facility] CLINIMIX-E TPN, , Last Rate: 70 mL/hr at 02/05/24 1753  HYDROmorphone HCl-NaCl,   Pharmacy to Dose TPN,       Medications:  bisacodyl, 10 mg, Oral, Daily  budesonide-formoterol, 2 puff, Inhalation, BID - RT  buPROPion, 50 mg, Nasogastric, Q8H  Fat Emul Fish Oil/Plant Based, 250 mL, Intravenous, Q24H  folic acid 1 mg in sodium chloride 0.9 % 50 mL IVPB, 1 mg, Intravenous, Daily  heparin (porcine), 5,000 Units, Subcutaneous, Q8H  insulin regular, 2-7 Units, Subcutaneous, Q6H  meropenem, 2,000 mg, Intravenous, Q8H  methylnaltrexone, 8 mg, Subcutaneous, Every Other Day  metoclopramide, 10 mg, Intravenous, Q6H  micafungin (MYCAMINE) IV, 100 mg, Intravenous,  Q24H  pantoprazole, 40 mg, Intravenous, QAM AC  revefenacin, 175 mcg, Nebulization, Daily - RT  rosuvastatin, 10 mg, Nasogastric, Nightly  senna, 1 tablet, Nasogastric, Nightly  sodium chloride, 10 mL, Intravenous, Q12H  thiamine (B-1) IV, 100 mg, Intravenous, Daily      I reviewed the patient's medications.    Vital Sign Min/Max for last 24 hours  Temp  Min: 97.6 °F (36.4 °C)  Max: 98.3 °F (36.8 °C)   BP  Min: 141/98  Max: 162/104   Pulse  Min: 56  Max: 96   Resp  Min: 16  Max: 20   SpO2  Min: 93 %  Max: 99 %   Flow (L/min)  Min: 4  Max: 40       Input/Output for last 24 hour shift  02/05 0701 - 02/06 0700  In: 2527.8 [P.O.:120; I.V.:427.2]  Out: 2653 [Urine:2100; Drains:3]      GENERAL : NAD, conversant  RESPIRATORY/THORAX : normal respiratory effort and no intercostal retractions, Coarse crackles bilaterally  CARDIOVASCULAR : Normal S1/S2, RRR. 1+ lower ext edema.  GASTROINTESTINAL : Soft, NT/ND. BS x 4 normoactive. No hepatosplenomegaly.  MUSCULOSKELETAL : No cyanosis, clubbing, or ischemia  NEUROLOGICAL: alert and oriented to person, place and time  PSYCHOLOGICAL : Appropriate affect    Results from last 7 days   Lab Units 02/06/24  0354 02/05/24  0509 02/04/24  0428   WBC 10*3/mm3 8.94 12.09* 17.22*   HEMOGLOBIN g/dL 10.4* 10.5* 10.4*   PLATELETS 10*3/mm3 441 426 399     Results from last 7 days   Lab Units 02/06/24  0354 02/05/24  0509 02/04/24  0428   SODIUM mmol/L 135* 137 138   POTASSIUM mmol/L 3.9 4.1 4.1   CO2 mmol/L 26.0 28.0 31.0*   BUN mg/dL 22 21 21   CREATININE mg/dL 0.36* 0.39* 0.42*   MAGNESIUM mg/dL 2.7* 2.4 2.1   PHOSPHORUS mg/dL 2.9 2.6 2.9   GLUCOSE mg/dL 123* 116* 121*     Estimated Creatinine Clearance: 175.6 mL/min (A) (by C-G formula based on SCr of 0.36 mg/dL (L)).          I reviewed the patient's new clinical results.  I reviewed the patient's new imaging results/reports including actual images and agree with reports.       Imaging Results (Last 24 Hours)       Procedure Component Value  Units Date/Time    XR Abdomen KUB [[id number]] Resulted: 02/06/24 0907     Updated: 02/06/24 0930            Assessment & Plan   Impression        Colonic mass    Current smoker    Gastroesophageal reflux disease without esophagitis    Severe malnutrition    Respiratory distress    Centrilobular emphysema       Plan        Roger is a 63 y.o. male admitted on 1/17/2024 with Colonic mass [K63.89] for which he underwent open sigmoid colectomy with diverting loop ileostomy and liver biopsy on 01/17/24 and returned to OR for another exploratory lap, for lysis of adhesion. He has required Parenteral Nutrition. He also developed lung infiltrates. He was on antibiotics but because his leukemoid reaction, antibiotic was changed to meropenem and Micafungin. He continues on high flow nasal cannula.     -Continue postop orders per surgery  -Ensure adequate pain control, currently on Dilaudid PCA pump  -Wean high flow nasal cannula to saturation greater than 88%  -Continue Symbicort and Yupelri for COPD  -Continue statin for hyperlipidemia  -Continue TPN for now  -Mobilize patient  -A.m. labs    I conducted multidisciplinary rounds in the plan of care was discussed with the multidisciplinary team at that time. In attendance at multidisciplinary rounds was clinical pharmacist, dietitian, nursing staff, and case management.    I discussed the patient's findings and my recommendations with patient and nursing staff     High level of risk due to:  parenteral controlled substances.      Yen Thomson DO  Pulmonary, Critical care and Sleep Medicine         Electronically signed by Praveen Thomson DO at 02/06/24 1012       Jimmy Craig MD at 02/06/24 0830          Patient Name:  Roger Bhat  YOB: 1960  [id number]    Surgery Progress Note    Date of visit: 2/6/2024      Subjective: No acute events overnight.  Tells me that he slept well and feels better.  Denies abdominal pain when I ask him,  "but also tells me he is using the PCA whenever he can.  NG had less output yesterday with 250 mL recorded.  Fluid in the canister however remains bilious.  300 mL of ileostomy output recorded.  Urine output over 2 L.          Objective:     /91   Pulse 85   Temp 97.8 °F (36.6 °C) (Axillary)   Resp 18   Ht 175.3 cm (69\")   Wt 57.6 kg (126 lb 15.8 oz)   SpO2 94%   BMI 18.75 kg/m²     Intake/Output Summary (Last 24 hours) at 2/6/2024 0830  Last data filed at 2/6/2024 0600  Gross per 24 hour   Intake 2487.8 ml   Output 2653 ml   Net -165.2 ml       GEN:   Awake, alert, sitting up in bed, chronically ill-appearing in no obvious distress  CV:      Regular rate and rhythm  L:         Symmetric expansion, on high flow nasal cannula and not obviously labored, coarse cough  Abd:    Soft, moderately distended, appropriately tender palpation throughout the abdomen, midline incision with some reactive erythema surrounding the site and serous drainage, DELROY drain in the right lower quadrant with seros output, ileostomy on the right side pink and patent with scant stool in the bag  Ext:     No cyanosis, clubbing, or edema    Recent labs that are back at this time have been reviewed.           Assessment/ Plan:    Mr. Bhat is a 63-year-old gentleman who is admitted following operative intervention for gallbladder and sigmoid colon mass on January 17     #Sigmoid colon mass, gallbladder mass  # Ileus  -Postop day 20 following lap vladimir, open sigmoid colectomy with diverting loop ileostomy, postoperative day 7 following exploratory laparotomy with washout  -Labs are without acute findings  -Ileostomy output appears to be slowly increasing.  300 mL yesterday.  NG tube output is less however output remains bilious.  I expect him to have much higher ileostomy output when he is GI motility improves  -KUB today  -Continue NG tube to low wall suction.  Okay for clears for comfort  -Continue TPN   -Continue antibiotics  -KUB " today to assess bowel dilation.  Hopefully can move out of the ICU soon when she has been off high flow for 24 hours      Jimmy Craig MD  2/6/2024  08:30 EST        Electronically signed by Jimmy Craig MD at 02/06/24 0869       Consult Notes (last 7 days)  Notes from 02/05/24 through 02/12/24   No notes of this type exist for this encounter.

## 2024-02-12 NOTE — PLAN OF CARE
Problem: Adult Inpatient Plan of Care  Goal: Patient-Specific Goal (Individualized)  Outcome: Ongoing, Progressing  Goal: Absence of Hospital-Acquired Illness or Injury  Outcome: Ongoing, Progressing  Intervention: Identify and Manage Fall Risk  Recent Flowsheet Documentation  Taken 2/12/2024 0009 by Gato Wilburn RN  Safety Promotion/Fall Prevention:   assistive device/personal items within reach   clutter free environment maintained   fall prevention program maintained   lighting adjusted   nonskid shoes/slippers when out of bed   room organization consistent   safety round/check completed   toileting scheduled  Taken 2/11/2024 2012 by Gato Wilburn RN  Safety Promotion/Fall Prevention:   assistive device/personal items within reach   clutter free environment maintained   fall prevention program maintained   lighting adjusted   nonskid shoes/slippers when out of bed   room organization consistent   safety round/check completed   toileting scheduled  Intervention: Prevent Skin Injury  Recent Flowsheet Documentation  Taken 2/12/2024 0009 by Gato Wilburn RN  Body Position:   weight shifting   position changed independently  Taken 2/11/2024 2012 by Gato Wilburn RN  Body Position:   weight shifting   position changed independently  Skin Protection:   adhesive use limited   tubing/devices free from skin contact  Intervention: Prevent and Manage VTE (Venous Thromboembolism) Risk  Recent Flowsheet Documentation  Taken 2/12/2024 0009 by Gato Wilburn RN  Activity Management: activity encouraged  Taken 2/11/2024 2012 by Gato Wilburn RN  Activity Management: activity encouraged  VTE Prevention/Management: (SQ heparin) other (see comments)  Range of Motion: active ROM (range of motion) encouraged  Intervention: Prevent Infection  Recent Flowsheet Documentation  Taken 2/12/2024 0009 by Gato Wilburn RN  Infection Prevention:   environmental surveillance  performed   equipment surfaces disinfected   hand hygiene promoted   rest/sleep promoted   single patient room provided  Taken 2/11/2024 2012 by Gato Wilburn RN  Infection Prevention:   environmental surveillance performed   equipment surfaces disinfected   hand hygiene promoted   rest/sleep promoted   single patient room provided  Goal: Optimal Comfort and Wellbeing  Outcome: Ongoing, Progressing  Intervention: Monitor Pain and Promote Comfort  Recent Flowsheet Documentation  Taken 2/11/2024 2012 by Gato Wilburn RN  Pain Management Interventions:   care clustered   medication offered but refused   position adjusted   pillow support provided   quiet environment facilitated  Intervention: Provide Person-Centered Care  Recent Flowsheet Documentation  Taken 2/11/2024 2012 by Gato Wilburn RN  Trust Relationship/Rapport:   care explained   questions answered   questions encouraged   thoughts/feelings acknowledged  Goal: Readiness for Transition of Care  Outcome: Ongoing, Progressing     Problem: Skin Injury Risk Increased  Goal: Skin Health and Integrity  Outcome: Ongoing, Progressing  Intervention: Promote and Optimize Oral Intake  Recent Flowsheet Documentation  Taken 2/11/2024 2012 by Gato Wilburn RN  Oral Nutrition Promotion: rest periods promoted  Intervention: Optimize Skin Protection  Recent Flowsheet Documentation  Taken 2/12/2024 0009 by Gato Wilburn, RN  Head of Bed (HOB) Positioning: HOB at 30-45 degrees  Taken 2/11/2024 2012 by Gato Wilburn RN  Pressure Reduction Techniques:   frequent weight shift encouraged   heels elevated off bed   weight shift assistance provided  Head of Bed (HOB) Positioning: HOB at 30-45 degrees  Pressure Reduction Devices:   positioning supports utilized   pressure-redistributing mattress utilized  Skin Protection:   adhesive use limited   tubing/devices free from skin contact     Problem: COPD (Chronic Obstructive  Pulmonary Disease) Comorbidity  Goal: Maintenance of COPD Symptom Control  Outcome: Ongoing, Progressing  Intervention: Maintain COPD-Symptom Control  Recent Flowsheet Documentation  Taken 2/12/2024 0009 by Gato Wilburn RN  Medication Review/Management: medications reviewed  Taken 2/11/2024 2012 by Gato Wilburn RN  Supportive Measures: active listening utilized  Medication Review/Management: medications reviewed     Problem: Adjustment to Illness (Sepsis/Septic Shock)  Goal: Optimal Coping  Outcome: Ongoing, Progressing  Intervention: Optimize Psychosocial Adjustment to Illness  Recent Flowsheet Documentation  Taken 2/11/2024 2012 by Gato Wilburn RN  Supportive Measures: active listening utilized  Family/Support System Care:   self-care encouraged   support provided     Problem: Bleeding (Sepsis/Septic Shock)  Goal: Absence of Bleeding  Outcome: Ongoing, Progressing  Intervention: Monitor and Manage Bleeding  Recent Flowsheet Documentation  Taken 2/12/2024 0009 by Gato Wilburn RN  Bleeding Precautions: monitored for signs of bleeding  Bleeding Management: dressing monitored  Taken 2/11/2024 2012 by Gato Wilburn RN  Bleeding Precautions: monitored for signs of bleeding  Bleeding Management: dressing monitored     Problem: Glycemic Control Impaired (Sepsis/Septic Shock)  Goal: Blood Glucose Level Within Desired Range  Outcome: Ongoing, Progressing  Intervention: Optimize Glycemic Control  Recent Flowsheet Documentation  Taken 2/11/2024 2012 by Gato Wilburn RN  Glycemic Management: blood glucose monitored     Problem: Infection Progression (Sepsis/Septic Shock)  Goal: Absence of Infection Signs and Symptoms  Outcome: Ongoing, Progressing  Intervention: Initiate Sepsis Management  Recent Flowsheet Documentation  Taken 2/12/2024 0009 by Gato Wilburn RN  Infection Prevention:   environmental surveillance performed   equipment surfaces  disinfected   hand hygiene promoted   rest/sleep promoted   single patient room provided  Taken 2/11/2024 2012 by Gato Wilburn RN  Infection Management: aseptic technique maintained  Infection Prevention:   environmental surveillance performed   equipment surfaces disinfected   hand hygiene promoted   rest/sleep promoted   single patient room provided  Intervention: Promote Recovery  Recent Flowsheet Documentation  Taken 2/12/2024 0009 by Gato Wilburn RN  Activity Management: activity encouraged  Taken 2/11/2024 2012 by Gato Wilburn RN  Activity Management: activity encouraged  Airway/Ventilation Support: pulmonary hygiene promoted  Sleep/Rest Enhancement: awakenings minimized  Intervention: Promote Stabilization  Recent Flowsheet Documentation  Taken 2/11/2024 2012 by Gato Wilburn RN  Fluid/Electrolyte Management:   fluids provided   oral rehydration therapy initiated     Problem: Nutrition Impaired (Sepsis/Septic Shock)  Goal: Optimal Nutrition Intake  Outcome: Ongoing, Progressing     Problem: Fall Injury Risk  Goal: Absence of Fall and Fall-Related Injury  Outcome: Ongoing, Progressing  Intervention: Identify and Manage Contributors  Recent Flowsheet Documentation  Taken 2/12/2024 0009 by Gato Wilburn RN  Medication Review/Management: medications reviewed  Taken 2/11/2024 2012 by Gato Wilburn RN  Medication Review/Management: medications reviewed  Intervention: Promote Injury-Free Environment  Recent Flowsheet Documentation  Taken 2/12/2024 0009 by Gato Wilburn RN  Safety Promotion/Fall Prevention:   assistive device/personal items within reach   clutter free environment maintained   fall prevention program maintained   lighting adjusted   nonskid shoes/slippers when out of bed   room organization consistent   safety round/check completed   toileting scheduled  Taken 2/11/2024 2012 by Gato Wilburn RN  Safety Promotion/Fall  Prevention:   assistive device/personal items within reach   clutter free environment maintained   fall prevention program maintained   lighting adjusted   nonskid shoes/slippers when out of bed   room organization consistent   safety round/check completed   toileting scheduled   Goal Outcome Evaluation:

## 2024-02-12 NOTE — THERAPY PROGRESS REPORT/RE-CERT
"Patient Name: Roger Bhat  : 1960    MRN: [id number]                              Today's Date: 2024       Admit Date: 2024    Visit Dx:     ICD-10-CM ICD-9-CM   1. Colonic mass  K63.89 569.89   2. Gallbladder mass  K82.8 575.8   3. Bowel obstruction  K56.609 560.9     Patient Active Problem List   Diagnosis    Current smoker    Cellulitis of right hand    Gastroesophageal reflux disease without esophagitis    Screening, lipid    Dysphagia    Hereditary hemochromatosis    Gallbladder mass    Colonic mass    Severe malnutrition    Respiratory distress    Centrilobular emphysema     Past Medical History:   Diagnosis Date    Allergies     Cellulitis of hand     Clotting disorder     \"FREE BLEEDING\"    Colonic mass     COPD (chronic obstructive pulmonary disease)     Cough     Current smoker     Erectile dysfunction     Fracture, foot Sept 2022    GERD (gastroesophageal reflux disease)     Hemochromatosis     Hyperlipidemia     Hypertension     Wears dentures     FULL SET     Past Surgical History:   Procedure Laterality Date    CHOLECYSTECTOMY N/A 2024    Procedure: CHOLECYSTECTOMY LAPAROSCOPIC;  Surgeon: Jimmy Craig MD;  Location: [facility] OR;  Service: General;  Laterality: N/A;    COLON RESECTION N/A 2024    Procedure: OPEN SIGMOID COLECTOMY, LIVER BIOPSY, DIVERTING LOOP ILEOSTOMY CREATION, TAKE DOWN OF THE SPLENIC FLEXURE, AND APPENDECTOMY;  Surgeon: Jimmy Craig MD;  Location: [facility] OR;  Service: General;  Laterality: N/A;    COLONOSCOPY      ENDOSCOPY  2016    pt say's, he was placed under general anesthesia for this    ENDOSCOPY N/A 10/19/2021    Procedure: ESOPHAGOGASTRODUODENOSCOPY;  Surgeon: Osmel Kessler MD;  Location:  [facility];  Service: Gastroenterology;  Laterality: N/A;  24 fr savory dilator used at 1251, 27 fr sdavory used at 1253, 33 [place] savoruy used at 1257, 42 fr savory used at 1259      ENDOSCOPY N/A 2021    Procedure: " ESOPHAGOGASTRODUODENOSCOPY WITH DILATATION;  Surgeon: Osmel Kessler MD;  Location: [facility];  Service: Gastroenterology;  Laterality: N/A;  Dilation with 48fr savery    EXPLORATORY LAPAROTOMY N/A 1/30/2024    Procedure: LAPAROTOMY EXPLORATORY;  Surgeon: Jimmy Craig MD;  Location: [facility] OR;  Service: General;  Laterality: N/A;    HAND SURGERY  2022      General Information       Row Name 02/12/24 1341          Physical Therapy Time and Intention    Document Type progress note/recertification  -KW     Mode of Treatment physical therapy  -KW       Row Name 02/12/24 1341          General Information    Patient Profile Reviewed yes  -KW     Existing Precautions/Restrictions fall  NG; ostomy; abdominal incision; DELROY  -KW               User Key  (r) = Recorded By, (t) = Taken By, (c) = Cosigned By      Initials Name Provider Type    Crystal Michael PT Physical Therapist                   Mobility       Row Name 02/12/24 1341          Sit-Stand Transfer    Sit-Stand [place] (Transfers) verbal cues;contact guard  -KW     Assistive Device (Sit-Stand Transfers) walker, front-wheeled  -KW       Row Name 02/12/24 1341          Gait/Stairs (Locomotion)    [place] Level (Gait) contact guard  -KW     Assistive Device (Gait) walker, front-wheeled  -KW     Distance in Feet (Gait) 200  -KW     Deviations/Abnormal Patterns (Gait) bilateral deviations;gait speed decreased;stride length decreased;base of support, narrow  -KW     Bilateral Gait Deviations forward flexed posture;heel strike decreased  -KW               User Key  (r) = Recorded By, (t) = Taken By, (c) = Cosigned By      Initials Name Provider Type    Crystal Michael PT Physical Therapist                   Obj/Interventions    No documentation.                  Goals/Plan       Row Name 02/12/24 1341          Bed Mobility Goal 1 (PT)    Activity/Assistive Device (Bed Mobility Goal 1, PT) sit to supine/supine to sit  -KW      [place] Level/Cues Needed (Bed Mobility Goal 1, PT) standby assist  -KW     Time Frame (Bed Mobility Goal 1, PT) long term goal (LTG);10 days  -KW     Progress/Outcomes (Bed Mobility Goal 1, PT) continuing progress toward goal  -KW       Row Name 02/12/24 1341          Transfer Goal 1 (PT)    Activity/Assistive Device (Transfer Goal 1, PT) sit-to-stand/stand-to-sit;bed-to-chair/chair-to-bed;other (see comments)  least restrictive AD  -KW     [place] Level/Cues Needed (Transfer Goal 1, PT) standby assist  -KW     Time Frame (Transfer Goal 1, PT) long term goal (LTG);10 days  -KW     Progress/Outcome (Transfer Goal 1, PT) continuing progress toward goal  -KW       Row Name 02/12/24 1341          Gait Training Goal 1 (PT)    Activity/Assistive Device (Gait Training Goal 1, PT) gait (walking locomotion);decrease fall risk;increase endurance/gait distance;other (see comments)  least restrictive AD  -KW     [place] Level (Gait Training Goal 1, PT) standby assist  -KW     Distance (Gait Training Goal 1, PT) 250  -KW     Time Frame (Gait Training Goal 1, PT) long term goal (LTG);10 days  -KW     Progress/Outcome (Gait Training Goal 1, PT) continuing progress toward goal  -KW       Row Name 02/12/24 1341          Stairs Goal 1 (PT)    Activity/Assistive Device (Stairs Goal 1, PT) ascending stairs;descending stairs  -KW     [place] Level/Cues Needed (Stairs Goal 1, PT) standby assist  -KW     Number of Stairs (Stairs Goal 1, PT) 3  -KW     Time Frame (Stairs Goal 1, PT) long term goal (LTG);by discharge  -KW     Progress/Outcome (Stairs Goal 1, PT) continuing progress toward goal  -KW               User Key  (r) = Recorded By, (t) = Taken By, (c) = Cosigned By      Initials Name Provider Type    Crystal Michael, PT Physical Therapist                   Clinical Impression       Row Name 02/12/24 1341          Pain    Pretreatment Pain Rating 0/10 - no pain  -KW       Row Name 02/12/24 1341           Plan of Care Review    Plan of Care Reviewed With patient  -KW     Progress improving  -KW     Outcome Evaluation Physical therapy treatment complete. Patient increased ambulation distance compared to previous treatment session, but continues to fatigue quickly. The patient continues to present below baseline for functional mobility. The patient would continue to benefit from skilled PT to address strength, balance and activity tolerance deficits. Continue to current PT POC  -KW       Row Name 02/12/24 1341          Vital Signs    Pre Systolic BP Rehab 135  -KW     Pre Treatment Diastolic   -KW     Pretreatment Heart Rate (beats/min) 102  -KW     Pre SpO2 (%) 96  -KW       Row Name 02/12/24 1341          Positioning and Restraints    Pre-Treatment Position in bed  -KW     Post Treatment Position chair  -KW     In Chair reclined;call light within reach;encouraged to call for assist;legs elevated  -KW               User Key  (r) = Recorded By, (t) = Taken By, (c) = Cosigned By      Initials Name Provider Type    KW Crystal Kelly, PT Physical Therapist                   Outcome Measures       Row Name 02/12/24 1341 02/12/24 1200       How much help from another person do you currently need...    Turning from your back to your side while in flat bed without using bedrails? 4  -KW 4  -MB    Moving from lying on back to sitting on the side of a flat bed without bedrails? 3  -KW 3  -MB    Moving to and from a bed to a chair (including a wheelchair)? 3  -KW 3  -MB    Standing up from a chair using your arms (e.g., wheelchair, bedside chair)? 3  -KW 3  -MB    Climbing 3-5 steps with a railing? 2  -KW 2  -MB    To walk in hospital room? 3  -KW 3  -MB    AM-PAC 6 Clicks Score (PT) 18  -KW 18  -MB    Highest Level of Mobility Goal 6 --> Walk 10 steps or more  -KW 6 --> Walk 10 steps or more  -MB      Row Name 02/12/24 0830          How much help from another person do you currently need...    Turning from your back  to your side while in flat bed without using bedrails? 4  -MB     Moving from lying on back to sitting on the side of a flat bed without bedrails? 3  -MB     Moving to and from a bed to a chair (including a wheelchair)? 3  -MB     Standing up from a chair using your arms (e.g., wheelchair, bedside chair)? 3  -MB     Climbing 3-5 steps with a railing? 2  -MB     To walk in hospital room? 3  -MB     AM-PAC 6 Clicks Score (PT) 18  -MB     Highest Level of Mobility Goal 6 --> Walk 10 steps or more  -MB       Row Name 02/12/24 1341 02/12/24 1058       Functional Assessment    Outcome Measure Options AM-PAC 6 Clicks Basic Mobility (PT)  -KW AM-PAC 6 Clicks Daily Activity (OT)  -AC              User Key  (r) = Recorded By, (t) = Taken By, (c) = Cosigned By      Initials Name Provider Type    Ely San OT Occupational Therapist    Crystal Michael, PT Physical Therapist    Yue Alfredo, RN Registered Nurse                                 Physical Therapy Education       Title: PT OT SLP Therapies (In Progress)       Topic: Physical Therapy (In Progress)       Point: Mobility training (In Progress)       Learning Progress Summary             Patient Acceptance, E,TB, NR by AY at 2/5/2024 1134    Acceptance, E, VU,DU by SD at 2/4/2024 1628    Acceptance, E,TB, VU by ES at 2/2/2024 1441   Significant Other Acceptance, E,TB, VU by ES at 2/2/2024 1441                         Point: Home exercise program (Done)       Learning Progress Summary             Patient Acceptance, E, VU,DU by SD at 2/4/2024 1628                         Point: Body mechanics (In Progress)       Learning Progress Summary             Patient Acceptance, E,TB, NR by AY at 2/5/2024 1134    Acceptance, E, VU,DU by SD at 2/4/2024 1628    Acceptance, E,TB, VU by ES at 2/2/2024 1441   Significant Other Acceptance, E,TB, VU by ES at 2/2/2024 1441                         Point: Precautions (In Progress)       Learning Progress Summary              Patient Acceptance, E,TB, NR by AY at 2/5/2024 1134    Acceptance, E, VU,DU by SD at 2/4/2024 1628    Acceptance, E,TB, VU by ES at 2/2/2024 1441   Significant Other Acceptance, E,TB, VU by ES at 2/2/2024 1441                                         User Key       Initials Effective Dates Name Provider Type Discipline    SD 03/13/23 -  Mayda Gabriel, PT Physical Therapist PT    HARRY 11/10/20 -  Aaliyah Rios, PT Physical Therapist PT    ES 08/11/22 -  Holly Hylton, PT Physical Therapist PT                  PT Recommendation and Plan     Plan of Care Reviewed With: patient  Progress: improving  Outcome Evaluation: Physical therapy treatment complete. Patient increased ambulation distance compared to previous treatment session, but continues to fatigue quickly. The patient continues to present below baseline for functional mobility. The patient would continue to benefit from skilled PT to address strength, balance and activity tolerance deficits. Continue to current PT POC     Time Calculation:         PT Charges       Row Name 02/12/24 1341             Time Calculation    Start Time 1341  -KW      PT Received On 02/12/24  -KW         Timed Charges    37402 - Gait Training Minutes  17  -KW         Total Minutes    Timed Charges Total Minutes 17  -KW       Total Minutes 17  -KW                User Key  (r) = Recorded By, (t) = Taken By, (c) = Cosigned By      Initials Name Provider Type    Crystal Michael, EVELIO Physical Therapist                  Therapy Charges for Today       Code Description Service Date Service Provider Modifiers Qty    18641001680 HC GAIT TRAINING EA 15 MIN 2/12/2024 Crystal Kelly PT GP 1            PT G-Codes  Outcome Measure Options: AM-PAC 6 Clicks Basic Mobility (PT)  AM-PAC 6 Clicks Score (PT): 18  AM-PAC 6 Clicks Score (OT): 19  PT Discharge Summary  Anticipated Discharge Disposition (PT): inpatient rehabilitation facility    Crystal Kelly PT  2/12/2024

## 2024-02-13 LAB
ANION GAP SERPL CALCULATED.3IONS-SCNC: 9 MMOL/L (ref 5–15)
BASOPHILS # BLD AUTO: 0.08 10*3/MM3 (ref 0–0.2)
BASOPHILS NFR BLD AUTO: 0.6 % (ref 0–1.5)
BUN SERPL-MCNC: 19 MG/DL (ref 8–23)
BUN/CREAT SERPL: 35.8 (ref 7–25)
CALCIUM SPEC-SCNC: 9 MG/DL (ref 8.6–10.5)
CHLORIDE SERPL-SCNC: 102 MMOL/L (ref 98–107)
CO2 SERPL-SCNC: 24 MMOL/L (ref 22–29)
CREAT SERPL-MCNC: 0.53 MG/DL (ref 0.76–1.27)
DEPRECATED RDW RBC AUTO: 55.9 FL (ref 37–54)
EGFRCR SERPLBLD CKD-EPI 2021: 114 ML/MIN/1.73
EOSINOPHIL # BLD AUTO: 0.52 10*3/MM3 (ref 0–0.4)
EOSINOPHIL NFR BLD AUTO: 4 % (ref 0.3–6.2)
ERYTHROCYTE [DISTWIDTH] IN BLOOD BY AUTOMATED COUNT: 15.1 % (ref 12.3–15.4)
GLUCOSE BLDC GLUCOMTR-MCNC: 105 MG/DL (ref 70–130)
GLUCOSE BLDC GLUCOMTR-MCNC: 108 MG/DL (ref 70–130)
GLUCOSE BLDC GLUCOMTR-MCNC: 114 MG/DL (ref 70–130)
GLUCOSE BLDC GLUCOMTR-MCNC: 116 MG/DL (ref 70–130)
GLUCOSE SERPL-MCNC: 101 MG/DL (ref 65–99)
HCT VFR BLD AUTO: 32.8 % (ref 37.5–51)
HGB BLD-MCNC: 11 G/DL (ref 13–17.7)
IMM GRANULOCYTES # BLD AUTO: 0.06 10*3/MM3 (ref 0–0.05)
IMM GRANULOCYTES NFR BLD AUTO: 0.5 % (ref 0–0.5)
LYMPHOCYTES # BLD AUTO: 2.2 10*3/MM3 (ref 0.7–3.1)
LYMPHOCYTES NFR BLD AUTO: 17 % (ref 19.6–45.3)
MAGNESIUM SERPL-MCNC: 2 MG/DL (ref 1.6–2.4)
MCH RBC QN AUTO: 34.1 PG (ref 26.6–33)
MCHC RBC AUTO-ENTMCNC: 33.5 G/DL (ref 31.5–35.7)
MCV RBC AUTO: 101.5 FL (ref 79–97)
MONOCYTES # BLD AUTO: 1.14 10*3/MM3 (ref 0.1–0.9)
MONOCYTES NFR BLD AUTO: 8.8 % (ref 5–12)
NEUTROPHILS NFR BLD AUTO: 69.1 % (ref 42.7–76)
NEUTROPHILS NFR BLD AUTO: 8.97 10*3/MM3 (ref 1.7–7)
NRBC BLD AUTO-RTO: 0 /100 WBC (ref 0–0.2)
PHOSPHATE SERPL-MCNC: 2.5 MG/DL (ref 2.5–4.5)
PLATELET # BLD AUTO: 484 10*3/MM3 (ref 140–450)
PMV BLD AUTO: 9.9 FL (ref 6–12)
POTASSIUM SERPL-SCNC: 4.8 MMOL/L (ref 3.5–5.2)
RBC # BLD AUTO: 3.23 10*6/MM3 (ref 4.14–5.8)
SODIUM SERPL-SCNC: 135 MMOL/L (ref 136–145)
WBC NRBC COR # BLD AUTO: 12.97 10*3/MM3 (ref 3.4–10.8)

## 2024-02-13 PROCEDURE — 25010000002 METOCLOPRAMIDE PER 10 MG: Performed by: INTERNAL MEDICINE

## 2024-02-13 PROCEDURE — 94761 N-INVAS EAR/PLS OXIMETRY MLT: CPT

## 2024-02-13 PROCEDURE — 82948 REAGENT STRIP/BLOOD GLUCOSE: CPT

## 2024-02-13 PROCEDURE — 25010000002 DIPHENHYDRAMINE PER 50 MG: Performed by: INTERNAL MEDICINE

## 2024-02-13 PROCEDURE — 84100 ASSAY OF PHOSPHORUS: CPT | Performed by: INTERNAL MEDICINE

## 2024-02-13 PROCEDURE — 80048 BASIC METABOLIC PNL TOTAL CA: CPT | Performed by: INTERNAL MEDICINE

## 2024-02-13 PROCEDURE — 63710000001 REVEFENACIN 175 MCG/3ML SOLUTION: Performed by: INTERNAL MEDICINE

## 2024-02-13 PROCEDURE — 94664 DEMO&/EVAL PT USE INHALER: CPT

## 2024-02-13 PROCEDURE — 85025 COMPLETE CBC W/AUTO DIFF WBC: CPT | Performed by: INTERNAL MEDICINE

## 2024-02-13 PROCEDURE — 25010000002 HEPARIN (PORCINE) PER 1000 UNITS: Performed by: INTERNAL MEDICINE

## 2024-02-13 PROCEDURE — 83735 ASSAY OF MAGNESIUM: CPT | Performed by: INTERNAL MEDICINE

## 2024-02-13 PROCEDURE — 94799 UNLISTED PULMONARY SVC/PX: CPT

## 2024-02-13 RX ADMIN — PANTOPRAZOLE SODIUM 40 MG: 40 INJECTION, POWDER, LYOPHILIZED, FOR SOLUTION INTRAVENOUS at 07:21

## 2024-02-13 RX ADMIN — ROSUVASTATIN CALCIUM 10 MG: 10 TABLET, FILM COATED ORAL at 20:48

## 2024-02-13 RX ADMIN — FOLIC ACID 1 MG: 1 TABLET ORAL at 09:19

## 2024-02-13 RX ADMIN — BUDESONIDE AND FORMOTEROL FUMARATE DIHYDRATE 2 PUFF: 160; 4.5 AEROSOL RESPIRATORY (INHALATION) at 12:08

## 2024-02-13 RX ADMIN — METOCLOPRAMIDE HYDROCHLORIDE 10 MG: 5 INJECTION INTRAMUSCULAR; INTRAVENOUS at 12:35

## 2024-02-13 RX ADMIN — HEPARIN SODIUM 5000 UNITS: 5000 INJECTION INTRAVENOUS; SUBCUTANEOUS at 14:18

## 2024-02-13 RX ADMIN — BUPROPION HYDROCHLORIDE 50 MG: 100 TABLET, FILM COATED ORAL at 22:29

## 2024-02-13 RX ADMIN — METOCLOPRAMIDE HYDROCHLORIDE 10 MG: 5 INJECTION INTRAMUSCULAR; INTRAVENOUS at 00:04

## 2024-02-13 RX ADMIN — BUPROPION HYDROCHLORIDE 50 MG: 100 TABLET, FILM COATED ORAL at 14:17

## 2024-02-13 RX ADMIN — Medication 10 ML: at 09:19

## 2024-02-13 RX ADMIN — BUDESONIDE AND FORMOTEROL FUMARATE DIHYDRATE 2 PUFF: 160; 4.5 AEROSOL RESPIRATORY (INHALATION) at 22:00

## 2024-02-13 RX ADMIN — SENNOSIDES 1 TABLET: 8.6 TABLET, FILM COATED ORAL at 20:48

## 2024-02-13 RX ADMIN — THIAMINE HCL TAB 100 MG 100 MG: 100 TAB at 09:19

## 2024-02-13 RX ADMIN — METOCLOPRAMIDE HYDROCHLORIDE 10 MG: 5 INJECTION INTRAMUSCULAR; INTRAVENOUS at 05:08

## 2024-02-13 RX ADMIN — HEPARIN SODIUM 5000 UNITS: 5000 INJECTION INTRAVENOUS; SUBCUTANEOUS at 05:11

## 2024-02-13 RX ADMIN — Medication 10 ML: at 20:48

## 2024-02-13 RX ADMIN — METOCLOPRAMIDE HYDROCHLORIDE 10 MG: 5 INJECTION INTRAMUSCULAR; INTRAVENOUS at 17:20

## 2024-02-13 RX ADMIN — DIPHENHYDRAMINE HYDROCHLORIDE 25 MG: 50 INJECTION, SOLUTION INTRAMUSCULAR; INTRAVENOUS at 00:07

## 2024-02-13 RX ADMIN — HEPARIN SODIUM 5000 UNITS: 5000 INJECTION INTRAVENOUS; SUBCUTANEOUS at 20:48

## 2024-02-13 RX ADMIN — REVEFENACIN 175 MCG: 175 SOLUTION RESPIRATORY (INHALATION) at 12:08

## 2024-02-13 RX ADMIN — BUPROPION HYDROCHLORIDE 50 MG: 100 TABLET, FILM COATED ORAL at 05:10

## 2024-02-13 RX ADMIN — ACETAMINOPHEN 650 MG: 650 SOLUTION ORAL at 09:18

## 2024-02-13 NOTE — PROGRESS NOTES
"Patient Name:  Roger Bhat  YOB: 1960  [id number]    Surgery Progress Note    Date of visit: 2/13/2024      Subjective: No acute events overnight.  Reports quite a bit of throat irritation from the NG tube.  Tolerated diet yesterday without difficulty.  Stoma with 550 mL of output.          Objective:     /89 (BP Location: Left arm, Patient Position: Lying)   Pulse 104   Temp 96.9 °F (36.1 °C) (Axillary)   Resp 18   Ht 175.3 cm (69\")   Wt 50.5 kg (111 lb 4.8 oz)   SpO2 94%   BMI 16.44 kg/m²     Intake/Output Summary (Last 24 hours) at 2/13/2024 0815  Last data filed at 2/13/2024 0719  Gross per 24 hour   Intake 2706 ml   Output 1990 ml   Net 716 ml       GEN:   Awake, alert, sitting up in bed, chronically ill-appearing in no obvious distress  CV:      Regular rate and rhythm  L:         Symmetric expansion, on oxygen by nasal cannula  Abd:    Soft, not obviously distended, appropriately tender palpation throughout the abdomen, midline incision with some reactive erythema surrounding the site and serous drainage, DELROY drain in the right lower quadrant with tan thick small volume output, ileostomy on the right side pink and patent with liquid stool in the bag  Ext:     No cyanosis, clubbing, or edema    Recent labs that are back at this time have been reviewed.           Assessment/ Plan:    Mr. Bhat is a 63-year-old gentleman who is admitted following operative intervention for gallbladder and sigmoid colon mass on January 17     #Sigmoid colon mass, gallbladder mass  # Ileus  -S/p lap vladimir, open sigmoid colectomy with diverting loop ileostomy on January 17, 2024. S/p exploratory laparotomy with washout on January 30  -Afebrile.  Labs without significant change.  White blood cell count 12.9  -Discontinue nasoenteral tube and tube feeds today  -Monitor ileostomy output  -Mobilize  -Hopeful for discharge to rehab in the next day or 2         Jimmy Craig MD  2/13/2024  08:15 " EST

## 2024-02-13 NOTE — PLAN OF CARE
Problem: Adult Inpatient Plan of Care  Goal: Patient-Specific Goal (Individualized)  Outcome: Ongoing, Progressing  Goal: Absence of Hospital-Acquired Illness or Injury  Outcome: Ongoing, Progressing  Intervention: Identify and Manage Fall Risk  Recent Flowsheet Documentation  Taken 2/13/2024 0003 by Gato Wilburn RN  Safety Promotion/Fall Prevention:   assistive device/personal items within reach   clutter free environment maintained   fall prevention program maintained   lighting adjusted   nonskid shoes/slippers when out of bed   room organization consistent   safety round/check completed   toileting scheduled  Taken 2/12/2024 1943 by Gato Wilburn RN  Safety Promotion/Fall Prevention:   assistive device/personal items within reach   clutter free environment maintained   fall prevention program maintained   lighting adjusted   nonskid shoes/slippers when out of bed   room organization consistent   safety round/check completed   toileting scheduled  Intervention: Prevent Skin Injury  Recent Flowsheet Documentation  Taken 2/13/2024 0003 by Gato Wilburn RN  Body Position:   weight shifting   position changed independently  Taken 2/12/2024 1943 by Gato Wilburn RN  Body Position:   weight shifting   position changed independently  Skin Protection:   adhesive use limited   tubing/devices free from skin contact  Intervention: Prevent and Manage VTE (Venous Thromboembolism) Risk  Recent Flowsheet Documentation  Taken 2/13/2024 0003 by Gato Wilburn RN  Activity Management: activity encouraged  Taken 2/12/2024 1943 by Gato Wilburn RN  Activity Management: activity encouraged  VTE Prevention/Management: (SQ heparin) other (see comments)  Range of Motion: active ROM (range of motion) encouraged  Intervention: Prevent Infection  Recent Flowsheet Documentation  Taken 2/13/2024 0003 by Gato Wilburn RN  Infection Prevention:   environmental surveillance  performed   equipment surfaces disinfected   hand hygiene promoted   rest/sleep promoted   single patient room provided  Taken 2/12/2024 1943 by Gato Wilburn RN  Infection Prevention:   environmental surveillance performed   equipment surfaces disinfected   hand hygiene promoted   rest/sleep promoted   single patient room provided  Goal: Optimal Comfort and Wellbeing  Outcome: Ongoing, Progressing  Intervention: Monitor Pain and Promote Comfort  Recent Flowsheet Documentation  Taken 2/12/2024 1943 by Gato Wilburn RN  Pain Management Interventions:   care clustered   medication offered but refused   pain management plan reviewed with patient/caregiver   position adjusted   pillow support provided   quiet environment facilitated  Intervention: Provide Person-Centered Care  Recent Flowsheet Documentation  Taken 2/12/2024 1943 by Gato Wilburn RN  Trust Relationship/Rapport:   care explained   questions answered   questions encouraged   thoughts/feelings acknowledged  Goal: Readiness for Transition of Care  Outcome: Ongoing, Progressing     Problem: Skin Injury Risk Increased  Goal: Skin Health and Integrity  Outcome: Ongoing, Progressing  Intervention: Promote and Optimize Oral Intake  Recent Flowsheet Documentation  Taken 2/12/2024 1943 by Gato Wilburn RN  Oral Nutrition Promotion: rest periods promoted  Intervention: Optimize Skin Protection  Recent Flowsheet Documentation  Taken 2/13/2024 0003 by Gato Wilburn RN  Head of Bed (HOB) Positioning: HOB at 30-45 degrees  Taken 2/12/2024 1943 by Gato Wilburn RN  Pressure Reduction Techniques:   frequent weight shift encouraged   heels elevated off bed   weight shift assistance provided  Head of Bed (HOB) Positioning: HOB at 30-45 degrees  Pressure Reduction Devices:   positioning supports utilized   pressure-redistributing mattress utilized  Skin Protection:   adhesive use limited   tubing/devices free from  skin contact     Problem: COPD (Chronic Obstructive Pulmonary Disease) Comorbidity  Goal: Maintenance of COPD Symptom Control  Outcome: Ongoing, Progressing  Intervention: Maintain COPD-Symptom Control  Recent Flowsheet Documentation  Taken 2/13/2024 0003 by Gato Wilburn RN  Medication Review/Management: medications reviewed  Taken 2/12/2024 1943 by Gato Wilburn RN  Supportive Measures: active listening utilized  Medication Review/Management: medications reviewed     Problem: Adjustment to Illness (Sepsis/Septic Shock)  Goal: Optimal Coping  Outcome: Ongoing, Progressing  Intervention: Optimize Psychosocial Adjustment to Illness  Recent Flowsheet Documentation  Taken 2/12/2024 1943 by Gato Wilburn RN  Supportive Measures: active listening utilized  Family/Support System Care:   self-care encouraged   support provided     Problem: Bleeding (Sepsis/Septic Shock)  Goal: Absence of Bleeding  Outcome: Ongoing, Progressing  Intervention: Monitor and Manage Bleeding  Recent Flowsheet Documentation  Taken 2/12/2024 1943 by Gato Wilburn RN  Bleeding Precautions: monitored for signs of bleeding     Problem: Glycemic Control Impaired (Sepsis/Septic Shock)  Goal: Blood Glucose Level Within Desired Range  Outcome: Ongoing, Progressing  Intervention: Optimize Glycemic Control  Recent Flowsheet Documentation  Taken 2/12/2024 1943 by Gato Wilburn RN  Glycemic Management: blood glucose monitored     Problem: Infection Progression (Sepsis/Septic Shock)  Goal: Absence of Infection Signs and Symptoms  Outcome: Ongoing, Progressing  Intervention: Initiate Sepsis Management  Recent Flowsheet Documentation  Taken 2/13/2024 0003 by Gato Wilburn RN  Infection Prevention:   environmental surveillance performed   equipment surfaces disinfected   hand hygiene promoted   rest/sleep promoted   single patient room provided  Taken 2/12/2024 1943 by Gato Wilburn  RN  Infection Management: aseptic technique maintained  Infection Prevention:   environmental surveillance performed   equipment surfaces disinfected   hand hygiene promoted   rest/sleep promoted   single patient room provided  Intervention: Promote Recovery  Recent Flowsheet Documentation  Taken 2/13/2024 0003 by Gato Wilburn RN  Activity Management: activity encouraged  Taken 2/12/2024 1943 by Gato Wilburn RN  Activity Management: activity encouraged  Airway/Ventilation Support: pulmonary hygiene promoted  Sleep/Rest Enhancement: awakenings minimized  Intervention: Promote Stabilization  Recent Flowsheet Documentation  Taken 2/12/2024 1943 by Gato Wilburn RN  Fluid/Electrolyte Management:   fluids provided   oral rehydration therapy initiated     Problem: Nutrition Impaired (Sepsis/Septic Shock)  Goal: Optimal Nutrition Intake  Outcome: Ongoing, Progressing     Problem: Fall Injury Risk  Goal: Absence of Fall and Fall-Related Injury  Outcome: Ongoing, Progressing  Intervention: Identify and Manage Contributors  Recent Flowsheet Documentation  Taken 2/13/2024 0003 by Gato Wilburn RN  Medication Review/Management: medications reviewed  Taken 2/12/2024 1943 by Gato Wilburn RN  Medication Review/Management: medications reviewed  Intervention: Promote Injury-Free Environment  Recent Flowsheet Documentation  Taken 2/13/2024 0003 by Gato Wilburn RN  Safety Promotion/Fall Prevention:   assistive device/personal items within reach   clutter free environment maintained   fall prevention program maintained   lighting adjusted   nonskid shoes/slippers when out of bed   room organization consistent   safety round/check completed   toileting scheduled  Taken 2/12/2024 1943 by Gato Wilburn RN  Safety Promotion/Fall Prevention:   assistive device/personal items within reach   clutter free environment maintained   fall prevention program maintained    lighting adjusted   nonskid shoes/slippers when out of bed   room organization consistent   safety round/check completed   toileting scheduled   Goal Outcome Evaluation:         VSS, RA, patient consistently denies pain, DELROY drain output 40 ml to this point of shift, tube feeding now at goal rate of 55 ml/hr, good ostomy output with lots of gas production, patient resting well, no complications assessed or stated.

## 2024-02-13 NOTE — PROGRESS NOTES
[facility] Medicine Services  CLINICAL REVIEW NOTE    Patient Name: Roger Bhat  : 1960  MRN: [id number]    Date of Admission: 2024  Length of Stay: 27  Attending Service:  General Surgery          Patient's labs, charting, and events reviewed.  No active medical management issues to address today, the Hospitalist team will continue to follow daily, be available for any needs, and see or bill for services as needed.      NG/Enteral feeds being discontinued. Labs stable. To [facility] when arranged.

## 2024-02-13 NOTE — PAYOR COMM NOTE
"[facility]# [id number]   Clinical Update    Utilization Review  Phone [phone]  Fax [phone]    [place] [phone]       Roger Bhat (63 y.o. Male)       Date of Birth   1960    Social Security Number       Address   [address] [place], [place] [address]    Home Phone   [phone]    MRN   [id number]       Confucianism   Gnosticism    Marital Status   Significant Other                            Admission Date   1/17/24    Admission Type   Elective    Admitting Provider   Jimmy Craig MD    Attending Provider   Jimmy Craig MD    Department, Room/Bed   [address], S[address]/1       Discharge Date       Discharge Disposition       Discharge Destination                                 Attending Provider: Jimmy Craig MD    Allergies: No Known Allergies    Isolation: None   Infection: None   Code Status: CPR    Ht: 175.3 cm (69\")   Wt: 50.5 kg (111 lb 4.8 oz)    Admission Cmt: None   Principal Problem: Colonic mass [K63.89]                   Active Insurance as of 1/17/2024       Primary Coverage       Payor Plan Insurance Group Employer/Plan Group    [employer] BLUE [employer]OSS [employer] BLUE [employer]OSS BLUE [employer] PPO [id number]       Payor Plan Address Payor Plan Phone Number Payor Plan Fax Number Effective Dates    [address] [phone]  2/1/2021 - None Entered    [place] [id number]         Subscriber Name Subscriber Birth Date Member ID       ROGER BHAT 1960 [id number]                     Emergency Contacts        (Rel.) Home Phone Work Phone Mobile Phone    Ambar Daniels (Significant Other) [phone] -- [phone]    Seferino Dalal (Friend) [phone] -- [phone]                 Physician Progress Notes (last 72 hours)        Dian Bolanos DO at 02/13/24 [facility]              [facility] Medicine Services  CLINICAL REVIEW NOTE    Patient Name: Roger Ford " "Home  : 1960  MRN: [id number]    Date of Admission: 2024  Length of Stay: 27  Attending Service:  General Surgery          Patient's labs, charting, and events reviewed.  No active medical management issues to address today, the Hospitalist team will continue to follow daily, be available for any needs, and see or bill for services as needed.      NG/Enteral feeds being discontinued. Labs stable. To [facility] when arranged.        Electronically signed by Dian Bolanos DO at 24 1032       Jimmy Craig MD at 24 0815          Patient Name:  Roger Bhat  YOB: 1960  [id number]    Surgery Progress Note    Date of visit: 2024      Subjective: No acute events overnight.  Reports quite a bit of throat irritation from the NG tube.  Tolerated diet yesterday without difficulty.  Stoma with 550 mL of output.          Objective:     /89 (BP Location: Left arm, Patient Position: Lying)   Pulse 104   Temp 96.9 °F (36.1 °C) (Axillary)   Resp 18   Ht 175.3 cm (69\")   Wt 50.5 kg (111 lb 4.8 oz)   SpO2 94%   BMI 16.44 kg/m²     Intake/Output Summary (Last 24 hours) at 2024 0815  Last data filed at 2024 0719  Gross per 24 hour   Intake 2706 ml   Output 1990 ml   Net 716 ml       GEN:   Awake, alert, sitting up in bed, chronically ill-appearing in no obvious distress  CV:      Regular rate and rhythm  L:         Symmetric expansion, on oxygen by nasal cannula  Abd:    Soft, not obviously distended, appropriately tender palpation throughout the abdomen, midline incision with some reactive erythema surrounding the site and serous drainage, DELROY drain in the right lower quadrant with tan thick small volume output, ileostomy on the right side pink and patent with liquid stool in the bag  Ext:     No cyanosis, clubbing, or edema    Recent labs that are back at this time have been reviewed.           Assessment/ Plan:    Mr. Bhat is a 63-year-old gentleman " "who is admitted following operative intervention for gallbladder and sigmoid colon mass on January 17     #Sigmoid colon mass, gallbladder mass  # Ileus  -S/p lap vladimir, open sigmoid colectomy with diverting loop ileostomy on January 17, 2024. S/p exploratory laparotomy with washout on January 30  -Afebrile.  Labs without significant change.  White blood cell count 12.9  -Discontinue nasoenteral tube and tube feeds today  -Monitor ileostomy output  -Mobilize  -Hopeful for discharge to rehab in the next day or 2         Jimmy Craig MD  2/13/2024  08:15 EST        Electronically signed by Jimmy Craig MD at 02/13/24 0817       Jimmy Craig MD at 02/12/24 1033          Patient Name:  Roger Bhat  YOB: 1960  [id number]    Surgery Progress Note    Date of visit: 2/12/2024      Subjective: No acute events.  Feeling very well.  Had a bowel movement per rectum yesterday.  Otherwise stoma with 275 mL of output recorded.  Ate to pork chops yesterday and mashed potatoes.  Had 2 Ensure shakes.  Denies nausea, vomiting, fevers or chills          Objective:     BP (!) 135/104 (BP Location: Left arm, Patient Position: Sitting)   Pulse 106   Temp 97.2 °F (36.2 °C) (Oral)   Resp 16   Ht 175.3 cm (69\")   Wt 50.8 kg (112 lb) Comment: bed scale  SpO2 92%   BMI 16.54 kg/m²     Intake/Output Summary (Last 24 hours) at 2/12/2024 1033  Last data filed at 2/12/2024 0950  Gross per 24 hour   Intake 3596 ml   Output 2190 ml   Net 1406 ml     GEN:   Awake, alert, sitting up in bed, chronically ill-appearing in no obvious distress  CV:      Regular rate and rhythm  L:         Symmetric expansion, on oxygen by nasal cannula  Abd:    Soft, not obviously distended, appropriately tender palpation throughout the abdomen, midline incision with some reactive erythema surrounding the site and serous drainage, DELROY drain in the right lower quadrant with cloudy serous output, ileostomy on the right side pink " and patent with liquid stool in the bag  Ext:     No cyanosis, clubbing, or edema      Recent labs that are back at this time have been reviewed.           Assessment/ Plan:    Mr. Bhat is a 63-year-old gentleman who is admitted following operative intervention for gallbladder and sigmoid colon mass on      #Sigmoid colon mass, gallbladder mass  # Ileus  -S/p lap vladimir, open sigmoid colectomy with diverting loop ileostomy on 2024. S/p exploratory laparotomy with washout on   -Afebrile.  Labs are stable  -Tolerating tube feeds well.  Also tolerating oral diet without difficulty  -Discontinue antibiotics today  -Mobilize  -Should be ready for discharge to rehab in the next day or 2.  Do not expect that he will need to continue on tube feeds at discharge as his oral intake is improving well.      Jimmy Craig MD  2024  10:33 EST        Electronically signed by Jimmy Craig MD at 24 1034       Dian Bolanos DO at 24 1027              [facility]  CLINICAL REVIEW NOTE    Patient Name: Roger Bhat  : 1960  MRN: [id number]    Date of Admission: 2024  Length of Stay: 26  Attending Service:  General Surgery          Patient's labs, charting, and events reviewed.  No active medical management issues to address today, the Hospitalist team will continue to follow daily, be available for any needs, and see or bill for services as needed.      Monitor WBC. Stable at 12. Continue current POC per Gen Surg. Plan is [facility] [place].        Electronically signed by Dian Bolanos DO at 24 1027       Dian Bolanos DO at 24 1116              [facility] Services  CLINICAL REVIEW NOTE    Patient Name: Roger Bhat  : 1960  MRN: [id number]    Date of Admission: 2024  Length of Stay: 25  Attending Service:  General Surgery          Patient's  "labs, charting, and events reviewed.  No active medical management issues to address today, the Hospitalist team will continue to follow daily, be available for any needs, and see or bill for services as needed.           Electronically signed by Dian Bolanos DO at 02/11/24 1116       Jimmy Craig MD at 02/11/24 1001          Patient Name:  Roger Bhat  YOB: 1960  [id number]    Surgery Progress Note    Date of visit: 2/11/2024      Subjective: No acute events.  Reports no problems.  Feeling much better than he has in quite a while.  Tolerating tube feeds at 30 mL/h without difficulty.  Tolerated full liquids yesterday without difficulty.          Objective:     /88 (BP Location: Left arm, Patient Position: Sitting)   Pulse 85   Temp 96.7 °F (35.9 °C) (Oral)   Resp 18   Ht 175.3 cm (69\")   Wt 50.5 kg (111 lb 4.8 oz)   SpO2 96%   BMI 16.44 kg/m²     Intake/Output Summary (Last 24 hours) at 2/11/2024 1002  Last data filed at 2/11/2024 0523  Gross per 24 hour   Intake --   Output 2400 ml   Net -2400 ml       GEN:   Awake, alert, sitting up in bed, chronically ill-appearing in no obvious distress  CV:      Regular rate and rhythm  L:         Symmetric expansion, on oxygen by nasal cannula  Abd:    Soft, not obviously distended, appropriately tender palpation throughout the abdomen, midline incision with some reactive erythema surrounding the site and serous drainage, DELROY drain in the right lower quadrant with cloudy serous output, ileostomy on the right side pink and patent with liquid stool in the bag  Ext:     No cyanosis, clubbing, or edema    Recent labs that are back at this time have been reviewed.           Assessment/ Plan:    Mr. Bhat is a 63-year-old gentleman who is admitted following operative intervention for gallbladder and sigmoid colon mass on January 17     #Sigmoid colon mass, gallbladder mass  # Ileus  -S/p lap vladimir, open sigmoid colectomy with " diverting loop ileostomy on January 17, 2024. S/p exploratory laparotomy with washout on January 30  -Afebrile.  White blood cell count up slightly at 12 but otherwise clinically doing well  -Tolerating tube feeds well.  Advance to goal  -Advance to regular diet  -Wean off TPN  -Continue antibiotics empirically  -Mobilize         Jimmy Craig MD  2/11/2024  10:02 EST        Electronically signed by Jimmy Craig MD at 02/11/24 1003       Consult Notes (last 72 hours)  Notes from 02/10/24 1443 through 02/13/24 1443   No notes of this type exist for this encounter.

## 2024-02-13 NOTE — PROGRESS NOTES
Clinical Nutrition   Nutrition Assessment  Reason for Visit: Follow-up protocol, EN    Patient Name: Roger Bhat  YOB: 1960  MRN: [id number]  Date of Encounter: 02/13/24 09:18 EST  Admission date: 1/17/2024    Comments:    Pt reports eating well, tolerating po diet. Pt waiting on removal of keofeed.     Continue Ensure Clear TID    Nutrition Assessment   Admission Diagnosis:  Colonic mass [K63.89]    Problem List:    Colonic mass    Current smoker    Gastroesophageal reflux disease without esophagitis    Severe malnutrition    Respiratory distress    Centrilobular emphysema      PMH:   He  has a past medical history of Allergies, Cellulitis of hand, Clotting disorder, Colonic mass, COPD (chronic obstructive pulmonary disease), Cough, Current smoker, Erectile dysfunction, Fracture, foot (Sept 7 2022), GERD (gastroesophageal reflux disease), Hemochromatosis, Hyperlipidemia, Hypertension, and Wears dentures.    PSH:  He  has a past surgical history that includes Esophagogastroduodenoscopy (05/2016); Colonoscopy (2021); Esophagogastroduodenoscopy (N/A, 10/19/2021); Esophagogastroduodenoscopy (N/A, 11/16/2021); Hand surgery (2022); Cholecystectomy (N/A, 1/17/2024); Colectomy (N/A, 1/17/2024); and Exploratory Laparotomy (N/A, 1/30/2024).    Applicable Nutrition Concerns:   Skin:  Oral:  GI: s/p lap vladimir, open sigmoid colectomy with diverting loop ileostomy     Applicable Interval History:     (1/17) open sigmoid colectomy with diverting loop ileostomy     (1/26) CT A/P - Impression:  1.Imaging features most consistent with small bowel adynamic ileus. No focal transition point identified to suggest mechanical obstruction.  2.Postoperative changes as detailed above. Minimal free fluid surrounding right lower quadrant surgical drain. No abscess identified.  3.Other incidental findings as detailed above.    (1/30) Ex lap with ALBERTO    Reported/Observed/Food/Nutrition Related History:      2/13  Pt eating well on fiber restricted diet-states he is having a hard time eating w/keofeed in place- eager to have removed today. Pt tells me he's going to [facility] tomorrow.     2/11  Pt resting in bed, in good spirits, is eager to start eating solid food  Per RN: pt tolerating trophic feed    2/10  Pt resting bed bed, frail appearing, reports tolerating full liquids at breakfast, is drinking ensure clear, wants to keep those as his supplement  Per RN: pt tolerating TF, no nausea, ate small amount at breakfast    2/9  Continues with TPN, trophic EN, and clear liquid diet to meet nutritional needs as ileus continues to resolve and diet is slowly advanced. Trophic EN initiated yesterday at 10 ml/hr, pt tolerated and surgeon advanced to 20 ml/hr this morning. Tolerating clear liquids with ensure clear meeting some of needs. Ileostomy with more output. Na continues to be slightly low, other lytes WNL.      2/8) NG clamped with checking residuals X 48 hr, 350 ml output yesterday. Ileostomy output continues to slowly increase. Pt advanced to clear liquid diet and plan to initiate trophic EN to supplement nutrition during transition to PO. Will slightly decrease PN with plans to further wean as PO/EN tolerance established. Visited with pt at bedside, up in chair in excellent spirits. Tolerating clears thus far, loves supplement and willing to sip t/o day. Denies further dietary needs on CLD at this time. Understands plan for EN, encouraged to listen to body to gauge tolerance. Still pending transfer to floor. Continuing to monitor slightly low Na/volume status, suspect will improve with lowered PN volume. Several new wts obtained illustrating weight gain with nutrition support, needs reassessed.     2/7) Pt remains NPO, is allowed sips for comfort. Ileostomy output increasing, NG decreasing but still bilous. Plan for clamping trials today. Respiratory status improving and pt to move to the floor. Visited with pt and  "supportive person at bedside. Pt sitting up in chair in good spirits. Understands care plan but hopeful to be able to eat in near future. Na remains slightly low but unchanged.     2/6) Continues with TPN, NPO awaiting ileus resolution. No significant changes with plans for continuing TPN, sips for comfort per surgeon note. Slightly more ileostomy output, continues with NG output but decreasing. Sodium slightly down with volume increase PN to better meet needs yesterday, will continue to monitor and adjust if continues to drop. Negative fluid balance yesterday, wt back down closer to suspect ABW.     (See several past notes for full hx)    Labs    Labs Reviewed: Yes     Results from last 7 days   Lab Units 02/13/24  0543 02/12/24  0623 02/11/24  0451   GLUCOSE mg/dL 101* 99 103*   BUN mg/dL 19 18 18   CREATININE mg/dL 0.53* 0.46* 0.52*   SODIUM mmol/L 135* 134* 132*   CHLORIDE mmol/L 102 101 99   POTASSIUM mmol/L 4.8 4.7 4.0   PHOSPHORUS mg/dL 2.5 2.4* 2.6   MAGNESIUM mg/dL 2.0 2.1 2.3             Invalid input(s): \"PLAT\"      Results from last 7 days   Lab Units 02/13/24  0508 02/13/24  0003 02/12/24  1702 02/12/24  1128 02/12/24  0506 02/12/24  0009   GLUCOSE mg/dL 108 116 117 118 99 110     Lab Results   Lab Value Date/Time    HGBA1C 5.00 01/10/2024 1508    HGBA1C 5.3 11/28/2022 1111    HGBA1C 5.20 08/17/2021 1149               Medications    Medications Reviewed: Yes  Pertinent: Dulcolax, Wellbutrin, folic acid, insulin, reglan, abx, protonix, senokot, thiamine, lipids  Infusion:   PRN:     Intake/Ouptut 24 hrs (0701 - 0700)   I&O's Reviewed: Yes   Intake & Output (last day)         02/12 0701 02/13 0700 02/13 0701 02/14 0700    P.O. 1140     Other 222     NG/GT 1584     Total Intake(mL/kg) 2946 (58.3)     Urine (mL/kg/hr) 1150 (0.9)     Drains 40     Stool 550 250    Total Output 1740 250    Net +1206 -250          Stool Unmeasured Occurrence 1 x           Anthropometrics     Height: Height: 175.3 cm " "(69\")  Last Filed Weight: Weight: 50.5 kg (111 lb 4.8 oz) (02/13/24 0528)  Method: Weight Method: Bed scale  BMI: BMI (Calculated): 16.4  BMI classification: Normal: 18.5-24.9kg/m2  IBW:   155 lb    UBW:     Weight       Weight (kg) Weight (lbs) Weight Method Visit Report   11/30/2022 68 kg  149 lb 14.6 oz   --    1/25/2023 68 kg  149 lb 14.6 oz   --    4/26/2023 64.683 kg  142 lb 9.6 oz   --    10/26/2023 65.137 kg  143 lb 9.6 oz   --    12/15/2023 64.683 kg  142 lb 9.6 oz   --    1/8/2024 63.504 kg  140 lb      1/10/2024 66.25 kg  146 lb 0.9 oz  Standing scale     1/17/2024 66.25 kg  146 lb 0.9 oz      1/24/2024 61.236 kg  135 lb  Bed scale     1/26/2024 54.613 kg  120 lb 6.4 oz  Standing scale       Weight change: weight loss of 26 lbs (17.8%) over the past 2 week(s)    Intentional?  No    Nutrition Focused Physical Exam     Date:  1/24       Patient meets criteria for malnutrition diagnosis, see MSA note.     Needs Assessment   Date: 1/26, reviewed 1/30, reassessed 1/31, 2/8    Height used:Height: 175.3 cm (69\")  Weights used: 58 kg / 129 lb (ABW)    Estimated Calorie needs: ~1800 Kcal/day  Method:  Kcals/KG 25-30 ABW = 2953-5655  Method:  MSJ 1374 X 1.3 = 1786    Estimated Protein needs: ~87 g PRO/day  Method: 1.3-1.5 g/Kg = 75-87    Estimated Fluid needs: ~ ml/day   Per clinical status    Current Nutrition Prescription     EN: Peptamen 1.5   Goal Rate: 55 ml/hr  Water Flushes: 10 ml/hr  Modular: None  Route: NG  Tube: Large bore     At goal over: 22Hrs/day     Rx will supply:   Goal Volume 1210 mL/day       Flush Volume 220 mL/day       Energy 1815 Kcal/day 101 % Est Need   Protein 82 g/day 94 % Est Need   Fiber 0 g/day       Water in   mL       Total Water 1152 mL       Meet DRI No           --------------------------------------------------------------------------  Product/Rate verified at bedside: Yes  Infusing Rate at time of visit: 55 ml/hr    PO: Diet: Gastrointestinal Diets; Fiber-Restricted; " Fluid Consistency: Thin (IDDSI 0)  Oral Nutrition Supplement:   Intake: 100% x last 2 meals    Nutrition Diagnosis   Date:  1/24            Updated:    Problem Malnutrition  severe/acute   Etiology Energy needs>energy intake 2/2 recent colectomy with ileostomy placement with slow to return bowel function followed by intolerance diet.    Signs/Symptoms PO intakes <50% EEN >/=5 days and loss 17.8% body weight with past 2 weeks, moderate muscle wasting, and moderate subcutaneous fat loss.    Status: Ongoing / worsened with updated standing scale weight, receiving TPN    Date:   1/24    Updated:     Problem Food and nutrition knowledge deficit   Etiology S/p colectomy with new ileostomy complicated by high ileostomy output   Signs/Symptoms Pt with prior lack of knowledge appropriate MNT/diet for condition   Status: Improvement Shown Education provided    Goal:   General: Nutrition to support treatment  PO: Tolerate PO, Maintain intake  EN/PN: N/A     Nutrition Intervention      Follow treatment progress, Care plan reviewed    Discontinue EN regimen  Ensure Clear TID    Monitoring/Evaluation:   Per protocol, PO intake, Supplement intake, GI status, Symptoms, POC/GOC      Margie Bland, MS,RD,LD  Time Spent: 20

## 2024-02-13 NOTE — CASE MANAGEMENT/SOCIAL WORK
Continued Stay Note   [place]     Patient Name: Roger Bhat  MRN: [id number]  Today's Date: 2/13/2024    Admit Date: 1/17/2024    Plan: rehab   Discharge Plan       Row Name 02/13/24 1136       Plan    Plan rehab    Patient/Family in Agreement with Plan yes    Plan Comments I spoke with this patient regarding April, from [facility] [place], calling and stating that insurance has denied him a rehab bed and are asking for a Peer to Peer for Wednesday. The information was given in [facility] tab and a secure message was sent to Dr Baer, who is on today, and Dr Alston, who is on tomorrow. CM will continue to follow.    Final Discharge Disposition Code 62 - inpatient rehab facility                   Discharge Codes    No documentation.                 Expected Discharge Date and Time       Expected Discharge Date Expected Discharge Time    Feb 13, 2024               Antonia Gracia RN

## 2024-02-14 LAB
ANION GAP SERPL CALCULATED.3IONS-SCNC: 8 MMOL/L (ref 5–15)
BASOPHILS # BLD AUTO: 0.08 10*3/MM3 (ref 0–0.2)
BASOPHILS NFR BLD AUTO: 0.9 % (ref 0–1.5)
BUN SERPL-MCNC: 14 MG/DL (ref 8–23)
BUN/CREAT SERPL: 29.2 (ref 7–25)
CALCIUM SPEC-SCNC: 9.1 MG/DL (ref 8.6–10.5)
CHLORIDE SERPL-SCNC: 102 MMOL/L (ref 98–107)
CO2 SERPL-SCNC: 25 MMOL/L (ref 22–29)
CREAT SERPL-MCNC: 0.48 MG/DL (ref 0.76–1.27)
DEPRECATED RDW RBC AUTO: 55.8 FL (ref 37–54)
EGFRCR SERPLBLD CKD-EPI 2021: 117.5 ML/MIN/1.73
EOSINOPHIL # BLD AUTO: 0.65 10*3/MM3 (ref 0–0.4)
EOSINOPHIL NFR BLD AUTO: 7.2 % (ref 0.3–6.2)
ERYTHROCYTE [DISTWIDTH] IN BLOOD BY AUTOMATED COUNT: 15.1 % (ref 12.3–15.4)
GLUCOSE BLDC GLUCOMTR-MCNC: 106 MG/DL (ref 70–130)
GLUCOSE BLDC GLUCOMTR-MCNC: 122 MG/DL (ref 70–130)
GLUCOSE BLDC GLUCOMTR-MCNC: 124 MG/DL (ref 70–130)
GLUCOSE BLDC GLUCOMTR-MCNC: 95 MG/DL (ref 70–130)
GLUCOSE BLDC GLUCOMTR-MCNC: 96 MG/DL (ref 70–130)
GLUCOSE SERPL-MCNC: 95 MG/DL (ref 65–99)
HCT VFR BLD AUTO: 32.3 % (ref 37.5–51)
HGB BLD-MCNC: 10.8 G/DL (ref 13–17.7)
IMM GRANULOCYTES # BLD AUTO: 0.05 10*3/MM3 (ref 0–0.05)
IMM GRANULOCYTES NFR BLD AUTO: 0.6 % (ref 0–0.5)
LYMPHOCYTES # BLD AUTO: 2.79 10*3/MM3 (ref 0.7–3.1)
LYMPHOCYTES NFR BLD AUTO: 30.7 % (ref 19.6–45.3)
MAGNESIUM SERPL-MCNC: 2.2 MG/DL (ref 1.6–2.4)
MCH RBC QN AUTO: 33.5 PG (ref 26.6–33)
MCHC RBC AUTO-ENTMCNC: 33.4 G/DL (ref 31.5–35.7)
MCV RBC AUTO: 100.3 FL (ref 79–97)
MONOCYTES # BLD AUTO: 0.8 10*3/MM3 (ref 0.1–0.9)
MONOCYTES NFR BLD AUTO: 8.8 % (ref 5–12)
NEUTROPHILS NFR BLD AUTO: 4.71 10*3/MM3 (ref 1.7–7)
NEUTROPHILS NFR BLD AUTO: 51.8 % (ref 42.7–76)
NRBC BLD AUTO-RTO: 0 /100 WBC (ref 0–0.2)
PHOSPHATE SERPL-MCNC: 3.2 MG/DL (ref 2.5–4.5)
PLATELET # BLD AUTO: 452 10*3/MM3 (ref 140–450)
PMV BLD AUTO: 9.4 FL (ref 6–12)
POTASSIUM SERPL-SCNC: 4.2 MMOL/L (ref 3.5–5.2)
RBC # BLD AUTO: 3.22 10*6/MM3 (ref 4.14–5.8)
RBC MORPH BLD: NORMAL
SMALL PLATELETS BLD QL SMEAR: NORMAL
SODIUM SERPL-SCNC: 135 MMOL/L (ref 136–145)
WBC MORPH BLD: NORMAL
WBC NRBC COR # BLD AUTO: 9.08 10*3/MM3 (ref 3.4–10.8)

## 2024-02-14 PROCEDURE — 25010000002 HEPARIN (PORCINE) PER 1000 UNITS: Performed by: INTERNAL MEDICINE

## 2024-02-14 PROCEDURE — 94664 DEMO&/EVAL PT USE INHALER: CPT

## 2024-02-14 PROCEDURE — 85007 BL SMEAR W/DIFF WBC COUNT: CPT | Performed by: INTERNAL MEDICINE

## 2024-02-14 PROCEDURE — 63710000001 REVEFENACIN 175 MCG/3ML SOLUTION: Performed by: INTERNAL MEDICINE

## 2024-02-14 PROCEDURE — 94799 UNLISTED PULMONARY SVC/PX: CPT

## 2024-02-14 PROCEDURE — 82948 REAGENT STRIP/BLOOD GLUCOSE: CPT

## 2024-02-14 PROCEDURE — 25010000002 METOCLOPRAMIDE PER 10 MG: Performed by: INTERNAL MEDICINE

## 2024-02-14 PROCEDURE — 83735 ASSAY OF MAGNESIUM: CPT | Performed by: INTERNAL MEDICINE

## 2024-02-14 PROCEDURE — 25010000002 DIPHENHYDRAMINE PER 50 MG: Performed by: INTERNAL MEDICINE

## 2024-02-14 PROCEDURE — 85025 COMPLETE CBC W/AUTO DIFF WBC: CPT | Performed by: INTERNAL MEDICINE

## 2024-02-14 PROCEDURE — 63710000001 ONDANSETRON ODT 4 MG TABLET DISPERSIBLE: Performed by: INTERNAL MEDICINE

## 2024-02-14 PROCEDURE — 84100 ASSAY OF PHOSPHORUS: CPT | Performed by: INTERNAL MEDICINE

## 2024-02-14 PROCEDURE — 80048 BASIC METABOLIC PNL TOTAL CA: CPT | Performed by: INTERNAL MEDICINE

## 2024-02-14 RX ORDER — PANTOPRAZOLE SODIUM 40 MG/1
40 TABLET, DELAYED RELEASE ORAL
Status: DISCONTINUED | OUTPATIENT
Start: 2024-02-15 | End: 2024-02-15 | Stop reason: HOSPADM

## 2024-02-14 RX ORDER — BUPROPION HYDROCHLORIDE 150 MG/1
150 TABLET ORAL EVERY MORNING
Status: DISCONTINUED | OUTPATIENT
Start: 2024-02-14 | End: 2024-02-15 | Stop reason: HOSPADM

## 2024-02-14 RX ADMIN — ROSUVASTATIN CALCIUM 10 MG: 10 TABLET, FILM COATED ORAL at 21:39

## 2024-02-14 RX ADMIN — PANTOPRAZOLE SODIUM 40 MG: 40 INJECTION, POWDER, LYOPHILIZED, FOR SOLUTION INTRAVENOUS at 08:08

## 2024-02-14 RX ADMIN — REVEFENACIN 175 MCG: 175 SOLUTION RESPIRATORY (INHALATION) at 08:16

## 2024-02-14 RX ADMIN — FOLIC ACID 1 MG: 1 TABLET ORAL at 08:08

## 2024-02-14 RX ADMIN — BUDESONIDE AND FORMOTEROL FUMARATE DIHYDRATE 2 PUFF: 160; 4.5 AEROSOL RESPIRATORY (INHALATION) at 08:15

## 2024-02-14 RX ADMIN — ONDANSETRON 4 MG: 4 TABLET, ORALLY DISINTEGRATING ORAL at 19:55

## 2024-02-14 RX ADMIN — Medication 10 ML: at 08:08

## 2024-02-14 RX ADMIN — DIPHENHYDRAMINE HYDROCHLORIDE 25 MG: 50 INJECTION, SOLUTION INTRAMUSCULAR; INTRAVENOUS at 21:44

## 2024-02-14 RX ADMIN — CALCIUM CARBONATE (ANTACID) CHEW TAB 500 MG 1 TABLET: 500 CHEW TAB at 19:55

## 2024-02-14 RX ADMIN — BUDESONIDE AND FORMOTEROL FUMARATE DIHYDRATE 2 PUFF: 160; 4.5 AEROSOL RESPIRATORY (INHALATION) at 22:55

## 2024-02-14 RX ADMIN — HEPARIN SODIUM 5000 UNITS: 5000 INJECTION INTRAVENOUS; SUBCUTANEOUS at 14:39

## 2024-02-14 RX ADMIN — BUPROPION HYDROCHLORIDE 150 MG: 150 TABLET, EXTENDED RELEASE ORAL at 08:08

## 2024-02-14 RX ADMIN — METOCLOPRAMIDE HYDROCHLORIDE 10 MG: 5 INJECTION INTRAMUSCULAR; INTRAVENOUS at 06:06

## 2024-02-14 RX ADMIN — THIAMINE HCL TAB 100 MG 100 MG: 100 TAB at 08:08

## 2024-02-14 RX ADMIN — HEPARIN SODIUM 5000 UNITS: 5000 INJECTION INTRAVENOUS; SUBCUTANEOUS at 21:38

## 2024-02-14 RX ADMIN — METOCLOPRAMIDE HYDROCHLORIDE 10 MG: 5 INJECTION INTRAMUSCULAR; INTRAVENOUS at 00:10

## 2024-02-14 RX ADMIN — Medication 10 ML: at 21:40

## 2024-02-14 RX ADMIN — HEPARIN SODIUM 5000 UNITS: 5000 INJECTION INTRAVENOUS; SUBCUTANEOUS at 06:07

## 2024-02-14 NOTE — PLAN OF CARE
Problem: Adult Inpatient Plan of Care  Goal: Patient-Specific Goal (Individualized)  Outcome: Ongoing, Progressing  Goal: Absence of Hospital-Acquired Illness or Injury  Outcome: Ongoing, Progressing  Intervention: Identify and Manage Fall Risk  Recent Flowsheet Documentation  Taken 2/14/2024 1400 by Neeru Garcia RN  Safety Promotion/Fall Prevention:   activity supervised   assistive device/personal items within reach   clutter free environment maintained   safety round/check completed   toileting scheduled   room organization consistent  Taken 2/14/2024 1200 by Neeru Garcia RN  Safety Promotion/Fall Prevention:   activity supervised   assistive device/personal items within reach   clutter free environment maintained   safety round/check completed   toileting scheduled   room organization consistent  Taken 2/14/2024 1000 by Neeru Garcia RN  Safety Promotion/Fall Prevention:   activity supervised   assistive device/personal items within reach   clutter free environment maintained   toileting scheduled   room organization consistent   safety round/check completed  Taken 2/14/2024 0800 by Neeru Garcia RN  Safety Promotion/Fall Prevention:   activity supervised   assistive device/personal items within reach   clutter free environment maintained   toileting scheduled   safety round/check completed   room organization consistent  Intervention: Prevent Skin Injury  Recent Flowsheet Documentation  Taken 2/14/2024 1400 by Neeru Garcia RN  Body Position: position changed independently  Skin Protection:   adhesive use limited   tubing/devices free from skin contact   transparent dressing maintained   skin-to-skin areas padded   skin-to-device areas padded  Taken 2/14/2024 1200 by Neeru Garcia RN  Body Position: position changed independently  Skin Protection:   adhesive use limited   tubing/devices free from skin contact   transparent dressing maintained   skin-to-skin areas padded    skin-to-device areas padded  Taken 2/14/2024 1000 by Neeru Garcia RN  Body Position: position changed independently  Skin Protection:   adhesive use limited   tubing/devices free from skin contact   transparent dressing maintained   skin-to-skin areas padded   skin-to-device areas padded  Taken 2/14/2024 0800 by Neeru Garcia RN  Body Position: position changed independently  Skin Protection:   adhesive use limited   tubing/devices free from skin contact   transparent dressing maintained   skin-to-skin areas padded   skin-to-device areas padded  Intervention: Prevent and Manage VTE (Venous Thromboembolism) Risk  Recent Flowsheet Documentation  Taken 2/14/2024 1400 by Neeru Garcia RN  Activity Management: up in chair  Taken 2/14/2024 1200 by Neeru Garcia RN  Activity Management: up in chair  Taken 2/14/2024 1000 by Neeru Garcia RN  Activity Management: activity encouraged  Taken 2/14/2024 0800 by Neeru Garcia RN  Activity Management: activity encouraged  Goal: Optimal Comfort and Wellbeing  Outcome: Ongoing, Progressing  Intervention: Provide Person-Centered Care  Recent Flowsheet Documentation  Taken 2/14/2024 0800 by Neeru Garcia RN  Trust Relationship/Rapport: care explained  Goal: Readiness for Transition of Care  Outcome: Ongoing, Progressing     Problem: Skin Injury Risk Increased  Goal: Skin Health and Integrity  Outcome: Ongoing, Progressing  Intervention: Optimize Skin Protection  Recent Flowsheet Documentation  Taken 2/14/2024 1400 by Neeru Garcia RN  Pressure Reduction Techniques: frequent weight shift encouraged  Head of Bed (HOB) Positioning: HOB elevated  Pressure Reduction Devices: positioning supports utilized  Skin Protection:   adhesive use limited   tubing/devices free from skin contact   transparent dressing maintained   skin-to-skin areas padded   skin-to-device areas padded  Taken 2/14/2024 1200 by Neeru Garcia RN  Pressure Reduction Techniques:  frequent weight shift encouraged  Head of Bed (HOB) Positioning: [facility] elevated  Pressure Reduction Devices: positioning supports utilized  Skin Protection:   adhesive use limited   tubing/devices free from skin contact   transparent dressing maintained   skin-to-skin areas padded   skin-to-device areas padded  Taken 2/14/2024 1000 by Neeru Garcia RN  Pressure Reduction Techniques: frequent weight shift encouraged  Head of Bed ([facility]) Positioning: [facility] elevated  Pressure Reduction Devices: positioning supports utilized  Skin Protection:   adhesive use limited   tubing/devices free from skin contact   transparent dressing maintained   skin-to-skin areas padded   skin-to-device areas padded  Taken 2/14/2024 0800 by Neeru Garcia RN  Pressure Reduction Techniques: frequent weight shift encouraged  Head of Bed ([facility]) Positioning: [facility] elevated  Pressure Reduction Devices: positioning supports utilized  Skin Protection:   adhesive use limited   tubing/devices free from skin contact   transparent dressing maintained   skin-to-skin areas padded   skin-to-device areas padded     Problem: COPD (Chronic Obstructive Pulmonary Disease) Comorbidity  Goal: Maintenance of COPD Symptom Control  Outcome: Ongoing, Progressing     Problem: Adjustment to Illness (Sepsis/Septic Shock)  Goal: Optimal Coping  Outcome: Ongoing, Progressing     Problem: Bleeding (Sepsis/Septic Shock)  Goal: Absence of Bleeding  Outcome: Ongoing, Progressing     Problem: Glycemic Control Impaired (Sepsis/Septic Shock)  Goal: Blood Glucose Level Within Desired Range  Outcome: Ongoing, Progressing     Problem: Infection Progression (Sepsis/Septic Shock)  Goal: Absence of Infection Signs and Symptoms  Outcome: Ongoing, Progressing  Intervention: Promote Recovery  Recent Flowsheet Documentation  Taken 2/14/2024 1400 by Neeru Garcia RN  Activity Management: up in chair  Taken 2/14/2024 1200 by Neeru Garcia RN  Activity Management: up in chair  Taken  2/14/2024 1000 by Neeru Garcia RN  Activity Management: activity encouraged  Taken 2/14/2024 0800 by Neeru Garcia RN  Activity Management: activity encouraged     Problem: Nutrition Impaired (Sepsis/Septic Shock)  Goal: Optimal Nutrition Intake  Outcome: Ongoing, Progressing     Problem: Fall Injury Risk  Goal: Absence of Fall and Fall-Related Injury  Outcome: Ongoing, Progressing  Intervention: Promote Injury-Free Environment  Recent Flowsheet Documentation  Taken 2/14/2024 1400 by Neeru Garcia RN  Safety Promotion/Fall Prevention:   activity supervised   assistive device/personal items within reach   clutter free environment maintained   safety round/check completed   toileting scheduled   room organization consistent  Taken 2/14/2024 1200 by Neeru Garcia RN  Safety Promotion/Fall Prevention:   activity supervised   assistive device/personal items within reach   clutter free environment maintained   safety round/check completed   toileting scheduled   room organization consistent  Taken 2/14/2024 1000 by Neeru Garcia RN  Safety Promotion/Fall Prevention:   activity supervised   assistive device/personal items within reach   clutter free environment maintained   toileting scheduled   room organization consistent   safety round/check completed  Taken 2/14/2024 0800 by Neeru Garcia RN  Safety Promotion/Fall Prevention:   activity supervised   assistive device/personal items within reach   clutter free environment maintained   toileting scheduled   safety round/check completed   room organization consistent   Goal Outcome Evaluation:

## 2024-02-14 NOTE — NURSING NOTE
A&Ox4. VSS. NSR-ST on tele. Room air. R PICC, flushed, good blood return, DCI, SL. RLQ ileostomy w loose stool, RLQ DELROY drain with tan milky output. Midline incision, staples,  LEONEL, DCI.  Ambulates well, SA with walker. Using urinal.  No skin breakdown noted. C/o HA in am, pain meds provided with good results. Tolerates well with food  intake. Pt states sore throat is getting better. Takes pills one at a time. No acute changes. Pt's family at bedside. Falls and frequent safety round/checks maintained.

## 2024-02-14 NOTE — PROGRESS NOTES
[facility] Medicine Services  CLINICAL REVIEW NOTE    Patient Name: Roger Bhat  : 1960  MRN: [id number]    Date of Admission: 2024  Length of Stay: 28  Attending Service:  General Surgery          Patient's labs, charting, and events reviewed.  No active medical management issues to address today, the Hospitalist team will continue to follow daily, be available for any needs, and see or bill for services as needed.      Awaiting peer-peer for [facility]. [facility] will sign off. Please call with questions or re-consult as needed.

## 2024-02-14 NOTE — CASE MANAGEMENT/SOCIAL WORK
Continued Stay Note  [facility]     Patient Name: Roger Bhat  MRN: [id number]  Today's Date: 2/14/2024    Admit Date: 1/17/2024    Plan: rehab   Discharge Plan       Row Name 02/14/24 0811       Plan    Plan rehab    Patient/Family in Agreement with Plan yes    Plan Comments I spoke with this patient today regarding the Peer to Peer that is scheduled for today before 1030. I discussed that if the denial is upheld, he is able to appeal, if he would like. He verbalized understanding. CM will contnue to follow.    1205  CM received a secure message from MD, and the denial for IRF was upheld. They recommended SNF. CM called April with [facility] to discuss. She is reviewing to see if he is able to have pre certification of insurance started for subacute at [facility]. Awaiting a call back. CM will follow.      Final Discharge Disposition Code 62 - inpatient rehab facility                   Discharge Codes    No documentation.                 Expected Discharge Date and Time       Expected Discharge Date Expected Discharge Time    Feb 13, 2024               Antonia Gracia RN

## 2024-02-14 NOTE — PLAN OF CARE
Problem: Adult Inpatient Plan of Care  Goal: Patient-Specific Goal (Individualized)  Outcome: Ongoing, Progressing  Goal: Absence of Hospital-Acquired Illness or Injury  Outcome: Ongoing, Progressing  Intervention: Identify and Manage Fall Risk  Recent Flowsheet Documentation  Taken 2/14/2024 0015 by Gato Wilburn RN  Safety Promotion/Fall Prevention:   assistive device/personal items within reach   clutter free environment maintained   fall prevention program maintained   lighting adjusted   nonskid shoes/slippers when out of bed   room organization consistent   safety round/check completed   toileting scheduled  Taken 2/13/2024 1945 by Gato Wilburn RN  Safety Promotion/Fall Prevention:   assistive device/personal items within reach   clutter free environment maintained   fall prevention program maintained   lighting adjusted   nonskid shoes/slippers when out of bed   room organization consistent   safety round/check completed   toileting scheduled  Intervention: Prevent Skin Injury  Recent Flowsheet Documentation  Taken 2/14/2024 0015 by Gato Wilburn RN  Body Position: position changed independently  Taken 2/13/2024 1945 by Gato Wilburn RN  Body Position: position changed independently  Skin Protection:   adhesive use limited   tubing/devices free from skin contact  Intervention: Prevent and Manage VTE (Venous Thromboembolism) Risk  Recent Flowsheet Documentation  Taken 2/14/2024 0015 by Gato Wilburn RN  Activity Management: activity encouraged  Taken 2/13/2024 1945 by Gato Wilburn RN  Activity Management: activity encouraged  VTE Prevention/Management: (SQ heparin) other (see comments)  Range of Motion: active ROM (range of motion) encouraged  Intervention: Prevent Infection  Recent Flowsheet Documentation  Taken 2/14/2024 0015 by Gato Wilburn RN  Infection Prevention:   environmental surveillance performed   equipment surfaces  disinfected   hand hygiene promoted   rest/sleep promoted   single patient room provided  Taken 2/13/2024 1945 by Gato Wilburn RN  Infection Prevention:   environmental surveillance performed   equipment surfaces disinfected   hand hygiene promoted   rest/sleep promoted   single patient room provided  Goal: Optimal Comfort and Wellbeing  Outcome: Ongoing, Progressing  Intervention: Monitor Pain and Promote Comfort  Recent Flowsheet Documentation  Taken 2/13/2024 1945 by Gato Wilburn RN  Pain Management Interventions:   care clustered   medication offered but refused   pillow support provided   position adjusted   quiet environment facilitated   pain management plan reviewed with patient/caregiver  Intervention: Provide Person-Centered Care  Recent Flowsheet Documentation  Taken 2/13/2024 1945 by Gato Wilburn RN  Trust Relationship/Rapport:   care explained   questions answered   questions encouraged   thoughts/feelings acknowledged  Goal: Readiness for Transition of Care  Outcome: Ongoing, Progressing     Problem: Skin Injury Risk Increased  Goal: Skin Health and Integrity  Outcome: Ongoing, Progressing  Intervention: Promote and Optimize Oral Intake  Recent Flowsheet Documentation  Taken 2/13/2024 1945 by Gato Wilburn RN  Oral Nutrition Promotion: rest periods promoted  Intervention: Optimize Skin Protection  Recent Flowsheet Documentation  Taken 2/14/2024 0015 by Gato Wilburn RN  Head of Bed (HOB) Positioning: HOB at 30-45 degrees  Taken 2/13/2024 1945 by Gato Wilburn RN  Pressure Reduction Techniques:   frequent weight shift encouraged   heels elevated off bed   weight shift assistance provided  Head of Bed (HOB) Positioning: HOB at 30-45 degrees  Pressure Reduction Devices:   positioning supports utilized   pressure-redistributing mattress utilized  Skin Protection:   adhesive use limited   tubing/devices free from skin contact     Problem: COPD  (Chronic Obstructive Pulmonary Disease) Comorbidity  Goal: Maintenance of COPD Symptom Control  Outcome: Ongoing, Progressing  Intervention: Maintain COPD-Symptom Control  Recent Flowsheet Documentation  Taken 2/14/2024 0015 by Gato Wilburn RN  Medication Review/Management: medications reviewed  Taken 2/13/2024 1945 by Gato Wilburn RN  Supportive Measures: active listening utilized  Medication Review/Management: medications reviewed     Problem: Adjustment to Illness (Sepsis/Septic Shock)  Goal: Optimal Coping  Outcome: Ongoing, Progressing  Intervention: Optimize Psychosocial Adjustment to Illness  Recent Flowsheet Documentation  Taken 2/13/2024 1945 by Gato Wilburn RN  Supportive Measures: active listening utilized  Family/Support System Care:   self-care encouraged   support provided     Problem: Bleeding (Sepsis/Septic Shock)  Goal: Absence of Bleeding  Outcome: Ongoing, Progressing  Intervention: Monitor and Manage Bleeding  Recent Flowsheet Documentation  Taken 2/14/2024 0015 by Gato Wilburn RN  Bleeding Precautions: monitored for signs of bleeding  Taken 2/13/2024 1945 by Gato Wilburn RN  Bleeding Precautions: monitored for signs of bleeding     Problem: Glycemic Control Impaired (Sepsis/Septic Shock)  Goal: Blood Glucose Level Within Desired Range  Outcome: Ongoing, Progressing  Intervention: Optimize Glycemic Control  Recent Flowsheet Documentation  Taken 2/13/2024 1945 by Gato Wilburn RN  Glycemic Management: blood glucose monitored     Problem: Infection Progression (Sepsis/Septic Shock)  Goal: Absence of Infection Signs and Symptoms  Outcome: Ongoing, Progressing  Intervention: Initiate Sepsis Management  Recent Flowsheet Documentation  Taken 2/14/2024 0015 by Gato Wilburn RN  Infection Prevention:   environmental surveillance performed   equipment surfaces disinfected   hand hygiene promoted   rest/sleep promoted   single  patient room provided  Taken 2/13/2024 1945 by Gato Wilburn RN  Infection Management: aseptic technique maintained  Infection Prevention:   environmental surveillance performed   equipment surfaces disinfected   hand hygiene promoted   rest/sleep promoted   single patient room provided  Intervention: Promote Recovery  Recent Flowsheet Documentation  Taken 2/14/2024 0015 by Gato Wilburn RN  Activity Management: activity encouraged  Taken 2/13/2024 1945 by Gato Wilburn RN  Activity Management: activity encouraged  Airway/Ventilation Support: pulmonary hygiene promoted  Sleep/Rest Enhancement: awakenings minimized  Intervention: Promote Stabilization  Recent Flowsheet Documentation  Taken 2/13/2024 1945 by Gato Wilburn RN  Fluid/Electrolyte Management:   fluids provided   oral rehydration therapy initiated     Problem: Nutrition Impaired (Sepsis/Septic Shock)  Goal: Optimal Nutrition Intake  Outcome: Ongoing, Progressing     Problem: Fall Injury Risk  Goal: Absence of Fall and Fall-Related Injury  Outcome: Ongoing, Progressing  Intervention: Identify and Manage Contributors  Recent Flowsheet Documentation  Taken 2/14/2024 0015 by Gato Wilburn RN  Medication Review/Management: medications reviewed  Taken 2/13/2024 1945 by Gato Wilburn RN  Medication Review/Management: medications reviewed  Intervention: Promote Injury-Free Environment  Recent Flowsheet Documentation  Taken 2/14/2024 0015 by Gato Wilburn RN  Safety Promotion/Fall Prevention:   assistive device/personal items within reach   clutter free environment maintained   fall prevention program maintained   lighting adjusted   nonskid shoes/slippers when out of bed   room organization consistent   safety round/check completed   toileting scheduled  Taken 2/13/2024 1945 by Gato Wilburn RN  Safety Promotion/Fall Prevention:   assistive device/personal items within reach   clutter  free environment maintained   fall prevention program maintained   lighting adjusted   nonskid shoes/slippers when out of bed   room organization consistent   safety round/check completed   toileting scheduled   Goal Outcome Evaluation:

## 2024-02-14 NOTE — PROGRESS NOTES
"Patient Name:  Roger Bhat  YOB: 1960  [id number]    Surgery Progress Note    Date of visit: 2/14/2024      Subjective: No acute events.  Ate well yesterday and appetite is significantly improved.  Denies any nausea or vomiting.  Had 1 L of ileostomy output yesterday.  DELROY drain with dark feculent appearing output that is slightly increased in volume, 30 mL yesterday.  However no fevers, chills, or abdominal pain          Objective:     /90   Pulse 92   Temp 97.5 °F (36.4 °C) (Oral)   Resp 17   Ht 175.3 cm (69\")   Wt 50.8 kg (112 lb)   SpO2 95%   BMI 16.54 kg/m²     Intake/Output Summary (Last 24 hours) at 2/14/2024 0946  Last data filed at 2/14/2024 0000  Gross per 24 hour   Intake 1200 ml   Output 1735 ml   Net -535 ml       GEN:   Awake, alert, sitting up in bed, chronically ill-appearing in no obvious distress  CV:      Regular rate and rhythm  L:         Symmetric expansion, on oxygen by nasal cannula  Abd:    Soft, not obviously distended, appropriately tender palpation throughout the abdomen, midline incision with some reactive erythema surrounding the site and serous drainage, DELROY drain in the right lower quadrant with small volume of feculent appearing output, ileostomy on the right side pink and patent with liquid stool in the bag  Ext:     No cyanosis, clubbing, or edema    Recent labs that are back at this time have been reviewed.           Assessment/ Plan:    Mr. Bhat is a 63-year-old gentleman who is admitted following operative intervention for gallbladder and sigmoid colon mass on January 17     #Sigmoid colon mass, gallbladder mass  # Ileus  -S/p lap vladimir, open sigmoid colectomy with diverting loop ileostomy on January 17, 2024. S/p exploratory laparotomy with washout on January 30  -Afebrile.  Labs are stable to improved.  White blood cell count is 9.  He has been afebrile  -Continue regular diet  -As his bowel function has improved, his DELROY drain output in the " pelvis has increased and there is a small amount of feculent appearing output.  This was similar to as before his takeback operation, however there was no definite leak visible at the time of surgery.  Regardless he is afebrile and clinically improving without antibiotics.  Will plan to continue drain now and at discharge  -Awaiting discharge to rehab.  Peer to peer scheduled for today as he was initially denied for [facility] Eloy Craig MD  2/14/2024  09:46 EST

## 2024-02-15 ENCOUNTER — READMISSION MANAGEMENT (OUTPATIENT)
Dept: CALL CENTER | Facility: HOSPITAL | Age: 64
End: 2024-02-15
Payer: COMMERCIAL

## 2024-02-15 ENCOUNTER — HOME HEALTH ADMISSION (OUTPATIENT)
Dept: HOME HEALTH SERVICES | Facility: HOME HEALTHCARE | Age: 64
End: 2024-02-15
Payer: COMMERCIAL

## 2024-02-15 ENCOUNTER — DOCUMENTATION (OUTPATIENT)
Dept: HOME HEALTH SERVICES | Facility: HOME HEALTHCARE | Age: 64
End: 2024-02-15
Payer: COMMERCIAL

## 2024-02-15 VITALS
BODY MASS INDEX: 16.59 KG/M2 | HEIGHT: 69 IN | SYSTOLIC BLOOD PRESSURE: 134 MMHG | TEMPERATURE: 96.3 F | RESPIRATION RATE: 16 BRPM | WEIGHT: 112 LBS | HEART RATE: 86 BPM | OXYGEN SATURATION: 96 % | DIASTOLIC BLOOD PRESSURE: 93 MMHG

## 2024-02-15 PROCEDURE — 97116 GAIT TRAINING THERAPY: CPT

## 2024-02-15 PROCEDURE — 25010000002 HEPARIN (PORCINE) PER 1000 UNITS: Performed by: INTERNAL MEDICINE

## 2024-02-15 PROCEDURE — 97535 SELF CARE MNGMENT TRAINING: CPT | Performed by: OCCUPATIONAL THERAPIST

## 2024-02-15 PROCEDURE — 97530 THERAPEUTIC ACTIVITIES: CPT | Performed by: OCCUPATIONAL THERAPIST

## 2024-02-15 RX ORDER — OXYCODONE HYDROCHLORIDE AND ACETAMINOPHEN 5; 325 MG/1; MG/1
1 TABLET ORAL EVERY 4 HOURS PRN
Qty: 12 TABLET | Refills: 0 | Status: SHIPPED | OUTPATIENT
Start: 2024-02-15

## 2024-02-15 RX ORDER — ONDANSETRON 4 MG/1
4 TABLET, ORALLY DISINTEGRATING ORAL EVERY 8 HOURS PRN
Qty: 12 TABLET | Refills: 0 | Status: SHIPPED | OUTPATIENT
Start: 2024-02-15

## 2024-02-15 RX ADMIN — BUPROPION HYDROCHLORIDE 150 MG: 150 TABLET, EXTENDED RELEASE ORAL at 09:35

## 2024-02-15 RX ADMIN — HEPARIN SODIUM 5000 UNITS: 5000 INJECTION INTRAVENOUS; SUBCUTANEOUS at 05:38

## 2024-02-15 RX ADMIN — FOLIC ACID 1 MG: 1 TABLET ORAL at 09:35

## 2024-02-15 RX ADMIN — PANTOPRAZOLE SODIUM 40 MG: 40 TABLET, DELAYED RELEASE ORAL at 05:38

## 2024-02-15 RX ADMIN — CALCIUM CARBONATE (ANTACID) CHEW TAB 500 MG 1 TABLET: 500 CHEW TAB at 09:36

## 2024-02-15 RX ADMIN — Medication 10 ML: at 09:36

## 2024-02-15 RX ADMIN — THIAMINE HCL TAB 100 MG 100 MG: 100 TAB at 09:35

## 2024-02-15 NOTE — CASE MANAGEMENT/SOCIAL WORK
Case Management Discharge Note      Final Note: I met with this patient regarding his discharge home with [facility] for SN, PT and OT. The order is in EPIC. He is agreeable with this plan. His family can transport. He denies having any further discharge needs at this time.         Selected Continued Care - Admitted Since 1/17/2024       Destination    No services have been selected for the patient.                Durable Medical Equipment    No services have been selected for the patient.                Dialysis/Infusion    No services have been selected for the patient.                Home Medical Care Coordination complete.      Service Provider Selected Services Address Phone Fax Patient Preferred     [facility] ,  [facility] [address] [id number] [phone] --              Therapy    No services have been selected for the patient.                Community Resources    No services have been selected for the patient.                Community & DME    No services have been selected for the patient.                         Final Discharge Disposition Code: 06 - home with home health care

## 2024-02-15 NOTE — DISCHARGE SUMMARY
Discharge Summary    Patient Name:  Roger Bhat  YOB: 1960  [id number]      DATE OF ADMISSION: 1/17/2024    DATE OF DISCHARGE: 2/15/2024       FINAL DIAGNOSES:     Colonic mass    Current smoker    Gastroesophageal reflux disease without esophagitis    Severe malnutrition    Respiratory distress    Centrilobular emphysema       CONSULTING SERVICES: Hospital medicine, critical care medicine, nutrition     PROCEDURES PERFORMED:   1/17/24  Laparoscopic Cholecystectomy  Laparoscopic Liver Biopsy  Open Sigmoid Colectomy with Primary Coloproctostomy   Mobilization of the Splenic Flexure  Flexible Sigmoidoscopy for Pneumatic Leak Test   Diverting Loop Ileostomy Creation   Appendectomy      1/30/24  Exploratory Laparotomy   Lysis of adhesions    HISTORY: The patient is a very pleasant 63 y.o. male with a history of concern for sigmoid colon mass and gallbladder mass.  He was admitted on January 17 for planned resection.      HOSPITAL COURSE: Roger Bhat was taken to the operating room on January 17, 2024 where they underwent laparoscopic cholecystectomy, open sigmoid colectomy with primary coloproctostomy, diverting loop ileostomy creation, and appendectomy. They tolerated the procedure without difficulty and were transported to the floor postoperatively in stable condition. Over the following several days he initially progressed.  1 week after surgery however he began to develop difficulty with nausea and vomiting despite ileostomy output.  His presentation was most consistent with an ileus and he was carefully observed.  A CT was performed on January 25 which was without acute findings and appeared most consistent with ileus.  He then began to develop a rising leukocytosis and was started on antibiotics.  A repeat CT scan was performed on January 29 which was similarly without acute findings most consistent with an ileus.  He continued to have difficulty with inability to tolerate an oral  diet, and with his rising leukocytosis there was concern for an intra-abdominal source of infection or obstructive process.  He was therefore recommended to return to the operating room and on January 30 was taken back to the operating room where he underwent laparotomy with lysis of adhesions.  There was no definite obstructive process identified or intra-abdominal infection.  There was some concern for possible aspiration at the time of surgery.  He was admitted to the ICU following this procedure.  He was started on TPN.  He was carefully monitored in the ICU and his antibiotics were broadened to meropenem and micafungin.  He slowly improved following this with a downtrending leukocytosis and eventual return of ileostomy output.  He was transferred to the floor he continued to improve.  He was followed by physical therapy.  He had resolution of his leukocytosis and appropriate bowel function to the ileostomy.  His antibiotics were discontinued on February 13.  He was initially recommended inpatient rehab, but chose to go home with assistance after being denied placement at [facility].  He had continued output from his drain, so this was left in place at the time of discharge.  On 2/15/2024 they were deemed appropriate for discharge in stable condition after having achieved maximal benefit of their hospital stay.      CONDITION: At the time of discharge, the patient is tolerating a regular diet without difficulty. he is having normal bowel function to the ileostomy and bladder function. his incisions are clean, dry and intact. Pain is well controlled.       DISCHARGE MEDICATIONS:   1. All previous home medications.   2. Percocet 5 - 10 mg PO  Q4h  PRN pain   3. Ondansetron 4 mg Q6h PRN nausea     DISCHARGE INSTRUCTIONS:  1. The patient is instructed to follow up with Dr. Craig on Tuesday, February 20 for drain check and labs  2. he is instructed to refrain from lifting more than 10 pounds for  the next 2 weeks.  3. If the patient has worsening problems with fever, chills, nausea or vomiting he is to contact Dr. Craig's office and a contact card has been provided.  4. They have been instructed that it is okay to shower.  Let warm soapy water run over the incisions.  Pat dry do not scrub.  No tub baths for the next 2 weeks.  5.  He has been instructed to continue his drain at discharge and empty and record the output twice daily      Jimmy Craig MD  2/15/2024  07:13 EST

## 2024-02-15 NOTE — CASE MANAGEMENT/SOCIAL WORK
Continued Stay Note   Félix     Patient Name: Roger Bhat  MRN: [id number]  Today's Date: 2/15/2024    Admit Date: 1/17/2024    Plan: home with home health   Discharge Plan       Row Name 02/15/24 0819       Plan    Plan home with home health    Patient/Family in Agreement with Plan yes    Plan Comments I met with this patient regarding discharge planning. He has decided to go home with home health and asked that CM make a referral to [facility] for SN, PT and OT, which I did. Awaiting response. He states he may be discharged home today and has family to transport. CM to follow.    Final Discharge Disposition Code 06 - home with home health care                   Discharge Codes    No documentation.                 Expected Discharge Date and Time       Expected Discharge Date Expected Discharge Time    Feb 13, 2024               Antonia Gracia RN

## 2024-02-15 NOTE — THERAPY TREATMENT NOTE
"Patient Name: Roger Bhat  : 1960    MRN: [id number]                              Today's Date: 2/15/2024       Admit Date: 2024    Visit Dx:     ICD-10-CM ICD-9-CM   1. Current smoker  F17.200 305.1   2. Colonic mass  K63.89 569.89   3. Gallbladder mass  K82.8 575.8   4. Bowel obstruction  K56.609 560.9   5. Cellulitis of right hand  L03.113 682.4   6. Gastroesophageal reflux disease without esophagitis  K21.9 530.81   7. Screening, lipid  Z13.220 V77.91   8. Hereditary hemochromatosis  E83.110 275.01   9. Severe malnutrition  E43 261   10. Respiratory distress  R06.03 786.09   11. Centrilobular emphysema  J43.2 492.8     Patient Active Problem List   Diagnosis    Current smoker    Cellulitis of right hand    Gastroesophageal reflux disease without esophagitis    Screening, lipid    Dysphagia    Hereditary hemochromatosis    Gallbladder mass    Colonic mass    Severe malnutrition    Respiratory distress    Centrilobular emphysema     Past Medical History:   Diagnosis Date    Allergies     Cellulitis of hand     Clotting disorder     \"FREE BLEEDING\"    Colonic mass     COPD (chronic obstructive pulmonary disease)     Cough     Current smoker     Erectile dysfunction     Fracture, foot Sept 2022    GERD (gastroesophageal reflux disease)     Hemochromatosis     Hyperlipidemia     Hypertension     Wears dentures     FULL SET     Past Surgical History:   Procedure Laterality Date    CHOLECYSTECTOMY N/A 2024    Procedure: CHOLECYSTECTOMY LAPAROSCOPIC;  Surgeon: Jimmy Craig MD;  Location: [facility] OR;  Service: General;  Laterality: N/A;    COLON RESECTION N/A 2024    Procedure: OPEN SIGMOID COLECTOMY, LIVER BIOPSY, DIVERTING LOOP ILEOSTOMY CREATION, TAKE DOWN OF THE SPLENIC FLEXURE, AND APPENDECTOMY;  Surgeon: Jimmy Craig MD;  Location:  [facility] OR;  Service: General;  Laterality: N/A;    COLONOSCOPY      ENDOSCOPY  2016    pt say's, he was placed under general anesthesia " for this    ENDOSCOPY N/A 10/19/2021    Procedure: ESOPHAGOGASTRODUODENOSCOPY;  Surgeon: Osmel Kessler MD;  Location:  [facility];  Service: Gastroenterology;  Laterality: N/A;  24 fr savory dilator used at 1251, 27 fr sdavory used at 1253, 33 [place] savoruy used at 1257, 42 fr savory used at 1259      ENDOSCOPY N/A 11/16/2021    Procedure: ESOPHAGOGASTRODUODENOSCOPY WITH DILATATION;  Surgeon: Osmel Kessler MD;  Location:  [facility];  Service: Gastroenterology;  Laterality: N/A;  Dilation with 48fr savery    EXPLORATORY LAPAROTOMY N/A 1/30/2024    Procedure: LAPAROTOMY EXPLORATORY;  Surgeon: Jimmy Craig MD;  [facility];  Service: General;  Laterality: N/A;    HAND SURGERY  2022      General Information       Row Name 02/15/24 0954          Physical Therapy Time and Intention    Document Type therapy note (daily note)  -KW     Mode of Treatment physical therapy  -KW       Row Name 02/15/24 0954          General Information    Patient Profile Reviewed yes  -KW     Existing Precautions/Restrictions fall;other (see comments)  ostomy, abdominal incision  -KW               User Key  (r) = Recorded By, (t) = Taken By, (c) = Cosigned By      Initials Name Provider Type    KW Crystal Kelly PT Physical Therapist                   Mobility       Row Name 02/15/24 0954          Sit-Stand Transfer    Sit-Stand [place] (Transfers) standby assist  -KW     Assistive Device (Sit-Stand Transfers) walker, front-wheeled  -KW       Row Name 02/15/24 0954          Gait/Stairs (Locomotion)    [place] Level (Gait) supervision  -KW     Assistive Device (Gait) walker, front-wheeled  -KW     Distance in Feet (Gait) 400  -KW     Deviations/Abnormal Patterns (Gait) bilateral deviations;gait speed decreased;stride length decreased;base of support, narrow  -KW     Bilateral Gait Deviations forward flexed posture;heel strike decreased  -KW               User Key  (r) = Recorded By, (t) =  Taken By, (c) = Cosigned By      Initials Name Provider Type    Crystal Michael PT Physical Therapist                   Obj/Interventions    No documentation.                  Goals/Plan    No documentation.                  Clinical Impression       Row Name 02/15/24 0954          Pain    Pretreatment Pain Rating 0/10 - no pain  -KW       Row Name 02/15/24 0954          Plan of Care Review    Plan of Care Reviewed With patient  -KW     Progress improving  -KW     Outcome Evaluation Physical therapy treatment complete. The patient continues to require cues for upright posture. Patient improved tolerance to mobility and with decreased subjective complaints of pain. Patient increased ambulation distance compared to previous treatment session. The patient continues to present below baseline for functional mobility. The patient would continue to benefit from skilled PT to address strength, balance and activity tolerance deficits. Continue to current PT POC  -KW       Row Name 02/15/24 0954          Vital Signs    Pre Systolic BP Rehab 126  -KW     Pre Treatment Diastolic BP 88  -KW     Pretreatment Heart Rate (beats/min) 96  -KW     Pre SpO2 (%) 96  -KW       Row Name 02/15/24 0954          Positioning and Restraints    Pre-Treatment Position sitting in chair/recliner  -KW     Post Treatment Position bed  -KW     In Bed supine;call light within reach;encouraged to call for assist  -KW               User Key  (r) = Recorded By, (t) = Taken By, (c) = Cosigned By      Initials Name Provider Type    Crystal Michael PT Physical Therapist                   Outcome Measures       Row Name 02/15/24 0954 02/15/24 0300       How much help from another person do you currently need...    Turning from your back to your side while in flat bed without using bedrails? 4  -KW 4  -AS    Moving from lying on back to sitting on the side of a flat bed without bedrails? 4  -KW 4  -AS    Moving to and from a bed to a chair  (including a wheelchair)? 4  -KW 4  -AS    Standing up from a chair using your arms (e.g., wheelchair, bedside chair)? 4  -KW 4  -AS    Climbing 3-5 steps with a railing? 3  -KW 3  -AS    To walk in hospital room? 3  -KW 3  -AS    AM-PAC 6 Clicks Score (PT) 22  -KW 22  -AS    Highest Level of Mobility Goal 7 --> Walk 25 feet or more  -KW 7 --> Walk 25 feet or more  -AS      Row Name 02/15/24 0954 02/15/24 0743       Functional Assessment    Outcome Measure Options AM-PAC 6 Clicks Basic Mobility (PT)  -KW AM-PAC 6 Clicks Daily Activity (OT)  -SD              User Key  (r) = Recorded By, (t) = Taken By, (c) = Cosigned By      Initials Name Provider Type    Mariela Amato, OT Occupational Therapist    Crystal Michael, PT Physical Therapist    AS Prashanth Rinaldi, RN Registered Nurse                                 Physical Therapy Education       Title: PT OT SLP Therapies (In Progress)       Topic: Physical Therapy (In Progress)       Point: Mobility training (In Progress)       Learning Progress Summary             Patient Acceptance, E,TB, NR by AY at 2/5/2024 1134    Acceptance, E, VU,DU by SD at 2/4/2024 1628    Acceptance, E,TB, VU by ES at 2/2/2024 1441   Significant Other Acceptance, E,TB, VU by ES at 2/2/2024 1441                         Point: Home exercise program (Done)       Learning Progress Summary             Patient Acceptance, E, VU,DU by SD at 2/4/2024 1628                         Point: Body mechanics (In Progress)       Learning Progress Summary             Patient Acceptance, E,TB, NR by AY at 2/5/2024 1134    Acceptance, E, VU,DU by SD at 2/4/2024 1628    Acceptance, E,TB, VU by ES at 2/2/2024 1441   Significant Other Acceptance, E,TB, VU by ES at 2/2/2024 1441                         Point: Precautions (In Progress)       Learning Progress Summary             Patient Acceptance, E,TB, NR by AY at 2/5/2024 1134    Acceptance, E, VU,DU by SD at 2/4/2024 1628    Acceptance, E,TB, VU  by ES at 2/2/2024 1441   Significant Other Acceptance, E,TB, VU by ES at 2/2/2024 1441                                         User Key       Initials Effective Dates Name Provider Type Discipline    SD 03/13/23 -  Mayda Gabriel, PT Physical Therapist PT    AY 11/10/20 -  Aaliyah Rios, PT Physical Therapist PT    ES 08/11/22 -  Holly Hylton, PT Physical Therapist PT                  PT Recommendation and Plan     Plan of Care Reviewed With: patient  Progress: improving  Outcome Evaluation: Physical therapy treatment complete. The patient continues to require cues for upright posture. Patient improved tolerance to mobility and with decreased subjective complaints of pain. Patient increased ambulation distance compared to previous treatment session. The patient continues to present below baseline for functional mobility. The patient would continue to benefit from skilled PT to address strength, balance and activity tolerance deficits. Continue to current PT POC     Time Calculation:         PT Charges       Row Name 02/15/24 0954             Time Calculation    Start Time 0954  -KW      PT Received On 02/15/24  -KW         Timed Charges    53903 - Gait Training Minutes  16  -KW         Total Minutes    Timed Charges Total Minutes 16  -KW       Total Minutes 16  -KW                User Key  (r) = Recorded By, (t) = Taken By, (c) = Cosigned By      Initials Name Provider Type    Crystal Michael, EVELIO Physical Therapist                      PT G-Codes  Outcome Measure Options: AM-PAC 6 Clicks Basic Mobility (PT)  AM-PAC 6 Clicks Score (PT): 22  AM-PAC 6 Clicks Score (OT): 22  PT Discharge Summary  Anticipated Discharge Disposition (PT): home with home health    Crystal Kelly PT  2/15/2024

## 2024-02-15 NOTE — NURSING NOTE
[facility] visit for Stoma care and assessment    Surgery date: 01/17/24  Surgical Procedures Since Admission:  Procedure(s):  CHOLECYSTECTOMY LAPAROSCOPIC  OPEN SIGMOID COLECTOMY, LIVER BIOPSY, DIVERTING LOOP ILEOSTOMY CREATION, TAKE DOWN OF THE SPLENIC FLEXURE, AND APPENDECTOMY  Surgeon:  Jimmy Craig MD  Status:  29 Days Post-Op  -------------------    Procedure(s):  LAPAROTOMY EXPLORATORY  Surgeon:  Jimmy Craig MD  Status:  16 Days Post-Op  -------------------     Patient seen in chair, has been weaned off of oxygen.  Has been having more output and gas confirmed today.  Medical/Surgical Floor. patient's spouse at beside.     Stoma Type: Loop Ileostomy  Diameter/shape: [id number]  Location: RLQ  Protrusion: Budded  Stoma Characteristics: Edematous, Moist, and Pink  Peristomal skin: Intact, slight breakdown 6-8p  Character of output:scant amounts in bag, not enough to measure  Current pouching system: 2 and 1/4 bam to high output bag  Accessory products, appliance placed: Bam 2 piece 2 and three-quarter flat with barrier ring placed to drainable bag, stoma powder to breakdown  Goal wear time:3-4 days  Last appliance change: 2/8 by me    Surgical Incision: Midline abdominal incision  Degree of approximation: 100  Approximating Devices: steristrips  Drainage Characteristics:none  Method of Management: open    Drain(s) type: DELROY drain  Effluent characteristics: milky tan    Education provided:   Ileostomy diet reviewed, Oral hydration promoted - specifically oral electrolyte replacement (OER), Dietician consulted for additional dietary recommendations, How to open/close the drainable end of the appliance, How to empty the appliance, Indications for changing the appliance, Stool characteristics for the type of stoma created, Ambulation promoted, How to measure the stoma and the importance of cutting the appliance to limit amout of peristomal skin exposed. , Lesson #1 - appliance change process completed  by WOC RN., Accessory products reviewed, When to seek medical attention, Use of Imodium and other non-pharmacological interventions to bulk stool, How to order supplies from ostomy supply company, Ostomy catalouge marked with specific products needed for ostomy type, and Discharge supplies provided  Planning on OP visit Tuesday 2pm, Ambar will check that she has map at home.    WOC Nurse will continue to follow for ostomy education. Please contact #[phone] with questions or if ostomy leaks occur.

## 2024-02-15 NOTE — PLAN OF CARE
Problem: Adult Inpatient Plan of Care  Goal: Patient-Specific Goal (Individualized)  Outcome: Ongoing, Progressing  Goal: Absence of Hospital-Acquired Illness or Injury  Outcome: Ongoing, Progressing  Intervention: Identify and Manage Fall Risk  Recent Flowsheet Documentation  Taken 2/15/2024 0000 by Prashanth Rinaldi RN  Safety Promotion/Fall Prevention:   safety round/check completed   nonskid shoes/slippers when out of bed   clutter free environment maintained   assistive device/personal items within reach  Taken 2/14/2024 2200 by Prashanth Rinaldi RN  Safety Promotion/Fall Prevention:   safety round/check completed   nonskid shoes/slippers when out of bed   clutter free environment maintained   assistive device/personal items within reach  Taken 2/14/2024 2000 by Prashanth Rinaldi RN  Safety Promotion/Fall Prevention:   safety round/check completed   nonskid shoes/slippers when out of bed   clutter free environment maintained   assistive device/personal items within reach  Intervention: Prevent Infection  Recent Flowsheet Documentation  Taken 2/15/2024 0000 by Prashanth Rinaldi RN  Infection Prevention: environmental surveillance performed  Taken 2/14/2024 2200 by Prashanth Rinaldi RN  Infection Prevention: environmental surveillance performed  Taken 2/14/2024 2000 by Prashanth Rinaldi RN  Infection Prevention: environmental surveillance performed  Goal: Optimal Comfort and Wellbeing  Outcome: Ongoing, Progressing  Goal: Readiness for Transition of Care  Outcome: Ongoing, Progressing   Goal Outcome Evaluation:

## 2024-02-15 NOTE — PLAN OF CARE
Alma Roger is a 76 year old female here for  Chief Complaint   Patient presents with   • Consultation     Denies latex allergy or sensitivity.    Medication verified, no changes.  PCP and Pharmacy verified.    Social History     Tobacco Use   Smoking Status Never Smoker   Smokeless Tobacco Never Used     Advance Directives Filed: No    ECO - Fully active, able to carry on all predisease activities without restrictions.    Height: Yes, shoes off.  Ht Readings from Last 1 Encounters:   19 5' (1.524 m)     Weight:Yes, shoes off.  Wt Readings from Last 3 Encounters:   19 70.8 kg   19 71.4 kg   19 70.9 kg       BMI: Body mass index is 30.47 kg/m².    REVIEW OF SYSTEMS  GENERAL:  Patient denies headache, fevers, chills, night sweats, change in appetite, weight loss, but complains of: excessive fatigue and dizziness-at times. Appetite good.   ALLERGIC/IMMUNOLOGIC: Verified allergies: Yes  EYES:  Patient denies significant visual difficulties, blurred vision, but complains of: double vision-at times.   ENT/MOUTH: Patient denies sore throat, sinus drainage, mouth sores, but complains of: problems with hearing  ENDOCRINE:  Patient denies thyroid disease, hormone replacement, hot flashes, but complains of: diabetes  HEMATOLOGIC/LYMPHATIC: Patient denies easy bruising, bleeding, tender lymph nodes, swollen lymph nodes  BREASTS: Patient denies abnormal masses of breast, nipple discharge, pain  RESPIRATORY:  Patient denies lung pain with breathing, cough, coughing up blood, shortness of breath  CARDIOVASCULAR:  Patient denies anginal chest pain, palpitations, shortness of breath when lying flat, peripheral edema, but complains of: hands swell.   GASTROINTESTINAL: Patient denies nausea, vomiting, diarrhea, GI bleeding, change in bowel habits, heartburn, sensation of feeling full, difficulty swallowing, but complains of: abdominal pain, pain ratin, location: mid abdomen where surgery was.    Goal Outcome Evaluation:  Plan of Care Reviewed With: patient        Progress: improving  Outcome Evaluation: Physical therapy treatment complete. The patient continues to require cues for upright posture. Patient improved tolerance to mobility and with decreased subjective complaints of pain. Patient increased ambulation distance compared to previous treatment session. The patient continues to present below baseline for functional mobility. The patient would continue to benefit from skilled PT to address strength, balance and activity tolerance deficits. Continue to current PT POC      Anticipated Discharge Disposition (PT): home with home health                         and constipation  : Patient denies abnormal genital masses, blood in the urine, frequency, urgency, burning with urination, hesitancy, incontinence, vaginal bleeding, discharge  MUSCULOSKELETAL:  Patient denies bone pain, redness, decreased range of motion, but complains of: joint pain, pain ratin, location: wrists and shoulders.  and joint swelling feet  SKIN:  Patient denies chronic rashes, inflammation, ulcerations, skin changes, itching  NEUROLOGIC:  Patient denies areas of focal weakness, abnormal gait, numbness, tingling, but complains of: loss of balance and sensory problems  PSYCHIATRIC: Patient denies insomnia, anxiety, but complains of: depression     Today's visit was performed with the assistance of  Services.   Name of : [place]   Service used: In Person: Kristyn     Permission obtained to discuss medical information with family present.

## 2024-02-15 NOTE — PLAN OF CARE
Problem: Adult Inpatient Plan of Care  Goal: Plan of Care Review  Recent Flowsheet Documentation  Taken 2/15/2024 0818 by Mariela Cerrato, OT  Progress: improving  Plan of Care Reviewed With: patient  Outcome Evaluation: Patient continues to increase his strength, occupational endurance, and balance to support ADL & functional mobility indpendence. Pt. was able to complete LBD with SBA, grooming tasks @sink side with SBA, and ambulate household distances with SBA using RWx. Pt. continues to present below functional baseline & warrants OT skilled services to return to PLOF. Recommend home with assist & HHOT upon d/c.   Goal Outcome Evaluation:  Plan of Care Reviewed With: patient        Progress: improving  Outcome Evaluation: Patient continues to increase his strength, occupational endurance, and balance to support ADL & functional mobility indpendence. Pt. was able to complete LBD with SBA, grooming tasks @sink side with SBA, and ambulate household distances with SBA using RWx. Pt. continues to present below functional baseline & warrants OT skilled services to return to PLOF. Recommend home with assist & HHOT upon d/c.      Anticipated Discharge Disposition (OT): home with assist, home with home health

## 2024-02-15 NOTE — THERAPY TREATMENT NOTE
"Patient Name: Roger Bhat  : 1960    MRN: [id number]                              Today's Date: 2/15/2024       Admit Date: 2024    Visit Dx:     ICD-10-CM ICD-9-CM   1. Current smoker  F17.200 305.1   2. Colonic mass  K63.89 569.89   3. Gallbladder mass  K82.8 575.8   4. Bowel obstruction  K56.609 560.9   5. Cellulitis of right hand  L03.113 682.4   6. Gastroesophageal reflux disease without esophagitis  K21.9 530.81   7. Screening, lipid  Z13.220 V77.91   8. Hereditary hemochromatosis  E83.110 275.01   9. Severe malnutrition  E43 261   10. Respiratory distress  R06.03 786.09   11. Centrilobular emphysema  J43.2 492.8     Patient Active Problem List   Diagnosis    Current smoker    Cellulitis of right hand    Gastroesophageal reflux disease without esophagitis    Screening, lipid    Dysphagia    Hereditary hemochromatosis    Gallbladder mass    Colonic mass    Severe malnutrition    Respiratory distress    Centrilobular emphysema     Past Medical History:   Diagnosis Date    Allergies     Cellulitis of hand     Clotting disorder     \"FREE BLEEDING\"    Colonic mass     COPD (chronic obstructive pulmonary disease)     Cough     Current smoker     Erectile dysfunction     Fracture, foot Sept 2022    GERD (gastroesophageal reflux disease)     Hemochromatosis     Hyperlipidemia     Hypertension     Wears dentures     FULL SET     Past Surgical History:   Procedure Laterality Date    CHOLECYSTECTOMY N/A 2024    Procedure: CHOLECYSTECTOMY LAPAROSCOPIC;  Surgeon: Jimmy Craig MD;  Location: [facility] OR;  Service: General;  Laterality: N/A;    COLON RESECTION N/A 2024    Procedure: OPEN SIGMOID COLECTOMY, LIVER BIOPSY, DIVERTING LOOP ILEOSTOMY CREATION, TAKE DOWN OF THE SPLENIC FLEXURE, AND APPENDECTOMY;  Surgeon: Jimmy Craig MD;  Location:  [facility] OR;  Service: General;  Laterality: N/A;    COLONOSCOPY      ENDOSCOPY  2016    pt say's, he was placed under general anesthesia " for this    ENDOSCOPY N/A 10/19/2021    Procedure: ESOPHAGOGASTRODUODENOSCOPY;  Surgeon: Osmel Kessler MD;  Location:  [facility];  Service: Gastroenterology;  Laterality: N/A;  24 fr savory dilator used at 1251, 27 fr sdavory used at 1253, 33 Malagasy savoruy used at 1257, 42 fr savory used at 1259      ENDOSCOPY N/A 11/16/2021    Procedure: ESOPHAGOGASTRODUODENOSCOPY WITH DILATATION;  Surgeon: Osmel Kessler MD;  Location:  [facility];  Service: Gastroenterology;  Laterality: N/A;  Dilation with 48fr savery    EXPLORATORY LAPAROTOMY N/A 1/30/2024    Procedure: LAPAROTOMY EXPLORATORY;  Surgeon: Jimmy Craig MD;  Location:  [facility] OR;  Service: General;  Laterality: N/A;    HAND SURGERY  2022      General Information       Row Name 02/15/24 0743          OT Time and Intention    Document Type therapy note (daily note)  -SD     Mode of Treatment occupational therapy  -SD       Row Name 02/15/24 0743          General Information    Patient Profile Reviewed yes  -SD     Existing Precautions/Restrictions fall;other (see comments)  ostomy, abdominal incision  -SD     Barriers to Rehab medically complex  -SD       Row Name 02/15/24 0743          Living Environment    People in Home significant other  -SD       Row Name 02/15/24 0743          Cognition    Orientation Status (Cognition) oriented x 4  -SD       Row Name 02/15/24 0743          Safety Issues, Functional Mobility    Safety Issues Affecting Function (Mobility) insight into deficits/self-awareness;safety precaution awareness;safety precautions follow-through/compliance  -SD     Impairments Affecting Function (Mobility) balance;strength;endurance/activity tolerance  -SD               User Key  (r) = Recorded By, (t) = Taken By, (c) = Cosigned By      Initials Name Provider Type    SD Mariela Cerrato, OT Occupational Therapist                     Mobility/ADL's       Row Name 02/15/24 0889          Bed Mobility    Bed Mobility  rolling right;scooting/bridging;supine-sit  -SD     Rolling Right [place] (Bed Mobility) modified independence  -SD     Scooting/Bridging [place] (Bed Mobility) modified independence  -SD     Supine-Sit [place] (Bed Mobility) modified independence  -SD     Assistive Device (Bed Mobility) bed rails;head of bed elevated  -SD       Row Name 02/15/24 0815          Transfers    Transfers sit-stand transfer;stand-sit transfer;bed-chair transfer  -SD       Row Name 02/15/24 0815          Bed-Chair Transfer    Bed-Chair [place] (Transfers) standby assist  -SD     Assistive Device (Bed-Chair Transfers) walker, front-wheeled  -SD       Row Name 02/15/24 0815          Sit-Stand Transfer    Sit-Stand [place] (Transfers) standby assist  -SD     Assistive Device (Sit-Stand Transfers) walker, front-wheeled  -SD       Row Name 02/15/24 0815          Stand-Sit Transfer    Stand-Sit [place] (Transfers) standby assist  -SD     Assistive Device (Stand-Sit Transfers) walker, front-wheeled  -SD       Row Name 02/15/24 0815          Functional Mobility    Functional Mobility- Ind. Level conditional independence  -SD     Functional Mobility- Device walker, front-wheeled  -SD     Functional Mobility-Distance (Feet) 250  -SD     Functional Mobility- Comment paused for several standing rest breaks  -SD     Patient was able to Ambulate yes  -SD       Row Name 02/15/24 0815          Activities of Daily Living    BADL Assessment/Intervention lower body dressing;grooming  -SD       Row Name 02/15/24 0815          Grooming Assessment/Training    [place] Level (Grooming) hair care, combing/brushing;wash face, hands;standby assist  -SD     Position (Grooming) sink side;supported standing  -SD       Row Name 02/15/24 0815          Lower Body Dressing Assessment/Training    [place] Level (Lower Body Dressing) don;shoes/slippers;standby assist  -SD     Position (Lower Body Dressing) edge of bed sitting  -SD                User Key  (r) = Recorded By, (t) = Taken By, (c) = Cosigned By      Initials Name Provider Type    Mariela Amato OT Occupational Therapist                   Obj/Interventions       Row Name 02/15/24 0817          Balance    Static Sitting Balance independent  -SD     Dynamic Sitting Balance independent  -SD     Position, Sitting Balance unsupported;sitting edge of bed  -SD     Sit to Stand Dynamic Balance standby assist  -SD     Static Standing Balance modified independence  -SD     Dynamic Standing Balance standby assist  -SD     Position/Device Used, Standing Balance supported;walker, front-wheeled  -SD     Balance Interventions standing;UE activity with balance activity;dynamic reaching;occupation based/functional task  -SD               User Key  (r) = Recorded By, (t) = Taken By, (c) = Cosigned By      Initials Name Provider Type    Mariela Amato OT Occupational Therapist                   Goals/Plan    No documentation.                  Clinical Impression       Row Name 02/15/24 0818          Pain Assessment    Pretreatment Pain Rating 0/10 - no pain  -SD     Posttreatment Pain Rating 0/10 - no pain  -SD       Row Name 02/15/24 0818          Plan of Care Review    Plan of Care Reviewed With patient  -SD     Progress improving  -SD     Outcome Evaluation Patient continues to increase his strength, occupational endurance, and balance to support ADL & functional mobility indpendence. Pt. was able to complete LBD with SBA, grooming tasks @sink side with SBA, and ambulate household distances with SBA using RWx. Pt. continues to present below functional baseline & warrants OT skilled services to return to PLOF. Recommend home with assist & HHOT upon d/c.  -SD       Row Name 02/15/24 0818          Therapy Assessment/Plan (OT)    Rehab Potential (OT) good, to achieve stated therapy goals  -SD     Criteria for Skilled Therapeutic Interventions Met (OT) yes;meets criteria;skilled  treatment is necessary  -SD     Therapy Frequency (OT) daily  -SD       Row Name 02/15/24 0818          Therapy Plan Review/Discharge Plan (OT)    Equipment Needs Upon Discharge (OT) walker, rolling  -SD     Anticipated Discharge Disposition (OT) home with assist;home with home health  -SD       Row Name 02/15/24 0818          Vital Signs    Pre Systolic BP Rehab 137  -SD     Pre Treatment Diastolic BP 96  -SD     Post Systolic BP Rehab 146  -SD     Post Treatment Diastolic BP 89  -SD     Posttreatment Heart Rate (beats/min) 88  -SD     O2 Delivery Pre Treatment room air  -SD     O2 Delivery Intra Treatment room air  -SD     Post SpO2 (%) 97  -SD     O2 Delivery Post Treatment room air  -SD     Pre Patient Position Supine  -SD     Intra Patient Position Standing  -SD     Post Patient Position Sitting  -SD       Row Name 02/15/24 0818          Positioning and Restraints    Pre-Treatment Position in bed  -SD     Post Treatment Position chair  -SD     In Chair notified nsg;reclined;call light within reach;encouraged to call for assist;legs elevated  -SD               User Key  (r) = Recorded By, (t) = Taken By, (c) = Cosigned By      Initials Name Provider Type    Mariela Amato, OT Occupational Therapist                   Outcome Measures       Row Name 02/15/24 0743          How much help from another is currently needed...    Putting on and taking off regular lower body clothing? 3  -SD     Bathing (including washing, rinsing, and drying) 3  -SD     Toileting (which includes using toilet bed pan or urinal) 4  -SD     Putting on and taking off regular upper body clothing 4  -SD     Taking care of personal grooming (such as brushing teeth) 4  -SD     Eating meals 4  -SD     AM-PAC 6 Clicks Score (OT) 22  -SD       Row Name 02/15/24 0300          How much help from another person do you currently need...    Turning from your back to your side while in flat bed without using bedrails? 4  -AS     Moving from  lying on back to sitting on the side of a flat bed without bedrails? 4  -AS     Moving to and from a bed to a chair (including a wheelchair)? 4  -AS     Standing up from a chair using your arms (e.g., wheelchair, bedside chair)? 4  -AS     Climbing 3-5 steps with a railing? 3  -AS     To walk in hospital room? 3  -AS     AM-PAC 6 Clicks Score (PT) 22  -AS     Highest Level of Mobility Goal 7 --> Walk 25 feet or more  -AS       Row Name 02/15/24 0743          Functional Assessment    Outcome Measure Options AM-PAC 6 Clicks Daily Activity (OT)  -SD               User Key  (r) = Recorded By, (t) = Taken By, (c) = Cosigned By      Initials Name Provider Type    Mariela Amato OT Occupational Therapist    AS Prashanth Rinaldi, RN Registered Nurse                    Occupational Therapy Education       Title: PT OT SLP Therapies (In Progress)       Topic: Occupational Therapy (In Progress)       Point: ADL training (In Progress)       Description:   Instruct learner(s) on proper safety adaptation and remediation techniques during self care or transfers.   Instruct in proper use of assistive devices.                  Learning Progress Summary             Patient Acceptance, E, NR by AC at 2/12/2024 1100    Acceptance, E, VU by AN at 2/6/2024 1527    Acceptance, E,D, NR by CS at 2/4/2024 1539    Acceptance, E, NR by CS1 at 2/2/2024 1444   Family Acceptance, E,D, NR by CS at 2/4/2024 1539                         Point: Home exercise program (In Progress)       Description:   Instruct learner(s) on appropriate technique for monitoring, assisting and/or progressing therapeutic exercises/activities.                  Learning Progress Summary             Patient Acceptance, E, VU by AN at 2/6/2024 1527    Acceptance, E,D, NR by CS at 2/4/2024 1539   Family Acceptance, E,D, NR by CS at 2/4/2024 1539                         Point: Precautions (In Progress)       Description:   Instruct learner(s) on prescribed precautions  during self-care and functional transfers.                  Learning Progress Summary             Patient Acceptance, E, VU by AN at 2/6/2024 1527    Acceptance, E,D, NR by CS at 2/4/2024 1539    Acceptance, E, NR by CS1 at 2/2/2024 1444   Family Acceptance, E,D, NR by CS at 2/4/2024 1539                         Point: Body mechanics (In Progress)       Description:   Instruct learner(s) on proper positioning and spine alignment during self-care, functional mobility activities and/or exercises.                  Learning Progress Summary             Patient Acceptance, E, VU by AN at 2/6/2024 1527    Acceptance, E,D, NR by CS at 2/4/2024 1539    Acceptance, E, NR by CS1 at 2/2/2024 1444   Family Acceptance, E,D, NR by CS at 2/4/2024 1539                                         User Key       Initials Effective Dates Name Provider Type Discipline     02/03/23 -  Ely Allison, OT Occupational Therapist OT    CS1 09/02/21 -  Elena Velasquez, OT Occupational Therapist OT     06/16/21 -  Tim Flores, OT Occupational Therapist OT    AN 09/21/21 -  Indira Santoyo, OT Occupational Therapist OT                  OT Recommendation and Plan  Therapy Frequency (OT): daily  Plan of Care Review  Plan of Care Reviewed With: patient  Progress: improving  Outcome Evaluation: Patient continues to increase his strength, occupational endurance, and balance to support ADL & functional mobility indpendence. Pt. was able to complete LBD with SBA, grooming tasks @sink side with SBA, and ambulate household distances with SBA using RWx. Pt. continues to present below functional baseline & warrants OT skilled services to return to PLOF. Recommend home with assist & HHOT upon d/c.     Time Calculation:         Time Calculation- OT       Row Name 02/15/24 0823             Time Calculation- OT    OT Received On 02/15/24  -SD      OT Goal Re-Cert Due Date 02/22/24  -SD         Timed Charges    97278 - OT Therapeutic Activity Minutes 13   -SD      39260 - OT Self Care/Mgmt Minutes 15  -SD         Total Minutes    Timed Charges Total Minutes 28  -SD       Total Minutes 28  -SD                User Key  (r) = Recorded By, (t) = Taken By, (c) = Cosigned By      Initials Name Provider Type    Mariela Amato OT Occupational Therapist                  Therapy Charges for Today       Code Description Service Date Service Provider Modifiers Qty    84197585623  OT THERAPEUTIC ACT EA 15 MIN 2/15/2024 Mariela Cerrato OT GO 1    18533627364 HC OT SELF CARE/MGMT/TRAIN EA 15 MIN 2/15/2024 Marieal Cerrato OT GO 1                 Mariela Cerrato OT  2/15/2024

## 2024-02-15 NOTE — PROGRESS NOTES
"Patient Name:  Roger Bhat  YOB: 1960  [id number]    Surgery Progress Note    Date of visit: 2/15/2024      Subjective: No acute events.  Denies any nausea or vomiting.  Tolerated diet yesterday well without difficulty.  Ileostomy with 500 mL of output recorded.  DELROY drain output not recorded, roughly 10 to 15 mL in the bulb this morning.  No fevers or chills.  Eager to be discharged today          Objective:     /96   Pulse 94   Temp 97.8 °F (36.6 °C) (Oral)   Resp 18   Ht 175.3 cm (69\")   Wt 50.8 kg (112 lb)   SpO2 100%   BMI 16.54 kg/m²     Intake/Output Summary (Last 24 hours) at 2/15/2024 0711  Last data filed at 2/14/2024 1859  Gross per 24 hour   Intake --   Output 500 ml   Net -500 ml       GEN:   Awake, alert, sitting up in bed, chronically ill-appearing in no obvious distress  CV:      Regular rate and rhythm  L:         Symmetric expansion, on oxygen by nasal cannula  Abd:    Soft, not obviously distended, no significant tenderness to palpation throughout the abdomen midline incision clean dry and intact, DELROY drain in the right lower quadrant with small volume of brown output, ileostomy on the right side pink and patent with liquid stool in the bag  Ext:     No cyanosis, clubbing, or edema    Recent labs that are back at this time have been reviewed.           Assessment/ Plan:    Mr. Bhat is a 63-year-old gentleman who is admitted following operative intervention for gallbladder and sigmoid colon mass on January 17     #Sigmoid colon mass, gallbladder mass  # Ileus  -S/p lap vladimir, open sigmoid colectomy with diverting loop ileostomy on January 17, 2024. S/p exploratory laparotomy with washout on January 30  -Afebrile.  Tolerating diet.  Having ileostomy output.  -He was denied for acute rehab yesterday.  Patient is interested in going home today with home health.  Will discuss with case management but tentative plan to discharge home this afternoon if this can be set " up.  He will follow-up with me on Tuesday for drain assessment and labs      Jimmy Craig MD  2/15/2024  07:11 EST

## 2024-02-16 ENCOUNTER — HOME CARE VISIT (OUTPATIENT)
Dept: HOME HEALTH SERVICES | Facility: HOME HEALTHCARE | Age: 64
End: 2024-02-16
Payer: COMMERCIAL

## 2024-02-16 ENCOUNTER — TRANSITIONAL CARE MANAGEMENT TELEPHONE ENCOUNTER (OUTPATIENT)
Dept: CALL CENTER | Facility: HOSPITAL | Age: 64
End: 2024-02-16
Payer: COMMERCIAL

## 2024-02-16 VITALS
DIASTOLIC BLOOD PRESSURE: 68 MMHG | SYSTOLIC BLOOD PRESSURE: 106 MMHG | TEMPERATURE: 98.7 F | OXYGEN SATURATION: 98 % | RESPIRATION RATE: 16 BRPM | HEART RATE: 104 BPM

## 2024-02-16 PROCEDURE — G0299 HHS/HOSPICE OF RN EA 15 MIN: HCPCS

## 2024-02-16 NOTE — PAYOR COMM NOTE
"[facility]# [id number]   Clinical Update  Discharge Summary    Utilization Review  Phone [phone]  Fax [phone]    [place] [id number]       Roger Bhat (63 y.o. Male)       Date of Birth   1960    Social Security Number       Address   [address] [place] [address]    Home Phone   [phone]    MRN   [id number]       Restoration   Lutheran    Marital Status   Significant Other                            Admission Date   1/17/24    Admission Type   Elective    Admitting Provider   Jimmy Craig MD    Attending Provider       Department, Room/Bed   [address], S575/1       Discharge Date   2/15/2024    Discharge Disposition   Home or Self Care    Discharge Destination                                 Attending Provider: (none)   Allergies: No Known Allergies    Isolation: None   Infection: None   Code Status: Prior    Ht: 175.3 cm (69\")   Wt: 50.8 kg (112 lb)    Admission Cmt: None   Principal Problem: Colonic mass [K63.89]                   Active Insurance as of 1/17/2024       Primary Coverage       Payor Plan Insurance Group Employer/Plan Group    ANTHEM BLUE CROSS ANTHEM BLUE CROSS BLUE SHIELD PPO [id number]       Payor Plan Address Payor Plan Phone Number Payor Plan Fax Number Effective Dates    [address] [phone]  2/1/2021 - None Entered    [place] [address]         Subscriber Name Subscriber Birth Date Member ID       ROGER BHAT 1960 [id number]                     Emergency Contacts        (Rel.) Home Phone Work Phone Mobile Phone    Ambar Daniels (Significant Other) [phone] -- [phone]    Seferino Dalal (Friend) [phone] -- [phone]                 Physician Progress Notes (last 4 days)        Jimmy Craig MD at 02/15/24 0711          Patient Name:  Roger Bhat  YOB: 1960  [id number]    Surgery Progress Note    Date of " "visit: 2/15/2024      Subjective: No acute events.  Denies any nausea or vomiting.  Tolerated diet yesterday well without difficulty.  Ileostomy with 500 mL of output recorded.  DELROY drain output not recorded, roughly 10 to 15 mL in the bulb this morning.  No fevers or chills.  Eager to be discharged today          Objective:     /96   Pulse 94   Temp 97.8 °F (36.6 °C) (Oral)   Resp 18   Ht 175.3 cm (69\")   Wt 50.8 kg (112 lb)   SpO2 100%   BMI 16.54 kg/m²     Intake/Output Summary (Last 24 hours) at 2/15/2024 0711  Last data filed at 2/14/2024 1859  Gross per 24 hour   Intake --   Output 500 ml   Net -500 ml       GEN:   Awake, alert, sitting up in bed, chronically ill-appearing in no obvious distress  CV:      Regular rate and rhythm  L:         Symmetric expansion, on oxygen by nasal cannula  Abd:    Soft, not obviously distended, no significant tenderness to palpation throughout the abdomen midline incision clean dry and intact, DELROY drain in the right lower quadrant with small volume of brown output, ileostomy on the right side pink and patent with liquid stool in the bag  Ext:     No cyanosis, clubbing, or edema    Recent labs that are back at this time have been reviewed.           Assessment/ Plan:    Mr. Bhat is a 63-year-old gentleman who is admitted following operative intervention for gallbladder and sigmoid colon mass on January 17     #Sigmoid colon mass, gallbladder mass  # Ileus  -S/p lap vladimir, open sigmoid colectomy with diverting loop ileostomy on January 17, 2024. S/p exploratory laparotomy with washout on January 30  -Afebrile.  Tolerating diet.  Having ileostomy output.  -He was denied for acute rehab yesterday.  Patient is interested in going home today with home health.  Will discuss with case management but tentative plan to discharge home this afternoon if this can be set up.  He will follow-up with me on Tuesday for drain assessment and labs      Jimmy Craig, " "MD  2/15/2024  07:11 EST        Electronically signed by Jimmy Craig MD at 02/15/24 0713       Dian Bolanos DO at 24 1126              [facility] Medicine Services  CLINICAL REVIEW NOTE    Patient Name: Roger Bhat  : 1960  MRN: [id number]    Date of Admission: 2024  Length of Stay: 28  Attending Service:  General Surgery          Patient's labs, charting, and events reviewed.  No active medical management issues to address today, the Hospitalist team will continue to follow daily, be available for any needs, and see or bill for services as needed.      Awaiting peer-peer for [facility] will sign off. Please call with questions or re-consult as needed.        Electronically signed by Dian Bolanos DO at 24 1127       Jimmy Craig MD at 24 0946          Patient Name:  Roger Bhat  YOB: 1960  [id number]    Surgery Progress Note    Date of visit: 2024      Subjective: No acute events.  Ate well yesterday and appetite is significantly improved.  Denies any nausea or vomiting.  Had 1 L of ileostomy output yesterday.  DELROY drain with dark feculent appearing output that is slightly increased in volume, 30 mL yesterday.  However no fevers, chills, or abdominal pain          Objective:     /90   Pulse 92   Temp 97.5 °F (36.4 °C) (Oral)   Resp 17   Ht 175.3 cm (69\")   Wt 50.8 kg (112 lb)   SpO2 95%   BMI 16.54 kg/m²     Intake/Output Summary (Last 24 hours) at 2024 0946  Last data filed at 2024 0000  Gross per 24 hour   Intake 1200 ml   Output 1735 ml   Net -535 ml       GEN:   Awake, alert, sitting up in bed, chronically ill-appearing in no obvious distress  CV:      Regular rate and rhythm  L:         Symmetric expansion, on oxygen by nasal cannula  Abd:    Soft, not obviously distended, appropriately tender palpation throughout the abdomen, midline incision with some " reactive erythema surrounding the site and serous drainage, DELROY drain in the right lower quadrant with small volume of feculent appearing output, ileostomy on the right side pink and patent with liquid stool in the bag  Ext:     No cyanosis, clubbing, or edema    Recent labs that are back at this time have been reviewed.           Assessment/ Plan:    Mr. Bhat is a 63-year-old gentleman who is admitted following operative intervention for gallbladder and sigmoid colon mass on      #Sigmoid colon mass, gallbladder mass  # Ileus  -S/p lap vladimir, open sigmoid colectomy with diverting loop ileostomy on 2024. S/p exploratory laparotomy with washout on   -Afebrile.  Labs are stable to improved.  White blood cell count is 9.  He has been afebrile  -Continue regular diet  -As his bowel function has improved, his DELROY drain output in the pelvis has increased and there is a small amount of feculent appearing output.  This was similar to as before his takeback operation, however there was no definite leak visible at the time of surgery.  Regardless he is afebrile and clinically improving without antibiotics.  Will plan to continue drain now and at discharge  -Awaiting discharge to rehab.  Peer to peer scheduled for today as he was initially denied for [facility]      Jimmy Craig MD  2024  09:46 EST        Electronically signed by Jimmy Craig MD at 24 0948       Dian Bolanos DO at 24 1032              [facility] Medicine Services  CLINICAL REVIEW NOTE    Patient Name: Roger Bhat  : 1960  MRN: [id number]    Date of Admission: 2024  Length of Stay: 27  Attending Service:  General Surgery          Patient's labs, charting, and events reviewed.  No active medical management issues to address today, the Hospitalist team will continue to follow daily, be available for any needs, and see or bill for services as  "needed.      NG/Enteral feeds being discontinued. Labs stable. To [facility] when arranged.        Electronically signed by Dian Bolanos DO at 02/13/24 1032       Jimmy Craig MD at 02/13/24 0815          Patient Name:  Roger Bhat  YOB: 1960  [id number]    Surgery Progress Note    Date of visit: 2/13/2024      Subjective: No acute events overnight.  Reports quite a bit of throat irritation from the NG tube.  Tolerated diet yesterday without difficulty.  Stoma with 550 mL of output.          Objective:     /89 (BP Location: Left arm, Patient Position: Lying)   Pulse 104   Temp 96.9 °F (36.1 °C) (Axillary)   Resp 18   Ht 175.3 cm (69\")   Wt 50.5 kg (111 lb 4.8 oz)   SpO2 94%   BMI 16.44 kg/m²     Intake/Output Summary (Last 24 hours) at 2/13/2024 0815  Last data filed at 2/13/2024 0719  Gross per 24 hour   Intake 2706 ml   Output 1990 ml   Net 716 ml       GEN:   Awake, alert, sitting up in bed, chronically ill-appearing in no obvious distress  CV:      Regular rate and rhythm  L:         Symmetric expansion, on oxygen by nasal cannula  Abd:    Soft, not obviously distended, appropriately tender palpation throughout the abdomen, midline incision with some reactive erythema surrounding the site and serous drainage, DELROY drain in the right lower quadrant with tan thick small volume output, ileostomy on the right side pink and patent with liquid stool in the bag  Ext:     No cyanosis, clubbing, or edema    Recent labs that are back at this time have been reviewed.           Assessment/ Plan:    Mr. Bhat is a 63-year-old gentleman who is admitted following operative intervention for gallbladder and sigmoid colon mass on January 17     #Sigmoid colon mass, gallbladder mass  # Ileus  -S/p lap vladimir, open sigmoid colectomy with diverting loop ileostomy on January 17, 2024. S/p exploratory laparotomy with washout on January 30  -Afebrile.  Labs without significant change.  White " "blood cell count 12.9  -Discontinue nasoenteral tube and tube feeds today  -Monitor ileostomy output  -Mobilize  -Hopeful for discharge to rehab in the next day or 2         Jimmy Craig MD  2/13/2024  08:15 EST        Electronically signed by Jimmy Craig MD at 02/13/24 0817       Jimmy Craig MD at 02/12/24 1033          Patient Name:  Roger Bhat  YOB: 1960  [id number]    Surgery Progress Note    Date of visit: 2/12/2024      Subjective: No acute events.  Feeling very well.  Had a bowel movement per rectum yesterday.  Otherwise stoma with 275 mL of output recorded.  Ate to pork chops yesterday and mashed potatoes.  Had 2 Ensure shakes.  Denies nausea, vomiting, fevers or chills          Objective:     BP (!) 135/104 (BP Location: Left arm, Patient Position: Sitting)   Pulse 106   Temp 97.2 °F (36.2 °C) (Oral)   Resp 16   Ht 175.3 cm (69\")   Wt 50.8 kg (112 lb) Comment: bed scale  SpO2 92%   BMI 16.54 kg/m²     Intake/Output Summary (Last 24 hours) at 2/12/2024 1033  Last data filed at 2/12/2024 0950  Gross per 24 hour   Intake 3596 ml   Output 2190 ml   Net 1406 ml     GEN:   Awake, alert, sitting up in bed, chronically ill-appearing in no obvious distress  CV:      Regular rate and rhythm  L:         Symmetric expansion, on oxygen by nasal cannula  Abd:    Soft, not obviously distended, appropriately tender palpation throughout the abdomen, midline incision with some reactive erythema surrounding the site and serous drainage, DELROY drain in the right lower quadrant with cloudy serous output, ileostomy on the right side pink and patent with liquid stool in the bag  Ext:     No cyanosis, clubbing, or edema      Recent labs that are back at this time have been reviewed.           Assessment/ Plan:    Mr. Bhat is a 63-year-old gentleman who is admitted following operative intervention for gallbladder and sigmoid colon mass on January 17     #Sigmoid colon mass, " gallbladder mass  # Ileus  -S/p lap vladimir, open sigmoid colectomy with diverting loop ileostomy on 2024. S/p exploratory laparotomy with washout on   -Afebrile.  Labs are stable  -Tolerating tube feeds well.  Also tolerating oral diet without difficulty  -Discontinue antibiotics today  -Mobilize  -Should be ready for discharge to rehab in the next day or 2.  Do not expect that he will need to continue on tube feeds at discharge as his oral intake is improving well.      Jimmy Craig MD  2024  10:33 EST        Electronically signed by Jimmy Craig MD at 24 1034       Dian Bolanos DO at 24 1027              [facility] Medicine Services  CLINICAL REVIEW NOTE    Patient Name: Roger Bhat  : 1960  MRN: [id number]    Date of Admission: 2024  Length of Stay: 26  Attending Service:  General Surgery          Patient's labs, charting, and events reviewed.  No active medical management issues to address today, the Hospitalist team will continue to follow daily, be available for any needs, and see or bill for services as needed.      Monitor WBC. Stable at 12. Continue current POC per Gen Surg. [facility]        Electronically signed by Dian Bolanos DO at 24 1027       Consult Notes (last 4 days)  Notes from 24 0921 through 24 0921   No notes of this type exist for this encounter.          Discharge Summary        Jimmy Craig MD at 02/15/24 0713          Discharge Summary    Patient Name:  Roger Bhat  YOB: 1960  [id number]      DATE OF ADMISSION: 2024    DATE OF DISCHARGE: 2/15/2024       FINAL DIAGNOSES:     Colonic mass    Current smoker    Gastroesophageal reflux disease without esophagitis    Severe malnutrition    Respiratory distress    Centrilobular emphysema       CONSULTING SERVICES: Hospital medicine, critical care medicine, nutrition     PROCEDURES  PERFORMED:   1/17/24  Laparoscopic Cholecystectomy  Laparoscopic Liver Biopsy  Open Sigmoid Colectomy with Primary Coloproctostomy   Mobilization of the Splenic Flexure  Flexible Sigmoidoscopy for Pneumatic Leak Test   Diverting Loop Ileostomy Creation   Appendectomy      1/30/24  Exploratory Laparotomy   Lysis of adhesions    HISTORY: The patient is a very pleasant 63 y.o. male with a history of concern for sigmoid colon mass and gallbladder mass.  He was admitted on January 17 for planned resection.      HOSPITAL COURSE: Roger Bhat was taken to the operating room on January 17, 2024 where they underwent laparoscopic cholecystectomy, open sigmoid colectomy with primary coloproctostomy, diverting loop ileostomy creation, and appendectomy. They tolerated the procedure without difficulty and were transported to the floor postoperatively in stable condition. Over the following several days he initially progressed.  1 week after surgery however he began to develop difficulty with nausea and vomiting despite ileostomy output.  His presentation was most consistent with an ileus and he was carefully observed.  A CT was performed on January 25 which was without acute findings and appeared most consistent with ileus.  He then began to develop a rising leukocytosis and was started on antibiotics.  A repeat CT scan was performed on January 29 which was similarly without acute findings most consistent with an ileus.  He continued to have difficulty with inability to tolerate an oral diet, and with his rising leukocytosis there was concern for an intra-abdominal source of infection or obstructive process.  He was therefore recommended to return to the operating room and on January 30 was taken back to the operating room where he underwent laparotomy with lysis of adhesions.  There was no definite obstructive process identified or intra-abdominal infection.  There was some concern for possible aspiration at the time of  surgery.  He was admitted to the ICU following this procedure.  He was started on TPN.  He was carefully monitored in the ICU and his antibiotics were broadened to meropenem and micafungin.  He slowly improved following this with a downtrending leukocytosis and eventual return of ileostomy output.  He was transferred to the floor he continued to improve.  He was followed by physical therapy.  He had resolution of his leukocytosis and appropriate bowel function to the ileostomy.  His antibiotics were discontinued on February 13.  He was initially recommended inpatient rehab, but chose to go home with assistance after being denied placement at [facility].  He had continued output from his drain, so this was left in place at the time of discharge.  On 2/15/2024 they were deemed appropriate for discharge in stable condition after having achieved maximal benefit of their hospital stay.      CONDITION: At the time of discharge, the patient is tolerating a regular diet without difficulty. he is having normal bowel function to the ileostomy and bladder function. his incisions are clean, dry and intact. Pain is well controlled.       DISCHARGE MEDICATIONS:   1. All previous home medications.   2. Percocet 5 - 10 mg PO  Q4h  PRN pain   3. Ondansetron 4 mg Q6h PRN nausea     DISCHARGE INSTRUCTIONS:  1. The patient is instructed to follow up with Dr. Craig on Tuesday, February 20 for drain check and labs  2. he is instructed to refrain from lifting more than 10 pounds for the next 2 weeks.  3. If the patient has worsening problems with fever, chills, nausea or vomiting he is to contact Dr. Craig's office and a contact card has been provided.  4. They have been instructed that it is okay to shower.  Let warm soapy water run over the incisions.  Pat dry do not scrub.  No tub baths for the next 2 weeks.  5.  He has been instructed to continue his drain at discharge and empty and record the output twice  daily      Chencho Camacho MD  2/15/2024  07:13 EST             Electronically signed by Chencho Camacho MD at 02/15/24 0852       Discharge Order (From admission, onward)       Start     Ordered    02/15/24 0855  Discharge patient  Once        Expected Discharge Date: 02/15/24   Discharge Disposition: Home or Self Care   Physician of Record for Attribution - Please select from Treatment Team: CHENCHO CAMACHO [[id number]]   Review needed by CMO to determine Physician of Record: No      Question Answer Comment   Physician of Record for Attribution - Please select from Treatment Team CHENCHO CAMACHO    Review needed by CMO to determine Physician of Record No        02/15/24 0855

## 2024-02-19 ENCOUNTER — HOME CARE VISIT (OUTPATIENT)
Dept: HOME HEALTH SERVICES | Facility: HOME HEALTHCARE | Age: 64
End: 2024-02-19
Payer: COMMERCIAL

## 2024-02-19 VITALS
HEART RATE: 70 BPM | OXYGEN SATURATION: 99 % | SYSTOLIC BLOOD PRESSURE: 128 MMHG | TEMPERATURE: 97.3 F | DIASTOLIC BLOOD PRESSURE: 72 MMHG | RESPIRATION RATE: 21 BRPM

## 2024-02-19 PROCEDURE — G0300 HHS/HOSPICE OF LPN EA 15 MIN: HCPCS

## 2024-02-19 NOTE — HOME HEALTH
Routine Visit Note: LPN    Skill/education provided: ostomy appliance assessment and teaching. pt tolerated procedure well

## 2024-02-20 ENCOUNTER — HOSPITAL ENCOUNTER (OUTPATIENT)
Dept: WOUND CARE | Facility: HOSPITAL | Age: 64
Discharge: HOME | End: 2024-02-20
Admitting: SURGERY
Payer: OTHER MISCELLANEOUS

## 2024-02-20 ENCOUNTER — LAB (OUTPATIENT)
Dept: LAB | Facility: HOSPITAL | Age: 64
End: 2024-02-20
Payer: COMMERCIAL

## 2024-02-20 ENCOUNTER — TRANSCRIBE ORDERS (OUTPATIENT)
Dept: LAB | Facility: HOSPITAL | Age: 64
End: 2024-02-20
Payer: COMMERCIAL

## 2024-02-20 DIAGNOSIS — K63.89 MALAKOPLAKIA OF ILEUM: ICD-10-CM

## 2024-02-20 DIAGNOSIS — K63.89 MALAKOPLAKIA OF ILEUM: Primary | ICD-10-CM

## 2024-02-20 LAB
ALBUMIN SERPL-MCNC: 3.7 G/DL (ref 3.5–5.2)
ALBUMIN/GLOB SERPL: 1.1 G/DL
ALP SERPL-CCNC: 112 U/L (ref 39–117)
ALT SERPL W P-5'-P-CCNC: 36 U/L (ref 1–41)
ANION GAP SERPL CALCULATED.3IONS-SCNC: 8 MMOL/L (ref 5–15)
AST SERPL-CCNC: 22 U/L (ref 1–40)
BILIRUB SERPL-MCNC: 0.2 MG/DL (ref 0–1.2)
BUN SERPL-MCNC: 8 MG/DL (ref 8–23)
BUN/CREAT SERPL: 14 (ref 7–25)
CALCIUM SPEC-SCNC: 9.2 MG/DL (ref 8.6–10.5)
CHLORIDE SERPL-SCNC: 100 MMOL/L (ref 98–107)
CO2 SERPL-SCNC: 26 MMOL/L (ref 22–29)
CREAT SERPL-MCNC: 0.57 MG/DL (ref 0.76–1.27)
DEPRECATED RDW RBC AUTO: 59.3 FL (ref 37–54)
EGFRCR SERPLBLD CKD-EPI 2021: 111.5 ML/MIN/1.73
ERYTHROCYTE [DISTWIDTH] IN BLOOD BY AUTOMATED COUNT: 15.6 % (ref 12.3–15.4)
GLOBULIN UR ELPH-MCNC: 3.3 GM/DL
GLUCOSE SERPL-MCNC: 105 MG/DL (ref 65–99)
HCT VFR BLD AUTO: 35.4 % (ref 37.5–51)
HGB BLD-MCNC: 11.8 G/DL (ref 13–17.7)
MCH RBC QN AUTO: 34.5 PG (ref 26.6–33)
MCHC RBC AUTO-ENTMCNC: 33.3 G/DL (ref 31.5–35.7)
MCV RBC AUTO: 103.5 FL (ref 79–97)
PLATELET # BLD AUTO: 414 10*3/MM3 (ref 140–450)
PMV BLD AUTO: 8.9 FL (ref 6–12)
POTASSIUM SERPL-SCNC: 4.6 MMOL/L (ref 3.5–5.2)
PROT SERPL-MCNC: 7 G/DL (ref 6–8.5)
RBC # BLD AUTO: 3.42 10*6/MM3 (ref 4.14–5.8)
SODIUM SERPL-SCNC: 134 MMOL/L (ref 136–145)
WBC NRBC COR # BLD AUTO: 11.17 10*3/MM3 (ref 3.4–10.8)

## 2024-02-20 PROCEDURE — 85027 COMPLETE CBC AUTOMATED: CPT

## 2024-02-20 PROCEDURE — 36415 COLL VENOUS BLD VENIPUNCTURE: CPT

## 2024-02-20 PROCEDURE — G0463 HOSPITAL OUTPT CLINIC VISIT: HCPCS

## 2024-02-20 PROCEDURE — 80053 COMPREHEN METABOLIC PANEL: CPT

## 2024-02-20 NOTE — NURSING NOTE
[facility] visit for Stoma care and assessment     Surgery date: 01/17/24  Surgical Procedures Since Admission:  Procedure(s):  CHOLECYSTECTOMY LAPAROSCOPIC  OPEN SIGMOID COLECTOMY, LIVER BIOPSY, DIVERTING LOOP ILEOSTOMY CREATION, TAKE DOWN OF THE SPLENIC FLEXURE, AND APPENDECTOMY  Surgeon:  Jimmy Craig MD  Status:  29 Days Post-Op  -------------------     Procedure(s):  LAPAROTOMY EXPLORATORY  Surgeon:  Jimmy Craig MD  Status:  16 Days Post-Op  -------------------     Patient seen in post op clinic, leaks occurring sometimes, alternating from liqiod to mushy. Intake 5 cups of tea, coffee sometimes, mtn dew with sugar, gatorade zero.     Stoma Type: Loop Ileostomy  Diameter/shape: 32 (cutting to 44mm)  Location: RLQ  Protrusion: Budded  Stoma Characteristics: Edematous, Moist, and Pink  Peristomal skin: Intact, inflamed breakdown circumferentially  Character of output:see above  Current pouching system: 2 and 3/4 bam with flat  Accessory products, appliance placed: Bam 2 piece 2 and 1/4 flat with barrier ring placed to drainable bag, vashe soak to breakdown, stoma powder to breakdown, barrier film wipe to breakdown  Goal wear time:2  Last appliance change: 2/20 by me     Surgical Incision: Midline abdominal incision  Degree of approximation: 100  Approximating Devices: ELONEL  Drainage Characteristics:none, blue sutures exposed below. Patient to show to Dr. Craig.     Drain(s) type: DELROY drain  Effluent characteristics: milky tan, scant     Recommendations:   Reduce coffee and tea, reduce MTN dew, add 1-2 tsp of salt to gatorade zero  Ask Dr. TIDWELL about immodium, can use up to 4 pills/ day, before meals and at bedtime  Change q2 days for a week to improve breakdown  Continue carbs with meals and protein, add crackers or pretzels between meals or when drinking liquids  Continue barrier ring   We may think about convex wafer next visit if leaking not improved    Education provided:    Ileostomy diet  reviewed, Oral hydration promoted - specifically oral electrolyte replacement (OER), Showering/Bathing, Indications for changing the appliance, Stool characteristics for the type of stoma created, How to measure the stoma and the importance of cutting the appliance to limit amout of peristomal skin exposed. , Accessory products reviewed, When to seek medical attention, and Use of Imodium and other non-pharmacological interventions to bulk stool    I spent 60 minutes, face-to-face, caring for Roger today.     Follow up appointment: No. Future appointment date and time: PRN, planning on 1-2 weeks, they will call if issues arise before then.

## 2024-02-21 ENCOUNTER — HOME CARE VISIT (OUTPATIENT)
Dept: HOME HEALTH SERVICES | Facility: HOME HEALTHCARE | Age: 64
End: 2024-02-21
Payer: COMMERCIAL

## 2024-02-21 VITALS
SYSTOLIC BLOOD PRESSURE: 102 MMHG | OXYGEN SATURATION: 98 % | HEART RATE: 99 BPM | DIASTOLIC BLOOD PRESSURE: 74 MMHG | RESPIRATION RATE: 16 BRPM

## 2024-02-21 PROCEDURE — G0152 HHCP-SERV OF OT,EA 15 MIN: HCPCS

## 2024-02-22 ENCOUNTER — HOME CARE VISIT (OUTPATIENT)
Dept: HOME HEALTH SERVICES | Facility: HOME HEALTHCARE | Age: 64
End: 2024-02-22
Payer: COMMERCIAL

## 2024-02-22 ENCOUNTER — OFFICE VISIT (OUTPATIENT)
Dept: FAMILY MEDICINE CLINIC | Facility: CLINIC | Age: 64
End: 2024-02-22
Payer: COMMERCIAL

## 2024-02-22 VITALS
TEMPERATURE: 97.8 F | RESPIRATION RATE: 18 BRPM | DIASTOLIC BLOOD PRESSURE: 60 MMHG | HEART RATE: 96 BPM | OXYGEN SATURATION: 99 % | SYSTOLIC BLOOD PRESSURE: 110 MMHG

## 2024-02-22 VITALS
BODY MASS INDEX: 18.33 KG/M2 | HEART RATE: 104 BPM | OXYGEN SATURATION: 99 % | RESPIRATION RATE: 20 BRPM | DIASTOLIC BLOOD PRESSURE: 62 MMHG | TEMPERATURE: 97.1 F | HEIGHT: 69 IN | WEIGHT: 123.8 LBS | SYSTOLIC BLOOD PRESSURE: 96 MMHG

## 2024-02-22 DIAGNOSIS — Z93.3 COLOSTOMY IN PLACE: ICD-10-CM

## 2024-02-22 DIAGNOSIS — Z09 HOSPITAL DISCHARGE FOLLOW-UP: Primary | ICD-10-CM

## 2024-02-22 DIAGNOSIS — I95.9 HYPOTENSION, UNSPECIFIED HYPOTENSION TYPE: ICD-10-CM

## 2024-02-22 PROCEDURE — G0300 HHS/HOSPICE OF LPN EA 15 MIN: HCPCS

## 2024-02-22 NOTE — HOME HEALTH
"OT Evaluation Note:    Home Health ordered for: disciplines SN/PT/OT.      Reason for Hosp/Primary Dx/Co-morbidities: 63-year-old male w/ PMH of COPD, HTN, and asthma.  Sigmoid colon mass recently detected with concern for gallbladder mass.  Presented to [facility] for scheduled surgery on 1.17.24 w/ Dr. Craig.  Procedure performed: lap cholecystectomy, lap liver biopsy, open sigmoid colectomy w/ primary coloproctostomy, mobilization of splenic flexure, fex sigmoidoscopy for pneumatic leak test, diverting loop ileostomy creation, and appendectomy. Patient returned to the OR on 1.30.24 d/t bowel obstruction and sepsis.  Insurance denied rehab and patient discharged home w/ HH services.      Focus of Care: HHOT 1w4 for education/training of ADL/IADL skills, balance retraining, endurance training, therapeutic exercise, HEP, equipment recommendations, home safety and fall prevention.    Patient's goal(s):  \"To get back to normal.  Maybe a little bit stronger.\"    Current Functional status/mobility/DME: Minimal to set up with ADL skills, walking in home without DME in the home. Pt has grab bar next to commode..      HB status/Living Arrangements: Homebound.  Lives in 2-story home w/ significant other, all living completed on first floor. Uneven surfaces between multiple rooms.     Skin Integrity/wound status: Midline incision w/o steristrips and no s/s of infection.  Ileostomy right quadrant and DELROY drain removed yesterday.    Code Status: CPR    Fall Risk/Safety concerns: High fall risk    Medication issues/Concerns:  N/A, SO manages medications    Additional Problems/Concerns: N/A    SDOH Barriers (i.e. caregiver concerns, social isolation, transportation, food insecurity, environment, income etc.)/Need for MSW: N/A    Plan for next visit: Reinforce HEP, DME s/u, EC/WS w/ ADL/lt IADL skills" How Severe Is Your Skin Lesion?: mild Has Your Skin Lesion Been Treated?: not been treated Is This A New Presentation, Or A Follow-Up?: Skin Lesion

## 2024-02-22 NOTE — PROGRESS NOTES
Transitional Care Follow Up Visit  Subjective     Roger Bhat is a 63 y.o. male who presents for a transitional care management visit.    Within 48 business hours after discharge our office contacted him via telephone to coordinate his care and needs.      I reviewed and discussed the details of that call along with the discharge summary, hospital problems, inpatient lab results, inpatient diagnostic studies, and consultation reports with Roger.     Current outpatient and discharge medications have been reconciled for the patient.  Reviewed by: Richa Mary DO          2/15/2024     6:11 PM   Date of TCM Phone Call   [facility]   Date of Admission 1/17/2024   Date of Discharge 2/15/2024   Discharge Disposition Home or Self Care     Risk for Readmission (LACE) Score: 11 (2/15/2024  6:00 AM)      History of Present Illness   Course During Hospital Stay:  The patient is a 63-year-old male who is here for hospital follow-up. He was admitted to [facility] on 01/17/2024 and discharged on 02/15/2024. Final diagnosis include colonic mass, current smoker, GERD, severe malnutrition, respiratory distress, and centrilobular emphysema. On 01/17/2024, he underwent a laparoscopic cholecystectomy and liver biopsy, open sigmoid colectomy with primary coloproctostomy, mobilization of splenic flexure, diverting loop ileostomy creation, and appendectomy. On 01/30/2024, he underwent exploratory laparotomy with lysis of adhesions. Antibiotics were discontinued on 02/13/2024. They initially recommended that he go to inpatient rehab; however, he chose to go home with assistance after he was denied placement at [facility]. He is accompanied by his significant other, Ambar.    Since being home from the hospital, he has been getting stronger. His appetite is good. His appetite got better the day after the tube was removed. He lost a lot of weight while hospitalized. He saw the surgeon's  office 2 days ago. The surgeon took out the drain, he would like drain looked at today. The colostomy bag is quite gassy right now. There is a lot more liquid. OT and PT came yesterday. The home health nurse ordered some supplies. His blood pressure is low today, but denies dizziness. He had coffee and 2 cups of tea today. Yesterday, it systolic blood pressure was 101. He denies feeling dizzy or lightheaded. When he walks, it is a bit sore from the wound of the incision. They took out the staples before he left. They put butterfly things and removed some of the sutures that dissolved themselves. He is trying to do some exercise and walk a little bit. His significant other wonders if low blood pressure causes him to be confused. He bought chocolates yesterday from the store and he did not know where they were. He is normally very alert. He is drinking good fluids. He is eating everything he is allowed to eat. He likes broccoli and cauliflower. He was given Percocet, but he has not taken it. After the first surgery, he was able to walk on his own. After the second surgery, he could not walk very well initially. However, he was determined to get moving again. He is ambulating without difficulty at this time. He was advised from his surgeon that they will consider reanastomosis in 3 months.     He was seen by hematology, discussed diagnosis of hemochromatosis.     The following portions of the patient's history were reviewed and updated as appropriate: allergies, current medications, past family history, past medical history, past social history, past surgical history, and problem list.    Review of Systems    Objective   Physical Exam  Vitals reviewed.   Cardiovascular:      Rate and Rhythm: Normal rate.      Heart sounds: Normal heart sounds.   Pulmonary:      Effort: Pulmonary effort is normal.      Breath sounds: Normal breath sounds.   Abdominal:      Comments: Colostomy bag in place    Skin:            Comments:  Healing would RLQ where drain was in place. No erythema present, granulation tissue present    Neurological:      Mental Status: He is alert.         Assessment & Plan   Diagnoses and all orders for this visit:    1. Hospital discharge follow-up (Primary)    2. Colostomy in place    3. Hypotension, unspecified hypotension type      1. Hospital follow-up.  He is up a few pounds compared to 01/2024. He will continue home health and follow up prn.    2. Hypotension.  His blood pressure is normal today. His low blood pressure can cause confusion. He was advised to drink plenty of fluids, along with increasing sodium in diet. He was advised to drink Gatorade.    Follow-up  The patient will follow up in 3 months.         Transcribed from ambient dictation for Richa Mary DO by Black Bradley.  02/22/24   11:42 EST    Patient or patient representative verbalized consent to the visit recording.  I have personally performed the services described in this document as transcribed by the above individual, and it is both accurate and complete.

## 2024-02-23 ENCOUNTER — READMISSION MANAGEMENT (OUTPATIENT)
Dept: CALL CENTER | Facility: HOSPITAL | Age: 64
End: 2024-02-23
Payer: COMMERCIAL

## 2024-02-23 ENCOUNTER — HOME CARE VISIT (OUTPATIENT)
Dept: HOME HEALTH SERVICES | Facility: HOME HEALTHCARE | Age: 64
End: 2024-02-23
Payer: COMMERCIAL

## 2024-02-23 VITALS
RESPIRATION RATE: 16 BRPM | TEMPERATURE: 97 F | DIASTOLIC BLOOD PRESSURE: 60 MMHG | SYSTOLIC BLOOD PRESSURE: 108 MMHG | OXYGEN SATURATION: 100 % | HEART RATE: 90 BPM

## 2024-02-23 PROCEDURE — G0151 HHCP-SERV OF PT,EA 15 MIN: HCPCS

## 2024-02-23 NOTE — HOME HEALTH
"iPT Eval Note:   Home Health ordered for: disciplines SN/PT/OT.   Reason for Hosp/Primary Dx/Co-morbidities: Pt is a 63-year-old male w/ PMH of COPD, HTN, and asthma. Sigmoid colon mass recently detected with concern for gallbladder mass. Presented to [facility] for scheduled surgery on 1.17.24 w/ Dr. Craig. Procedure performed: lap cholecystectomy, lap liver biopsy, open sigmoid colectomy w/ primary coloproctostomy, mobilization of splenic flexure, fex sigmoidoscopy for pneumatic leak test, diverting loop ileostomy creation, and appendectomy. Patient returned to the OR on 1.30.24 d/t bowel obstruction and sepsis. Insurance denied rehab and patient discharged home w/ HH services.   Focus of Care: HHPT 1wk4 for gait training, balance training,endurance training, therapeutic exercise, pt education and HEP instruction related to recent colectomy  Patient's goal(s): \"To get back to normal. Maybe a little bit stronger; be able to go back to work\"   Current Functional status/mobility/DME: Pt ambulating in home without AD with SBA for safety  HB status/Living Arrangements: Homebound. [place] Patient does not use the upstairs. Lives w/ significant other.   Code Status: CPR   Fall Risk/Safety concerns: High fall risk   Plan for next visit: review and upgrade HEP if appropriate"

## 2024-02-23 NOTE — OUTREACH NOTE
General Surgery Week 2 Survey      Flowsheet Row Responses   [facility] facility patient discharged from? [place]   Does the patient have one of the following disease processes/diagnoses(primary or secondary)? General Surgery   Week 2 attempt successful? No   Unsuccessful attempts Attempt 1            Le GOLD - Registered Nurse

## 2024-02-26 ENCOUNTER — HOME CARE VISIT (OUTPATIENT)
Dept: HOME HEALTH SERVICES | Facility: HOME HEALTHCARE | Age: 64
End: 2024-02-26
Payer: COMMERCIAL

## 2024-02-26 ENCOUNTER — READMISSION MANAGEMENT (OUTPATIENT)
Dept: CALL CENTER | Facility: HOSPITAL | Age: 64
End: 2024-02-26
Payer: COMMERCIAL

## 2024-02-26 PROCEDURE — G0152 HHCP-SERV OF OT,EA 15 MIN: HCPCS

## 2024-02-26 NOTE — OUTREACH NOTE
General Surgery Week 2 Survey      Flowsheet Row Responses   [facility] patient discharged from? [place]   Does the patient have one of the following disease processes/diagnoses(primary or secondary)? General Surgery   Week 2 attempt successful? Yes   Call start time 1446   Call end time 1508   Discharge diagnosis COLON RESECTION--CHOLECYSTECTOMY-Diverting Loop Ileostomy Creation   Person spoke with today (if not patient) and relationship Ambar-S/O   Meds reviewed with patient/caregiver? Yes   Is the patient having any side effects they believe may be caused by any medication additions or changes? No   Is the patient taking all medications as directed (includes completed medication regime)? Yes   Does the patient have a follow up appointment scheduled with their surgeon? Yes   Has the patient kept scheduled appointments due by today? N/A   What is the Home health agency?  [facility]   Has home health visited the patient within 72 hours of discharge? Yes   Home health comments Pt had to miss his first  nursing appt d/t pt having several appts during the time the nurse could only come visit. S/O stated she managed to change the ostomy herself. RN spoke with  and they will call pt and schedule another nurse to come out. S/O VU   Psychosocial issues? No   What is the patient's perception of their health status since discharge? Improving   Nursing interventions Nurse provided patient education   Is the patient/caregiver able to teach back steps to recovery at home? Set small, achievable goals for return to baseline health   Is the patient/caregiver able to teach back the hierarchy of who to call/visit for symptoms/problems? PCP, Specialist, Home health nurse, Urgent Care, ED, 911 Yes   Week 2 call completed? Yes   Call end time 1508            Lee Ann DEUTSCH - Registered Nurse

## 2024-02-28 ENCOUNTER — HOME CARE VISIT (OUTPATIENT)
Dept: HOME HEALTH SERVICES | Facility: HOME HEALTHCARE | Age: 64
End: 2024-02-28
Payer: COMMERCIAL

## 2024-02-28 VITALS
RESPIRATION RATE: 16 BRPM | OXYGEN SATURATION: 99 % | DIASTOLIC BLOOD PRESSURE: 80 MMHG | TEMPERATURE: 94.7 F | SYSTOLIC BLOOD PRESSURE: 118 MMHG | HEART RATE: 67 BPM

## 2024-02-28 PROCEDURE — G0300 HHS/HOSPICE OF LPN EA 15 MIN: HCPCS

## 2024-02-28 NOTE — HOME HEALTH
Routine Visit Note:    Skill/education provided: Upgrade HEP, therapeutic exercise, functional transfer, home safety and SO training    Patient/caregiver response: Pt tolerated session well, increase strength/endurance noted.     Plan for next visit: TE/TA, undurance training, functional transfer, IADL skill retraining    Other pertinent info:

## 2024-02-28 NOTE — HOME HEALTH
Routine Visit Note: LPN    Skill/education provided: ostomy assessment, changed ostomy with cg assisting. pt tolerated procedure well.suture line closed.no falls reported no changes in medications. Pt continues changing the appliance every other day as ordered by ostomy nurse. pt continues with mild redness arounf stoma. stoma decreased in size.     Patient/caregiver response: pt will follow up with ostomy nurse Friday as osmel.    Plan for next visit: ostomy teaching    Other pertinent info: barrier piece ordered today

## 2024-03-01 ENCOUNTER — HOSPITAL ENCOUNTER (OUTPATIENT)
Dept: WOUND CARE | Facility: HOSPITAL | Age: 64
Discharge: HOME | End: 2024-03-01
Payer: COMMERCIAL

## 2024-03-01 ENCOUNTER — HOME CARE VISIT (OUTPATIENT)
Dept: HOME HEALTH SERVICES | Facility: HOME HEALTHCARE | Age: 64
End: 2024-03-01
Payer: COMMERCIAL

## 2024-03-01 VITALS
HEART RATE: 86 BPM | DIASTOLIC BLOOD PRESSURE: 76 MMHG | RESPIRATION RATE: 16 BRPM | SYSTOLIC BLOOD PRESSURE: 106 MMHG | TEMPERATURE: 97.5 F | OXYGEN SATURATION: 96 %

## 2024-03-01 PROCEDURE — G0463 HOSPITAL OUTPT CLINIC VISIT: HCPCS

## 2024-03-01 PROCEDURE — G0151 HHCP-SERV OF PT,EA 15 MIN: HCPCS

## 2024-03-01 RX ORDER — ROSUVASTATIN CALCIUM 10 MG/1
10 TABLET, COATED ORAL
Qty: 90 TABLET | Refills: 3 | Status: SHIPPED | OUTPATIENT
Start: 2024-03-01

## 2024-03-01 NOTE — HOME HEALTH
Routine Visit Note:   Skill/education provided: fall risk education, gait training in home without AD, HEP review and upgrade, BLE ther ex and balance tasks  Patient/caregiver response: pt tolerated activity with vitals WNL and without c/o pain  Plan for next visit: advance balance tasks if appropriate; d/c planning  Other pertinent info: none

## 2024-03-01 NOTE — NURSING NOTE
[facility] visit for Stoma care and assessment     Surgery date: 01/17/24     Patient seen in post op clinic, leaks occurring sometimes, less liquid, more mushy.     Stoma Type: Loop Ileostomy  Diameter/shape: 32 mm  Location: RLQ  Protrusion: Budded-much more budded with convex  Stoma Characteristics:  Moist, and Pink  Peristomal skin: improving and healing of sore from 6-9 o clock.  Character of output:see above  Current pouching system: [place] 2 piece 2 and 1/4 flat with barrier ring  Accessory products, appliance placed:  bertha 2 piece red soft convex to drainable bag, stoma powder to breakdown and wiped away excess, barrier spray to seal in  Goal wear time:3-4  Last appliance change: 3/1 by me     Surgical Incision: Midline abdominal incision  Degree of approximation: 100  Approximating Devices: LEONEL  Drainage Characteristics: none, closed over    Recommendations:   Choose between bertha flat red with ring and bertha soft convex red with no ring, maybe consider minimal paste directly around hole cut  Continue powder then spray until wounds healed  Gave ostomy belt to try  Order through [facility] when HH dc's  Can titrate up to changing q3-4 days since sore is improved  Call office while this WOCN out of town in March for emergencies, will touch base end of March.    I spent 45 minutes, face-to-face, caring for Roger today.     Follow up appointment: No. Future appointment date and time: will touch base late March

## 2024-03-04 ENCOUNTER — HOME CARE VISIT (OUTPATIENT)
Dept: HOME HEALTH SERVICES | Facility: HOME HEALTHCARE | Age: 64
End: 2024-03-04
Payer: COMMERCIAL

## 2024-03-04 PROCEDURE — G0157 HHC PT ASSISTANT EA 15: HCPCS

## 2024-03-04 PROCEDURE — G0152 HHCP-SERV OF OT,EA 15 MIN: HCPCS

## 2024-03-05 ENCOUNTER — HOME CARE VISIT (OUTPATIENT)
Dept: HOME HEALTH SERVICES | Facility: HOME HEALTHCARE | Age: 64
End: 2024-03-05
Payer: COMMERCIAL

## 2024-03-05 VITALS — OXYGEN SATURATION: 97 % | DIASTOLIC BLOOD PRESSURE: 60 MMHG | HEART RATE: 116 BPM | SYSTOLIC BLOOD PRESSURE: 110 MMHG

## 2024-03-05 VITALS — OXYGEN SATURATION: 99 % | HEART RATE: 101 BPM | TEMPERATURE: 97.3 F

## 2024-03-05 PROCEDURE — G0300 HHS/HOSPICE OF LPN EA 15 MIN: HCPCS

## 2024-03-05 NOTE — HOME HEALTH
PT Routine visit note    Skill/education provided: fall prevention education, gait, balance activties    Pt. response: pt. is cooperative and agreeable to PT today; pt. demos ability to tolerate 9 mins outdoor walk today w/o AD on varying grades and surfaces, vitals WNL's, moderate dyspnea reported.  Pt. reports compliance with HEP and walking daily.    Plan for next visit: upgrade balance activities if appropriate

## 2024-03-05 NOTE — HOME HEALTH
Routine Visit Note:    Skill/education provided: Reinforce HEP and upgraded as indicated, ADL/lt IADL skills w/ problem solving to ensure body mechanics, wt/lifting precautions, pacing of self. Reinforced home safety and fall prevention w/ wearing proper footwear.    Patient/caregiver response: Pt tolerated session well w/ no LOB or SOA w/ ther ex and ther activity    Plan for next visit: Reinforce HEP and upgrade as indicated, increase resuming IADL skills for refiling bird feeders, watering plants and sweeping kitchen floor w/ good body mechanics    Other pertinent info: MD this am re: drainage below ostomy. Pt requesting to return to work, MD cleared to start next week. Ambar, SO he will have to lift > 10 lbs to complete his job.

## 2024-03-05 NOTE — HOME HEALTH
Routine Visit Note: LPN    Skill/education provided: post surgical wound assessment/ pt went to MD yesterday and has a infection in the lower section of the suture line , see pic, no abx prescribed. changes dressing, CG to changes dressing daily. v/s wdl, no n/v ostomy patent pt changes appliance yesterday. CG reports no difficulty in management of the ostomy.    Patient/caregiver response: CG/Pt will continue with ostomy maintenance and call HH if any difficultied    Plan for next visit: wound care /assessment    Other pertinent info:

## 2024-03-07 ENCOUNTER — READMISSION MANAGEMENT (OUTPATIENT)
Dept: CALL CENTER | Facility: HOSPITAL | Age: 64
End: 2024-03-07
Payer: COMMERCIAL

## 2024-03-07 VITALS
HEART RATE: 101 BPM | SYSTOLIC BLOOD PRESSURE: 118 MMHG | DIASTOLIC BLOOD PRESSURE: 80 MMHG | RESPIRATION RATE: 16 BRPM | TEMPERATURE: 97.1 F | OXYGEN SATURATION: 99 %

## 2024-03-07 NOTE — OUTREACH NOTE
General Surgery Week 3 Survey      Flowsheet Row Responses   [facility] patient discharged from? [place]   Does the patient have one of the following disease processes/diagnoses(primary or secondary)? General Surgery   Week 3 attempt successful? Yes   Call start time 1146   Call end time 1150   Discharge diagnosis COLON RESECTION--CHOLECYSTECTOMY-Diverting Loop Ileostomy Creation   Person spoke with today (if not patient) and relationship Ambar-S/O   Meds reviewed with patient/caregiver? Yes   Is the patient having any side effects they believe may be caused by any medication additions or changes? No   Does the patient have all medications related to this admission filled (includes all antibiotics, pain medications, etc.) Yes   Is the patient taking all medications as directed (includes completed medication regime)? Yes   Does the patient have a follow up appointment scheduled with their surgeon? Yes   Has the patient kept scheduled appointments due by today? Yes   Comments Patient was seen in the office on Monday by surgeon. He is oozing some drainage from incision. Wife reports drainage has increased and they are changing small dressing every three to 4 hours. She will call surgeons office to report right away.   Psychosocial issues? No   What is the patient's perception of their health status since discharge? Same   Nursing interventions Nurse provided patient education   Is the patient/caregiver able to teach back signs and symptoms of incisional infection? Increased redness, swelling or pain at the incisonal site, Incisional warmth, Fever, Pus or odor from incision, Increased drainage or bleeding   Is the patient/caregiver able to teach back steps to recovery at home? Set small, achievable goals for return to baseline health, Rest and rebuild strength, gradually increase activity   Is the patient/caregiver able to teach back the hierarchy of who to call/visit for symptoms/problems? PCP, Specialist, Home  health nurse, Urgent Care, ED, 911 Yes   Additional teach back comments Wife will call surgeons office to report an increase in drainage.   Week 3 call completed? Yes   Is the patient interested in additional calls from an ambulatory ? No   Would this patient benefit from a Referral to [facility] Social Work? No   Call end time 1150            Miguel Ángel BLAKE - Registered Nurse

## 2024-03-08 ENCOUNTER — HOME CARE VISIT (OUTPATIENT)
Dept: HOME HEALTH SERVICES | Facility: HOME HEALTHCARE | Age: 64
End: 2024-03-08
Payer: COMMERCIAL

## 2024-03-08 VITALS
DIASTOLIC BLOOD PRESSURE: 76 MMHG | RESPIRATION RATE: 18 BRPM | SYSTOLIC BLOOD PRESSURE: 128 MMHG | TEMPERATURE: 98.1 F | HEART RATE: 78 BPM | OXYGEN SATURATION: 78 %

## 2024-03-08 PROCEDURE — G0300 HHS/HOSPICE OF LPN EA 15 MIN: HCPCS

## 2024-03-11 ENCOUNTER — HOME CARE VISIT (OUTPATIENT)
Dept: HOME HEALTH SERVICES | Facility: HOME HEALTHCARE | Age: 64
End: 2024-03-11
Payer: COMMERCIAL

## 2024-03-11 VITALS
HEART RATE: 88 BPM | SYSTOLIC BLOOD PRESSURE: 130 MMHG | TEMPERATURE: 97.4 F | OXYGEN SATURATION: 96 % | RESPIRATION RATE: 18 BRPM | DIASTOLIC BLOOD PRESSURE: 72 MMHG

## 2024-03-11 PROCEDURE — G0299 HHS/HOSPICE OF RN EA 15 MIN: HCPCS

## 2024-03-11 NOTE — HOME HEALTH
LPNRoutine Visit Note:    Skill/education provided: ostomy assessment post surgical site assessment, infection continues at base of suture line redness , warmth and puss, md notified and mupiracin ordered. v/s wdl. mild discomfort at infection site. pt and cg proficient in ostomy maint. no falls reported , no changes in medications, pt releases to light duty to work.    Patient/caregiver response:monitor surgical site and call [facility] if increase in infestion, pain or redness.    Plan for next visit: post surgical site assessment, ordering ostomy supplies for discharge planning    Other pertinent info:

## 2024-03-11 NOTE — HOME HEALTH
Routine Visit Note:    Skill/education provided: wound to abdomen assessed due to cg c/o increased redness, site appeared to have some redness that cg states is resolving and drainage has been coming out but no foul odor, surgeon aware and patient is afebrile , instructed patient and cg on s/sx of worsening to report and made sure on call hha number reasily accessible     Patient/caregiver response: tolerted wound care without pain, cleansed with dial soap and water and applied muprocin ointment then covered with dsd and secured with tape     Plan for next visit: sn for wound assessment       ordered     adhesive remover wipes  gauze   foam tape

## 2024-03-13 ENCOUNTER — HOME CARE VISIT (OUTPATIENT)
Dept: HOME HEALTH SERVICES | Facility: HOME HEALTHCARE | Age: 64
End: 2024-03-13
Payer: COMMERCIAL

## 2024-03-13 PROCEDURE — G0299 HHS/HOSPICE OF RN EA 15 MIN: HCPCS

## 2024-03-14 ENCOUNTER — HOME CARE VISIT (OUTPATIENT)
Dept: HOME HEALTH SERVICES | Facility: HOME HEALTHCARE | Age: 64
End: 2024-03-14
Payer: COMMERCIAL

## 2024-03-14 VITALS
DIASTOLIC BLOOD PRESSURE: 87 MMHG | HEART RATE: 80 BPM | RESPIRATION RATE: 18 BRPM | SYSTOLIC BLOOD PRESSURE: 135 MMHG | TEMPERATURE: 97.7 F

## 2024-03-14 PROCEDURE — G0152 HHCP-SERV OF OT,EA 15 MIN: HCPCS

## 2024-03-14 NOTE — HOME HEALTH
Routine Visit Note:    Skill/education provided: assessed wound to mid lower abdomen, well approximated with small amoutn ss drainage noted, no odoor , redness resolving, patient afebrile draining from pinhole open area to lower section of wound, ostomy intact and draining loose brown stool, intact     Patient/caregiver response: tolerated well,     Plan for next visit: sn to assess wound     Other pertinent info:     muprocin ointment used over wound

## 2024-03-15 ENCOUNTER — HOME CARE VISIT (OUTPATIENT)
Dept: HOME HEALTH SERVICES | Facility: HOME HEALTHCARE | Age: 64
End: 2024-03-15
Payer: COMMERCIAL

## 2024-03-15 VITALS
TEMPERATURE: 97.6 F | HEART RATE: 78 BPM | DIASTOLIC BLOOD PRESSURE: 68 MMHG | SYSTOLIC BLOOD PRESSURE: 128 MMHG | OXYGEN SATURATION: 97 % | RESPIRATION RATE: 15 BRPM

## 2024-03-15 PROCEDURE — G0157 HHC PT ASSISTANT EA 15: HCPCS

## 2024-03-17 VITALS
HEART RATE: 89 BPM | SYSTOLIC BLOOD PRESSURE: 128 MMHG | DIASTOLIC BLOOD PRESSURE: 80 MMHG | OXYGEN SATURATION: 100 % | TEMPERATURE: 98 F

## 2024-03-18 ENCOUNTER — HOME CARE VISIT (OUTPATIENT)
Dept: HOME HEALTH SERVICES | Facility: HOME HEALTHCARE | Age: 64
End: 2024-03-18
Payer: COMMERCIAL

## 2024-03-18 VITALS
SYSTOLIC BLOOD PRESSURE: 118 MMHG | TEMPERATURE: 97.3 F | DIASTOLIC BLOOD PRESSURE: 80 MMHG | OXYGEN SATURATION: 99 % | HEART RATE: 70 BPM | RESPIRATION RATE: 16 BRPM

## 2024-03-18 PROCEDURE — G0151 HHCP-SERV OF PT,EA 15 MIN: HCPCS

## 2024-03-18 PROCEDURE — G0299 HHS/HOSPICE OF RN EA 15 MIN: HCPCS

## 2024-03-18 NOTE — HOME HEALTH
PT Routine visit note    Skill/education provided: B LE Ther ex, standing balance activities    Pt. response: pt. is cooperative and agreeable to PT today; pt. tolerates adding resistance to B LE Ther ex today, using yellow T-band.  Pt. able to perform standing balance exercises w/ less UE support, modifying arm positions as needed to stabilize.    Plan for next visit: Discharge if appropriate by PT

## 2024-03-19 VITALS — HEART RATE: 62 BPM | RESPIRATION RATE: 18 BRPM | OXYGEN SATURATION: 100 % | TEMPERATURE: 96.4 F

## 2024-03-19 NOTE — HOME HEALTH
Routine Visit Note:    Skill/education provided: wound assessed to lower abdomen, redness resolved, 3 pin size open areas to incision line, afebrile and draining small amount ss drainage, photos sent to surgeon and patient sees surgeon again this afternoon     Patient/caregiver response: tolerated well without pain     Plan for next visit: sn for wound assessment     Other pertinent info:

## 2024-03-22 ENCOUNTER — HOME CARE VISIT (OUTPATIENT)
Dept: HOME HEALTH SERVICES | Facility: HOME HEALTHCARE | Age: 64
End: 2024-03-22
Payer: COMMERCIAL

## 2024-03-22 PROCEDURE — G0299 HHS/HOSPICE OF RN EA 15 MIN: HCPCS

## 2024-03-24 VITALS
RESPIRATION RATE: 16 BRPM | DIASTOLIC BLOOD PRESSURE: 78 MMHG | TEMPERATURE: 98.1 F | SYSTOLIC BLOOD PRESSURE: 128 MMHG | HEART RATE: 98 BPM | OXYGEN SATURATION: 100 %

## 2024-03-24 NOTE — HOME HEALTH
Routine Visit Note:    Skill/education provided: Wound care and ostomy changed    Patient/caregiver response: pt tolerated well    Plan for next visit: wound care/assessment    Other pertinent info: Will order supplies

## 2024-03-25 ENCOUNTER — HOME CARE VISIT (OUTPATIENT)
Dept: HOME HEALTH SERVICES | Facility: HOME HEALTHCARE | Age: 64
End: 2024-03-25
Payer: COMMERCIAL

## 2024-03-25 VITALS
DIASTOLIC BLOOD PRESSURE: 98 MMHG | HEART RATE: 76 BPM | RESPIRATION RATE: 18 BRPM | OXYGEN SATURATION: 98 % | SYSTOLIC BLOOD PRESSURE: 133 MMHG | TEMPERATURE: 97.7 F

## 2024-03-25 PROCEDURE — G0299 HHS/HOSPICE OF RN EA 15 MIN: HCPCS

## 2024-03-25 NOTE — HOME HEALTH
Routine Visit Note:    Skill/education provided: assessed ostomy site and wound care and assessment     Patient/caregiver response: tolerated well without pain     Plan for next visit: sn for wound assessment and teaching     Other pertinent info:         patient wound and ostomy    ostomy chaange, order barrier wipes, wound one home not deep weeping small amount, ostomy some light redness around but no skin breakdown

## 2024-03-28 ENCOUNTER — HOME CARE VISIT (OUTPATIENT)
Dept: HOME HEALTH SERVICES | Facility: HOME HEALTHCARE | Age: 64
End: 2024-03-28
Payer: COMMERCIAL

## 2024-03-28 PROCEDURE — G0300 HHS/HOSPICE OF LPN EA 15 MIN: HCPCS

## 2024-03-28 RX ORDER — BUPROPION HYDROCHLORIDE 150 MG/1
TABLET ORAL
Qty: 90 TABLET | Refills: 1 | Status: SHIPPED | OUTPATIENT
Start: 2024-03-28

## 2024-03-28 NOTE — TELEPHONE ENCOUNTER
Rx Refill Note  Requested Prescriptions     Pending Prescriptions Disp Refills    buPROPion XL (WELLBUTRIN XL) 150 MG 24 hr tablet [Pharmacy Med Name: bupropion HCl  mg 24 hr tablet, extended release] 90 tablet 1     Sig: TAKE ONE TABLET BY MOUTH EVERY MORNING      Last office visit with prescribing clinician: 2/22/2024   Last telemedicine visit with prescribing clinician: Visit date not found   Next office visit with prescribing clinician: Visit date not found                         Would you like a call back once the refill request has been completed: [] Yes [] No    If the office needs to give you a call back, can they leave a voicemail: [] Yes [] No    Mine Reyes MA  03/28/24, 14:56 EDT    Pt reported not taking at recent visit

## 2024-04-01 VITALS
HEART RATE: 78 BPM | DIASTOLIC BLOOD PRESSURE: 78 MMHG | OXYGEN SATURATION: 98 % | TEMPERATURE: 97.9 F | SYSTOLIC BLOOD PRESSURE: 128 MMHG | RESPIRATION RATE: 18 BRPM

## 2024-04-01 NOTE — HOME HEALTH
Routine Visit Note: LPN    Skill/education provided: wound care/assessment wound healing. wound care as ordered pt tolerated well. V/S wdl. no falls reported , no changes in medications.

## 2024-04-02 ENCOUNTER — HOME CARE VISIT (OUTPATIENT)
Dept: HOME HEALTH SERVICES | Facility: HOME HEALTHCARE | Age: 64
End: 2024-04-02
Payer: COMMERCIAL

## 2024-04-02 NOTE — CASE COMMUNICATION
[facility] on 4/3. I have not personally seen this patient and note from last SNV is not complete so cannot  whether or not he is appropriate for DC. Cert period ends 4/15

## 2024-04-02 NOTE — CASE COMMUNICATION
[facility]    Reason: MD appt      For your records only.   Per CMS Guidance, MD must be notified of missed/cancelled visits; therefore the prescribed frequency was not met.

## 2024-04-05 ENCOUNTER — TELEPHONE (OUTPATIENT)
Dept: WOUND CARE | Facility: HOSPITAL | Age: 64
End: 2024-04-05
Payer: COMMERCIAL

## 2024-04-05 ENCOUNTER — HOME CARE VISIT (OUTPATIENT)
Dept: HOME HEALTH SERVICES | Facility: HOME HEALTHCARE | Age: 64
End: 2024-04-05
Payer: COMMERCIAL

## 2024-04-05 VITALS
OXYGEN SATURATION: 96 % | RESPIRATION RATE: 20 BRPM | HEART RATE: 78 BPM | DIASTOLIC BLOOD PRESSURE: 68 MMHG | TEMPERATURE: 98.7 F | SYSTOLIC BLOOD PRESSURE: 122 MMHG

## 2024-04-05 PROCEDURE — G0299 HHS/HOSPICE OF RN EA 15 MIN: HCPCS

## 2024-04-05 NOTE — HOME HEALTH
Routine Visit Note: LPN    Skill/education provided: ostomy assessment/ wound assessment, wound shows no ssi and is close to closed. mild drainage. ostomy appliance changed stoma looks great . no complications. stool noted. v/s wdl. no falls reported no changes in medications. reviewed medications with cg/pt .    Patient/caregiver response: will continue wound care to wound in a bsence of [facility] and report any ssi to HHSN/ MD.    Plan for next visit: wound care /assessment    Other pertinent info:

## 2024-04-05 NOTE — TELEPHONE ENCOUNTER
Roger Bhat  1960  [id number]    Summary: Called and checked on patient and Ambar answered phone and let me know that patient is doing well other than opening and incision which has been seen by the surgeon and home health is helping to treat.  According to Ambar patient is still not experiencing leakage or skin irritation with the new barrier that we decided to try one of the last visits I saw the patient.  Briefly spoke about reversal process and showering as well.  Ambar agrees to get back in touch should further needs with his ostomy bag or skin occur.    Time spent teleconferencing patient was: 15 minutes

## 2024-04-08 ENCOUNTER — HOME CARE VISIT (OUTPATIENT)
Dept: HOME HEALTH SERVICES | Facility: HOME HEALTHCARE | Age: 64
End: 2024-04-08
Payer: COMMERCIAL

## 2024-04-08 VITALS
DIASTOLIC BLOOD PRESSURE: 84 MMHG | SYSTOLIC BLOOD PRESSURE: 136 MMHG | TEMPERATURE: 97.8 F | OXYGEN SATURATION: 98 % | HEART RATE: 80 BPM | RESPIRATION RATE: 18 BRPM

## 2024-04-08 PROCEDURE — G0299 HHS/HOSPICE OF RN EA 15 MIN: HCPCS

## 2024-04-08 NOTE — HOME HEALTH
Routine Visit Note:    Skill/education provided: stoma reddish and rasied, bowel movement     Patient/caregiver response: assessed wound to midline incision almost completely healed, preplanning for discharge next week, assessed stoma     Plan for next visit: SN for discharge     Other pertinent info:      plan to discharge next snv , then readmit after reversal surgery in [place] ,      MAY 3 SEES SURGEON AGAIN

## 2024-04-09 ENCOUNTER — HOME CARE VISIT (OUTPATIENT)
Dept: HOME HEALTH SERVICES | Facility: HOME HEALTHCARE | Age: 64
End: 2024-04-09
Payer: COMMERCIAL

## 2024-04-10 ENCOUNTER — TELEPHONE (OUTPATIENT)
Dept: FAMILY MEDICINE CLINIC | Facility: CLINIC | Age: 64
End: 2024-04-10
Payer: COMMERCIAL

## 2024-04-10 DIAGNOSIS — R91.1 LUNG NODULE SEEN ON IMAGING STUDY: Primary | ICD-10-CM

## 2024-04-10 DIAGNOSIS — R94.2 ABNORMAL PET SCAN OF LUNG: ICD-10-CM

## 2024-04-10 NOTE — TELEPHONE ENCOUNTER
Caller: Ambar Daniels    Relationship: Emergency Contact    Best call back number: [phone]       What was the call regarding: PATIENT GOT A LETTER STATING THAT HE IS DUE FOR A LUNG SCREENING, HE WAS JUST IN THE HOSPITAL FOR A LONG STAY, HE WAS ON OXYGEN AND HAD MANY PROCEDURES DONE. DOES PATIENT NEED A LUNG SCREENING STILL? PLEASE CALL TO LET PATIENT KNOW.     Is it okay if the provider responds through MyChart:

## 2024-04-15 ENCOUNTER — HOME CARE VISIT (OUTPATIENT)
Dept: HOME HEALTH SERVICES | Facility: HOME HEALTHCARE | Age: 64
End: 2024-04-15
Payer: COMMERCIAL

## 2024-04-16 ENCOUNTER — HOME CARE VISIT (OUTPATIENT)
Dept: HOME HEALTH SERVICES | Facility: HOME HEALTHCARE | Age: 64
End: 2024-04-16
Payer: COMMERCIAL

## 2024-04-16 VITALS
DIASTOLIC BLOOD PRESSURE: 77 MMHG | HEART RATE: 77 BPM | SYSTOLIC BLOOD PRESSURE: 130 MMHG | OXYGEN SATURATION: 98 % | RESPIRATION RATE: 18 BRPM | TEMPERATURE: 98.4 F

## 2024-04-16 NOTE — Clinical Note
Discharge Summary/Summary of Care Provided: surgical wound assessment and teaching     Patient received home health for diagnosis: Colonic mass and Gallbladder mass removal with ostomy creation post Bowel obstruction    Current level of functional ability: ambulates independently     Living arrangements: lives with significant other     Progress towards goals and/or Were all goals met? goals met patient is back to work     Follow-up appointment plans and community resources provided:     THE FOLLOWING INSTRUCTIONS WERE REVIEWED UPON DISCHARGE:    Keep your appointments as scheduled     Call your doctor if you develop a new or worsening symptoms     Continue to take the medications prescribed by your doctor. A medication list is attached. Be sure to keep them informed of all medications you take including over the counter herbal, vitamins/minerals and the ones you take only when needed.    Follow the  diet as ordered by your doctor.     Continue with home program as instructed by therapist (handouts given).    Continue wound care as ordered.     Instructions given to Patient    Instructions provided per Visit

## 2024-04-26 ENCOUNTER — TRANSCRIBE ORDERS (OUTPATIENT)
Dept: ADMINISTRATIVE | Facility: HOSPITAL | Age: 64
End: 2024-04-26
Payer: COMMERCIAL

## 2024-04-26 DIAGNOSIS — K63.89 MASS OF COLON: Primary | ICD-10-CM

## 2024-04-30 ENCOUNTER — HOSPITAL ENCOUNTER (EMERGENCY)
Facility: HOSPITAL | Age: 64
Discharge: HOME | End: 2024-04-30
Attending: EMERGENCY MEDICINE
Payer: COMMERCIAL

## 2024-04-30 VITALS
OXYGEN SATURATION: 98 % | DIASTOLIC BLOOD PRESSURE: 85 MMHG | WEIGHT: 123.46 LBS | SYSTOLIC BLOOD PRESSURE: 111 MMHG | BODY MASS INDEX: 18.29 KG/M2 | HEART RATE: 88 BPM | RESPIRATION RATE: 16 BRPM | HEIGHT: 69 IN | TEMPERATURE: 97.6 F

## 2024-04-30 DIAGNOSIS — T18.128A ESOPHAGEAL OBSTRUCTION DUE TO FOOD IMPACTION: Primary | ICD-10-CM
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
## 2024-04-30 LAB
ALBUMIN SERPL-MCNC: 4.5 G/DL (ref 3.5–5.2)
ALBUMIN/GLOB SERPL: 1.7 G/DL
ALP SERPL-CCNC: 91 U/L (ref 39–117)
ALT SERPL W P-5'-P-CCNC: 18 U/L (ref 1–41)
ANION GAP SERPL CALCULATED.3IONS-SCNC: 13 MMOL/L (ref 5–15)
AST SERPL-CCNC: 28 U/L (ref 1–40)
BASOPHILS # BLD AUTO: 0.05 10*3/MM3 (ref 0–0.2)
BASOPHILS NFR BLD AUTO: 0.6 % (ref 0–1.5)
BILIRUB SERPL-MCNC: 0.2 MG/DL (ref 0–1.2)
BUN SERPL-MCNC: 10 MG/DL (ref 8–23)
BUN/CREAT SERPL: 14.7 (ref 7–25)
CALCIUM SPEC-SCNC: 9.2 MG/DL (ref 8.6–10.5)
CHLORIDE SERPL-SCNC: 103 MMOL/L (ref 98–107)
CO2 SERPL-SCNC: 21 MMOL/L (ref 22–29)
CREAT SERPL-MCNC: 0.68 MG/DL (ref 0.76–1.27)
DEPRECATED RDW RBC AUTO: 54.4 FL (ref 37–54)
EGFRCR SERPLBLD CKD-EPI 2021: 105.1 ML/MIN/1.73
EOSINOPHIL # BLD AUTO: 0.32 10*3/MM3 (ref 0–0.4)
EOSINOPHIL NFR BLD AUTO: 4 % (ref 0.3–6.2)
ERYTHROCYTE [DISTWIDTH] IN BLOOD BY AUTOMATED COUNT: 14.6 % (ref 12.3–15.4)
GLOBULIN UR ELPH-MCNC: 2.6 GM/DL
GLUCOSE SERPL-MCNC: 98 MG/DL (ref 65–99)
HCT VFR BLD AUTO: 40 % (ref 37.5–51)
HGB BLD-MCNC: 13.4 G/DL (ref 13–17.7)
IMM GRANULOCYTES # BLD AUTO: 0.02 10*3/MM3 (ref 0–0.05)
IMM GRANULOCYTES NFR BLD AUTO: 0.3 % (ref 0–0.5)
LYMPHOCYTES # BLD AUTO: 3.04 10*3/MM3 (ref 0.7–3.1)
LYMPHOCYTES NFR BLD AUTO: 38.1 % (ref 19.6–45.3)
MCH RBC QN AUTO: 34 PG (ref 26.6–33)
MCHC RBC AUTO-ENTMCNC: 33.5 G/DL (ref 31.5–35.7)
MCV RBC AUTO: 101.5 FL (ref 79–97)
MONOCYTES # BLD AUTO: 0.55 10*3/MM3 (ref 0.1–0.9)
MONOCYTES NFR BLD AUTO: 6.9 % (ref 5–12)
NEUTROPHILS NFR BLD AUTO: 3.99 10*3/MM3 (ref 1.7–7)
NEUTROPHILS NFR BLD AUTO: 50.1 % (ref 42.7–76)
NRBC BLD AUTO-RTO: 0 /100 WBC (ref 0–0.2)
PLATELET # BLD AUTO: 332 10*3/MM3 (ref 140–450)
PMV BLD AUTO: 9.6 FL (ref 6–12)
POTASSIUM SERPL-SCNC: 4.6 MMOL/L (ref 3.5–5.2)
PROT SERPL-MCNC: 7.1 G/DL (ref 6–8.5)
RBC # BLD AUTO: 3.94 10*6/MM3 (ref 4.14–5.8)
SODIUM SERPL-SCNC: 137 MMOL/L (ref 136–145)
WBC NRBC COR # BLD AUTO: 7.97 10*3/MM3 (ref 3.4–10.8)

## 2024-04-30 PROCEDURE — 25810000003 LACTATED RINGERS SOLUTION: Performed by: EMERGENCY MEDICINE

## 2024-04-30 PROCEDURE — 99283 EMERGENCY DEPT VISIT LOW MDM: CPT

## 2024-04-30 PROCEDURE — 85025 COMPLETE CBC W/AUTO DIFF WBC: CPT | Performed by: EMERGENCY MEDICINE

## 2024-04-30 PROCEDURE — 80053 COMPREHEN METABOLIC PANEL: CPT | Performed by: EMERGENCY MEDICINE

## 2024-04-30 RX ORDER — IBUPROFEN 600 MG/1
1 TABLET ORAL ONCE
Status: DISCONTINUED | OUTPATIENT
Start: 2024-04-30 | End: 2024-05-01 | Stop reason: HOSPADM

## 2024-04-30 RX ADMIN — SODIUM CHLORIDE, POTASSIUM CHLORIDE, SODIUM LACTATE AND CALCIUM CHLORIDE 1000 ML: 600; 310; 30; 20 INJECTION, SOLUTION INTRAVENOUS at 22:17

## 2024-05-01 NOTE — DISCHARGE INSTRUCTIONS
Use extra caution when eating meats and breads as these are the most common foods that can get stuck in the esophagus.  Return to the ER as needed for new or worsening symptoms

## 2024-05-14 ENCOUNTER — TELEPHONE (OUTPATIENT)
Dept: FAMILY MEDICINE CLINIC | Facility: CLINIC | Age: 64
End: 2024-05-14
Payer: COMMERCIAL

## 2024-05-14 DIAGNOSIS — R13.10 DYSPHAGIA, UNSPECIFIED TYPE: Primary | ICD-10-CM

## 2024-05-14 NOTE — TELEPHONE ENCOUNTER
Caller: Ambar Daniels    Relationship: Emergency Contact    Best call back number: [phone]    What is the best time to reach you: ANYTIME    Who are you requesting to speak with (clinical staff, provider,  specific staff member): CLINICAL STAFF    Do you know the name of the person who called: AMBAR DANIELS/ LISTED AS FRIEND ON  VERBAL    What was the call regarding: ISSUES WITH CHOKING. HE HAD HIS ESOPHAGUS STRETCHED BEFORE.  SHOULD HE HAVE THIS PROCEDURE AGAIN.  ALSO IS REQUESTING TO DISCUSS THE LUNG SCREENING THAT WAS SUPPOSE TO BE SCHEDULED     Is it okay if the provider responds through MyChart:

## 2024-05-15 DIAGNOSIS — E55.9 VITAMIN D DEFICIENCY: ICD-10-CM

## 2024-05-16 RX ORDER — ERGOCALCIFEROL 1.25 MG/1
50000 CAPSULE ORAL WEEKLY
Qty: 4 CAPSULE | Refills: 3 | OUTPATIENT
Start: 2024-05-16

## 2024-05-23 ENCOUNTER — HOSPITAL ENCOUNTER (OUTPATIENT)
Dept: GENERAL RADIOLOGY | Facility: HOSPITAL | Age: 64
Discharge: HOME | End: 2024-05-23
Admitting: SURGERY
Payer: COMMERCIAL

## 2024-05-23 DIAGNOSIS — K63.89 MASS OF COLON: ICD-10-CM

## 2024-05-23 PROCEDURE — 74270 X-RAY XM COLON 1CNTRST STD: CPT

## 2024-05-23 RX ADMIN — BARIUM SULFATE 400 ML: 1.05 SUSPENSION ORAL; RECTAL at 10:56

## 2024-05-30 NOTE — ED PROVIDER NOTES
"Subjective   History of Present Illness  Patient presents for evaluation of a suspected esophageal food impaction, was eating pork tonight at dinner and felt to get stuck in his lower neck.  Has a history of similar symptoms and has required esophageal dilation in the past.  Last time he had this problem was about 3 years ago.  He is not having any difficulty breathing.  He is not able to swallow or tolerate his secretions.    History provided by:  Patient      Review of Systems    Past Medical History:   Diagnosis Date    Allergies     Cellulitis of hand     Clotting disorder     \"FREE BLEEDING\"    Colonic mass     COPD (chronic obstructive pulmonary disease)     Cough     Current smoker     Erectile dysfunction     Fracture, foot Sept 7 2022    GERD (gastroesophageal reflux disease)     Hemochromatosis     Hyperlipidemia     Hypertension     Wears dentures     FULL SET       No Known Allergies    Past Surgical History:   Procedure Laterality Date    CHOLECYSTECTOMY N/A 1/17/2024    Procedure: CHOLECYSTECTOMY LAPAROSCOPIC;  Surgeon: Jimmy Craig MD;  Location: [facility] OR;  Service: General;  Laterality: N/A;    COLON RESECTION N/A 1/17/2024    Procedure: OPEN SIGMOID COLECTOMY, LIVER BIOPSY, DIVERTING LOOP ILEOSTOMY CREATION, TAKE DOWN OF THE SPLENIC FLEXURE, AND APPENDECTOMY;  Surgeon: Jimmy Craig MD;  Location:  [facility];  Service: General;  Laterality: N/A;    COLONOSCOPY  2021    ENDOSCOPY  05/2016    pt say's, he was placed under general anesthesia for this    ENDOSCOPY N/A 10/19/2021    Procedure: ESOPHAGOGASTRODUODENOSCOPY;  Surgeon: Osmel Kessler MD;  Location: [facility] ENDOSCOPY;  Service: Gastroenterology;  Laterality: N/A;  24 fr savory dilator used at 1251, 27 fr sdavory used at 1253, 33 Mongolian savoruy used at 1257, 42 fr savory used at 1259      ENDOSCOPY N/A 11/16/2021    Procedure: ESOPHAGOGASTRODUODENOSCOPY WITH DILATATION;  Surgeon: Osmel Kessler MD;  Location: [facility] " ENDOSCOPY;  Service: Gastroenterology;  Laterality: N/A;  Dilation with 48fr savery    EXPLORATORY LAPAROTOMY N/A 1/30/2024    Procedure: LAPAROTOMY EXPLORATORY;  Surgeon: Jimmy Craig MD;  Location: [facility] OR;  Service: General;  Laterality: N/A;    HAND SURGERY  2022       Family History   Problem Relation Age of Onset    No Known Problems Mother     No Known Problems Father     Colon cancer Neg Hx     Colon polyps Neg Hx     Esophageal cancer Neg Hx        Social History     Socioeconomic History    Marital status: Significant Other   Tobacco Use    Smoking status: Every Day     Current packs/day: 0.25     Average packs/day: 0.3 packs/day for 15.0 years (3.8 ttl pk-yrs)     Types: Cigarettes    Smokeless tobacco: Never    Tobacco comments:     max 2ppd  now 1/4 ppd    Vaping Use    Vaping status: Never Used   Substance and Sexual Activity    Alcohol use: Yes     Alcohol/week: 23.0 standard drinks of alcohol     Types: 2 Glasses of wine, 21 Cans of beer per week    Drug use: Never    Sexual activity: Defer           Objective   Physical Exam  Constitutional:       General: He is in acute distress.      Comments: Hunched over an emesis bag drooling   HENT:      Head: Normocephalic and atraumatic.   Eyes:      Conjunctiva/sclera: Conjunctivae normal.      Pupils: Pupils are equal, round, and reactive to light.   Cardiovascular:      Rate and Rhythm: Normal rate and regular rhythm.      Pulses: Normal pulses.      Heart sounds: No murmur heard.     No gallop.   Pulmonary:      Effort: Pulmonary effort is normal. No respiratory distress.   Abdominal:      General: Abdomen is flat. There is no distension.      Tenderness: There is no abdominal tenderness.   Musculoskeletal:         General: No swelling or deformity. Normal range of motion.   Skin:     General: Skin is warm and dry.      Capillary Refill: Capillary refill takes less than 2 seconds.   Neurological:      General: No focal deficit present.       Mental Status: He is alert and oriented to person, place, and time.   Psychiatric:         Mood and Affect: Mood normal.         Behavior: Behavior normal.         Procedures           ED Course                                             Medical Decision Making  Patient signs and symptoms are most consistent with an upper esophageal food impaction.  He does not have symptoms or signs concerning for a tracheal or aspirated foreign body.  Will attempt esophageal relaxation with 1 mg IV glucagon.  Basic labs were obtained.    Patient had successful passage of food bolus in the emergency department.  He is able to tolerate oral intake and his secretions without difficulty.  He was discharged in good condition    Problems Addressed:  Esophageal obstruction due to food impaction: complicated acute illness or injury    Amount and/or Complexity of Data Reviewed  Independent Historian: spouse     Details: Patient's spouse provides some of the history  External Data Reviewed: notes.     Details: 2/22/2024 reviewed most recent primary care visit documenting patient's chronic medical conditions  Labs: ordered. Decision-making details documented in ED Course.    Risk  Prescription drug management.  Decision regarding hospitalization.        Final diagnoses:   Esophageal obstruction due to food impaction       ED Disposition  ED Disposition       ED Disposition   Discharge    Condition   Stable    Comment   --             Recent Results (from the past 24 hour(s))   Comprehensive Metabolic Panel    Collection Time: 04/30/24 10:17 PM    Specimen: Blood   Result Value Ref Range    Glucose 98 65 - 99 mg/dL    BUN 10 8 - 23 mg/dL    Creatinine 0.68 (L) 0.76 - 1.27 mg/dL    Sodium 137 136 - 145 mmol/L    Potassium 4.6 3.5 - 5.2 mmol/L    Chloride 103 98 - 107 mmol/L    CO2 21.0 (L) 22.0 - 29.0 mmol/L    Calcium 9.2 8.6 - 10.5 mg/dL    Total Protein 7.1 6.0 - 8.5 g/dL    Albumin 4.5 3.5 - 5.2 g/dL    ALT (SGPT) 18 1 - 41 U/L    AST  "(SGOT) 28 1 - 40 U/L    Alkaline Phosphatase 91 39 - 117 U/L    Total Bilirubin 0.2 0.0 - 1.2 mg/dL    Globulin 2.6 gm/dL    A/G Ratio 1.7 g/dL    BUN/Creatinine Ratio 14.7 7.0 - 25.0    Anion Gap 13.0 5.0 - 15.0 mmol/L    eGFR 105.1 >60.0 mL/min/1.73   CBC Auto Differential    Collection Time: 04/30/24 10:17 PM    Specimen: Blood   Result Value Ref Range    WBC 7.97 3.40 - 10.80 10*3/mm3    RBC 3.94 (L) 4.14 - 5.80 10*6/mm3    Hemoglobin 13.4 13.0 - 17.7 g/dL    Hematocrit 40.0 37.5 - 51.0 %    .5 (H) 79.0 - 97.0 fL    MCH 34.0 (H) 26.6 - 33.0 pg    MCHC 33.5 31.5 - 35.7 g/dL    RDW 14.6 12.3 - 15.4 %    RDW-SD 54.4 (H) 37.0 - 54.0 fl    MPV 9.6 6.0 - 12.0 fL    Platelets 332 140 - 450 10*3/mm3    Neutrophil % 50.1 42.7 - 76.0 %    Lymphocyte % 38.1 19.6 - 45.3 %    Monocyte % 6.9 5.0 - 12.0 %    Eosinophil % 4.0 0.3 - 6.2 %    Basophil % 0.6 0.0 - 1.5 %    Immature Grans % 0.3 0.0 - 0.5 %    Neutrophils, Absolute 3.99 1.70 - 7.00 10*3/mm3    Lymphocytes, Absolute 3.04 0.70 - 3.10 10*3/mm3    Monocytes, Absolute 0.55 0.10 - 0.90 10*3/mm3    Eosinophils, Absolute 0.32 0.00 - 0.40 10*3/mm3    Basophils, Absolute 0.05 0.00 - 0.20 10*3/mm3    Immature Grans, Absolute 0.02 0.00 - 0.05 10*3/mm3    nRBC 0.0 0.0 - 0.2 /100 WBC     Note: In addition to lab results from this visit, the labs listed above may include labs taken at another facility or during a different encounter within the last 24 hours. Please correlate lab times with ED admission and discharge times for further clarification of the services performed during this visit.    No orders to display     Vitals:    04/30/24 2154   BP: 111/85   BP Location: Right arm   Patient Position: Sitting   Pulse: 88   Resp: 16   Temp: 97.6 °F (36.4 °C)   TempSrc: Oral   SpO2: 98%   Weight: 56 kg (123 lb 7.3 oz)   Height: 175.3 cm (69\")     Medications   lactated ringers bolus 1,000 mL (0 mL Intravenous Stopped 4/30/24 2312)     ECG/EMG Results (last 24 hours)       ** " No results found for the last 24 hours. **          No orders to display           No follow-up provider specified.       Medication List      No changes were made to your prescriptions during this visit.            Troy Huntley MD  05/01/24 0138     - Continue Gabapentin 300 mg qd for neuropathic pain

## 2024-06-01 ENCOUNTER — HOSPITAL ENCOUNTER (OUTPATIENT)
Dept: CT IMAGING | Facility: HOSPITAL | Age: 64
Discharge: HOME | End: 2024-06-01
Admitting: FAMILY MEDICINE
Payer: COMMERCIAL

## 2024-06-01 DIAGNOSIS — R94.2 ABNORMAL PET SCAN OF LUNG: ICD-10-CM

## 2024-06-01 DIAGNOSIS — R91.1 LUNG NODULE SEEN ON IMAGING STUDY: ICD-10-CM

## 2024-06-01 PROCEDURE — 71250 CT THORAX DX C-: CPT

## 2024-06-07 ENCOUNTER — OUTSIDE FACILITY SERVICE (OUTPATIENT)
Dept: GASTROENTEROLOGY | Facility: CLINIC | Age: 64
End: 2024-06-07
Payer: COMMERCIAL

## 2024-06-07 PROCEDURE — 43239 EGD BIOPSY SINGLE/MULTIPLE: CPT | Performed by: INTERNAL MEDICINE

## 2024-06-07 PROCEDURE — 43248 EGD GUIDE WIRE INSERTION: CPT | Performed by: INTERNAL MEDICINE

## 2024-06-07 PROCEDURE — 88305 TISSUE EXAM BY PATHOLOGIST: CPT | Performed by: INTERNAL MEDICINE

## 2024-06-10 ENCOUNTER — LAB REQUISITION (OUTPATIENT)
Dept: LAB | Facility: HOSPITAL | Age: 64
End: 2024-06-10
Payer: COMMERCIAL

## 2024-06-10 DIAGNOSIS — K22.2 ESOPHAGEAL OBSTRUCTION: ICD-10-CM

## 2024-06-10 DIAGNOSIS — R13.10 DYSPHAGIA, UNSPECIFIED: ICD-10-CM

## 2024-06-10 DIAGNOSIS — K44.9 DIAPHRAGMATIC HERNIA WITHOUT OBSTRUCTION OR GANGRENE: ICD-10-CM

## 2024-06-10 DIAGNOSIS — K31.89 OTHER DISEASES OF STOMACH AND DUODENUM: ICD-10-CM

## 2024-06-11 DIAGNOSIS — J30.89 NON-SEASONAL ALLERGIC RHINITIS, UNSPECIFIED TRIGGER: ICD-10-CM

## 2024-06-11 LAB — REF LAB TEST METHOD: NORMAL

## 2024-06-11 RX ORDER — LEVOCETIRIZINE DIHYDROCHLORIDE 5 MG/1
5 TABLET, FILM COATED ORAL EVERY EVENING
Qty: 90 TABLET | Refills: 1 | Status: SHIPPED | OUTPATIENT
Start: 2024-06-11

## 2024-06-18 DIAGNOSIS — R91.1 LUNG NODULE SEEN ON IMAGING STUDY: Primary | ICD-10-CM

## 2024-06-21 ENCOUNTER — PRE-ADMISSION TESTING (OUTPATIENT)
Dept: PREADMISSION TESTING | Facility: HOSPITAL | Age: 64
End: 2024-06-21
Payer: COMMERCIAL

## 2024-06-21 VITALS — HEIGHT: 69 IN | WEIGHT: 154.54 LBS | BODY MASS INDEX: 22.89 KG/M2

## 2024-06-21 LAB
ANION GAP SERPL CALCULATED.3IONS-SCNC: 9 MMOL/L (ref 5–15)
BUN SERPL-MCNC: 13 MG/DL (ref 8–23)
BUN/CREAT SERPL: 12.4 (ref 7–25)
CALCIUM SPEC-SCNC: 9.2 MG/DL (ref 8.6–10.5)
CHLORIDE SERPL-SCNC: 108 MMOL/L (ref 98–107)
CO2 SERPL-SCNC: 25 MMOL/L (ref 22–29)
CREAT SERPL-MCNC: 1.05 MG/DL (ref 0.76–1.27)
DEPRECATED RDW RBC AUTO: 53.4 FL (ref 37–54)
EGFRCR SERPLBLD CKD-EPI 2021: 80.3 ML/MIN/1.73
ERYTHROCYTE [DISTWIDTH] IN BLOOD BY AUTOMATED COUNT: 14.3 % (ref 12.3–15.4)
GLUCOSE SERPL-MCNC: 104 MG/DL (ref 65–99)
HBA1C MFR BLD: 5.6 % (ref 4.8–5.6)
HCT VFR BLD AUTO: 39.3 % (ref 37.5–51)
HGB BLD-MCNC: 13 G/DL (ref 13–17.7)
MCH RBC QN AUTO: 33.6 PG (ref 26.6–33)
MCHC RBC AUTO-ENTMCNC: 33.1 G/DL (ref 31.5–35.7)
MCV RBC AUTO: 101.6 FL (ref 79–97)
PLATELET # BLD AUTO: 291 10*3/MM3 (ref 140–450)
PMV BLD AUTO: 9.6 FL (ref 6–12)
POTASSIUM SERPL-SCNC: 5.2 MMOL/L (ref 3.5–5.2)
RBC # BLD AUTO: 3.87 10*6/MM3 (ref 4.14–5.8)
SODIUM SERPL-SCNC: 142 MMOL/L (ref 136–145)
WBC NRBC COR # BLD AUTO: 8.6 10*3/MM3 (ref 3.4–10.8)

## 2024-06-21 PROCEDURE — 36415 COLL VENOUS BLD VENIPUNCTURE: CPT

## 2024-06-21 PROCEDURE — 83036 HEMOGLOBIN GLYCOSYLATED A1C: CPT

## 2024-06-21 PROCEDURE — 85027 COMPLETE CBC AUTOMATED: CPT

## 2024-06-21 PROCEDURE — 80048 BASIC METABOLIC PNL TOTAL CA: CPT

## 2024-06-21 NOTE — PAT
Patient did not review general PAT education video as instructed in their preoperative information received from their surgeon.  One-on-one Pre Admission Testing general education provided during PAT visit.  Copies of PAT general education handouts (Incentive Spirometry, Meds to Beds Program, Patient Belongings, Pre-op skin preparation instructions, Blood Glucose testing, Visitor policy, Surgery FAQ, Code H) distributed to patient. Encouraged patient/family to read PAT general education handouts thoroughly and notify PAT staff with any questions or concerns. Patient instructed to bring PAT pass and completed skin prep sheet (if applicable) on the day of procedure. Patient verbalized understanding of all information and priority content.     Patient to apply Chlorhexadine wipes  to surgical area (as instructed) the night before procedure and the AM of procedure. Wipes provided.    Patient instructed to drink 20 ounces of Gatorade or Gatorlyte (if diabetic) and it needs to be completed 1 hour (for Main OR patients) or 2 hours (scheduled  section & [facility] patients) before given arrival time for procedure (NO RED Gatorade and NO Gatorade Zero).    Patient verbalized understanding.    EKG from 1- cleared by Dr. Ruiz.

## 2024-06-28 ENCOUNTER — ANESTHESIA (OUTPATIENT)
Dept: PERIOP | Facility: HOSPITAL | Age: 64
End: 2024-06-28
Payer: COMMERCIAL

## 2024-06-28 ENCOUNTER — HOSPITAL ENCOUNTER (INPATIENT)
Facility: HOSPITAL | Age: 64
LOS: 4 days | Discharge: HOME | End: 2024-07-02
Attending: SURGERY | Admitting: SURGERY
Payer: COMMERCIAL

## 2024-06-28 ENCOUNTER — ANESTHESIA EVENT (OUTPATIENT)
Dept: PERIOP | Facility: HOSPITAL | Age: 64
End: 2024-06-28
Payer: COMMERCIAL

## 2024-06-28 DIAGNOSIS — K63.5 COLON POLYP: ICD-10-CM

## 2024-06-28 PROBLEM — Z93.2 ILEOSTOMY PRESENT: Status: ACTIVE | Noted: 2024-06-28

## 2024-06-28 PROCEDURE — 25010000002 SUGAMMADEX 200 MG/2ML SOLUTION: Performed by: NURSE ANESTHETIST, CERTIFIED REGISTERED

## 2024-06-28 PROCEDURE — P9041 ALBUMIN (HUMAN),5%, 50ML: HCPCS | Performed by: NURSE ANESTHETIST, CERTIFIED REGISTERED

## 2024-06-28 PROCEDURE — 25010000002 ALBUMIN HUMAN 5% PER 50 ML: Performed by: NURSE ANESTHETIST, CERTIFIED REGISTERED

## 2024-06-28 PROCEDURE — 88304 TISSUE EXAM BY PATHOLOGIST: CPT | Performed by: SURGERY

## 2024-06-28 PROCEDURE — 25010000002 PROPOFOL 10 MG/ML EMULSION: Performed by: NURSE ANESTHETIST, CERTIFIED REGISTERED

## 2024-06-28 PROCEDURE — 25810000003 LACTATED RINGERS PER 1000 ML: Performed by: ANESTHESIOLOGY

## 2024-06-28 PROCEDURE — 25010000002 MIDAZOLAM PER 1 MG: Performed by: ANESTHESIOLOGY

## 2024-06-28 PROCEDURE — 25010000002 HYDRALAZINE PER 20 MG

## 2024-06-28 PROCEDURE — 25010000002 HYDRALAZINE PER 20 MG: Performed by: NURSE ANESTHETIST, CERTIFIED REGISTERED

## 2024-06-28 PROCEDURE — 0DBB0ZZ EXCISION OF ILEUM, OPEN APPROACH: ICD-10-PCS | Performed by: SURGERY

## 2024-06-28 PROCEDURE — 25010000002 FENTANYL CITRATE (PF) 100 MCG/2ML SOLUTION: Performed by: NURSE ANESTHETIST, CERTIFIED REGISTERED

## 2024-06-28 PROCEDURE — 25010000002 ONDANSETRON PER 1 MG: Performed by: NURSE ANESTHETIST, CERTIFIED REGISTERED

## 2024-06-28 PROCEDURE — 25010000002 DEXAMETHASONE PER 1 MG: Performed by: NURSE ANESTHETIST, CERTIFIED REGISTERED

## 2024-06-28 PROCEDURE — 25810000003 LACTATED RINGERS PER 1000 ML: Performed by: SURGERY

## 2024-06-28 PROCEDURE — 25010000002 CEFAZOLIN PER 500 MG: Performed by: SURGERY

## 2024-06-28 PROCEDURE — 94640 AIRWAY INHALATION TREATMENT: CPT

## 2024-06-28 DEVICE — PROXIMATE LINEAR CUTTER RELOAD, BLUE, 75MM
Type: IMPLANTABLE DEVICE | Site: ABDOMEN | Status: FUNCTIONAL
Brand: PROXIMATE

## 2024-06-28 DEVICE — PROXIMATE RELOADABLE LINEAR CUTTER WITH SAFETY LOCK-OUT, 75MM
Type: IMPLANTABLE DEVICE | Site: ABDOMEN | Status: FUNCTIONAL
Brand: PROXIMATE

## 2024-06-28 DEVICE — PROXIMATE RELOADABLE LINEAR STAPLER
Type: IMPLANTABLE DEVICE | Site: ABDOMEN | Status: FUNCTIONAL
Brand: PROXIMATE

## 2024-06-28 RX ORDER — DROPERIDOL 2.5 MG/ML
0.62 INJECTION, SOLUTION INTRAMUSCULAR; INTRAVENOUS ONCE AS NEEDED
Status: DISCONTINUED | OUTPATIENT
Start: 2024-06-28 | End: 2024-06-28 | Stop reason: HOSPADM

## 2024-06-28 RX ORDER — FENTANYL CITRATE 50 UG/ML
INJECTION, SOLUTION INTRAMUSCULAR; INTRAVENOUS AS NEEDED
Status: DISCONTINUED | OUTPATIENT
Start: 2024-06-28 | End: 2024-06-28 | Stop reason: SDUPTHER

## 2024-06-28 RX ORDER — FENTANYL CITRATE 50 UG/ML
50 INJECTION, SOLUTION INTRAMUSCULAR; INTRAVENOUS
Status: DISCONTINUED | OUTPATIENT
Start: 2024-06-28 | End: 2024-06-28 | Stop reason: HOSPADM

## 2024-06-28 RX ORDER — ACETAMINOPHEN 500 MG
1000 TABLET ORAL EVERY 6 HOURS
Status: DISCONTINUED | OUTPATIENT
Start: 2024-06-28 | End: 2024-06-29

## 2024-06-28 RX ORDER — SODIUM CHLORIDE 0.9 % (FLUSH) 0.9 %
10 SYRINGE (ML) INJECTION EVERY 12 HOURS SCHEDULED
Status: DISCONTINUED | OUTPATIENT
Start: 2024-06-28 | End: 2024-07-02 | Stop reason: HOSPADM

## 2024-06-28 RX ORDER — HYDROMORPHONE HCL/0.9% NACL/PF 10 MG/50ML
PATIENT CONTROLLED ANALGESIA SYRINGE INTRAVENOUS CONTINUOUS
Status: DISCONTINUED | OUTPATIENT
Start: 2024-06-28 | End: 2024-06-30

## 2024-06-28 RX ORDER — HYDRALAZINE HYDROCHLORIDE 20 MG/ML
INJECTION INTRAMUSCULAR; INTRAVENOUS
Status: COMPLETED
Start: 2024-06-28 | End: 2024-06-28

## 2024-06-28 RX ORDER — METRONIDAZOLE 500 MG/100ML
500 INJECTION, SOLUTION INTRAVENOUS ONCE
Status: COMPLETED | OUTPATIENT
Start: 2024-06-28 | End: 2024-06-28

## 2024-06-28 RX ORDER — LABETALOL HYDROCHLORIDE 5 MG/ML
INJECTION, SOLUTION INTRAVENOUS
Status: DISCONTINUED
Start: 2024-06-28 | End: 2024-06-28 | Stop reason: WASHOUT

## 2024-06-28 RX ORDER — ALBUTEROL SULFATE 90 UG/1
2 AEROSOL, METERED RESPIRATORY (INHALATION) EVERY 4 HOURS PRN
Status: DISCONTINUED | OUTPATIENT
Start: 2024-06-28 | End: 2024-07-02 | Stop reason: HOSPADM

## 2024-06-28 RX ORDER — SODIUM CHLORIDE, SODIUM LACTATE, POTASSIUM CHLORIDE, CALCIUM CHLORIDE 600; 310; 30; 20 MG/100ML; MG/100ML; MG/100ML; MG/100ML
9 INJECTION, SOLUTION INTRAVENOUS CONTINUOUS
Status: DISCONTINUED | OUTPATIENT
Start: 2024-06-28 | End: 2024-06-30

## 2024-06-28 RX ORDER — HYDRALAZINE HYDROCHLORIDE 20 MG/ML
5 INJECTION INTRAMUSCULAR; INTRAVENOUS
Status: DISCONTINUED | OUTPATIENT
Start: 2024-06-28 | End: 2024-06-28 | Stop reason: HOSPADM

## 2024-06-28 RX ORDER — HYDRALAZINE HYDROCHLORIDE 20 MG/ML
10 INJECTION INTRAMUSCULAR; INTRAVENOUS ONCE
Status: CANCELLED | OUTPATIENT
Start: 2024-06-28

## 2024-06-28 RX ORDER — ROCURONIUM BROMIDE 10 MG/ML
INJECTION, SOLUTION INTRAVENOUS AS NEEDED
Status: DISCONTINUED | OUTPATIENT
Start: 2024-06-28 | End: 2024-06-28 | Stop reason: SURG

## 2024-06-28 RX ORDER — BUPIVACAINE HYDROCHLORIDE AND EPINEPHRINE 5; 5 MG/ML; UG/ML
INJECTION, SOLUTION PERINEURAL AS NEEDED
Status: DISCONTINUED | OUTPATIENT
Start: 2024-06-28 | End: 2024-06-28 | Stop reason: HOSPADM

## 2024-06-28 RX ORDER — LABETALOL HYDROCHLORIDE 5 MG/ML
5 INJECTION, SOLUTION INTRAVENOUS
Status: DISCONTINUED | OUTPATIENT
Start: 2024-06-28 | End: 2024-06-28 | Stop reason: HOSPADM

## 2024-06-28 RX ORDER — ONDANSETRON 2 MG/ML
INJECTION INTRAMUSCULAR; INTRAVENOUS AS NEEDED
Status: DISCONTINUED | OUTPATIENT
Start: 2024-06-28 | End: 2024-06-28 | Stop reason: SURG

## 2024-06-28 RX ORDER — MELOXICAM 15 MG/1
15 TABLET ORAL ONCE
Status: COMPLETED | OUTPATIENT
Start: 2024-06-28 | End: 2024-06-28

## 2024-06-28 RX ORDER — HYDROMORPHONE HYDROCHLORIDE 1 MG/ML
0.5 INJECTION, SOLUTION INTRAMUSCULAR; INTRAVENOUS; SUBCUTANEOUS
Status: DISCONTINUED | OUTPATIENT
Start: 2024-06-28 | End: 2024-06-28 | Stop reason: HOSPADM

## 2024-06-28 RX ORDER — PREGABALIN 75 MG/1
75 CAPSULE ORAL ONCE
Status: COMPLETED | OUTPATIENT
Start: 2024-06-28 | End: 2024-06-28

## 2024-06-28 RX ORDER — MIDAZOLAM HYDROCHLORIDE 1 MG/ML
1 INJECTION INTRAMUSCULAR; INTRAVENOUS
Status: DISCONTINUED | OUTPATIENT
Start: 2024-06-28 | End: 2024-06-28 | Stop reason: HOSPADM

## 2024-06-28 RX ORDER — SODIUM CHLORIDE 0.9 % (FLUSH) 0.9 %
10 SYRINGE (ML) INJECTION EVERY 12 HOURS SCHEDULED
Status: CANCELLED | OUTPATIENT
Start: 2024-06-28

## 2024-06-28 RX ORDER — BUDESONIDE AND FORMOTEROL FUMARATE DIHYDRATE 160; 4.5 UG/1; UG/1
2 AEROSOL RESPIRATORY (INHALATION)
Status: DISCONTINUED | OUTPATIENT
Start: 2024-06-28 | End: 2024-07-02 | Stop reason: HOSPADM

## 2024-06-28 RX ORDER — NALOXONE HCL 0.4 MG/ML
0.1 VIAL (ML) INJECTION
Status: DISCONTINUED | OUTPATIENT
Start: 2024-06-28 | End: 2024-07-02 | Stop reason: HOSPADM

## 2024-06-28 RX ORDER — ALVIMOPAN 12 MG/1
12 CAPSULE ORAL ONCE
Status: COMPLETED | OUTPATIENT
Start: 2024-06-28 | End: 2024-06-28

## 2024-06-28 RX ORDER — PROPOFOL 10 MG/ML
VIAL (ML) INTRAVENOUS AS NEEDED
Status: DISCONTINUED | OUTPATIENT
Start: 2024-06-28 | End: 2024-06-28 | Stop reason: SURG

## 2024-06-28 RX ORDER — ALVIMOPAN 12 MG/1
12 CAPSULE ORAL 2 TIMES DAILY
Status: DISCONTINUED | OUTPATIENT
Start: 2024-06-29 | End: 2024-06-29

## 2024-06-28 RX ORDER — SODIUM CHLORIDE 0.9 % (FLUSH) 0.9 %
10 SYRINGE (ML) INJECTION AS NEEDED
Status: DISCONTINUED | OUTPATIENT
Start: 2024-06-28 | End: 2024-06-28 | Stop reason: HOSPADM

## 2024-06-28 RX ORDER — SCOLOPAMINE TRANSDERMAL SYSTEM 1 MG/1
1 PATCH, EXTENDED RELEASE TRANSDERMAL ONCE
Status: DISCONTINUED | OUTPATIENT
Start: 2024-06-28 | End: 2024-06-28

## 2024-06-28 RX ORDER — LIDOCAINE HYDROCHLORIDE 10 MG/ML
INJECTION, SOLUTION EPIDURAL; INFILTRATION; INTRACAUDAL; PERINEURAL AS NEEDED
Status: DISCONTINUED | OUTPATIENT
Start: 2024-06-28 | End: 2024-06-28 | Stop reason: SURG

## 2024-06-28 RX ORDER — FAMOTIDINE 20 MG/1
20 TABLET, FILM COATED ORAL ONCE
Status: COMPLETED | OUTPATIENT
Start: 2024-06-28 | End: 2024-06-28

## 2024-06-28 RX ORDER — ONDANSETRON 2 MG/ML
4 INJECTION INTRAMUSCULAR; INTRAVENOUS EVERY 6 HOURS PRN
Status: DISCONTINUED | OUTPATIENT
Start: 2024-06-28 | End: 2024-07-02 | Stop reason: HOSPADM

## 2024-06-28 RX ORDER — ROSUVASTATIN CALCIUM 10 MG/1
10 TABLET, COATED ORAL DAILY
Status: DISCONTINUED | OUTPATIENT
Start: 2024-06-29 | End: 2024-07-02 | Stop reason: HOSPADM

## 2024-06-28 RX ORDER — HYDROMORPHONE HCL/0.9% NACL/PF 10 MG/50ML
PATIENT CONTROLLED ANALGESIA SYRINGE INTRAVENOUS
Status: COMPLETED
Start: 2024-06-28 | End: 2024-06-28

## 2024-06-28 RX ORDER — DIPHENHYDRAMINE HYDROCHLORIDE 50 MG/ML
25 INJECTION INTRAMUSCULAR; INTRAVENOUS EVERY 6 HOURS PRN
Status: DISCONTINUED | OUTPATIENT
Start: 2024-06-28 | End: 2024-07-02 | Stop reason: HOSPADM

## 2024-06-28 RX ORDER — AMOXICILLIN 250 MG
2 CAPSULE ORAL NIGHTLY
Status: DISCONTINUED | OUTPATIENT
Start: 2024-06-28 | End: 2024-07-02 | Stop reason: HOSPADM

## 2024-06-28 RX ORDER — DEXAMETHASONE SODIUM PHOSPHATE 4 MG/ML
INJECTION, SOLUTION INTRA-ARTICULAR; INTRALESIONAL; INTRAMUSCULAR; INTRAVENOUS; SOFT TISSUE AS NEEDED
Status: DISCONTINUED | OUTPATIENT
Start: 2024-06-28 | End: 2024-06-28 | Stop reason: SURG

## 2024-06-28 RX ORDER — ACETAMINOPHEN 500 MG
1000 TABLET ORAL ONCE
Status: COMPLETED | OUTPATIENT
Start: 2024-06-28 | End: 2024-06-28

## 2024-06-28 RX ORDER — ALBUMIN, HUMAN INJ 5% 5 %
SOLUTION INTRAVENOUS CONTINUOUS PRN
Status: DISCONTINUED | OUTPATIENT
Start: 2024-06-28 | End: 2024-06-28 | Stop reason: SURG

## 2024-06-28 RX ORDER — SODIUM CHLORIDE 9 MG/ML
40 INJECTION, SOLUTION INTRAVENOUS AS NEEDED
Status: DISCONTINUED | OUTPATIENT
Start: 2024-06-28 | End: 2024-06-28 | Stop reason: HOSPADM

## 2024-06-28 RX ORDER — HEPARIN SODIUM 5000 [USP'U]/ML
5000 INJECTION, SOLUTION INTRAVENOUS; SUBCUTANEOUS EVERY 8 HOURS SCHEDULED
Status: DISCONTINUED | OUTPATIENT
Start: 2024-06-29 | End: 2024-07-02 | Stop reason: HOSPADM

## 2024-06-28 RX ORDER — BUPROPION HYDROCHLORIDE 150 MG/1
150 TABLET ORAL EVERY MORNING
Status: DISCONTINUED | OUTPATIENT
Start: 2024-06-29 | End: 2024-07-02 | Stop reason: HOSPADM

## 2024-06-28 RX ORDER — SODIUM CHLORIDE, SODIUM LACTATE, POTASSIUM CHLORIDE, CALCIUM CHLORIDE 600; 310; 30; 20 MG/100ML; MG/100ML; MG/100ML; MG/100ML
50 INJECTION, SOLUTION INTRAVENOUS CONTINUOUS
Status: DISCONTINUED | OUTPATIENT
Start: 2024-06-28 | End: 2024-06-30

## 2024-06-28 RX ORDER — SODIUM CHLORIDE 0.9 % (FLUSH) 0.9 %
10 SYRINGE (ML) INJECTION AS NEEDED
Status: DISCONTINUED | OUTPATIENT
Start: 2024-06-28 | End: 2024-07-02 | Stop reason: HOSPADM

## 2024-06-28 RX ORDER — SODIUM CHLORIDE 9 MG/ML
40 INJECTION, SOLUTION INTRAVENOUS AS NEEDED
Status: DISCONTINUED | OUTPATIENT
Start: 2024-06-28 | End: 2024-07-02 | Stop reason: HOSPADM

## 2024-06-28 RX ORDER — LABETALOL HYDROCHLORIDE 5 MG/ML
10 INJECTION, SOLUTION INTRAVENOUS ONCE
Status: CANCELLED | OUTPATIENT
Start: 2024-06-28 | End: 2024-06-28

## 2024-06-28 RX ORDER — LIDOCAINE HYDROCHLORIDE 10 MG/ML
0.5 INJECTION, SOLUTION EPIDURAL; INFILTRATION; INTRACAUDAL; PERINEURAL ONCE AS NEEDED
Status: COMPLETED | OUTPATIENT
Start: 2024-06-28 | End: 2024-06-28

## 2024-06-28 RX ORDER — FAMOTIDINE 10 MG/ML
20 INJECTION, SOLUTION INTRAVENOUS ONCE
Status: CANCELLED | OUTPATIENT
Start: 2024-06-28 | End: 2024-06-28

## 2024-06-28 RX ADMIN — PREGABALIN 75 MG: 75 CAPSULE ORAL at 10:37

## 2024-06-28 RX ADMIN — SODIUM CHLORIDE, POTASSIUM CHLORIDE, SODIUM LACTATE AND CALCIUM CHLORIDE 9 ML/HR: 600; 310; 30; 20 INJECTION, SOLUTION INTRAVENOUS at 10:38

## 2024-06-28 RX ADMIN — SODIUM CHLORIDE, POTASSIUM CHLORIDE, SODIUM LACTATE AND CALCIUM CHLORIDE 100 ML/HR: 600; 310; 30; 20 INJECTION, SOLUTION INTRAVENOUS at 14:01

## 2024-06-28 RX ADMIN — PROPOFOL 160 MG: 10 INJECTION, EMULSION INTRAVENOUS at 11:43

## 2024-06-28 RX ADMIN — METRONIDAZOLE 500 MG: 500 INJECTION, SOLUTION INTRAVENOUS at 11:31

## 2024-06-28 RX ADMIN — DEXAMETHASONE SODIUM PHOSPHATE 4 MG: 4 INJECTION INTRA-ARTICULAR; INTRALESIONAL; INTRAMUSCULAR; INTRAVENOUS; SOFT TISSUE at 12:34

## 2024-06-28 RX ADMIN — SCOPOLAMINE 1 PATCH: 1.5 PATCH, EXTENDED RELEASE TRANSDERMAL at 10:36

## 2024-06-28 RX ADMIN — Medication: at 14:27

## 2024-06-28 RX ADMIN — ALVIMOPAN 12 MG: 12 CAPSULE ORAL at 10:37

## 2024-06-28 RX ADMIN — LIDOCAINE HYDROCHLORIDE 100 MG: 10 INJECTION, SOLUTION EPIDURAL; INFILTRATION; INTRACAUDAL; PERINEURAL at 11:42

## 2024-06-28 RX ADMIN — ACETAMINOPHEN 1000 MG: 500 TABLET, FILM COATED ORAL at 16:42

## 2024-06-28 RX ADMIN — LIDOCAINE HYDROCHLORIDE 0.5 ML: 10 INJECTION, SOLUTION EPIDURAL; INFILTRATION; INTRACAUDAL; PERINEURAL at 10:36

## 2024-06-28 RX ADMIN — ONDANSETRON 4 MG: 2 INJECTION INTRAMUSCULAR; INTRAVENOUS at 12:34

## 2024-06-28 RX ADMIN — ROCURONIUM BROMIDE 20 MG: 10 INJECTION INTRAVENOUS at 12:41

## 2024-06-28 RX ADMIN — FENTANYL CITRATE 100 MCG: 50 INJECTION, SOLUTION INTRAMUSCULAR; INTRAVENOUS at 12:42

## 2024-06-28 RX ADMIN — FAMOTIDINE 20 MG: 20 TABLET, FILM COATED ORAL at 10:37

## 2024-06-28 RX ADMIN — ROCURONIUM BROMIDE 50 MG: 10 INJECTION INTRAVENOUS at 11:44

## 2024-06-28 RX ADMIN — FENTANYL CITRATE 100 MCG: 50 INJECTION, SOLUTION INTRAMUSCULAR; INTRAVENOUS at 11:40

## 2024-06-28 RX ADMIN — MIDAZOLAM HYDROCHLORIDE 2 MG: 1 INJECTION, SOLUTION INTRAMUSCULAR; INTRAVENOUS at 11:31

## 2024-06-28 RX ADMIN — HYDRALAZINE HYDROCHLORIDE 5 MG: 20 INJECTION INTRAMUSCULAR; INTRAVENOUS at 13:58

## 2024-06-28 RX ADMIN — MELOXICAM 15 MG: 15 TABLET ORAL at 10:37

## 2024-06-28 RX ADMIN — HYDRALAZINE HYDROCHLORIDE 5 MG: 20 INJECTION INTRAMUSCULAR; INTRAVENOUS at 13:36

## 2024-06-28 RX ADMIN — ACETAMINOPHEN 1000 MG: 500 TABLET ORAL at 10:36

## 2024-06-28 RX ADMIN — SUGAMMADEX 200 MG: 100 INJECTION, SOLUTION INTRAVENOUS at 13:10

## 2024-06-28 RX ADMIN — SODIUM CHLORIDE 2 G: 900 INJECTION INTRAVENOUS at 11:23

## 2024-06-28 RX ADMIN — BUDESONIDE AND FORMOTEROL FUMARATE DIHYDRATE 2 PUFF: 160; 4.5 AEROSOL RESPIRATORY (INHALATION) at 19:57

## 2024-06-28 RX ADMIN — ALBUMIN (HUMAN): 12.5 INJECTION, SOLUTION INTRAVENOUS at 11:56

## 2024-06-28 NOTE — PLAN OF CARE
Goal Outcome Evaluation:  Plan of Care Reviewed With: patient, friend        Progress: no change  Outcome Evaluation: Patient transferred to unit. VSS on RA, EtCO2 in place with no O2 flow for capnography monitoring. Pain managed with scheduled acetaminophen and hydromorphone PCA. Adequate UOP. Abdominal dressing is clean, dry, intact; patient's abdomen very tender with slightest palpation. Tolerating clear liquid diet; denies nausea. No prns administered. Friend at bedside, attentive to patient. Patient is alert & oriented x4. Care ongoing, continue plan of care.

## 2024-06-28 NOTE — H&P
"  Pre-Op H&P  Roger Bhat  [id number]  1960      Chief complaint: Unwanted appliance      Subjective:  Patient is a 64 y.o.male presents for scheduled surgery by Dr. Craig. He anticipates a LOOP ILEOSTOMY REVERSAL  today. He underwent sigmoid colectomy with loop ileostomy, appy and cholecystectomy on 1/17/24 due to colon mass. Pathology was benign.       Review of Systems:  Constitutional-- No fever, chills or sweats. No fatigue.  CV-- No chest pain, palpitation or syncope. +HTN, HLD  Resp-- No cough, hemoptysis. +SOB, COPD  Skin--No rashes or lesions      Allergies: No Known Allergies      Home Meds:  Medications Prior to Admission   Medication Sig Dispense Refill Last Dose    albuterol sulfate HFA (Ventolin HFA) 108 (90 Base) MCG/ACT inhaler Inhale 2 puffs Every 4 (Four) Hours As Needed for Wheezing or Shortness of Air. 8 g 11     Breyna 160-4.5 MCG/ACT inhaler 2 puffs 2 (Two) Times a Day.       buPROPion XL (WELLBUTRIN XL) 150 MG 24 hr tablet TAKE ONE TABLET BY MOUTH EVERY MORNING 90 tablet 1     levocetirizine (XYZAL) 5 MG tablet TAKE ONE TABLET BY MOUTH EVERY EVENING 90 tablet 1     NON FORMULARY Take 1 tablet by mouth Daily. Calcium, Magnesium and Zinc plus Vitamin D3       omeprazole (priLOSEC) 40 MG capsule TAKE ONE CAPSULE BY MOUTH EVERY DAY 90 capsule 3     ondansetron ODT (ZOFRAN-ODT) 4 MG disintegrating tablet Place 1 tablet on the tongue Every 8 (Eight) Hours As Needed for Nausea or Vomiting. (Patient not taking: Reported on 2/22/2024) 12 tablet 0     rosuvastatin (CRESTOR) 10 MG tablet TAKE ONE TABLET BY MOUTH NIGHTLY AT BEDTIME 90 tablet 3     sildenafil (Viagra) 100 MG tablet Take 0.5-1 tablets by mouth Daily As Needed for Erectile Dysfunction. 20 tablet 5          PMH:   Past Medical History:   Diagnosis Date    Allergies     Cellulitis of hand     Clotting disorder     \"FREE BLEEDING\"    Colonic mass     COPD (chronic obstructive pulmonary disease)     Cough     Erectile dysfunction  "    Fracture, foot Sept 7 2022    GERD (gastroesophageal reflux disease)     Hemochromatosis     Hyperlipidemia     Hypertension     Wears dentures     FULL SET     PSH:    Past Surgical History:   Procedure Laterality Date    CHOLECYSTECTOMY N/A 01/17/2024    Procedure: CHOLECYSTECTOMY LAPAROSCOPIC;  Surgeon: Jimmy Craig MD;  Location:  [facility] OR;  Service: General;  Laterality: N/A;    COLON RESECTION N/A 01/17/2024    Procedure: OPEN SIGMOID COLECTOMY, LIVER BIOPSY, DIVERTING LOOP ILEOSTOMY CREATION, TAKE DOWN OF THE SPLENIC FLEXURE, AND APPENDECTOMY;  Surgeon: Jimmy Craig MD;  Location:  [facility] OR;  Service: General;  Laterality: N/A;    COLONOSCOPY  2021    ENDOSCOPY  05/2016    pt say's, he was placed under general anesthesia for this    ENDOSCOPY N/A 10/19/2021    Procedure: ESOPHAGOGASTRODUODENOSCOPY;  Surgeon: Osmel Kessler MD;  Location:  [facility]OSCOPY;  Service: Gastroenterology;  Laterality: N/A;  24 fr savory dilator used at 1251, 27 fr sdavory used at 1253, 33 [place] savoruy used at 1257, 42 fr savory used at 1259      ENDOSCOPY N/A 11/16/2021    Procedure: ESOPHAGOGASTRODUODENOSCOPY WITH DILATATION;  Surgeon: Osmel Kessler MD;  Location:  [facility];  Service: Gastroenterology;  Laterality: N/A;  Dilation with 48fr savery    ESOPHAGEAL DILATATION  06/2024    EXPLORATORY LAPAROTOMY N/A 01/30/2024    Procedure: LAPAROTOMY EXPLORATORY;  Surgeon: Jimmy Craig MD;  Location:  [facility] OR;  Service: General;  Laterality: N/A;    HAND SURGERY Right 2022       Immunization History:  Influenza: UTD  Pneumococcal: UTD  Tetanus: UTD  Covid : x4    Social History:   Tobacco:   Social History     Tobacco Use   Smoking Status Every Day    Average packs/day: 2.0 packs/day for 49.0 years (98.0 ttl pk-yrs)    Types: Cigarettes    Start date: 1975   Smokeless Tobacco Never      Alcohol:     Social History     Substance and Sexual Activity   Alcohol Use Yes    Alcohol/week: 23.0  standard drinks of alcohol    Types: 2 Glasses of wine, 21 Cans of beer per week         Physical Exam: VS: /104  HR 66  RR 16  T 97.9 Sat 100%RA      General Appearance:    Alert, cooperative, no distress, appears stated age   Head:    Normocephalic, without obvious abnormality, atraumatic   Lungs:     Clear to auscultation bilaterally, respirations unlabored    Heart:   Regular rate and rhythm, S1 and S2 normal    Abdomen:    Soft without tenderness   Extremities:   Extremities normal, atraumatic, no cyanosis or edema   Skin:   Skin color, texture, turgor normal, no rashes or lesions   Neurologic:   Grossly intact     Results Review:     LABS:  Lab Results   Component Value Date    WBC 8.60 06/21/2024    HGB 13.0 06/21/2024    HCT 39.3 06/21/2024    .6 (H) 06/21/2024     06/21/2024    NEUTROABS 3.99 04/30/2024    GLUCOSE 104 (H) 06/21/2024    BUN 13 06/21/2024    CREATININE 1.05 06/21/2024    EGFRIFNONA 115 08/17/2021    EGFRIFAFRI 140 08/17/2021     06/21/2024    K 5.2 06/21/2024     (H) 06/21/2024    CO2 25.0 06/21/2024    MG 2.2 02/14/2024    PHOS 3.2 02/14/2024    CALCIUM 9.2 06/21/2024    ALBUMIN 4.5 04/30/2024    AST 28 04/30/2024    ALT 18 04/30/2024    BILITOT 0.2 04/30/2024       RADIOLOGY:  Imaging Results (Last 72 Hours)       ** No results found for the last 72 hours. **            I reviewed the patient's new clinical results.    Cancer Staging (if applicable)  Cancer Patient: __ yes __no __unknown; If yes, clinical stage T:__ N:__M:__, stage group or __N/A      Impression: Unwanted appliance      Plan: LOOP ILEOSTOMY REVERSAL       NESHA Cristina   6/28/2024   10:20 EDT

## 2024-06-28 NOTE — PROGRESS NOTES
Patient Name:  Roger Bhat  YOB: 1960  [id number]    Surgery Post - Operative Note    Date of visit: 6/28/2024      Subjective: Resting in bed. Pain controlled. Had a new IV placed        Objective:    /81   Pulse 65   Temp 97.6 °F (36.4 °C) (Temporal)   Resp 16   SpO2 97%     CV:  Regular rate and rhythm   L:  Clear to auscultation bilaterally. Not labored on O2 by NC   ABD:  Soft, appropriately tender. Dressings clean, dry and intact   EXT:  No cyanosis, clubbing or edema        Assessment/ Plan:     Recovering well after ileostomy takedown. Continue Pulmonary toilet        Jimmy Craig MD  6/28/2024  16:42 EDT

## 2024-06-28 NOTE — OP NOTE
Operative Report    Patient Name:  Roger Bhat  YOB: 1960  [id number]    6/28/2024      PREOPERATIVE DIAGNOSIS: History of sigmoid colectomy with diverting loop ileostomy for inflammatory colon polyp      POSTOPERATIVE DIAGNOSIS: Same        PROCEDURE PERFORMED:     Diverting loop ileostomy takedown       SURGEON: Jimmy Craig MD      ASSISTANT: Assistant: Yazan Harding PA-C      Assistant surgeon responsibilities: He assisted with dissection, retraction, creation of the anastomosis, and closure. Their assistance was necessary and required for successful completion of the case.        SPECIMENS: Prior ileostomy    ESTIMATED BLOOD LOSS: 25 mL      ANESTHESIA: General with TAP blocks.        FINDINGS:  1.  The prior loop ileostomy was completely mobilized on the right side, the stoma itself was resected and a primary enteroenterostomy was created.       INDICATIONS:      This patient is a 64 y.o. male with a history of open sigmoid colectomy with diverting loop ileostomy creation in January 2024 for a colon mass.  Pathology on this was benign.  He was planned for loop ileostomy takedown.  He underwent appropriate preoperative workup and did require dilation of his prior coloproctostomy.  A barium enema following this was appropriate.  He was then recommended to undergo ileostomy reversal. The risks, benefits, and alternatives of the procedure were discussed with the patient and their family preoperatively and they agreed to proceed.          DESCRIPTION OF PROCEDURE:      After obtaining informed consent, the patient was taken to the operating room and placed in the supine position on the operating room table. General anesthesia was initiated. The abdomen was prepped and draped in the usual sterile fashion. A time out was properly performed. Antibiotics were given preoperatively. SCDs were properly placed on the patient and turned on.      We began the procedure by incising at the  mucocutaneous junction surrounding the prior ileostomy on the right side.  Skin incision was made with electrocautery and we carefully  the ileostomy from the surrounding skin by incising along the mucocutaneous junction.  We then continued our dissection in the subcutaneous tissues  the prior ileostomy and hernia sac from the surrounding subcutaneous tissues.  We extended this dissection all the way to the level of the anterior fascia.  The ileostomy was then carefully  from the anterior fascia and rectus muscle and was mobilized from the surrounding anterior abdominal wall circumferentially.  There were some limited adhesions to the anterior abdominal wall which were taken down.  There were some omental adhesions that extended more medially to the midline, however these were not taken down as they did not interfere with our dissection and were not accessible through the ileostomy site.  Once the ileostomy and small bowel were completely freed from the aperture and anterior abdominal wall, portions of the small bowel proximal and distal to the site were able to be eviscerated from the abdominal wall.  We chose a transection site for the small bowel both proximal and distal to the prior ileostomy, and the small bowel was divided at both of the sites using a KAPIL 75 blue load stapler.  The mesentery to the prior ileostomy was divided using clamps and 2-0 silk ties.  The specimen was then passed off the field as prior ileostomy.  The 2 divided ends of the small bowel were brought in close apposition without any tension.  A primary side-to-side functional end-to-end enteroenterostomy was then created with the common channel created using a KAPIL 75 blue load stapler and the common enterotomy closed using a TX 60 blue load stapler.  A 3-0 silk crotch stitch was placed.  Portions of the staple line were oversewn using 3-0 silk suture in a Lembert fashion.  The anastomosis was palpated and found  to be widely patent.  Portions of the mesenteric defect were closed using interrupted 2-0 Vicryl suture.    The anastomosis was then returned to the abdominal cavity.  The posterior fascia was quite attenuated, however we did close this using interrupted 0 Vicryl suture.  The anterior abdominal wall fascia was then circumferentially cleared surrounding the prior ileostomy site.  The anterior fascia was then closed using figure-of-eight 0 PDS suture.  The subcutaneous wound was irrigated.  The subcutaneous tissues were then reapproximated using 2-0 Vicryl suture.  The skin was then closed using 4-0 Monocryl in a pursestring fashion with the open portion of the wound left open and packed.  A clean and dry dressing was applied overlying this.     After the procedure, the patient was awakened, extubated, and taken to the postoperative anesthesia care unit for recovery. All needle, instrument, and lap counts were correct. I was personally present and performed all portions of the procedure. There were no immediate complications         Jimmy Craig MD  6/28/2024  13:09 EDT

## 2024-06-28 NOTE — ANESTHESIA POSTPROCEDURE EVALUATION
Patient: Roger Bhat    Procedure Summary       Date: 06/28/24 Room / Location:  [facility] / [facility]    Anesthesia Start: 1133 Anesthesia Stop: 1330    Procedure: LOOP ILEOSTOMY REVERSAL Diagnosis:     Surgeons: Jimmy Craig MD Provider: Nicolás Marshall MD    Anesthesia Type: general ASA Status: 3            Anesthesia Type: general    Vitals  Vitals Value Taken Time   /103 06/28/24 1324   Temp     Pulse 77 06/28/24 1329   Resp     SpO2 99 % 06/28/24 1329   Vitals shown include unfiled device data.        Post Anesthesia Care and Evaluation    Patient location during evaluation: PACU  Patient participation: complete - patient participated  Level of consciousness: sleepy but conscious  Pain management: adequate    Airway patency: patent  Anesthetic complications: No anesthetic complications  PONV Status: none  Cardiovascular status: hemodynamically stable and acceptable  Respiratory status: nonlabored ventilation, acceptable and nasal cannula  Hydration status: acceptable

## 2024-06-28 NOTE — ANESTHESIA PREPROCEDURE EVALUATION
Anesthesia Evaluation     Patient summary reviewed and Nursing notes reviewed                Airway   Mallampati: I  TM distance: >3 FB  Neck ROM: full  No difficulty expected  Dental - normal exam   (+) upper dentures and lower dentures    Pulmonary - normal exam   (+) a smoker Current, COPD,  Cardiovascular - normal exam    (+) hypertension, hyperlipidemia      Neuro/Psych- negative ROS  GI/Hepatic/Renal/Endo    (+) GERD    Musculoskeletal (-) negative ROS    Abdominal  - normal exam    Bowel sounds: normal.   Substance History   (+) alcohol use     OB/GYN negative ob/gyn ROS         Other   blood dyscrasia (HEMOCHROMATOSIS),                   Anesthesia Plan    ASA 3     general     (TAPS FOR POSTOP PAIN)  intravenous induction     Anesthetic plan, risks, benefits, and alternatives have been provided, discussed and informed consent has been obtained with: patient.    Plan discussed with CRNA.    CODE STATUS:

## 2024-06-29 LAB
ANION GAP SERPL CALCULATED.3IONS-SCNC: 11 MMOL/L (ref 5–15)
BASOPHILS # BLD AUTO: 0.03 10*3/MM3 (ref 0–0.2)
BASOPHILS NFR BLD AUTO: 0.3 % (ref 0–1.5)
BUN SERPL-MCNC: 14 MG/DL (ref 8–23)
BUN/CREAT SERPL: 17.3 (ref 7–25)
CALCIUM SPEC-SCNC: 8.8 MG/DL (ref 8.6–10.5)
CHLORIDE SERPL-SCNC: 104 MMOL/L (ref 98–107)
CO2 SERPL-SCNC: 24 MMOL/L (ref 22–29)
CREAT SERPL-MCNC: 0.81 MG/DL (ref 0.76–1.27)
DEPRECATED RDW RBC AUTO: 51.7 FL (ref 37–54)
EGFRCR SERPLBLD CKD-EPI 2021: 99.7 ML/MIN/1.73
EOSINOPHIL # BLD AUTO: 0.06 10*3/MM3 (ref 0–0.4)
EOSINOPHIL NFR BLD AUTO: 0.5 % (ref 0.3–6.2)
ERYTHROCYTE [DISTWIDTH] IN BLOOD BY AUTOMATED COUNT: 14.2 % (ref 12.3–15.4)
GLUCOSE SERPL-MCNC: 105 MG/DL (ref 65–99)
HCT VFR BLD AUTO: 33.3 % (ref 37.5–51)
HGB BLD-MCNC: 11.3 G/DL (ref 13–17.7)
IMM GRANULOCYTES # BLD AUTO: 0.03 10*3/MM3 (ref 0–0.05)
IMM GRANULOCYTES NFR BLD AUTO: 0.3 % (ref 0–0.5)
LYMPHOCYTES # BLD AUTO: 1.97 10*3/MM3 (ref 0.7–3.1)
LYMPHOCYTES NFR BLD AUTO: 16.7 % (ref 19.6–45.3)
MAGNESIUM SERPL-MCNC: 2 MG/DL (ref 1.6–2.4)
MCH RBC QN AUTO: 33.5 PG (ref 26.6–33)
MCHC RBC AUTO-ENTMCNC: 33.9 G/DL (ref 31.5–35.7)
MCV RBC AUTO: 98.8 FL (ref 79–97)
MONOCYTES # BLD AUTO: 0.98 10*3/MM3 (ref 0.1–0.9)
MONOCYTES NFR BLD AUTO: 8.3 % (ref 5–12)
NEUTROPHILS NFR BLD AUTO: 73.9 % (ref 42.7–76)
NEUTROPHILS NFR BLD AUTO: 8.73 10*3/MM3 (ref 1.7–7)
NRBC BLD AUTO-RTO: 0 /100 WBC (ref 0–0.2)
PHOSPHATE SERPL-MCNC: 3.5 MG/DL (ref 2.5–4.5)
PLATELET # BLD AUTO: 302 10*3/MM3 (ref 140–450)
PMV BLD AUTO: 9.4 FL (ref 6–12)
POTASSIUM SERPL-SCNC: 4.2 MMOL/L (ref 3.5–5.2)
RBC # BLD AUTO: 3.37 10*6/MM3 (ref 4.14–5.8)
SODIUM SERPL-SCNC: 139 MMOL/L (ref 136–145)
WBC NRBC COR # BLD AUTO: 11.8 10*3/MM3 (ref 3.4–10.8)

## 2024-06-29 PROCEDURE — 80048 BASIC METABOLIC PNL TOTAL CA: CPT | Performed by: SURGERY

## 2024-06-29 PROCEDURE — 85025 COMPLETE CBC W/AUTO DIFF WBC: CPT | Performed by: SURGERY

## 2024-06-29 PROCEDURE — 83735 ASSAY OF MAGNESIUM: CPT | Performed by: SURGERY

## 2024-06-29 PROCEDURE — 94664 DEMO&/EVAL PT USE INHALER: CPT

## 2024-06-29 PROCEDURE — 25010000002 HEPARIN (PORCINE) PER 1000 UNITS: Performed by: SURGERY

## 2024-06-29 PROCEDURE — 94799 UNLISTED PULMONARY SVC/PX: CPT

## 2024-06-29 PROCEDURE — 84100 ASSAY OF PHOSPHORUS: CPT | Performed by: SURGERY

## 2024-06-29 PROCEDURE — 25010000002 ONDANSETRON PER 1 MG: Performed by: SURGERY

## 2024-06-29 RX ORDER — ACETAMINOPHEN 325 MG/1
650 TABLET ORAL EVERY 6 HOURS
Status: DISCONTINUED | OUTPATIENT
Start: 2024-06-29 | End: 2024-07-02 | Stop reason: HOSPADM

## 2024-06-29 RX ADMIN — ACETAMINOPHEN 1000 MG: 500 TABLET, FILM COATED ORAL at 10:50

## 2024-06-29 RX ADMIN — Medication 10 ML: at 08:23

## 2024-06-29 RX ADMIN — ONDANSETRON 4 MG: 2 INJECTION INTRAMUSCULAR; INTRAVENOUS at 17:44

## 2024-06-29 RX ADMIN — ACETAMINOPHEN 1000 MG: 500 TABLET, FILM COATED ORAL at 04:46

## 2024-06-29 RX ADMIN — SENNOSIDES AND DOCUSATE SODIUM 2 TABLET: 50; 8.6 TABLET ORAL at 00:03

## 2024-06-29 RX ADMIN — BUPROPION HYDROCHLORIDE 150 MG: 150 TABLET, EXTENDED RELEASE ORAL at 08:22

## 2024-06-29 RX ADMIN — BUDESONIDE AND FORMOTEROL FUMARATE DIHYDRATE 2 PUFF: 160; 4.5 AEROSOL RESPIRATORY (INHALATION) at 21:01

## 2024-06-29 RX ADMIN — BUDESONIDE AND FORMOTEROL FUMARATE DIHYDRATE 2 PUFF: 160; 4.5 AEROSOL RESPIRATORY (INHALATION) at 09:11

## 2024-06-29 RX ADMIN — HEPARIN SODIUM 5000 UNITS: 5000 INJECTION INTRAVENOUS; SUBCUTANEOUS at 04:46

## 2024-06-29 RX ADMIN — ACETAMINOPHEN 1000 MG: 500 TABLET, FILM COATED ORAL at 00:03

## 2024-06-29 RX ADMIN — HEPARIN SODIUM 5000 UNITS: 5000 INJECTION INTRAVENOUS; SUBCUTANEOUS at 14:00

## 2024-06-29 RX ADMIN — ACETAMINOPHEN 650 MG: 325 TABLET ORAL at 23:08

## 2024-06-29 RX ADMIN — ROSUVASTATIN 10 MG: 10 TABLET, FILM COATED ORAL at 08:22

## 2024-06-29 RX ADMIN — Medication 10 ML: at 21:09

## 2024-06-29 RX ADMIN — HEPARIN SODIUM 5000 UNITS: 5000 INJECTION INTRAVENOUS; SUBCUTANEOUS at 21:09

## 2024-06-29 RX ADMIN — ACETAMINOPHEN 650 MG: 325 TABLET ORAL at 16:53

## 2024-06-29 RX ADMIN — ALVIMOPAN 12 MG: 12 CAPSULE ORAL at 08:22

## 2024-06-29 NOTE — PROGRESS NOTES
"Patient Name:  Roger Bhat  YOB: 1960  [id number]    Surgery Progress Note    Date of visit: 6/29/2024      Subjective: No acute events overnight.  Reports some soreness on the right side of the abdominal wall.  Only in very intermittently using the PCA however.  No nausea or vomiting.  Tolerated small [place] liquid diet.  No flatus or bowel movement yet          Objective:     /67 (BP Location: Left arm, Patient Position: Lying)   Pulse 78   Temp 98.8 °F (37.1 °C) (Temporal)   Resp 16   Ht 175.3 cm (69\")   Wt 69.9 kg (154 lb)   SpO2 96%   BMI 22.74 kg/m²     Intake/Output Summary (Last 24 hours) at 6/29/2024 0756  Last data filed at 6/29/2024 0441  Gross per 24 hour   Intake 1206 ml   Output 725 ml   Net 481 ml       GEN:   Awake, alert, in no acute distress, resting comfortably in bed   CV:   Regular rate and rhythm  L:  Symmetric expansion, not labored on room air  Abd:  Soft, not distended, appropriately tender palpation on the right side of the abdominal wall, dressing in place which is clean dry and intact  Ext:  No cyanosis, clubbing, or edema    Recent labs that are back at this time have been reviewed.           Assessment/ Plan:    Mr. Bhat is a 64-year-old gentleman who previously underwent open sigmoid colectomy in January 2024 with diverting loop ileostomy creation who is admitted after undergoing loop ileostomy takedown on June 28    -Postoperative day 1  -Vital signs are stable  -Labs are still pending  -Advance to full liquid diet  -Continue PCA for now  -Decrease IV fluid rate  -Mobilize  -Awaiting return of bowel function      Jimmy Craig MD  6/29/2024  07:56 EDT      "

## 2024-06-29 NOTE — PLAN OF CARE
Goal Outcome Evaluation:         VSS on RA. Pt required no PRN pain meds through out shift. C/o nausea relieved by PRN zofran. Continuing on dilaudid PCA. Patient ambulates well with standby and had 2 watery brown BM's today. Will continue plan of care.

## 2024-06-29 NOTE — PLAN OF CARE
Goal Outcome Evaluation:      VSS. Pt pain well controlled with PCA. Abdominal incisions CDI. Pt rested well overnight.

## 2024-06-30 PROCEDURE — 94799 UNLISTED PULMONARY SVC/PX: CPT

## 2024-06-30 PROCEDURE — 25010000002 METOCLOPRAMIDE PER 10 MG: Performed by: SURGERY

## 2024-06-30 PROCEDURE — 25010000002 HEPARIN (PORCINE) PER 1000 UNITS: Performed by: SURGERY

## 2024-06-30 RX ORDER — MORPHINE SULFATE 2 MG/ML
2 INJECTION, SOLUTION INTRAMUSCULAR; INTRAVENOUS EVERY 4 HOURS PRN
Status: DISCONTINUED | OUTPATIENT
Start: 2024-06-30 | End: 2024-07-02 | Stop reason: HOSPADM

## 2024-06-30 RX ORDER — METOCLOPRAMIDE HYDROCHLORIDE 5 MG/ML
10 INJECTION INTRAMUSCULAR; INTRAVENOUS EVERY 6 HOURS
Status: DISCONTINUED | OUTPATIENT
Start: 2024-06-30 | End: 2024-07-02 | Stop reason: HOSPADM

## 2024-06-30 RX ORDER — OXYCODONE HYDROCHLORIDE 5 MG/1
5 TABLET ORAL EVERY 4 HOURS PRN
Status: DISCONTINUED | OUTPATIENT
Start: 2024-06-30 | End: 2024-07-02 | Stop reason: HOSPADM

## 2024-06-30 RX ORDER — PANTOPRAZOLE SODIUM 40 MG/10ML
40 INJECTION, POWDER, LYOPHILIZED, FOR SOLUTION INTRAVENOUS
Status: DISCONTINUED | OUTPATIENT
Start: 2024-06-30 | End: 2024-07-02 | Stop reason: HOSPADM

## 2024-06-30 RX ORDER — DEXTROSE MONOHYDRATE, SODIUM CHLORIDE, AND POTASSIUM CHLORIDE 50; 1.49; 4.5 G/1000ML; G/1000ML; G/1000ML
50 INJECTION, SOLUTION INTRAVENOUS CONTINUOUS
Status: DISCONTINUED | OUTPATIENT
Start: 2024-06-30 | End: 2024-07-01

## 2024-06-30 RX ADMIN — POTASSIUM CHLORIDE, DEXTROSE MONOHYDRATE AND SODIUM CHLORIDE 50 ML/HR: 150; 5; 450 INJECTION, SOLUTION INTRAVENOUS at 08:58

## 2024-06-30 RX ADMIN — BUDESONIDE AND FORMOTEROL FUMARATE DIHYDRATE 2 PUFF: 160; 4.5 AEROSOL RESPIRATORY (INHALATION) at 09:00

## 2024-06-30 RX ADMIN — HEPARIN SODIUM 5000 UNITS: 5000 INJECTION INTRAVENOUS; SUBCUTANEOUS at 21:00

## 2024-06-30 RX ADMIN — SENNOSIDES AND DOCUSATE SODIUM 2 TABLET: 50; 8.6 TABLET ORAL at 20:58

## 2024-06-30 RX ADMIN — PANTOPRAZOLE SODIUM 40 MG: 40 INJECTION, POWDER, FOR SOLUTION INTRAVENOUS at 09:36

## 2024-06-30 RX ADMIN — ACETAMINOPHEN 650 MG: 325 TABLET ORAL at 05:44

## 2024-06-30 RX ADMIN — Medication 10 ML: at 09:36

## 2024-06-30 RX ADMIN — HEPARIN SODIUM 5000 UNITS: 5000 INJECTION INTRAVENOUS; SUBCUTANEOUS at 05:44

## 2024-06-30 RX ADMIN — BUPROPION HYDROCHLORIDE 150 MG: 150 TABLET, EXTENDED RELEASE ORAL at 06:25

## 2024-06-30 RX ADMIN — METOCLOPRAMIDE 10 MG: 5 INJECTION, SOLUTION INTRAMUSCULAR; INTRAVENOUS at 20:58

## 2024-06-30 RX ADMIN — METOCLOPRAMIDE 10 MG: 5 INJECTION, SOLUTION INTRAMUSCULAR; INTRAVENOUS at 09:36

## 2024-06-30 RX ADMIN — Medication 10 ML: at 21:05

## 2024-06-30 RX ADMIN — ROSUVASTATIN 10 MG: 10 TABLET, FILM COATED ORAL at 08:07

## 2024-06-30 RX ADMIN — BUDESONIDE AND FORMOTEROL FUMARATE DIHYDRATE 2 PUFF: 160; 4.5 AEROSOL RESPIRATORY (INHALATION) at 19:34

## 2024-06-30 RX ADMIN — HEPARIN SODIUM 5000 UNITS: 5000 INJECTION INTRAVENOUS; SUBCUTANEOUS at 14:01

## 2024-06-30 RX ADMIN — METOCLOPRAMIDE 10 MG: 5 INJECTION, SOLUTION INTRAMUSCULAR; INTRAVENOUS at 14:01

## 2024-06-30 RX ADMIN — ACETAMINOPHEN 650 MG: 325 TABLET ORAL at 11:59

## 2024-06-30 RX ADMIN — ACETAMINOPHEN 650 MG: 325 TABLET ORAL at 23:09

## 2024-06-30 RX ADMIN — ACETAMINOPHEN 650 MG: 325 TABLET ORAL at 16:53

## 2024-06-30 NOTE — PROGRESS NOTES
"Patient Name:  Roger Bhat  YOB: 1960  [id number]    Surgery Progress Note    Date of visit: 6/30/2024      Subjective: No acute events.  He had some nausea and 1 episode of emesis last night immediately after eating his full liquids.  He did however have several liquidy bowel movements and is passing gas per rectum.  Feels better this morning with no nausea or vomiting.  Denies fevers or chills.  Ambulated yesterday.  Pain is well-controlled          Objective:     /78 (BP Location: Left arm, Patient Position: Lying)   Pulse 81   Temp 98.1 °F (36.7 °C) (Temporal)   Resp 17   Ht 175.3 cm (69\")   Wt 69.9 kg (154 lb)   SpO2 96%   BMI 22.74 kg/m²     Intake/Output Summary (Last 24 hours) at 6/30/2024 0800  Last data filed at 6/30/2024 0600  Gross per 24 hour   Intake 543 ml   Output --   Net 543 ml     GEN:   Awake, alert, in no acute distress, resting comfortably in bed   CV:      Regular rate and rhythm  L:         Symmetric expansion, not labored on room air  Abd:    Soft, not distended, appropriately tender palpation on the right side of the abdominal wall, dressing in place which is clean dry and intact  Ext:     No cyanosis, clubbing, or edema    Recent labs that are back at this time have been reviewed.           Assessment/ Plan:    Mr. Bhat is a 64-year-old gentleman who previously underwent open sigmoid colectomy in January 2024 with diverting loop ileostomy creation who is admitted after undergoing loop ileostomy takedown on June 28     -Postoperative day 2  -Vital signs are stable  -Labs yesterday were appropriate.  Will recheck tomorrow  -Will de-escalate back to clear liquids given the episode of nausea and vomiting he had last night.  No recurrence overnight however and he has started to have to have bowel movements  -Discontinue PCA  -Continue IV fluids  -PPI  -Mobilize      Jimmy Craig MD  6/30/2024  08:00 EDT      "

## 2024-06-30 NOTE — PLAN OF CARE
Goal Outcome Evaluation:PCA discontinued early this shift.  Pain has been controlled with scheduled acetaminophen.  Ambulated three times this shift. Tolerating liquid diet.

## 2024-06-30 NOTE — PLAN OF CARE
Goal Outcome Evaluation:  Plan of Care Reviewed With: patient, friend        Progress: improving  Outcome Evaluation: Pt rested well throughout the shift. VSS on RA. Pt havng liquid BMs this shift. Dilaudid PCA infusing and managing pt pain well. Abd dressing CDI. Tolerating full liquid diet with mild, intermittent nausea. No PRNs indicated. No other concerns noted at this time.

## 2024-07-01 ENCOUNTER — APPOINTMENT (OUTPATIENT)
Dept: GENERAL RADIOLOGY | Facility: HOSPITAL | Age: 64
End: 2024-07-01
Payer: COMMERCIAL

## 2024-07-01 LAB
ANION GAP SERPL CALCULATED.3IONS-SCNC: 12 MMOL/L (ref 5–15)
ANION GAP SERPL CALCULATED.3IONS-SCNC: 12 MMOL/L (ref 5–15)
BUN SERPL-MCNC: 6 MG/DL (ref 8–23)
BUN SERPL-MCNC: 6 MG/DL (ref 8–23)
BUN/CREAT SERPL: 7.7 (ref 7–25)
BUN/CREAT SERPL: 9.5 (ref 7–25)
CALCIUM SPEC-SCNC: 8.7 MG/DL (ref 8.6–10.5)
CALCIUM SPEC-SCNC: 8.9 MG/DL (ref 8.6–10.5)
CHLORIDE SERPL-SCNC: 95 MMOL/L (ref 98–107)
CHLORIDE SERPL-SCNC: 96 MMOL/L (ref 98–107)
CO2 SERPL-SCNC: 27 MMOL/L (ref 22–29)
CO2 SERPL-SCNC: 27 MMOL/L (ref 22–29)
CREAT SERPL-MCNC: 0.63 MG/DL (ref 0.76–1.27)
CREAT SERPL-MCNC: 0.78 MG/DL (ref 0.76–1.27)
CYTO UR: NORMAL
DEPRECATED RDW RBC AUTO: 47.2 FL (ref 37–54)
EGFRCR SERPLBLD CKD-EPI 2021: 100.8 ML/MIN/1.73
EGFRCR SERPLBLD CKD-EPI 2021: 107.5 ML/MIN/1.73
ERYTHROCYTE [DISTWIDTH] IN BLOOD BY AUTOMATED COUNT: 13.2 % (ref 12.3–15.4)
GLUCOSE SERPL-MCNC: 113 MG/DL (ref 65–99)
GLUCOSE SERPL-MCNC: 135 MG/DL (ref 65–99)
HCT VFR BLD AUTO: 34.9 % (ref 37.5–51)
HGB BLD-MCNC: 12 G/DL (ref 13–17.7)
LAB AP CASE REPORT: NORMAL
LAB AP CLINICAL INFORMATION: NORMAL
MAGNESIUM SERPL-MCNC: 2.3 MG/DL (ref 1.6–2.4)
MCH RBC QN AUTO: 33.6 PG (ref 26.6–33)
MCHC RBC AUTO-ENTMCNC: 34.4 G/DL (ref 31.5–35.7)
MCV RBC AUTO: 97.8 FL (ref 79–97)
PATH REPORT.FINAL DX SPEC: NORMAL
PATH REPORT.GROSS SPEC: NORMAL
PHOSPHATE SERPL-MCNC: 2.3 MG/DL (ref 2.5–4.5)
PLATELET # BLD AUTO: 297 10*3/MM3 (ref 140–450)
PMV BLD AUTO: 10 FL (ref 6–12)
POTASSIUM SERPL-SCNC: 2.7 MMOL/L (ref 3.5–5.2)
POTASSIUM SERPL-SCNC: 2.9 MMOL/L (ref 3.5–5.2)
RBC # BLD AUTO: 3.57 10*6/MM3 (ref 4.14–5.8)
SODIUM SERPL-SCNC: 134 MMOL/L (ref 136–145)
SODIUM SERPL-SCNC: 135 MMOL/L (ref 136–145)
WBC NRBC COR # BLD AUTO: 12.04 10*3/MM3 (ref 3.4–10.8)

## 2024-07-01 PROCEDURE — 25010000002 POTASSIUM CHLORIDE 10 MEQ/100ML SOLUTION: Performed by: SURGERY

## 2024-07-01 PROCEDURE — 71045 X-RAY EXAM CHEST 1 VIEW: CPT

## 2024-07-01 PROCEDURE — 84100 ASSAY OF PHOSPHORUS: CPT | Performed by: SURGERY

## 2024-07-01 PROCEDURE — 94799 UNLISTED PULMONARY SVC/PX: CPT

## 2024-07-01 PROCEDURE — 80048 BASIC METABOLIC PNL TOTAL CA: CPT | Performed by: SURGERY

## 2024-07-01 PROCEDURE — 83735 ASSAY OF MAGNESIUM: CPT | Performed by: SURGERY

## 2024-07-01 PROCEDURE — 85027 COMPLETE CBC AUTOMATED: CPT | Performed by: SURGERY

## 2024-07-01 PROCEDURE — 25010000002 METOCLOPRAMIDE PER 10 MG: Performed by: SURGERY

## 2024-07-01 PROCEDURE — 25010000002 HEPARIN (PORCINE) PER 1000 UNITS: Performed by: SURGERY

## 2024-07-01 RX ORDER — POTASSIUM CHLORIDE 7.45 MG/ML
10 INJECTION INTRAVENOUS
Status: COMPLETED | OUTPATIENT
Start: 2024-07-01 | End: 2024-07-02

## 2024-07-01 RX ORDER — DEXTROSE MONOHYDRATE, SODIUM CHLORIDE, AND POTASSIUM CHLORIDE 50; 1.49; 4.5 G/1000ML; G/1000ML; G/1000ML
100 INJECTION, SOLUTION INTRAVENOUS CONTINUOUS
Status: DISCONTINUED | OUTPATIENT
Start: 2024-07-01 | End: 2024-07-02 | Stop reason: HOSPADM

## 2024-07-01 RX ADMIN — HEPARIN SODIUM 5000 UNITS: 5000 INJECTION INTRAVENOUS; SUBCUTANEOUS at 06:02

## 2024-07-01 RX ADMIN — POTASSIUM CHLORIDE, DEXTROSE MONOHYDRATE AND SODIUM CHLORIDE 100 ML/HR: 150; 5; 450 INJECTION, SOLUTION INTRAVENOUS at 21:14

## 2024-07-01 RX ADMIN — HEPARIN SODIUM 5000 UNITS: 5000 INJECTION INTRAVENOUS; SUBCUTANEOUS at 21:03

## 2024-07-01 RX ADMIN — POTASSIUM CHLORIDE 10 MEQ: 7.46 INJECTION, SOLUTION INTRAVENOUS at 22:10

## 2024-07-01 RX ADMIN — Medication 10 ML: at 21:00

## 2024-07-01 RX ADMIN — BUPROPION HYDROCHLORIDE 150 MG: 150 TABLET, EXTENDED RELEASE ORAL at 06:02

## 2024-07-01 RX ADMIN — ROSUVASTATIN 10 MG: 10 TABLET, FILM COATED ORAL at 08:26

## 2024-07-01 RX ADMIN — BUDESONIDE AND FORMOTEROL FUMARATE DIHYDRATE 2 PUFF: 160; 4.5 AEROSOL RESPIRATORY (INHALATION) at 08:32

## 2024-07-01 RX ADMIN — METOCLOPRAMIDE 10 MG: 5 INJECTION, SOLUTION INTRAMUSCULAR; INTRAVENOUS at 15:59

## 2024-07-01 RX ADMIN — SENNOSIDES AND DOCUSATE SODIUM 2 TABLET: 50; 8.6 TABLET ORAL at 21:04

## 2024-07-01 RX ADMIN — Medication 10 ML: at 08:29

## 2024-07-01 RX ADMIN — METOCLOPRAMIDE 10 MG: 5 INJECTION, SOLUTION INTRAMUSCULAR; INTRAVENOUS at 21:03

## 2024-07-01 RX ADMIN — POTASSIUM CHLORIDE 10 MEQ: 7.46 INJECTION, SOLUTION INTRAVENOUS at 23:25

## 2024-07-01 RX ADMIN — ACETAMINOPHEN 650 MG: 325 TABLET ORAL at 11:45

## 2024-07-01 RX ADMIN — ACETAMINOPHEN 650 MG: 325 TABLET ORAL at 17:39

## 2024-07-01 RX ADMIN — METOCLOPRAMIDE 10 MG: 5 INJECTION, SOLUTION INTRAMUSCULAR; INTRAVENOUS at 08:27

## 2024-07-01 RX ADMIN — METOCLOPRAMIDE 10 MG: 5 INJECTION, SOLUTION INTRAMUSCULAR; INTRAVENOUS at 03:27

## 2024-07-01 RX ADMIN — BUDESONIDE AND FORMOTEROL FUMARATE DIHYDRATE 2 PUFF: 160; 4.5 AEROSOL RESPIRATORY (INHALATION) at 19:08

## 2024-07-01 RX ADMIN — ACETAMINOPHEN 650 MG: 325 TABLET ORAL at 23:27

## 2024-07-01 RX ADMIN — POTASSIUM CHLORIDE, DEXTROSE MONOHYDRATE AND SODIUM CHLORIDE 50 ML/HR: 150; 5; 450 INJECTION, SOLUTION INTRAVENOUS at 05:04

## 2024-07-01 RX ADMIN — ACETAMINOPHEN 650 MG: 325 TABLET ORAL at 06:00

## 2024-07-01 RX ADMIN — PANTOPRAZOLE SODIUM 40 MG: 40 INJECTION, POWDER, FOR SOLUTION INTRAVENOUS at 06:02

## 2024-07-01 RX ADMIN — HEPARIN SODIUM 5000 UNITS: 5000 INJECTION INTRAVENOUS; SUBCUTANEOUS at 15:59

## 2024-07-01 NOTE — CASE MANAGEMENT/SOCIAL WORK
Discharge Planning Assessment  [facility]     Patient Name: Roger Bhat  MRN: [id number]  Today's Date: 7/1/2024    Admit Date: 6/28/2024    Plan: Home with RAFAELA Villalta [phone].   Discharge Needs Assessment       Row Name 07/01/24 1218       Living Environment    People in Home significant other    Name(s) of People in Home RAFAELA Villalta [phone]    Primary Care Provided by self    Provides Primary Care For no one    Family Caregiver if Needed spouse    Family Caregiver Names SO Ambar [phone]    Quality of Family Relationships involved;helpful    Able to Return to Prior Arrangements yes       Transition Planning    Transportation Anticipated family or friend will provide  RAFAELA Villalta [phone]       Discharge Needs Assessment    Readmission Within the Last 30 Days no previous admission in last 30 days    Equipment Currently Used at Home none                   Discharge Plan       Row Name 07/01/24 4529       Plan    Plan Home with RAFAELA Villalta [phone].    Plan Comments SW'er spoke with patient and SO Ambar [phone] at bedside. Lives in [facility] independently with ADL's, no DME or HH. Patient declined to establish a Living Will. Patient's PCP is Richa Mary DO.  Patient has [place] [employer] insurance. Plan is Home with RAFAELA Villalta [phone].    Final Discharge Disposition Code 01 - home or self-care                  Continued Care and Services - Admitted Since 6/28/2024    No active coordination exists for this encounter.       Expected Discharge Date and Time       Expected Discharge Date Expected Discharge Time    Jun 29, 2024            Demographic Summary       Row Name 07/01/24 1217       General Information    Admission Type inpatient    Referral Source admission list    Reason for Consult discharge planning                   Functional Status       Row Name 07/01/24 1226       Functional Status    Usual Activity Tolerance good    Current Activity Tolerance good       Functional  Status, IADL    Medications independent    Meal Preparation independent    Housekeeping independent    Laundry independent    Shopping independent    IADL Comments Independent with ADL's, no DME or HH       Employment/    Employment/ Comments [employer] [employer] insurance                   Psychosocial    No documentation.                  Abuse/Neglect    No documentation.                  Legal    No documentation.                  Substance Abuse    No documentation.                  Patient Forms    No documentation.                     DEMETRIS Parmar (Kay)

## 2024-07-01 NOTE — PLAN OF CARE
Goal Outcome Evaluation:                 PATIENT HAD TEMPERATURE TODAY AND LOW POTASSIUM,. MD NOTIFIED.  HE HAS AMBULATED IN THE HALLWAY TODAY. HAS OPEN AREA TO RIGHT ABDOMEN WITH DRESSING CLEAN, DRY AND INTACT. ON ROOM AIR. CONTINUES TO HAVE BOUTS OF DIARRHEA, NO OTHER CHANGES. WILL CONTINUE TO MONITOR

## 2024-07-01 NOTE — PROGRESS NOTES
"Patient Name:  Roger Bhat  YOB: 1960  [id number]    Surgery Progress Note    Date of visit: 7/1/2024      Subjective: Did well yesterday.  No episodes of nausea or vomiting.  Tolerated clear liquid diet without difficulty.  Had 5 or 6 small liquid bowel movements.  Pain is well-controlled.  Denies fevers or chills          Objective:     /84 (BP Location: Left arm, Patient Position: Lying)   Pulse 87   Temp 98.7 °F (37.1 °C) (Temporal)   Resp 18   Ht 175.3 cm (69\")   Wt 69.9 kg (154 lb)   SpO2 98%   BMI 22.74 kg/m²     Intake/Output Summary (Last 24 hours) at 7/1/2024 0706  Last data filed at 6/30/2024 1833  Gross per 24 hour   Intake 479 ml   Output --   Net 479 ml       GEN:   Awake, alert, in no acute distress, resting comfortably in bed   CV:      Regular rate and rhythm  L:         Symmetric expansion, not labored on room air  Abd:    Soft, not distended, appropriately tender palpation on the right side of the abdominal wall, dressing in place which is clean dry and intact  Ext:     No cyanosis, clubbing, or edema    Recent labs that are back at this time have been reviewed.           Assessment/ Plan:    Mr. Bhat is a 64-year-old gentleman who previously underwent open sigmoid colectomy in January 2024 with diverting loop ileostomy creation who is admitted after undergoing loop ileostomy takedown on June 28     -Postoperative day 3  -Vital signs are stable  -Labs from today are still pending we will follow-up.  -Having bowel movements with no recurrent nausea or vomiting of the last 24 hours.  Will advance to regular low residue diet  -Continue oral pain meds  -Discontinue IV fluids  -PPI  -Mobilize  -Possibly home tomorrow if continues to tolerate diet and labs are stable      Jimmy Craig MD  7/1/2024  07:06 EDT      "

## 2024-07-01 NOTE — PLAN OF CARE
Problem: Adult Inpatient Plan of Care  Goal: Plan of Care Review  Outcome: Ongoing, Progressing  Flowsheets  Taken 7/1/2024 0358 by Marietta De Luna, RN  Plan of Care Reviewed With: patient  Outcome Evaluation: Patient reports no pain or nausea this shift. Abdominal dressing is clean, dry and intact. Pt has been up ad rhiannon and to toilet several times. Tolerating clear liquids.  Taken 6/30/2024 0410 by Sierra Shook RN  Progress: improving   Goal Outcome Evaluation:  Plan of Care Reviewed With: patient           Outcome Evaluation: Patient reports no pain or nausea this shift. Abdominal dressing is clean, dry and intact. Pt has been up ad rhiannon and to toilet several times. Tolerating clear liquids.

## 2024-07-02 ENCOUNTER — READMISSION MANAGEMENT (OUTPATIENT)
Dept: CALL CENTER | Facility: HOSPITAL | Age: 64
End: 2024-07-02
Payer: COMMERCIAL

## 2024-07-02 VITALS
HEART RATE: 82 BPM | SYSTOLIC BLOOD PRESSURE: 138 MMHG | RESPIRATION RATE: 18 BRPM | OXYGEN SATURATION: 97 % | TEMPERATURE: 98.2 F | DIASTOLIC BLOOD PRESSURE: 81 MMHG | WEIGHT: 154 LBS | HEIGHT: 69 IN | BODY MASS INDEX: 22.81 KG/M2

## 2024-07-02 LAB
ANION GAP SERPL CALCULATED.3IONS-SCNC: 12 MMOL/L (ref 5–15)
BUN SERPL-MCNC: 5 MG/DL (ref 8–23)
BUN/CREAT SERPL: 7.6 (ref 7–25)
CALCIUM SPEC-SCNC: 8.2 MG/DL (ref 8.6–10.5)
CHLORIDE SERPL-SCNC: 98 MMOL/L (ref 98–107)
CO2 SERPL-SCNC: 25 MMOL/L (ref 22–29)
CREAT SERPL-MCNC: 0.66 MG/DL (ref 0.76–1.27)
DEPRECATED RDW RBC AUTO: 48.3 FL (ref 37–54)
EGFRCR SERPLBLD CKD-EPI 2021: 106 ML/MIN/1.73
ERYTHROCYTE [DISTWIDTH] IN BLOOD BY AUTOMATED COUNT: 13.5 % (ref 12.3–15.4)
GLUCOSE SERPL-MCNC: 112 MG/DL (ref 65–99)
HCT VFR BLD AUTO: 32.1 % (ref 37.5–51)
HGB BLD-MCNC: 11.1 G/DL (ref 13–17.7)
MAGNESIUM SERPL-MCNC: 1.6 MG/DL (ref 1.6–2.4)
MCH RBC QN AUTO: 33.4 PG (ref 26.6–33)
MCHC RBC AUTO-ENTMCNC: 34.6 G/DL (ref 31.5–35.7)
MCV RBC AUTO: 96.7 FL (ref 79–97)
PHOSPHATE SERPL-MCNC: 2.8 MG/DL (ref 2.5–4.5)
PLATELET # BLD AUTO: 271 10*3/MM3 (ref 140–450)
PMV BLD AUTO: 9.5 FL (ref 6–12)
POTASSIUM SERPL-SCNC: 3.3 MMOL/L (ref 3.5–5.2)
POTASSIUM SERPL-SCNC: 3.3 MMOL/L (ref 3.5–5.2)
RBC # BLD AUTO: 3.32 10*6/MM3 (ref 4.14–5.8)
SODIUM SERPL-SCNC: 135 MMOL/L (ref 136–145)
WBC NRBC COR # BLD AUTO: 11.42 10*3/MM3 (ref 3.4–10.8)

## 2024-07-02 PROCEDURE — 85027 COMPLETE CBC AUTOMATED: CPT | Performed by: SURGERY

## 2024-07-02 PROCEDURE — 25010000002 METOCLOPRAMIDE PER 10 MG: Performed by: SURGERY

## 2024-07-02 PROCEDURE — 25010000002 HEPARIN (PORCINE) PER 1000 UNITS: Performed by: SURGERY

## 2024-07-02 PROCEDURE — 84132 ASSAY OF SERUM POTASSIUM: CPT | Performed by: SURGERY

## 2024-07-02 PROCEDURE — 80048 BASIC METABOLIC PNL TOTAL CA: CPT | Performed by: SURGERY

## 2024-07-02 PROCEDURE — 25010000002 POTASSIUM CHLORIDE 10 MEQ/100ML SOLUTION: Performed by: SURGERY

## 2024-07-02 PROCEDURE — 83735 ASSAY OF MAGNESIUM: CPT | Performed by: SURGERY

## 2024-07-02 PROCEDURE — 94799 UNLISTED PULMONARY SVC/PX: CPT

## 2024-07-02 PROCEDURE — 84100 ASSAY OF PHOSPHORUS: CPT | Performed by: SURGERY

## 2024-07-02 RX ORDER — OXYCODONE HYDROCHLORIDE AND ACETAMINOPHEN 5; 325 MG/1; MG/1
1 TABLET ORAL EVERY 4 HOURS PRN
Qty: 12 TABLET | Refills: 0 | Status: SHIPPED | OUTPATIENT
Start: 2024-07-02

## 2024-07-02 RX ORDER — ONDANSETRON 4 MG/1
4 TABLET, FILM COATED ORAL EVERY 8 HOURS PRN
Qty: 12 TABLET | Refills: 0 | Status: SHIPPED | OUTPATIENT
Start: 2024-07-02 | End: 2025-07-02

## 2024-07-02 RX ORDER — POTASSIUM CHLORIDE 20 MEQ/1
20 TABLET, EXTENDED RELEASE ORAL DAILY
Qty: 7 TABLET | Refills: 0 | Status: SHIPPED | OUTPATIENT
Start: 2024-07-02 | End: 2024-07-09

## 2024-07-02 RX ORDER — POTASSIUM CHLORIDE 20 MEQ/1
40 TABLET, EXTENDED RELEASE ORAL EVERY 4 HOURS
Status: DISCONTINUED | OUTPATIENT
Start: 2024-07-02 | End: 2024-07-02 | Stop reason: HOSPADM

## 2024-07-02 RX ADMIN — ACETAMINOPHEN 650 MG: 325 TABLET ORAL at 11:04

## 2024-07-02 RX ADMIN — Medication 10 ML: at 08:52

## 2024-07-02 RX ADMIN — POTASSIUM CHLORIDE 10 MEQ: 7.46 INJECTION, SOLUTION INTRAVENOUS at 03:25

## 2024-07-02 RX ADMIN — ROSUVASTATIN 10 MG: 10 TABLET, FILM COATED ORAL at 08:50

## 2024-07-02 RX ADMIN — METOCLOPRAMIDE 10 MG: 5 INJECTION, SOLUTION INTRAMUSCULAR; INTRAVENOUS at 08:50

## 2024-07-02 RX ADMIN — POTASSIUM CHLORIDE 40 MEQ: 1500 TABLET, EXTENDED RELEASE ORAL at 14:44

## 2024-07-02 RX ADMIN — POTASSIUM CHLORIDE 10 MEQ: 7.46 INJECTION, SOLUTION INTRAVENOUS at 01:30

## 2024-07-02 RX ADMIN — METOCLOPRAMIDE 10 MG: 5 INJECTION, SOLUTION INTRAMUSCULAR; INTRAVENOUS at 03:24

## 2024-07-02 RX ADMIN — POTASSIUM CHLORIDE 10 MEQ: 7.46 INJECTION, SOLUTION INTRAVENOUS at 00:26

## 2024-07-02 RX ADMIN — HEPARIN SODIUM 5000 UNITS: 5000 INJECTION INTRAVENOUS; SUBCUTANEOUS at 13:42

## 2024-07-02 RX ADMIN — ACETAMINOPHEN 650 MG: 325 TABLET ORAL at 04:31

## 2024-07-02 RX ADMIN — HEPARIN SODIUM 5000 UNITS: 5000 INJECTION INTRAVENOUS; SUBCUTANEOUS at 06:14

## 2024-07-02 RX ADMIN — METOCLOPRAMIDE 10 MG: 5 INJECTION, SOLUTION INTRAMUSCULAR; INTRAVENOUS at 14:44

## 2024-07-02 RX ADMIN — BUDESONIDE AND FORMOTEROL FUMARATE DIHYDRATE 2 PUFF: 160; 4.5 AEROSOL RESPIRATORY (INHALATION) at 08:14

## 2024-07-02 RX ADMIN — BUPROPION HYDROCHLORIDE 150 MG: 150 TABLET, EXTENDED RELEASE ORAL at 06:14

## 2024-07-02 RX ADMIN — BUDESONIDE AND FORMOTEROL FUMARATE DIHYDRATE 2 PUFF: 160; 4.5 AEROSOL RESPIRATORY (INHALATION) at 08:50

## 2024-07-02 RX ADMIN — PANTOPRAZOLE SODIUM 40 MG: 40 INJECTION, POWDER, FOR SOLUTION INTRAVENOUS at 06:14

## 2024-07-02 RX ADMIN — POTASSIUM CHLORIDE 10 MEQ: 7.46 INJECTION, SOLUTION INTRAVENOUS at 02:27

## 2024-07-02 NOTE — DISCHARGE SUMMARY
Discharge Summary    Patient Name:  Roger Bhat  YOB: 1960  [id number]      DATE OF ADMISSION: 6/28/2024    DATE OF DISCHARGE: 7/2/2024       FINAL DIAGNOSES:     Ileostomy present       CONSULTING SERVICES: None      PROCEDURES PERFORMED:   1.  Loop ileostomy reversal on June 28, 2024    HISTORY: The patient is a very pleasant 64 y.o. male who presented on June 28, 2024 for planned loop ileostomy reversal.      HOSPITAL COURSE: Roger Bhat was taken to the operating room on June 28, 2024 where they underwent loop ileostomy reversal. They tolerated the procedure without difficulty and were transported to the floor postoperatively in stable condition. Over the following several days they progressed.  He experienced return of bowel function on postoperative day 2.  He did have some difficulty with loose stools however this improved.  He was able to tolerate advancing diet without difficulty.  He did have a febrile temp of 101.7 on postoperative day 3, however this resolved and he did not have any recurrent fevers.  He required some potassium replacement for hypokalemia however this also improved.  On postoperative day 4 they were deemed appropriate for discharge in stable condition after having achieved maximal benefit of their hospital stay.      CONDITION: At the time of discharge, the patient is tolerating a regular diet without difficulty. he is having normal bowel and bladder function. his incisions are clean, dry and intact. Pain is well controlled.       DISCHARGE MEDICATIONS:   1. All previous home medications.   2. Percocet 5 - 10 mg PO  Q4h  PRN pain   3. Colace 100mg PO BID  4. Ondansetron 4 mg Q6h PRN nausea  5.  Potassium supplementation for the next several days as prescribed     DISCHARGE INSTRUCTIONS:  1. The patient is instructed to follow up with Dr. Craig in 2 weeks’ time.  2. he is instructed to refrain from lifting more than 10 pounds for the next 2 weeks.  3. If  the patient has worsening problems with fever, chills, nausea or vomiting he is to contact Dr. Craig's office and a contact card has been provided.  4. They have been instructed that it is okay to shower.  Let warm soapy water run over the incisions.  Pat dry do not scrub.  No tub baths for the next 2 weeks.      Jimmy Craig MD  7/2/2024  16:39 EDT

## 2024-07-02 NOTE — PLAN OF CARE
Goal Outcome Evaluation:            PATIENT IS BEING DISCHARGED HOME. 2 IV'S REMOVED, NO BLEEDING NOTED/ DISCHARGE INSTRUCTIONS GIVEN. PATIENT INSTRUCTED ON EATING POTASSIUM RICH DIET AND TO MAKE FOLLOW UP APPT IN THE AM. HE ACKNOWLEDGED UNDERSTANDING. HE WAS ESCORTED OFF UNIT BY STAFF

## 2024-07-02 NOTE — CASE MANAGEMENT/SOCIAL WORK
Continued Stay Note  [facility]     Patient Name: Roger Bhat  MRN: [id number]  Today's Date: 7/2/2024    Admit Date: 6/28/2024    Plan: Home with SO   Discharge Plan       Row Name 07/02/24 0836       Plan    Plan Home with SO    Patient/Family in Agreement with Plan yes    Plan Comments Spoke to patient at bedside. Plan is home with SO. SO will transport. Patient denies any discharge needs. CM will continue to follow.    Final Discharge Disposition Code 01 - home or self-care                   Discharge Codes    No documentation.                 Expected Discharge Date and Time       Expected Discharge Date Expected Discharge Time    Jun 29, 2024               Jarvis Smith RN

## 2024-07-02 NOTE — PLAN OF CARE
Problem: Adult Inpatient Plan of Care  Goal: Plan of Care Review  Outcome: Ongoing, Progressing  Flowsheets  Taken 7/2/2024 0347  Outcome Evaluation: Pt potassium came back 2.7.  Six bags of IV potassium was adminstered. Abdominal dressing is clean, dry and intact.  Tolerating diet. Pt still reports diarrhea.  Taken 7/1/2024 0358  Plan of Care Reviewed With: patient   Goal Outcome Evaluation:  Plan of Care Reviewed With: patient           Outcome Evaluation: Pt potassium came back 2.7.  Six bags of IV potassium was adminstered. Abdominal dressing is clean, dry and intact.  Tolerating diet. Pt still reports diarrhea.

## 2024-07-02 NOTE — OUTREACH NOTE
Prep Survey      Flowsheet Row Responses   [facility] facility patient discharged from? [place]   Is LACE score < 7 ? No   Eligibility [facility]   Date of Admission 06/28/24   Date of Discharge 07/02/24   Discharge Disposition Home or Self Care   Discharge diagnosis ILEOSTOMY CLOSURE   Does the patient have one of the following disease processes/diagnoses(primary or secondary)? General Surgery   Does the patient have Home health ordered? No   Is there a DME ordered? No   Prep survey completed? Yes            PAPA BLAKE - Registered Nurse

## 2024-07-02 NOTE — PAYOR COMM NOTE
"Roger Bhat (64 y.o. 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    Marital Status   Significant Other                            Admission Date   6/28/24    Admission Type   Urgent    Admitting Provider   Jimmy Craig MD    Attending Provider   Jimmy Craig MD    Department, Room/Bed   [address], N[address]/1       Discharge Date       Discharge Disposition       Discharge Destination                                 Attending Provider: Jimmy Craig MD    Allergies: No Known Allergies    Isolation: None   Infection: None   Code Status: CPR    Ht: 175.3 cm (69\")   Wt: 69.9 kg (154 lb)    Admission Cmt: None   Principal Problem: Ileostomy present [Z93.2]                   Active Insurance as of 6/28/2024       Primary Coverage       Payor Plan Insurance Group Employer/Plan Group    [employer]HE[employer] BLUE CROSS ANTHEM BLUE CROSS BLUE SHIELD PPO [id number]       Payor Plan Address Payor Plan Phone Number Payor Plan Fax Number Effective Dates    [address] [phone]  2/1/2021 - None Entered    [place] [id number]         Subscriber Name Subscriber Birth Date Member ID       ROGER BHAT 1960 [id number]                     Emergency Contacts        (Rel.) Home Phone Work Phone Mobile Phone    Ambar Daniels (Significant Other) [phone] -- [phone]    Seferino Dalal (Friend) [phone] -- [phone]              Lab Results (last 72 hours)       Procedure Component Value Units Date/Time    Magnesium [[id number]]  (Normal) Collected: 07/02/24 0732    Specimen: Blood Updated: 07/02/24 0814     Magnesium 1.6 mg/dL     Potassium [[id number]]  (Abnormal) Collected: 07/02/24 0732    Specimen: Blood Updated: 07/02/24 0812     Potassium 3.3 mmol/L     Basic Metabolic Panel [[id number]]  (Abnormal) Collected: " 07/02/24 0732    Specimen: Blood Updated: 07/02/24 0812     Glucose 112 mg/dL      BUN 5 mg/dL      Creatinine 0.66 mg/dL      Sodium 135 mmol/L      Potassium 3.3 mmol/L      Chloride 98 mmol/L      CO2 25.0 mmol/L      Calcium 8.2 mg/dL      BUN/Creatinine Ratio 7.6     Anion Gap 12.0 mmol/L      eGFR 106.0 mL/min/1.73     Narrative:      GFR Normal >60  Chronic Kidney Disease <60  Kidney Failure <15      Phosphorus [[id number]]  (Normal) Collected: 07/02/24 0732    Specimen: Blood Updated: 07/02/24 0812     Phosphorus 2.8 mg/dL     CBC (No Diff) [[id number]]  (Abnormal) Collected: 07/02/24 0739    Specimen: Blood Updated: 07/02/24 0746     WBC 11.42 10*3/mm3      RBC 3.32 10*6/mm3      Hemoglobin 11.1 g/dL      Hematocrit 32.1 %      MCV 96.7 fL      MCH 33.4 pg      MCHC 34.6 g/dL      RDW 13.5 %      RDW-SD 48.3 fl      MPV 9.5 fL      Platelets 271 10*3/mm3     Basic Metabolic Panel [[id number]]  (Abnormal) Collected: 07/01/24 2047    Specimen: Blood Updated: 07/01/24 2128     Glucose 135 mg/dL      BUN 6 mg/dL      Creatinine 0.78 mg/dL      Sodium 135 mmol/L      Potassium 2.7 mmol/L      Chloride 96 mmol/L      CO2 27.0 mmol/L      Calcium 8.9 mg/dL      BUN/Creatinine Ratio 7.7     Anion Gap 12.0 mmol/L      eGFR 100.8 mL/min/1.73     Narrative:      GFR Normal >60  Chronic Kidney Disease <60  Kidney Failure <15      Tissue Pathology Exam [[id number]] Collected: 06/28/24 1226    Specimen: Tissue from Small Intestine Updated: 07/01/24 1450     Case Report --     Surgical Pathology Report                         Case: [id number]                                  Authorizing Provider:  Jimmy Craig MD      Collected:           06/28/2024 12:26 PM          Ordering Location:     [facility]   Received:            06/28/2024 02:06 PM                                 OR                                                                           Pathologist:           Mart Lindsay MD              "                                          Specimen:    Small Intestine, ILEOSTOMY                                                                  Clinical Information --     Colon polyp         Final Diagnosis --     SMALL BOWEL, ILEOSTOMY TAKEDOWN:     Benign enteric mucosa with chronic inflammation and reactive changes     No evidence of malignancy         Gross Description --     1. Small Intestine.  Received in formalin labeled \"ileostomy\", are 2 unoriented segments of small bowel ranging from 2.5-6.0 cm in length and have an average diameter of 2.3 cm.  Both segments of small bowel opened to a single stoma site which is surrounded by a thin rim of pink-tan, roughened skin.  The margin surrounding the skin is inked blue.  The exposed mucosa is pink-tan, granular, edematous and glistening.  Opening of the bowel segments reveals yellow-tan, edematous, normally folded and unremarkable mucosa.  The wall thickness measures up to 0.3 cm.  No masses or lesions are identified.  Representative sections are submitted as follows:  1A: Smaller bowel segment margin, en face  1B: Longer bowel segment margin, en face  1C: Stoma site with surrounding skin   MLA       Microscopic Description --     The slides are reviewed and demonstrate histopathologic features supporting the above rendered diagnosis.        Phosphorus [[id number]]  (Abnormal) Collected: 07/01/24 0732    Specimen: Blood Updated: 07/01/24 0846     Phosphorus 2.3 mg/dL     Basic Metabolic Panel [[id number]]  (Abnormal) Collected: 07/01/24 0732    Specimen: Blood Updated: 07/01/24 0846     Glucose 113 mg/dL      BUN 6 mg/dL      Creatinine 0.63 mg/dL      Sodium 134 mmol/L      Potassium 2.9 mmol/L      Chloride 95 mmol/L      CO2 27.0 mmol/L      Calcium 8.7 mg/dL      BUN/Creatinine Ratio 9.5     Anion Gap 12.0 mmol/L      eGFR 107.5 mL/min/1.73     Narrative:      GFR Normal >60  Chronic Kidney Disease <60  Kidney Failure <15      Magnesium [[id number]]  (Normal) " "Collected: 07/01/24 0732    Specimen: Blood Updated: 07/01/24 0846     Magnesium 2.3 mg/dL     CBC (No Diff) [[id number]]  (Abnormal) Collected: 07/01/24 0732    Specimen: Blood Updated: 07/01/24 0824     WBC 12.04 10*3/mm3      RBC 3.57 10*6/mm3      Hemoglobin 12.0 g/dL      Hematocrit 34.9 %      MCV 97.8 fL      MCH 33.6 pg      MCHC 34.4 g/dL      RDW 13.2 %      RDW-SD 47.2 fl      MPV 10.0 fL      Platelets 297 10*3/mm3           Imaging Results (Last 72 Hours)       Procedure Component Value Units Date/Time    XR Chest 1 View [[id number]] Collected: 07/01/24 1300     Updated: 07/01/24 1304    Narrative:      XR CHEST 1 VW    Date of Exam: 7/1/2024 12:29 PM EDT    Indication: fever, postop day 3 ileostomy takedown, eval for PNA or atelectasis    Comparison: Chest x-ray 2/4/2024    Findings:  The heart is stable in size. No pneumothorax or pleural effusion. No focal infiltrate.      Impression:      Impression:  No acute cardiopulmonary process.      Electronically Signed: Dari Ortiz MD    7/1/2024 1:01 PM EDT    Workstation ID: [id number]             Physician Progress Notes (last 72 hours)        Jimmy Craig MD at 07/02/24 0743          Patient Name:  Roger Bhat  YOB: 1960  [id number]    Surgery Progress Note    Date of visit: 7/2/2024      Subjective: Had an isolated fever yesterday of 101.7.  Has been afebrile since then and overall feels well.  Continues to have some loose stools but this is becoming more solid, 3 overnight.  Denies any nausea, vomiting, fevers or chills.  Tolerated regular diet without difficulty.  No episodes of emesis.  Denies shortness of air or dysuria.  Does have a slight cough          Objective:     /82 (BP Location: Left arm, Patient Position: Lying)   Pulse 86   Temp 97.2 °F (36.2 °C) (Temporal)   Resp 18   Ht 175.3 cm (69\")   Wt 69.9 kg (154 lb)   SpO2 97%   BMI 22.74 kg/m²   No intake or output data in the 24 hours ending 07/02/24 " "0743    GEN:   Awake, alert, in no acute distress, resting comfortably in bed   CV:   Regular rate and rhythm  L:  Symmetric expansion, not labored on room air  Abd:  Soft, not distended, only mild appropriate tenderness to palpation of the right side surrounding incision site, incision is clean dry and intact with open portion with red granulation tissue  Ext:  No cyanosis, clubbing, or edema    Recent labs that are back at this time have been reviewed.           Assessment/ Plan:    Mr. Bhat is a 64-year-old gentleman who previously underwent open sigmoid colectomy in January 2024 with diverting loop ileostomy creation who is admitted after undergoing loop ileostomy takedown on June 28     -Postoperative day 4  -Had an isolated febrile temp of 101.7 yesterday.  Labs yesterday with hypokalemia that has since been replaced as well as white blood cell count of 12.  Repeat labs today are pending  -Chest x-ray yesterday with no acute findings.  No dysuria.  -Tolerating diet and having bowel function  -Will follow-up repeat labs today.        Jimmy Craig MD  7/2/2024  07:43 EDT        Electronically signed by Jimmy Craig MD at 07/02/24 0745       Jimmy Craig MD at 07/01/24 0706          Patient Name:  Roger Bhat  YOB: 1960  [id number]    Surgery Progress Note    Date of visit: 7/1/2024      Subjective: Did well yesterday.  No episodes of nausea or vomiting.  Tolerated clear liquid diet without difficulty.  Had 5 or 6 small liquid bowel movements.  Pain is well-controlled.  Denies fevers or chills          Objective:     /84 (BP Location: Left arm, Patient Position: Lying)   Pulse 87   Temp 98.7 °F (37.1 °C) (Temporal)   Resp 18   Ht 175.3 cm (69\")   Wt 69.9 kg (154 lb)   SpO2 98%   BMI 22.74 kg/m²     Intake/Output Summary (Last 24 hours) at 7/1/2024 0706  Last data filed at 6/30/2024 1833  Gross per 24 hour   Intake 479 ml   Output --   Net 479 ml       GEN:   " "Awake, alert, in no acute distress, resting comfortably in bed   CV:      Regular rate and rhythm  L:         Symmetric expansion, not labored on room air  Abd:    Soft, not distended, appropriately tender palpation on the right side of the abdominal wall, dressing in place which is clean dry and intact  Ext:     No cyanosis, clubbing, or edema    Recent labs that are back at this time have been reviewed.           Assessment/ Plan:    Mr. Bhat is a 64-year-old gentleman who previously underwent open sigmoid colectomy in January 2024 with diverting loop ileostomy creation who is admitted after undergoing loop ileostomy takedown on June 28     -Postoperative day 3  -Vital signs are stable  -Labs from today are still pending we will follow-up.  -Having bowel movements with no recurrent nausea or vomiting of the last 24 hours.  Will advance to regular low residue diet  -Continue oral pain meds  -Discontinue IV fluids  -PPI  -Mobilize  -Possibly home tomorrow if continues to tolerate diet and labs are stable      Jimmy Craig MD  7/1/2024  07:06 EDT        Electronically signed by Jimmy Craig MD at 07/01/24 0708       Jimmy Craig MD at 06/30/24 0800          Patient Name:  Roger Bhat  YOB: 1960  [id number]    Surgery Progress Note    Date of visit: 6/30/2024      Subjective: No acute events.  He had some nausea and 1 episode of emesis last night immediately after eating his full liquids.  He did however have several liquidy bowel movements and is passing gas per rectum.  Feels better this morning with no nausea or vomiting.  Denies fevers or chills.  Ambulated yesterday.  Pain is well-controlled          Objective:     /78 (BP Location: Left arm, Patient Position: Lying)   Pulse 81   Temp 98.1 °F (36.7 °C) (Temporal)   Resp 17   Ht 175.3 cm (69\")   Wt 69.9 kg (154 lb)   SpO2 96%   BMI 22.74 kg/m²     Intake/Output Summary (Last 24 hours) at 6/30/2024 0800  Last " data filed at 6/30/2024 0600  Gross per 24 hour   Intake 543 ml   Output --   Net 543 ml     GEN:   Awake, alert, in no acute distress, resting comfortably in bed   CV:      Regular rate and rhythm  L:         Symmetric expansion, not labored on room air  Abd:    Soft, not distended, appropriately tender palpation on the right side of the abdominal wall, dressing in place which is clean dry and intact  Ext:     No cyanosis, clubbing, or edema    Recent labs that are back at this time have been reviewed.           Assessment/ Plan:    Mr. Bhat is a 64-year-old gentleman who previously underwent open sigmoid colectomy in January 2024 with diverting loop ileostomy creation who is admitted after undergoing loop ileostomy takedown on June 28     -Postoperative day 2  -Vital signs are stable  -Labs yesterday were appropriate.  Will recheck tomorrow  -Will de-escalate back to clear liquids given the episode of nausea and vomiting he had last night.  No recurrence overnight however and he has started to have to have bowel movements  -Discontinue PCA  -Continue IV fluids  -PPI  -Mobilize      Jimmy Craig MD  6/30/2024  08:00 EDT        Electronically signed by Jimmy Craig MD at 06/30/24 0802       Consult Notes (last 72 hours)  Notes from 06/29/24 1107 through 07/02/24 1107   No notes of this type exist for this encounter.

## 2024-07-02 NOTE — PROGRESS NOTES
"Patient Name:  Roger Bhat  YOB: 1960  [id number]    Surgery Progress Note    Date of visit: 7/2/2024      Subjective: Had an isolated fever yesterday of 101.7.  Has been afebrile since then and overall feels well.  Continues to have some loose stools but this is becoming more solid, 3 overnight.  Denies any nausea, vomiting, fevers or chills.  Tolerated regular diet without difficulty.  No episodes of emesis.  Denies shortness of air or dysuria.  Does have a slight cough          Objective:     /82 (BP Location: Left arm, Patient Position: Lying)   Pulse 86   Temp 97.2 °F (36.2 °C) (Temporal)   Resp 18   Ht 175.3 cm (69\")   Wt 69.9 kg (154 lb)   SpO2 97%   BMI 22.74 kg/m²   No intake or output data in the 24 hours ending 07/02/24 0743    GEN:   Awake, alert, in no acute distress, resting comfortably in bed   CV:   Regular rate and rhythm  L:  Symmetric expansion, not labored on room air  Abd:  Soft, not distended, only mild appropriate tenderness to palpation of the right side surrounding incision site, incision is clean dry and intact with open portion with red granulation tissue  Ext:  No cyanosis, clubbing, or edema    Recent labs that are back at this time have been reviewed.           Assessment/ Plan:    Mr. Bhat is a 64-year-old gentleman who previously underwent open sigmoid colectomy in January 2024 with diverting loop ileostomy creation who is admitted after undergoing loop ileostomy takedown on June 28     -Postoperative day 4  -Had an isolated febrile temp of 101.7 yesterday.  Labs yesterday with hypokalemia that has since been replaced as well as white blood cell count of 12.  Repeat labs today are pending  -Chest x-ray yesterday with no acute findings.  No dysuria.  -Tolerating diet and having bowel function  -Will follow-up repeat labs today.        Jimmy Craig MD  7/2/2024  07:43 EDT      "

## 2024-07-03 ENCOUNTER — TRANSITIONAL CARE MANAGEMENT TELEPHONE ENCOUNTER (OUTPATIENT)
Dept: CALL CENTER | Facility: HOSPITAL | Age: 64
End: 2024-07-03
Payer: COMMERCIAL

## 2024-07-03 NOTE — OUTREACH NOTE
Call Center TCM Note      Flowsheet Row Responses   [facility] patient discharged from? [place]   Does the patient have one of the following disease processes/diagnoses(primary or secondary)? General Surgery   TCM attempt successful? Yes   Call start time 1223   Call end time 1226   Discharge diagnosis loop ileostomy reversal.   Is patient permission given to speak with other caregiver? Yes   Person spoke with today (if not patient) and relationship Ambar Daniels Significant Other   Meds reviewed with patient/caregiver? Yes  [New: percocet, zofran, potassium Chloride]   Does the patient have all medications related to this admission filled (includes all antibiotics, pain medications, etc.) Yes   Is the patient taking all medications as directed (includes completed medication regime)? Yes   Comments PCP Dr Mary. Declines to schedule a PCP HFU appt with call today. Patient will be following up with his surgeon and denies any needs from Primary care. Reports that they do have lab recheck in place to check potassium.   Does the patient have an appointment with their PCP within 7-14 days of discharge? No   Nursing Interventions Patient desires to follow up with specialty only, Routed TCM call to PCP office   Has home health visited the patient within 72 hours of discharge? N/A   Psychosocial issues? No   Did the patient receive a copy of their discharge instructions? Yes   Nursing interventions Reviewed instructions with patient   What is the patient's perception of their health status since discharge? Improving   Is the patient /caregiver able to teach back basic post-op care? Keep incision areas clean,dry and protected, Lifting as instructed by MD in discharge instructions, No tub bath, swimming, or hot tub until instructed by MD  [OK to shower]   Is the patient/caregiver able to teach back signs and symptoms of incisional infection? Increased redness, swelling or pain at the incisonal site, Increased drainage  or bleeding, Fever, Pus or odor from incision   Is the patient/caregiver able to teach back steps to recovery at home? Set small, achievable goals for return to baseline health, Rest and rebuild strength, gradually increase activity, Eat a well-balance diet   Is the patient/caregiver able to teach back the hierarchy of who to call/visit for symptoms/problems? PCP, Specialist, Home health nurse, Urgent Care, ED, 911 Yes   TCM call completed? Yes   Call end time 1226   Would this patient benefit from a Referral to [facility] Social Work? No   Is the patient interested in additional calls from an ambulatory ? No            Delfina Wright, KHURRAM    7/3/2024, 12:31 EDT

## 2024-09-06 ENCOUNTER — OFFICE VISIT (OUTPATIENT)
Dept: FAMILY MEDICINE CLINIC | Facility: CLINIC | Age: 64
End: 2024-09-06
Payer: COMMERCIAL

## 2024-09-06 VITALS
HEIGHT: 69 IN | DIASTOLIC BLOOD PRESSURE: 80 MMHG | SYSTOLIC BLOOD PRESSURE: 130 MMHG | OXYGEN SATURATION: 99 % | RESPIRATION RATE: 18 BRPM | HEART RATE: 75 BPM | WEIGHT: 163 LBS | BODY MASS INDEX: 24.14 KG/M2 | TEMPERATURE: 98.1 F

## 2024-09-06 DIAGNOSIS — R23.3 EASY BRUISING: Primary | ICD-10-CM

## 2024-09-06 PROBLEM — L03.113 CELLULITIS OF RIGHT HAND: Status: RESOLVED | Noted: 2018-11-29 | Resolved: 2024-09-06

## 2024-09-06 PROBLEM — E43 SEVERE MALNUTRITION: Status: RESOLVED | Noted: 2024-01-30 | Resolved: 2024-09-06

## 2024-09-06 PROBLEM — F17.200 CURRENT SMOKER: Status: RESOLVED | Noted: 2017-09-20 | Resolved: 2024-09-06

## 2024-09-06 PROBLEM — Z93.2 ILEOSTOMY PRESENT: Status: RESOLVED | Noted: 2024-06-28 | Resolved: 2024-09-06

## 2024-09-06 PROCEDURE — 99213 OFFICE O/P EST LOW 20 MIN: CPT | Performed by: FAMILY MEDICINE

## 2024-09-06 NOTE — PROGRESS NOTES
Subjective   Roger Bhat is a 64 y.o. male.     History of Present Illness     He had been in the hospital for about one  month in Jan 24  This was related to some cancer issue but the they did biopsy and had resection a dthis was benign  Had another surgery after initial surgery  Had ileostomy which was reversed    They are concerned about excessive bruising  All over his legs which is worse  Can easily bleed as well  The bruises take a long time    He did NOT have this issue prior to his surgery in , so has not been a lifelong problem      The following portions of the patient's history were reviewed and updated as appropriate: allergies, current medications, past family history, past medical history, past social history, past surgical history, and problem list.    Review of Systems   Constitutional: Negative.    Psychiatric/Behavioral: Negative.         Objective   Physical Exam  Vitals and nursing note reviewed.   Constitutional:       General: He is not in acute distress.     Appearance: Normal appearance. He is well-developed.   Cardiovascular:      Rate and Rhythm: Normal rate and regular rhythm.      Heart sounds: Normal heart sounds.   Pulmonary:      Effort: Pulmonary effort is normal.      Breath sounds: Normal breath sounds.   Skin:         Neurological:      Mental Status: He is alert and oriented to person, place, and time.   Psychiatric:         Mood and Affect: Mood normal.         Behavior: Behavior normal.         Thought Content: Thought content normal.         Judgment: Judgment normal.         Assessment & Plan   Diagnoses and all orders for this visit:    1. Easy bruising (Primary)  -     CBC & Differential  -     Comprehensive Metabolic Panel    No wide spread bruising noted over chest, torso, nor BUE.  Some on LLE and one on RLE.  Will check blood counts  No history of bleeding nor bruising issues in past.  They seem to be more prevalent since he went through his bowel surgeries in  Jan/Feb.  May be nutrition status so did recommend MTV  Will f/u pending labs

## 2024-09-07 LAB
ALBUMIN SERPL-MCNC: 4.4 G/DL (ref 3.9–4.9)
ALP SERPL-CCNC: 99 IU/L (ref 44–121)
ALT SERPL-CCNC: 21 IU/L (ref 0–44)
AST SERPL-CCNC: 21 IU/L (ref 0–40)
BASOPHILS # BLD AUTO: 0.1 X10E3/UL (ref 0–0.2)
BASOPHILS NFR BLD AUTO: 1 %
BILIRUB SERPL-MCNC: 0.3 MG/DL (ref 0–1.2)
BUN SERPL-MCNC: 8 MG/DL (ref 8–27)
BUN/CREAT SERPL: 9 (ref 10–24)
CALCIUM SERPL-MCNC: 9.4 MG/DL (ref 8.6–10.2)
CHLORIDE SERPL-SCNC: 98 MMOL/L (ref 96–106)
CO2 SERPL-SCNC: 23 MMOL/L (ref 20–29)
CREAT SERPL-MCNC: 0.89 MG/DL (ref 0.76–1.27)
EGFRCR SERPLBLD CKD-EPI 2021: 97 ML/MIN/1.73
EOSINOPHIL # BLD AUTO: 0.4 X10E3/UL (ref 0–0.4)
EOSINOPHIL NFR BLD AUTO: 4 %
ERYTHROCYTE [DISTWIDTH] IN BLOOD BY AUTOMATED COUNT: 13.7 % (ref 11.6–15.4)
GLOBULIN SER CALC-MCNC: 2.7 G/DL (ref 1.5–4.5)
GLUCOSE SERPL-MCNC: 93 MG/DL (ref 70–99)
HCT VFR BLD AUTO: 39.6 % (ref 37.5–51)
HGB BLD-MCNC: 13.2 G/DL (ref 13–17.7)
IMM GRANULOCYTES # BLD AUTO: 0 X10E3/UL (ref 0–0.1)
IMM GRANULOCYTES NFR BLD AUTO: 0 %
LYMPHOCYTES # BLD AUTO: 2.3 X10E3/UL (ref 0.7–3.1)
LYMPHOCYTES NFR BLD AUTO: 26 %
MCH RBC QN AUTO: 34.3 PG (ref 26.6–33)
MCHC RBC AUTO-ENTMCNC: 33.3 G/DL (ref 31.5–35.7)
MCV RBC AUTO: 103 FL (ref 79–97)
MONOCYTES # BLD AUTO: 0.7 X10E3/UL (ref 0.1–0.9)
MONOCYTES NFR BLD AUTO: 8 %
NEUTROPHILS # BLD AUTO: 5.3 X10E3/UL (ref 1.4–7)
NEUTROPHILS NFR BLD AUTO: 61 %
PLATELET # BLD AUTO: 338 X10E3/UL (ref 150–450)
POTASSIUM SERPL-SCNC: 4.6 MMOL/L (ref 3.5–5.2)
PROT SERPL-MCNC: 7.1 G/DL (ref 6–8.5)
RBC # BLD AUTO: 3.85 X10E6/UL (ref 4.14–5.8)
SODIUM SERPL-SCNC: 136 MMOL/L (ref 134–144)
WBC # BLD AUTO: 8.8 X10E3/UL (ref 3.4–10.8)

## 2024-10-30 DIAGNOSIS — J43.9 PULMONARY EMPHYSEMA, UNSPECIFIED EMPHYSEMA TYPE: ICD-10-CM

## 2024-10-30 DIAGNOSIS — F17.200 CURRENT SMOKER: ICD-10-CM

## 2024-10-30 RX ORDER — ALBUTEROL SULFATE 90 UG/1
INHALANT RESPIRATORY (INHALATION)
Qty: 18 G | Refills: 11 | Status: SHIPPED | OUTPATIENT
Start: 2024-10-30

## 2024-11-04 RX ORDER — BUPROPION HYDROCHLORIDE 150 MG/1
TABLET ORAL
Qty: 90 TABLET | Refills: 1 | Status: SHIPPED | OUTPATIENT
Start: 2024-11-04

## 2024-11-06 DIAGNOSIS — J44.9 CHRONIC OBSTRUCTIVE PULMONARY DISEASE, UNSPECIFIED COPD TYPE: ICD-10-CM

## 2024-11-06 RX ORDER — BUDESONIDE, GLYCOPYRROLATE, AND FORMOTEROL FUMARATE 160; 9; 4.8 UG/1; UG/1; UG/1
AEROSOL, METERED RESPIRATORY (INHALATION)
Qty: 10.7 G | Refills: 5 | OUTPATIENT
Start: 2024-11-06

## 2024-12-03 ENCOUNTER — TELEPHONE (OUTPATIENT)
Dept: FAMILY MEDICINE CLINIC | Facility: CLINIC | Age: 64
End: 2024-12-03
Payer: COMMERCIAL

## 2024-12-03 NOTE — TELEPHONE ENCOUNTER
Caller: Ambar Daniels    Relationship: Emergency Contact    Best call back number: [phone]     Requested Prescriptions:   [facility] INHALER     Pharmacy where request should be sent: [address] - [phone]  - [phone] FX     Last office visit with prescribing clinician: 2/22/2024   Last telemedicine visit with prescribing clinician: Visit date not found   Next office visit with prescribing clinician: Visit date not found     Additional details provided by patient:     Does the patient have less than a 3 day supply:  [x] Yes  [] No    Would you like a call back once the refill request has been completed: [] Yes [x] No    If the office needs to give you a call back, can they leave a voicemail: [] Yes [x] No    Cadance Dunaway, RegSched Rep   12/03/24 16:53 EST

## 2024-12-04 NOTE — TELEPHONE ENCOUNTER
Nay is currently on medication list. Is he using this inhaler? If so, cannot combine with Breztri.

## 2024-12-08 RX ORDER — BUDESONIDE, GLYCOPYRROLATE, AND FORMOTEROL FUMARATE 160; 9; 4.8 UG/1; UG/1; UG/1
2 AEROSOL, METERED RESPIRATORY (INHALATION) 2 TIMES DAILY
Qty: 10.7 G | Refills: 5 | Status: SHIPPED | OUTPATIENT
Start: 2024-12-08

## 2024-12-09 DIAGNOSIS — J30.89 NON-SEASONAL ALLERGIC RHINITIS, UNSPECIFIED TRIGGER: ICD-10-CM

## 2024-12-09 RX ORDER — LEVOCETIRIZINE DIHYDROCHLORIDE 5 MG/1
5 TABLET, FILM COATED ORAL EVERY EVENING
Qty: 90 TABLET | Refills: 1 | Status: SHIPPED | OUTPATIENT
Start: 2024-12-09

## 2024-12-21 ENCOUNTER — HOSPITAL ENCOUNTER (OUTPATIENT)
Dept: CT IMAGING | Facility: HOSPITAL | Age: 64
Discharge: HOME | End: 2024-12-21
Payer: COMMERCIAL

## 2024-12-21 DIAGNOSIS — R91.1 LUNG NODULE SEEN ON IMAGING STUDY: ICD-10-CM

## 2024-12-21 PROCEDURE — 71250 CT THORAX DX C-: CPT

## 2024-12-26 DIAGNOSIS — K21.9 GASTROESOPHAGEAL REFLUX DISEASE WITHOUT ESOPHAGITIS: ICD-10-CM

## 2024-12-26 RX ORDER — OMEPRAZOLE 40 MG/1
40 CAPSULE, DELAYED RELEASE ORAL DAILY
Qty: 90 CAPSULE | Refills: 3 | Status: SHIPPED | OUTPATIENT
Start: 2024-12-26

## 2025-01-14 ENCOUNTER — TELEPHONE (OUTPATIENT)
Dept: FAMILY MEDICINE CLINIC | Facility: CLINIC | Age: 65
End: 2025-01-14
Payer: COMMERCIAL

## 2025-01-14 NOTE — TELEPHONE ENCOUNTER
Caller: Ambar Daniels    Relationship to patient: Emergency Contact      Best call back number: [phone]    Provider: PRAMOD     Medication PA needed: BREZTRI    Reason for call/Prior Auth: THE CALLER WOULD LIKE TO GET HELP TO GET A TIER REDUCTION ON THE MEDICATION TO HELP LOWER THE COST THE CALLER SAID THAT WHEN SHE TRIED TO FILL  THE MEDICATION THEY WANTED 45.00 FOR A 30 DAY SUPPLY

## 2025-01-15 ENCOUNTER — PRIOR AUTHORIZATION (OUTPATIENT)
Dept: FAMILY MEDICINE CLINIC | Facility: CLINIC | Age: 65
End: 2025-01-15
Payer: COMMERCIAL

## 2025-01-17 NOTE — TELEPHONE ENCOUNTER
PA not needed, available without authorization.   Completed Step Therapy Exception form and faxed to [phone].  Left detailed vm with pt letting him know this was faxed.

## 2025-02-04 ENCOUNTER — TELEPHONE (OUTPATIENT)
Dept: FAMILY MEDICINE CLINIC | Facility: CLINIC | Age: 65
End: 2025-02-04
Payer: COMMERCIAL

## 2025-02-04 NOTE — TELEPHONE ENCOUNTER
Caller: Ambar Daniels    Relationship: Emergency Contact    Best call back number: [phone]     What is the best time to reach you: ANY    Who are you requesting to speak with (clinical staff, provider,  specific staff member): CLINICAL STAFF    What was the call regarding: PATIENT HAS HAD A COUPLE SURGERIES ON THROAT, PATIENT WENT TO EAT 2/3 AND CHOKED ON FOOD, PATIENT WENT TO ER. PATIENTS EMERGENCY CONTACT WOULD LIKE A CALL TO DISCUS WHAT THE NEXT STEP OF TREATMENT SHOULD BE. AMBAR STATES PATIENT IS OK NOW, PLEASE CALL TO ADVISE.

## 2025-02-05 NOTE — TELEPHONE ENCOUNTER
Please schedule appt. Possibly needs swallow study vs gastroenterology consult.   He is established with Dr. Craig. He also has option of contacting his office for this concern and see if EGD is needed.

## 2025-02-14 ENCOUNTER — TRANSCRIBE ORDERS (OUTPATIENT)
Dept: ADMINISTRATIVE | Facility: HOSPITAL | Age: 65
End: 2025-02-14
Payer: COMMERCIAL

## 2025-02-14 DIAGNOSIS — R13.10 DYSPHAGIA, UNSPECIFIED TYPE: Primary | ICD-10-CM

## 2025-03-03 ENCOUNTER — HOSPITAL ENCOUNTER (OUTPATIENT)
Dept: GENERAL RADIOLOGY | Facility: HOSPITAL | Age: 65
Discharge: HOME | End: 2025-03-03
Admitting: SURGERY
Payer: COMMERCIAL

## 2025-03-03 DIAGNOSIS — R13.10 DYSPHAGIA, UNSPECIFIED TYPE: ICD-10-CM

## 2025-03-03 PROCEDURE — 74220 X-RAY XM ESOPHAGUS 1CNTRST: CPT

## 2025-03-03 RX ADMIN — BARIUM SULFATE 183 ML: 960 POWDER, FOR SUSPENSION ORAL at 13:29

## 2025-03-04 DIAGNOSIS — E78.00 ELEVATED LDL CHOLESTEROL LEVEL: Primary | ICD-10-CM

## 2025-03-04 RX ORDER — ROSUVASTATIN CALCIUM 10 MG/1
10 TABLET, COATED ORAL
Qty: 90 TABLET | Refills: 0 | Status: SHIPPED | OUTPATIENT
Start: 2025-03-04

## 2025-03-14 PROCEDURE — 88305 TISSUE EXAM BY PATHOLOGIST: CPT | Performed by: SURGERY

## 2025-03-17 ENCOUNTER — LAB REQUISITION (OUTPATIENT)
Dept: LAB | Facility: HOSPITAL | Age: 65
End: 2025-03-17
Payer: COMMERCIAL

## 2025-03-17 DIAGNOSIS — R13.10 DYSPHAGIA, UNSPECIFIED: ICD-10-CM

## 2025-03-22 DIAGNOSIS — N52.9 ERECTILE DYSFUNCTION, UNSPECIFIED ERECTILE DYSFUNCTION TYPE: ICD-10-CM

## 2025-03-24 RX ORDER — SILDENAFIL 100 MG/1
TABLET, FILM COATED ORAL
Qty: 20 TABLET | Refills: 5 | Status: SHIPPED | OUTPATIENT
Start: 2025-03-24

## 2025-04-03 ENCOUNTER — TELEPHONE (OUTPATIENT)
Dept: FAMILY MEDICINE CLINIC | Facility: CLINIC | Age: 65
End: 2025-04-03
Payer: COMMERCIAL

## 2025-04-03 NOTE — TELEPHONE ENCOUNTER
Caller: JeremiahAmbar    Relationship: Emergency Contact    Best call back number: [phone]     What is the best time to reach you: ANYTIME     Who are you requesting to speak with (clinical staff, provider,  specific staff member): CLINICAL STAFF     What was the call regarding: PATIENT'S FRIEND IS CALLING TO SPEAK WITH THE CLINICAL STAFF ABOUT A MEDICATION. SHE IS NOT SURE THE NAME OF THE MEDICATION AS IT IS A NEW PRESCRIPTION AND IT WAS PRESCRIBED BY A DIFFERENT PROVIDER. SHE HAS SOME CONFUSION ON IT.     Is it okay if the provider responds through MyChart: NO

## 2025-04-04 NOTE — TELEPHONE ENCOUNTER
Returned call, informed Ambar Jreemiah that our office does not have an up to date KELSI form with her on it.   Ambar stated she and Roger will get this updated, and that they already reached out to the other provider regarding her call. No further instructions were needed from us.

## 2025-04-09 ENCOUNTER — TELEPHONE (OUTPATIENT)
Dept: FAMILY MEDICINE CLINIC | Facility: CLINIC | Age: 65
End: 2025-04-09
Payer: COMMERCIAL

## 2025-04-09 NOTE — TELEPHONE ENCOUNTER
I prescribed omeprazole in Dec 2024. If omeprazole isn't effective, likely why it was changed to Dexilant. Will need to contact prescribing office.

## 2025-04-09 NOTE — TELEPHONE ENCOUNTER
Caller: Ambar Daniels    Relationship: Emergency Contact    Best call back number: [phone]     Which medication are you concerned about: DEXILANT 60 MG    Who prescribed you this medication:     ESPERANZA JO     What are your concerns: PATIENT STATES MEDICATION IS $75 FOR 30 DAYS AFTER INSURANCE AND THEY ARE WANTING TO KNOW IF THERE IS ANY MEDICATION THAT IS CHEAPER. ALSO TAKE omeprazole (priLOSEC) 40 MG capsule  FOR ACID REFLUX SO WOULD LIKE CLARIFICATION ON WHAT HE SHOULD DO. DR. JO STATED THAT HE DID NOT THINK THE ESOPHAGUS NEEDED TO BE STRETCHED OUT. EVERYTHING LOOKED OKAY BESIDES HIM BEING CONCERNED ABOUT THE ACID REFLUX.

## 2025-04-15 DIAGNOSIS — N52.9 ERECTILE DYSFUNCTION, UNSPECIFIED ERECTILE DYSFUNCTION TYPE: ICD-10-CM

## 2025-04-15 RX ORDER — ROSUVASTATIN CALCIUM 10 MG/1
10 TABLET, COATED ORAL
Qty: 90 TABLET | Refills: 0 | OUTPATIENT
Start: 2025-04-15

## 2025-04-15 RX ORDER — SILDENAFIL 100 MG/1
TABLET, FILM COATED ORAL
Qty: 20 TABLET | Refills: 5 | OUTPATIENT
Start: 2025-04-15

## 2025-04-15 NOTE — TELEPHONE ENCOUNTER
Pt needs an appointment     Last seen PCP 02-     Called the only number listed and it is for [facility]- pt significant other- no answer left vcm with call back number- no [facility] for [facility] please get in touch with pt to schedule- per last office note with PCP Follow-up  The patient will follow up in 3 months.

## 2025-04-29 RX ORDER — BUPROPION HYDROCHLORIDE 150 MG/1
150 TABLET ORAL EVERY MORNING
Qty: 90 TABLET | Refills: 1 | Status: SHIPPED | OUTPATIENT
Start: 2025-04-29

## 2025-06-10 DIAGNOSIS — J30.89 NON-SEASONAL ALLERGIC RHINITIS, UNSPECIFIED TRIGGER: ICD-10-CM

## 2025-06-10 RX ORDER — LEVOCETIRIZINE DIHYDROCHLORIDE 5 MG/1
5 TABLET, FILM COATED ORAL EVERY EVENING
Qty: 90 TABLET | Refills: 1 | Status: SHIPPED | OUTPATIENT
Start: 2025-06-10

## 2025-06-18 RX ORDER — BUDESONIDE, GLYCOPYRROLATE, AND FORMOTEROL FUMARATE 160; 9; 4.8 UG/1; UG/1; UG/1
2 AEROSOL, METERED RESPIRATORY (INHALATION) 2 TIMES DAILY
Qty: 10.7 G | Refills: 5 | Status: SHIPPED | OUTPATIENT
Start: 2025-06-18

## 2025-06-26 RX ORDER — ROSUVASTATIN CALCIUM 10 MG/1
10 TABLET, COATED ORAL
Qty: 90 TABLET | Refills: 0 | Status: SHIPPED | OUTPATIENT
Start: 2025-06-26

## 2025-07-03 ENCOUNTER — PRIOR AUTHORIZATION (OUTPATIENT)
Dept: FAMILY MEDICINE CLINIC | Facility: CLINIC | Age: 65
End: 2025-07-03

## 2025-07-03 ENCOUNTER — OFFICE VISIT (OUTPATIENT)
Dept: FAMILY MEDICINE CLINIC | Facility: CLINIC | Age: 65
End: 2025-07-03
Payer: COMMERCIAL

## 2025-07-03 VITALS
HEART RATE: 80 BPM | WEIGHT: 161.8 LBS | HEIGHT: 69 IN | DIASTOLIC BLOOD PRESSURE: 80 MMHG | TEMPERATURE: 98 F | SYSTOLIC BLOOD PRESSURE: 162 MMHG | RESPIRATION RATE: 18 BRPM | BODY MASS INDEX: 23.96 KG/M2 | OXYGEN SATURATION: 96 %

## 2025-07-03 DIAGNOSIS — T14.8XXA WOUND INFECTION: ICD-10-CM

## 2025-07-03 DIAGNOSIS — R03.0 ELEVATED BLOOD PRESSURE READING IN OFFICE WITHOUT DIAGNOSIS OF HYPERTENSION: ICD-10-CM

## 2025-07-03 DIAGNOSIS — E78.00 ELEVATED LDL CHOLESTEROL LEVEL: ICD-10-CM

## 2025-07-03 DIAGNOSIS — L08.9 WOUND INFECTION: ICD-10-CM

## 2025-07-03 DIAGNOSIS — Z87.891 HISTORY OF TOBACCO USE DISORDER: ICD-10-CM

## 2025-07-03 DIAGNOSIS — H10.32 ACUTE BACTERIAL CONJUNCTIVITIS OF LEFT EYE: Primary | ICD-10-CM

## 2025-07-03 DIAGNOSIS — K21.9 GASTROESOPHAGEAL REFLUX DISEASE WITHOUT ESOPHAGITIS: ICD-10-CM

## 2025-07-03 RX ORDER — DOXYCYCLINE 100 MG/1
100 CAPSULE ORAL EVERY 12 HOURS SCHEDULED
Qty: 20 CAPSULE | Refills: 0 | Status: SHIPPED | OUTPATIENT
Start: 2025-07-03 | End: 2025-07-13

## 2025-07-03 RX ORDER — FEXOFENADINE HCL 60 MG/1
60 TABLET, FILM COATED ORAL DAILY
COMMUNITY

## 2025-07-03 RX ORDER — DEXLANSOPRAZOLE 60 MG/1
1 CAPSULE, DELAYED RELEASE ORAL DAILY
COMMUNITY
Start: 2025-06-04 | End: 2025-07-03 | Stop reason: SDUPTHER

## 2025-07-03 RX ORDER — DEXLANSOPRAZOLE 60 MG/1
1 CAPSULE, DELAYED RELEASE ORAL DAILY
Qty: 90 CAPSULE | Refills: 1 | Status: SHIPPED | OUTPATIENT
Start: 2025-07-03

## 2025-07-03 RX ORDER — MOXIFLOXACIN 5 MG/ML
1 SOLUTION/ DROPS OPHTHALMIC 3 TIMES DAILY
Qty: 3 ML | Refills: 0 | Status: SHIPPED | OUTPATIENT
Start: 2025-07-03 | End: 2025-07-10

## 2025-07-03 NOTE — PROGRESS NOTES
Chief Complaint  Heartburn and Eye Problem (Wanting refill of the Moxifloxacin. )    Subjective          Roger Logan Bhat presents to [facility]    History of Present Illness      He reports experiencing left eye conjunctivitis, which he suspects may have been contracted from horses or barn swallows. A few days ago, he experienced soreness in his eye, which remains red. He was prescribed medication by an urgent care doctor but has run out of it. He has been applying warm compresses to his eye.    He has been using Dexilant for heartburn, as prescribed by Dr. Edgar. He tried omeprazole and pantoprazole in the past. He also takes famotidine for indigestion at night, as needed.     He reports difficulty healing wounds on his arm and leg. He has been applying lotion and ointment to the affected areas and occasionally bandages them. He has never had his blood flow checked. This issue has been recurring for over a year.    He has been experiencing muscle cramps at night for the past couple of nights. He drinks a lot of Gatorade Zero.    He reports frequent bowel movements, often twice a day or more, immediately after eating. He underwent gallbladder removal surgery a year ago.    He has been monitoring his blood pressure, which usually ranges from 135 to 145 systolic.    He quit smoking on 01/16/2024. His weight was 111 pounds when he was hospitalized. His weight was 125 to 140 pounds 2 years ago. His appetite is good, and he eats quite a bit.    SOCIAL HISTORY  He quit smoking on 01/16/2024.      The following portions of the patient's history were reviewed and updated as appropriate: allergies, current medications, past family history, past medical history, past social history, past surgical history, and problem list.    Objective      Physical Exam  Vitals and nursing note reviewed.   Eyes:      Conjunctiva/sclera:      Left eye: Left conjunctiva is injected.   Cardiovascular:       Rate and Rhythm: Normal rate and regular rhythm.      Heart sounds: Normal heart sounds.   Pulmonary:      Effort: Pulmonary effort is normal.      Breath sounds: Normal breath sounds.   Skin:            Comments: Left ventral forearm wound with surrounding erythema.  Sparse hair growth distal bilateral lower extremities   Neurological:      Mental Status: He is alert.   Psychiatric:         Mood and Affect: Mood normal.        Physical Exam      Result Review :       Results               Assessment and Plan    Diagnoses and all orders for this visit:    1. Acute bacterial conjunctivitis of left eye (Primary)  -     moxifloxacin (Vigamox) 0.5 % ophthalmic solution; Administer 0.05 mL into the left eye 3 (Three) Times a Day for 7 days.  Dispense: 3 mL; Refill: 0    2. Gastroesophageal reflux disease without esophagitis  -     dexlansoprazole (DEXILANT) 60 MG capsule; Take 1 capsule by mouth Daily.  Dispense: 90 capsule; Refill: 1    3. Wound infection  -     doxycycline (MONODOX) 100 MG capsule; Take 1 capsule by mouth Every 12 (Twelve) Hours for 10 days.  Dispense: 20 capsule; Refill: 0    4. Elevated blood pressure reading in office without diagnosis of hypertension    5. History of tobacco use disorder  -     Doppler Arterial Multi Level Lower Extremity - Bilateral CAR; Future    6. Elevated LDL cholesterol level  -     Doppler Arterial Multi Level Lower Extremity - Bilateral CAR; Future      Assessment & Plan  1. Conjunctivitis.  - Reports soreness and redness in the left eye, likely due to exposure to animals.  - Physical exam shows mild infection in the left eye.  - Discussed continuing warm compresses and the effectiveness of current medication.  - Prescription for eye drops will be sent to pharmacy.    2. Heartburn.  - Physical exam and review of records indicate previous use of omeprazole and pantoprazole.  - Discussed using famotidine for breakthrough symptoms.    3. Wound healing issues.  - Reports  difficulty healing wounds on legs, ongoing for over a year.  - Physical exam shows redness and infection in wounds.  - Discussed ordering an ultrasound of the arteries in the legs to check for blockages.  - Prescription for doxycycline will be provided; advised to keep wounds bandaged while working outside.    4. Muscle cramps.  - Reports experiencing muscle cramps at night.  - Physical exam and review of hydration status.  - Discussed ensuring adequate hydration and considering magnesium supplements.  - Advised to continue using magnesium supplements if needed.    5. Dumping syndrome.  - Experiences frequent bowel movements after eating, likely due to gallbladder removal.  - Physical exam and review of dietary habits.  - Discussed monitoring diet, particularly avoiding fatty/greasy foods.  - Advised to manage symptoms through dietary adjustments.    6. Elevated blood pressure.  - Blood pressure reading is elevated. He reports normal home readings  - Physical exam and review of blood pressure history.  - Discussed continuing regular monitoring of blood pressure.    7. Weight management.  - Weight was 150 pounds in 10/2022 and 143 pounds in 10/2023. Current weight is ok.  - Physical exam and review of weight history.  - Discussed maintaining current weight and being mindful of diet.  - Advised to continue monitoring weight and dietary habits.    Follow-up  - The patient will follow up in 09/2025.        Follow Up   No follow-ups on file.  Patient or patient representative verbalized consent for the use of Ambient Listening during the visit with  Richa Mary DO for chart documentation. 7/5/2025  12:14 EDT    Patient was given instructions and counseling regarding his condition or for health maintenance advice. Please see specific information pulled into the AVS if appropriate.

## 2025-07-17 ENCOUNTER — TELEPHONE (OUTPATIENT)
Dept: FAMILY MEDICINE CLINIC | Facility: CLINIC | Age: 65
End: 2025-07-17

## 2025-07-17 ENCOUNTER — HOSPITAL ENCOUNTER (OUTPATIENT)
Dept: CARDIOLOGY | Facility: HOSPITAL | Age: 65
Discharge: HOME | End: 2025-07-17
Admitting: FAMILY MEDICINE
Payer: COMMERCIAL

## 2025-07-17 VITALS — HEIGHT: 69 IN | WEIGHT: 161 LBS | BODY MASS INDEX: 23.85 KG/M2

## 2025-07-17 DIAGNOSIS — Z87.891 HISTORY OF TOBACCO USE DISORDER: ICD-10-CM

## 2025-07-17 DIAGNOSIS — E78.00 ELEVATED LDL CHOLESTEROL LEVEL: ICD-10-CM

## 2025-07-17 LAB
BH CV LOWER ARTERIAL LEFT ABI RATIO: 1.03
BH CV LOWER ARTERIAL LEFT DORSALIS PEDIS SYS MAX: 147
BH CV LOWER ARTERIAL LEFT GREAT TOE SYS MAX: 134
BH CV LOWER ARTERIAL LEFT POST TIBIAL SYS MAX: 147
BH CV LOWER ARTERIAL LEFT TBI RATIO: 0.94
BH CV LOWER ARTERIAL RIGHT ABI RATIO: 0.93
BH CV LOWER ARTERIAL RIGHT CALF RATIO: 1.04
BH CV LOWER ARTERIAL RIGHT DORSALIS PEDIS SYS MAX: 121
BH CV LOWER ARTERIAL RIGHT GREAT TOE SYS MAX: 125
BH CV LOWER ARTERIAL RIGHT POPLITEAL SYS MAX: 149
BH CV LOWER ARTERIAL RIGHT POST TIBIAL SYS MAX: 133
BH CV LOWER ARTERIAL RIGHT TBI RATIO: 0.87
UPPER ARTERIAL LEFT ARM BRACHIAL SYS MAX: 143
UPPER ARTERIAL RIGHT ARM BRACHIAL SYS MAX: 139

## 2025-07-17 PROCEDURE — 93923 UPR/LXTR ART STDY 3+ LVLS: CPT

## 2025-07-22 ENCOUNTER — RESULTS FOLLOW-UP (OUTPATIENT)
Dept: FAMILY MEDICINE CLINIC | Facility: CLINIC | Age: 65
End: 2025-07-22
Payer: COMMERCIAL

## 2025-07-25 NOTE — TELEPHONE ENCOUNTER
Name: Roger Bhat    Relationship: Self    Best Callback Number:      HUB PROVIDED THE RELAY MESSAGE FROM THE OFFICE   PATIENT VOICED UNDERSTANDING AND HAS NO FURTHER QUESTIONS AT THIS TIME    ADDITIONAL INFORMATION:

## (undated) DEVICE — HLDR CATH FOL SAFESECURE

## (undated) DEVICE — SUT VIC PLS CTD ANTIB BR 0 18IN 45CM

## (undated) DEVICE — BLANKT WARM UPPR/BDY ARM/OUT 57X196CM

## (undated) DEVICE — FRCP BIOP COLD ENDOJAW ALLGTR CUP 2MM/CH 155CM

## (undated) DEVICE — Device

## (undated) DEVICE — PENCL SMOKE/EVAC MEGADYNE TELESCP 10FT

## (undated) DEVICE — SUT VIC 3/0 SH CR8 18IN VCP864D

## (undated) DEVICE — SYS VISABILITY LAP/SCPE LAPAROVUE CLN WARM 2/PRT 3TO12MM

## (undated) DEVICE — DRP SURG U/BUTT W/PCH PRT STRL

## (undated) DEVICE — SUT PDS 1 CT1 18IN Z741D

## (undated) DEVICE — PCH INST SURG INVISISHIELD 2PCKT

## (undated) DEVICE — TROC BLADLES XCEL/OPTI SLV THRD 5X100

## (undated) DEVICE — GLV SURG SENSICARE PI MIC PF SZ8.5 LF STRL

## (undated) DEVICE — DRP LAP CHOL W/PCH LF 102X122X78IN STRL

## (undated) DEVICE — DRP LEG 29X43IN CUFF6IN STRL

## (undated) DEVICE — TRAP FLD MINIVAC MEGADYNE 100ML

## (undated) DEVICE — SUT SILK 3/0 SH CR8 18IN C013D

## (undated) DEVICE — KT ORCA ORCAPOD DISP STRL

## (undated) DEVICE — 450 ML BOTTLE OF 0.05% CHLORHEXIDINE GLUCONATE IN 99.95% STERILE WATER FOR IRRIGATION, USP AND APPLICATOR.: Brand: IRRISEPT ANTIMICROBIAL WOUND LAVAGE

## (undated) DEVICE — ADHS SKIN PREMIERPRO EXOFIN TOPICAL HI/VISC .5ML

## (undated) DEVICE — SUT SILK 3/0 TIES 18IN A184H

## (undated) DEVICE — SYR LUERLOK 30CC

## (undated) DEVICE — GOWN SURG ORBIS LVL3 2XL STRL

## (undated) DEVICE — ROD OS LP SURFIT 2 1/2IN

## (undated) DEVICE — TUBING, SUCTION, 1/4" X 10', STRAIGHT: Brand: MEDLINE

## (undated) DEVICE — MARKR SKIN W/RULR

## (undated) DEVICE — ANTIBACTERIAL UNDYED BRAIDED (POLYGLACTIN 910), SYNTHETIC ABSORBABLE SUTURE: Brand: COATED VICRYL

## (undated) DEVICE — PK MAJ GEN ABD SPLT 10

## (undated) DEVICE — LUBE JELLY EZ FLIP TOP 2OZ

## (undated) DEVICE — ELECTRD BLD EZ CLN MOD XLNG 2.75IN

## (undated) DEVICE — TP SXN YANKR OPNTP LF STRL

## (undated) DEVICE — DRSNG PAD ABD 8X10IN STRL

## (undated) DEVICE — TRY CATH UROMETER SIL 16F

## (undated) DEVICE — DRN WND JP FLT HUBLSS SIL FUL/PERF 10MM 20CM

## (undated) DEVICE — GLV SURG SENSICARE PI MIC PF SZ8 LF STRL

## (undated) DEVICE — THE BITE BLOCK MAXI, LATEX FREE STRAP IS USED TO PROTECT THE ENDOSCOPE INSERTION TUBE FROM BEING BITTEN BY THE PATIENT.

## (undated) DEVICE — DRSNG WND BORDR/ADHS NONADHR/GZ LF 2X2IN STRL

## (undated) DEVICE — TBG PENCL TELESCP MEGADYNE SMOKE EVAC 10FT

## (undated) DEVICE — PCH PVPI LIQ 10PCT 3/4OZ STRL

## (undated) DEVICE — TBSET INSUFF HEATED W RTP FOR PNEUMO

## (undated) DEVICE — SUT PDS 0 ENDOLP 18IN DYED EZ10G

## (undated) DEVICE — RETRV BG SPECI ENDOPOUCH

## (undated) DEVICE — INTRO ACCSR BLNT TP

## (undated) DEVICE — SUT SILK 0 TIES 30IN A306H

## (undated) DEVICE — STPLR SKIN PROXIMATE RH WD

## (undated) DEVICE — SYR LUERLOK 50ML

## (undated) DEVICE — SUT SILK 2/0 TIES 18IN A185H

## (undated) DEVICE — CLTH CLENS READYCLEANSE PERI CARE PK/5

## (undated) DEVICE — SUT PROLN 2/0 PC3 8833H

## (undated) DEVICE — TP SXN SURG POOLE W/REMOV/SHEATH EYE/132

## (undated) DEVICE — SPNG GZ WOVN 4X4IN 12PLY 10/BX STRL

## (undated) DEVICE — SLV TROC ENDOPATHXCEL THRD 5X100MM CB5LT

## (undated) DEVICE — MEDI-VAC YANKAUER SUCTION HANDLE: Brand: CARDINAL HEALTH

## (undated) DEVICE — SYS IRRISEPT JET LAVG CHG .05PCT

## (undated) DEVICE — PATIENT RETURN ELECTRODE, SINGLE-USE, CONTACT QUALITY MONITORING, ADULT, WITH 9FT CORD, FOR PATIENTS WEIGING OVER 33LBS. (15KG): Brand: MEGADYNE

## (undated) DEVICE — DRSNG WND BORDR/ADHS NONADHR/GZ LF 4X14IN STRL

## (undated) DEVICE — APPL CHLORAPREP TINTED 26ML TEAL

## (undated) DEVICE — PAD GRND E/S MEGADYNE MONOPLR 2/PLT W/CORD A/ DISP

## (undated) DEVICE — SUT MNCRYL PLS ANTIB UD 4/0 PS2 18IN

## (undated) DEVICE — SUT ETHLN 2/0 PS 18IN 585H

## (undated) DEVICE — BNDR ABD PREMIUM/UNIV 10IN 27TO48IN

## (undated) DEVICE — SUT PROLN 2/0 KS 30IN 8623H

## (undated) DEVICE — SYS FILT LAP LAPAROSHIELD EVAC SMOKE STRL BX/10

## (undated) DEVICE — FIAPC® PROBE W/ FILTER 3000 A OD 2.3MM/6.9FR; L 3M/9.8FT: Brand: ERBE

## (undated) DEVICE — PREMIUM DRY TRAY LF: Brand: MEDLINE INDUSTRIES, INC.

## (undated) DEVICE — SINGLE-USE BIOPSY FORCEPS: Brand: RADIAL JAW 4

## (undated) DEVICE — DRN WND RESVR BULB JP SIL 100ML

## (undated) DEVICE — LEX GENERAL ABDOMINAL SPLIT: Brand: MEDLINE INDUSTRIES, INC.

## (undated) DEVICE — SUT VIC 0 UR6 27IN VCP603H

## (undated) DEVICE — DEV LAP LIGASURE OPN SEALER/DIV MARYLAND 37CM